# Patient Record
Sex: FEMALE | Race: BLACK OR AFRICAN AMERICAN | NOT HISPANIC OR LATINO | Employment: UNEMPLOYED | URBAN - NONMETROPOLITAN AREA
[De-identification: names, ages, dates, MRNs, and addresses within clinical notes are randomized per-mention and may not be internally consistent; named-entity substitution may affect disease eponyms.]

---

## 2017-01-23 RX ORDER — LANCETS
EACH MISCELLANEOUS
Qty: 100 EACH | Refills: 3 | Status: SHIPPED | OUTPATIENT
Start: 2017-01-23 | End: 2017-10-12 | Stop reason: SDUPTHER

## 2017-02-20 RX ORDER — ISOPROPYL ALCOHOL 0.75 G/1
SWAB TOPICAL
Qty: 200 EACH | Refills: 3 | Status: SHIPPED | OUTPATIENT
Start: 2017-02-20 | End: 2019-04-04 | Stop reason: SDUPTHER

## 2017-03-09 ENCOUNTER — OFFICE VISIT (OUTPATIENT)
Dept: OTOLARYNGOLOGY | Facility: CLINIC | Age: 82
End: 2017-03-09

## 2017-03-09 VITALS — WEIGHT: 194 LBS | BODY MASS INDEX: 29.4 KG/M2 | TEMPERATURE: 98.7 F | HEIGHT: 68 IN

## 2017-03-09 DIAGNOSIS — H61.23 BILATERAL IMPACTED CERUMEN: Primary | ICD-10-CM

## 2017-03-09 PROCEDURE — 69210 REMOVE IMPACTED EAR WAX UNI: CPT | Performed by: OTOLARYNGOLOGY

## 2017-03-09 NOTE — PROGRESS NOTES
Subjective   Kierra Scales is a 87 y.o. female.       History of Present Illness   Patient has a history of recurring binaural cerumen impactions.  Her daughter is noted that she hasn't been hearing well for a few months.      The following portions of the patient's history were reviewed and updated as appropriate: allergies, current medications, past family history, past medical history, past social history, past surgical history and problem list.     reports that she has never smoked. She does not have any smokeless tobacco history on file.   Patient is not a tobacco user and has not been counseled for use of tobacco products      Review of Systems        Objective   Physical Exam  Bilateral cerumen impactions are noted  Using the binocular microscope for visualization, cerumen impaction was removed from bilateral ear canal(s) using instrumentation. This was personally performed by Ugo Gale MD      Assessment/Plan   Kierra was seen today for follow-up.    Diagnoses and all orders for this visit:    Bilateral impacted cerumen      Cerumen removed as described above.  Will have her return in 9 months instead of waiting a year.

## 2017-03-15 ENCOUNTER — APPOINTMENT (OUTPATIENT)
Dept: LAB | Facility: HOSPITAL | Age: 82
End: 2017-03-15

## 2017-03-15 ENCOUNTER — OFFICE VISIT (OUTPATIENT)
Dept: ENDOCRINOLOGY | Facility: CLINIC | Age: 82
End: 2017-03-15

## 2017-03-15 VITALS
HEIGHT: 68 IN | BODY MASS INDEX: 29.37 KG/M2 | SYSTOLIC BLOOD PRESSURE: 118 MMHG | WEIGHT: 193.8 LBS | HEART RATE: 82 BPM | DIASTOLIC BLOOD PRESSURE: 68 MMHG

## 2017-03-15 DIAGNOSIS — E55.9 VITAMIN D DEFICIENCY: ICD-10-CM

## 2017-03-15 DIAGNOSIS — I10 ESSENTIAL HYPERTENSION: Primary | ICD-10-CM

## 2017-03-15 DIAGNOSIS — E05.90 HYPERTHYROIDISM: ICD-10-CM

## 2017-03-15 DIAGNOSIS — Z79.4 CONTROLLED TYPE 2 DIABETES MELLITUS WITHOUT COMPLICATION, WITH LONG-TERM CURRENT USE OF INSULIN (HCC): ICD-10-CM

## 2017-03-15 DIAGNOSIS — E11.9 CONTROLLED TYPE 2 DIABETES MELLITUS WITHOUT COMPLICATION, WITH LONG-TERM CURRENT USE OF INSULIN (HCC): ICD-10-CM

## 2017-03-15 DIAGNOSIS — E78.5 HYPERLIPIDEMIA, UNSPECIFIED HYPERLIPIDEMIA TYPE: ICD-10-CM

## 2017-03-15 LAB
ALBUMIN SERPL-MCNC: 4.6 G/DL (ref 3.4–4.8)
ALBUMIN/GLOB SERPL: 1.5 G/DL (ref 1.1–1.8)
ALP SERPL-CCNC: 75 U/L (ref 38–126)
ALT SERPL W P-5'-P-CCNC: 22 U/L (ref 9–52)
ANION GAP SERPL CALCULATED.3IONS-SCNC: 13 MMOL/L (ref 5–15)
AST SERPL-CCNC: 35 U/L (ref 14–36)
BASOPHILS # BLD AUTO: 0.01 10*3/MM3 (ref 0–0.2)
BASOPHILS NFR BLD AUTO: 0.2 % (ref 0–2)
BILIRUB SERPL-MCNC: 0.8 MG/DL (ref 0.2–1.3)
BUN BLD-MCNC: 13 MG/DL (ref 7–21)
BUN/CREAT SERPL: 16.9 (ref 7–25)
CALCIUM SPEC-SCNC: 9.6 MG/DL (ref 8.4–10.2)
CHLORIDE SERPL-SCNC: 101 MMOL/L (ref 95–110)
CO2 SERPL-SCNC: 29 MMOL/L (ref 22–31)
CREAT BLD-MCNC: 0.77 MG/DL (ref 0.5–1)
DEPRECATED RDW RBC AUTO: 43.7 FL (ref 36.4–46.3)
EOSINOPHIL # BLD AUTO: 0.08 10*3/MM3 (ref 0–0.7)
EOSINOPHIL NFR BLD AUTO: 1.7 % (ref 0–7)
ERYTHROCYTE [DISTWIDTH] IN BLOOD BY AUTOMATED COUNT: 12.8 % (ref 11.5–14.5)
GFR SERPL CREATININE-BSD FRML MDRD: 86 ML/MIN/1.73 (ref 39–90)
GLOBULIN UR ELPH-MCNC: 3 GM/DL (ref 2.3–3.5)
GLUCOSE BLD-MCNC: 138 MG/DL (ref 60–100)
HBA1C MFR BLD: 6.36 % (ref 4–5.6)
HCT VFR BLD AUTO: 39.7 % (ref 35–45)
HGB BLD-MCNC: 13.2 G/DL (ref 12–15.5)
IMM GRANULOCYTES # BLD: 0.01 10*3/MM3 (ref 0–0.02)
IMM GRANULOCYTES NFR BLD: 0.2 % (ref 0–0.5)
LYMPHOCYTES # BLD AUTO: 1.51 10*3/MM3 (ref 0.6–4.2)
LYMPHOCYTES NFR BLD AUTO: 32.3 % (ref 10–50)
MCH RBC QN AUTO: 31.7 PG (ref 26.5–34)
MCHC RBC AUTO-ENTMCNC: 33.2 G/DL (ref 31.4–36)
MCV RBC AUTO: 95.4 FL (ref 80–98)
MONOCYTES # BLD AUTO: 0.27 10*3/MM3 (ref 0–0.9)
MONOCYTES NFR BLD AUTO: 5.8 % (ref 0–12)
NEUTROPHILS # BLD AUTO: 2.79 10*3/MM3 (ref 2–8.6)
NEUTROPHILS NFR BLD AUTO: 59.8 % (ref 37–80)
PLATELET # BLD AUTO: 220 10*3/MM3 (ref 150–450)
PMV BLD AUTO: 11 FL (ref 8–12)
POTASSIUM BLD-SCNC: 4.2 MMOL/L (ref 3.5–5.1)
PROT SERPL-MCNC: 7.6 G/DL (ref 6.3–8.6)
RBC # BLD AUTO: 4.16 10*6/MM3 (ref 3.77–5.16)
SODIUM BLD-SCNC: 143 MMOL/L (ref 137–145)
T3 SERPL-MCNC: 135 NG/DL (ref 97–169)
T4 FREE SERPL-MCNC: 1.72 NG/DL (ref 0.78–2.19)
TSH SERPL DL<=0.05 MIU/L-ACNC: 0.43 MIU/ML (ref 0.46–4.68)
WBC NRBC COR # BLD: 4.67 10*3/MM3 (ref 3.2–9.8)

## 2017-03-15 PROCEDURE — 99214 OFFICE O/P EST MOD 30 MIN: CPT | Performed by: NURSE PRACTITIONER

## 2017-03-15 PROCEDURE — 36415 COLL VENOUS BLD VENIPUNCTURE: CPT | Performed by: NURSE PRACTITIONER

## 2017-03-15 PROCEDURE — 84443 ASSAY THYROID STIM HORMONE: CPT | Performed by: NURSE PRACTITIONER

## 2017-03-15 PROCEDURE — 84439 ASSAY OF FREE THYROXINE: CPT | Performed by: NURSE PRACTITIONER

## 2017-03-15 PROCEDURE — 80053 COMPREHEN METABOLIC PANEL: CPT | Performed by: NURSE PRACTITIONER

## 2017-03-15 PROCEDURE — 85025 COMPLETE CBC W/AUTO DIFF WBC: CPT | Performed by: NURSE PRACTITIONER

## 2017-03-15 PROCEDURE — 83036 HEMOGLOBIN GLYCOSYLATED A1C: CPT | Performed by: NURSE PRACTITIONER

## 2017-03-15 PROCEDURE — 84480 ASSAY TRIIODOTHYRONINE (T3): CPT | Performed by: NURSE PRACTITIONER

## 2017-03-15 RX ORDER — PRAVASTATIN SODIUM 40 MG
40 TABLET ORAL NIGHTLY
Qty: 30 TABLET | Refills: 11 | Status: SHIPPED | OUTPATIENT
Start: 2017-03-15 | End: 2018-03-16 | Stop reason: SDUPTHER

## 2017-03-15 RX ORDER — EZETIMIBE 10 MG/1
10 TABLET ORAL DAILY
Qty: 30 TABLET | Refills: 11 | Status: SHIPPED | OUTPATIENT
Start: 2017-03-15 | End: 2018-03-16 | Stop reason: SDUPTHER

## 2017-03-15 NOTE — PROGRESS NOTES
Subjective    Kierra Scales is a 87 y.o. female. she is here today for follow-up.    History of Present Illness     Duration/Timing:Diabetes mellitus type 2, age at onset of diabetes: 50 years, onset of symptoms sudden         Timing constant      Quality Controlled      Severity High            Severity (Complications/Hospitalizations)  Secondary Macrovascular Complications:CAD  Secondary Microvascular Complications:Diabetic Retinopathy      Context  Diabetes Regimen: Insulin, last HgbA1c 6.7% from Dec. 2016            Blood Glucose Readings     Was running high   Was snacking all day     Increased the insulin and now at goal         Diet      High carb      Exercise:does not exercise      Associated Signs/Symptoms  Hyperglycemic Symptoms: No polyuria,polydipsia, No polyphagia, No weight loss, No weight gain  Hypoglycemic Episodes:No documented hypoglycemia   Modifying Factors/Comorbidities:Hypertension, hyperlipidemia           The following portions of the patient's history were reviewed and updated as appropriate:   Past Medical History   Diagnosis Date   • Anxiety    • Arthritis    • Bradycardia    • Chest pain    • Dyslipidemia    • Dyspnea    • Fibrocystic disease of breast    • Hashimoto's thyroiditis    • Hypertensive disorder    • Hypoglycemic reaction to insulin in type 2 diabetes mellitus    • Hypothyroidism    • Hypothyroidism    • Pain    • Palpitations    • Paroxysmal tachycardia    • Postoperative hypothyroidism    • Shortness of breath    • Tricuspid valve disorders, specified as nonrheumatic    • Type 2 diabetes mellitus    • Vitamin D deficiency      Past Surgical History   Procedure Laterality Date   • Pacemaker implantation     • Tubal abdominal ligation     • Eye surgery     • External ear surgery     • Salivary gland surgery     • Lipoma excision       Family History   Problem Relation Age of Onset   • Diabetes Other    • Heart disease Other    • Hypertension Other    • Thyroid disease  "Other      OB History     No data available        Current Outpatient Prescriptions   Medication Sig Dispense Refill   • Alcohol Swabs (B-D SINGLE USE SWABS REGULAR) pads USE AS DIRECTED 200 each 3   • Canagliflozin (INVOKANA) 300 MG tablet Take 300 mg by mouth daily.     • donepezil (ARICEPT) 10 MG tablet Take 1 tablet by mouth Daily.  3   • enalapril (VASOTEC) 20 MG tablet Take 20 mg by mouth 2 (two) times a day.     • ezetimibe (ZETIA) 10 MG tablet Take 1 tablet by mouth Daily. 30 tablet 11   • insulin aspart (NOVOLOG FLEXPEN) 100 UNIT/ML solution pen-injector sc pen Inject 4 Units under the skin daily with dinner. May increase up to 8 units as directed.     • Insulin Glargine (LANTUS SOLOSTAR) 100 UNIT/ML injection pen Inject 20 Units under the skin every night.     • Insulin Pen Needle (COMFORT EZ PEN NEEDLES) 31G X 6 MM misc 2 (two) times a day. Inject two times per day.     • Insulin Syringe-Needle U-100 31G X 5/16\" 1 ML misc 2 (two) times a day. Use as directed TWICE DAILY FOR Insulin Injections     • Lancets (UNILET COMFORTOUCH LANCET) misc USE AS DIRECTED FOR FINGERSTICKS 100 each 3   • memantine (NAMENDA) 5 MG tablet Take 1 tablet by mouth 2 (Two) Times a Day.  3   • nabumetone (RELAFEN) 500 MG tablet Take 500 mg by mouth daily.     • pravastatin (PRAVACHOL) 40 MG tablet Take 1 tablet by mouth Every Night. 30 tablet 11   • SITagliptin (JANUVIA) 100 MG tablet Take 1 tablet by mouth Daily. 30 tablet 11   • sotalol (BETAPACE) 80 MG tablet Take 80 mg by mouth 2 (Two) Times a Day.     • TAZTIA  MG 24 hr capsule      • vitamin D (ERGOCALCIFEROL) 31795 UNITS capsule capsule Take 1 capsule by mouth Every 14 (Fourteen) Days. 30 capsule 11     No current facility-administered medications for this visit.      Allergies   Allergen Reactions   • Penicillins      Social History     Social History   • Marital status:      Spouse name: N/A   • Number of children: N/A   • Years of education: N/A     Social " "History Main Topics   • Smoking status: Never Smoker   • Smokeless tobacco: None   • Alcohol use None   • Drug use: None   • Sexual activity: Not Asked     Other Topics Concern   • None     Social History Narrative       Review of Systems  Review of Systems   Constitutional: Negative for activity change, appetite change, chills, diaphoresis and fatigue.   HENT: Negative for congestion, dental problem, drooling, ear discharge, ear pain, facial swelling, sneezing, sore throat, tinnitus, trouble swallowing and voice change.    Eyes: Negative for photophobia, pain, discharge, redness, itching and visual disturbance.   Respiratory: Negative for apnea, cough, choking, chest tightness and shortness of breath.    Cardiovascular: Negative for chest pain, palpitations and leg swelling.   Gastrointestinal: Negative for abdominal distention, abdominal pain, constipation, diarrhea, nausea and vomiting.   Endocrine: Negative for cold intolerance, heat intolerance, polydipsia, polyphagia and polyuria.   Genitourinary: Negative for difficulty urinating, dysuria, frequency, hematuria and urgency.   Musculoskeletal: Negative for arthralgias, back pain, gait problem, joint swelling, myalgias, neck pain and neck stiffness.   Skin: Negative for color change, pallor, rash and wound.   Allergic/Immunologic: Negative for environmental allergies, food allergies and immunocompromised state.   Neurological: Negative for dizziness, tremors, facial asymmetry, weakness, light-headedness, numbness and headaches.   Hematological: Negative for adenopathy. Does not bruise/bleed easily.   Psychiatric/Behavioral: Negative for agitation, behavioral problems, confusion, decreased concentration and sleep disturbance.        Objective      Visit Vitals   • /68   • Pulse 82   • Ht 68\" (172.7 cm)   • Wt 193 lb 12.8 oz (87.9 kg)   • BMI 29.47 kg/m2     Physical Exam   Constitutional: She is oriented to person, place, and time. She appears " well-developed and well-nourished. No distress.   HENT:   Head: Normocephalic and atraumatic.   Right Ear: External ear normal.   Left Ear: External ear normal.   Nose: Nose normal.   Eyes: Conjunctivae and EOM are normal. Pupils are equal, round, and reactive to light.   Neck: Normal range of motion. Neck supple. No tracheal deviation present. No thyromegaly present.   Cardiovascular: Normal rate, regular rhythm and normal heart sounds.    No murmur heard.  Pulmonary/Chest: Effort normal and breath sounds normal. No respiratory distress. She has no wheezes.   Abdominal: Soft. Bowel sounds are normal. There is no tenderness. There is no rebound and no guarding.   Musculoskeletal: Normal range of motion. She exhibits no edema, tenderness or deformity.   Neurological: She is alert and oriented to person, place, and time. No cranial nerve deficit.   Skin: Skin is warm and dry. No rash noted.   Psychiatric: She has a normal mood and affect. Her behavior is normal. Judgment and thought content normal.       Lab Review  GLUCOSE (mg/dl)   Date Value   12/09/2016 135 (H)   09/09/2016 62   06/09/2016 183 (H)     SODIUM (mmol/L)   Date Value   12/09/2016 144   09/09/2016 141   06/09/2016 144     POTASSIUM (mmol/L)   Date Value   12/09/2016 4.4   09/09/2016 4.4   06/09/2016 4.2     CHLORIDE (mmol/L)   Date Value   12/09/2016 102   09/09/2016 101   06/09/2016 100     CO2 (mmol/L)   Date Value   12/09/2016 30   09/09/2016 26   06/09/2016 30     BUN (mg/dl)   Date Value   12/09/2016 12   09/09/2016 16   06/09/2016 14     CREATININE (mg/dl)   Date Value   12/09/2016 0.8   09/09/2016 0.9   06/09/2016 0.8     HEMOGLOBIN A1C (%TotHgb)   Date Value   12/09/2016 6.7 (H)   09/09/2016 6.8 (H)   06/09/2016 6.7 (H)     TRIGLYCERIDES (mg/dl)   Date Value   08/05/2015 54   02/08/2014 67       Assessment/Plan      1. Essential hypertension    2. Hyperlipidemia, unspecified hyperlipidemia type    3. Vitamin D deficiency    4. Controlled type 2  "diabetes mellitus without complication, with long-term current use of insulin    5. Hyperthyroidism    .    Medications prescribed:  Outpatient Encounter Prescriptions as of 3/15/2017   Medication Sig Dispense Refill   • Alcohol Swabs (B-D SINGLE USE SWABS REGULAR) pads USE AS DIRECTED 200 each 3   • Canagliflozin (INVOKANA) 300 MG tablet Take 300 mg by mouth daily.     • donepezil (ARICEPT) 10 MG tablet Take 1 tablet by mouth Daily.  3   • enalapril (VASOTEC) 20 MG tablet Take 20 mg by mouth 2 (two) times a day.     • ezetimibe (ZETIA) 10 MG tablet Take 1 tablet by mouth Daily. 30 tablet 11   • insulin aspart (NOVOLOG FLEXPEN) 100 UNIT/ML solution pen-injector sc pen Inject 4 Units under the skin daily with dinner. May increase up to 8 units as directed.     • Insulin Glargine (LANTUS SOLOSTAR) 100 UNIT/ML injection pen Inject 20 Units under the skin every night.     • Insulin Pen Needle (COMFORT EZ PEN NEEDLES) 31G X 6 MM misc 2 (two) times a day. Inject two times per day.     • Insulin Syringe-Needle U-100 31G X 5/16\" 1 ML misc 2 (two) times a day. Use as directed TWICE DAILY FOR Insulin Injections     • Lancets (UNILET COMFORTOUCH LANCET) misc USE AS DIRECTED FOR FINGERSTICKS 100 each 3   • memantine (NAMENDA) 5 MG tablet Take 1 tablet by mouth 2 (Two) Times a Day.  3   • nabumetone (RELAFEN) 500 MG tablet Take 500 mg by mouth daily.     • pravastatin (PRAVACHOL) 40 MG tablet Take 1 tablet by mouth Every Night. 30 tablet 11   • SITagliptin (JANUVIA) 100 MG tablet Take 1 tablet by mouth Daily. 30 tablet 11   • sotalol (BETAPACE) 80 MG tablet Take 80 mg by mouth 2 (Two) Times a Day.     • TAZTIA  MG 24 hr capsule      • vitamin D (ERGOCALCIFEROL) 55785 UNITS capsule capsule Take 1 capsule by mouth Every 14 (Fourteen) Days. 30 capsule 11   • [DISCONTINUED] ezetimibe (ZETIA) 10 MG tablet Take 10 mg by mouth daily.     • [DISCONTINUED] pravastatin (PRAVACHOL) 40 MG tablet Take 40 mg by mouth every night.     • " [DISCONTINUED] sitaGLIPtin (JANUVIA) 100 MG tablet Take 100 mg by mouth daily.       No facility-administered encounter medications on file as of 3/15/2017.        Orders placed during this encounter include:  Orders Placed This Encounter   Procedures   • Comprehensive Metabolic Panel   • TSH   • Hemoglobin A1c   • T4, Free   • T3           Assessment and Plan      Glycemic Management         Januvia 100 mg daily      Invokana 100 mg daily     Lantus 20 units - increased to 22 units      Novolog 8 units with meals - increased to 10 units                  Lipid Management:      Pravastatin 40 mg qhs     Zetia 10 mg qhs     10-15      Lipids at goal         Blood Pressure Management:      On enalapril 20 mg daily      Not taking lasix      Sotalol TID      Preventive Care:patient is not smoking      Bone Health      August 2016      Vitamin d- 45.5      50,000 units every 2 weeks      Other Diabetes Related Aspects      Off medication      Nov. 2016      TSH - 0.53        Lab Results   Component Value Date    TSH 0.39 (L) 12/09/2016     Repeat thyroid function test             4. Follow-up: Return in about 3 months (around 6/15/2017) for Recheck.    .

## 2017-03-16 ENCOUNTER — TELEPHONE (OUTPATIENT)
Dept: ENDOCRINOLOGY | Facility: CLINIC | Age: 82
End: 2017-03-16

## 2017-03-16 DIAGNOSIS — R94.6 ABNORMAL THYROID FUNCTION TEST: Primary | ICD-10-CM

## 2017-03-16 DIAGNOSIS — E04.0 SIMPLE GOITER: ICD-10-CM

## 2017-03-16 NOTE — TELEPHONE ENCOUNTER
----- Message from DEBBY Cotto sent at 3/16/2017  1:09 PM CDT -----  Thyroid still abnormal need thyroid ultrasound and repeat levels day of ultrasound; A1c was 6.3 so great; chemistry and blood count normal

## 2017-03-24 ENCOUNTER — HOSPITAL ENCOUNTER (OUTPATIENT)
Dept: ULTRASOUND IMAGING | Facility: HOSPITAL | Age: 82
Discharge: HOME | End: 2017-03-24
Admitting: NURSE PRACTITIONER

## 2017-03-24 PROCEDURE — 76536 US EXAM OF HEAD AND NECK: CPT

## 2017-03-28 ENCOUNTER — TELEPHONE (OUTPATIENT)
Dept: ENDOCRINOLOGY | Facility: CLINIC | Age: 82
End: 2017-03-28

## 2017-03-28 DIAGNOSIS — E05.20 TOXIC MULTINODUL GOITER: Primary | ICD-10-CM

## 2017-03-28 NOTE — TELEPHONE ENCOUNTER
----- Message from DEBBY Cotto sent at 3/28/2017  7:53 AM CDT -----  Let her daughter know we need a thyroid uptake and scan-due to her tsh being hyperthyroid and she has thyroid nodules in both lobes of the thyroid ( I will order)

## 2017-04-10 ENCOUNTER — HOSPITAL ENCOUNTER (OUTPATIENT)
Dept: NUCLEAR MEDICINE | Facility: HOSPITAL | Age: 82
Discharge: HOME | End: 2017-04-10

## 2017-04-10 PROCEDURE — 0 SODIUM IODIDE 3.7 MBQ CAPSULE: Performed by: NURSE PRACTITIONER

## 2017-04-10 PROCEDURE — A9516 IODINE I-123 SOD IODIDE MIC: HCPCS | Performed by: NURSE PRACTITIONER

## 2017-04-10 RX ORDER — SODIUM IODIDE I 123 100 UCI/1
1 CAPSULE, GELATIN COATED ORAL
Status: COMPLETED | OUTPATIENT
Start: 2017-04-10 | End: 2017-04-10

## 2017-04-10 RX ADMIN — Medication 1 CAPSULE: at 08:44

## 2017-04-11 ENCOUNTER — HOSPITAL ENCOUNTER (OUTPATIENT)
Dept: NUCLEAR MEDICINE | Facility: HOSPITAL | Age: 82
Discharge: HOME | End: 2017-04-11

## 2017-04-11 PROCEDURE — 78014 THYROID IMAGING W/BLOOD FLOW: CPT

## 2017-04-12 ENCOUNTER — TELEPHONE (OUTPATIENT)
Dept: ENDOCRINOLOGY | Facility: CLINIC | Age: 82
End: 2017-04-12

## 2017-04-12 DIAGNOSIS — E04.1 SOLITARY THYROID NODULE: Primary | ICD-10-CM

## 2017-04-12 DIAGNOSIS — E04.1 THYROID NODULE: Primary | ICD-10-CM

## 2017-04-12 NOTE — TELEPHONE ENCOUNTER
----- Message from DEBBY Cotto sent at 4/12/2017 10:09 AM CDT -----  Let her know we need to start methimazole 2.5 mg daily - reason hyperthyroid, scan has high uptake; also needs to be scheduled for biopsy of thyroid nodule with  ; What blood thinners is she on? Stop ASA one week prior to biopsy, is she on plavix, coumadin?

## 2017-04-17 RX ORDER — INSULIN ASPART 100 [IU]/ML
INJECTION, SOLUTION INTRAVENOUS; SUBCUTANEOUS
Qty: 15 ML | Refills: 3 | Status: SHIPPED | OUTPATIENT
Start: 2017-04-17 | End: 2017-08-02 | Stop reason: SDUPTHER

## 2017-05-01 ENCOUNTER — TELEPHONE (OUTPATIENT)
Dept: ENDOCRINOLOGY | Facility: CLINIC | Age: 82
End: 2017-05-01

## 2017-05-01 RX ORDER — METHIMAZOLE 5 MG/1
TABLET ORAL
Qty: 30 TABLET | Refills: 5 | Status: SHIPPED | OUTPATIENT
Start: 2017-05-01 | End: 2018-04-13 | Stop reason: SDUPTHER

## 2017-05-03 ENCOUNTER — HOSPITAL ENCOUNTER (OUTPATIENT)
Dept: ULTRASOUND IMAGING | Facility: HOSPITAL | Age: 82
Discharge: HOME | End: 2017-05-03
Admitting: INTERNAL MEDICINE

## 2017-05-03 DIAGNOSIS — E04.1 SOLITARY THYROID NODULE: ICD-10-CM

## 2017-05-03 PROCEDURE — 88173 CYTOPATH EVAL FNA REPORT: CPT | Performed by: PATHOLOGY

## 2017-05-03 PROCEDURE — 88173 CYTOPATH EVAL FNA REPORT: CPT | Performed by: NURSE PRACTITIONER

## 2017-05-03 PROCEDURE — 76942 ECHO GUIDE FOR BIOPSY: CPT

## 2017-05-03 PROCEDURE — 10022 US GUIDED FINE NEEDLE ASPIRATION: CPT | Performed by: INTERNAL MEDICINE

## 2017-05-03 PROCEDURE — 76942 ECHO GUIDE FOR BIOPSY: CPT | Performed by: INTERNAL MEDICINE

## 2017-05-05 LAB
LAB AP CASE REPORT: NORMAL
LAB AP DIAGNOSIS COMMENT: NORMAL
LAB AP NON-GYN SPECIMEN ADEQUACY: NORMAL
Lab: NORMAL
PATH REPORT.FINAL DX SPEC: NORMAL

## 2017-05-08 ENCOUNTER — TELEPHONE (OUTPATIENT)
Dept: ENDOCRINOLOGY | Facility: CLINIC | Age: 82
End: 2017-05-08

## 2017-05-18 ENCOUNTER — TELEPHONE (OUTPATIENT)
Dept: ENDOCRINOLOGY | Facility: CLINIC | Age: 82
End: 2017-05-18

## 2017-05-24 ENCOUNTER — CLINICAL SUPPORT (OUTPATIENT)
Dept: CARDIOLOGY | Facility: CLINIC | Age: 82
End: 2017-05-24

## 2017-05-24 DIAGNOSIS — I49.5 SSS (SICK SINUS SYNDROME) (HCC): Primary | ICD-10-CM

## 2017-05-24 PROCEDURE — 93288 INTERROG EVL PM/LDLS PM IP: CPT | Performed by: INTERNAL MEDICINE

## 2017-05-26 ENCOUNTER — APPOINTMENT (OUTPATIENT)
Dept: CT IMAGING | Facility: HOSPITAL | Age: 82
End: 2017-05-26

## 2017-05-26 ENCOUNTER — HOSPITAL ENCOUNTER (INPATIENT)
Facility: HOSPITAL | Age: 82
LOS: 13 days | End: 2017-06-12
Attending: FAMILY MEDICINE | Admitting: FAMILY MEDICINE

## 2017-05-26 DIAGNOSIS — R31.9 HEMATURIA: ICD-10-CM

## 2017-05-26 DIAGNOSIS — K92.1 GASTROINTESTINAL HEMORRHAGE WITH MELENA: Primary | ICD-10-CM

## 2017-05-26 DIAGNOSIS — E86.0 DEHYDRATION: ICD-10-CM

## 2017-05-26 DIAGNOSIS — Z74.09 IMPAIRED FUNCTIONAL MOBILITY, BALANCE, GAIT, AND ENDURANCE: ICD-10-CM

## 2017-05-26 DIAGNOSIS — Z74.09 IMPAIRED MOBILITY AND ADLS: ICD-10-CM

## 2017-05-26 DIAGNOSIS — C18.7 MALIGNANT NEOPLASM OF SIGMOID COLON (HCC): ICD-10-CM

## 2017-05-26 DIAGNOSIS — Z78.9 IMPAIRED MOBILITY AND ADLS: ICD-10-CM

## 2017-05-26 LAB
ALBUMIN SERPL-MCNC: 3.6 G/DL (ref 3.4–4.8)
ALBUMIN/GLOB SERPL: 1.4 G/DL (ref 1.1–1.8)
ALP SERPL-CCNC: 54 U/L (ref 38–126)
ALT SERPL W P-5'-P-CCNC: 23 U/L (ref 9–52)
ANION GAP SERPL CALCULATED.3IONS-SCNC: 10 MMOL/L (ref 5–15)
AST SERPL-CCNC: 18 U/L (ref 14–36)
BACTERIA UR QL AUTO: NORMAL /HPF
BASOPHILS # BLD AUTO: 0.01 10*3/MM3 (ref 0–0.2)
BASOPHILS NFR BLD AUTO: 0.2 % (ref 0–2)
BILIRUB SERPL-MCNC: 0.6 MG/DL (ref 0.2–1.3)
BILIRUB UR QL STRIP: NEGATIVE
BUN BLD-MCNC: 26 MG/DL (ref 7–21)
BUN/CREAT SERPL: 28.6 (ref 7–25)
CALCIUM SPEC-SCNC: 9.2 MG/DL (ref 8.4–10.2)
CHLORIDE SERPL-SCNC: 105 MMOL/L (ref 95–110)
CLARITY UR: CLEAR
CO2 SERPL-SCNC: 20 MMOL/L (ref 22–31)
COLOR UR: YELLOW
CREAT BLD-MCNC: 0.91 MG/DL (ref 0.5–1)
DEPRECATED RDW RBC AUTO: 44.6 FL (ref 36.4–46.3)
EOSINOPHIL # BLD AUTO: 0.01 10*3/MM3 (ref 0–0.7)
EOSINOPHIL NFR BLD AUTO: 0.2 % (ref 0–7)
ERYTHROCYTE [DISTWIDTH] IN BLOOD BY AUTOMATED COUNT: 13 % (ref 11.5–14.5)
GFR SERPL CREATININE-BSD FRML MDRD: 71 ML/MIN/1.73 (ref 39–90)
GLOBULIN UR ELPH-MCNC: 2.6 GM/DL (ref 2.3–3.5)
GLUCOSE BLD-MCNC: 173 MG/DL (ref 60–100)
GLUCOSE BLDC GLUCOMTR-MCNC: 111 MG/DL (ref 70–130)
GLUCOSE UR STRIP-MCNC: ABNORMAL MG/DL
HCT VFR BLD AUTO: 31.3 % (ref 35–45)
HGB BLD-MCNC: 10.4 G/DL (ref 12–15.5)
HGB UR QL STRIP.AUTO: ABNORMAL
HOLD SPECIMEN: NORMAL
HOLD SPECIMEN: NORMAL
HYALINE CASTS UR QL AUTO: NORMAL /LPF
IMM GRANULOCYTES # BLD: 0.02 10*3/MM3 (ref 0–0.02)
IMM GRANULOCYTES NFR BLD: 0.4 % (ref 0–0.5)
KETONES UR QL STRIP: NEGATIVE
LEUKOCYTE ESTERASE UR QL STRIP.AUTO: NEGATIVE
LIPASE SERPL-CCNC: 140 U/L (ref 23–300)
LYMPHOCYTES # BLD AUTO: 1.13 10*3/MM3 (ref 0.6–4.2)
LYMPHOCYTES NFR BLD AUTO: 21.9 % (ref 10–50)
MCH RBC QN AUTO: 31.4 PG (ref 26.5–34)
MCHC RBC AUTO-ENTMCNC: 33.2 G/DL (ref 31.4–36)
MCV RBC AUTO: 94.6 FL (ref 80–98)
MONOCYTES # BLD AUTO: 0.26 10*3/MM3 (ref 0–0.9)
MONOCYTES NFR BLD AUTO: 5 % (ref 0–12)
NEUTROPHILS # BLD AUTO: 3.72 10*3/MM3 (ref 2–8.6)
NEUTROPHILS NFR BLD AUTO: 72.3 % (ref 37–80)
NITRITE UR QL STRIP: NEGATIVE
PH UR STRIP.AUTO: 5.5 [PH] (ref 5–9)
PLATELET # BLD AUTO: 155 10*3/MM3 (ref 150–450)
PMV BLD AUTO: 10.6 FL (ref 8–12)
POTASSIUM BLD-SCNC: 4.4 MMOL/L (ref 3.5–5.1)
PROT SERPL-MCNC: 6.2 G/DL (ref 6.3–8.6)
PROT UR QL STRIP: NEGATIVE
RBC # BLD AUTO: 3.31 10*6/MM3 (ref 3.77–5.16)
RBC # UR: NORMAL /HPF
REF LAB TEST METHOD: NORMAL
SODIUM BLD-SCNC: 135 MMOL/L (ref 137–145)
SP GR UR STRIP: 1.01 (ref 1–1.03)
SQUAMOUS #/AREA URNS HPF: NORMAL /HPF
UROBILINOGEN UR QL STRIP: ABNORMAL
WBC NRBC COR # BLD: 5.15 10*3/MM3 (ref 3.2–9.8)
WBC UR QL AUTO: NORMAL /HPF
WHOLE BLOOD HOLD SPECIMEN: NORMAL
WHOLE BLOOD HOLD SPECIMEN: NORMAL

## 2017-05-26 PROCEDURE — G0378 HOSPITAL OBSERVATION PER HR: HCPCS

## 2017-05-26 PROCEDURE — 85025 COMPLETE CBC W/AUTO DIFF WBC: CPT | Performed by: INTERNAL MEDICINE

## 2017-05-26 PROCEDURE — 82962 GLUCOSE BLOOD TEST: CPT

## 2017-05-26 PROCEDURE — 99284 EMERGENCY DEPT VISIT MOD MDM: CPT

## 2017-05-26 PROCEDURE — 85025 COMPLETE CBC W/AUTO DIFF WBC: CPT | Performed by: FAMILY MEDICINE

## 2017-05-26 PROCEDURE — 83690 ASSAY OF LIPASE: CPT | Performed by: NURSE PRACTITIONER

## 2017-05-26 PROCEDURE — 74176 CT ABD & PELVIS W/O CONTRAST: CPT

## 2017-05-26 PROCEDURE — 80053 COMPREHEN METABOLIC PANEL: CPT | Performed by: FAMILY MEDICINE

## 2017-05-26 PROCEDURE — 81001 URINALYSIS AUTO W/SCOPE: CPT | Performed by: NURSE PRACTITIONER

## 2017-05-26 RX ORDER — DILTIAZEM HYDROCHLORIDE 180 MG/1
180 CAPSULE, COATED, EXTENDED RELEASE ORAL DAILY
COMMUNITY
End: 2017-06-23 | Stop reason: HOSPADM

## 2017-05-26 RX ORDER — ONDANSETRON 2 MG/ML
4 INJECTION INTRAMUSCULAR; INTRAVENOUS EVERY 6 HOURS PRN
Status: DISCONTINUED | OUTPATIENT
Start: 2017-05-26 | End: 2017-06-08

## 2017-05-26 RX ORDER — DILTIAZEM HYDROCHLORIDE 180 MG/1
180 CAPSULE, COATED, EXTENDED RELEASE ORAL DAILY
Status: DISCONTINUED | OUTPATIENT
Start: 2017-05-26 | End: 2017-06-12 | Stop reason: HOSPADM

## 2017-05-26 RX ORDER — SOTALOL HYDROCHLORIDE 80 MG/1
80 TABLET ORAL EVERY 12 HOURS SCHEDULED
Status: DISCONTINUED | OUTPATIENT
Start: 2017-05-26 | End: 2017-06-12 | Stop reason: HOSPADM

## 2017-05-26 RX ORDER — SODIUM CHLORIDE 9 MG/ML
75 INJECTION, SOLUTION INTRAVENOUS CONTINUOUS
Status: DISCONTINUED | OUTPATIENT
Start: 2017-05-26 | End: 2017-05-26

## 2017-05-26 RX ORDER — ENALAPRIL MALEATE 5 MG/1
20 TABLET ORAL 2 TIMES DAILY
Status: DISCONTINUED | OUTPATIENT
Start: 2017-05-26 | End: 2017-06-12 | Stop reason: HOSPADM

## 2017-05-26 RX ORDER — PRAVASTATIN SODIUM 40 MG
40 TABLET ORAL NIGHTLY
Status: DISCONTINUED | OUTPATIENT
Start: 2017-05-26 | End: 2017-06-02

## 2017-05-26 RX ORDER — SODIUM CHLORIDE 9 MG/ML
75 INJECTION, SOLUTION INTRAVENOUS CONTINUOUS
Status: DISCONTINUED | OUTPATIENT
Start: 2017-05-26 | End: 2017-06-02

## 2017-05-26 RX ORDER — DONEPEZIL HYDROCHLORIDE 5 MG/1
10 TABLET, FILM COATED ORAL DAILY
Status: DISCONTINUED | OUTPATIENT
Start: 2017-05-26 | End: 2017-05-28

## 2017-05-26 RX ORDER — HYDROCODONE BITARTRATE AND ACETAMINOPHEN 5; 325 MG/1; MG/1
1 TABLET ORAL EVERY 6 HOURS PRN
Status: DISCONTINUED | OUTPATIENT
Start: 2017-05-26 | End: 2017-06-02

## 2017-05-26 RX ORDER — PANTOPRAZOLE SODIUM 40 MG/10ML
40 INJECTION, POWDER, LYOPHILIZED, FOR SOLUTION INTRAVENOUS EVERY 12 HOURS SCHEDULED
Status: DISCONTINUED | OUTPATIENT
Start: 2017-05-26 | End: 2017-06-12 | Stop reason: HOSPADM

## 2017-05-26 RX ORDER — PANTOPRAZOLE SODIUM 40 MG/10ML
40 INJECTION, POWDER, LYOPHILIZED, FOR SOLUTION INTRAVENOUS
Status: DISCONTINUED | OUTPATIENT
Start: 2017-05-27 | End: 2017-05-26

## 2017-05-26 RX ORDER — METHIMAZOLE 5 MG/1
2.5 TABLET ORAL DAILY
Status: DISCONTINUED | OUTPATIENT
Start: 2017-05-26 | End: 2017-06-12 | Stop reason: HOSPADM

## 2017-05-26 RX ORDER — ACETAMINOPHEN 325 MG/1
650 TABLET ORAL EVERY 6 HOURS PRN
Status: DISCONTINUED | OUTPATIENT
Start: 2017-05-26 | End: 2017-06-02

## 2017-05-26 RX ORDER — MEMANTINE HYDROCHLORIDE 5 MG/1
5 TABLET ORAL 2 TIMES DAILY
Status: DISCONTINUED | OUTPATIENT
Start: 2017-05-26 | End: 2017-06-12 | Stop reason: HOSPADM

## 2017-05-26 RX ADMIN — SOTALOL HYDROCHLORIDE 80 MG: 80 TABLET ORAL at 20:39

## 2017-05-26 RX ADMIN — ENALAPRIL MALEATE 20 MG: 10 TABLET ORAL at 20:40

## 2017-05-26 RX ADMIN — SODIUM CHLORIDE 500 ML: 9 INJECTION, SOLUTION INTRAVENOUS at 14:33

## 2017-05-26 RX ADMIN — MEMANTINE 5 MG: 5 TABLET ORAL at 20:39

## 2017-05-26 RX ADMIN — DONEPEZIL HYDROCHLORIDE 10 MG: 10 TABLET, FILM COATED ORAL at 20:39

## 2017-05-26 RX ADMIN — DILTIAZEM HYDROCHLORIDE 180 MG: 180 CAPSULE, EXTENDED RELEASE ORAL at 20:38

## 2017-05-26 RX ADMIN — METHIMAZOLE 2.5 MG: 5 TABLET ORAL at 20:40

## 2017-05-26 RX ADMIN — PANTOPRAZOLE SODIUM 40 MG: 40 INJECTION, POWDER, FOR SOLUTION INTRAVENOUS at 20:51

## 2017-05-26 RX ADMIN — PRAVASTATIN SODIUM 40 MG: 40 TABLET ORAL at 20:38

## 2017-05-26 RX ADMIN — SODIUM CHLORIDE 75 ML/HR: 900 INJECTION, SOLUTION INTRAVENOUS at 18:13

## 2017-05-27 LAB
ALBUMIN SERPL-MCNC: 3.8 G/DL (ref 3.4–4.8)
ALBUMIN/GLOB SERPL: 1.4 G/DL (ref 1.1–1.8)
ALP SERPL-CCNC: 50 U/L (ref 38–126)
ALT SERPL W P-5'-P-CCNC: 26 U/L (ref 9–52)
ANION GAP SERPL CALCULATED.3IONS-SCNC: 11 MMOL/L (ref 5–15)
AST SERPL-CCNC: 24 U/L (ref 14–36)
BASOPHILS # BLD AUTO: 0 10*3/MM3 (ref 0–0.2)
BASOPHILS # BLD AUTO: 0 10*3/MM3 (ref 0–0.2)
BASOPHILS # BLD AUTO: 0.01 10*3/MM3 (ref 0–0.2)
BASOPHILS NFR BLD AUTO: 0 % (ref 0–2)
BASOPHILS NFR BLD AUTO: 0 % (ref 0–2)
BASOPHILS NFR BLD AUTO: 0.2 % (ref 0–2)
BILIRUB SERPL-MCNC: 0.6 MG/DL (ref 0.2–1.3)
BUN BLD-MCNC: 14 MG/DL (ref 7–21)
BUN/CREAT SERPL: 16.9 (ref 7–25)
CALCIUM SPEC-SCNC: 9.1 MG/DL (ref 8.4–10.2)
CHLORIDE SERPL-SCNC: 105 MMOL/L (ref 95–110)
CO2 SERPL-SCNC: 27 MMOL/L (ref 22–31)
CREAT BLD-MCNC: 0.83 MG/DL (ref 0.5–1)
DEPRECATED RDW RBC AUTO: 44.3 FL (ref 36.4–46.3)
DEPRECATED RDW RBC AUTO: 44.9 FL (ref 36.4–46.3)
DEPRECATED RDW RBC AUTO: 45.4 FL (ref 36.4–46.3)
EOSINOPHIL # BLD AUTO: 0.06 10*3/MM3 (ref 0–0.7)
EOSINOPHIL NFR BLD AUTO: 1.2 % (ref 0–7)
EOSINOPHIL NFR BLD AUTO: 1.3 % (ref 0–7)
EOSINOPHIL NFR BLD AUTO: 1.4 % (ref 0–7)
ERYTHROCYTE [DISTWIDTH] IN BLOOD BY AUTOMATED COUNT: 12.9 % (ref 11.5–14.5)
ERYTHROCYTE [DISTWIDTH] IN BLOOD BY AUTOMATED COUNT: 13 % (ref 11.5–14.5)
ERYTHROCYTE [DISTWIDTH] IN BLOOD BY AUTOMATED COUNT: 13.1 % (ref 11.5–14.5)
GFR SERPL CREATININE-BSD FRML MDRD: 79 ML/MIN/1.73 (ref 39–90)
GLOBULIN UR ELPH-MCNC: 2.7 GM/DL (ref 2.3–3.5)
GLUCOSE BLD-MCNC: 126 MG/DL (ref 60–100)
GLUCOSE BLDC GLUCOMTR-MCNC: 120 MG/DL (ref 70–130)
GLUCOSE BLDC GLUCOMTR-MCNC: 140 MG/DL (ref 70–130)
GLUCOSE BLDC GLUCOMTR-MCNC: 150 MG/DL (ref 70–130)
GLUCOSE BLDC GLUCOMTR-MCNC: 209 MG/DL (ref 70–130)
HCT VFR BLD AUTO: 27.7 % (ref 35–45)
HCT VFR BLD AUTO: 28.4 % (ref 35–45)
HCT VFR BLD AUTO: 30.3 % (ref 35–45)
HGB BLD-MCNC: 10.2 G/DL (ref 12–15.5)
HGB BLD-MCNC: 9.3 G/DL (ref 12–15.5)
HGB BLD-MCNC: 9.5 G/DL (ref 12–15.5)
IMM GRANULOCYTES # BLD: 0.02 10*3/MM3 (ref 0–0.02)
IMM GRANULOCYTES NFR BLD: 0.4 % (ref 0–0.5)
IMM GRANULOCYTES NFR BLD: 0.4 % (ref 0–0.5)
IMM GRANULOCYTES NFR BLD: 0.5 % (ref 0–0.5)
LYMPHOCYTES # BLD AUTO: 1.48 10*3/MM3 (ref 0.6–4.2)
LYMPHOCYTES # BLD AUTO: 1.56 10*3/MM3 (ref 0.6–4.2)
LYMPHOCYTES # BLD AUTO: 1.83 10*3/MM3 (ref 0.6–4.2)
LYMPHOCYTES NFR BLD AUTO: 33.8 % (ref 10–50)
LYMPHOCYTES NFR BLD AUTO: 35.6 % (ref 10–50)
LYMPHOCYTES NFR BLD AUTO: 37.6 % (ref 10–50)
MCH RBC QN AUTO: 31.6 PG (ref 26.5–34)
MCH RBC QN AUTO: 31.6 PG (ref 26.5–34)
MCH RBC QN AUTO: 32.3 PG (ref 26.5–34)
MCHC RBC AUTO-ENTMCNC: 33.5 G/DL (ref 31.4–36)
MCHC RBC AUTO-ENTMCNC: 33.6 G/DL (ref 31.4–36)
MCHC RBC AUTO-ENTMCNC: 33.7 G/DL (ref 31.4–36)
MCV RBC AUTO: 94.2 FL (ref 80–98)
MCV RBC AUTO: 94.4 FL (ref 80–98)
MCV RBC AUTO: 95.9 FL (ref 80–98)
MONOCYTES # BLD AUTO: 0.31 10*3/MM3 (ref 0–0.9)
MONOCYTES # BLD AUTO: 0.34 10*3/MM3 (ref 0–0.9)
MONOCYTES # BLD AUTO: 0.35 10*3/MM3 (ref 0–0.9)
MONOCYTES NFR BLD AUTO: 6.4 % (ref 0–12)
MONOCYTES NFR BLD AUTO: 7.6 % (ref 0–12)
MONOCYTES NFR BLD AUTO: 8.2 % (ref 0–12)
NEUTROPHILS # BLD AUTO: 2.26 10*3/MM3 (ref 2–8.6)
NEUTROPHILS # BLD AUTO: 2.63 10*3/MM3 (ref 2–8.6)
NEUTROPHILS # BLD AUTO: 2.64 10*3/MM3 (ref 2–8.6)
NEUTROPHILS NFR BLD AUTO: 54.2 % (ref 37–80)
NEUTROPHILS NFR BLD AUTO: 54.3 % (ref 37–80)
NEUTROPHILS NFR BLD AUTO: 56.9 % (ref 37–80)
NRBC BLD MANUAL-RTO: 0 /100 WBC (ref 0–0)
PLATELET # BLD AUTO: 145 10*3/MM3 (ref 150–450)
PLATELET # BLD AUTO: 155 10*3/MM3 (ref 150–450)
PLATELET # BLD AUTO: 164 10*3/MM3 (ref 150–450)
PMV BLD AUTO: 10.5 FL (ref 8–12)
PMV BLD AUTO: 10.7 FL (ref 8–12)
PMV BLD AUTO: 11 FL (ref 8–12)
POTASSIUM BLD-SCNC: 4 MMOL/L (ref 3.5–5.1)
PROT SERPL-MCNC: 6.5 G/DL (ref 6.3–8.6)
RBC # BLD AUTO: 2.94 10*6/MM3 (ref 3.77–5.16)
RBC # BLD AUTO: 3.01 10*6/MM3 (ref 3.77–5.16)
RBC # BLD AUTO: 3.16 10*6/MM3 (ref 3.77–5.16)
SODIUM BLD-SCNC: 143 MMOL/L (ref 137–145)
WBC NRBC COR # BLD: 4.16 10*3/MM3 (ref 3.2–9.8)
WBC NRBC COR # BLD: 4.62 10*3/MM3 (ref 3.2–9.8)
WBC NRBC COR # BLD: 4.87 10*3/MM3 (ref 3.2–9.8)

## 2017-05-27 PROCEDURE — 80053 COMPREHEN METABOLIC PANEL: CPT | Performed by: INTERNAL MEDICINE

## 2017-05-27 PROCEDURE — 86901 BLOOD TYPING SEROLOGIC RH(D): CPT

## 2017-05-27 PROCEDURE — 86900 BLOOD TYPING SEROLOGIC ABO: CPT

## 2017-05-27 PROCEDURE — 85025 COMPLETE CBC W/AUTO DIFF WBC: CPT | Performed by: INTERNAL MEDICINE

## 2017-05-27 PROCEDURE — 82962 GLUCOSE BLOOD TEST: CPT

## 2017-05-27 PROCEDURE — 63710000001 INSULIN DETEMIR PER 5 UNITS: Performed by: NURSE PRACTITIONER

## 2017-05-27 PROCEDURE — G0378 HOSPITAL OBSERVATION PER HR: HCPCS

## 2017-05-27 RX ORDER — SODIUM CHLORIDE 0.9 % (FLUSH) 0.9 %
1-10 SYRINGE (ML) INJECTION AS NEEDED
Status: DISCONTINUED | OUTPATIENT
Start: 2017-05-27 | End: 2017-06-12 | Stop reason: HOSPADM

## 2017-05-27 RX ORDER — NICOTINE POLACRILEX 4 MG
15 LOZENGE BUCCAL
Status: DISCONTINUED | OUTPATIENT
Start: 2017-05-27 | End: 2017-06-12 | Stop reason: HOSPADM

## 2017-05-27 RX ORDER — DEXTROSE MONOHYDRATE 25 G/50ML
25 INJECTION, SOLUTION INTRAVENOUS
Status: DISCONTINUED | OUTPATIENT
Start: 2017-05-27 | End: 2017-06-12 | Stop reason: HOSPADM

## 2017-05-27 RX ORDER — SUCRALFATE ORAL 1 G/10ML
1 SUSPENSION ORAL EVERY 6 HOURS SCHEDULED
Status: DISCONTINUED | OUTPATIENT
Start: 2017-05-27 | End: 2017-06-02

## 2017-05-27 RX ADMIN — SOTALOL HYDROCHLORIDE 80 MG: 80 TABLET ORAL at 09:17

## 2017-05-27 RX ADMIN — SOTALOL HYDROCHLORIDE 80 MG: 80 TABLET ORAL at 20:12

## 2017-05-27 RX ADMIN — DONEPEZIL HYDROCHLORIDE 10 MG: 10 TABLET, FILM COATED ORAL at 09:16

## 2017-05-27 RX ADMIN — PANTOPRAZOLE SODIUM 40 MG: 40 INJECTION, POWDER, FOR SOLUTION INTRAVENOUS at 09:16

## 2017-05-27 RX ADMIN — SUCRALFATE 1 G: 1 SUSPENSION ORAL at 17:47

## 2017-05-27 RX ADMIN — PANTOPRAZOLE SODIUM 40 MG: 40 INJECTION, POWDER, FOR SOLUTION INTRAVENOUS at 20:12

## 2017-05-27 RX ADMIN — DILTIAZEM HYDROCHLORIDE 180 MG: 180 CAPSULE, EXTENDED RELEASE ORAL at 09:16

## 2017-05-27 RX ADMIN — ENALAPRIL MALEATE 20 MG: 10 TABLET ORAL at 17:46

## 2017-05-27 RX ADMIN — PRAVASTATIN SODIUM 40 MG: 40 TABLET ORAL at 20:12

## 2017-05-27 RX ADMIN — SUCRALFATE 1 G: 1 SUSPENSION ORAL at 14:47

## 2017-05-27 RX ADMIN — MEMANTINE 5 MG: 5 TABLET ORAL at 09:15

## 2017-05-27 RX ADMIN — SODIUM CHLORIDE 75 ML/HR: 900 INJECTION, SOLUTION INTRAVENOUS at 09:16

## 2017-05-27 RX ADMIN — INSULIN DETEMIR 20 UNITS: 100 INJECTION, SOLUTION SUBCUTANEOUS at 21:00

## 2017-05-27 RX ADMIN — METHIMAZOLE 2.5 MG: 5 TABLET ORAL at 09:15

## 2017-05-27 RX ADMIN — MEMANTINE 5 MG: 5 TABLET ORAL at 17:46

## 2017-05-27 RX ADMIN — SODIUM CHLORIDE 75 ML/HR: 900 INJECTION, SOLUTION INTRAVENOUS at 21:03

## 2017-05-27 RX ADMIN — ENALAPRIL MALEATE 20 MG: 10 TABLET ORAL at 09:16

## 2017-05-28 LAB
ABO GROUP BLD: NORMAL
ALBUMIN SERPL-MCNC: 3.4 G/DL (ref 3.4–4.8)
ALBUMIN/GLOB SERPL: 1.3 G/DL (ref 1.1–1.8)
ALP SERPL-CCNC: 42 U/L (ref 38–126)
ALT SERPL W P-5'-P-CCNC: 26 U/L (ref 9–52)
ANION GAP SERPL CALCULATED.3IONS-SCNC: 8 MMOL/L (ref 5–15)
APTT PPP: 29.2 SECONDS (ref 20–40.3)
AST SERPL-CCNC: 20 U/L (ref 14–36)
BASOPHILS # BLD AUTO: 0.01 10*3/MM3 (ref 0–0.2)
BASOPHILS NFR BLD AUTO: 0.2 % (ref 0–2)
BILIRUB SERPL-MCNC: 0.5 MG/DL (ref 0.2–1.3)
BLD GP AB SCN SERPL QL: NEGATIVE
BUN BLD-MCNC: 11 MG/DL (ref 7–21)
BUN/CREAT SERPL: 14.1 (ref 7–25)
CALCIUM SPEC-SCNC: 8.9 MG/DL (ref 8.4–10.2)
CHLORIDE SERPL-SCNC: 105 MMOL/L (ref 95–110)
CO2 SERPL-SCNC: 26 MMOL/L (ref 22–31)
CREAT BLD-MCNC: 0.78 MG/DL (ref 0.5–1)
DEPRECATED RDW RBC AUTO: 43 FL (ref 36.4–46.3)
DEPRECATED RDW RBC AUTO: 43.9 FL (ref 36.4–46.3)
DEPRECATED RDW RBC AUTO: 44.2 FL (ref 36.4–46.3)
EOSINOPHIL # BLD AUTO: 0.04 10*3/MM3 (ref 0–0.7)
EOSINOPHIL # BLD AUTO: 0.07 10*3/MM3 (ref 0–0.7)
EOSINOPHIL # BLD AUTO: 0.09 10*3/MM3 (ref 0–0.7)
EOSINOPHIL NFR BLD AUTO: 0.9 % (ref 0–7)
EOSINOPHIL NFR BLD AUTO: 1.7 % (ref 0–7)
EOSINOPHIL NFR BLD AUTO: 2 % (ref 0–7)
ERYTHROCYTE [DISTWIDTH] IN BLOOD BY AUTOMATED COUNT: 12.7 % (ref 11.5–14.5)
ERYTHROCYTE [DISTWIDTH] IN BLOOD BY AUTOMATED COUNT: 12.8 % (ref 11.5–14.5)
ERYTHROCYTE [DISTWIDTH] IN BLOOD BY AUTOMATED COUNT: 13.1 % (ref 11.5–14.5)
GFR SERPL CREATININE-BSD FRML MDRD: 84 ML/MIN/1.73 (ref 39–90)
GLOBULIN UR ELPH-MCNC: 2.7 GM/DL (ref 2.3–3.5)
GLUCOSE BLD-MCNC: 87 MG/DL (ref 60–100)
GLUCOSE BLDC GLUCOMTR-MCNC: 129 MG/DL (ref 70–130)
GLUCOSE BLDC GLUCOMTR-MCNC: 188 MG/DL (ref 70–130)
GLUCOSE BLDC GLUCOMTR-MCNC: 71 MG/DL (ref 70–130)
GLUCOSE BLDC GLUCOMTR-MCNC: 88 MG/DL (ref 70–130)
HCT VFR BLD AUTO: 27.3 % (ref 35–45)
HCT VFR BLD AUTO: 27.7 % (ref 35–45)
HCT VFR BLD AUTO: 28 % (ref 35–45)
HCT VFR BLD AUTO: 28.4 % (ref 35–45)
HEMOCCULT STL QL: POSITIVE
HGB BLD-MCNC: 9.2 G/DL (ref 12–15.5)
HGB BLD-MCNC: 9.3 G/DL (ref 12–15.5)
HGB BLD-MCNC: 9.5 G/DL (ref 12–15.5)
HGB BLD-MCNC: 9.6 G/DL (ref 12–15.5)
HOLD SPECIMEN: NORMAL
IMM GRANULOCYTES # BLD: 0.01 10*3/MM3 (ref 0–0.02)
IMM GRANULOCYTES # BLD: 0.01 10*3/MM3 (ref 0–0.02)
IMM GRANULOCYTES # BLD: 0.03 10*3/MM3 (ref 0–0.02)
IMM GRANULOCYTES NFR BLD: 0.2 % (ref 0–0.5)
IMM GRANULOCYTES NFR BLD: 0.2 % (ref 0–0.5)
IMM GRANULOCYTES NFR BLD: 0.7 % (ref 0–0.5)
INR PPP: 1.09 (ref 0.8–1.2)
LYMPHOCYTES # BLD AUTO: 1.19 10*3/MM3 (ref 0.6–4.2)
LYMPHOCYTES # BLD AUTO: 1.3 10*3/MM3 (ref 0.6–4.2)
LYMPHOCYTES # BLD AUTO: 1.56 10*3/MM3 (ref 0.6–4.2)
LYMPHOCYTES NFR BLD AUTO: 28.6 % (ref 10–50)
LYMPHOCYTES NFR BLD AUTO: 28.7 % (ref 10–50)
LYMPHOCYTES NFR BLD AUTO: 35.3 % (ref 10–50)
Lab: NORMAL
MCH RBC QN AUTO: 31.5 PG (ref 26.5–34)
MCH RBC QN AUTO: 31.5 PG (ref 26.5–34)
MCH RBC QN AUTO: 31.6 PG (ref 26.5–34)
MCHC RBC AUTO-ENTMCNC: 33.6 G/DL (ref 31.4–36)
MCHC RBC AUTO-ENTMCNC: 33.8 G/DL (ref 31.4–36)
MCHC RBC AUTO-ENTMCNC: 33.9 G/DL (ref 31.4–36)
MCV RBC AUTO: 92.7 FL (ref 80–98)
MCV RBC AUTO: 93.4 FL (ref 80–98)
MCV RBC AUTO: 93.9 FL (ref 80–98)
MONOCYTES # BLD AUTO: 0.25 10*3/MM3 (ref 0–0.9)
MONOCYTES # BLD AUTO: 0.29 10*3/MM3 (ref 0–0.9)
MONOCYTES # BLD AUTO: 0.3 10*3/MM3 (ref 0–0.9)
MONOCYTES NFR BLD AUTO: 6 % (ref 0–12)
MONOCYTES NFR BLD AUTO: 6.6 % (ref 0–12)
MONOCYTES NFR BLD AUTO: 6.6 % (ref 0–12)
NEUTROPHILS # BLD AUTO: 2.46 10*3/MM3 (ref 2–8.6)
NEUTROPHILS # BLD AUTO: 2.63 10*3/MM3 (ref 2–8.6)
NEUTROPHILS # BLD AUTO: 2.85 10*3/MM3 (ref 2–8.6)
NEUTROPHILS NFR BLD AUTO: 55.7 % (ref 37–80)
NEUTROPHILS NFR BLD AUTO: 62.9 % (ref 37–80)
NEUTROPHILS NFR BLD AUTO: 63.3 % (ref 37–80)
NRBC BLD MANUAL-RTO: 0.6 /100 WBC (ref 0–0)
PLATELET # BLD AUTO: 150 10*3/MM3 (ref 150–450)
PLATELET # BLD AUTO: 154 10*3/MM3 (ref 150–450)
PLATELET # BLD AUTO: 162 10*3/MM3 (ref 150–450)
PMV BLD AUTO: 10.3 FL (ref 8–12)
PMV BLD AUTO: 10.3 FL (ref 8–12)
PMV BLD AUTO: 9.5 FL (ref 8–12)
POTASSIUM BLD-SCNC: 3.6 MMOL/L (ref 3.5–5.1)
PROT SERPL-MCNC: 6.1 G/DL (ref 6.3–8.6)
PROTHROMBIN TIME: 14 SECONDS (ref 11.1–15.3)
RBC # BLD AUTO: 2.95 10*6/MM3 (ref 3.77–5.16)
RBC # BLD AUTO: 3.02 10*6/MM3 (ref 3.77–5.16)
RBC # BLD AUTO: 3.04 10*6/MM3 (ref 3.77–5.16)
RH BLD: POSITIVE
SODIUM BLD-SCNC: 139 MMOL/L (ref 137–145)
WBC NRBC COR # BLD: 4.16 10*3/MM3 (ref 3.2–9.8)
WBC NRBC COR # BLD: 4.42 10*3/MM3 (ref 3.2–9.8)
WBC NRBC COR # BLD: 4.53 10*3/MM3 (ref 3.2–9.8)

## 2017-05-28 PROCEDURE — 85610 PROTHROMBIN TIME: CPT | Performed by: SURGERY

## 2017-05-28 PROCEDURE — 85730 THROMBOPLASTIN TIME PARTIAL: CPT | Performed by: SURGERY

## 2017-05-28 PROCEDURE — 86900 BLOOD TYPING SEROLOGIC ABO: CPT | Performed by: HOSPITALIST

## 2017-05-28 PROCEDURE — 82962 GLUCOSE BLOOD TEST: CPT

## 2017-05-28 PROCEDURE — 80053 COMPREHEN METABOLIC PANEL: CPT | Performed by: INTERNAL MEDICINE

## 2017-05-28 PROCEDURE — 86901 BLOOD TYPING SEROLOGIC RH(D): CPT | Performed by: HOSPITALIST

## 2017-05-28 PROCEDURE — 85014 HEMATOCRIT: CPT | Performed by: INTERNAL MEDICINE

## 2017-05-28 PROCEDURE — G0378 HOSPITAL OBSERVATION PER HR: HCPCS

## 2017-05-28 PROCEDURE — 86850 RBC ANTIBODY SCREEN: CPT | Performed by: HOSPITALIST

## 2017-05-28 PROCEDURE — 86923 COMPATIBILITY TEST ELECTRIC: CPT

## 2017-05-28 PROCEDURE — 85018 HEMOGLOBIN: CPT | Performed by: INTERNAL MEDICINE

## 2017-05-28 PROCEDURE — 63710000001 INSULIN ASPART PER 5 UNITS: Performed by: NURSE PRACTITIONER

## 2017-05-28 PROCEDURE — 85025 COMPLETE CBC W/AUTO DIFF WBC: CPT | Performed by: HOSPITALIST

## 2017-05-28 PROCEDURE — 99225 PR SBSQ OBSERVATION CARE/DAY 25 MINUTES: CPT | Performed by: SURGERY

## 2017-05-28 PROCEDURE — 85025 COMPLETE CBC W/AUTO DIFF WBC: CPT | Performed by: INTERNAL MEDICINE

## 2017-05-28 PROCEDURE — 63710000001 INSULIN DETEMIR PER 5 UNITS: Performed by: NURSE PRACTITIONER

## 2017-05-28 PROCEDURE — 82270 OCCULT BLOOD FECES: CPT | Performed by: INTERNAL MEDICINE

## 2017-05-28 PROCEDURE — 82378 CARCINOEMBRYONIC ANTIGEN: CPT | Performed by: SURGERY

## 2017-05-28 RX ORDER — DONEPEZIL HYDROCHLORIDE 10 MG/1
10 TABLET, FILM COATED ORAL NIGHTLY
Status: DISCONTINUED | OUTPATIENT
Start: 2017-05-28 | End: 2017-06-12 | Stop reason: HOSPADM

## 2017-05-28 RX ADMIN — PANTOPRAZOLE SODIUM 40 MG: 40 INJECTION, POWDER, FOR SOLUTION INTRAVENOUS at 09:40

## 2017-05-28 RX ADMIN — PRAVASTATIN SODIUM 40 MG: 40 TABLET ORAL at 20:23

## 2017-05-28 RX ADMIN — METHIMAZOLE 2.5 MG: 5 TABLET ORAL at 09:39

## 2017-05-28 RX ADMIN — ENALAPRIL MALEATE 20 MG: 10 TABLET ORAL at 09:39

## 2017-05-28 RX ADMIN — PANTOPRAZOLE SODIUM 40 MG: 40 INJECTION, POWDER, FOR SOLUTION INTRAVENOUS at 20:24

## 2017-05-28 RX ADMIN — MEMANTINE 5 MG: 5 TABLET ORAL at 10:26

## 2017-05-28 RX ADMIN — SOTALOL HYDROCHLORIDE 80 MG: 80 TABLET ORAL at 20:23

## 2017-05-28 RX ADMIN — DONEPEZIL HYDROCHLORIDE 10 MG: 10 TABLET ORAL at 20:23

## 2017-05-28 RX ADMIN — Medication 10 ML: at 00:21

## 2017-05-28 RX ADMIN — INSULIN DETEMIR 20 UNITS: 100 INJECTION, SOLUTION SUBCUTANEOUS at 20:24

## 2017-05-28 RX ADMIN — SUCRALFATE 1 G: 1 SUSPENSION ORAL at 11:42

## 2017-05-28 RX ADMIN — DILTIAZEM HYDROCHLORIDE 180 MG: 180 CAPSULE, EXTENDED RELEASE ORAL at 09:38

## 2017-05-28 RX ADMIN — ENALAPRIL MALEATE 20 MG: 10 TABLET ORAL at 20:23

## 2017-05-28 RX ADMIN — SODIUM CHLORIDE 75 ML/HR: 900 INJECTION, SOLUTION INTRAVENOUS at 08:13

## 2017-05-28 RX ADMIN — MEMANTINE 5 MG: 5 TABLET ORAL at 18:01

## 2017-05-28 RX ADMIN — SUCRALFATE 1 G: 1 SUSPENSION ORAL at 18:01

## 2017-05-28 RX ADMIN — INSULIN ASPART 2 UNITS: 100 INJECTION, SOLUTION INTRAVENOUS; SUBCUTANEOUS at 20:34

## 2017-05-28 RX ADMIN — SOTALOL HYDROCHLORIDE 80 MG: 80 TABLET ORAL at 09:38

## 2017-05-29 ENCOUNTER — APPOINTMENT (OUTPATIENT)
Dept: NUCLEAR MEDICINE | Facility: HOSPITAL | Age: 82
End: 2017-05-29

## 2017-05-29 LAB
ALBUMIN SERPL-MCNC: 3.2 G/DL (ref 3.4–4.8)
ALBUMIN/GLOB SERPL: 1.1 G/DL (ref 1.1–1.8)
ALP SERPL-CCNC: 37 U/L (ref 38–126)
ALT SERPL W P-5'-P-CCNC: 22 U/L (ref 9–52)
ANION GAP SERPL CALCULATED.3IONS-SCNC: 6 MMOL/L (ref 5–15)
AST SERPL-CCNC: 21 U/L (ref 14–36)
BILIRUB SERPL-MCNC: 0.4 MG/DL (ref 0.2–1.3)
BUN BLD-MCNC: 8 MG/DL (ref 7–21)
BUN/CREAT SERPL: 11.4 (ref 7–25)
CALCIUM SPEC-SCNC: 8.6 MG/DL (ref 8.4–10.2)
CHLORIDE SERPL-SCNC: 106 MMOL/L (ref 95–110)
CO2 SERPL-SCNC: 26 MMOL/L (ref 22–31)
CREAT BLD-MCNC: 0.7 MG/DL (ref 0.5–1)
GFR SERPL CREATININE-BSD FRML MDRD: 96 ML/MIN/1.73 (ref 39–90)
GLOBULIN UR ELPH-MCNC: 2.8 GM/DL (ref 2.3–3.5)
GLUCOSE BLD-MCNC: 87 MG/DL (ref 60–100)
GLUCOSE BLDC GLUCOMTR-MCNC: 100 MG/DL (ref 70–130)
GLUCOSE BLDC GLUCOMTR-MCNC: 129 MG/DL (ref 70–130)
HCT VFR BLD AUTO: 24.9 % (ref 35–45)
HCT VFR BLD AUTO: 26.8 % (ref 35–45)
HGB BLD-MCNC: 8.4 G/DL (ref 12–15.5)
HGB BLD-MCNC: 9 G/DL (ref 12–15.5)
POTASSIUM BLD-SCNC: 3.7 MMOL/L (ref 3.5–5.1)
PROT SERPL-MCNC: 6 G/DL (ref 6.3–8.6)
SODIUM BLD-SCNC: 138 MMOL/L (ref 137–145)

## 2017-05-29 PROCEDURE — A9560 TC99M LABELED RBC: HCPCS | Performed by: SURGERY

## 2017-05-29 PROCEDURE — 82962 GLUCOSE BLOOD TEST: CPT

## 2017-05-29 PROCEDURE — G0378 HOSPITAL OBSERVATION PER HR: HCPCS

## 2017-05-29 PROCEDURE — 63710000001 INSULIN ASPART PER 5 UNITS: Performed by: NURSE PRACTITIONER

## 2017-05-29 PROCEDURE — 0 TECHNETIUM LABELED RED BLOOD CELLS: Performed by: SURGERY

## 2017-05-29 PROCEDURE — 85014 HEMATOCRIT: CPT | Performed by: INTERNAL MEDICINE

## 2017-05-29 PROCEDURE — 85018 HEMOGLOBIN: CPT | Performed by: INTERNAL MEDICINE

## 2017-05-29 PROCEDURE — 80053 COMPREHEN METABOLIC PANEL: CPT | Performed by: INTERNAL MEDICINE

## 2017-05-29 PROCEDURE — 78278 ACUTE GI BLOOD LOSS IMAGING: CPT

## 2017-05-29 PROCEDURE — 63710000001 INSULIN DETEMIR PER 5 UNITS: Performed by: NURSE PRACTITIONER

## 2017-05-29 PROCEDURE — 99224 PR SBSQ OBSERVATION CARE/DAY 15 MINUTES: CPT | Performed by: SURGERY

## 2017-05-29 RX ORDER — POLYETHYLENE GLYCOL 3350, SODIUM CHLORIDE, POTASSIUM CHLORIDE, SODIUM BICARBONATE, AND SODIUM SULFATE 240; 5.84; 2.98; 6.72; 22.72 G/4L; G/4L; G/4L; G/4L; G/4L
4000 POWDER, FOR SOLUTION ORAL ONCE
Status: COMPLETED | OUTPATIENT
Start: 2017-05-29 | End: 2017-05-29

## 2017-05-29 RX ADMIN — PANTOPRAZOLE SODIUM 40 MG: 40 INJECTION, POWDER, FOR SOLUTION INTRAVENOUS at 21:46

## 2017-05-29 RX ADMIN — SUCRALFATE 1 G: 1 SUSPENSION ORAL at 17:28

## 2017-05-29 RX ADMIN — SOTALOL HYDROCHLORIDE 80 MG: 80 TABLET ORAL at 09:57

## 2017-05-29 RX ADMIN — Medication 1 DOSE: at 10:32

## 2017-05-29 RX ADMIN — SUCRALFATE 1 G: 1 SUSPENSION ORAL at 05:52

## 2017-05-29 RX ADMIN — MEMANTINE 5 MG: 5 TABLET ORAL at 09:57

## 2017-05-29 RX ADMIN — SODIUM CHLORIDE 75 ML/HR: 900 INJECTION, SOLUTION INTRAVENOUS at 00:11

## 2017-05-29 RX ADMIN — ENALAPRIL MALEATE 20 MG: 10 TABLET ORAL at 09:57

## 2017-05-29 RX ADMIN — SODIUM CHLORIDE 75 ML/HR: 900 INJECTION, SOLUTION INTRAVENOUS at 09:58

## 2017-05-29 RX ADMIN — METHIMAZOLE 2.5 MG: 5 TABLET ORAL at 09:57

## 2017-05-29 RX ADMIN — SUCRALFATE 1 G: 1 SUSPENSION ORAL at 11:52

## 2017-05-29 RX ADMIN — INSULIN ASPART 3 UNITS: 100 INJECTION, SOLUTION INTRAVENOUS; SUBCUTANEOUS at 21:47

## 2017-05-29 RX ADMIN — MEMANTINE 5 MG: 5 TABLET ORAL at 17:28

## 2017-05-29 RX ADMIN — INSULIN DETEMIR 20 UNITS: 100 INJECTION, SOLUTION SUBCUTANEOUS at 21:00

## 2017-05-29 RX ADMIN — DONEPEZIL HYDROCHLORIDE 10 MG: 10 TABLET ORAL at 21:46

## 2017-05-29 RX ADMIN — PANTOPRAZOLE SODIUM 40 MG: 40 INJECTION, POWDER, FOR SOLUTION INTRAVENOUS at 09:58

## 2017-05-29 RX ADMIN — HYDROCODONE BITARTRATE AND ACETAMINOPHEN 1 TABLET: 5; 325 TABLET ORAL at 00:57

## 2017-05-29 RX ADMIN — SODIUM CHLORIDE 75 ML/HR: 900 INJECTION, SOLUTION INTRAVENOUS at 23:24

## 2017-05-29 RX ADMIN — PRAVASTATIN SODIUM 40 MG: 40 TABLET ORAL at 21:46

## 2017-05-29 RX ADMIN — POLYETHYLENE GLYCOL 3350, SODIUM CHLORIDE, POTASSIUM CHLORIDE, SODIUM BICARBONATE, AND SODIUM SULFATE 4000 ML: 240; 5.84; 2.98; 6.72; 22.72 POWDER, FOR SOLUTION ORAL at 16:04

## 2017-05-29 RX ADMIN — SUCRALFATE 1 G: 1 SUSPENSION ORAL at 00:11

## 2017-05-29 RX ADMIN — DILTIAZEM HYDROCHLORIDE 180 MG: 180 CAPSULE, EXTENDED RELEASE ORAL at 09:56

## 2017-05-29 RX ADMIN — SOTALOL HYDROCHLORIDE 80 MG: 80 TABLET ORAL at 21:46

## 2017-05-29 RX ADMIN — ENALAPRIL MALEATE 20 MG: 10 TABLET ORAL at 17:28

## 2017-05-30 ENCOUNTER — APPOINTMENT (OUTPATIENT)
Dept: GENERAL RADIOLOGY | Facility: HOSPITAL | Age: 82
End: 2017-05-30

## 2017-05-30 ENCOUNTER — ANESTHESIA EVENT (OUTPATIENT)
Dept: GASTROENTEROLOGY | Facility: HOSPITAL | Age: 82
End: 2017-05-30

## 2017-05-30 ENCOUNTER — ANESTHESIA (OUTPATIENT)
Dept: GASTROENTEROLOGY | Facility: HOSPITAL | Age: 82
End: 2017-05-30

## 2017-05-30 ENCOUNTER — APPOINTMENT (OUTPATIENT)
Dept: CT IMAGING | Facility: HOSPITAL | Age: 82
End: 2017-05-30

## 2017-05-30 LAB
ALBUMIN SERPL-MCNC: 3.5 G/DL (ref 3.4–4.8)
ALBUMIN/GLOB SERPL: 1.3 G/DL (ref 1.1–1.8)
ALP SERPL-CCNC: 47 U/L (ref 38–126)
ALT SERPL W P-5'-P-CCNC: 28 U/L (ref 9–52)
ANION GAP SERPL CALCULATED.3IONS-SCNC: 9 MMOL/L (ref 5–15)
AST SERPL-CCNC: 22 U/L (ref 14–36)
BASOPHILS # BLD AUTO: 0 10*3/MM3 (ref 0–0.2)
BASOPHILS NFR BLD AUTO: 0 % (ref 0–2)
BILIRUB SERPL-MCNC: 0.4 MG/DL (ref 0.2–1.3)
BUN BLD-MCNC: 4 MG/DL (ref 7–21)
BUN/CREAT SERPL: 5.6 (ref 7–25)
CALCIUM SPEC-SCNC: 8.7 MG/DL (ref 8.4–10.2)
CHLORIDE SERPL-SCNC: 106 MMOL/L (ref 95–110)
CO2 SERPL-SCNC: 28 MMOL/L (ref 22–31)
CREAT BLD-MCNC: 0.72 MG/DL (ref 0.5–1)
DEPRECATED RDW RBC AUTO: 44.3 FL (ref 36.4–46.3)
EOSINOPHIL # BLD AUTO: 0.06 10*3/MM3 (ref 0–0.7)
EOSINOPHIL NFR BLD AUTO: 1.4 % (ref 0–7)
ERYTHROCYTE [DISTWIDTH] IN BLOOD BY AUTOMATED COUNT: 13.2 % (ref 11.5–14.5)
GFR SERPL CREATININE-BSD FRML MDRD: 93 ML/MIN/1.73 (ref 39–90)
GLOBULIN UR ELPH-MCNC: 2.7 GM/DL (ref 2.3–3.5)
GLUCOSE BLD-MCNC: 63 MG/DL (ref 60–100)
GLUCOSE BLDC GLUCOMTR-MCNC: 111 MG/DL (ref 70–130)
GLUCOSE BLDC GLUCOMTR-MCNC: 116 MG/DL (ref 70–130)
GLUCOSE BLDC GLUCOMTR-MCNC: 116 MG/DL (ref 70–130)
GLUCOSE BLDC GLUCOMTR-MCNC: 187 MG/DL (ref 70–130)
GLUCOSE BLDC GLUCOMTR-MCNC: 187 MG/DL (ref 70–130)
GLUCOSE BLDC GLUCOMTR-MCNC: 63 MG/DL (ref 70–130)
GLUCOSE BLDC GLUCOMTR-MCNC: 75 MG/DL (ref 70–130)
GLUCOSE BLDC GLUCOMTR-MCNC: 83 MG/DL (ref 70–130)
HCT VFR BLD AUTO: 28.1 % (ref 35–45)
HGB BLD-MCNC: 9.3 G/DL (ref 12–15.5)
IMM GRANULOCYTES # BLD: 0.02 10*3/MM3 (ref 0–0.02)
IMM GRANULOCYTES NFR BLD: 0.5 % (ref 0–0.5)
LYMPHOCYTES # BLD AUTO: 1.17 10*3/MM3 (ref 0.6–4.2)
LYMPHOCYTES NFR BLD AUTO: 26.7 % (ref 10–50)
MCH RBC QN AUTO: 31.4 PG (ref 26.5–34)
MCHC RBC AUTO-ENTMCNC: 33.1 G/DL (ref 31.4–36)
MCV RBC AUTO: 94.9 FL (ref 80–98)
MONOCYTES # BLD AUTO: 0.39 10*3/MM3 (ref 0–0.9)
MONOCYTES NFR BLD AUTO: 8.9 % (ref 0–12)
NEUTROPHILS # BLD AUTO: 2.74 10*3/MM3 (ref 2–8.6)
NEUTROPHILS NFR BLD AUTO: 62.5 % (ref 37–80)
PLATELET # BLD AUTO: 163 10*3/MM3 (ref 150–450)
PMV BLD AUTO: 10.4 FL (ref 8–12)
POTASSIUM BLD-SCNC: 3.6 MMOL/L (ref 3.5–5.1)
PROT SERPL-MCNC: 6.2 G/DL (ref 6.3–8.6)
RBC # BLD AUTO: 2.96 10*6/MM3 (ref 3.77–5.16)
SODIUM BLD-SCNC: 143 MMOL/L (ref 137–145)
WBC NRBC COR # BLD: 4.38 10*3/MM3 (ref 3.2–9.8)

## 2017-05-30 PROCEDURE — 88305 TISSUE EXAM BY PATHOLOGIST: CPT | Performed by: SURGERY

## 2017-05-30 PROCEDURE — 82962 GLUCOSE BLOOD TEST: CPT

## 2017-05-30 PROCEDURE — 25010000002 PROPOFOL 10 MG/ML EMULSION: Performed by: NURSE ANESTHETIST, CERTIFIED REGISTERED

## 2017-05-30 PROCEDURE — 88305 TISSUE EXAM BY PATHOLOGIST: CPT | Performed by: PATHOLOGY

## 2017-05-30 PROCEDURE — 45381 COLONOSCOPY SUBMUCOUS NJX: CPT | Performed by: SURGERY

## 2017-05-30 PROCEDURE — 0 IOPAMIDOL 61 % SOLUTION: Performed by: INTERNAL MEDICINE

## 2017-05-30 PROCEDURE — 0DBN8ZX EXCISION OF SIGMOID COLON, VIA NATURAL OR ARTIFICIAL OPENING ENDOSCOPIC, DIAGNOSTIC: ICD-10-PCS | Performed by: SURGERY

## 2017-05-30 PROCEDURE — 85025 COMPLETE CBC W/AUTO DIFF WBC: CPT | Performed by: SURGERY

## 2017-05-30 PROCEDURE — 74177 CT ABD & PELVIS W/CONTRAST: CPT

## 2017-05-30 PROCEDURE — 71020 HC CHEST PA AND LATERAL: CPT

## 2017-05-30 PROCEDURE — 45380 COLONOSCOPY AND BIOPSY: CPT | Performed by: SURGERY

## 2017-05-30 PROCEDURE — 80053 COMPREHEN METABOLIC PANEL: CPT | Performed by: INTERNAL MEDICINE

## 2017-05-30 RX ORDER — PROMETHAZINE HYDROCHLORIDE 25 MG/ML
12.5 INJECTION, SOLUTION INTRAMUSCULAR; INTRAVENOUS ONCE AS NEEDED
Status: DISCONTINUED | OUTPATIENT
Start: 2017-05-30 | End: 2017-06-02 | Stop reason: HOSPADM

## 2017-05-30 RX ORDER — DEXAMETHASONE SODIUM PHOSPHATE 4 MG/ML
8 INJECTION, SOLUTION INTRA-ARTICULAR; INTRALESIONAL; INTRAMUSCULAR; INTRAVENOUS; SOFT TISSUE ONCE AS NEEDED
Status: DISCONTINUED | OUTPATIENT
Start: 2017-05-30 | End: 2017-06-02 | Stop reason: HOSPADM

## 2017-05-30 RX ORDER — PROMETHAZINE HYDROCHLORIDE 25 MG/1
25 SUPPOSITORY RECTAL ONCE AS NEEDED
Status: DISCONTINUED | OUTPATIENT
Start: 2017-05-30 | End: 2017-06-02 | Stop reason: HOSPADM

## 2017-05-30 RX ORDER — PROPOFOL 10 MG/ML
VIAL (ML) INTRAVENOUS AS NEEDED
Status: DISCONTINUED | OUTPATIENT
Start: 2017-05-30 | End: 2017-05-30 | Stop reason: SURG

## 2017-05-30 RX ORDER — PROMETHAZINE HYDROCHLORIDE 25 MG/1
25 TABLET ORAL ONCE AS NEEDED
Status: DISCONTINUED | OUTPATIENT
Start: 2017-05-30 | End: 2017-06-02 | Stop reason: HOSPADM

## 2017-05-30 RX ADMIN — PANTOPRAZOLE SODIUM 40 MG: 40 INJECTION, POWDER, FOR SOLUTION INTRAVENOUS at 21:50

## 2017-05-30 RX ADMIN — MEMANTINE 5 MG: 5 TABLET ORAL at 18:09

## 2017-05-30 RX ADMIN — SUCRALFATE 1 G: 1 SUSPENSION ORAL at 18:08

## 2017-05-30 RX ADMIN — PROPOFOL 50 MG: 10 INJECTION, EMULSION INTRAVENOUS at 13:01

## 2017-05-30 RX ADMIN — PROPOFOL 30 MG: 10 INJECTION, EMULSION INTRAVENOUS at 13:10

## 2017-05-30 RX ADMIN — SODIUM CHLORIDE 75 ML/HR: 900 INJECTION, SOLUTION INTRAVENOUS at 21:47

## 2017-05-30 RX ADMIN — SOTALOL HYDROCHLORIDE 80 MG: 80 TABLET ORAL at 21:49

## 2017-05-30 RX ADMIN — ENALAPRIL MALEATE 20 MG: 10 TABLET ORAL at 18:09

## 2017-05-30 RX ADMIN — PRAVASTATIN SODIUM 40 MG: 40 TABLET ORAL at 21:49

## 2017-05-30 RX ADMIN — DONEPEZIL HYDROCHLORIDE 10 MG: 10 TABLET ORAL at 21:49

## 2017-05-30 RX ADMIN — PROPOFOL 50 MG: 10 INJECTION, EMULSION INTRAVENOUS at 13:05

## 2017-05-30 RX ADMIN — PANTOPRAZOLE SODIUM 40 MG: 40 INJECTION, POWDER, FOR SOLUTION INTRAVENOUS at 08:35

## 2017-05-30 RX ADMIN — PROPOFOL 50 MG: 10 INJECTION, EMULSION INTRAVENOUS at 12:58

## 2017-05-30 RX ADMIN — DEXTROSE MONOHYDRATE 25 G: 25 INJECTION, SOLUTION INTRAVENOUS at 08:28

## 2017-05-30 RX ADMIN — IOPAMIDOL 100 ML: 612 INJECTION, SOLUTION INTRAVENOUS at 21:15

## 2017-05-30 RX ADMIN — PROPOFOL 50 MG: 10 INJECTION, EMULSION INTRAVENOUS at 12:56

## 2017-05-31 ENCOUNTER — APPOINTMENT (OUTPATIENT)
Dept: CARDIOLOGY | Facility: HOSPITAL | Age: 82
End: 2017-05-31
Attending: INTERNAL MEDICINE

## 2017-05-31 PROBLEM — Z01.810 PREOP CARDIOVASCULAR EXAM: Status: ACTIVE | Noted: 2017-05-31

## 2017-05-31 LAB
ALBUMIN SERPL-MCNC: 3.2 G/DL (ref 3.4–4.8)
ALBUMIN/GLOB SERPL: 1.2 G/DL (ref 1.1–1.8)
ALP SERPL-CCNC: 50 U/L (ref 38–126)
ALT SERPL W P-5'-P-CCNC: 33 U/L (ref 9–52)
ANION GAP SERPL CALCULATED.3IONS-SCNC: 9 MMOL/L (ref 5–15)
AST SERPL-CCNC: 21 U/L (ref 14–36)
BASOPHILS # BLD AUTO: 0.01 10*3/MM3 (ref 0–0.2)
BASOPHILS NFR BLD AUTO: 0.2 % (ref 0–2)
BH CV ECHO MEAS - ACS: 1 CM
BH CV ECHO MEAS - AO ISTHMUS: 2.5 CM
BH CV ECHO MEAS - AO MAX PG (FULL): 16.2 MMHG
BH CV ECHO MEAS - AO MAX PG: 24.8 MMHG
BH CV ECHO MEAS - AO MEAN PG (FULL): 9.2 MMHG
BH CV ECHO MEAS - AO MEAN PG: 13.4 MMHG
BH CV ECHO MEAS - AO ROOT AREA (BSA CORRECTED): 1.7
BH CV ECHO MEAS - AO ROOT AREA: 9.4 CM^2
BH CV ECHO MEAS - AO ROOT DIAM: 3.5 CM
BH CV ECHO MEAS - AO V2 MAX: 248.9 CM/SEC
BH CV ECHO MEAS - AO V2 MEAN: 176.3 CM/SEC
BH CV ECHO MEAS - AO V2 VTI: 57.7 CM
BH CV ECHO MEAS - ASC AORTA: 3.3 CM
BH CV ECHO MEAS - AVA(I,A): 2.5 CM^2
BH CV ECHO MEAS - AVA(I,D): 2.5 CM^2
BH CV ECHO MEAS - AVA(V,A): 2.7 CM^2
BH CV ECHO MEAS - AVA(V,D): 2.7 CM^2
BH CV ECHO MEAS - BSA(HAYCOCK): 2.1 M^2
BH CV ECHO MEAS - BSA: 2.1 M^2
BH CV ECHO MEAS - BZI_BMI: 25.5 KILOGRAMS/M^2
BH CV ECHO MEAS - BZI_METRIC_HEIGHT: 182.9 CM
BH CV ECHO MEAS - BZI_METRIC_WEIGHT: 85.3 KG
BH CV ECHO MEAS - EDV(CUBED): 124.7 ML
BH CV ECHO MEAS - EDV(TEICH): 118 ML
BH CV ECHO MEAS - EF(CUBED): 84.5 %
BH CV ECHO MEAS - EF(TEICH): 77.4 %
BH CV ECHO MEAS - ESV(CUBED): 19.3 ML
BH CV ECHO MEAS - ESV(TEICH): 26.6 ML
BH CV ECHO MEAS - FS: 46.3 %
BH CV ECHO MEAS - IVS/LVPW: 1
BH CV ECHO MEAS - IVSD: 0.8 CM
BH CV ECHO MEAS - LA DIMENSION: 3.7 CM
BH CV ECHO MEAS - LA/AO: 1.1
BH CV ECHO MEAS - LV MASS(C)D: 132.6 GRAMS
BH CV ECHO MEAS - LV MASS(C)DI: 63.9 GRAMS/M^2
BH CV ECHO MEAS - LV MAX PG: 8.5 MMHG
BH CV ECHO MEAS - LV MEAN PG: 4.3 MMHG
BH CV ECHO MEAS - LV V1 MAX: 146.1 CM/SEC
BH CV ECHO MEAS - LV V1 MEAN: 94.6 CM/SEC
BH CV ECHO MEAS - LV V1 VTI: 32.3 CM
BH CV ECHO MEAS - LVIDD: 5 CM
BH CV ECHO MEAS - LVIDS: 2.7 CM
BH CV ECHO MEAS - LVOT AREA (M): 4.5 CM^2
BH CV ECHO MEAS - LVOT AREA: 4.5 CM^2
BH CV ECHO MEAS - LVOT DIAM: 2.4 CM
BH CV ECHO MEAS - LVPWD: 0.78 CM
BH CV ECHO MEAS - MR MAX PG: 76.8 MMHG
BH CV ECHO MEAS - MR MAX VEL: 438.2 CM/SEC
BH CV ECHO MEAS - MV A MAX VEL: 105.6 CM/SEC
BH CV ECHO MEAS - MV DEC SLOPE: 721.3 CM/SEC^2
BH CV ECHO MEAS - MV E MAX VEL: 96.3 CM/SEC
BH CV ECHO MEAS - MV E/A: 0.91
BH CV ECHO MEAS - MV MAX PG: 10.3 MMHG
BH CV ECHO MEAS - MV MEAN PG: 4.5 MMHG
BH CV ECHO MEAS - MV P1/2T MAX VEL: 157.5 CM/SEC
BH CV ECHO MEAS - MV P1/2T: 63.9 MSEC
BH CV ECHO MEAS - MV V2 MAX: 160.2 CM/SEC
BH CV ECHO MEAS - MV V2 MEAN: 98.5 CM/SEC
BH CV ECHO MEAS - MV V2 VTI: 46.1 CM
BH CV ECHO MEAS - MVA P1/2T LCG: 1.4 CM^2
BH CV ECHO MEAS - MVA(P1/2T): 3.4 CM^2
BH CV ECHO MEAS - MVA(VTI): 3.2 CM^2
BH CV ECHO MEAS - PA MAX PG: 8.9 MMHG
BH CV ECHO MEAS - PA V2 MAX: 149.3 CM/SEC
BH CV ECHO MEAS - PI END-D VEL: 74.7 CM/SEC
BH CV ECHO MEAS - RVDD: 2.3 CM
BH CV ECHO MEAS - SI(AO): 260.7 ML/M^2
BH CV ECHO MEAS - SI(CUBED): 50.8 ML/M^2
BH CV ECHO MEAS - SI(LVOT): 70.8 ML/M^2
BH CV ECHO MEAS - SI(TEICH): 44 ML/M^2
BH CV ECHO MEAS - SV(AO): 540.9 ML
BH CV ECHO MEAS - SV(CUBED): 105.4 ML
BH CV ECHO MEAS - SV(LVOT): 146.9 ML
BH CV ECHO MEAS - SV(TEICH): 91.4 ML
BH CV ECHO MEAS - TR MAX VEL: 310.2 CM/SEC
BILIRUB SERPL-MCNC: 0.4 MG/DL (ref 0.2–1.3)
BUN BLD-MCNC: 4 MG/DL (ref 7–21)
BUN/CREAT SERPL: 5.5 (ref 7–25)
CALCIUM SPEC-SCNC: 8.5 MG/DL (ref 8.4–10.2)
CEA SERPL-MCNC: 1.9 NG/ML (ref 0–5)
CHLORIDE SERPL-SCNC: 106 MMOL/L (ref 95–110)
CO2 SERPL-SCNC: 25 MMOL/L (ref 22–31)
CREAT BLD-MCNC: 0.73 MG/DL (ref 0.5–1)
DEPRECATED RDW RBC AUTO: 46.4 FL (ref 36.4–46.3)
EOSINOPHIL # BLD AUTO: 0.05 10*3/MM3 (ref 0–0.7)
EOSINOPHIL NFR BLD AUTO: 1.1 % (ref 0–7)
ERYTHROCYTE [DISTWIDTH] IN BLOOD BY AUTOMATED COUNT: 13.6 % (ref 11.5–14.5)
GFR SERPL CREATININE-BSD FRML MDRD: 91 ML/MIN/1.73 (ref 39–90)
GLOBULIN UR ELPH-MCNC: 2.6 GM/DL (ref 2.3–3.5)
GLUCOSE BLD-MCNC: 156 MG/DL (ref 60–100)
GLUCOSE BLDC GLUCOMTR-MCNC: 180 MG/DL (ref 70–130)
GLUCOSE BLDC GLUCOMTR-MCNC: 180 MG/DL (ref 70–130)
GLUCOSE BLDC GLUCOMTR-MCNC: 185 MG/DL (ref 70–130)
GLUCOSE BLDC GLUCOMTR-MCNC: 203 MG/DL (ref 70–130)
HCT VFR BLD AUTO: 25.9 % (ref 35–45)
HGB BLD-MCNC: 8.4 G/DL (ref 12–15.5)
IMM GRANULOCYTES # BLD: 0.01 10*3/MM3 (ref 0–0.02)
IMM GRANULOCYTES NFR BLD: 0.2 % (ref 0–0.5)
LAB AP CASE REPORT: NORMAL
LAB AP CLINICAL INFORMATION: NORMAL
LAB AP DIAGNOSIS COMMENT: NORMAL
LV EF 2D ECHO EST: 60 %
LYMPHOCYTES # BLD AUTO: 1.02 10*3/MM3 (ref 0.6–4.2)
LYMPHOCYTES NFR BLD AUTO: 21.8 % (ref 10–50)
Lab: NORMAL
MCH RBC QN AUTO: 30.8 PG (ref 26.5–34)
MCHC RBC AUTO-ENTMCNC: 32.4 G/DL (ref 31.4–36)
MCV RBC AUTO: 94.9 FL (ref 80–98)
MONOCYTES # BLD AUTO: 0.44 10*3/MM3 (ref 0–0.9)
MONOCYTES NFR BLD AUTO: 9.4 % (ref 0–12)
NEUTROPHILS # BLD AUTO: 3.15 10*3/MM3 (ref 2–8.6)
NEUTROPHILS NFR BLD AUTO: 67.3 % (ref 37–80)
PATH REPORT.FINAL DX SPEC: NORMAL
PATH REPORT.GROSS SPEC: NORMAL
PLATELET # BLD AUTO: 162 10*3/MM3 (ref 150–450)
PMV BLD AUTO: 10.6 FL (ref 8–12)
POTASSIUM BLD-SCNC: 3.8 MMOL/L (ref 3.5–5.1)
PROT SERPL-MCNC: 5.8 G/DL (ref 6.3–8.6)
RBC # BLD AUTO: 2.73 10*6/MM3 (ref 3.77–5.16)
SODIUM BLD-SCNC: 140 MMOL/L (ref 137–145)
WBC NRBC COR # BLD: 4.68 10*3/MM3 (ref 3.2–9.8)

## 2017-05-31 PROCEDURE — 63710000001 INSULIN ASPART PER 5 UNITS: Performed by: NURSE PRACTITIONER

## 2017-05-31 PROCEDURE — 80053 COMPREHEN METABOLIC PANEL: CPT | Performed by: INTERNAL MEDICINE

## 2017-05-31 PROCEDURE — 85025 COMPLETE CBC W/AUTO DIFF WBC: CPT | Performed by: FAMILY MEDICINE

## 2017-05-31 PROCEDURE — 63710000001 INSULIN DETEMIR PER 5 UNITS: Performed by: NURSE PRACTITIONER

## 2017-05-31 PROCEDURE — 82962 GLUCOSE BLOOD TEST: CPT

## 2017-05-31 PROCEDURE — 99024 POSTOP FOLLOW-UP VISIT: CPT | Performed by: SURGERY

## 2017-05-31 PROCEDURE — 93306 TTE W/DOPPLER COMPLETE: CPT

## 2017-05-31 PROCEDURE — 99222 1ST HOSP IP/OBS MODERATE 55: CPT | Performed by: INTERNAL MEDICINE

## 2017-05-31 PROCEDURE — 93306 TTE W/DOPPLER COMPLETE: CPT | Performed by: INTERNAL MEDICINE

## 2017-05-31 RX ADMIN — SODIUM CHLORIDE 75 ML/HR: 900 INJECTION, SOLUTION INTRAVENOUS at 20:47

## 2017-05-31 RX ADMIN — PANTOPRAZOLE SODIUM 40 MG: 40 INJECTION, POWDER, FOR SOLUTION INTRAVENOUS at 21:31

## 2017-05-31 RX ADMIN — SUCRALFATE 1 G: 1 SUSPENSION ORAL at 17:59

## 2017-05-31 RX ADMIN — SUCRALFATE 1 G: 1 SUSPENSION ORAL at 00:21

## 2017-05-31 RX ADMIN — INSULIN ASPART 3 UNITS: 100 INJECTION, SOLUTION INTRAVENOUS; SUBCUTANEOUS at 21:29

## 2017-05-31 RX ADMIN — INSULIN DETEMIR 20 UNITS: 100 INJECTION, SOLUTION SUBCUTANEOUS at 21:28

## 2017-05-31 RX ADMIN — DILTIAZEM HYDROCHLORIDE 180 MG: 180 CAPSULE, EXTENDED RELEASE ORAL at 08:12

## 2017-05-31 RX ADMIN — INSULIN ASPART 3 UNITS: 100 INJECTION, SOLUTION INTRAVENOUS; SUBCUTANEOUS at 11:19

## 2017-05-31 RX ADMIN — ENALAPRIL MALEATE 20 MG: 10 TABLET ORAL at 17:55

## 2017-05-31 RX ADMIN — SOTALOL HYDROCHLORIDE 80 MG: 80 TABLET ORAL at 21:27

## 2017-05-31 RX ADMIN — SOTALOL HYDROCHLORIDE 80 MG: 80 TABLET ORAL at 08:13

## 2017-05-31 RX ADMIN — SUCRALFATE 1 G: 1 SUSPENSION ORAL at 05:41

## 2017-05-31 RX ADMIN — INSULIN ASPART 3 UNITS: 100 INJECTION, SOLUTION INTRAVENOUS; SUBCUTANEOUS at 17:52

## 2017-05-31 RX ADMIN — PANTOPRAZOLE SODIUM 40 MG: 40 INJECTION, POWDER, FOR SOLUTION INTRAVENOUS at 08:15

## 2017-05-31 RX ADMIN — SUCRALFATE 1 G: 1 SUSPENSION ORAL at 13:00

## 2017-05-31 RX ADMIN — DONEPEZIL HYDROCHLORIDE 10 MG: 10 TABLET ORAL at 21:27

## 2017-05-31 RX ADMIN — PRAVASTATIN SODIUM 40 MG: 40 TABLET ORAL at 21:27

## 2017-05-31 RX ADMIN — ENALAPRIL MALEATE 20 MG: 10 TABLET ORAL at 08:14

## 2017-05-31 RX ADMIN — MEMANTINE 5 MG: 5 TABLET ORAL at 17:55

## 2017-05-31 RX ADMIN — METHIMAZOLE 2.5 MG: 5 TABLET ORAL at 08:13

## 2017-05-31 RX ADMIN — MEMANTINE 5 MG: 5 TABLET ORAL at 08:16

## 2017-06-01 ENCOUNTER — ANESTHESIA EVENT (OUTPATIENT)
Dept: PERIOP | Facility: HOSPITAL | Age: 82
End: 2017-06-01

## 2017-06-01 LAB
ABO + RH BLD: NORMAL
ABO + RH BLD: NORMAL
ABO GROUP BLD: NORMAL
ALBUMIN SERPL-MCNC: 3.2 G/DL (ref 3.4–4.8)
ALBUMIN/GLOB SERPL: 1.2 G/DL (ref 1.1–1.8)
ALP SERPL-CCNC: 53 U/L (ref 38–126)
ALT SERPL W P-5'-P-CCNC: 27 U/L (ref 9–52)
ANION GAP SERPL CALCULATED.3IONS-SCNC: 10 MMOL/L (ref 5–15)
AST SERPL-CCNC: 21 U/L (ref 14–36)
BASOPHILS # BLD AUTO: 0 10*3/MM3 (ref 0–0.2)
BASOPHILS NFR BLD AUTO: 0 % (ref 0–2)
BH BB BLOOD EXPIRATION DATE: NORMAL
BH BB BLOOD EXPIRATION DATE: NORMAL
BH BB BLOOD TYPE BARCODE: 5100
BH BB BLOOD TYPE BARCODE: 5100
BH BB DISPENSE STATUS: NORMAL
BH BB DISPENSE STATUS: NORMAL
BH BB PRODUCT CODE: NORMAL
BH BB PRODUCT CODE: NORMAL
BH BB UNIT NUMBER: NORMAL
BH BB UNIT NUMBER: NORMAL
BILIRUB SERPL-MCNC: 0.4 MG/DL (ref 0.2–1.3)
BLD GP AB SCN SERPL QL: NEGATIVE
BUN BLD-MCNC: 4 MG/DL (ref 7–21)
BUN/CREAT SERPL: 5.6 (ref 7–25)
CALCIUM SPEC-SCNC: 8.6 MG/DL (ref 8.4–10.2)
CHLORIDE SERPL-SCNC: 104 MMOL/L (ref 95–110)
CO2 SERPL-SCNC: 28 MMOL/L (ref 22–31)
CREAT BLD-MCNC: 0.71 MG/DL (ref 0.5–1)
DEPRECATED RDW RBC AUTO: 47.3 FL (ref 36.4–46.3)
EOSINOPHIL # BLD AUTO: 0.08 10*3/MM3 (ref 0–0.7)
EOSINOPHIL NFR BLD AUTO: 1.8 % (ref 0–7)
ERYTHROCYTE [DISTWIDTH] IN BLOOD BY AUTOMATED COUNT: 13.9 % (ref 11.5–14.5)
GFR SERPL CREATININE-BSD FRML MDRD: 94 ML/MIN/1.73 (ref 39–90)
GLOBULIN UR ELPH-MCNC: 2.6 GM/DL (ref 2.3–3.5)
GLUCOSE BLD-MCNC: 94 MG/DL (ref 60–100)
GLUCOSE BLDC GLUCOMTR-MCNC: 133 MG/DL (ref 70–130)
GLUCOSE BLDC GLUCOMTR-MCNC: 146 MG/DL (ref 70–130)
GLUCOSE BLDC GLUCOMTR-MCNC: 188 MG/DL (ref 70–130)
HCT VFR BLD AUTO: 25.6 % (ref 35–45)
HGB BLD-MCNC: 8.5 G/DL (ref 12–15.5)
IMM GRANULOCYTES # BLD: 0.01 10*3/MM3 (ref 0–0.02)
IMM GRANULOCYTES NFR BLD: 0.2 % (ref 0–0.5)
LYMPHOCYTES # BLD AUTO: 1.17 10*3/MM3 (ref 0.6–4.2)
LYMPHOCYTES NFR BLD AUTO: 26.9 % (ref 10–50)
Lab: NORMAL
MCH RBC QN AUTO: 31.4 PG (ref 26.5–34)
MCHC RBC AUTO-ENTMCNC: 33.2 G/DL (ref 31.4–36)
MCV RBC AUTO: 94.5 FL (ref 80–98)
MONOCYTES # BLD AUTO: 0.48 10*3/MM3 (ref 0–0.9)
MONOCYTES NFR BLD AUTO: 11 % (ref 0–12)
NEUTROPHILS # BLD AUTO: 2.61 10*3/MM3 (ref 2–8.6)
NEUTROPHILS NFR BLD AUTO: 60.1 % (ref 37–80)
PLATELET # BLD AUTO: 166 10*3/MM3 (ref 150–450)
PMV BLD AUTO: 10.6 FL (ref 8–12)
POTASSIUM BLD-SCNC: 3.5 MMOL/L (ref 3.5–5.1)
PROT SERPL-MCNC: 5.8 G/DL (ref 6.3–8.6)
RBC # BLD AUTO: 2.71 10*6/MM3 (ref 3.77–5.16)
RH BLD: POSITIVE
SODIUM BLD-SCNC: 142 MMOL/L (ref 137–145)
UNIT  ABO: NORMAL
UNIT  ABO: NORMAL
UNIT  RH: NORMAL
UNIT  RH: NORMAL
WBC NRBC COR # BLD: 4.35 10*3/MM3 (ref 3.2–9.8)

## 2017-06-01 PROCEDURE — 86900 BLOOD TYPING SEROLOGIC ABO: CPT | Performed by: SURGERY

## 2017-06-01 PROCEDURE — 63710000001 INSULIN DETEMIR PER 5 UNITS: Performed by: NURSE PRACTITIONER

## 2017-06-01 PROCEDURE — 86901 BLOOD TYPING SEROLOGIC RH(D): CPT | Performed by: SURGERY

## 2017-06-01 PROCEDURE — 99231 SBSQ HOSP IP/OBS SF/LOW 25: CPT | Performed by: SURGERY

## 2017-06-01 PROCEDURE — 85025 COMPLETE CBC W/AUTO DIFF WBC: CPT | Performed by: FAMILY MEDICINE

## 2017-06-01 PROCEDURE — 86850 RBC ANTIBODY SCREEN: CPT | Performed by: SURGERY

## 2017-06-01 PROCEDURE — 93010 ELECTROCARDIOGRAM REPORT: CPT | Performed by: INTERNAL MEDICINE

## 2017-06-01 PROCEDURE — 80053 COMPREHEN METABOLIC PANEL: CPT | Performed by: INTERNAL MEDICINE

## 2017-06-01 PROCEDURE — 93005 ELECTROCARDIOGRAM TRACING: CPT | Performed by: FAMILY MEDICINE

## 2017-06-01 PROCEDURE — 82962 GLUCOSE BLOOD TEST: CPT

## 2017-06-01 PROCEDURE — 63710000001 INSULIN ASPART PER 5 UNITS: Performed by: NURSE PRACTITIONER

## 2017-06-01 RX ORDER — LEVOFLOXACIN 5 MG/ML
500 INJECTION, SOLUTION INTRAVENOUS ONCE
Status: COMPLETED | OUTPATIENT
Start: 2017-06-02 | End: 2017-06-02

## 2017-06-01 RX ORDER — NEOMYCIN SULFATE 500 MG/1
1000 TABLET ORAL
Status: DISCONTINUED | OUTPATIENT
Start: 2017-06-01 | End: 2017-06-02

## 2017-06-01 RX ORDER — METRONIDAZOLE 500 MG/1
1000 TABLET ORAL
Status: DISCONTINUED | OUTPATIENT
Start: 2017-06-01 | End: 2017-06-02

## 2017-06-01 RX ORDER — MAGNESIUM CARB/ALUMINUM HYDROX 105-160MG
296 TABLET,CHEWABLE ORAL ONCE
Status: COMPLETED | OUTPATIENT
Start: 2017-06-01 | End: 2017-06-01

## 2017-06-01 RX ADMIN — MAGESIUM CITRATE 296 ML: 1.75 LIQUID ORAL at 22:08

## 2017-06-01 RX ADMIN — HYDROCODONE BITARTRATE AND ACETAMINOPHEN 1 TABLET: 5; 325 TABLET ORAL at 09:25

## 2017-06-01 RX ADMIN — DILTIAZEM HYDROCHLORIDE 180 MG: 180 CAPSULE, EXTENDED RELEASE ORAL at 09:08

## 2017-06-01 RX ADMIN — SUCRALFATE 1 G: 1 SUSPENSION ORAL at 05:49

## 2017-06-01 RX ADMIN — DONEPEZIL HYDROCHLORIDE 10 MG: 10 TABLET ORAL at 22:10

## 2017-06-01 RX ADMIN — SODIUM CHLORIDE 75 ML/HR: 900 INJECTION, SOLUTION INTRAVENOUS at 23:49

## 2017-06-01 RX ADMIN — HYDROCODONE BITARTRATE AND ACETAMINOPHEN 1 TABLET: 5; 325 TABLET ORAL at 16:04

## 2017-06-01 RX ADMIN — ENALAPRIL MALEATE 20 MG: 10 TABLET ORAL at 17:50

## 2017-06-01 RX ADMIN — PRAVASTATIN SODIUM 40 MG: 40 TABLET ORAL at 22:10

## 2017-06-01 RX ADMIN — MEMANTINE 5 MG: 5 TABLET ORAL at 17:51

## 2017-06-01 RX ADMIN — INSULIN DETEMIR 20 UNITS: 100 INJECTION, SOLUTION SUBCUTANEOUS at 22:27

## 2017-06-01 RX ADMIN — PANTOPRAZOLE SODIUM 40 MG: 40 INJECTION, POWDER, FOR SOLUTION INTRAVENOUS at 22:13

## 2017-06-01 RX ADMIN — PANTOPRAZOLE SODIUM 40 MG: 40 INJECTION, POWDER, FOR SOLUTION INTRAVENOUS at 09:08

## 2017-06-01 RX ADMIN — ENALAPRIL MALEATE 20 MG: 10 TABLET ORAL at 09:07

## 2017-06-01 RX ADMIN — SUCRALFATE 1 G: 1 SUSPENSION ORAL at 17:51

## 2017-06-01 RX ADMIN — SOTALOL HYDROCHLORIDE 80 MG: 80 TABLET ORAL at 22:10

## 2017-06-01 RX ADMIN — INSULIN ASPART 3 UNITS: 100 INJECTION, SOLUTION INTRAVENOUS; SUBCUTANEOUS at 22:27

## 2017-06-01 RX ADMIN — SODIUM CHLORIDE 75 ML/HR: 900 INJECTION, SOLUTION INTRAVENOUS at 12:05

## 2017-06-01 RX ADMIN — NEOMYCIN SULFATE 1000 MG: 500 TABLET ORAL at 23:46

## 2017-06-01 RX ADMIN — SOTALOL HYDROCHLORIDE 80 MG: 80 TABLET ORAL at 09:08

## 2017-06-01 RX ADMIN — METRONIDAZOLE 1000 MG: 500 TABLET ORAL at 23:46

## 2017-06-01 RX ADMIN — SUCRALFATE 1 G: 1 SUSPENSION ORAL at 01:15

## 2017-06-01 RX ADMIN — NEOMYCIN SULFATE 1000 MG: 500 TABLET ORAL at 22:10

## 2017-06-01 RX ADMIN — METHIMAZOLE 2.5 MG: 5 TABLET ORAL at 09:08

## 2017-06-01 RX ADMIN — SUCRALFATE 1 G: 1 SUSPENSION ORAL at 11:33

## 2017-06-01 RX ADMIN — SUCRALFATE 1 G: 1 SUSPENSION ORAL at 23:46

## 2017-06-01 RX ADMIN — MEMANTINE 5 MG: 5 TABLET ORAL at 09:08

## 2017-06-01 RX ADMIN — METRONIDAZOLE 1000 MG: 500 TABLET ORAL at 22:10

## 2017-06-01 NOTE — PLAN OF CARE
Problem: Patient Care Overview (Adult)  Goal: Plan of Care Review  Outcome: Ongoing (interventions implemented as appropriate)    Problem: Fluid Volume Deficit (Adult)  Goal: Fluid/Electrolyte Balance  Outcome: Ongoing (interventions implemented as appropriate)  Goal: Comfort/Well Being  Outcome: Ongoing (interventions implemented as appropriate)    Problem: Gastrointestinal Bleeding (Adult)  Goal: Signs and Symptoms of Listed Potential Problems Will be Absent or Manageable (Gastrointestinal Bleeding)  Outcome: Ongoing (interventions implemented as appropriate)

## 2017-06-01 NOTE — PROGRESS NOTES
[facility] Medicine Services  INPATIENT PROGRESS NOTE    Length of Stay: 2  Date of Admission: 5/26/2017  Primary Care Physician: Melissa Ch MD    Subjective   Chief Complaint: Rectal bleeding  HPI:  No overnight events. Had colonoscopy that found a  mass in sigmoid colon that was partially obstructive, worrisome for malignancy. General surgery to plan on removing mass + part of colon for Friday, we will get pre-op clearance prior to this. Patient today complaining of no pain, no rectal bleeding reported per patient and daughter in room. Cardiology evaluated patient and cleared for surgery, ECHO and ECG done.     Review of Systems   Constitutional: Negative for chills and fever.   Respiratory: Negative for shortness of breath.    Cardiovascular: Negative for chest pain.   Gastrointestinal: Negative for abdominal pain, diarrhea, nausea and vomiting.   Genitourinary: Negative for dysuria.   Neurological: Negative for dizziness.        All pertinent negatives and positives are as above. All other systems have been reviewed and are negative unless otherwise stated.     Objective    Temp:  [97.3 °F (36.3 °C)-98.4 °F (36.9 °C)] 97.3 °F (36.3 °C)  Heart Rate:  [60-80] 62  Resp:  [16-18] 16  BP: (122-139)/(52-80) 139/63    Physical Exam   Constitutional: She is oriented to person, place, and time. She appears well-developed and well-nourished.   Cardiovascular: Normal rate, regular rhythm and normal heart sounds.  Exam reveals no gallop and no friction rub.    No murmur heard.  Pulmonary/Chest: Effort normal and breath sounds normal. No respiratory distress. She has no wheezes. She has no rales.   Abdominal: Soft. Bowel sounds are normal. There is no tenderness.   Musculoskeletal: Normal range of motion. She exhibits no edema.   Neurological: She is alert and oriented to person, place, and time.   Skin: Skin is warm and dry.   Psychiatric: She has a normal mood and  affect. Her behavior is normal.   Vitals reviewed.          Results Review:  I have reviewed the labs, radiology results, and diagnostic studies.    Laboratory Data:     Results from last 7 days  Lab Units 05/31/17  0540 05/30/17  0619 05/29/17  0344   SODIUM mmol/L 140 143 138   POTASSIUM mmol/L 3.8 3.6 3.7   CHLORIDE mmol/L 106 106 106   TOTAL CO2 mmol/L 25.0 28.0 26.0   BUN mg/dL 4* 4* 8   CREATININE mg/dL 0.73 0.72 0.70   GLUCOSE mg/dL 156* 63 87   CALCIUM mg/dL 8.5 8.7 8.6   BILIRUBIN mg/dL 0.4 0.4 0.4   ALK PHOS U/L 50 47 37*   ALT (SGPT) U/L 33 28 22   AST (SGOT) U/L 21 22 21   ANION GAP mmol/L 9.0 9.0 6.0     Estimated Creatinine Clearance: 56.1 mL/min (by C-G formula based on Cr of 0.73).            Results from last 7 days  Lab Units 05/31/17  0540 05/30/17  0619 05/29/17  0344 05/29/17  0022 05/28/17  1554 05/28/17  0938 05/28/17  0438 05/27/17  2348   WBC 10*3/mm3 4.68 4.38  --   --   --  4.53 4.16 4.42   HEMOGLOBIN g/dL 8.4* 9.3* 9.0* 8.4* 9.2* 9.5* 9.3* 9.6*   HEMATOCRIT % 25.9* 28.1* 26.8* 24.9* 27.3* 28.0* 27.7* 28.4*   PLATELETS 10*3/mm3 162 163  --   --   --  154 150 162       Results from last 7 days  Lab Units 05/28/17  1615   INR  1.09       Culture Data:   No results found for: BLOODCX  No results found for: URINECX  No results found for: RESPCX  No results found for: WOUNDCX  No results found for: STOOLCX  No components found for: BODYFLD    Radiology Data:   Imaging Results (last 24 hours)     Procedure Component Value Units Date/Time    CT Abdomen Pelvis With Contrast [[id number]] Collected:  05/30/17 2042     Updated:  05/31/17 0930    Narrative:         PROCEDURE: Ct abdomen and pelvis with contrast    REASON FOR EXAM: colon mass, K92.1 Melena R31.9 Hematuria,  unspecified E86.0 Dehydration    FINDINGS: Comparison study dated  May 26, 2017. Axial computer  tomography sequential imaging was performed from the diaphragms  through the symphysis pubis after administration of IV  contrast  .Sagittal and coronal reformates was performed.    This exam was performed according to our departmental  dose-optimization program, which includes automated exposure  control, adjustment of the mA and/or kV according to patient size  and/or use of iterative reconstruction technique.     Imaging through lung bases reveals no abnormality.    The liver is normal. Multiple gallstones are seen within the  gallbladder. Otherwise gallbladder is unremarkable. The biliary  system appears within normal limits. The pancreas is normal. The  spleen is normal. Stable right adrenal gland hypodense nodule  which has Hounsfield's below 10 which measures 1.6 cm in greatest  diameter. Otherwise the adrenal glands are unremarkable. Right  kidney mid zone stable subcentimeter hypodense lesion which does  not appear to enhance consistent with benign hyperdense cyst.  Right kidney lower pole stable small simple renal cortical cyst.  Otherwise right kidney and ureter are normal. Left kidney upper  pole, mid zone, and lower pole stable simple renal cortical  cysts. Otherwise left kidney and ureter are unremarkable. The  bladder is normal. Stable appearance of the uterus with small  calcified uterine fibroids seen. Mid descending colon focal  region of circumferential colon wall thickening. Otherwise hollow  viscera is unremarkable. No lymphadenopathy in the abdomen or the  pelvis. No acute osseous abnormality.      Impression:       1. Mid descending colon focal region of circumferential colon  wall thickening. This may represent peristaltic wave versus focal  region of colitis or neoplastic involvement. Consider colonoscopy  or barium enema to further evaluate..  2. Cholelithiasis..  3. Stable benign right adrenal gland adenoma..  4. Stable benign right kidney hyperdense cyst. Stable bilateral  kidney small simple renal cortical cyst..  5. Stable small calcified uterine fibroid..    Electronically signed by:  Elton Villanueva MD   5/31/2017 9:29 AM CDT  Workstation: [id number]          I have reviewed the patient current medications.     Assessment/Plan     Hospital Problem List     SSS (sick sinus syndrome)    Gastrointestinal hemorrhage with melena    Preop cardiovascular exam          Plan:    - Will keep patient on clears till Friday, ECHO pending. Surgery following.   - Continue current home medications  - SCDs/TEDs for DVT PPx               Discharge Planning: I expect patient to be discharged to home in 5-6 days.    Jimy Ryder MD   06/01/17   7:23 AM

## 2017-06-01 NOTE — PROGRESS NOTES
GENERAL SURGERY PROGRESS NOTE  Chief Complaint:  Surgery Follow up   LOS: 2 days       Subjective     Interval History:     No issues today, feels well.    Review of Systems:    No complaints, no blood per rectum    Objective     Vital Signs  Temp:  [97 °F (36.1 °C)-98.4 °F (36.9 °C)] 97 °F (36.1 °C)  Heart Rate:  [60-69] 62  Resp:  [16-18] 16  BP: (129-146)/(61-80) 146/65    Physical Exam:   Abdomen soft  Labs:  Lab Results (last 24 hours)     Procedure Component Value Units Date/Time    POC Glucose Fingerstick [[id number]]  (Abnormal) Collected:  05/31/17 1922    Specimen:  Blood Updated:  05/31/17 2040     Glucose 180 (H) mg/dL       Meter: [id number]: [id number] NYA TRAORE       CBC & Differential [[id number]] Collected:  06/01/17 0609    Specimen:  Blood Updated:  06/01/17 0724    Narrative:       The following orders were created for panel order CBC & Differential.  Procedure                               Abnormality         Status                     ---------                               -----------         ------                     CBC Auto Differential[[id number]]        Abnormal            Final result                 Please view results for these tests on the individual orders.    CBC Auto Differential [[id number]]  (Abnormal) Collected:  06/01/17 0609    Specimen:  Blood Updated:  06/01/17 0724     WBC 4.35 10*3/mm3      RBC 2.71 (L) 10*6/mm3      Hemoglobin 8.5 (L) g/dL      Hematocrit 25.6 (L) %      MCV 94.5 fL      MCH 31.4 pg      MCHC 33.2 g/dL      RDW 13.9 %      RDW-SD 47.3 (H) fl      MPV 10.6 fL      Platelets 166 10*3/mm3      Neutrophil % 60.1 %      Lymphocyte % 26.9 %      Monocyte % 11.0 %      Eosinophil % 1.8 %      Basophil % 0.0 %      Immature Grans % 0.2 %      Neutrophils, Absolute 2.61 10*3/mm3      Lymphocytes, Absolute 1.17 10*3/mm3      Monocytes, Absolute 0.48 10*3/mm3      Eosinophils, Absolute 0.08 10*3/mm3      Basophils, Absolute 0.00 10*3/mm3      Immature Grans,  Absolute 0.01 10*3/mm3     Comprehensive Metabolic Panel [[id number]]  (Abnormal) Collected:  06/01/17 0609    Specimen:  Blood Updated:  06/01/17 0733     Glucose 94 mg/dL      BUN 4 (L) mg/dL      Creatinine 0.71 mg/dL      Sodium 142 mmol/L      Potassium 3.5 mmol/L      Chloride 104 mmol/L      CO2 28.0 mmol/L      Calcium 8.6 mg/dL      Total Protein 5.8 (L) g/dL      Albumin 3.20 (L) g/dL      ALT (SGPT) 27 U/L      AST (SGOT) 21 U/L      Alkaline Phosphatase 53 U/L      Total Bilirubin 0.4 mg/dL      eGFR  African Amer 94 (H) mL/min/1.73      Globulin 2.6 gm/dL      A/G Ratio 1.2 g/dL      BUN/Creatinine Ratio 5.6 (L)     Anion Gap 10.0 mmol/L     Narrative:       The MDRD GFR formula is only valid for adults with stable renal function between ages 18 and 70.    POC Glucose Fingerstick [[id number]]  (Abnormal) Collected:  06/01/17 1043    Specimen:  Blood Updated:  06/01/17 1102     Glucose 146 (H) mg/dL       RN NotifiedMeter: [id number]: [id number] TRENT SILVER       POC Glucose Fingerstick [[id number]]  (Abnormal) Collected:  06/01/17 1609    Specimen:  Blood Updated:  06/01/17 1655     Glucose 133 (H) mg/dL       RN NotifiedMeter: [id number]: [id number] TRENT SILVER              Results Review:     Labs and imaging for today were reviewed.        Assessment/Plan     Kierra Scales is a 88 y.o. female with sigmoid colon cancer, lower GI bleed.      Will plan for OR tomorrow morning.  Will discuss risks and benefits with family tomorrow AM to obtain consent.  Bowel prep ordered for tonight.        This document has been electronically signed by Stefano Linn MD on June 1, 2017 5:22 PM        Stefano Linn MD  06/01/17  5:22 PM

## 2017-06-02 ENCOUNTER — ANESTHESIA (OUTPATIENT)
Dept: PERIOP | Facility: HOSPITAL | Age: 82
End: 2017-06-02

## 2017-06-02 LAB
ALBUMIN SERPL-MCNC: 2.9 G/DL (ref 3.4–4.8)
ALBUMIN/GLOB SERPL: 1.1 G/DL (ref 1.1–1.8)
ALP SERPL-CCNC: 48 U/L (ref 38–126)
ALT SERPL W P-5'-P-CCNC: 30 U/L (ref 9–52)
ANION GAP SERPL CALCULATED.3IONS-SCNC: 8 MMOL/L (ref 5–15)
ANION GAP SERPL CALCULATED.3IONS-SCNC: 9 MMOL/L (ref 5–15)
ARTERIAL PATENCY WRIST A: ABNORMAL
AST SERPL-CCNC: 20 U/L (ref 14–36)
ATMOSPHERIC PRESS: ABNORMAL MMHG
BASE EXCESS BLDA CALC-SCNC: 2.1 MMOL/L (ref -2.4–2.4)
BASOPHILS # BLD AUTO: 0 10*3/MM3 (ref 0–0.2)
BASOPHILS NFR BLD AUTO: 0 % (ref 0–2)
BDY SITE: ABNORMAL
BILIRUB SERPL-MCNC: 0.4 MG/DL (ref 0.2–1.3)
BUN BLD-MCNC: 5 MG/DL (ref 7–21)
BUN BLD-MCNC: 6 MG/DL (ref 7–21)
BUN/CREAT SERPL: 7.8 (ref 7–25)
BUN/CREAT SERPL: 9.2 (ref 7–25)
CA-I BLD-MCNC: 4.4 MG/DL (ref 4.5–4.9)
CALCIUM SPEC-SCNC: 7.2 MG/DL (ref 8.4–10.2)
CALCIUM SPEC-SCNC: 8.2 MG/DL (ref 8.4–10.2)
CHLORIDE SERPL-SCNC: 103 MMOL/L (ref 95–110)
CHLORIDE SERPL-SCNC: 104 MMOL/L (ref 95–110)
CO2 BLDA-SCNC: 26.5 MMOL/L (ref 23–27)
CO2 SERPL-SCNC: 25 MMOL/L (ref 22–31)
CO2 SERPL-SCNC: 27 MMOL/L (ref 22–31)
CREAT BLD-MCNC: 0.64 MG/DL (ref 0.5–1)
CREAT BLD-MCNC: 0.65 MG/DL (ref 0.5–1)
DEPRECATED RDW RBC AUTO: 45.8 FL (ref 36.4–46.3)
DEPRECATED RDW RBC AUTO: 46.2 FL (ref 36.4–46.3)
EOSINOPHIL # BLD AUTO: 0.07 10*3/MM3 (ref 0–0.7)
EOSINOPHIL NFR BLD AUTO: 1.4 % (ref 0–7)
ERYTHROCYTE [DISTWIDTH] IN BLOOD BY AUTOMATED COUNT: 13.4 % (ref 11.5–14.5)
ERYTHROCYTE [DISTWIDTH] IN BLOOD BY AUTOMATED COUNT: 13.7 % (ref 11.5–14.5)
GFR SERPL CREATININE-BSD FRML MDRD: 104 ML/MIN/1.73 (ref 39–90)
GFR SERPL CREATININE-BSD FRML MDRD: 106 ML/MIN/1.73 (ref 39–90)
GLOBULIN UR ELPH-MCNC: 2.6 GM/DL (ref 2.3–3.5)
GLUCOSE BLD-MCNC: 149 MG/DL (ref 60–100)
GLUCOSE BLD-MCNC: 84 MG/DL (ref 60–100)
GLUCOSE BLDA-MCNC: 93 MMOL/L
GLUCOSE BLDC GLUCOMTR-MCNC: 124 MG/DL (ref 70–130)
GLUCOSE BLDC GLUCOMTR-MCNC: 177 MG/DL (ref 70–130)
GLUCOSE BLDC GLUCOMTR-MCNC: 196 MG/DL (ref 70–130)
GLUCOSE BLDC GLUCOMTR-MCNC: 96 MG/DL (ref 70–130)
HCO3 BLDA-SCNC: 25.5 MMOL/L (ref 22–26)
HCT VFR BLD AUTO: 24.3 % (ref 35–45)
HCT VFR BLD AUTO: 27.2 % (ref 35–45)
HCT VFR BLD CALC: 24 % (ref 38–47)
HGB BLD-MCNC: 8.1 G/DL (ref 12–15.5)
HGB BLD-MCNC: 9.1 G/DL (ref 12–15.5)
HGB BLDA-MCNC: 8.3 G/DL (ref 12–16)
IMM GRANULOCYTES # BLD: 0.01 10*3/MM3 (ref 0–0.02)
IMM GRANULOCYTES NFR BLD: 0.2 % (ref 0–0.5)
LYMPHOCYTES # BLD AUTO: 1.2 10*3/MM3 (ref 0.6–4.2)
LYMPHOCYTES NFR BLD AUTO: 24.4 % (ref 10–50)
MCH RBC QN AUTO: 31 PG (ref 26.5–34)
MCH RBC QN AUTO: 31.8 PG (ref 26.5–34)
MCHC RBC AUTO-ENTMCNC: 33.3 G/DL (ref 31.4–36)
MCHC RBC AUTO-ENTMCNC: 33.5 G/DL (ref 31.4–36)
MCV RBC AUTO: 92.5 FL (ref 80–98)
MCV RBC AUTO: 95.3 FL (ref 80–98)
MODALITY: ABNORMAL
MONOCYTES # BLD AUTO: 0.54 10*3/MM3 (ref 0–0.9)
MONOCYTES NFR BLD AUTO: 11 % (ref 0–12)
NEUTROPHILS # BLD AUTO: 3.09 10*3/MM3 (ref 2–8.6)
NEUTROPHILS NFR BLD AUTO: 63 % (ref 37–80)
PCO2 BLDA: 34.5 MM HG (ref 35–45)
PH BLDA: 7.49 PH UNITS (ref 7.35–7.45)
PLATELET # BLD AUTO: 177 10*3/MM3 (ref 150–450)
PLATELET # BLD AUTO: 178 10*3/MM3 (ref 150–450)
PMV BLD AUTO: 10 FL (ref 8–12)
PMV BLD AUTO: 10.6 FL (ref 8–12)
PO2 BLDA: 102.6 MM HG (ref 80–105)
POTASSIUM BLD-SCNC: 3.5 MMOL/L (ref 3.5–5.1)
POTASSIUM BLD-SCNC: 3.6 MMOL/L (ref 3.5–5.1)
POTASSIUM BLDA-SCNC: 3.38 MMOL/L (ref 3.6–4.9)
PROT SERPL-MCNC: 5.5 G/DL (ref 6.3–8.6)
RBC # BLD AUTO: 2.55 10*6/MM3 (ref 3.77–5.16)
RBC # BLD AUTO: 2.94 10*6/MM3 (ref 3.77–5.16)
SAO2 % BLDCOA: 97.8 % (ref 94–100)
SODIUM BLD-SCNC: 136 MMOL/L (ref 137–145)
SODIUM BLD-SCNC: 140 MMOL/L (ref 137–145)
SODIUM BLDA-SCNC: 139 MMOL/L (ref 138–146)
WBC NRBC COR # BLD: 4.91 10*3/MM3 (ref 3.2–9.8)
WBC NRBC COR # BLD: 5.78 10*3/MM3 (ref 3.2–9.8)

## 2017-06-02 PROCEDURE — 25010000002 PROPOFOL 10 MG/ML EMULSION: Performed by: NURSE ANESTHETIST, CERTIFIED REGISTERED

## 2017-06-02 PROCEDURE — 0DTG0ZZ RESECTION OF LEFT LARGE INTESTINE, OPEN APPROACH: ICD-10-PCS | Performed by: SURGERY

## 2017-06-02 PROCEDURE — 0DJD4ZZ INSPECTION OF LOWER INTESTINAL TRACT, PERCUTANEOUS ENDOSCOPIC APPROACH: ICD-10-PCS | Performed by: SURGERY

## 2017-06-02 PROCEDURE — 25010000002 ALBUMIN HUMAN 5% PER 50 ML: Performed by: NURSE ANESTHETIST, CERTIFIED REGISTERED

## 2017-06-02 PROCEDURE — 85025 COMPLETE CBC W/AUTO DIFF WBC: CPT | Performed by: FAMILY MEDICINE

## 2017-06-02 PROCEDURE — 25010000002 PHENYLEPHRINE PER 1 ML: Performed by: NURSE ANESTHETIST, CERTIFIED REGISTERED

## 2017-06-02 PROCEDURE — 63710000001 INSULIN DETEMIR PER 5 UNITS: Performed by: NURSE PRACTITIONER

## 2017-06-02 PROCEDURE — 25010000003 MORPHINE PER 10 MG: Performed by: SURGERY

## 2017-06-02 PROCEDURE — 25010000002 LEVOFLOXACIN PER 250 MG: Performed by: SURGERY

## 2017-06-02 PROCEDURE — 82962 GLUCOSE BLOOD TEST: CPT

## 2017-06-02 PROCEDURE — 25010000002 HYDROMORPHONE PER 4 MG: Performed by: NURSE ANESTHETIST, CERTIFIED REGISTERED

## 2017-06-02 PROCEDURE — 25010000002 FENTANYL CITRATE (PF) 100 MCG/2ML SOLUTION: Performed by: NURSE ANESTHETIST, CERTIFIED REGISTERED

## 2017-06-02 PROCEDURE — 80053 COMPREHEN METABOLIC PANEL: CPT | Performed by: INTERNAL MEDICINE

## 2017-06-02 PROCEDURE — 82803 BLOOD GASES ANY COMBINATION: CPT | Performed by: SURGERY

## 2017-06-02 PROCEDURE — 25010000002 MIDAZOLAM PER 1 MG: Performed by: NURSE ANESTHETIST, CERTIFIED REGISTERED

## 2017-06-02 PROCEDURE — 63710000001 INSULIN ASPART PER 5 UNITS: Performed by: NURSE PRACTITIONER

## 2017-06-02 PROCEDURE — 85027 COMPLETE CBC AUTOMATED: CPT | Performed by: SURGERY

## 2017-06-02 PROCEDURE — P9041 ALBUMIN (HUMAN),5%, 50ML: HCPCS | Performed by: NURSE ANESTHETIST, CERTIFIED REGISTERED

## 2017-06-02 PROCEDURE — 44140 PARTIAL REMOVAL OF COLON: CPT | Performed by: SURGERY

## 2017-06-02 PROCEDURE — 25010000002 NEOSTIGMINE PER 0.5 MG: Performed by: NURSE ANESTHETIST, CERTIFIED REGISTERED

## 2017-06-02 PROCEDURE — 25010000002 ONDANSETRON PER 1 MG: Performed by: NURSE ANESTHETIST, CERTIFIED REGISTERED

## 2017-06-02 PROCEDURE — 88309 TISSUE EXAM BY PATHOLOGIST: CPT | Performed by: SURGERY

## 2017-06-02 PROCEDURE — 88309 TISSUE EXAM BY PATHOLOGIST: CPT | Performed by: PATHOLOGY

## 2017-06-02 RX ORDER — ROCURONIUM BROMIDE 10 MG/ML
INJECTION, SOLUTION INTRAVENOUS AS NEEDED
Status: DISCONTINUED | OUTPATIENT
Start: 2017-06-02 | End: 2017-06-02 | Stop reason: SURG

## 2017-06-02 RX ORDER — SODIUM CHLORIDE 9 MG/ML
100 INJECTION, SOLUTION INTRAVENOUS CONTINUOUS
Status: DISCONTINUED | OUTPATIENT
Start: 2017-06-02 | End: 2017-06-03

## 2017-06-02 RX ORDER — ALBUMIN, HUMAN INJ 5% 5 %
SOLUTION INTRAVENOUS CONTINUOUS PRN
Status: DISCONTINUED | OUTPATIENT
Start: 2017-06-02 | End: 2017-06-02 | Stop reason: SURG

## 2017-06-02 RX ORDER — GLYCOPYRROLATE 0.2 MG/ML
INJECTION INTRAMUSCULAR; INTRAVENOUS AS NEEDED
Status: DISCONTINUED | OUTPATIENT
Start: 2017-06-02 | End: 2017-06-02 | Stop reason: SURG

## 2017-06-02 RX ORDER — LEVOFLOXACIN 5 MG/ML
500 INJECTION, SOLUTION INTRAVENOUS EVERY 24 HOURS
Status: COMPLETED | OUTPATIENT
Start: 2017-06-03 | End: 2017-06-03

## 2017-06-02 RX ORDER — BUPIVACAINE HYDROCHLORIDE AND EPINEPHRINE 5; 5 MG/ML; UG/ML
INJECTION, SOLUTION EPIDURAL; INTRACAUDAL; PERINEURAL AS NEEDED
Status: DISCONTINUED | OUTPATIENT
Start: 2017-06-02 | End: 2017-06-12 | Stop reason: HOSPADM

## 2017-06-02 RX ORDER — FENTANYL CITRATE 50 UG/ML
INJECTION, SOLUTION INTRAMUSCULAR; INTRAVENOUS AS NEEDED
Status: DISCONTINUED | OUTPATIENT
Start: 2017-06-02 | End: 2017-06-02 | Stop reason: SURG

## 2017-06-02 RX ORDER — MORPHINE SULFATE 1 MG/ML
INJECTION INTRAVENOUS CONTINUOUS
Status: DISCONTINUED | OUTPATIENT
Start: 2017-06-02 | End: 2017-06-06

## 2017-06-02 RX ORDER — HYDROMORPHONE HCL 110MG/55ML
PATIENT CONTROLLED ANALGESIA SYRINGE INTRAVENOUS AS NEEDED
Status: DISCONTINUED | OUTPATIENT
Start: 2017-06-02 | End: 2017-06-02 | Stop reason: SURG

## 2017-06-02 RX ORDER — SODIUM CHLORIDE, SODIUM GLUCONATE, SODIUM ACETATE, POTASSIUM CHLORIDE, AND MAGNESIUM CHLORIDE 526; 502; 368; 37; 30 MG/100ML; MG/100ML; MG/100ML; MG/100ML; MG/100ML
INJECTION, SOLUTION INTRAVENOUS CONTINUOUS PRN
Status: DISCONTINUED | OUTPATIENT
Start: 2017-06-02 | End: 2017-06-02 | Stop reason: SURG

## 2017-06-02 RX ORDER — LIDOCAINE HYDROCHLORIDE 20 MG/ML
INJECTION, SOLUTION INFILTRATION; PERINEURAL AS NEEDED
Status: DISCONTINUED | OUTPATIENT
Start: 2017-06-02 | End: 2017-06-02 | Stop reason: SURG

## 2017-06-02 RX ORDER — NALOXONE HCL 0.4 MG/ML
0.1 VIAL (ML) INJECTION
Status: DISCONTINUED | OUTPATIENT
Start: 2017-06-02 | End: 2017-06-12 | Stop reason: HOSPADM

## 2017-06-02 RX ORDER — PROPOFOL 10 MG/ML
VIAL (ML) INTRAVENOUS AS NEEDED
Status: DISCONTINUED | OUTPATIENT
Start: 2017-06-02 | End: 2017-06-02 | Stop reason: SURG

## 2017-06-02 RX ORDER — ONDANSETRON 2 MG/ML
INJECTION INTRAMUSCULAR; INTRAVENOUS AS NEEDED
Status: DISCONTINUED | OUTPATIENT
Start: 2017-06-02 | End: 2017-06-02 | Stop reason: SURG

## 2017-06-02 RX ORDER — MIDAZOLAM HYDROCHLORIDE 1 MG/ML
INJECTION INTRAMUSCULAR; INTRAVENOUS AS NEEDED
Status: DISCONTINUED | OUTPATIENT
Start: 2017-06-02 | End: 2017-06-02 | Stop reason: SURG

## 2017-06-02 RX ADMIN — ENALAPRIL MALEATE 20 MG: 10 TABLET ORAL at 21:48

## 2017-06-02 RX ADMIN — LEVOFLOXACIN 500 MG: 5 INJECTION, SOLUTION INTRAVENOUS at 06:01

## 2017-06-02 RX ADMIN — PHENYLEPHRINE HYDROCHLORIDE 100 MCG: 10 INJECTION INTRAVENOUS at 13:02

## 2017-06-02 RX ADMIN — FENTANYL CITRATE 100 MCG: 50 INJECTION, SOLUTION INTRAMUSCULAR; INTRAVENOUS at 12:37

## 2017-06-02 RX ADMIN — SODIUM CHLORIDE, SODIUM GLUCONATE, SODIUM ACETATE, POTASSIUM CHLORIDE, AND MAGNESIUM CHLORIDE: 526; 502; 368; 37; 30 INJECTION, SOLUTION INTRAVENOUS at 14:40

## 2017-06-02 RX ADMIN — HYDROMORPHONE HYDROCHLORIDE 0.5 MG: 2 INJECTION, SOLUTION INTRAMUSCULAR; INTRAVENOUS; SUBCUTANEOUS at 12:47

## 2017-06-02 RX ADMIN — METRONIDAZOLE 500 MG: 500 INJECTION, SOLUTION INTRAVENOUS at 21:49

## 2017-06-02 RX ADMIN — ROCURONIUM BROMIDE 20 MG: 10 INJECTION INTRAVENOUS at 13:24

## 2017-06-02 RX ADMIN — EPHEDRINE SULFATE 5 MG: 50 INJECTION INTRAMUSCULAR; INTRAVENOUS; SUBCUTANEOUS at 14:57

## 2017-06-02 RX ADMIN — EPHEDRINE SULFATE 10 MG: 50 INJECTION INTRAMUSCULAR; INTRAVENOUS; SUBCUTANEOUS at 14:34

## 2017-06-02 RX ADMIN — PROPOFOL 100 MG: 10 INJECTION, EMULSION INTRAVENOUS at 11:41

## 2017-06-02 RX ADMIN — ROCURONIUM BROMIDE 30 MG: 10 INJECTION INTRAVENOUS at 11:42

## 2017-06-02 RX ADMIN — DILTIAZEM HYDROCHLORIDE 180 MG: 180 CAPSULE, EXTENDED RELEASE ORAL at 08:48

## 2017-06-02 RX ADMIN — FENTANYL CITRATE 50 MCG: 50 INJECTION, SOLUTION INTRAMUSCULAR; INTRAVENOUS at 11:41

## 2017-06-02 RX ADMIN — PHENYLEPHRINE HYDROCHLORIDE 100 MCG: 10 INJECTION INTRAVENOUS at 12:03

## 2017-06-02 RX ADMIN — ALBUMIN HUMAN: 0.05 INJECTION, SOLUTION INTRAVENOUS at 13:19

## 2017-06-02 RX ADMIN — METRONIDAZOLE 500 MG: 500 INJECTION, SOLUTION INTRAVENOUS at 12:23

## 2017-06-02 RX ADMIN — SOTALOL HYDROCHLORIDE 80 MG: 80 TABLET ORAL at 22:03

## 2017-06-02 RX ADMIN — SODIUM CHLORIDE, SODIUM GLUCONATE, SODIUM ACETATE, POTASSIUM CHLORIDE, AND MAGNESIUM CHLORIDE: 526; 502; 368; 37; 30 INJECTION, SOLUTION INTRAVENOUS at 14:10

## 2017-06-02 RX ADMIN — SODIUM CHLORIDE, SODIUM GLUCONATE, SODIUM ACETATE, POTASSIUM CHLORIDE, AND MAGNESIUM CHLORIDE: 526; 502; 368; 37; 30 INJECTION, SOLUTION INTRAVENOUS at 11:47

## 2017-06-02 RX ADMIN — MIDAZOLAM 1 MG: 1 INJECTION INTRAMUSCULAR; INTRAVENOUS at 11:32

## 2017-06-02 RX ADMIN — MEMANTINE 5 MG: 5 TABLET ORAL at 21:48

## 2017-06-02 RX ADMIN — INSULIN DETEMIR 20 UNITS: 100 INJECTION, SOLUTION SUBCUTANEOUS at 21:49

## 2017-06-02 RX ADMIN — MIDAZOLAM 1 MG: 1 INJECTION INTRAMUSCULAR; INTRAVENOUS at 11:36

## 2017-06-02 RX ADMIN — PHENYLEPHRINE HYDROCHLORIDE 100 MCG: 10 INJECTION INTRAVENOUS at 13:15

## 2017-06-02 RX ADMIN — Medication: at 16:58

## 2017-06-02 RX ADMIN — LIDOCAINE HYDROCHLORIDE 100 MG: 20 INJECTION, SOLUTION INFILTRATION; PERINEURAL at 11:41

## 2017-06-02 RX ADMIN — EPHEDRINE SULFATE 5 MG: 50 INJECTION INTRAMUSCULAR; INTRAVENOUS; SUBCUTANEOUS at 14:51

## 2017-06-02 RX ADMIN — PANTOPRAZOLE SODIUM 40 MG: 40 INJECTION, POWDER, FOR SOLUTION INTRAVENOUS at 08:48

## 2017-06-02 RX ADMIN — ONDANSETRON 4 MG: 2 INJECTION INTRAMUSCULAR; INTRAVENOUS at 15:35

## 2017-06-02 RX ADMIN — PHENYLEPHRINE HYDROCHLORIDE 100 MCG: 10 INJECTION INTRAVENOUS at 11:59

## 2017-06-02 RX ADMIN — EPHEDRINE SULFATE 15 MG: 50 INJECTION INTRAMUSCULAR; INTRAVENOUS; SUBCUTANEOUS at 14:29

## 2017-06-02 RX ADMIN — ENALAPRIL MALEATE 20 MG: 10 TABLET ORAL at 08:48

## 2017-06-02 RX ADMIN — ROCURONIUM BROMIDE 10 MG: 10 INJECTION INTRAVENOUS at 12:49

## 2017-06-02 RX ADMIN — NEOSTIGMINE METHYLSULFATE 2 MG: 1 INJECTION, SOLUTION INTRAMUSCULAR; INTRAVENOUS; SUBCUTANEOUS at 15:40

## 2017-06-02 RX ADMIN — GLYCOPYRROLATE 0.4 MG: 0.2 INJECTION, SOLUTION INTRAMUSCULAR; INTRAVENOUS at 15:40

## 2017-06-02 RX ADMIN — FENTANYL CITRATE 100 MCG: 50 INJECTION, SOLUTION INTRAMUSCULAR; INTRAVENOUS at 12:42

## 2017-06-02 RX ADMIN — INSULIN ASPART 3 UNITS: 100 INJECTION, SOLUTION INTRAVENOUS; SUBCUTANEOUS at 22:04

## 2017-06-02 RX ADMIN — HYDROMORPHONE HYDROCHLORIDE 0.5 MG: 2 INJECTION, SOLUTION INTRAMUSCULAR; INTRAVENOUS; SUBCUTANEOUS at 14:38

## 2017-06-02 RX ADMIN — METRONIDAZOLE 500 MG: 500 INJECTION, SOLUTION INTRAVENOUS at 07:30

## 2017-06-02 RX ADMIN — SODIUM CHLORIDE 125 ML/HR: 900 INJECTION, SOLUTION INTRAVENOUS at 21:52

## 2017-06-02 RX ADMIN — PHENYLEPHRINE HYDROCHLORIDE 100 MCG: 10 INJECTION INTRAVENOUS at 12:08

## 2017-06-02 RX ADMIN — PHENYLEPHRINE HYDROCHLORIDE 0.05 MCG/KG/MIN: 10 INJECTION INTRAVENOUS at 13:16

## 2017-06-02 RX ADMIN — HYDROMORPHONE HYDROCHLORIDE 0.4 MG: 2 INJECTION, SOLUTION INTRAMUSCULAR; INTRAVENOUS; SUBCUTANEOUS at 16:00

## 2017-06-02 RX ADMIN — ROCURONIUM BROMIDE 20 MG: 10 INJECTION INTRAVENOUS at 12:26

## 2017-06-02 RX ADMIN — SOTALOL HYDROCHLORIDE 80 MG: 80 TABLET ORAL at 08:48

## 2017-06-02 RX ADMIN — DONEPEZIL HYDROCHLORIDE 10 MG: 10 TABLET ORAL at 21:42

## 2017-06-02 RX ADMIN — PANTOPRAZOLE SODIUM 40 MG: 40 INJECTION, POWDER, FOR SOLUTION INTRAVENOUS at 22:03

## 2017-06-02 NOTE — PLAN OF CARE
Problem: Patient Care Overview (Adult)  Goal: Plan of Care Review  Outcome: Ongoing (interventions implemented as appropriate)    06/01/17 1956   Coping/Psychosocial Response Interventions   Plan Of Care Reviewed With patient;daughter   Patient Care Overview   Progress no change   Outcome Evaluation   Outcome Summary/Follow up Plan pt is scheduled for surgery tomorrow. npo after midnight.       Goal: Adult Individualization and Mutuality  Outcome: Ongoing (interventions implemented as appropriate)  Goal: Discharge Needs Assessment  Outcome: Ongoing (interventions implemented as appropriate)    Problem: Fluid Volume Deficit (Adult)  Goal: Fluid/Electrolyte Balance  Outcome: Ongoing (interventions implemented as appropriate)  Goal: Comfort/Well Being  Outcome: Ongoing (interventions implemented as appropriate)    Problem: Gastrointestinal Bleeding (Adult)  Goal: Signs and Symptoms of Listed Potential Problems Will be Absent or Manageable (Gastrointestinal Bleeding)  Outcome: Ongoing (interventions implemented as appropriate)

## 2017-06-02 NOTE — PLAN OF CARE
Problem: Patient Care Overview (Adult)  Goal: Plan of Care Review  Outcome: Ongoing (interventions implemented as appropriate)    06/02/17 1704   Coping/Psychosocial Response Interventions   Plan Of Care Reviewed With patient   Patient Care Overview   Progress progress toward functional goals as expected   Outcome Evaluation   Outcome Summary/Follow up Plan patient drowsy; vitals stable; pain minimal; ready for transfer to ICU 6         Problem: Perioperative Period (Adult)  Goal: Signs and Symptoms of Listed Potential Problems Will be Absent or Manageable (Perioperative Period)  Outcome: Ongoing (interventions implemented as appropriate)

## 2017-06-02 NOTE — PROGRESS NOTES
Risks and benefits of laparoscopic sigmoid colon resection, possible open colon resection were discussed with the patient and her daughters.  They wish to proceed.          This document has been electronically signed by Stefano Linn MD on June 2, 2017 9:08 AM

## 2017-06-02 NOTE — BRIEF OP NOTE
COLON RESECTION LAPAROSCOPIC POSSIBLE OPEN  Procedure Note    Kierra Scales  5/26/2017 - 6/2/2017    Pre-op Diagnosis:   Malignant neoplasm of sigmoid colon [C18.7]    Post-op Diagnosis:     Post-Op Diagnosis Codes:     * Malignant neoplasm of sigmoid colon [C18.7]    Procedure/CPT® Codes:      Procedure(s):  Left Hemicolectomy    Surgeon(s):  Stefano Linn MD    Anesthesia: General    Staff:   Circulator: Lindsay Kline RN; Saad Duval RN; Elyssa Harvey RN; Bhargav Ureña RN  Scrub Person: Pete Wade  Endo Technician: Rebecca Olsen CST  Assistant: Kinza Ramon    Estimated Blood Loss: 200 mL  Urine Voided: 635 mL    Specimens:                  ID Type Source Tests Collected by Time Destination   A : LEFT COLON STITCH MARKS DISTAL END Tissue Large Intestine, Sigmoid Colon TISSUE EXAM Stefano Linn MD 6/2/2017 1423          Drains:   Urethral Catheter 06/02/17 1200 100% silicone 16 10 10 (Active)   Securement secured to upper leg with adhesive device 6/2/2017  4:13 PM           Findings: tattooed lesion in left colon    Complications: none          This document has been electronically signed by Stefano Linn MD on June 2, 2017 4:32 PM      Stefano Linn MD     Date: 6/2/2017  Time: 4:31 PM

## 2017-06-02 NOTE — ANESTHESIA PROCEDURE NOTES
Airway  Urgency: elective    Airway not difficult    General Information and Staff    Patient location during procedure: OR  CRNA: ELIZABETH RUDD    Indications and Patient Condition  Indications for airway management: airway protection    Preoxygenated: yes  Mask difficulty assessment: 2 - vent by mask + OA or adjuvant +/- NMBA    Final Airway Details  Final airway type: endotracheal airway      Successful airway: ETT  Cuffed: yes   Successful intubation technique: direct laryngoscopy  Endotracheal tube insertion site: oral  Blade: Demarco  Blade size: #3  ETT size: 7.0 mm  Cormack-Lehane Classification: grade I - full view of glottis  Placement verified by: chest auscultation and capnometry   Cuff volume (mL): 8  Measured from: lips  ETT to lips (cm): 21  Number of attempts at approach: 1

## 2017-06-02 NOTE — ANESTHESIA POSTPROCEDURE EVALUATION
Patient: Kierra Scales    Procedure Summary     Date Anesthesia Start Anesthesia Stop Room / Location    06/02/17 1132 1618  [facility] OR 09 / [facility]       Procedure Diagnosis Surgeon Provider    COLON RESECTION (N/A Abdomen) Malignant neoplasm of sigmoid colon  (Malignant neoplasm of sigmoid colon [C18.7]) MD Andrae Bradshaw MD          Anesthesia Type: general  Last vitals  BP      Temp      Pulse     Resp      SpO2        Post Anesthesia Care and Evaluation    Patient location during evaluation: PACU  Patient participation: complete - patient participated  Level of consciousness: awake  Pain score: 2  Pain management: adequate  Airway patency: patent  Anesthetic complications: No anesthetic complications  PONV Status: none  Cardiovascular status: acceptable, blood pressure returned to baseline, hemodynamically stable and stable  Respiratory status: acceptable and spontaneous ventilation  Hydration status: acceptable

## 2017-06-02 NOTE — PROGRESS NOTES
[facility] Medicine Services  INPATIENT PROGRESS NOTE    Length of Stay: 3  Date of Admission: 5/26/2017  Primary Care Physician: Melissa Ch MD    Subjective   Chief Complaint: Rectal bleeding  HPI:  No overnight events. Has been on clears until midnight. No N/V, abdominal pain, or bleeding reported. Patient eager to get surgery done today.     Review of Systems   Constitutional: Negative for chills and fever.   Respiratory: Negative for shortness of breath.    Cardiovascular: Negative for chest pain.   Gastrointestinal: Negative for abdominal pain, diarrhea, nausea and vomiting.   Genitourinary: Negative for dysuria.   Neurological: Negative for dizziness.        All pertinent negatives and positives are as above. All other systems have been reviewed and are negative unless otherwise stated.     Objective    Temp:  [97.8 °F (36.6 °C)-99.7 °F (37.6 °C)] 97.8 °F (36.6 °C)  Heart Rate:  [60-72] 65  Resp:  [16-18] 16  BP: (122-149)/(58-67) 139/63    Physical Exam   Constitutional: She is oriented to person, place, and time. She appears well-developed and well-nourished.   Cardiovascular: Normal rate, regular rhythm and normal heart sounds.  Exam reveals no gallop and no friction rub.    No murmur heard.  Pulmonary/Chest: Effort normal and breath sounds normal. No respiratory distress. She has no wheezes. She has no rales.   Abdominal: Soft. Bowel sounds are normal. There is no tenderness.   Musculoskeletal: Normal range of motion. She exhibits no edema.   Neurological: She is alert and oriented to person, place, and time.   Skin: Skin is warm and dry.   Psychiatric: She has a normal mood and affect. Her behavior is normal.   Vitals reviewed.          Results Review:  I have reviewed the labs, radiology results, and diagnostic studies.    Laboratory Data:     Results from last 7 days  Lab Units 06/02/17  1324 06/02/17  0601 06/01/17  0609 05/31/17  0540   SODIUM  mmol/L  --  140 142 140   SODIUM, ARTERIAL mmol/L 139.0  --   --   --    POTASSIUM mmol/L  --  3.5 3.5 3.8   CHLORIDE mmol/L  --  104 104 106   TOTAL CO2 mmol/L  --  27.0 28.0 25.0   BUN mg/dL  --  5* 4* 4*   CREATININE mg/dL  --  0.64 0.71 0.73   GLUCOSE mg/dL  --  84 94 156*   GLUCOSE, ARTERIAL mmol/L 93  --   --   --    CALCIUM mg/dL  --  8.2* 8.6 8.5   BILIRUBIN mg/dL  --  0.4 0.4 0.4   ALK PHOS U/L  --  48 53 50   ALT (SGPT) U/L  --  30 27 33   AST (SGOT) U/L  --  20 21 21   ANION GAP mmol/L  --  9.0 10.0 9.0     Estimated Creatinine Clearance: 56.1 mL/min (by C-G formula based on Cr of 0.64).            Results from last 7 days  Lab Units 06/02/17  0601 06/01/17  0609 05/31/17  0540 05/30/17  0619 05/29/17  0344  05/28/17  0938   WBC 10*3/mm3 4.91 4.35 4.68 4.38  --   --  4.53   HEMOGLOBIN g/dL 8.1* 8.5* 8.4* 9.3* 9.0*  < > 9.5*   HEMATOCRIT % 24.3* 25.6* 25.9* 28.1* 26.8*  < > 28.0*   PLATELETS 10*3/mm3 177 166 162 163  --   --  154   < > = values in this interval not displayed.    Results from last 7 days  Lab Units 05/28/17  1615   INR  1.09       Culture Data:   No results found for: BLOODCX  No results found for: URINECX  No results found for: RESPCX  No results found for: WOUNDCX  No results found for: STOOLCX  No components found for: BODYFLD    Radiology Data:   Imaging Results (last 24 hours)     ** No results found for the last 24 hours. **          I have reviewed the patient current medications.     Assessment/Plan     Hospital Problem List     SSS (sick sinus syndrome)    Gastrointestinal hemorrhage with melena    Preop cardiovascular exam          Plan:    - Patient to OR today for colon resection   - Continue current home medications  - SCDs/TEDs for DVT PPx               Discharge Planning: I expect patient to be discharged to home in 5-6 days.    Jimy Ryder MD   06/02/17   4:23 PM

## 2017-06-02 NOTE — CONSULTS
"Adult Nutrition  Assessment    Patient Name:  Kierra Scales  YOB: 1929  MRN: [id number]  Admit Date:  5/26/2017    Assessment Date:  6/2/2017          Reason for Assessment       06/02/17 1303    Reason for Assessment    Reason For Assessment/Visit per organizational policy;length of stay    Diagnosis Diagnosis    Gastrointestinal Gastrointestinal bleed;Bowel resection                Nutrition/Diet History       06/02/17 1305    Nutrition/Diet History    Typical Food/Fluid Intake Pt gone to OR for colon resection and possible removal of mass.            Anthropometrics       06/02/17 1306    Anthropometrics    Height 182.9 cm (72\")    Weight 85.3 kg (188 lb)    RD Documented Weight on Admission 86.2 kg (190 lb)    Anthropometrics (Special Considerations)    Height Used for Calculations 1.829 m (6')    Weight Used for Calculations 85.3 kg (188 lb)    RD Calculated     RD Calculated %     Ideal Body Weight (IBW)    Ideal Body Weight (IBW), Female 73.69    % Ideal Body Weight 115.96    Body Mass Index (BMI)    BMI (kg/m2) 25.55    BMI Grade 25 - 29.9 - overweight            Labs/Tests/Procedures/Meds       06/02/17 1308    Labs/Tests/Procedures/Meds    Labs/Tests Review Reviewed    Medication Review Reviewed, pertinent              Estimated/Assessed Needs       06/02/17 1308    Calculation Measurements    Weight Used For Calculations 85.3 kg (188 lb)    Height Used for Calculations 1.829 m (6')    Estimated/Assessed Energy Needs    Energy Need Method [place] or    Age 88    RMR ([place] Jeor Equation) 1394.76    Activity Factors ([place] or)  --   1.4    Total estimated needs ([place] Jeor) 1952    Estimated/Assessed Protein Needs    Weight Used for Protein Calculation 85.3 kg (188 lb)    Protein (gm/kg) 1.2    1.2 Gm Protein (gm) 102.33    Estimated Protein Range     Estimated/Assessed Fluid Needs    Fluid Need Method RDA method    RDA Method (mL)  1950       "      Nutrition Prescription Ordered       06/02/17 1309    Nutrition Prescription PO    Current PO Diet NPO            Evaluation of Received Nutrient/Fluid Intake       06/02/17 1310    PO Evaluation    Number of Days PO Intake Evaluated 2 days    % PO Intake %   Clear liquids            Comments:      Pt screened for LOS.  Currently in OR for colon resection and may possibly have mass removed from colon.  RD will assess at later date and monitor diet advancement and initiation of PO intake.        Electronically signed by:  Xenia Randall RD  06/02/17 1:10 PM

## 2017-06-02 NOTE — ANESTHESIA PREPROCEDURE EVALUATION
Anesthesia Evaluation     Patient summary reviewed and Nursing notes reviewed   NPO Solid Status: > 8 hours       Airway   Mallampati: II  TM distance: >3 FB  Neck ROM: full  possible difficult intubation  Dental    (+) edentulous    Pulmonary - normal exam    breath sounds clear to auscultation  (+) shortness of breath,   Cardiovascular     ECG reviewed  Patient on routine beta blocker and Beta blocker given within 24 hours of surgery  Rhythm: regular  Rate: normal    (+) pacemaker interrogated >year ago, hypertension well controlled, valvular problems/murmurs TI, murmur (Grade II/VI systolic murmur.), hyperlipidemia    ROS comment: EKG:Paced    Neuro/Psych  (+) psychiatric history Anxiety, dementia,    GI/Hepatic/Renal/Endo    (+)  diabetes mellitus type 2 poorly controlled using insulin, hypothyroidism,     Musculoskeletal     Abdominal    Substance History - negative use     OB/GYN negative ob/gyn ROS         Other   (+) arthritis                                   Anesthesia Plan    ASA 3     general   (Discussed central line,arterial line(if necessary) and patient, family understand possible complications,risks and agrees.)  intravenous induction   Anesthetic plan and risks discussed with patient, child, healthcare power of  and legal guardian.

## 2017-06-03 LAB
ALBUMIN SERPL-MCNC: 2.9 G/DL (ref 3.4–4.8)
ALBUMIN/GLOB SERPL: 1.2 G/DL (ref 1.1–1.8)
ALP SERPL-CCNC: 46 U/L (ref 38–126)
ALT SERPL W P-5'-P-CCNC: 28 U/L (ref 9–52)
ANION GAP SERPL CALCULATED.3IONS-SCNC: 11 MMOL/L (ref 5–15)
AST SERPL-CCNC: 21 U/L (ref 14–36)
BASOPHILS # BLD AUTO: 0 10*3/MM3 (ref 0–0.2)
BASOPHILS NFR BLD AUTO: 0 % (ref 0–2)
BILIRUB SERPL-MCNC: 0.6 MG/DL (ref 0.2–1.3)
BUN BLD-MCNC: 6 MG/DL (ref 7–21)
BUN/CREAT SERPL: 9.7 (ref 7–25)
CALCIUM SPEC-SCNC: 7.9 MG/DL (ref 8.4–10.2)
CHLORIDE SERPL-SCNC: 106 MMOL/L (ref 95–110)
CO2 SERPL-SCNC: 24 MMOL/L (ref 22–31)
CREAT BLD-MCNC: 0.62 MG/DL (ref 0.5–1)
DEPRECATED RDW RBC AUTO: 45.3 FL (ref 36.4–46.3)
EOSINOPHIL # BLD AUTO: 0 10*3/MM3 (ref 0–0.7)
EOSINOPHIL NFR BLD AUTO: 0 % (ref 0–7)
ERYTHROCYTE [DISTWIDTH] IN BLOOD BY AUTOMATED COUNT: 13.4 % (ref 11.5–14.5)
GFR SERPL CREATININE-BSD FRML MDRD: 110 ML/MIN/1.73 (ref 39–90)
GLOBULIN UR ELPH-MCNC: 2.4 GM/DL (ref 2.3–3.5)
GLUCOSE BLD-MCNC: 150 MG/DL (ref 60–100)
GLUCOSE BLDC GLUCOMTR-MCNC: 131 MG/DL (ref 70–130)
GLUCOSE BLDC GLUCOMTR-MCNC: 139 MG/DL (ref 70–130)
GLUCOSE BLDC GLUCOMTR-MCNC: 162 MG/DL (ref 70–130)
HCT VFR BLD AUTO: 28.5 % (ref 35–45)
HGB BLD-MCNC: 9.6 G/DL (ref 12–15.5)
IMM GRANULOCYTES # BLD: 0.05 10*3/MM3 (ref 0–0.02)
IMM GRANULOCYTES NFR BLD: 0.4 % (ref 0–0.5)
LYMPHOCYTES # BLD AUTO: 0.56 10*3/MM3 (ref 0.6–4.2)
LYMPHOCYTES NFR BLD AUTO: 4.5 % (ref 10–50)
MCH RBC QN AUTO: 31.7 PG (ref 26.5–34)
MCHC RBC AUTO-ENTMCNC: 33.7 G/DL (ref 31.4–36)
MCV RBC AUTO: 94.1 FL (ref 80–98)
MONOCYTES # BLD AUTO: 0.59 10*3/MM3 (ref 0–0.9)
MONOCYTES NFR BLD AUTO: 4.7 % (ref 0–12)
NEUTROPHILS # BLD AUTO: 11.26 10*3/MM3 (ref 2–8.6)
NEUTROPHILS NFR BLD AUTO: 90.4 % (ref 37–80)
PLATELET # BLD AUTO: 174 10*3/MM3 (ref 150–450)
PMV BLD AUTO: 9.8 FL (ref 8–12)
POTASSIUM BLD-SCNC: 3.7 MMOL/L (ref 3.5–5.1)
PROT SERPL-MCNC: 5.3 G/DL (ref 6.3–8.6)
RBC # BLD AUTO: 3.03 10*6/MM3 (ref 3.77–5.16)
SODIUM BLD-SCNC: 141 MMOL/L (ref 137–145)
WBC NRBC COR # BLD: 12.46 10*3/MM3 (ref 3.2–9.8)

## 2017-06-03 PROCEDURE — 85025 COMPLETE CBC W/AUTO DIFF WBC: CPT | Performed by: FAMILY MEDICINE

## 2017-06-03 PROCEDURE — 25010000003 MORPHINE PER 10 MG: Performed by: SURGERY

## 2017-06-03 PROCEDURE — 94799 UNLISTED PULMONARY SVC/PX: CPT

## 2017-06-03 PROCEDURE — 25010000002 LEVOFLOXACIN PER 250 MG: Performed by: SURGERY

## 2017-06-03 PROCEDURE — 80053 COMPREHEN METABOLIC PANEL: CPT | Performed by: INTERNAL MEDICINE

## 2017-06-03 PROCEDURE — 63710000001 INSULIN ASPART PER 5 UNITS: Performed by: NURSE PRACTITIONER

## 2017-06-03 PROCEDURE — 25010000002 ENOXAPARIN PER 10 MG: Performed by: SURGERY

## 2017-06-03 PROCEDURE — 82962 GLUCOSE BLOOD TEST: CPT

## 2017-06-03 PROCEDURE — 25810000003 SODIUM CHLORIDE 0.9 % WITH KCL 20 MEQ 20-0.9 MEQ/L-% SOLUTION: Performed by: SURGERY

## 2017-06-03 PROCEDURE — 99024 POSTOP FOLLOW-UP VISIT: CPT | Performed by: SURGERY

## 2017-06-03 RX ORDER — SODIUM CHLORIDE AND POTASSIUM CHLORIDE 150; 900 MG/100ML; MG/100ML
100 INJECTION, SOLUTION INTRAVENOUS CONTINUOUS
Status: DISCONTINUED | OUTPATIENT
Start: 2017-06-03 | End: 2017-06-06

## 2017-06-03 RX ADMIN — POTASSIUM CHLORIDE AND SODIUM CHLORIDE 100 ML/HR: 900; 150 INJECTION, SOLUTION INTRAVENOUS at 11:57

## 2017-06-03 RX ADMIN — ENALAPRIL MALEATE 20 MG: 10 TABLET ORAL at 18:21

## 2017-06-03 RX ADMIN — LEVOFLOXACIN 500 MG: 5 INJECTION, SOLUTION INTRAVENOUS at 05:01

## 2017-06-03 RX ADMIN — DILTIAZEM HYDROCHLORIDE 180 MG: 180 CAPSULE, EXTENDED RELEASE ORAL at 10:34

## 2017-06-03 RX ADMIN — INSULIN ASPART 3 UNITS: 100 INJECTION, SOLUTION INTRAVENOUS; SUBCUTANEOUS at 21:07

## 2017-06-03 RX ADMIN — DONEPEZIL HYDROCHLORIDE 10 MG: 10 TABLET ORAL at 21:05

## 2017-06-03 RX ADMIN — MEMANTINE 5 MG: 5 TABLET ORAL at 11:56

## 2017-06-03 RX ADMIN — SODIUM CHLORIDE 125 ML/HR: 900 INJECTION, SOLUTION INTRAVENOUS at 05:53

## 2017-06-03 RX ADMIN — MEMANTINE 5 MG: 5 TABLET ORAL at 18:21

## 2017-06-03 RX ADMIN — PANTOPRAZOLE SODIUM 40 MG: 40 INJECTION, POWDER, FOR SOLUTION INTRAVENOUS at 10:35

## 2017-06-03 RX ADMIN — PANTOPRAZOLE SODIUM 40 MG: 40 INJECTION, POWDER, FOR SOLUTION INTRAVENOUS at 21:05

## 2017-06-03 RX ADMIN — SOTALOL HYDROCHLORIDE 80 MG: 80 TABLET ORAL at 21:05

## 2017-06-03 RX ADMIN — METRONIDAZOLE 500 MG: 500 INJECTION, SOLUTION INTRAVENOUS at 03:28

## 2017-06-03 RX ADMIN — ENOXAPARIN SODIUM 40 MG: 40 INJECTION SUBCUTANEOUS at 11:57

## 2017-06-03 RX ADMIN — ENALAPRIL MALEATE 20 MG: 10 TABLET ORAL at 10:34

## 2017-06-03 RX ADMIN — SOTALOL HYDROCHLORIDE 80 MG: 80 TABLET ORAL at 11:57

## 2017-06-03 RX ADMIN — METRONIDAZOLE 500 MG: 500 INJECTION, SOLUTION INTRAVENOUS at 12:06

## 2017-06-03 RX ADMIN — Medication: at 17:03

## 2017-06-03 RX ADMIN — METHIMAZOLE 2.5 MG: 5 TABLET ORAL at 10:34

## 2017-06-03 NOTE — PLAN OF CARE
Problem: Patient Care Overview (Adult)  Goal: Plan of Care Review    06/03/17 1880   Coping/Psychosocial Response Interventions   Plan Of Care Reviewed With patient   Patient Care Overview   Progress no change   Outcome Evaluation   Outcome Summary/Follow up Plan pt vss. Pt transfer from ICU

## 2017-06-03 NOTE — PLAN OF CARE
Problem: Patient Care Overview (Adult)  Goal: Plan of Care Review  Outcome: Ongoing (interventions implemented as appropriate)  Goal: Adult Individualization and Mutuality  Outcome: Ongoing (interventions implemented as appropriate)  Goal: Discharge Needs Assessment  Outcome: Ongoing (interventions implemented as appropriate)    Problem: Fluid Volume Deficit (Adult)  Goal: Fluid/Electrolyte Balance  Outcome: Ongoing (interventions implemented as appropriate)  Goal: Comfort/Well Being  Outcome: Ongoing (interventions implemented as appropriate)    Problem: Gastrointestinal Bleeding (Adult)  Goal: Signs and Symptoms of Listed Potential Problems Will be Absent or Manageable (Gastrointestinal Bleeding)  Outcome: Ongoing (interventions implemented as appropriate)    Problem: Perioperative Period (Adult)  Goal: Signs and Symptoms of Listed Potential Problems Will be Absent or Manageable (Perioperative Period)  Outcome: Ongoing (interventions implemented as appropriate)

## 2017-06-03 NOTE — PROGRESS NOTES
[facility] Medicine Services  INPATIENT PROGRESS NOTE     LOS: 4 days   Patient Care Team:  Melissa Ch MD as PCP - General  Lindsay Ocampo MA as Medical Assistant    Chief Complaint:    Patient is doing well postop.  She is nothing by mouth and yet to pass flatus.  She denies nausea, vomiting, fever or chills.  She has some abdominal discomfort.  She is slightly deconditioned.    Subjective     Interval History:     Patient Complaints: As above    History taken from: Patient    Review of Systems:    Review of Systems   Constitutional: Negative for chills, diaphoresis, fatigue and fever.   Respiratory: Negative for cough, chest tightness, shortness of breath and wheezing.    Cardiovascular: Negative for chest pain, palpitations and leg swelling.   Gastrointestinal: Negative for abdominal distention, abdominal pain, constipation, diarrhea, nausea and vomiting.   Genitourinary: Negative for difficulty urinating, dysuria, enuresis, flank pain, frequency and hematuria.   Neurological: Positive for weakness. Negative for dizziness and headaches.   Psychiatric/Behavioral: Negative for agitation and confusion.         Objective     Vital Signs  Temp:  [96.8 °F (36 °C)-99.3 °F (37.4 °C)] 96.8 °F (36 °C)  Heart Rate:  [60-86] 73  Resp:  [16-24] 24  BP: (102-149)/(52-67) 133/63  Arterial Line BP: (114-170)/(35-56) 168/41    Physical Exam:   Physical Exam   Constitutional: She is oriented to person, place, and time.   Cardiovascular: Normal rate, regular rhythm and normal heart sounds.  Exam reveals no gallop.    No murmur heard.  Pulmonary/Chest: Effort normal and breath sounds normal. She has no wheezes. She has no rales.   Abdominal: Soft. Bowel sounds are normal. She exhibits no distension. There is no tenderness.   Musculoskeletal: She exhibits no edema.   Neurological: She is alert and oriented to person, place, and time.   Skin: Skin is warm and dry.    Nursing note and vitals reviewed.         Results Review:       Results from last 7 days  Lab Units 06/03/17 0441 06/02/17  1642 06/02/17  1324 06/02/17  0601 06/01/17  0609 05/31/17  0540 05/30/17  0619 05/29/17  0344 05/28/17  0438   SODIUM mmol/L 141 136*  --  140 142 140 143 138 139   SODIUM, ARTERIAL mmol/L  --   --  139.0  --   --   --   --   --   --    POTASSIUM mmol/L 3.7 3.6  --  3.5 3.5 3.8 3.6 3.7 3.6   CHLORIDE mmol/L 106 103  --  104 104 106 106 106 105   TOTAL CO2 mmol/L 24.0 25.0  --  27.0 28.0 25.0 28.0 26.0 26.0   BUN mg/dL 6* 6*  --  5* 4* 4* 4* 8 11   CREATININE mg/dL 0.62 0.65  --  0.64 0.71 0.73 0.72 0.70 0.78   GLUCOSE mg/dL 150* 149*  --  84 94 156* 63 87 87   GLUCOSE, ARTERIAL mmol/L  --   --  93  --   --   --   --   --   --    CALCIUM mg/dL 7.9* 7.2*  --  8.2* 8.6 8.5 8.7 8.6 8.9   BILIRUBIN mg/dL 0.6  --   --  0.4 0.4 0.4 0.4 0.4 0.5   ALK PHOS U/L 46  --   --  48 53 50 47 37* 42   ALT (SGPT) U/L 28  --   --  30 27 33 28 22 26   AST (SGOT) U/L 21  --   --  20 21 21 22 21 20               Results from last 7 days  Lab Units 06/03/17  0441 06/02/17  1642 06/02/17  0601 06/01/17  0609 05/31/17  0540 05/30/17  0619 05/29/17  0344  05/28/17  0938   WBC 10*3/mm3 12.46* 5.78 4.91 4.35 4.68 4.38  --   --  4.53   HEMOGLOBIN g/dL 9.6* 9.1* 8.1* 8.5* 8.4* 9.3* 9.0*  < > 9.5*   HEMATOCRIT % 28.5* 27.2* 24.3* 25.6* 25.9* 28.1* 26.8*  < > 28.0*   PLATELETS 10*3/mm3 174 178 177 166 162 163  --   --  154   < > = values in this interval not displayed.    Lab Results   Component Value Date    CKTOTAL 45 11/26/2014       pH, Arterial   Date Value Ref Range Status   06/02/2017 7.486 (H) 7.350 - 7.450 pH units Final     CO2   Date Value Ref Range Status   06/03/2017 24.0 22.0 - 31.0 mmol/L Final         Results from last 7 days  Lab Units 05/28/17  1615   INR  1.09        Imaging Results (last 7 days)     Procedure Component Value Units Date/Time    NM GI Blood Loss [[id number]] Collected:  05/29/17 0903      Updated:  05/29/17 1220    Narrative:       .                         EXAMINATION:   Nuclear Medicine GI bleed scan                                    INDICATION:  Melena                 CLINICAL HISTORY:  COMPARISON EXAMINATIONS:   none                  DOSE:  36 mCi of autologous labeled red blood cells (Ultratag  in-vivo)  (I.V.)          TECHNIQUE:  dynamic acquisition, 60 second frame intervals for 60  minutes                        FINDINGS:                         The uptake and distribution of radiotracer activity demonstrates:                     A physiologic distribution of radiotracer for a patient of this  age.    There is no evidence of an active GI bleed during the duration of  the examination.                      .         Impression:       CONCLUSION:          1. Negative examination:  No evidence of active GI bleed during  the duration of the imaging study.           Please note:    Sensitivity for for detection of Tc-99m Ultratag RBCs:   approximately 0.1 to 0.5 cc/min is considered the minimum bleed  rate for scintigraphic detection.                Electronically signed by:  BRITTANY Sotelo MD  5/29/2017 12:19  PM CDT Workstation: [id number]    XR Chest PA & Lateral [[id number]] Collected:  05/30/17 2005     Updated:  05/30/17 2024    Narrative:         EXAM:          Radiograph(s), Chest   VIEWS:    PA / Lat ; 2       DATE/TIME:  5/30/2017 8:23 PM CDT               INDICATION:    Preop (procedure not specified)           COMPARISON:  CXR: none          FINDINGS:             - lines/tubes:    Left approach dual-lead pacemaker     - cardiac:         size within normal limits         - mediastinum: contour within normal limits         - lungs:         no focal air space process, pulmonary  interstitial edema, nodule(s)/mass             - pleura:         no evidence of  fluid                  - osseous:         unremarkable for age                  - misc.:         Impression:       CONCLUSION:         1. Negative examination.                                     Electronically signed by:  BRITTANY Sotelo MD  5/30/2017 8:24  PM CDT Workstation: [id number]    CT Abdomen Pelvis With Contrast [[id number]] Collected:  05/30/17 2042     Updated:  05/31/17 0930    Narrative:         PROCEDURE: Ct abdomen and pelvis with contrast    REASON FOR EXAM: colon mass, K92.1 Melena R31.9 Hematuria,  unspecified E86.0 Dehydration    FINDINGS: Comparison study dated  May 26, 2017. Axial computer  tomography sequential imaging was performed from the diaphragms  through the symphysis pubis after administration of IV contrast  .Sagittal and coronal reformates was performed.    This exam was performed according to our departmental  dose-optimization program, which includes automated exposure  control, adjustment of the mA and/or kV according to patient size  and/or use of iterative reconstruction technique.     Imaging through lung bases reveals no abnormality.    The liver is normal. Multiple gallstones are seen within the  gallbladder. Otherwise gallbladder is unremarkable. The biliary  system appears within normal limits. The pancreas is normal. The  spleen is normal. Stable right adrenal gland hypodense nodule  which has Hounsfield's below 10 which measures 1.6 cm in greatest  diameter. Otherwise the adrenal glands are unremarkable. Right  kidney mid zone stable subcentimeter hypodense lesion which does  not appear to enhance consistent with benign hyperdense cyst.  Right kidney lower pole stable small simple renal cortical cyst.  Otherwise right kidney and ureter are normal. Left kidney upper  pole, mid zone, and lower pole stable simple renal cortical  cysts. Otherwise left kidney and ureter are unremarkable. The  bladder is normal. Stable appearance of the uterus with small  calcified uterine fibroids seen. Mid descending colon focal  region of circumferential colon wall thickening. Otherwise hollow  viscera is  unremarkable. No lymphadenopathy in the abdomen or the  pelvis. No acute osseous abnormality.      Impression:       1. Mid descending colon focal region of circumferential colon  wall thickening. This may represent peristaltic wave versus focal  region of colitis or neoplastic involvement. Consider colonoscopy  or barium enema to further evaluate..  2. Cholelithiasis..  3. Stable benign right adrenal gland adenoma..  4. Stable benign right kidney hyperdense cyst. Stable bilateral  kidney small simple renal cortical cyst..  5. Stable small calcified uterine fibroid..    Electronically signed by:  Elton Villanueva MD  5/31/2017 9:29 AM CDT  Workstation: [id number]                                    Medication Review:   Current Facility-Administered Medications   Medication Dose Route Frequency Provider Last Rate Last Dose   • bupivacaine-EPINEPHrine PF (MARCAINE w/EPI) 0.5% -1:[id number] injection    PRN Stefano Linn MD   30 mL at 06/02/17 1130   • dextrose (D50W) solution 25 g  25 g Intravenous Q15 Min PRN Vicki G Levill, APRN   25 g at 05/30/17 0828   • dextrose (GLUTOSE) oral gel 15 g  15 g Oral Q15 Min PRN Vicki G Levill, APRN       • diltiaZEM CD (CARDIZEM CD) 24 hr capsule 180 mg  180 mg Oral Daily Joan Rojas MD   180 mg at 06/02/17 0848   • donepezil (ARICEPT) tablet 10 mg  10 mg Oral Nightly Stefano Linn MD   10 mg at 06/02/17 2142   • enalapril (VASOTEC) tablet 20 mg  20 mg Oral BID Joan Rojas MD   20 mg at 06/02/17 2148   • enoxaparin (LOVENOX) syringe 40 mg  40 mg Subcutaneous Q24H Jimmy Landry MD       • glucagon (human recombinant) (GLUCAGEN DIAGNOSTIC) injection 1 mg  1 mg Subcutaneous Q15 Min PRN Vicki G Levill, APRN       • insulin aspart (novoLOG) injection 0-14 Units  0-14 Units Subcutaneous 4x Daily AC & at Bedtime Vicki G Levill, APRN   3 Units at 06/02/17 2204   • memantine (NAMENDA) tablet 5 mg  5 mg Oral BID Joan Rojas MD   5 mg at 06/02/17 2148   • methIMAzole  (TAPAZOLE) half tablet 2.5 mg  2.5 mg Oral Daily Joan Rojas MD   2.5 mg at 06/01/17 0908   • metroNIDAZOLE (FLAGYL) IVPB 500 mg  500 mg Intravenous Q8H Stefano Linn MD   500 mg at 06/03/17 0328   • Morphine sulfate PCA 1 mg/mL 30 mL syringe   Intravenous Continuous Stefano Linn MD       • naloxone (NARCAN) injection 0.1 mg  0.1 mg Intravenous Q5 Min PRN Stefano Linn MD       • ondansetron (ZOFRAN) injection 4 mg  4 mg Intravenous Q6H PRN Joan Rojas MD       • pantoprazole (PROTONIX) injection 40 mg  40 mg Intravenous Q12H Joan Rojas MD   40 mg at 06/02/17 2203   • sodium chloride 0.9 % flush 1-10 mL  1-10 mL Intravenous PRN Vicki G Levill, APRN   10 mL at 05/28/17 0021   • sodium chloride 0.9 % with KCl 20 mEq/L infusion  100 mL/hr Intravenous Continuous Jimmy Landry MD       • sotalol (BETAPACE) tablet 80 mg  80 mg Oral Q12H Joan Rojas MD   80 mg at 06/02/17 2203         Assessment/Plan     Active Problems:    SSS (sick sinus syndrome)    Gastrointestinal hemorrhage with melena    Preop cardiovascular exam    PLAN:  Continue current Postoperative care.  Surgery is actively following and patient will be transferred out of the ICU today.  Hemoglobin remains stable at 9.6 and slight leukocytosis is reactive.  Echocardiogram showed normal left ventricular systolic function with ejection fraction of 60%.  The left atrial cavity is mildly dilated.    Nate Andres MD  06/03/17      EMR Dragon/Transcription disclaimer:   Much of this encounter note is an electronic transcription/translation of spoken language to printed text. The electronic translation of spoken language may permit erroneous, or at times, nonsensical words or phrases to be inadvertently transcribed; Although I have reviewed the note for such errors, some may still exist.

## 2017-06-03 NOTE — PLAN OF CARE
Problem: Patient Care Overview (Adult)  Goal: Plan of Care Review  Outcome: Ongoing (interventions implemented as appropriate)    06/03/17 0536   Coping/Psychosocial Response Interventions   Plan Of Care Reviewed With patient   Patient Care Overview   Progress progress toward functional goals as expected   Outcome Evaluation   Outcome Summary/Follow up Plan Patient did well through the night and pain was controlled appropriately per patient. Patient was confused at times and was trying to pull off tubing and such but was easily redirected that they had a purpose. Patient tolerated ice chips well.        Goal: Adult Individualization and Mutuality  Outcome: Ongoing (interventions implemented as appropriate)  Goal: Discharge Needs Assessment  Outcome: Ongoing (interventions implemented as appropriate)    Problem: Fluid Volume Deficit (Adult)  Goal: Fluid/Electrolyte Balance  Outcome: Ongoing (interventions implemented as appropriate)  Goal: Comfort/Well Being  Outcome: Ongoing (interventions implemented as appropriate)    Problem: Gastrointestinal Bleeding (Adult)  Goal: Signs and Symptoms of Listed Potential Problems Will be Absent or Manageable (Gastrointestinal Bleeding)  Outcome: Ongoing (interventions implemented as appropriate)    Problem: Perioperative Period (Adult)  Goal: Signs and Symptoms of Listed Potential Problems Will be Absent or Manageable (Perioperative Period)  Outcome: Ongoing (interventions implemented as appropriate)

## 2017-06-04 LAB
GLUCOSE BLDC GLUCOMTR-MCNC: 145 MG/DL (ref 70–130)
GLUCOSE BLDC GLUCOMTR-MCNC: 158 MG/DL (ref 70–130)
GLUCOSE BLDC GLUCOMTR-MCNC: 162 MG/DL (ref 70–130)
GLUCOSE BLDC GLUCOMTR-MCNC: 186 MG/DL (ref 70–130)

## 2017-06-04 PROCEDURE — 25010000003 MORPHINE PER 10 MG: Performed by: SURGERY

## 2017-06-04 PROCEDURE — 82962 GLUCOSE BLOOD TEST: CPT

## 2017-06-04 PROCEDURE — 25010000002 ENOXAPARIN PER 10 MG: Performed by: SURGERY

## 2017-06-04 PROCEDURE — 25810000003 SODIUM CHLORIDE 0.9 % WITH KCL 20 MEQ 20-0.9 MEQ/L-% SOLUTION: Performed by: SURGERY

## 2017-06-04 PROCEDURE — 63710000001 INSULIN ASPART PER 5 UNITS: Performed by: NURSE PRACTITIONER

## 2017-06-04 RX ADMIN — POTASSIUM CHLORIDE AND SODIUM CHLORIDE 100 ML/HR: 900; 150 INJECTION, SOLUTION INTRAVENOUS at 00:07

## 2017-06-04 RX ADMIN — ENOXAPARIN SODIUM 40 MG: 40 INJECTION SUBCUTANEOUS at 10:00

## 2017-06-04 RX ADMIN — MEMANTINE 5 MG: 5 TABLET ORAL at 17:17

## 2017-06-04 RX ADMIN — DONEPEZIL HYDROCHLORIDE 10 MG: 10 TABLET ORAL at 21:12

## 2017-06-04 RX ADMIN — SOTALOL HYDROCHLORIDE 80 MG: 80 TABLET ORAL at 21:12

## 2017-06-04 RX ADMIN — ENALAPRIL MALEATE 20 MG: 10 TABLET ORAL at 17:17

## 2017-06-04 RX ADMIN — PANTOPRAZOLE SODIUM 40 MG: 40 INJECTION, POWDER, FOR SOLUTION INTRAVENOUS at 21:12

## 2017-06-04 RX ADMIN — INSULIN ASPART 3 UNITS: 100 INJECTION, SOLUTION INTRAVENOUS; SUBCUTANEOUS at 21:24

## 2017-06-04 RX ADMIN — MEMANTINE 5 MG: 5 TABLET ORAL at 10:01

## 2017-06-04 RX ADMIN — POTASSIUM CHLORIDE AND SODIUM CHLORIDE 100 ML/HR: 900; 150 INJECTION, SOLUTION INTRAVENOUS at 11:24

## 2017-06-04 RX ADMIN — ENALAPRIL MALEATE 20 MG: 10 TABLET ORAL at 10:01

## 2017-06-04 RX ADMIN — POTASSIUM CHLORIDE AND SODIUM CHLORIDE 100 ML/HR: 900; 150 INJECTION, SOLUTION INTRAVENOUS at 21:17

## 2017-06-04 RX ADMIN — DILTIAZEM HYDROCHLORIDE 180 MG: 180 CAPSULE, EXTENDED RELEASE ORAL at 10:01

## 2017-06-04 RX ADMIN — INSULIN ASPART 3 UNITS: 100 INJECTION, SOLUTION INTRAVENOUS; SUBCUTANEOUS at 17:23

## 2017-06-04 RX ADMIN — Medication: at 17:29

## 2017-06-04 RX ADMIN — INSULIN ASPART 3 UNITS: 100 INJECTION, SOLUTION INTRAVENOUS; SUBCUTANEOUS at 09:09

## 2017-06-04 RX ADMIN — SOTALOL HYDROCHLORIDE 80 MG: 80 TABLET ORAL at 10:01

## 2017-06-04 RX ADMIN — PANTOPRAZOLE SODIUM 40 MG: 40 INJECTION, POWDER, FOR SOLUTION INTRAVENOUS at 10:02

## 2017-06-04 RX ADMIN — METHIMAZOLE 2.5 MG: 5 TABLET ORAL at 10:04

## 2017-06-04 NOTE — PLAN OF CARE
Problem: Gastrointestinal Bleeding (Adult)  Goal: Signs and Symptoms of Listed Potential Problems Will be Absent or Manageable (Gastrointestinal Bleeding)  Outcome: Ongoing (interventions implemented as appropriate)

## 2017-06-04 NOTE — MEDICAL STUDENT
Jimmy Landry MD [facility]  General Surgery    6/4/2017    Chief Complaint:     Subjective      LOS: 5 days      Patient is 88 y.o. female with  Malignant neoplasm of sigmoid colon POD #2 s/p Left Hemicolectomy  No complaints. Pain controlled. No flatus. Voiding urine without difficulty. No leg swelling or pain. No trouble breathing. No dizziness upon standing. OOB to bathroom.           Current Medications:     Current Facility-Administered Medications   Medication Dose Route Frequency Provider Last Rate Last Dose   • bupivacaine-EPINEPHrine PF (MARCAINE w/EPI) 0.5% -1:[id number] injection    PRN Stefano Linn MD   30 mL at 06/02/17 1130   • dextrose (D50W) solution 25 g  25 g Intravenous Q15 Min PRN Vicki G Levill, APRN   25 g at 05/30/17 0828   • dextrose (GLUTOSE) oral gel 15 g  15 g Oral Q15 Min PRN Vicki G Levill, APRN       • diltiaZEM CD (CARDIZEM CD) 24 hr capsule 180 mg  180 mg Oral Daily Joan Rojas MD   180 mg at 06/04/17 1001   • donepezil (ARICEPT) tablet 10 mg  10 mg Oral Nightly Stefano Linn MD   10 mg at 06/03/17 2105   • enalapril (VASOTEC) tablet 20 mg  20 mg Oral BID Joan Rojas MD   20 mg at 06/04/17 1001   • enoxaparin (LOVENOX) syringe 40 mg  40 mg Subcutaneous Q24H Jimmy Landry MD   40 mg at 06/04/17 1000   • glucagon (human recombinant) (GLUCAGEN DIAGNOSTIC) injection 1 mg  1 mg Subcutaneous Q15 Min PRN Vicki G Levill, APRN       • insulin aspart (novoLOG) injection 0-14 Units  0-14 Units Subcutaneous 4x Daily AC & at Bedtime Vicki G Levill, APRN   3 Units at 06/04/17 0909   • memantine (NAMENDA) tablet 5 mg  5 mg Oral BID Joan Rojas MD   5 mg at 06/04/17 1001   • methIMAzole (TAPAZOLE) half tablet 2.5 mg  2.5 mg Oral Daily Joan Rojas MD   2.5 mg at 06/04/17 1004   • Morphine sulfate PCA 1 mg/mL 30 mL syringe   Intravenous Continuous Stefano Linn MD       • naloxone (NARCAN) injection 0.1 mg  0.1 mg Intravenous Q5 Min PRN Stefano Linn MD       •  ondansetron (ZOFRAN) injection 4 mg  4 mg Intravenous Q6H PRN Joan Rojas MD       • pantoprazole (PROTONIX) injection 40 mg  40 mg Intravenous Q12H Joan Rojas MD   40 mg at 06/04/17 1002   • sodium chloride 0.9 % flush 1-10 mL  1-10 mL Intravenous PRN Vicki G Levill, APRN   10 mL at 05/28/17 0021   • sodium chloride 0.9 % with KCl 20 mEq/L infusion  100 mL/hr Intravenous Continuous Jimmy Landry  mL/hr at 06/04/17 1124 100 mL/hr at 06/04/17 1124   • sotalol (BETAPACE) tablet 80 mg  80 mg Oral Q12H Joan Rojas MD   80 mg at 06/04/17 1001       Objective     Physical Exam:   Temp:  [96.9 °F (36.1 °C)-100.2 °F (37.9 °C)] 99 °F (37.2 °C)  Heart Rate:  [67-87] 73  Resp:  [16-18] 18  BP: (114-135)/(56-96) 131/58     Intake/Output       06/03/17 0700 - 06/04/17 0659 06/04/17 0700 - 06/05/17 0659    Intake (ml) 1182 980    Output (ml) 750 250    Net (ml) 432 730          Physical Exam   Constitutional: She is oriented to person, place, and time.   Cardiovascular: Normal rate and regular rhythm.    4/6 holosystolic crescendo decrescendo murmur obliterating s2   Pulmonary/Chest: Effort normal. No respiratory distress.   Abdominal: Soft. She exhibits no distension. There is no tenderness.   Scant bowel sounds in all four quadrants . Intact incision   Musculoskeletal: She exhibits no edema.   Neurological: She is oriented to person, place, and time.   Skin: Skin is warm. No erythema.   Incisions healing well. No signs of infection, dehiscence, erythema, drainage    Vitals reviewed.        Labs:  Lab Results (last 24 hours)     Procedure Component Value Units Date/Time    POC Glucose Fingerstick [[id number]]  (Abnormal) Collected:  06/03/17 1709    Specimen:  Blood Updated:  06/03/17 1726     Glucose 139 (H) mg/dL       Meter: [id number]: [id number] GARCÍA KAYELA DAY       POC Glucose Fingerstick [[id number]]  (Abnormal) Collected:  06/03/17 2107    Specimen:  Blood Updated:  06/03/17 2132     Glucose 162  (H) mg/dL       Sliding Scale AdminMeter: [id number]: [id number] THERESA PALOMARES       POC Glucose Fingerstick [[id number]]  (Abnormal) Collected:  06/04/17 0633    Specimen:  Blood Updated:  06/04/17 0649     Glucose 186 (H) mg/dL       Sliding Scale AdminMeter: [id number]: [id number] DURALPOONAM VELASQUEZ       POC Glucose Fingerstick [[id number]]  (Abnormal) Collected:  06/04/17 1127    Specimen:  Blood Updated:  06/04/17 1146     Glucose 145 (H) mg/dL       Meter: [id number]: [id number] GARCÍA KAYELA DAY             Images:   Imaging Results (last 24 hours)     ** No results found for the last 24 hours. **          (I reviewed the patient's results and images.)                                                                                    ASSESSMENT/PLAN   88 y.o. female with  Malignant neoplasm of sigmoid colon POD #2 s/p Left Hemicolectomy    1.continue NPO until resumption of bowel function  2.continue DVT prophylaxis   3.OOB and ambulate    Ugo Heaton, Medical Student

## 2017-06-04 NOTE — PROGRESS NOTES
[facility] Medicine Services  INPATIENT PROGRESS NOTE    Length of Stay: 5  Date of Admission: 5/26/2017  Primary Care Physician: Melissa Ch MD    Subjective   Chief Complaint:   Chief Complaint   Patient presents with   • Rectal Bleeding       HPI:  HPI      Review of Systems   Constitutional: Negative for activity change, appetite change, chills, diaphoresis, fatigue, fever and unexpected weight change.   HENT: Negative.  Negative for congestion, dental problem, drooling, ear discharge, ear pain, facial swelling, hearing loss, mouth sores, nosebleeds, postnasal drip, rhinorrhea, sinus pressure, sneezing, tinnitus, trouble swallowing and voice change.    Eyes: Negative for photophobia and discharge.   Respiratory: Negative for apnea, cough, choking, shortness of breath, wheezing and stridor.    Cardiovascular: Negative for chest pain, palpitations and leg swelling.   Gastrointestinal: Negative for abdominal pain, anal bleeding, blood in stool, constipation, diarrhea, nausea, rectal pain and vomiting.   Endocrine: Negative for cold intolerance, heat intolerance, polydipsia, polyphagia and polyuria.   Genitourinary: Negative for dysuria, enuresis, frequency, hematuria and urgency.   Musculoskeletal: Negative for arthralgias, back pain, joint swelling, myalgias, neck pain and neck stiffness.   Skin: Negative for color change, pallor, rash and wound.   Allergic/Immunologic: Negative for food allergies and immunocompromised state.   Neurological: Negative for dizziness, tremors, seizures, syncope, facial asymmetry, speech difficulty, weakness, light-headedness, numbness and headaches.   Hematological: Negative for adenopathy.   Psychiatric/Behavioral: Negative for agitation, behavioral problems, confusion, hallucinations, sleep disturbance and suicidal ideas. The patient is not nervous/anxious.      All pertinent negatives and positives are as above. All other systems  have been reviewed and are negative unless otherwise stated.     Objective    Temp:  [96.9 °F (36.1 °C)-100.2 °F (37.9 °C)] 99 °F (37.2 °C)  Heart Rate:  [67-87] 73  Resp:  [16-18] 18  BP: (114-135)/(56-96) 131/58  Physical Exam   Constitutional: She is oriented to person, place, and time. She appears well-developed and well-nourished.  Non-toxic appearance. She does not have a sickly appearance. She does not appear ill. No distress. She is not intubated.   HENT:   Head: Normocephalic and atraumatic.   Right Ear: External ear normal.   Left Ear: External ear normal.   Nose: Nose normal.   Mouth/Throat: Oropharynx is clear and moist. No oropharyngeal exudate.   Eyes: Conjunctivae and EOM are normal. Pupils are equal, round, and reactive to light. Right eye exhibits no discharge and no exudate. Left eye exhibits no discharge and no exudate. Right conjunctiva is not injected. Right conjunctiva has no hemorrhage. Left conjunctiva is not injected. Left conjunctiva has no hemorrhage. No scleral icterus.   Neck: Normal range of motion. Neck supple. No hepatojugular reflux and no JVD present. No tracheal tenderness, no spinous process tenderness and no muscular tenderness present. Carotid bruit is not present. No rigidity. No tracheal deviation, no edema, no erythema and normal range of motion present. No thyroid mass and no thyromegaly present.   Cardiovascular: Normal rate, regular rhythm, normal heart sounds and intact distal pulses.   No extrasystoles are present. Exam reveals no gallop and no friction rub.    No murmur heard.  Pulmonary/Chest: Effort normal and breath sounds normal. No stridor. She is not intubated. No respiratory distress. She has no decreased breath sounds. She has no wheezes. She has no rhonchi. She has no rales. She exhibits no tenderness.   Abdominal: Soft. Normal appearance and bowel sounds are normal. She exhibits no shifting dullness, no distension, no pulsatile liver, no fluid wave, no  abdominal bruit, no ascites, no pulsatile midline mass and no mass. There is no hepatosplenomegaly, splenomegaly or hepatomegaly. There is no tenderness. There is no rigidity, no rebound, no guarding, no CVA tenderness, no tenderness at McBurney's point and negative Sheriff's sign. No hernia.   Genitourinary: Rectal exam shows guaiac negative stool.   Musculoskeletal: Normal range of motion. She exhibits no edema, tenderness or deformity.        Right shoulder: She exhibits no swelling.      Skin Integrity  -   She hasno right foot ulcer, non-callous right foot, no right foot warmth and no right foot blister. She has no left foot ulcer, non-callous left foot, no left foot warmth and no left foot blister..  Lymphadenopathy:     She has no cervical adenopathy.     She has no axillary adenopathy.   Neurological: She is alert and oriented to person, place, and time. She has normal strength and normal reflexes. She is not disoriented. She displays no atrophy, no tremor and normal reflexes. No cranial nerve deficit or sensory deficit. She exhibits normal muscle tone. She displays no seizure activity. Coordination and gait normal. She displays no Babinski's sign on the right side. She displays no Babinski's sign on the left side.   Skin: Skin is warm and dry. No abrasion, no bruising, no burn, no ecchymosis, no laceration, no lesion, no purpura and no rash noted. Rash is not macular, not papular, not maculopapular, not nodular, not pustular, not vesicular and not urticarial. She is not diaphoretic. No cyanosis or erythema. No pallor. Nails show no clubbing.   Psychiatric: She has a normal mood and affect. Her behavior is normal. Judgment and thought content normal. Her mood appears not anxious. Her affect is not angry, not blunt, not labile and not inappropriate. Her speech is not slurred. She is not agitated, not aggressive, not hyperactive, not slowed, not withdrawn and not combative. Thought content is not paranoid and  not delusional. Cognition and memory are not impaired. She does not express impulsivity or inappropriate judgment. She does not exhibit a depressed mood. She expresses no homicidal and no suicidal ideation. She expresses no suicidal plans and no homicidal plans. She is communicative. She exhibits normal recent memory and normal remote memory.         Results Review:  I have reviewed the labs, radiology results, and diagnostic studies.    Laboratory Data:   Lab Results (last 24 hours)     Procedure Component Value Units Date/Time    POC Glucose Fingerstick [[id number]]  (Abnormal) Collected:  06/03/17 1709    Specimen:  Blood Updated:  06/03/17 1726     Glucose 139 (H) mg/dL       Meter: [id number]: [id number] EVANS KAYELA DAY       POC Glucose Fingerstick [[id number]]  (Abnormal) Collected:  06/03/17 2107    Specimen:  Blood Updated:  06/03/17 2132     Glucose 162 (H) mg/dL       Sliding Scale AdminMeter: [id number]: [id number] [place] JELANI       POC Glucose Fingerstick [[id number]]  (Abnormal) Collected:  06/04/17 0633    Specimen:  Blood Updated:  06/04/17 0649     Glucose 186 (H) mg/dL       Sliding Scale AdminMeter: [id number]: [id number] DURAL RON       POC Glucose Fingerstick [[id number]]  (Abnormal) Collected:  06/04/17 1127    Specimen:  Blood Updated:  06/04/17 1146     Glucose 145 (H) mg/dL       Meter: [id number]: [id number] GARCÍA KAYELA DAY             Culture Data:   No results found for: BLOODCX  No results found for: URINECX  No results found for: RESPCX  No results found for: WOUNDCX  No results found for: STOOLCX  No components found for: BODYFLD    Radiology Data:   Imaging Results (last 24 hours)     ** No results found for the last 24 hours. **          I have reviewed the patient current medications.     Assessment/Plan     Active Problems:  SSS (sick sinus syndrome)  Gastrointestinal hemorrhage with melena  Preop cardiovascular  exam     PLAN:  Continue current Postoperative care. Surgery is actively following and patient will be transferred out of the ICU today.  Hemoglobin remains stable at 9.6 and slight leukocytosis is reactive.  Echocardiogram showed normal left ventricular systolic function with ejection fraction of 60%. The left atrial cavity is mildly dilated.     Will start PT / OT    Evie Schafer MD   06/04/17   3:04 PM

## 2017-06-04 NOTE — PLAN OF CARE
Problem: Patient Care Overview (Adult)  Goal: Plan of Care Review  Outcome: Ongoing (interventions implemented as appropriate)    06/04/17 1513   Coping/Psychosocial Response Interventions   Plan Of Care Reviewed With patient   Patient Care Overview   Progress improving   Outcome Evaluation   Outcome Summary/Follow up Plan pt vss.       Goal: Adult Individualization and Mutuality  Outcome: Ongoing (interventions implemented as appropriate)  Goal: Discharge Needs Assessment  Outcome: Ongoing (interventions implemented as appropriate)    Problem: Fluid Volume Deficit (Adult)  Goal: Fluid/Electrolyte Balance  Outcome: Ongoing (interventions implemented as appropriate)  Goal: Comfort/Well Being  Outcome: Ongoing (interventions implemented as appropriate)    Problem: Gastrointestinal Bleeding (Adult)  Goal: Signs and Symptoms of Listed Potential Problems Will be Absent or Manageable (Gastrointestinal Bleeding)  Outcome: Ongoing (interventions implemented as appropriate)    Problem: Perioperative Period (Adult)  Goal: Signs and Symptoms of Listed Potential Problems Will be Absent or Manageable (Perioperative Period)  Outcome: Ongoing (interventions implemented as appropriate)    Problem: Fall Risk (Adult)  Goal: Identify Related Risk Factors and Signs and Symptoms  Outcome: Outcome(s) achieved Date Met:  06/04/17  Goal: Absence of Falls  Outcome: Ongoing (interventions implemented as appropriate)

## 2017-06-05 PROBLEM — E11.9 DIABETES MELLITUS (HCC): Chronic | Status: ACTIVE | Noted: 2017-06-05

## 2017-06-05 PROBLEM — C18.9 COLON CANCER (HCC): Chronic | Status: ACTIVE | Noted: 2017-06-05

## 2017-06-05 PROBLEM — D50.0 BLOOD LOSS ANEMIA: Chronic | Status: ACTIVE | Noted: 2017-06-05

## 2017-06-05 LAB
ALBUMIN SERPL-MCNC: 2.5 G/DL (ref 3.4–4.8)
ALBUMIN/GLOB SERPL: 1 G/DL (ref 1.1–1.8)
ALP SERPL-CCNC: 59 U/L (ref 38–126)
ALT SERPL W P-5'-P-CCNC: 29 U/L (ref 9–52)
ANION GAP SERPL CALCULATED.3IONS-SCNC: 6 MMOL/L (ref 5–15)
AST SERPL-CCNC: 16 U/L (ref 14–36)
BASOPHILS # BLD AUTO: 0.01 10*3/MM3 (ref 0–0.2)
BASOPHILS NFR BLD AUTO: 0.1 % (ref 0–2)
BILIRUB SERPL-MCNC: 0.4 MG/DL (ref 0.2–1.3)
BUN BLD-MCNC: 6 MG/DL (ref 7–21)
BUN/CREAT SERPL: 10 (ref 7–25)
CALCIUM SPEC-SCNC: 8 MG/DL (ref 8.4–10.2)
CHLORIDE SERPL-SCNC: 107 MMOL/L (ref 95–110)
CO2 SERPL-SCNC: 25 MMOL/L (ref 22–31)
CREAT BLD-MCNC: 0.6 MG/DL (ref 0.5–1)
DEPRECATED RDW RBC AUTO: 47.1 FL (ref 36.4–46.3)
EOSINOPHIL # BLD AUTO: 0.05 10*3/MM3 (ref 0–0.7)
EOSINOPHIL NFR BLD AUTO: 0.6 % (ref 0–7)
ERYTHROCYTE [DISTWIDTH] IN BLOOD BY AUTOMATED COUNT: 13.5 % (ref 11.5–14.5)
GFR SERPL CREATININE-BSD FRML MDRD: 114 ML/MIN/1.73 (ref 39–90)
GLOBULIN UR ELPH-MCNC: 2.6 GM/DL (ref 2.3–3.5)
GLUCOSE BLD-MCNC: 139 MG/DL (ref 60–100)
GLUCOSE BLDC GLUCOMTR-MCNC: 150 MG/DL (ref 70–130)
GLUCOSE BLDC GLUCOMTR-MCNC: 200 MG/DL (ref 70–130)
GLUCOSE BLDC GLUCOMTR-MCNC: 259 MG/DL (ref 70–130)
GLUCOSE BLDC GLUCOMTR-MCNC: 260 MG/DL (ref 70–130)
HCT VFR BLD AUTO: 23.9 % (ref 35–45)
HGB BLD-MCNC: 7.7 G/DL (ref 12–15.5)
IMM GRANULOCYTES # BLD: 0.03 10*3/MM3 (ref 0–0.02)
IMM GRANULOCYTES NFR BLD: 0.4 % (ref 0–0.5)
LYMPHOCYTES # BLD AUTO: 0.66 10*3/MM3 (ref 0.6–4.2)
LYMPHOCYTES NFR BLD AUTO: 8.4 % (ref 10–50)
MCH RBC QN AUTO: 30.9 PG (ref 26.5–34)
MCHC RBC AUTO-ENTMCNC: 32.2 G/DL (ref 31.4–36)
MCV RBC AUTO: 96 FL (ref 80–98)
MONOCYTES # BLD AUTO: 0.56 10*3/MM3 (ref 0–0.9)
MONOCYTES NFR BLD AUTO: 7.1 % (ref 0–12)
NEUTROPHILS # BLD AUTO: 6.55 10*3/MM3 (ref 2–8.6)
NEUTROPHILS NFR BLD AUTO: 83.4 % (ref 37–80)
PLATELET # BLD AUTO: 183 10*3/MM3 (ref 150–450)
PMV BLD AUTO: 10.3 FL (ref 8–12)
POTASSIUM BLD-SCNC: 4.5 MMOL/L (ref 3.5–5.1)
PROT SERPL-MCNC: 5.1 G/DL (ref 6.3–8.6)
RBC # BLD AUTO: 2.49 10*6/MM3 (ref 3.77–5.16)
SODIUM BLD-SCNC: 138 MMOL/L (ref 137–145)
WBC NRBC COR # BLD: 7.86 10*3/MM3 (ref 3.2–9.8)

## 2017-06-05 PROCEDURE — 63710000001 INSULIN ASPART PER 5 UNITS: Performed by: NURSE PRACTITIONER

## 2017-06-05 PROCEDURE — 99024 POSTOP FOLLOW-UP VISIT: CPT | Performed by: SURGERY

## 2017-06-05 PROCEDURE — 25810000003 SODIUM CHLORIDE 0.9 % WITH KCL 20 MEQ 20-0.9 MEQ/L-% SOLUTION: Performed by: SURGERY

## 2017-06-05 PROCEDURE — 97162 PT EVAL MOD COMPLEX 30 MIN: CPT

## 2017-06-05 PROCEDURE — G8979 MOBILITY GOAL STATUS: HCPCS

## 2017-06-05 PROCEDURE — 85025 COMPLETE CBC W/AUTO DIFF WBC: CPT | Performed by: FAMILY MEDICINE

## 2017-06-05 PROCEDURE — G8978 MOBILITY CURRENT STATUS: HCPCS

## 2017-06-05 PROCEDURE — 25010000002 ENOXAPARIN PER 10 MG: Performed by: SURGERY

## 2017-06-05 PROCEDURE — 80053 COMPREHEN METABOLIC PANEL: CPT | Performed by: INTERNAL MEDICINE

## 2017-06-05 PROCEDURE — 82962 GLUCOSE BLOOD TEST: CPT

## 2017-06-05 RX ADMIN — MEMANTINE 5 MG: 5 TABLET ORAL at 17:00

## 2017-06-05 RX ADMIN — DILTIAZEM HYDROCHLORIDE 180 MG: 180 CAPSULE, EXTENDED RELEASE ORAL at 08:39

## 2017-06-05 RX ADMIN — POTASSIUM CHLORIDE AND SODIUM CHLORIDE 100 ML/HR: 900; 150 INJECTION, SOLUTION INTRAVENOUS at 06:47

## 2017-06-05 RX ADMIN — ENOXAPARIN SODIUM 40 MG: 40 INJECTION SUBCUTANEOUS at 08:39

## 2017-06-05 RX ADMIN — ENALAPRIL MALEATE 20 MG: 10 TABLET ORAL at 08:39

## 2017-06-05 RX ADMIN — POTASSIUM CHLORIDE AND SODIUM CHLORIDE 100 ML/HR: 900; 150 INJECTION, SOLUTION INTRAVENOUS at 16:57

## 2017-06-05 RX ADMIN — ENALAPRIL MALEATE 20 MG: 10 TABLET ORAL at 18:19

## 2017-06-05 RX ADMIN — PANTOPRAZOLE SODIUM 40 MG: 40 INJECTION, POWDER, FOR SOLUTION INTRAVENOUS at 21:23

## 2017-06-05 RX ADMIN — INSULIN ASPART 8 UNITS: 100 INJECTION, SOLUTION INTRAVENOUS; SUBCUTANEOUS at 16:56

## 2017-06-05 RX ADMIN — MEMANTINE 5 MG: 5 TABLET ORAL at 08:39

## 2017-06-05 RX ADMIN — DONEPEZIL HYDROCHLORIDE 10 MG: 10 TABLET ORAL at 21:23

## 2017-06-05 RX ADMIN — INSULIN ASPART 8 UNITS: 100 INJECTION, SOLUTION INTRAVENOUS; SUBCUTANEOUS at 21:30

## 2017-06-05 RX ADMIN — SOTALOL HYDROCHLORIDE 80 MG: 80 TABLET ORAL at 08:39

## 2017-06-05 RX ADMIN — INSULIN ASPART 5 UNITS: 100 INJECTION, SOLUTION INTRAVENOUS; SUBCUTANEOUS at 10:52

## 2017-06-05 RX ADMIN — METHIMAZOLE 2.5 MG: 5 TABLET ORAL at 08:39

## 2017-06-05 RX ADMIN — SOTALOL HYDROCHLORIDE 80 MG: 80 TABLET ORAL at 21:23

## 2017-06-05 RX ADMIN — PANTOPRAZOLE SODIUM 40 MG: 40 INJECTION, POWDER, FOR SOLUTION INTRAVENOUS at 08:41

## 2017-06-05 NOTE — THERAPY EVALUATION
Acute Care - Physical Therapy Initial Evaluation  [facility]     Patient Name: Kierra Scales  : 1929  MRN: [id number]  Today's Date: 2017   Onset of Illness/Injury or Date of Surgery Date: 17  Date of Referral to PT: 17         Admit Date: 2017     Visit Dx:    ICD-10-CM ICD-9-CM   1. Gastrointestinal hemorrhage with melena K92.1 578.1   2. Hematuria R31.9 599.70   3. Dehydration E86.0 276.51   4. Malignant neoplasm of sigmoid colon C18.7 153.3   5. 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)   • Hypothyroidism   • Dyspnea   • Hyperthyroidism   • Gastrointestinal hemorrhage with melena   • Diabetes mellitus   • Colon cancer   • Blood loss anemia     Past Medical History:   Diagnosis Date   • Anxiety    • Arthritis    • Bradycardia    • Dyslipidemia    • Dyspnea    • Fibrocystic disease of breast    • Hashimoto's thyroiditis    • Hypertensive disorder    • Hypothyroidism    • Pain    • Palpitations    • Paroxysmal tachycardia    • Shortness of breath    • Tricuspid valve disorders, specified as nonrheumatic    • Vitamin D deficiency      Past Surgical History:   Procedure Laterality Date   • COLON RESECTION N/A 2017    Procedure: COLON RESECTION;  Surgeon: Stefano Linn MD;  Location: [facility] OR;  Service:    • COLONOSCOPY N/A 2017    Procedure: Flexible Sigmoidoscopy;  Surgeon: Stefano Linn MD;  Location: [facility] ENDOSCOPY;  Service:    • EXTERNAL EAR SURGERY     • EYE SURGERY     • LIPOMA EXCISION     • PACEMAKER IMPLANTATION     • SALIVARY GLAND SURGERY     • TUBAL ABDOMINAL LIGATION            PT ASSESSMENT (last  72 hours)      PT Evaluation       06/05/17 1652 06/05/17 1455    Rehab Evaluation    Document Type  evaluation  -GB    Subjective Information  agree to therapy  -GB    Patient Effort, Rehab Treatment  good  -GB    Symptoms Noted During/After Treatment  --   hard of hearing/needed cuing & repeated cues for activity;   -GB    General Information    Patient Profile Review  yes  -GB    Onset of Illness/Injury or Date of Surgery Date  06/02/17  -GB    General Observations  up in chair; family around her  -GB    Precautions/Limitations  fall precautions;other (see comments)   gait belt placement  -GB    Prior Level of Function  independent:;all household mobility;cooking;ADL's;gait  -GB    Equipment Currently Used at Home none  -GB none  -GB    Plans/Goals Discussed With  patient and family  -GB    Risks Reviewed  patient and family:;LOB;other (comment)   no OOB/OO chair  w/out assist  -GB    Benefits Reviewed  patient and family:;improve function  -GB    Living Environment    Lives With  other (see comments)   family states someone there all the time  -GB    Living Arrangements  house  -GB    Home Accessibility  bed and bath on same level  -GB    Stair Railings at Home  none  -GB    Type of Financial/Environmental Concern  none  -GB    Living Environment Comment  has second story but she does not go up/down per grandson  -GB    Clinical Impression    Date of Referral to PT  06/05/17  -GB    PT Diagnosis  decreased mobility post op colon resection  -GB    Prognosis  good  -GB    Functional Level At Time Of Evaluation  required assist x 2-3 to stand  -GB    Patient/Family Goals Statement  home   -GB    Criteria for Skilled Therapeutic Interventions Met  yes  -GB    Pathology/Pathophysiology Noted (Describe Specifically for Each System)  musculoskeletal  -GB    Rehab Potential  good, to achieve stated therapy goals  -GB    Predicted Duration of Therapy Intervention (days/wks)  till d/c   -GB    Vital Signs    Pre Systolic  BP Rehab  140  -GB    Pre Treatment Diastolic BP  48  -GB    Post Systolic BP Rehab  144  -GB    Post Treatment Diastolic BP  44  -GB    Pretreatment Heart Rate (beats/min)  71  -GB    Intratreatment Heart Rate (beats/min)  75  -GB    Posttreatment Heart Rate (beats/min)  71  -GB    Pre SpO2 (%)  97  -GB    O2 Delivery Pre Treatment  supplemental O2  -GB    Post SpO2 (%)  92  -GB    O2 Delivery Post Treatment  supplemental O2  -GB    Pre Patient Position  Sitting  -GB    Intra Patient Position  Sitting  -GB    Post Patient Position  Sitting  -GB    Pain Assessment    Pain Assessment  0-10  -GB    Pain Score  --   no pain reported  -GB    Vision Assessment/Intervention    Visual Impairment  WFL  -GB    Cognitive Assessment/Intervention    Current Cognitive/Communication Assessment  functional  -GB    Orientation Status  oriented to;person;place;situation;required verbal cueing (specifiy in comments)   needed repeated physical commands for reaching back to chair  -GB    Follows Commands/Answers Questions  75% of the time   needed cuing to follow directions, carla motor commands  -GB    Personal Safety  mild impairment  -GB    ROM (Range of Motion)    General ROM  upper extremity range of motion deficits identified  -GB    General ROM Detail  monie shoulder flx PROM ~40-50deg but active ~30 deg; elbow ext R limited by ~30 deg from ext; L~10 deg from ext  -GB    MMT (Manual Muscle Testing)    General MMT Assessment  upper extremity strength deficits identified;lower extremity strength deficits identified  -GB    General MMT Assessment Detail  monie shoulder flx 3-/5 w/ elevation limited   -GB    Lower Extremity    Lower Ext Manual Muscle Testing Detail  monie LE shakey, short duration contraction but able to resist at least 4-/5, L>R; quads 4/5, hams 4-/5; DF/PF 4-/5  -GB    Transfer Assessment/Treatment    Transfers, Sit-Stand Winder  moderate assist (50% patient effort);2 person assist required;1 person + 1 person to  manage equipment  -GB    Transfers, Stand-Sit Reeves  moderate assist (50% patient effort);1 person + 1 person to manage equipment  -GB    Gait Assessment/Treatment    Gait, Reeves Level  contact guard assist;minimum assist (75% patient effort);2 person assist required;1 person + 1 person to manage equipment  -GB    Gait, Assistive Device  rolling walker  -GB    Gait, Distance (Feet)  6   6 ', 6' w/ CGA-min a x 2 plus one for equipment   -GB    Gait, Gait Pattern Analysis  swing-to gait  -GB    Gait, Gait Deviations  bilateral:;forward flexed posture;step length decreased  -GB    Gait, Comment  on last stance/gait, she did not have concept to reach behind her for chair with cuing;   -GB    Sensory Assessment/Intervention    Light Touch  LUE;RUE;LLE;RLE   intact  -GB    Positioning and Restraints    Pre-Treatment Position  sitting in chair/recliner  -GB    Post Treatment Position  chair  -GB    In Chair  call light within reach;encouraged to call for assist;reclined;with family/caregiver;LLE elevated;RLE elevated  -GB      06/03/17 0800       Muscle Tone Assessment    Muscle Tone Assessment Bilateral Upper Extremities;Bilateral Lower Extremities  -MA     Bilateral Upper Extremities Muscle Tone Assessment mildly decreased tone  -MA     Bilateral Lower Extremities Muscle Tone Assessment mildly decreased tone  -MA       User Key  (r) = Recorded By, (t) = Taken By, (c) = Cosigned By    Initials Name Provider Type    LILIAN Matthews, PT Physical Therapist    MUNIR Flores, RN Registered Nurse              PT Recommendation and Plan  Planned Therapy Interventions: gait training, bed mobility training, balance training, patient/family education, ROM (Range of Motion), strengthening, stair training, transfer training  PT Frequency: other (see comments) (5-14 x /wk)  Plan of Care Review  Plan Of Care Reviewed With: patient, caregiver  Outcome Summary/Follow up Plan: pt previously indep w/  mobility & self care but currently needs cuing & manual assist  on how/when to sit down, she is very weak and  not able to care for self at home in current condition          IP PT Goals       06/05/17 1652          Bed Mobility PT LTG    Bed Mobility PT LTG, Date Established 06/05/17  -GB      Bed Mobility PT LTG, Time to Achieve 5 - 7 days  -GB      Bed Mobility PT LTG, Activity Type all bed mobility  -GB      Bed Mobility PT LTG, [place] Level conditional independence  -GB      Bed Mobility PT LTG, Outcome goal not met  -GB      Transfer Training PT LTG    Transfer Training PT LTG, Date Established 06/05/17  -GB      Transfer Training PT LTG, Time to Achieve 5 - 7 days  -GB      Transfer Training PT LTG, Activity Type all transfers  -GB      Transfer Training PT LTG, [place] Level contact guard assist  -GB      Transfer Training PT LTG, Assist Device walker, rolling  -GB      Transfer Training PT LTG, Outcome goal not met  -GB      Gait Training PT STG    Gait Training Goal PT STG, Date Established 06/05/17  -GB      Gait Training Goal PT STG, Time to Achieve 2 wks  -GB      Gait Training Goal PT STG, [place] Level conditional independence  -GB      Gait Training Goal PT STG, Assist Device walker, rolling  -GB      Gait Training Goal PT STG, Distance to Achieve 150 ft w/ RW and CGA x 1  -GB      Gait Training Goal PT STG, Additional Goal 300 ft w/ RW and SBA x 1   -GB      Gait Training Goal PT STG, Outcome goal not met  -GB      Stair Training PT LTG    Stair Training Goal PT LTG, Date Established 06/05/17  -GB      Stair Training Goal PT LTG, Time to Achieve by discharge  -GB      Stair Training Goal PT LTG, [place] Level conditional independence  -GB      Stair Training Goal PT LTG, Assist Device walker, rolling  -GB      Stair Training Goal PT LTG, Distance to Achieve up/down 1 step  -GB      Stair Training Goal PT LTG, Outcome goal not met  -GB        User Key  (r) = Recorded By, (t) =  Taken By, (c) = Cosigned By    Initials Name Provider Type    LILIAN Matthews PT Physical Therapist                Outcome Measures       06/05/17 1700          How much help from another person do you currently need...    Turning from your back to your side while in flat bed without using bedrails? 2  -GB      Moving from lying on back to sitting on the side of a flat bed without bedrails? 2  -GB      Moving to and from a bed to a chair (including a wheelchair)? 2  -GB      Standing up from a chair using your arms (e.g., wheelchair, bedside chair)? 2  -GB      Climbing 3-5 steps with a railing? 1  -GB      To walk in hospital room? 1  -GB      AM-PAC 6 Clicks Score 10  -GB      Functional Assessment    Outcome Measure Options AM-PAC 6 Clicks Basic Mobility (PT)  -GB        User Key  (r) = Recorded By, (t) = Taken By, (c) = Cosigned By    Initials Name Provider Type    LILIAN Matthews PT Physical Therapist           Time Calculation:         PT Charges       06/05/17 1703          Time Calculation    Start Time 1455  -GB      Stop Time 1555  -GB      Time Calculation (min) 60 min  -GB      PT Received On 06/05/17  -GB      PT Goal Re-Cert Due Date 06/14/17  -GB        User Key  (r) = Recorded By, (t) = Taken By, (c) = Cosigned By    Initials Name Provider Type    LILIAN Matthews PT Physical Therapist          Therapy Charges for Today     Code Description Service Date Service Provider Modifiers Qty    63089170278 HC PT MOBILITY CURRENT 6/5/2017 Cristin Matthews, PT GP, CL 1    13171809315 HC PT MOBILITY PROJECTED 6/5/2017 Cristin Matthews PT GP, CI 1    03553687909 HC PT THER SUPP EA 15 MIN 6/5/2017 Cristin Matthews, PT GP 1    41078492458 HC PT EVAL MOD COMPLEXITY 3 6/5/2017 Cristin Matthews PT GP 1          PT G-Codes  PT Professional Judgement Used?: Yes  Outcome Measure Options: AM-PAC 6 Clicks Basic Mobility (PT)  Score: 10  Functional  Limitation: Mobility: Walking and moving around  Mobility: Walking and Moving Around Current Status (): At least 60 percent but less than 80 percent impaired, limited or restricted  Mobility: Walking and Moving Around Goal Status (): At least 1 percent but less than 20 percent impaired, limited or restricted      Cristin Matthews, PT  6/5/2017

## 2017-06-05 NOTE — CONSULTS
Adult Nutrition  Assessment    Patient Name:  Kierra Scales  YOB: 1929  MRN: [id number]  Admit Date:  5/26/2017    Assessment Date:  6/5/2017          Reason for Assessment       06/05/17 1446    Reason for Assessment    Reason For Assessment/Visit follow up protocol    Gastrointestinal Bowel resection                Nutrition/Diet History       06/05/17 1446    Nutrition/Diet History    Typical Food/Fluid Intake Pt started on clear liquids and tolerating so far.  Pt reports she was eating fine at home pta before GI symptoms began a few weeks ago.              Labs/Tests/Procedures/Meds       06/05/17 1447    Labs/Tests/Procedures/Meds    Labs/Tests Review Reviewed    Medication Review Reviewed, pertinent;Insulin            Physical Findings       06/05/17 1448    Physical Findings/Assessment    Additional Documentation Physical Appearance (Group)    Physical Appearance    Gastrointestinal abdominal tenderness    Skin surgical wound              Nutrition Prescription Ordered       06/05/17 1448    Nutrition Prescription PO    Current PO Diet Clear Liquid              Comments:      Pt is s/p colon resection.  Diet started on clear liquids today and pt tolerating so far.  Pt indicates she was eating fine prior to when GI symptoms began a few weeks ago.  Wt is normally around 200lb per pt.  Pt reports some soreness after abdominal surgery but no other GI symptoms.  RD will monitor diet advancement and progress.        Electronically signed by:  Xenia Randall RD  06/05/17 2:48 PM

## 2017-06-05 NOTE — PLAN OF CARE
Problem: Patient Care Overview (Adult)  Goal: Plan of Care Review  Outcome: Ongoing (interventions implemented as appropriate)    06/05/17 0313   Coping/Psychosocial Response Interventions   Plan Of Care Reviewed With patient   Patient Care Overview   Progress improving   Outcome Evaluation   Outcome Summary/Follow up Plan vss, pain with movement, voiding and using bedside, slept well durning night, hypoactive bowel sounds       Goal: Adult Individualization and Mutuality  Outcome: Ongoing (interventions implemented as appropriate)  Goal: Discharge Needs Assessment  Outcome: Ongoing (interventions implemented as appropriate)    Problem: Fluid Volume Deficit (Adult)  Goal: Fluid/Electrolyte Balance  Outcome: Ongoing (interventions implemented as appropriate)  Goal: Comfort/Well Being  Outcome: Ongoing (interventions implemented as appropriate)    Problem: Gastrointestinal Bleeding (Adult)  Goal: Signs and Symptoms of Listed Potential Problems Will be Absent or Manageable (Gastrointestinal Bleeding)  Outcome: Outcome(s) achieved Date Met:  06/05/17    Problem: Perioperative Period (Adult)  Goal: Signs and Symptoms of Listed Potential Problems Will be Absent or Manageable (Perioperative Period)  Outcome: Outcome(s) achieved Date Met:  06/05/17    Problem: Fall Risk (Adult)  Goal: Absence of Falls  Outcome: Ongoing (interventions implemented as appropriate)

## 2017-06-05 NOTE — PLAN OF CARE
Problem: Patient Care Overview (Adult)  Goal: Plan of Care Review  Outcome: Ongoing (interventions implemented as appropriate)    06/05/17 1652   Coping/Psychosocial Response Interventions   Plan Of Care Reviewed With patient;caregiver   Outcome Evaluation   Outcome Summary/Follow up Plan pt previously indep w/ mobility & self care but currently needs cuing & manual assist on how/when to sit down, she is very weak and not able to care for self at home in current condition       Goal: Adult Individualization and Mutuality  Outcome: Ongoing (interventions implemented as appropriate)  Goal: Discharge Needs Assessment  Outcome: Ongoing (interventions implemented as appropriate)    06/05/17 1652   Discharge Needs Assessment   Concerns To Be Addressed home safety concerns;cognitive/perceptual concerns   Concerns Comments pt may need 24/7 care for her safety in mobility and self care by d/c if she is not fully indep with good decision making for safety and mobility    Equipment Needed After Discharge walker, rolling;commode   Discharge Facility/Level Of Care Needs skilled transitional care;rehabilitation facility   Current Discharge Risk dependent with mobility/activities of daily living   Current Health   Outpatient/Agency/Support Group Needs inpatient rehabilitation facility (specify);skilled nursing facility (specify)   Self-Care   Equipment Currently Used at Home none         Problem: Inpatient Physical Therapy  Goal: Bed Mobility Goal LTG- PT  Outcome: Ongoing (interventions implemented as appropriate)    06/05/17 1652   Bed Mobility PT LTG   Bed Mobility PT LTG, Date Established 06/05/17   Bed Mobility PT LTG, Time to Achieve 5 - 7 days   Bed Mobility PT LTG, Activity Type all bed mobility   Bed Mobility PT LTG, [place] Level conditional independence   Bed Mobility PT LTG, Outcome goal not met       Goal: Transfer Training Goal 1 LTG- PT  Outcome: Ongoing (interventions implemented as appropriate)    06/05/17  1652   Transfer Training PT LTG   Transfer Training PT LTG, Date Established 06/05/17   Transfer Training PT LTG, Time to Achieve 5 - 7 days   Transfer Training PT LTG, Activity Type all transfers   Transfer Training PT LTG, [place] Level contact guard assist   Transfer Training PT LTG, Assist Device walker, rolling   Transfer Training PT LTG, Outcome goal not met       Goal: Gait Training Goal STG- PT  Outcome: Ongoing (interventions implemented as appropriate)    06/05/17 1652   Gait Training PT STG   Gait Training Goal PT STG, Date Established 06/05/17   Gait Training Goal PT STG, Time to Achieve 2 wks   Gait Training Goal PT STG, [place] Level conditional independence   Gait Training Goal PT STG, Assist Device walker, rolling   Gait Training Goal PT STG, Distance to Achieve 150 ft w/ RW and CGA x 1   Gait Training Goal PT STG, Additional Goal 300 ft w/ RW and SBA x 1    Gait Training Goal PT STG, Outcome goal not met       Goal: Stair Training Goal LTG- PT  Outcome: Ongoing (interventions implemented as appropriate)    06/05/17 1652   Stair Training PT LTG   Stair Training Goal PT LTG, Date Established 06/05/17   Stair Training Goal PT LTG, Time to Achieve by discharge   Stair Training Goal PT LTG, [place] Level conditional independence   Stair Training Goal PT LTG, Assist Device walker, rolling   Stair Training Goal PT LTG, Distance to Achieve up/down 1 step   Stair Training Goal PT LTG, Outcome goal not met

## 2017-06-05 NOTE — OP NOTE
COLON RESECTION LAPAROSCOPIC POSSIBLE OPEN  Procedure Note    Kierra Scales  5/26/2017 - 6/2/2017    Pre-op Diagnosis:   Malignant neoplasm of sigmoid colon [C18.7]    Post-op Diagnosis:     Post-Op Diagnosis Codes:     * Malignant neoplasm of sigmoid colon [C18.7]    Procedure/CPT® Codes:      Procedure(s):  Laparoscopic converted to Open Left Colon Resection    Surgeon(s):  Stefano Linn MD    Anesthesia: General    Staff:   Circulator: Lindsay Kline RN; Saad Duval RN; Elyssa Harvey RN; Bhargav Ureña RN  Scrub Person: Pete Wade  Endo Technician: Rebecca Olsen, JESUS  Assistant: Kinza Ramon    Estimated Blood Loss: 200 mL    Specimens:                  ID Type Source Tests Collected by Time Destination   A : LEFT COLON STITCH MARKS DISTAL END Tissue Large Intestine, Sigmoid Colon TISSUE EXAM Stefano Linn MD 6/2/2017 1423          Drains:       [REMOVED] Naso/Oral/Gastric Tube 06/02/17 1600 [place] sump right nostril (Removed)   Removed 06/03/17 1146   Placement Check Methods x-ray confirmation* 6/2/2017  5:25 PM   Tolerance no adverse signs/symptoms 6/3/2017  8:00 AM   Securement taped to nostril center 6/3/2017  4:00 AM   Suction Setting/Drainage Method low suction;intermittent setting 6/3/2017  8:00 AM   Drainage Color green, dark 6/3/2017  8:00 AM   General output (mL) 100 6/3/2017  5:41 AM       [REMOVED] Urethral Catheter 06/02/17 1200 100% silicone 16 10 10 (Removed)   Removed 06/03/17 1146   Daily Indications Monitoring of strict I &O 6/3/2017  8:00 AM   Securement secured to upper leg with adhesive device 6/3/2017  8:00 AM   Catheter care done Yes 6/3/2017  8:00 AM   Tolerance no signs/symptoms of discomfort 6/3/2017  8:00 AM   Urine Output (mL) 400 6/3/2017  8:00 AM       Findings: Left Colon lesion, tattooed    Complications: None    Indication: See H and P    Operative Note:    Patient was seen and consented preoperatively.  She was then taken in the operating  room and placed in supine position.  Gen. anesthetic was administered and the patient was orotracheally intubated without incident.  A Mcmanus catheter was placed sterilely by the nursing staff.  An arterial line was placed sterilely by the anesthesia staff.  A nasogastric tube was placed by anesthesia as well.  Following this a preoperative briefing was performed.  The abdomen was prepped and draped in normal sterile fashion.  A timeout was then performed.    Following time out the patient's prior lower midline scar was excised sharply.  Bovie electrocautery was used to carry the incision down to the level of the midline fascia.  The fascia was then opened.  The peritoneum was then grasped elevated upward and opened sharply.  The peritoneum and fascia was then opened for the full length of our hand port incision in the low midline.  A hand port was then inserted with a 5 mm trocar in place which was used to insufflate the abdomen without incident.  A laparoscope was inserted into additional 5 mm ports were placed in the left mid clavicular line in the upper abdomen.  Both were inserted after injection of local and under direct visualization.    With these ports in place my hand was inserted through the GelPort and used to obtain medial traction on the left colon.  The white line of [place] was visualized and the peritoneum was opened in this area using the Enseal device.  As the left colon was mobilized down towards the sigmoid colon it became apparent that the suspected sigmoid colon lesion was actually a lesion in the mid left colon.  This was confirmed by visualization of the tattoo as well as palpation of a firm mass in the middle portion of the left colon.  The left colon was then mobilized completely by division of its peritoneal attachments.  I began attempting to mobilize the splenic flexure, however this proved to be difficult owing to intra-abdominal fat as well as the anatomy of the patient's splenic  flexure.  The sigmoid colon was also mobilized out and away from the pelvic sidewall.  In doing so I was able to visualize and confirm the location of the left ureter.    At this point the GelPort was removed and I attempted to eviscerate the left colon.  Again, as the splenic flexure was not completely mobilized this proved to be difficult.  Therefore I converted to a small open incision by extending our hand port incision superiorly.  A wound protector was placed.  The splenic flexure of the colon was then retracted in the cephalad direction and the remaining attachments of the splenic flexure as well as the left colon adjacent to the lesion were divided using electrocautery.  The inferior mesenteric vein was encountered during this portion of the dissection, it was isolated with a right angle clamp and divided and secured with a suture ligature.  Once this was done the transverse colon, splenic flexure, and left colon were all well mobilized.  The middle colic artery was identified in the transverse colon.  A window was created in the mesentery just distal to this and proximal to the splenic flexure.  A KESHAV stapler was used to divide the colon this point.  The mesentery was then sequentially divided all the way down to the level of the left colic vasculature.  The vascular bundle was clamped near its base, divided and then secured with a suture ligature.  Division of the mesentery continued all the way down to the distal sigmoid colon.  A separate firing of the KESHAV stapler was then used to divide the sigmoid colon.  The specimen was then marked and passed off the field.    Attention was then turned to creation of an anastomosis.  Additional sigmoid colon was removed using a KESHAV stapler at this point as I felt there was questionable blood supply to the distal portion of the bowel.  This was passed off field as additional specimen.  The 2 segments of colon were then oriented in an end to end fashion.  The  mesenteric defect was then closed using interrupted 3-0 Vicryl suture.  The 2 staple lines were then removed.  An end to end single-layer handsewn anastomosis was then created using interrupted 3-0 Vicryl sutures.  At the conclusion of the anastomosis it was palpated and felt to be widely patent.    The abdomen was then irrigated.  All sponge, instrument, and needle counts were confirmed be correct.  The omentum was placed over our anastomosis.  The wound protector was removed.  The fascia was closed using a running #1 PDS suture.  Skin incisions were then closed using 4-0 Vicryl suture.  Skin affix was placed over all incisions.  The patient was then awakened and returned recovery in good condition.        This document has been electronically signed by Stefano Linn MD on June 5, 2017 8:40 AM        Stefano Linn MD     Date: 6/5/2017  Time: 8:29 AM

## 2017-06-05 NOTE — PLAN OF CARE
Problem: Patient Care Overview (Adult)  Goal: Plan of Care Review  Outcome: Ongoing (interventions implemented as appropriate)  Goal: Adult Individualization and Mutuality  Outcome: Ongoing (interventions implemented as appropriate)    06/05/17 1326   Individualization   Patient Specific Preferences pt likes toilet paper instead of wipes       Goal: Discharge Needs Assessment  Outcome: Ongoing (interventions implemented as appropriate)    Problem: Fluid Volume Deficit (Adult)  Goal: Fluid/Electrolyte Balance  Outcome: Ongoing (interventions implemented as appropriate)  Goal: Comfort/Well Being  Outcome: Ongoing (interventions implemented as appropriate)    Problem: Fall Risk (Adult)  Goal: Absence of Falls  Outcome: Ongoing (interventions implemented as appropriate)

## 2017-06-05 NOTE — PROGRESS NOTES
Progress Note  Guido Motley MD  Hospitalist     LOS: 6 days   Patient Care Team:  Melissa Ch MD as PCP    Chief Complaint: rectal bleeding    Subjective     Interval History:     Patient Complaints: rectal bleeding. She was diagnosed and operated upon (L hemicolectomy) due to colon cancer.    History taken from: patient    Medication Review:   Current Facility-Administered Medications   Medication Dose Route Frequency Provider Last Rate Last Dose   • bupivacaine-EPINEPHrine PF (MARCAINE w/EPI) 0.5% -[id number] injection    PRN Stefano Linn MD   30 mL at 06/02/17 1130   • dextrose (D50W) solution 25 g  25 g Intravenous Q15 Min PRN Vciki G Levill, APRN   25 g at 05/30/17 0828   • dextrose (GLUTOSE) oral gel 15 g  15 g Oral Q15 Min PRN Vicki G Levill, APRN       • diltiaZEM CD (CARDIZEM CD) 24 hr capsule 180 mg  180 mg Oral Daily Joan Rojas MD   180 mg at 06/05/17 0839   • donepezil (ARICEPT) tablet 10 mg  10 mg Oral Nightly Stefano Linn MD   10 mg at 06/04/17 2112   • enalapril (VASOTEC) tablet 20 mg  20 mg Oral BID Joan Rojas MD   20 mg at 06/05/17 0839   • enoxaparin (LOVENOX) syringe 40 mg  40 mg Subcutaneous Q24H Jimmy Landry MD   40 mg at 06/05/17 0839   • glucagon (human recombinant) (GLUCAGEN DIAGNOSTIC) injection 1 mg  1 mg Subcutaneous Q15 Min PRN Vicki G Levill, APRN       • insulin aspart (novoLOG) injection 0-14 Units  0-14 Units Subcutaneous 4x Daily AC & at Bedtime Vicki G Levill, APRN   5 Units at 06/05/17 1052   • memantine (NAMENDA) tablet 5 mg  5 mg Oral BID Joan Rojas MD   5 mg at 06/05/17 0839   • methIMAzole (TAPAZOLE) half tablet 2.5 mg  2.5 mg Oral Daily Joan Rojas MD   2.5 mg at 06/05/17 0839   • Morphine sulfate PCA 1 mg/mL 30 mL syringe   Intravenous Continuous Stefano Linn MD       • naloxone (NARCAN) injection 0.1 mg  0.1 mg Intravenous Q5 Min PRN Stefano Linn MD       • ondansetron (ZOFRAN) injection 4 mg  4 mg  Intravenous Q6H PRN Joan Rojas MD       • pantoprazole (PROTONIX) injection 40 mg  40 mg Intravenous Q12H Joan Rojas MD   40 mg at 06/05/17 0841   • sodium chloride 0.9 % flush 1-10 mL  1-10 mL Intravenous PRN Vicki Mchugh, APRN   10 mL at 05/28/17 0021   • sodium chloride 0.9 % with KCl 20 mEq/L infusion  100 mL/hr Intravenous Continuous Jimmy Landry  mL/hr at 06/05/17 0647 100 mL/hr at 06/05/17 0647   • sotalol (BETAPACE) tablet 80 mg  80 mg Oral Q12H Joan Rojas MD   80 mg at 06/05/17 0839       Review of Systems:   Review of Systems   Constitutional: Positive for fatigue. Negative for fever.   Respiratory: Negative for cough, shortness of breath and wheezing.    Cardiovascular: Negative for chest pain.   Gastrointestinal: Positive for abdominal distention and nausea. Negative for abdominal pain, anal bleeding, constipation, diarrhea and vomiting.   Genitourinary: Negative for dysuria and urgency.   Musculoskeletal: Positive for arthralgias.   Skin: Positive for pallor.   Neurological: Positive for weakness and light-headedness.   Psychiatric/Behavioral: Negative for agitation and behavioral problems.   All other systems reviewed and are negative.      Objective     Vital Signs  Temp:  [97 °F (36.1 °C)-99.8 °F (37.7 °C)] 99.8 °F (37.7 °C)  Heart Rate:  [68-82] 72  Resp:  [18] 18  BP: (125-142)/(58-64) 142/64    Physical Exam:  Physical Exam   Constitutional: She is oriented to person, place, and time. She appears well-developed and well-nourished.   HENT:   Head: Normocephalic and atraumatic.   Eyes: EOM are normal. Pupils are equal, round, and reactive to light. No scleral icterus.   Neck: Normal range of motion. Neck supple.   Cardiovascular: Normal rate and regular rhythm.    Pulmonary/Chest: Effort normal and breath sounds normal. No respiratory distress. She has no wheezes.   Abdominal: Soft. Bowel sounds are normal. She exhibits no distension. There is tenderness (minimal). There is no  rebound and no guarding.   Musculoskeletal: She exhibits no edema or tenderness.   Neurological: She is alert and oriented to person, place, and time. No cranial nerve deficit.   Skin: Skin is warm and dry. There is pallor.   Psychiatric: She has a normal mood and affect. Her behavior is normal.   Vitals reviewed.       Results Review:    Lab Results (last 24 hours)     Procedure Component Value Units Date/Time    POC Glucose Fingerstick [[id number]]  (Abnormal) Collected:  06/04/17 1722    Specimen:  Blood Updated:  06/04/17 1831     Glucose 162 (H) mg/dL       Meter: [id number]: [id number] RAQUEL BROOKE       POC Glucose Fingerstick [[id number]]  (Abnormal) Collected:  06/04/17 2122    Specimen:  Blood Updated:  06/04/17 2230     Glucose 158 (H) mg/dL       Meter: [id number]: [id number] DAYANA RO       POC Glucose Fingerstick [[id number]]  (Abnormal) Collected:  06/05/17 0647    Specimen:  Blood Updated:  06/05/17 0701     Glucose 150 (H) mg/dL       RN NotifiedMeter: [id number]: [id number] PAWEL STARKS       Comprehensive Metabolic Panel [[id number]]  (Abnormal) Collected:  06/05/17 0709    Specimen:  Blood Updated:  06/05/17 0758     Glucose 139 (H) mg/dL      BUN 6 (L) mg/dL      Creatinine 0.60 mg/dL      Sodium 138 mmol/L      Potassium 4.5 mmol/L      Chloride 107 mmol/L      CO2 25.0 mmol/L      Calcium 8.0 (L) mg/dL      Total Protein 5.1 (L) g/dL      Albumin 2.50 (L) g/dL      ALT (SGPT) 29 U/L      AST (SGOT) 16 U/L      Alkaline Phosphatase 59 U/L      Total Bilirubin 0.4 mg/dL      eGFR  African Amer 114 (H) mL/min/1.73      Globulin 2.6 gm/dL      A/G Ratio 1.0 (L) g/dL      BUN/Creatinine Ratio 10.0     Anion Gap 6.0 mmol/L     Narrative:       The MDRD GFR formula is only valid for adults with stable renal function between ages 18 and 70.    CBC & Differential [[id number]] Collected:  06/05/17 0757    Specimen:  Blood Updated:  06/05/17 0900    Narrative:       The  following orders were created for panel order CBC & Differential.  Procedure                               Abnormality         Status                     ---------                               -----------         ------                     CBC Auto Differential[[id number]]        Abnormal            Final result                 Please view results for these tests on the individual orders.    CBC Auto Differential [[id number]]  (Abnormal) Collected:  06/05/17 0757    Specimen:  Blood Updated:  06/05/17 0900     WBC 7.86 10*3/mm3      RBC 2.49 (L) 10*6/mm3      Hemoglobin 7.7 (L) g/dL      Hematocrit 23.9 (L) %      MCV 96.0 fL      MCH 30.9 pg      MCHC 32.2 g/dL      RDW 13.5 %      RDW-SD 47.1 (H) fl      MPV 10.3 fL      Platelets 183 10*3/mm3      Neutrophil % 83.4 (H) %      Lymphocyte % 8.4 (L) %      Monocyte % 7.1 %      Eosinophil % 0.6 %      Basophil % 0.1 %      Immature Grans % 0.4 %      Neutrophils, Absolute 6.55 10*3/mm3      Lymphocytes, Absolute 0.66 10*3/mm3      Monocytes, Absolute 0.56 10*3/mm3      Eosinophils, Absolute 0.05 10*3/mm3      Basophils, Absolute 0.01 10*3/mm3      Immature Grans, Absolute 0.03 (H) 10*3/mm3     Tissue Exam [[id number]] Collected:  06/02/17 1423    Specimen:  Tissue from Large Intestine, Sigmoid Colon Updated:  06/05/17 1018    POC Glucose Fingerstick [[id number]]  (Abnormal) Collected:  06/05/17 1050    Specimen:  Blood Updated:  06/05/17 1129     Glucose 200 (H) mg/dL       Sliding Scale AdminMeter: [id number]: [id number] ANDRES SHAN             Imaging Results (last 24 hours)     ** No results found for the last 24 hours. **          Assessment/Plan     Principal Problem:    Gastrointestinal hemorrhage with melena    Blood loss anemia    Active Problems:    Diabetes mellitus    Colon cancer - S/P colectomy    Follow up CBCs, advance diet if tolerated.    Guido Motley MD  06/05/17  12:06 PM

## 2017-06-05 NOTE — PROGRESS NOTES
GENERAL SURGERY PROGRESS NOTE  Chief Complaint:  Surgery Follow up   LOS: 6 days       Subjective     Interval History:     Feels well, no bowel function yet. No nausea. OOB and urinating well    Objective     Vital Signs  Temp:  [97 °F (36.1 °C)-99.8 °F (37.7 °C)] 99.8 °F (37.7 °C)  Heart Rate:  [68-82] 72  Resp:  [18] 18  BP: (118-142)/(56-64) 142/64    Physical Exam:   Abdomen soft, incision CDI  Labs:  Lab Results (last 24 hours)     Procedure Component Value Units Date/Time    POC Glucose Fingerstick [[id number]]  (Abnormal) Collected:  06/04/17 1127    Specimen:  Blood Updated:  06/04/17 1146     Glucose 145 (H) mg/dL       Meter: [id number]: [id number] xzoops       POC Glucose Fingerstick [[id number]]  (Abnormal) Collected:  06/04/17 1722    Specimen:  Blood Updated:  06/04/17 1831     Glucose 162 (H) mg/dL       Meter: [id number]: [id number] xzoops       POC Glucose Fingerstick [[id number]]  (Abnormal) Collected:  06/04/17 2122    Specimen:  Blood Updated:  06/04/17 2230     Glucose 158 (H) mg/dL       Meter: [id number]: [id number] Apprema       POC Glucose Fingerstick [[id number]]  (Abnormal) Collected:  06/05/17 0647    Specimen:  Blood Updated:  06/05/17 0701     Glucose 150 (H) mg/dL       RN NotifiedMeter: [id number]: [id number] PAWEL STARKS       Comprehensive Metabolic Panel [[id number]] Collected:  06/05/17 0709    Specimen:  Blood Updated:  06/05/17 0738    CBC & Differential [[id number]] Updated:  06/05/17 0748    Specimen:  Blood     Narrative:       The following orders were created for panel order CBC & Differential.  Procedure                               Abnormality         Status                     ---------                               -----------         ------                     CBC Auto Differential[[id number]]                                                         Please view results for these tests on the individual orders.     CBC Auto Differential [[id number]] Updated:  06/05/17 0748    Specimen:  Blood            Results Review:     Labs and imaging for today were reviewed.      Assessment/Plan     Kierra Scales is a 88 y.o. female who is POD#3 Left Colon Resection.      Await Pathology  Await bowel function, will start clears today.  Continue OOB, PT  ON GI and DVT prophylaxis.  Follow up AM labs.            This document has been electronically signed by Stefano Linn MD on June 5, 2017 7:49 AM        Stefano Linn MD  06/05/17  7:49 AM

## 2017-06-06 LAB
ALBUMIN SERPL-MCNC: 2.5 G/DL (ref 3.4–4.8)
ALBUMIN/GLOB SERPL: 1 G/DL (ref 1.1–1.8)
ALP SERPL-CCNC: 45 U/L (ref 38–126)
ALT SERPL W P-5'-P-CCNC: 28 U/L (ref 9–52)
ANION GAP SERPL CALCULATED.3IONS-SCNC: 5 MMOL/L (ref 5–15)
AST SERPL-CCNC: 16 U/L (ref 14–36)
BASOPHILS # BLD AUTO: 0 10*3/MM3 (ref 0–0.2)
BASOPHILS NFR BLD AUTO: 0 % (ref 0–2)
BILIRUB SERPL-MCNC: 0.4 MG/DL (ref 0.2–1.3)
BUN BLD-MCNC: 4 MG/DL (ref 7–21)
BUN/CREAT SERPL: 7.1 (ref 7–25)
CALCIUM SPEC-SCNC: 8.1 MG/DL (ref 8.4–10.2)
CHLORIDE SERPL-SCNC: 101 MMOL/L (ref 95–110)
CO2 SERPL-SCNC: 28 MMOL/L (ref 22–31)
CREAT BLD-MCNC: 0.56 MG/DL (ref 0.5–1)
DEPRECATED RDW RBC AUTO: 44.9 FL (ref 36.4–46.3)
EOSINOPHIL # BLD AUTO: 0.08 10*3/MM3 (ref 0–0.7)
EOSINOPHIL NFR BLD AUTO: 1.3 % (ref 0–7)
ERYTHROCYTE [DISTWIDTH] IN BLOOD BY AUTOMATED COUNT: 13.1 % (ref 11.5–14.5)
GFR SERPL CREATININE-BSD FRML MDRD: 124 ML/MIN/1.73 (ref 39–90)
GLOBULIN UR ELPH-MCNC: 2.5 GM/DL (ref 2.3–3.5)
GLUCOSE BLD-MCNC: 168 MG/DL (ref 60–100)
GLUCOSE BLDC GLUCOMTR-MCNC: 214 MG/DL (ref 70–130)
GLUCOSE BLDC GLUCOMTR-MCNC: 243 MG/DL (ref 70–130)
GLUCOSE BLDC GLUCOMTR-MCNC: 251 MG/DL (ref 70–130)
HCT VFR BLD AUTO: 22.3 % (ref 35–45)
HGB BLD-MCNC: 7.4 G/DL (ref 12–15.5)
IMM GRANULOCYTES # BLD: 0.02 10*3/MM3 (ref 0–0.02)
IMM GRANULOCYTES NFR BLD: 0.3 % (ref 0–0.5)
LYMPHOCYTES # BLD AUTO: 0.78 10*3/MM3 (ref 0.6–4.2)
LYMPHOCYTES NFR BLD AUTO: 12.9 % (ref 10–50)
MCH RBC QN AUTO: 31 PG (ref 26.5–34)
MCHC RBC AUTO-ENTMCNC: 33.2 G/DL (ref 31.4–36)
MCV RBC AUTO: 93.3 FL (ref 80–98)
MONOCYTES # BLD AUTO: 0.7 10*3/MM3 (ref 0–0.9)
MONOCYTES NFR BLD AUTO: 11.6 % (ref 0–12)
NEUTROPHILS # BLD AUTO: 4.46 10*3/MM3 (ref 2–8.6)
NEUTROPHILS NFR BLD AUTO: 73.9 % (ref 37–80)
PLATELET # BLD AUTO: 193 10*3/MM3 (ref 150–450)
PMV BLD AUTO: 9.8 FL (ref 8–12)
POTASSIUM BLD-SCNC: 3.3 MMOL/L (ref 3.5–5.1)
PROT SERPL-MCNC: 5 G/DL (ref 6.3–8.6)
RBC # BLD AUTO: 2.39 10*6/MM3 (ref 3.77–5.16)
SODIUM BLD-SCNC: 134 MMOL/L (ref 137–145)
WBC NRBC COR # BLD: 6.04 10*3/MM3 (ref 3.2–9.8)

## 2017-06-06 PROCEDURE — 80053 COMPREHEN METABOLIC PANEL: CPT | Performed by: INTERNAL MEDICINE

## 2017-06-06 PROCEDURE — 97110 THERAPEUTIC EXERCISES: CPT

## 2017-06-06 PROCEDURE — 97150 GROUP THERAPEUTIC PROCEDURES: CPT

## 2017-06-06 PROCEDURE — 97116 GAIT TRAINING THERAPY: CPT

## 2017-06-06 PROCEDURE — 82962 GLUCOSE BLOOD TEST: CPT

## 2017-06-06 PROCEDURE — 85025 COMPLETE CBC W/AUTO DIFF WBC: CPT | Performed by: FAMILY MEDICINE

## 2017-06-06 PROCEDURE — 25010000002 ENOXAPARIN PER 10 MG: Performed by: SURGERY

## 2017-06-06 PROCEDURE — 25810000003 SODIUM CHLORIDE 0.9 % WITH KCL 20 MEQ 20-0.9 MEQ/L-% SOLUTION: Performed by: SURGERY

## 2017-06-06 PROCEDURE — 94799 UNLISTED PULMONARY SVC/PX: CPT | Performed by: NURSE PRACTITIONER

## 2017-06-06 PROCEDURE — 99024 POSTOP FOLLOW-UP VISIT: CPT | Performed by: SURGERY

## 2017-06-06 PROCEDURE — 63710000001 INSULIN ASPART PER 5 UNITS: Performed by: NURSE PRACTITIONER

## 2017-06-06 RX ORDER — MORPHINE SULFATE 2 MG/ML
2 INJECTION, SOLUTION INTRAMUSCULAR; INTRAVENOUS EVERY 4 HOURS PRN
Status: DISCONTINUED | OUTPATIENT
Start: 2017-06-06 | End: 2017-06-12 | Stop reason: HOSPADM

## 2017-06-06 RX ORDER — SODIUM CHLORIDE 450 MG/100ML
50 INJECTION, SOLUTION INTRAVENOUS CONTINUOUS
Status: DISCONTINUED | OUTPATIENT
Start: 2017-06-06 | End: 2017-06-10

## 2017-06-06 RX ORDER — HYDROCODONE BITARTRATE AND ACETAMINOPHEN 5; 325 MG/1; MG/1
1 TABLET ORAL EVERY 6 HOURS PRN
Status: DISCONTINUED | OUTPATIENT
Start: 2017-06-06 | End: 2017-06-12 | Stop reason: HOSPADM

## 2017-06-06 RX ORDER — POTASSIUM CHLORIDE 750 MG/1
40 CAPSULE, EXTENDED RELEASE ORAL ONCE
Status: COMPLETED | OUTPATIENT
Start: 2017-06-06 | End: 2017-06-06

## 2017-06-06 RX ADMIN — MEMANTINE 5 MG: 5 TABLET ORAL at 17:10

## 2017-06-06 RX ADMIN — PANTOPRAZOLE SODIUM 40 MG: 40 INJECTION, POWDER, FOR SOLUTION INTRAVENOUS at 09:22

## 2017-06-06 RX ADMIN — SODIUM CHLORIDE 50 ML/HR: 4.5 INJECTION, SOLUTION INTRAVENOUS at 16:05

## 2017-06-06 RX ADMIN — POTASSIUM CHLORIDE 40 MEQ: 750 CAPSULE, EXTENDED RELEASE ORAL at 16:37

## 2017-06-06 RX ADMIN — SOTALOL HYDROCHLORIDE 80 MG: 80 TABLET ORAL at 09:26

## 2017-06-06 RX ADMIN — DONEPEZIL HYDROCHLORIDE 10 MG: 10 TABLET ORAL at 21:59

## 2017-06-06 RX ADMIN — INSULIN ASPART 3 UNITS: 100 INJECTION, SOLUTION INTRAVENOUS; SUBCUTANEOUS at 09:23

## 2017-06-06 RX ADMIN — INSULIN ASPART 5 UNITS: 100 INJECTION, SOLUTION INTRAVENOUS; SUBCUTANEOUS at 22:00

## 2017-06-06 RX ADMIN — INSULIN ASPART 5 UNITS: 100 INJECTION, SOLUTION INTRAVENOUS; SUBCUTANEOUS at 11:00

## 2017-06-06 RX ADMIN — INSULIN ASPART 8 UNITS: 100 INJECTION, SOLUTION INTRAVENOUS; SUBCUTANEOUS at 17:16

## 2017-06-06 RX ADMIN — ENALAPRIL MALEATE 20 MG: 10 TABLET ORAL at 09:22

## 2017-06-06 RX ADMIN — MEMANTINE 5 MG: 5 TABLET ORAL at 09:22

## 2017-06-06 RX ADMIN — SOTALOL HYDROCHLORIDE 80 MG: 80 TABLET ORAL at 21:59

## 2017-06-06 RX ADMIN — HYDROCODONE BITARTRATE AND ACETAMINOPHEN 1 TABLET: 5; 325 TABLET ORAL at 18:16

## 2017-06-06 RX ADMIN — POTASSIUM CHLORIDE AND SODIUM CHLORIDE 100 ML/HR: 900; 150 INJECTION, SOLUTION INTRAVENOUS at 02:10

## 2017-06-06 RX ADMIN — PANTOPRAZOLE SODIUM 40 MG: 40 INJECTION, POWDER, FOR SOLUTION INTRAVENOUS at 22:00

## 2017-06-06 RX ADMIN — DILTIAZEM HYDROCHLORIDE 180 MG: 180 CAPSULE, EXTENDED RELEASE ORAL at 09:22

## 2017-06-06 RX ADMIN — POTASSIUM CHLORIDE AND SODIUM CHLORIDE 100 ML/HR: 900; 150 INJECTION, SOLUTION INTRAVENOUS at 13:00

## 2017-06-06 RX ADMIN — ENOXAPARIN SODIUM 40 MG: 40 INJECTION SUBCUTANEOUS at 09:22

## 2017-06-06 RX ADMIN — METHIMAZOLE 2.5 MG: 5 TABLET ORAL at 09:22

## 2017-06-06 RX ADMIN — ENALAPRIL MALEATE 20 MG: 10 TABLET ORAL at 17:10

## 2017-06-06 NOTE — PLAN OF CARE
Problem: Patient Care Overview (Adult)  Goal: Plan of Care Review  Outcome: Ongoing (interventions implemented as appropriate)    06/06/17 1350   Coping/Psychosocial Response Interventions   Plan Of Care Reviewed With patient   Patient Care Overview   Progress improving   Outcome Evaluation   Outcome Summary/Follow up Plan working well with therapy, pt complaining of no pain       Goal: Adult Individualization and Mutuality  Outcome: Ongoing (interventions implemented as appropriate)  Goal: Discharge Needs Assessment  Outcome: Ongoing (interventions implemented as appropriate)    Problem: Fluid Volume Deficit (Adult)  Goal: Fluid/Electrolyte Balance  Outcome: Ongoing (interventions implemented as appropriate)    Problem: Fall Risk (Adult)  Goal: Absence of Falls  Outcome: Ongoing (interventions implemented as appropriate)

## 2017-06-06 NOTE — PROGRESS NOTES
Progress Note  Guido Motley MD  Hospitalist     LOS: 7 days   Patient Care Team:  Melissa Ch MD as PCP    Chief Complaint: rectal bleeding    Subjective     Interval History:     Patient Complaints: rectal bleeding. She was diagnosed and operated upon (L hemicolectomy) due to colon cancer.    History taken from: patient    Medication Review:   Current Facility-Administered Medications   Medication Dose Route Frequency Provider Last Rate Last Dose   • bupivacaine-EPINEPHrine PF (MARCAINE w/EPI) 0.5% -1:[id number] injection    PRN Stefano Linn MD   30 mL at 06/02/17 1130   • dextrose (D50W) solution 25 g  25 g Intravenous Q15 Min PRN Vicki G Levill, APRN   25 g at 05/30/17 0828   • dextrose (GLUTOSE) oral gel 15 g  15 g Oral Q15 Min PRN Vicki G Levill, APRN       • diltiaZEM CD (CARDIZEM CD) 24 hr capsule 180 mg  180 mg Oral Daily Joan Rojas MD   180 mg at 06/06/17 0922   • donepezil (ARICEPT) tablet 10 mg  10 mg Oral Nightly Stefano Linn MD   10 mg at 06/05/17 2123   • enalapril (VASOTEC) tablet 20 mg  20 mg Oral BID Joan Rojas MD   20 mg at 06/06/17 0922   • enoxaparin (LOVENOX) syringe 40 mg  40 mg Subcutaneous Q24H Jimmy Landry MD   40 mg at 06/06/17 0922   • glucagon (human recombinant) (GLUCAGEN DIAGNOSTIC) injection 1 mg  1 mg Subcutaneous Q15 Min PRN Vicki G Levill, APRN       • insulin aspart (novoLOG) injection 0-14 Units  0-14 Units Subcutaneous 4x Daily AC & at Bedtime Vicki G Levill, APRN   5 Units at 06/06/17 1100   • memantine (NAMENDA) tablet 5 mg  5 mg Oral BID Joan Rojas MD   5 mg at 06/06/17 0922   • methIMAzole (TAPAZOLE) half tablet 2.5 mg  2.5 mg Oral Daily Joan Rojas MD   2.5 mg at 06/06/17 0922   • Morphine sulfate PCA 1 mg/mL 30 mL syringe   Intravenous Continuous Stefano Linn MD       • naloxone (NARCAN) injection 0.1 mg  0.1 mg Intravenous Q5 Min PRN Stefano Linn MD       • ondansetron (ZOFRAN) injection 4 mg  4 mg  Intravenous Q6H PRN Joan Rojas MD       • pantoprazole (PROTONIX) injection 40 mg  40 mg Intravenous Q12H Joan Rojas MD   40 mg at 06/06/17 0922   • sodium chloride 0.9 % flush 1-10 mL  1-10 mL Intravenous PRN Vicki Mchugh, APRN   10 mL at 05/28/17 0021   • sodium chloride 0.9 % with KCl 20 mEq/L infusion  100 mL/hr Intravenous Continuous Jimmy Landry  mL/hr at 06/06/17 0210 100 mL/hr at 06/06/17 0210   • sotalol (BETAPACE) tablet 80 mg  80 mg Oral Q12H Joan Rojas MD   80 mg at 06/06/17 0926       Review of Systems:   Review of Systems   Constitutional: Positive for fatigue. Negative for fever.   Respiratory: Negative for cough, shortness of breath and wheezing.    Cardiovascular: Negative for chest pain.   Gastrointestinal: Positive for abdominal distention and nausea. Negative for abdominal pain, anal bleeding, constipation, diarrhea and vomiting.   Genitourinary: Negative for dysuria and urgency.   Musculoskeletal: Positive for arthralgias.   Skin: Positive for pallor.   Neurological: Positive for weakness and light-headedness.   Psychiatric/Behavioral: Negative for agitation and behavioral problems.   All other systems reviewed and are negative.      Objective     Vital Signs  Temp:  [97.4 °F (36.3 °C)-99.8 °F (37.7 °C)] 99.8 °F (37.7 °C)  Heart Rate:  [67-83] 76  Resp:  [18] 18  BP: (134-150)/(63-68) 150/68    Physical Exam:  Physical Exam   Constitutional: She is oriented to person, place, and time. She appears well-developed and well-nourished.   HENT:   Head: Normocephalic and atraumatic.   Eyes: EOM are normal. Pupils are equal, round, and reactive to light. No scleral icterus.   Neck: Normal range of motion. Neck supple.   Cardiovascular: Normal rate and regular rhythm.    Pulmonary/Chest: Effort normal and breath sounds normal. No respiratory distress. She has no wheezes.   Abdominal: Soft. Bowel sounds are normal. She exhibits no distension. There is tenderness (minimal). There is no  rebound and no guarding.   Musculoskeletal: She exhibits no edema or tenderness.   Neurological: She is alert and oriented to person, place, and time. No cranial nerve deficit.   Skin: Skin is warm and dry. There is pallor.   Psychiatric: She has a normal mood and affect. Her behavior is normal.   Vitals reviewed.       Results Review:    Lab Results (last 24 hours)     Procedure Component Value Units Date/Time    POC Glucose Fingerstick [[id number]]  (Abnormal) Collected:  06/05/17 1656    Specimen:  Blood Updated:  06/05/17 1755     Glucose 260 (H) mg/dL       Sliding Scale AdminMeter: [id number]: [id number] DMITRY TORREZ       POC Glucose Fingerstick [[id number]]  (Abnormal) Collected:  06/05/17 2123    Specimen:  Blood Updated:  06/05/17 2140     Glucose 259 (H) mg/dL       Sliding Scale AdminMeter: [id number]: [id number] JORDANA MCKENNA       CBC & Differential [[id number]] Collected:  06/06/17 0541    Specimen:  Blood Updated:  06/06/17 0627    Narrative:       The following orders were created for panel order CBC & Differential.  Procedure                               Abnormality         Status                     ---------                               -----------         ------                     CBC Auto Differential[[id number]]        Abnormal            Final result                 Please view results for these tests on the individual orders.    CBC Auto Differential [[id number]]  (Abnormal) Collected:  06/06/17 0541    Specimen:  Blood Updated:  06/06/17 0627     WBC 6.04 10*3/mm3      RBC 2.39 (L) 10*6/mm3      Hemoglobin 7.4 (L) g/dL      Hematocrit 22.3 (L) %      MCV 93.3 fL      MCH 31.0 pg      MCHC 33.2 g/dL      RDW 13.1 %      RDW-SD 44.9 fl      MPV 9.8 fL      Platelets 193 10*3/mm3      Neutrophil % 73.9 %      Lymphocyte % 12.9 %      Monocyte % 11.6 %      Eosinophil % 1.3 %      Basophil % 0.0 %      Immature Grans % 0.3 %      Neutrophils, Absolute 4.46 10*3/mm3       Lymphocytes, Absolute 0.78 10*3/mm3      Monocytes, Absolute 0.70 10*3/mm3      Eosinophils, Absolute 0.08 10*3/mm3      Basophils, Absolute 0.00 10*3/mm3      Immature Grans, Absolute 0.02 10*3/mm3     Comprehensive Metabolic Panel [[id number]]  (Abnormal) Collected:  06/06/17 0541    Specimen:  Blood Updated:  06/06/17 0640     Glucose 168 (H) mg/dL      BUN 4 (L) mg/dL      Creatinine 0.56 mg/dL      Sodium 134 (L) mmol/L      Potassium 3.3 (L) mmol/L      Chloride 101 mmol/L      CO2 28.0 mmol/L      Calcium 8.1 (L) mg/dL      Total Protein 5.0 (L) g/dL      Albumin 2.50 (L) g/dL      ALT (SGPT) 28 U/L      AST (SGOT) 16 U/L      Alkaline Phosphatase 45 U/L      Total Bilirubin 0.4 mg/dL      eGFR  African Amer 124 (H) mL/min/1.73      Globulin 2.5 gm/dL      A/G Ratio 1.0 (L) g/dL      BUN/Creatinine Ratio 7.1     Anion Gap 5.0 mmol/L     Narrative:       The MDRD GFR formula is only valid for adults with stable renal function between ages 18 and 70.          Imaging Results (last 24 hours)     ** No results found for the last 24 hours. **          Assessment/Plan     Principal Problem:    Gastrointestinal hemorrhage with melena    Blood loss anemia    Active Problems:    Diabetes mellitus    Colon cancer - S/P colectomy    Follow up CBCs, advance diet if tolerated.    Guido Motley MD  06/06/17  11:35 AM

## 2017-06-06 NOTE — PLAN OF CARE
Problem: Patient Care Overview (Adult)  Goal: Plan of Care Review  Outcome: Ongoing (interventions implemented as appropriate)    06/05/17 1652 06/06/17 0435   Coping/Psychosocial Response Interventions   Plan Of Care Reviewed With --  patient   Patient Care Overview   Progress --  improving   Outcome Evaluation   Outcome Summary/Follow up Plan pt previously indep w/ mobility & self care but currently needs cuing & manual assist on how/when to sit down, she is very weak and not able to care for self at home in current condition --        Goal: Adult Individualization and Mutuality  Outcome: Ongoing (interventions implemented as appropriate)  Goal: Discharge Needs Assessment  Outcome: Ongoing (interventions implemented as appropriate)    Problem: Fluid Volume Deficit (Adult)  Goal: Fluid/Electrolyte Balance  Outcome: Ongoing (interventions implemented as appropriate)  Goal: Comfort/Well Being  Outcome: Ongoing (interventions implemented as appropriate)    Problem: Fall Risk (Adult)  Goal: Absence of Falls  Outcome: Ongoing (interventions implemented as appropriate)

## 2017-06-06 NOTE — THERAPY TREATMENT NOTE
Acute Care - Physical Therapy Treatment Note  [facility]     Patient Name: Kierra Scales  : 1929  MRN: [id number]  Today's Date: 2017  Onset of Illness/Injury or Date of Surgery Date: 17  Date of Referral to PT: 17       Admit Date: 2017    Visit Dx:    ICD-10-CM ICD-9-CM   1. Gastrointestinal hemorrhage with melena K92.1 578.1   2. Hematuria R31.9 599.70   3. Dehydration E86.0 276.51   4. Malignant neoplasm of sigmoid colon C18.7 153.3   5. 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)   • Hypothyroidism   • Dyspnea   • Hyperthyroidism   • Gastrointestinal hemorrhage with melena   • Diabetes mellitus   • Colon cancer   • Blood loss anemia               Adult Rehabilitation Note       17 1325 17 1455       Rehab Assessment/Intervention    Discipline physical therapy assistant  -AM physical therapist  -     Document Type therapy note (daily note)  -AM      Subjective Information agree to therapy;complains of;pain  -AM      Patient Effort, Rehab Treatment good  -AM      Symptoms Noted During/After Treatment fatigue  -AM      Precautions/Limitations fall precautions;oxygen therapy device and L/min;pacemaker  -AM      Equipment Issued to Patient gait belt  -AM      Recorded by [AM] Luciano Sanchez PTA [GB] Cristin Matthews, PT     Vital Signs    Pre Systolic BP Rehab 158  -AM      Pre Treatment Diastolic BP 69  -AM      Post Systolic BP Rehab 149  -AM      Post Treatment Diastolic BP 66  -AM      Pretreatment Heart Rate (beats/min) 68  -AM      Posttreatment Heart Rate (beats/min) 71  -AM      Pre SpO2 (%) 98  -AM      O2  Delivery Pre Treatment supplemental O2  -AM      Post SpO2 (%) 99  -AM      O2 Delivery Post Treatment supplemental O2  -AM      Pre Patient Position Sitting  -AM      Intra Patient Position Standing  -AM      Post Patient Position Sitting  -AM      Recorded by [AM] Luciano Sanchez PTA      Pain Assessment    Pain Assessment No/denies pain  -AM      Recorded by [AM] Luciano Sanchez PTA      Vision Assessment/Intervention    Visual Impairment --  -AM      Recorded by [AM] Luciano Sanchez PTA      Cognitive Assessment/Intervention    Current Cognitive/Communication Assessment functional  -AM      Orientation Status oriented x 4  -AM      Follows Commands/Answers Questions 100% of the time  -AM      Personal Safety Interventions gait belt;nonskid shoes/slippers when out of bed;supervised activity  -AM      Recorded by [AM] Luciano Sanchez PTA      ROM (Range of Motion)    General ROM no range of motion deficits identified  -AM      Recorded by [AM] Luciano Sanchez PTA      Bed Mobility, Assessment/Treatment    Bed Mobility, Roll Left, [place] not tested  -AM      Bed Mobility, Roll Right, [place] not tested  -AM      Bed Mobility, Scoot/Bridge, [place] not tested  -AM      Bed Mob, Supine to Sit, [place] not tested  -AM      Bed Mob, Sit to Supine, [place] not tested  -AM      Bed Mob, Sidelying to Sit, [place] not tested  -AM      Bed Mob, Sit to Sidelying, [place] not tested  -AM      Recorded by [AM] Luciano Sanchez PTA      Transfer Assessment/Treatment    Transfers, Bed-Chair [place] contact guard assist  -AM      Transfers, Chair-Bed [place] contact guard assist  -AM      Transfers, Bed-Chair-Bed, Assist Device rolling walker  -AM      Transfers, Sit-Stand [place] minimum assist (75% patient effort)  -AM      Transfers, Stand-Sit [place] minimum assist (75% patient effort)  -AM      Transfers, Sit-Stand-Sit, Assist Device rolling walker  -AM      Toilet  Transfer, [place] not tested  -AM      Walk-In Shower Transfer, [place] not tested  -AM      Bathtub Transfer, [place] not tested  -AM      Transfer, Maintain Weight Bearing Status able to maintain weight bearing status  -AM      Transfer, Safety Issues step length decreased  -AM      Recorded by [AM] Luciano Sanchez PTA      Gait Assessment/Treatment    Gait, [place] Level contact guard assist  -AM      Gait, Assistive Device rolling walker  -AM      Gait, Distance (Feet) 80  -AM      Gait, Gait Pattern Analysis 3-point gait  -AM      Gait, Gait Deviations bilateral:;timothy decreased  -AM      Gait, Maintain Weight Bearing Status able to maintain weight bearing status  -AM      Gait, Safety Issues step length decreased;supplemental O2  -AM      Gait, Impairments strength decreased  -AM      Recorded by [AM] Luciano Sanchez PTA      Stairs Assessment/Treatment    Stairs, [place] Level not tested  -AM      Recorded by [AM] Luciano Sanchez PTA      Sensory Assessment/Intervention    Light Touch --  -AM      Recorded by [AM] Luciano Sanchez PTA      Positioning and Restraints    Pre-Treatment Position sitting in chair/recliner  -AM      Post Treatment Position chair  -AM      In Chair reclined;call light within reach;encouraged to call for assist;exit alarm on;legs elevated  -AM      Recorded by [AM] Luciano Sanchez PTA        User Key  (r) = Recorded By, (t) = Taken By, (c) = Cosigned By    Initials Name Effective Dates    LILIAN Matthews, PT 10/17/16 -     AM Luciano Sanchez, ROYAL 10/17/16 -                 IP PT Goals       06/06/17 1325 06/05/17 1652       Bed Mobility PT LTG    Bed Mobility PT LTG, Date Established  06/05/17  -GB     Bed Mobility PT LTG, Time to Achieve  5 - 7 days  -GB     Bed Mobility PT LTG, Activity Type  all bed mobility  -GB     Bed Mobility PT LTG, [place] Level  conditional independence  -GB     Bed Mobility PT LTG, Date Goal Reviewed 06/06/17   -AM      Bed Mobility PT LTG, Outcome goal not met  -AM goal not met  -GB     Transfer Training PT LTG    Transfer Training PT LTG, Date Established  06/05/17  -GB     Transfer Training PT LTG, Time to Achieve  5 - 7 days  -GB     Transfer Training PT LTG, Activity Type  all transfers  -GB     Transfer Training PT LTG, [place] Level  contact guard assist  -GB     Transfer Training PT LTG, Assist Device  walker, rolling  -GB     Transfer Training PT  LTG, Date Goal Reviewed 06/06/17  -AM      Transfer Training PT LTG, Outcome goal met  -AM goal not met  -GB     Gait Training PT STG    Gait Training Goal PT STG, Date Established  06/05/17  -GB     Gait Training Goal PT STG, Time to Achieve  2 wks  -GB     Gait Training Goal PT STG, [place] Level  conditional independence  -GB     Gait Training Goal PT STG, Assist Device  walker, rolling  -GB     Gait Training Goal PT STG, Distance to Achieve  150 ft w/ RW and CGA x 1  -GB     Gait Training Goal PT STG, Additional Goal  300 ft w/ RW and SBA x 1   -GB     Gait Training Goal PT STG, Date Goal Reviewed 06/06/17  -AM      Gait Training Goal PT STG, Outcome goal not met  -AM goal not met  -GB     Stair Training PT LTG    Stair Training Goal PT LTG, Date Established  06/05/17  -GB     Stair Training Goal PT LTG, Time to Achieve  by discharge  -GB     Stair Training Goal PT LTG, [place] Level  conditional independence  -GB     Stair Training Goal PT LTG, Assist Device  walker, rolling  -GB     Stair Training Goal PT LTG, Distance to Achieve  up/down 1 step  -GB     Stair Training Goal PT LTG, Date Goal Reviewed 06/06/17  -AM      Stair Training Goal PT LTG, Outcome goal not met  -AM goal not met  -GB       User Key  (r) = Recorded By, (t) = Taken By, (c) = Cosigned By    Initials Name Provider Type    LILIAN Matthews, PT Physical Therapist    CHARLES Sanchez, PTA Physical Therapy Assistant                  PT Recommendation and Plan  Anticipated  Discharge Disposition: inpatient rehabilitation facility  Planned Therapy Interventions: gait training, bed mobility training, balance training, patient/family education, ROM (Range of Motion), strengthening, stair training, transfer training  PT Frequency: other (see comments) (5-14 x /wk)  Plan of Care Review  Plan Of Care Reviewed With: patient  Progress: progress toward functional goals as expected  Outcome Summary/Follow up Plan: Pt met 1 PT goal this tx. Pt able to amb 80 ft w/RW CGA. Pt would benefit from ARU once discharged from this facility.           Outcome Measures       06/06/17 1325 06/05/17 1700       How much help from another person do you currently need...    Turning from your back to your side while in flat bed without using bedrails? 2  -AM 2  -GB     Moving from lying on back to sitting on the side of a flat bed without bedrails? 2  -AM 2  -GB     Moving to and from a bed to a chair (including a wheelchair)? 3  -AM 2  -GB     Standing up from a chair using your arms (e.g., wheelchair, bedside chair)? 3  -AM 2  -GB     Climbing 3-5 steps with a railing? 1  -AM 1  -GB     To walk in hospital room? 3  -AM 1  -GB     AM-PAC 6 Clicks Score 14  -AM 10  -GB     Functional Assessment    Outcome Measure Options AM-PAC 6 Clicks Basic Mobility (PT)  -AM AM-PAC 6 Clicks Basic Mobility (PT)  -GB       User Key  (r) = Recorded By, (t) = Taken By, (c) = Cosigned By    Initials Name Provider Type    LILIAN Matthews, PT Physical Therapist    CHARLES Sanchez PTA Physical Therapy Assistant           Time Calculation:         PT Charges       06/06/17 1325          Time Calculation    Start Time 1325  -AM      Stop Time 1350  -AM      Time Calculation (min) 25 min  -AM      PT Received On 06/06/17  -AM      PT - Next Appointment 06/07/17  -AM      Time Calculation- PT    Total Timed Code Minutes- PT 25 minute(s)  -AM        User Key  (r) = Recorded By, (t) = Taken By, (c) = Cosigned By    Initials  Name Provider Type    AM Luciano Sanchez PTA Physical Therapy Assistant          Therapy Charges for Today     Code Description Service Date Service Provider Modifiers Qty    13705981848 HC GAIT TRAINING EA 15 MIN 6/6/2017 Luciano Sanchez PTA GP 1    19586482166 HC PT THER PROC GROUP 6/6/2017 Luciano Sanchez PTA GP 1          PT G-Codes  PT Professional Judgement Used?: Yes  Outcome Measure Options: AM-PAC 6 Clicks Basic Mobility (PT)  Score: 10  Functional Limitation: Mobility: Walking and moving around  Mobility: Walking and Moving Around Current Status (): At least 60 percent but less than 80 percent impaired, limited or restricted  Mobility: Walking and Moving Around Goal Status (): At least 1 percent but less than 20 percent impaired, limited or restricted    Luciano Sanchez PTA  6/6/2017

## 2017-06-06 NOTE — PLAN OF CARE
Problem: Patient Care Overview (Adult)  Goal: Plan of Care Review  Outcome: Ongoing (interventions implemented as appropriate)    06/06/17 1325   Coping/Psychosocial Response Interventions   Plan Of Care Reviewed With patient   Patient Care Overview   Progress progress toward functional goals as expected   Outcome Evaluation   Outcome Summary/Follow up Plan Pt met 1 PT goal this tx. Pt able to amb 80 ft w/RW CGA. Pt would benefit from ARU once discharged from this facility.        Goal: Discharge Needs Assessment  Outcome: Ongoing (interventions implemented as appropriate)    06/06/17 1325   Discharge Needs Assessment   Concerns To Be Addressed discharge planning concerns   Readmission Within The Last 30 Days no previous admission in last 30 days   Equipment Needed After Discharge walker, rolling;commode   Discharge Facility/Level Of Care Needs skilled transitional care;rehabilitation facility   Current Discharge Risk lives alone;physical impairment   Current Health   Anticipated Changes Related to Illness inability to care for self   Self-Care   Equipment Currently Used at Home none   Living Environment   Transportation Available family or friend will provide         Problem: Inpatient Physical Therapy  Goal: Bed Mobility Goal LTG- PT  Outcome: Ongoing (interventions implemented as appropriate)    06/05/17 1652 06/06/17 1325   Bed Mobility PT LTG   Bed Mobility PT LTG, Date Established 06/05/17 --    Bed Mobility PT LTG, Time to Achieve 5 - 7 days --    Bed Mobility PT LTG, Activity Type all bed mobility --    Bed Mobility PT LTG, [place] Level conditional independence --    Bed Mobility PT LTG, Date Goal Reviewed --  06/06/17   Bed Mobility PT LTG, Outcome --  goal not met       Goal: Transfer Training Goal 1 LTG- PT  Outcome: Outcome(s) achieved Date Met:  06/06/17 06/05/17 1652 06/06/17 1325   Transfer Training PT LTG   Transfer Training PT LTG, Date Established 06/05/17 --    Transfer Training PT LTG,  Time to Achieve 5 - 7 days --    Transfer Training PT LTG, Activity Type all transfers --    Transfer Training PT LTG, [place] Level contact guard assist --    Transfer Training PT LTG, Assist Device walker, rolling --    Transfer Training PT LTG, Date Goal Reviewed --  06/06/17   Transfer Training PT LTG, Outcome --  goal met       Goal: Gait Training Goal STG- PT  Outcome: Ongoing (interventions implemented as appropriate)    06/05/17 1652 06/06/17 1325   Gait Training PT STG   Gait Training Goal PT STG, Date Established 06/05/17 --    Gait Training Goal PT STG, Time to Achieve 2 wks --    Gait Training Goal PT STG, [place] Level conditional independence --    Gait Training Goal PT STG, Assist Device walker, rolling --    Gait Training Goal PT STG, Distance to Achieve 150 ft w/ RW and CGA x 1 --    Gait Training Goal PT STG, Additional Goal 300 ft w/ RW and SBA x 1  --    Gait Training Goal PT STG, Date Goal Reviewed --  06/06/17   Gait Training Goal PT STG, Outcome --  goal not met       Goal: Stair Training Goal LTG- PT  Outcome: Ongoing (interventions implemented as appropriate)    06/05/17 1652 06/06/17 1325   Stair Training PT LTG   Stair Training Goal PT LTG, Date Established 06/05/17 --    Stair Training Goal PT LTG, Time to Achieve by discharge --    Stair Training Goal PT LTG, [place] Level conditional independence --    Stair Training Goal PT LTG, Assist Device walker, rolling --    Stair Training Goal PT LTG, Distance to Achieve up/down 1 step --    Stair Training Goal PT LTG, Date Goal Reviewed --  06/06/17   Stair Training Goal PT LTG, Outcome --  goal not met

## 2017-06-07 LAB
ALBUMIN SERPL-MCNC: 2.7 G/DL (ref 3.4–4.8)
ALBUMIN/GLOB SERPL: 1 G/DL (ref 1.1–1.8)
ALP SERPL-CCNC: 47 U/L (ref 38–126)
ALT SERPL W P-5'-P-CCNC: 31 U/L (ref 9–52)
ANION GAP SERPL CALCULATED.3IONS-SCNC: 7 MMOL/L (ref 5–15)
AST SERPL-CCNC: 18 U/L (ref 14–36)
BASOPHILS # BLD AUTO: 0.01 10*3/MM3 (ref 0–0.2)
BASOPHILS NFR BLD AUTO: 0.2 % (ref 0–2)
BILIRUB SERPL-MCNC: 0.4 MG/DL (ref 0.2–1.3)
BUN BLD-MCNC: 3 MG/DL (ref 7–21)
BUN/CREAT SERPL: 5.6 (ref 7–25)
CALCIUM SPEC-SCNC: 8.3 MG/DL (ref 8.4–10.2)
CHLORIDE SERPL-SCNC: 96 MMOL/L (ref 95–110)
CO2 SERPL-SCNC: 29 MMOL/L (ref 22–31)
CREAT BLD-MCNC: 0.54 MG/DL (ref 0.5–1)
DEPRECATED RDW RBC AUTO: 43.2 FL (ref 36.4–46.3)
EOSINOPHIL # BLD AUTO: 0.06 10*3/MM3 (ref 0–0.7)
EOSINOPHIL NFR BLD AUTO: 1.3 % (ref 0–7)
ERYTHROCYTE [DISTWIDTH] IN BLOOD BY AUTOMATED COUNT: 13 % (ref 11.5–14.5)
GFR SERPL CREATININE-BSD FRML MDRD: 129 ML/MIN/1.73 (ref 39–90)
GLOBULIN UR ELPH-MCNC: 2.7 GM/DL (ref 2.3–3.5)
GLUCOSE BLD-MCNC: 179 MG/DL (ref 60–100)
GLUCOSE BLDC GLUCOMTR-MCNC: 188 MG/DL (ref 70–130)
GLUCOSE BLDC GLUCOMTR-MCNC: 200 MG/DL (ref 70–130)
GLUCOSE BLDC GLUCOMTR-MCNC: 210 MG/DL (ref 70–130)
GLUCOSE BLDC GLUCOMTR-MCNC: 227 MG/DL (ref 70–130)
HCT VFR BLD AUTO: 23.6 % (ref 35–45)
HGB BLD-MCNC: 7.9 G/DL (ref 12–15.5)
IMM GRANULOCYTES # BLD: 0.01 10*3/MM3 (ref 0–0.02)
IMM GRANULOCYTES NFR BLD: 0.2 % (ref 0–0.5)
LYMPHOCYTES # BLD AUTO: 0.8 10*3/MM3 (ref 0.6–4.2)
LYMPHOCYTES NFR BLD AUTO: 17.8 % (ref 10–50)
MCH RBC QN AUTO: 30.7 PG (ref 26.5–34)
MCHC RBC AUTO-ENTMCNC: 33.5 G/DL (ref 31.4–36)
MCV RBC AUTO: 91.8 FL (ref 80–98)
MONOCYTES # BLD AUTO: 0.6 10*3/MM3 (ref 0–0.9)
MONOCYTES NFR BLD AUTO: 13.4 % (ref 0–12)
NEUTROPHILS # BLD AUTO: 3.01 10*3/MM3 (ref 2–8.6)
NEUTROPHILS NFR BLD AUTO: 67.1 % (ref 37–80)
PLATELET # BLD AUTO: 206 10*3/MM3 (ref 150–450)
PMV BLD AUTO: 10.2 FL (ref 8–12)
POTASSIUM BLD-SCNC: 3.4 MMOL/L (ref 3.5–5.1)
PROT SERPL-MCNC: 5.4 G/DL (ref 6.3–8.6)
RBC # BLD AUTO: 2.57 10*6/MM3 (ref 3.77–5.16)
SODIUM BLD-SCNC: 132 MMOL/L (ref 137–145)
WBC NRBC COR # BLD: 4.49 10*3/MM3 (ref 3.2–9.8)

## 2017-06-07 PROCEDURE — 97530 THERAPEUTIC ACTIVITIES: CPT

## 2017-06-07 PROCEDURE — 25010000002 ENOXAPARIN PER 10 MG: Performed by: SURGERY

## 2017-06-07 PROCEDURE — 82962 GLUCOSE BLOOD TEST: CPT

## 2017-06-07 PROCEDURE — 97116 GAIT TRAINING THERAPY: CPT

## 2017-06-07 PROCEDURE — 85025 COMPLETE CBC W/AUTO DIFF WBC: CPT | Performed by: FAMILY MEDICINE

## 2017-06-07 PROCEDURE — 25010000002 ONDANSETRON PER 1 MG: Performed by: INTERNAL MEDICINE

## 2017-06-07 PROCEDURE — 99024 POSTOP FOLLOW-UP VISIT: CPT | Performed by: SURGERY

## 2017-06-07 PROCEDURE — 80053 COMPREHEN METABOLIC PANEL: CPT | Performed by: INTERNAL MEDICINE

## 2017-06-07 PROCEDURE — 94760 N-INVAS EAR/PLS OXIMETRY 1: CPT

## 2017-06-07 PROCEDURE — 94799 UNLISTED PULMONARY SVC/PX: CPT

## 2017-06-07 PROCEDURE — 63710000001 INSULIN ASPART PER 5 UNITS: Performed by: NURSE PRACTITIONER

## 2017-06-07 RX ADMIN — ONDANSETRON 4 MG: 2 INJECTION INTRAMUSCULAR; INTRAVENOUS at 18:52

## 2017-06-07 RX ADMIN — HYDROCODONE BITARTRATE AND ACETAMINOPHEN 1 TABLET: 5; 325 TABLET ORAL at 18:52

## 2017-06-07 RX ADMIN — DONEPEZIL HYDROCHLORIDE 10 MG: 10 TABLET ORAL at 22:02

## 2017-06-07 RX ADMIN — ENALAPRIL MALEATE 20 MG: 10 TABLET ORAL at 08:24

## 2017-06-07 RX ADMIN — SODIUM CHLORIDE 50 ML/HR: 4.5 INJECTION, SOLUTION INTRAVENOUS at 14:22

## 2017-06-07 RX ADMIN — DILTIAZEM HYDROCHLORIDE 180 MG: 180 CAPSULE, EXTENDED RELEASE ORAL at 08:24

## 2017-06-07 RX ADMIN — SOTALOL HYDROCHLORIDE 80 MG: 80 TABLET ORAL at 08:25

## 2017-06-07 RX ADMIN — METHIMAZOLE 2.5 MG: 5 TABLET ORAL at 08:25

## 2017-06-07 RX ADMIN — SOTALOL HYDROCHLORIDE 80 MG: 80 TABLET ORAL at 22:02

## 2017-06-07 RX ADMIN — MEMANTINE 5 MG: 5 TABLET ORAL at 08:25

## 2017-06-07 RX ADMIN — PANTOPRAZOLE SODIUM 40 MG: 40 INJECTION, POWDER, FOR SOLUTION INTRAVENOUS at 22:02

## 2017-06-07 RX ADMIN — MEMANTINE 5 MG: 5 TABLET ORAL at 17:20

## 2017-06-07 RX ADMIN — ENALAPRIL MALEATE 20 MG: 10 TABLET ORAL at 17:20

## 2017-06-07 RX ADMIN — INSULIN ASPART 3 UNITS: 100 INJECTION, SOLUTION INTRAVENOUS; SUBCUTANEOUS at 17:24

## 2017-06-07 RX ADMIN — INSULIN ASPART 5 UNITS: 100 INJECTION, SOLUTION INTRAVENOUS; SUBCUTANEOUS at 22:19

## 2017-06-07 RX ADMIN — INSULIN ASPART 5 UNITS: 100 INJECTION, SOLUTION INTRAVENOUS; SUBCUTANEOUS at 11:36

## 2017-06-07 RX ADMIN — INSULIN ASPART 5 UNITS: 100 INJECTION, SOLUTION INTRAVENOUS; SUBCUTANEOUS at 08:25

## 2017-06-07 RX ADMIN — PANTOPRAZOLE SODIUM 40 MG: 40 INJECTION, POWDER, FOR SOLUTION INTRAVENOUS at 08:26

## 2017-06-07 RX ADMIN — ENOXAPARIN SODIUM 40 MG: 40 INJECTION SUBCUTANEOUS at 08:30

## 2017-06-07 NOTE — PLAN OF CARE
Problem: Patient Care Overview (Adult)  Goal: Plan of Care Review  Outcome: Ongoing (interventions implemented as appropriate)    06/07/17 1228   Coping/Psychosocial Response Interventions   Plan Of Care Reviewed With patient   Patient Care Overview   Progress improving   Outcome Evaluation   Outcome Summary/Follow up Plan overall did well with gt and mobility. cont gt and steps         Problem: Inpatient Physical Therapy  Goal: Bed Mobility Goal LTG- PT  Outcome: Ongoing (interventions implemented as appropriate)    06/05/17 1652 06/07/17 1228   Bed Mobility PT LTG   Bed Mobility PT LTG, Date Established 06/05/17 --    Bed Mobility PT LTG, Time to Achieve 5 - 7 days --    Bed Mobility PT LTG, Activity Type all bed mobility --    Bed Mobility PT LTG, Purdys Level conditional independence --    Bed Mobility PT LTG, Date Goal Reviewed --  06/07/17   Bed Mobility PT LTG, Outcome --  goal ongoing       Goal: Gait Training Goal STG- PT  Outcome: Ongoing (interventions implemented as appropriate)    06/05/17 1652 06/07/17 1228   Gait Training PT STG   Gait Training Goal PT STG, Date Established 06/05/17 --    Gait Training Goal PT STG, Time to Achieve 2 wks --    Gait Training Goal PT STG, Purdys Level conditional independence --    Gait Training Goal PT STG, Assist Device walker, rolling --    Gait Training Goal PT STG, Distance to Achieve 150 ft w/ RW and CGA x 1 --    Gait Training Goal PT STG, Additional Goal 300 ft w/ RW and SBA x 1  --    Gait Training Goal PT STG, Date Goal Reviewed --  06/07/17   Gait Training Goal PT STG, Outcome --  goal ongoing       Goal: Stair Training Goal LTG- PT  Outcome: Ongoing (interventions implemented as appropriate)    06/05/17 1652 06/07/17 1228   Stair Training PT LTG   Stair Training Goal PT LTG, Date Established 06/05/17 --    Stair Training Goal PT LTG, Time to Achieve by discharge --    Stair Training Goal PT LTG, Purdys Level conditional independence --     Stair Training Goal PT LTG, Assist Device walker, rolling --    Stair Training Goal PT LTG, Distance to Achieve up/down 1 step --    Stair Training Goal PT LTG, Date Goal Reviewed --  06/07/17   Stair Training Goal PT LTG, Outcome --  goal ongoing

## 2017-06-07 NOTE — PLAN OF CARE
Problem: Patient Care Overview (Adult)  Goal: Plan of Care Review  Outcome: Ongoing (interventions implemented as appropriate)  Goal: Adult Individualization and Mutuality  Outcome: Ongoing (interventions implemented as appropriate)  Goal: Discharge Needs Assessment  Outcome: Ongoing (interventions implemented as appropriate)    Problem: Fluid Volume Deficit (Adult)  Goal: Fluid/Electrolyte Balance  Outcome: Ongoing (interventions implemented as appropriate)  Goal: Comfort/Well Being  Outcome: Ongoing (interventions implemented as appropriate)    Problem: Fall Risk (Adult)  Goal: Absence of Falls  Outcome: Ongoing (interventions implemented as appropriate)

## 2017-06-07 NOTE — CONSULTS
Adult Nutrition  Assessment    Patient Name:  Kierra Scales  YOB: 1929  MRN: [id number]  Admit Date:  5/26/2017    Assessment Date:  6/7/2017          Reason for Assessment       06/07/17 1411    Reason for Assessment    Reason For Assessment/Visit follow up protocol    Gastrointestinal Bowel resection                Nutrition/Diet History       06/07/17 1411    Nutrition/Diet History    Typical Food/Fluid Intake Pt tolerating full liquids so far.  Willing to try glucerna.              Labs/Tests/Procedures/Meds       06/07/17 1411    Labs/Tests/Procedures/Meds    Labs/Tests Review Reviewed    Medication Review Reviewed, pertinent            Physical Findings       06/07/17 1412    Physical Appearance    Gastrointestinal abdominal tenderness    Skin surgical wound              Nutrition Prescription Ordered       06/07/17 1412    Nutrition Prescription PO    Current PO Diet Full Liquid            Evaluation of Received Nutrient/Fluid Intake       06/07/17 1412    PO Evaluation    % PO Intake 50-75   full liquids            Comments:      Diet advanced to full liquids.  Pt tolerating so far s/p colon resection.  Continue to await pathology results.  RD encouraged intake especially of yogurt and milk.  Pt willing to try Glucerna, will add on trays.  MILTON will monitor.        Electronically signed by:  Xenia Randall RD  06/07/17 2:13 PM

## 2017-06-07 NOTE — PROGRESS NOTES
GENERAL SURGERY PROGRESS NOTE  Chief Complaint:  Surgery Follow up   LOS: 8 days       Subjective     Interval History:     Feels well, had bowel movement this AM  Tolerated fulls    Objective     Vital Signs  Temp:  [98.3 °F (36.8 °C)-99.7 °F (37.6 °C)] 99.3 °F (37.4 °C)  Heart Rate:  [69-78] 78  Resp:  [16-18] 16  BP: (146-174)/(58-77) 154/73    Physical Exam:   Abdomen soft, incision CDI  Labs:  Lab Results (last 24 hours)     Procedure Component Value Units Date/Time    POC Glucose Fingerstick [[id number]]  (Abnormal) Collected:  06/06/17 1714    Specimen:  Blood Updated:  06/06/17 1802     Glucose 251 (H) mg/dL       Sliding Scale AdminMeter: [id number]: [id number] DMITRY TORREZ       POC Glucose Fingerstick [[id number]]  (Abnormal) Collected:  06/06/17 2106    Specimen:  Blood Updated:  06/06/17 2141     Glucose 214 (H) mg/dL       RN NotifiedMeter: [id number]: [id number] MAYAJUAN LANEVERNA       POC Glucose Fingerstick [[id number]]  (Abnormal) Collected:  06/07/17 0701    Specimen:  Blood Updated:  06/07/17 0717     Glucose 200 (H) mg/dL       RN NotifiedMeter: [id number]: [id number] DIONE BOWEN       CBC & Differential [[id number]] Collected:  06/07/17 0656    Specimen:  Blood Updated:  06/07/17 0745    Narrative:       The following orders were created for panel order CBC & Differential.  Procedure                               Abnormality         Status                     ---------                               -----------         ------                     CBC Auto Differential[[id number]]        Abnormal            Final result                 Please view results for these tests on the individual orders.    CBC Auto Differential [[id number]]  (Abnormal) Collected:  06/07/17 0656    Specimen:  Blood Updated:  06/07/17 0745     WBC 4.49 10*3/mm3      RBC 2.57 (L) 10*6/mm3      Hemoglobin 7.9 (L) g/dL      Hematocrit 23.6 (L) %      MCV 91.8 fL      MCH 30.7 pg      MCHC 33.5  g/dL      RDW 13.0 %      RDW-SD 43.2 fl      MPV 10.2 fL      Platelets 206 10*3/mm3      Neutrophil % 67.1 %      Lymphocyte % 17.8 %      Monocyte % 13.4 (H) %      Eosinophil % 1.3 %      Basophil % 0.2 %      Immature Grans % 0.2 %      Neutrophils, Absolute 3.01 10*3/mm3      Lymphocytes, Absolute 0.80 10*3/mm3      Monocytes, Absolute 0.60 10*3/mm3      Eosinophils, Absolute 0.06 10*3/mm3      Basophils, Absolute 0.01 10*3/mm3      Immature Grans, Absolute 0.01 10*3/mm3     Comprehensive Metabolic Panel [[id number]]  (Abnormal) Collected:  06/07/17 0656    Specimen:  Blood Updated:  06/07/17 0803     Glucose 179 (H) mg/dL      BUN 3 (L) mg/dL      Creatinine 0.54 mg/dL      Sodium 132 (L) mmol/L      Potassium 3.4 (L) mmol/L      Chloride 96 mmol/L      CO2 29.0 mmol/L      Calcium 8.3 (L) mg/dL      Total Protein 5.4 (L) g/dL      Albumin 2.70 (L) g/dL      ALT (SGPT) 31 U/L      AST (SGOT) 18 U/L      Alkaline Phosphatase 47 U/L      Total Bilirubin 0.4 mg/dL      eGFR  African Amer 129 (H) mL/min/1.73      Globulin 2.7 gm/dL      A/G Ratio 1.0 (L) g/dL      BUN/Creatinine Ratio 5.6 (L)     Anion Gap 7.0 mmol/L     Narrative:       The MDRD GFR formula is only valid for adults with stable renal function between ages 18 and 70.              Assessment/Plan     Kierra Scales is a 88 y.o. female who is s/p left colectomy      Advance diet. Anticipate home soon.  Follow up pathology.          This document has been electronically signed by Stefano Linn MD on June 7, 2017 4:34 PM        Stefano Linn MD  06/07/17  4:34 PM

## 2017-06-07 NOTE — PROGRESS NOTES
Progress Note  Guido Motley MD  Hospitalist     LOS: 8 days   Patient Care Team:  Melissa Ch MD as PCP    Chief Complaint: rectal bleeding    Subjective     Interval History:     Patient Complaints: rectal bleeding. She was diagnosed and operated upon (L hemicolectomy) due to colon cancer. She is able to ambulate with assistance.    History taken from: patient    Medication Review:   Current Facility-Administered Medications   Medication Dose Route Frequency Provider Last Rate Last Dose   • bupivacaine-EPINEPHrine PF (MARCAINE w/EPI) 0.5% -1:232450 injection    PRN Stefano Linn MD   30 mL at 06/02/17 1130   • dextrose (D50W) solution 25 g  25 g Intravenous Q15 Min PRN Vicki G Levill, APRN   25 g at 05/30/17 0828   • dextrose (GLUTOSE) oral gel 15 g  15 g Oral Q15 Min PRN Vicki G Levill, APRN       • diltiaZEM CD (CARDIZEM CD) 24 hr capsule 180 mg  180 mg Oral Daily Joan Rojas MD   180 mg at 06/07/17 0824   • donepezil (ARICEPT) tablet 10 mg  10 mg Oral Nightly Stefano Linn MD   10 mg at 06/06/17 2159   • enalapril (VASOTEC) tablet 20 mg  20 mg Oral BID Joan Rojas MD   20 mg at 06/07/17 0824   • enoxaparin (LOVENOX) syringe 40 mg  40 mg Subcutaneous Q24H Jimmy Landry MD   40 mg at 06/07/17 0830   • glucagon (human recombinant) (GLUCAGEN DIAGNOSTIC) injection 1 mg  1 mg Subcutaneous Q15 Min PRN Vicki G Levill, APRN       • HYDROcodone-acetaminophen (NORCO) 5-325 MG per tablet 1 tablet  1 tablet Oral Q6H PRN Stefano Linn MD   1 tablet at 06/06/17 1816   • insulin aspart (novoLOG) injection 0-14 Units  0-14 Units Subcutaneous 4x Daily AC & at Bedtime Vicki G Levill, APRN   5 Units at 06/07/17 0825   • memantine (NAMENDA) tablet 5 mg  5 mg Oral BID Joan Rojas MD   5 mg at 06/07/17 0825   • methIMAzole (TAPAZOLE) half tablet 2.5 mg  2.5 mg Oral Daily Samer MD Bob   2.5 mg at 06/07/17 0855   • Morphine sulfate (PF) injection 2 mg  2 mg Intravenous Q4H PRN Stefano  Jerson Linn MD       • naloxone (NARCAN) injection 0.1 mg  0.1 mg Intravenous Q5 Min PRN Stefano Linn MD       • ondansetron (ZOFRAN) injection 4 mg  4 mg Intravenous Q6H PRN Joan Rojas MD       • pantoprazole (PROTONIX) injection 40 mg  40 mg Intravenous Q12H Samer MD Bob   40 mg at 06/07/17 0826   • sodium chloride 0.45 % infusion  50 mL/hr Intravenous Continuous Stefano Linn MD 50 mL/hr at 06/06/17 1605 50 mL/hr at 06/06/17 1605   • sodium chloride 0.9 % flush 1-10 mL  1-10 mL Intravenous PRN Vicki G Levill, APRN   10 mL at 05/28/17 0021   • sotalol (BETAPACE) tablet 80 mg  80 mg Oral Q12H Samesheng Rojas MD   80 mg at 06/07/17 0825       Review of Systems:   Review of Systems   Constitutional: Positive for fatigue. Negative for fever.   Respiratory: Negative for cough, shortness of breath and wheezing.    Cardiovascular: Negative for chest pain.   Gastrointestinal: Positive for abdominal distention and nausea. Negative for abdominal pain, anal bleeding, constipation, diarrhea and vomiting.   Genitourinary: Negative for dysuria and urgency.   Musculoskeletal: Positive for arthralgias.   Skin: Positive for pallor.   Neurological: Positive for weakness and light-headedness.   Psychiatric/Behavioral: Negative for agitation and behavioral problems.   All other systems reviewed and are negative.      Objective     Vital Signs  Temp:  [98.3 °F (36.8 °C)-99.7 °F (37.6 °C)] 98.3 °F (36.8 °C)  Heart Rate:  [69-76] 75  Resp:  [18] 18  BP: (146-162)/(58-70) 162/68    Physical Exam:  Physical Exam   Constitutional: She is oriented to person, place, and time. She appears well-developed and well-nourished.   HENT:   Head: Normocephalic and atraumatic.   Eyes: EOM are normal. Pupils are equal, round, and reactive to light. No scleral icterus.   Neck: Normal range of motion. Neck supple.   Cardiovascular: Normal rate and regular rhythm.    Pulmonary/Chest: Effort normal and breath sounds  normal. No respiratory distress. She has no wheezes.   Abdominal: Soft. Bowel sounds are normal. She exhibits no distension. There is tenderness (minimal). There is no rebound and no guarding.   Musculoskeletal: She exhibits no edema or tenderness.   Neurological: She is alert and oriented to person, place, and time. No cranial nerve deficit.   Skin: Skin is warm and dry. There is pallor.   Psychiatric: She has a normal mood and affect. Her behavior is normal.   Vitals reviewed.       Results Review:    Lab Results (last 24 hours)     Procedure Component Value Units Date/Time    POC Glucose Fingerstick [[id number]]  (Abnormal) Collected:  06/06/17 1057    Specimen:  Blood Updated:  06/06/17 1140     Glucose 243 (H) mg/dL       Sliding Scale AdminMeter: [id number]: [id number] [employer] SHAN       POC Glucose Fingerstick [[id number]]  (Abnormal) Collected:  06/06/17 1714    Specimen:  Blood Updated:  06/06/17 1802     Glucose 251 (H) mg/dL       Sliding Scale AdminMeter: [id number]: [id number] [employer] SHAN       POC Glucose Fingerstick [[id number]]  (Abnormal) Collected:  06/06/17 2106    Specimen:  Blood Updated:  06/06/17 2141     Glucose 214 (H) mg/dL       RN NotifiedMeter: [id number]: [id number] COLEMANJonathanJUAN LANEVERNA       POC Glucose Fingerstick [[id number]]  (Abnormal) Collected:  06/07/17 0701    Specimen:  Blood Updated:  06/07/17 0717     Glucose 200 (H) mg/dL       RN NotifiedMeter: [id number]: [id number] DIONE BOWEN       CBC & Differential [[id number]] Collected:  06/07/17 0656    Specimen:  Blood Updated:  06/07/17 0745    Narrative:       The following orders were created for panel order CBC & Differential.  Procedure                               Abnormality         Status                     ---------                               -----------         ------                     CBC Auto Differential[[id number]]        Abnormal            Final result                  Please view results for these tests on the individual orders.    CBC Auto Differential [[id number]]  (Abnormal) Collected:  06/07/17 0656    Specimen:  Blood Updated:  06/07/17 0745     WBC 4.49 10*3/mm3      RBC 2.57 (L) 10*6/mm3      Hemoglobin 7.9 (L) g/dL      Hematocrit 23.6 (L) %      MCV 91.8 fL      MCH 30.7 pg      MCHC 33.5 g/dL      RDW 13.0 %      RDW-SD 43.2 fl      MPV 10.2 fL      Platelets 206 10*3/mm3      Neutrophil % 67.1 %      Lymphocyte % 17.8 %      Monocyte % 13.4 (H) %      Eosinophil % 1.3 %      Basophil % 0.2 %      Immature Grans % 0.2 %      Neutrophils, Absolute 3.01 10*3/mm3      Lymphocytes, Absolute 0.80 10*3/mm3      Monocytes, Absolute 0.60 10*3/mm3      Eosinophils, Absolute 0.06 10*3/mm3      Basophils, Absolute 0.01 10*3/mm3      Immature Grans, Absolute 0.01 10*3/mm3     Comprehensive Metabolic Panel [[id number]]  (Abnormal) Collected:  06/07/17 0656    Specimen:  Blood Updated:  06/07/17 0803     Glucose 179 (H) mg/dL      BUN 3 (L) mg/dL      Creatinine 0.54 mg/dL      Sodium 132 (L) mmol/L      Potassium 3.4 (L) mmol/L      Chloride 96 mmol/L      CO2 29.0 mmol/L      Calcium 8.3 (L) mg/dL      Total Protein 5.4 (L) g/dL      Albumin 2.70 (L) g/dL      ALT (SGPT) 31 U/L      AST (SGOT) 18 U/L      Alkaline Phosphatase 47 U/L      Total Bilirubin 0.4 mg/dL      eGFR  African Amer 129 (H) mL/min/1.73      Globulin 2.7 gm/dL      A/G Ratio 1.0 (L) g/dL      BUN/Creatinine Ratio 5.6 (L)     Anion Gap 7.0 mmol/L     Narrative:       The MDRD GFR formula is only valid for adults with stable renal function between ages 18 and 70.          Imaging Results (last 24 hours)     ** No results found for the last 24 hours. **          Assessment/Plan     Principal Problem:    Gastrointestinal hemorrhage with melena    Blood loss anemia    Active Problems:    Diabetes mellitus    Colon cancer - S/P colectomy    Follow up CBCs, advance diet if tolerated. PT/OT to evaluate.    Guido  MD Tiesha  06/07/17  11:22 AM

## 2017-06-07 NOTE — THERAPY TREATMENT NOTE
Inpatient Rehabilitation - Physical Therapy Treatment Note  [facility]     Patient Name: Kierra Scales  : 1929  MRN: [id number]  Today's Date: 2017  Onset of Illness/Injury or Date of Surgery Date: 17  Date of Referral to PT: 17       Admit Date: 2017    Visit Dx:    ICD-10-CM ICD-9-CM   1. Gastrointestinal hemorrhage with melena K92.1 578.1   2. Hematuria R31.9 599.70   3. Dehydration E86.0 276.51   4. Malignant neoplasm of sigmoid colon C18.7 153.3   5. 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)   • Hypothyroidism   • Dyspnea   • Hyperthyroidism   • Gastrointestinal hemorrhage with melena   • Diabetes mellitus   • Colon cancer   • Blood loss anemia               Adult Rehabilitation Note       17 1034 17 1325 17 1455    Rehab Assessment/Intervention    Discipline physical therapy assistant  -RW physical therapy assistant  -AM physical therapist  -GB    Document Type therapy note (daily note)  -RW therapy note (daily note)  -AM     Subjective Information agree to therapy  -RW agree to therapy;complains of;pain  -AM     Patient Effort, Rehab Treatment good  -RW good  -AM     Symptoms Noted During/After Treatment  fatigue  -AM     Precautions/Limitations pacemaker;oxygen therapy device and L/min  -RW fall precautions;oxygen therapy device and L/min;pacemaker  -AM     Equipment Issued to Patient  gait belt  -AM     Recorded by [RW] Danny Beebe PTA [AM] Luciano Sanchez PTA [GB] Cristin Matthews, IRMA    Vital Signs    Pre Systolic BP Rehab 161  -  -AM     Pre Treatment Diastolic BP 70  -RW 69  -AM     Post  Systolic BP Rehab 134  -  -AM     Post Treatment Diastolic BP 62  -RW 66  -AM     Pretreatment Heart Rate (beats/min) 76  -RW 68  -AM     Posttreatment Heart Rate (beats/min) 72  -RW 71  -AM     Pre SpO2 (%) 98  -RW 98  -AM     O2 Delivery Pre Treatment supplemental O2  -RW supplemental O2  -AM     Post SpO2 (%)  99  -AM     O2 Delivery Post Treatment  supplemental O2  -AM     Pre Patient Position  Sitting  -AM     Intra Patient Position  Standing  -AM     Post Patient Position  Sitting  -AM     Recorded by [RW] Danny Beebe PTA [AM] Luciano Sanchez PTA     Pain Assessment    Pain Assessment  No/denies pain  -AM     Recorded by  [AM] Luciano Sanchez PTA     Vision Assessment/Intervention    Visual Impairment  --  -AM     Recorded by  [AM] Luciano Sanchez PTA     Cognitive Assessment/Intervention    Current Cognitive/Communication Assessment functional  -RW functional  -AM     Orientation Status oriented to;person  -RW oriented x 4  -AM     Follows Commands/Answers Questions needs cueing  -% of the time  -AM     Personal Safety Interventions gait belt;nonskid shoes/slippers when out of bed  -RW gait belt;nonskid shoes/slippers when out of bed;supervised activity  -AM     Recorded by [RW] Danny Beebe PTA [AM] Luciano Sanchez PTA     ROM (Range of Motion)    General ROM  no range of motion deficits identified  -AM     Recorded by  [AM] Luciano Sanchez PTA     Bed Mobility, Assessment/Treatment    Bed Mobility, Roll Left, [place]  not tested  -AM     Bed Mobility, Roll Right, [place]  not tested  -AM     Bed Mobility, Scoot/Bridge, [place]  not tested  -AM     Bed Mob, Supine to Sit, [place] conditional independence;supervision required  -RW not tested  -AM     Bed Mob, Sit to Supine, [place]  not tested  -AM     Bed Mob, Sidelying to Sit, [place]  not tested  -AM     Bed Mob, Sit to Sidelying, [place]  not tested  -AM     Recorded by [RW] Danny Beebe PTA  [AM] Luciano Sanchez PTA     Transfer Assessment/Treatment    Transfers, Bed-Chair [place] contact guard assist  -RW contact guard assist  -AM     Transfers, Chair-Bed [place] contact guard assist  -RW contact guard assist  -AM     Transfers, Bed-Chair-Bed, Assist Device rolling walker  -RW rolling walker  -AM     Transfers, Sit-Stand [place] contact guard assist  -RW minimum assist (75% patient effort)  -AM     Transfers, Stand-Sit [place] contact guard assist  -RW minimum assist (75% patient effort)  -AM     Transfers, Sit-Stand-Sit, Assist Device rolling walker  -RW rolling walker  -AM     Toilet Transfer, [place] contact guard assist  -RW not tested  -AM     Walk-In Shower Transfer, [place]  not tested  -AM     Bathtub Transfer, [place]  not tested  -AM     Transfer, Maintain Weight Bearing Status  able to maintain weight bearing status  -AM     Transfer, Safety Issues  step length decreased  -AM     Recorded by [RW] Danny Beebe PTA [AM] Luciano Sanchez PTA     Gait Assessment/Treatment    Gait, [place] Level contact guard assist;stand by assist  -RW contact guard assist  -AM     Gait, Assistive Device rolling walker  -RW rolling walker  -AM     Gait, Distance (Feet) 300   2 standing breaks to look out windows  -RW 80  -AM     Gait, Gait Pattern Analysis swing-through gait  -RW 3-point gait  -AM     Gait, Gait Deviations  bilateral:;timothy decreased  -AM     Gait, Maintain Weight Bearing Status  able to maintain weight bearing status  -AM     Gait, Safety Issues  step length decreased;supplemental O2  -AM     Gait, Impairments  strength decreased  -AM     Recorded by [RW] Danny Beebe PTA [AM] Luciano Sanchez PTA     Stairs Assessment/Treatment    Stairs, [place] Level  not tested  -AM     Recorded by  [AM] Luciano Sanchez PTA     Functional Mobility    Functional Mobility- Ind. Level contact guard assist  -RW      Functional Mobility- Safety Issues  balance decreased during turns  -RW      Functional Mobility- Comment  in close enviornment obstacles hinder.  -RW      Recorded by [RW] Danny Beebe PTA      Sensory Assessment/Intervention    Light Touch  --  -AM     Recorded by  [AM] Luciano Sanchez PTA     Positioning and Restraints    Pre-Treatment Position in bed  -RW sitting in chair/recliner  -AM     Post Treatment Position chair  -RW chair  -AM     In Chair reclined;call light within reach;encouraged to call for assist  -RW reclined;call light within reach;encouraged to call for assist;exit alarm on;legs elevated  -AM     Recorded by [RW] Danny Beebe PTA [AM] Luciano Sanchez PTA       User Key  (r) = Recorded By, (t) = Taken By, (c) = Cosigned By    Initials Name Effective Dates    GB Cristin Matthews, PT 10/17/16 -     AM Luciano Sanchez, PTA 10/17/16 -     RW Danny Beebe, PTA 10/17/16 -                 IP PT Goals       06/07/17 1228 06/06/17 1325 06/05/17 1652    Bed Mobility PT LTG    Bed Mobility PT LTG, Date Established   06/05/17  -GB    Bed Mobility PT LTG, Time to Achieve   5 - 7 days  -GB    Bed Mobility PT LTG, Activity Type   all bed mobility  -GB    Bed Mobility PT LTG, [place] Level   conditional independence  -GB    Bed Mobility PT LTG, Date Goal Reviewed 06/07/17  -RW 06/06/17  -AM     Bed Mobility PT LTG, Outcome goal ongoing  -RW goal not met  -AM goal not met  -GB    Transfer Training PT LTG    Transfer Training PT LTG, Date Established   06/05/17  -GB    Transfer Training PT LTG, Time to Achieve   5 - 7 days  -GB    Transfer Training PT LTG, Activity Type   all transfers  -GB    Transfer Training PT LTG, [place] Level   contact guard assist  -GB    Transfer Training PT LTG, Assist Device   walker, rolling  -GB    Transfer Training PT  LTG, Date Goal Reviewed  06/06/17  -AM     Transfer Training PT LTG, Outcome  goal met  -AM goal not met  -GB    Gait Training PT STG    Gait Training Goal PT STG, Date  Established   06/05/17  -GB    Gait Training Goal PT STG, Time to Achieve   2 wks  -GB    Gait Training Goal PT STG, [place] Level   conditional independence  -GB    Gait Training Goal PT STG, Assist Device   walker, rolling  -GB    Gait Training Goal PT STG, Distance to Achieve   150 ft w/ RW and CGA x 1  -GB    Gait Training Goal PT STG, Additional Goal   300 ft w/ RW and SBA x 1   -GB    Gait Training Goal PT STG, Date Goal Reviewed 06/07/17  -RW 06/06/17  -AM     Gait Training Goal PT STG, Outcome goal ongoing  -RW goal not met  -AM goal not met  -GB    Stair Training PT LTG    Stair Training Goal PT LTG, Date Established   06/05/17  -GB    Stair Training Goal PT LTG, Time to Achieve   by discharge  -GB    Stair Training Goal PT LTG, [place] Level   conditional independence  -GB    Stair Training Goal PT LTG, Assist Device   walker, rolling  -GB    Stair Training Goal PT LTG, Distance to Achieve   up/down 1 step  -GB    Stair Training Goal PT LTG, Date Goal Reviewed 06/07/17  -RW 06/06/17  -AM     Stair Training Goal PT LTG, Outcome goal ongoing  -RW goal not met  -AM goal not met  -GB      User Key  (r) = Recorded By, (t) = Taken By, (c) = Cosigned By    Initials Name Provider Type    LILIAN Matthews, PT Physical Therapist    AM Luciano Sanchez, PTA Physical Therapy Assistant    RESHMA Beebe, PTA Physical Therapy Assistant          Physical Therapy Education     Title: PT OT SLP Therapies (Active)     Topic: Physical Therapy (Active)     Point: Mobility training (Active)    Learning Progress Summary    Learner Readiness Method Response Comment Documented by Status   Patient Acceptance E NR reinforced use of walker and to call for assist when needed.  06/07/17 1227 Active                      User Key     Initials Effective Dates Name Provider Type Discipline     10/17/16 -  Danny Beebe, ROYAL Physical Therapy Assistant PT                    PT Recommendation and Plan  Anticipated  Discharge Disposition: inpatient rehabilitation facility  Planned Therapy Interventions: gait training, bed mobility training, balance training, patient/family education, ROM (Range of Motion), strengthening, stair training, transfer training  PT Frequency: other (see comments) (5-14 x /wk)  Plan of Care Review  Plan Of Care Reviewed With: patient  Progress: improving  Outcome Summary/Follow up Plan: overall did well with gt and mobility. cont gt and steps          Outcome Measures       06/07/17 1034 06/06/17 1325 06/05/17 1700    How much help from another person do you currently need...    Turning from your back to your side while in flat bed without using bedrails? 3  -RW 2  -AM 2  -GB    Moving from lying on back to sitting on the side of a flat bed without bedrails? 3  -RW 2  -AM 2  -GB    Moving to and from a bed to a chair (including a wheelchair)? 3  -RW 3  -AM 2  -GB    Standing up from a chair using your arms (e.g., wheelchair, bedside chair)? 3  -RW 3  -AM 2  -GB    Climbing 3-5 steps with a railing? 3  -RW 1  -AM 1  -GB    To walk in hospital room? 3  -RW 3  -AM 1  -GB    AM-PAC 6 Clicks Score 18  -RW 14  -AM 10  -GB    Functional Assessment    Outcome Measure Options  AM-PAC 6 Clicks Basic Mobility (PT)  -AM AM-PAC 6 Clicks Basic Mobility (PT)  -GB      User Key  (r) = Recorded By, (t) = Taken By, (c) = Cosigned By    Initials Name Provider Type    LILIAN Matthews, PT Physical Therapist    CHARLES Sanchez, PTA Physical Therapy Assistant    RESHMA Beebe \[facility]\"" Physical Therapy Assistant           Time Calculation:         PT Charges       06/07/17 1230          Time Calculation    Start Time 1034  -RW      Stop Time 1107  -RW      Time Calculation (min) 33 min  -RW      Time Calculation- PT    Total Timed Code Minutes- PT 33 minute(s)  -RW        User Key  (r) = Recorded By, (t) = Taken By, (c) = Cosigned By    Initials Name Provider Type    RESHMA Beebe, ROYAL Physical Therapy  Assistant          Therapy Charges for Today     Code Description Service Date Service Provider Modifiers Qty    91020952332 HC GAIT TRAINING EA 15 MIN 6/7/2017 Danny Beebe PTA GP 1    28933963836 HC PT THERAPEUTIC ACT EA 15 MIN 6/7/2017 Danny Beebe PTA GP 1          PT G-Codes  PT Professional Judgement Used?: Yes  Outcome Measure Options: AM-PAC 6 Clicks Basic Mobility (PT)  Score: 10  Functional Limitation: Mobility: Walking and moving around  Mobility: Walking and Moving Around Current Status (): At least 60 percent but less than 80 percent impaired, limited or restricted  Mobility: Walking and Moving Around Goal Status (): At least 1 percent but less than 20 percent impaired, limited or restricted    Danny Beebe PTA  6/7/2017

## 2017-06-07 NOTE — PLAN OF CARE
Problem: Patient Care Overview (Adult)  Goal: Plan of Care Review  Outcome: Ongoing (interventions implemented as appropriate)    06/07/17 8468   Coping/Psychosocial Response Interventions   Plan Of Care Reviewed With patient   Patient Care Overview   Progress improving   Outcome Evaluation   Outcome Summary/Follow up Plan Patient VSS and patient had a BM today.        Goal: Adult Individualization and Mutuality  Outcome: Ongoing (interventions implemented as appropriate)  Goal: Discharge Needs Assessment  Outcome: Ongoing (interventions implemented as appropriate)    Problem: Fluid Volume Deficit (Adult)  Goal: Fluid/Electrolyte Balance  Outcome: Ongoing (interventions implemented as appropriate)  Goal: Comfort/Well Being  Outcome: Ongoing (interventions implemented as appropriate)    Problem: Fall Risk (Adult)  Goal: Absence of Falls  Outcome: Ongoing (interventions implemented as appropriate)

## 2017-06-07 NOTE — PLAN OF CARE
Problem: Patient Care Overview (Adult)  Goal: Plan of Care Review  Outcome: Ongoing (interventions implemented as appropriate)    06/07/17 1417   Coping/Psychosocial Response Interventions   Plan Of Care Reviewed With patient;family   Patient Care Overview   Progress improving   Outcome Evaluation   Outcome Summary/Follow up Plan Diet advanced to full liquids and pt tolerating thus far. Advised pt of menu options on full liquid diet and encouraged protein/calorie intake. Will add glucerna to optimize nutrition post op.

## 2017-06-08 LAB
ALBUMIN SERPL-MCNC: 2.7 G/DL (ref 3.4–4.8)
ALBUMIN/GLOB SERPL: 1.1 G/DL (ref 1.1–1.8)
ALP SERPL-CCNC: 49 U/L (ref 38–126)
ALT SERPL W P-5'-P-CCNC: 26 U/L (ref 9–52)
ANION GAP SERPL CALCULATED.3IONS-SCNC: 10 MMOL/L (ref 5–15)
AST SERPL-CCNC: 19 U/L (ref 14–36)
BASOPHILS # BLD AUTO: 0.01 10*3/MM3 (ref 0–0.2)
BASOPHILS NFR BLD AUTO: 0.2 % (ref 0–2)
BILIRUB SERPL-MCNC: 0.4 MG/DL (ref 0.2–1.3)
BUN BLD-MCNC: 5 MG/DL (ref 7–21)
BUN/CREAT SERPL: 8.2 (ref 7–25)
CALCIUM SPEC-SCNC: 8.3 MG/DL (ref 8.4–10.2)
CHLORIDE SERPL-SCNC: 96 MMOL/L (ref 95–110)
CO2 SERPL-SCNC: 26 MMOL/L (ref 22–31)
CREAT BLD-MCNC: 0.61 MG/DL (ref 0.5–1)
DEPRECATED RDW RBC AUTO: 43.3 FL (ref 36.4–46.3)
EOSINOPHIL # BLD AUTO: 0.07 10*3/MM3 (ref 0–0.7)
EOSINOPHIL NFR BLD AUTO: 1.4 % (ref 0–7)
ERYTHROCYTE [DISTWIDTH] IN BLOOD BY AUTOMATED COUNT: 13.5 % (ref 11.5–14.5)
GFR SERPL CREATININE-BSD FRML MDRD: 112 ML/MIN/1.73 (ref 39–90)
GLOBULIN UR ELPH-MCNC: 2.5 GM/DL (ref 2.3–3.5)
GLUCOSE BLD-MCNC: 159 MG/DL (ref 60–100)
GLUCOSE BLDC GLUCOMTR-MCNC: 165 MG/DL (ref 70–130)
GLUCOSE BLDC GLUCOMTR-MCNC: 181 MG/DL (ref 70–130)
GLUCOSE BLDC GLUCOMTR-MCNC: 184 MG/DL (ref 70–130)
GLUCOSE BLDC GLUCOMTR-MCNC: 187 MG/DL (ref 70–130)
HCT VFR BLD AUTO: 23.7 % (ref 35–45)
HGB BLD-MCNC: 8 G/DL (ref 12–15.5)
IMM GRANULOCYTES # BLD: 0.04 10*3/MM3 (ref 0–0.02)
IMM GRANULOCYTES NFR BLD: 0.8 % (ref 0–0.5)
LAB AP CASE REPORT: NORMAL
LAB AP DIAGNOSIS COMMENT: NORMAL
LAB AP SYNOPTIC CHECKLIST: NORMAL
LYMPHOCYTES # BLD AUTO: 0.94 10*3/MM3 (ref 0.6–4.2)
LYMPHOCYTES NFR BLD AUTO: 19.4 % (ref 10–50)
Lab: NORMAL
MCH RBC QN AUTO: 29.9 PG (ref 26.5–34)
MCHC RBC AUTO-ENTMCNC: 33.8 G/DL (ref 31.4–36)
MCV RBC AUTO: 88.4 FL (ref 80–98)
MONOCYTES # BLD AUTO: 0.63 10*3/MM3 (ref 0–0.9)
MONOCYTES NFR BLD AUTO: 13 % (ref 0–12)
NEUTROPHILS # BLD AUTO: 3.16 10*3/MM3 (ref 2–8.6)
NEUTROPHILS NFR BLD AUTO: 65.2 % (ref 37–80)
NRBC BLD MANUAL-RTO: 0 /100 WBC (ref 0–0)
PATH REPORT.FINAL DX SPEC: NORMAL
PATH REPORT.GROSS SPEC: NORMAL
PLATELET # BLD AUTO: 238 10*3/MM3 (ref 150–450)
PMV BLD AUTO: 9.8 FL (ref 8–12)
POTASSIUM BLD-SCNC: 3.2 MMOL/L (ref 3.5–5.1)
PROT SERPL-MCNC: 5.2 G/DL (ref 6.3–8.6)
RBC # BLD AUTO: 2.68 10*6/MM3 (ref 3.77–5.16)
SODIUM BLD-SCNC: 132 MMOL/L (ref 137–145)
WBC NRBC COR # BLD: 4.85 10*3/MM3 (ref 3.2–9.8)

## 2017-06-08 PROCEDURE — 25010000002 ONDANSETRON PER 1 MG: Performed by: INTERNAL MEDICINE

## 2017-06-08 PROCEDURE — 97110 THERAPEUTIC EXERCISES: CPT

## 2017-06-08 PROCEDURE — 25010000003 POTASSIUM CHLORIDE 10 MEQ/100ML SOLUTION: Performed by: INTERNAL MEDICINE

## 2017-06-08 PROCEDURE — 80053 COMPREHEN METABOLIC PANEL: CPT | Performed by: INTERNAL MEDICINE

## 2017-06-08 PROCEDURE — 97535 SELF CARE MNGMENT TRAINING: CPT

## 2017-06-08 PROCEDURE — 25010000002 ENOXAPARIN PER 10 MG: Performed by: SURGERY

## 2017-06-08 PROCEDURE — 85025 COMPLETE CBC W/AUTO DIFF WBC: CPT | Performed by: FAMILY MEDICINE

## 2017-06-08 PROCEDURE — 82962 GLUCOSE BLOOD TEST: CPT

## 2017-06-08 PROCEDURE — 99024 POSTOP FOLLOW-UP VISIT: CPT | Performed by: SURGERY

## 2017-06-08 PROCEDURE — 63710000001 INSULIN ASPART PER 5 UNITS: Performed by: NURSE PRACTITIONER

## 2017-06-08 PROCEDURE — 97116 GAIT TRAINING THERAPY: CPT

## 2017-06-08 RX ORDER — ONDANSETRON 2 MG/ML
4 INJECTION INTRAMUSCULAR; INTRAVENOUS EVERY 4 HOURS PRN
Status: DISCONTINUED | OUTPATIENT
Start: 2017-06-08 | End: 2017-06-12 | Stop reason: HOSPADM

## 2017-06-08 RX ORDER — POTASSIUM CHLORIDE 7.45 MG/ML
10 INJECTION INTRAVENOUS
Status: DISCONTINUED | OUTPATIENT
Start: 2017-06-08 | End: 2017-06-12 | Stop reason: HOSPADM

## 2017-06-08 RX ADMIN — INSULIN ASPART 3 UNITS: 100 INJECTION, SOLUTION INTRAVENOUS; SUBCUTANEOUS at 11:09

## 2017-06-08 RX ADMIN — MEMANTINE 5 MG: 5 TABLET ORAL at 08:59

## 2017-06-08 RX ADMIN — PANTOPRAZOLE SODIUM 40 MG: 40 INJECTION, POWDER, FOR SOLUTION INTRAVENOUS at 21:43

## 2017-06-08 RX ADMIN — POTASSIUM CHLORIDE 10 MEQ: 7.46 INJECTION, SOLUTION INTRAVENOUS at 16:51

## 2017-06-08 RX ADMIN — SODIUM CHLORIDE 50 ML/HR: 4.5 INJECTION, SOLUTION INTRAVENOUS at 09:10

## 2017-06-08 RX ADMIN — METHIMAZOLE 2.5 MG: 5 TABLET ORAL at 08:59

## 2017-06-08 RX ADMIN — INSULIN ASPART 3 UNITS: 100 INJECTION, SOLUTION INTRAVENOUS; SUBCUTANEOUS at 08:21

## 2017-06-08 RX ADMIN — ENOXAPARIN SODIUM 40 MG: 40 INJECTION SUBCUTANEOUS at 08:59

## 2017-06-08 RX ADMIN — MEMANTINE 5 MG: 5 TABLET ORAL at 17:54

## 2017-06-08 RX ADMIN — POTASSIUM CHLORIDE 10 MEQ: 7.46 INJECTION, SOLUTION INTRAVENOUS at 17:57

## 2017-06-08 RX ADMIN — SOTALOL HYDROCHLORIDE 80 MG: 80 TABLET ORAL at 21:43

## 2017-06-08 RX ADMIN — ENALAPRIL MALEATE 20 MG: 10 TABLET ORAL at 08:59

## 2017-06-08 RX ADMIN — DILTIAZEM HYDROCHLORIDE 180 MG: 180 CAPSULE, EXTENDED RELEASE ORAL at 08:59

## 2017-06-08 RX ADMIN — ENALAPRIL MALEATE 20 MG: 10 TABLET ORAL at 17:54

## 2017-06-08 RX ADMIN — INSULIN ASPART 3 UNITS: 100 INJECTION, SOLUTION INTRAVENOUS; SUBCUTANEOUS at 17:54

## 2017-06-08 RX ADMIN — POTASSIUM CHLORIDE 10 MEQ: 7.46 INJECTION, SOLUTION INTRAVENOUS at 18:54

## 2017-06-08 RX ADMIN — POTASSIUM CHLORIDE 10 MEQ: 7.46 INJECTION, SOLUTION INTRAVENOUS at 15:52

## 2017-06-08 RX ADMIN — SOTALOL HYDROCHLORIDE 80 MG: 80 TABLET ORAL at 08:59

## 2017-06-08 RX ADMIN — PANTOPRAZOLE SODIUM 40 MG: 40 INJECTION, POWDER, FOR SOLUTION INTRAVENOUS at 08:59

## 2017-06-08 RX ADMIN — ONDANSETRON 4 MG: 2 INJECTION INTRAMUSCULAR; INTRAVENOUS at 08:20

## 2017-06-08 RX ADMIN — INSULIN ASPART 3 UNITS: 100 INJECTION, SOLUTION INTRAVENOUS; SUBCUTANEOUS at 21:44

## 2017-06-08 RX ADMIN — ONDANSETRON 4 MG: 2 INJECTION INTRAMUSCULAR; INTRAVENOUS at 22:14

## 2017-06-08 RX ADMIN — DONEPEZIL HYDROCHLORIDE 10 MG: 10 TABLET ORAL at 21:43

## 2017-06-08 NOTE — PROGRESS NOTES
Progress Note  Guido Motley MD  Hospitalist     LOS: 9 days   Patient Care Team:  Melissa Ch MD as PCP    Chief Complaint: rectal bleeding    Subjective     Interval History:     Patient Complaints: rectal bleeding. She was diagnosed and operated upon (L hemicolectomy) due to colon cancer. She is able to ambulate with assistance. At times she is slightly confused. Today she felt nauseated.    History taken from: patient    Medication Review:   Current Facility-Administered Medications   Medication Dose Route Frequency Provider Last Rate Last Dose   • bupivacaine-EPINEPHrine PF (MARCAINE w/EPI) 0.5% -1:021371 injection    PRN Stefano Linn MD   30 mL at 06/02/17 1130   • dextrose (D50W) solution 25 g  25 g Intravenous Q15 Min PRN Vicki G Levill, APRN   25 g at 05/30/17 0828   • dextrose (GLUTOSE) oral gel 15 g  15 g Oral Q15 Min PRN Vicki G Levill, APRN       • diltiaZEM CD (CARDIZEM CD) 24 hr capsule 180 mg  180 mg Oral Daily Joan Rojas MD   180 mg at 06/08/17 0859   • donepezil (ARICEPT) tablet 10 mg  10 mg Oral Nightly Stefano Linn MD   10 mg at 06/07/17 2202   • enalapril (VASOTEC) tablet 20 mg  20 mg Oral BID Joan Rojas MD   20 mg at 06/08/17 0859   • enoxaparin (LOVENOX) syringe 40 mg  40 mg Subcutaneous Q24H Jimmy Landry MD   40 mg at 06/08/17 0859   • glucagon (human recombinant) (GLUCAGEN DIAGNOSTIC) injection 1 mg  1 mg Subcutaneous Q15 Min PRN Vicki G Levill, APRN       • HYDROcodone-acetaminophen (NORCO) 5-325 MG per tablet 1 tablet  1 tablet Oral Q6H PRN Stefano Linn MD   1 tablet at 06/07/17 1852   • insulin aspart (novoLOG) injection 0-14 Units  0-14 Units Subcutaneous 4x Daily AC & at Bedtime Vicki G Levill, APRN   3 Units at 06/08/17 1109   • memantine (NAMENDA) tablet 5 mg  5 mg Oral BID Joan Rojas MD   5 mg at 06/08/17 0859   • methIMAzole (TAPAZOLE) half tablet 2.5 mg  2.5 mg Oral Daily Samer MD Bob   2.5 mg at 06/08/17 0859   •  Morphine sulfate (PF) injection 2 mg  2 mg Intravenous Q4H PRN Stefano Linn MD       • naloxone (NARCAN) injection 0.1 mg  0.1 mg Intravenous Q5 Min PRN Stefano Linn MD       • ondansetron (ZOFRAN) injection 4 mg  4 mg Intravenous Q4H PRN Guido Motley MD       • pantoprazole (PROTONIX) injection 40 mg  40 mg Intravenous Q12H Joan Rojas MD   40 mg at 06/08/17 0859   • sodium chloride 0.45 % infusion  50 mL/hr Intravenous Continuous Stefano Linn MD 50 mL/hr at 06/08/17 0910 50 mL/hr at 06/08/17 0910   • sodium chloride 0.9 % flush 1-10 mL  1-10 mL Intravenous PRN Vicki G Levill, APRN   10 mL at 05/28/17 0021   • sotalol (BETAPACE) tablet 80 mg  80 mg Oral Q12H Joan Rojas MD   80 mg at 06/08/17 0859       Review of Systems:   Review of Systems   Constitutional: Positive for fatigue. Negative for fever.   Respiratory: Negative for cough, shortness of breath and wheezing.    Cardiovascular: Negative for chest pain.   Gastrointestinal: Positive for abdominal distention and nausea. Negative for abdominal pain, anal bleeding, constipation, diarrhea and vomiting.   Genitourinary: Negative for dysuria and urgency.   Musculoskeletal: Positive for arthralgias.   Skin: Positive for pallor.   Neurological: Positive for weakness and light-headedness.   Psychiatric/Behavioral: Negative for agitation and behavioral problems.   All other systems reviewed and are negative.      Objective     Vital Signs  Temp:  [96.8 °F (36 °C)-99.3 °F (37.4 °C)] 96.8 °F (36 °C)  Heart Rate:  [67-85] 80  Resp:  [16-18] 18  BP: (141-217)/() 168/78    Physical Exam:  Physical Exam   Constitutional: She is oriented to person, place, and time. She appears well-developed and well-nourished.   HENT:   Head: Normocephalic and atraumatic.   Eyes: EOM are normal. Pupils are equal, round, and reactive to light. No scleral icterus.   Neck: Normal range of motion. Neck supple.   Cardiovascular: Normal rate and  regular rhythm.    Pulmonary/Chest: Effort normal and breath sounds normal. No respiratory distress. She has no wheezes.   Abdominal: Soft. Bowel sounds are normal. She exhibits no distension. There is tenderness (minimal). There is no rebound and no guarding.   Musculoskeletal: She exhibits no edema or tenderness.   Neurological: She is alert and oriented to person, place, and time. No cranial nerve deficit.   Skin: Skin is warm and dry. There is pallor.   Psychiatric: She has a normal mood and affect. Her behavior is normal.   Vitals reviewed.       Results Review:    Lab Results (last 24 hours)     Procedure Component Value Units Date/Time    POC Glucose Fingerstick [[id number]]  (Abnormal) Collected:  06/07/17 1718    Specimen:  Blood Updated:  06/07/17 1808     Glucose 188 (H) mg/dL       Sliding Scale AdminMeter: [id number]: [id number] [facility]       POC Glucose Fingerstick [[id number]]  (Abnormal) Collected:  06/07/17 1111    Specimen:  Blood Updated:  06/07/17 2042     Glucose 227 (H) mg/dL       Sliding Scale AdminMeter: [id number]: [id number] [facility]       POC Glucose Fingerstick [[id number]]  (Abnormal) Collected:  06/07/17 2214    Specimen:  Blood Updated:  06/07/17 2226     Glucose 210 (H) mg/dL       RN NotifiedMeter: [id number]: [id number] MADISYN LOZA       CBC & Differential [[id number]] Collected:  06/08/17 0610    Specimen:  Blood Updated:  06/08/17 0705    Narrative:       The following orders were created for panel order CBC & Differential.  Procedure                               Abnormality         Status                     ---------                               -----------         ------                     CBC Auto Differential[[id number]]        Abnormal            Final result                 Please view results for these tests on the individual orders.    CBC Auto Differential [[id number]]  (Abnormal) Collected:  06/08/17 0610    Specimen:  Blood  Updated:  06/08/17 0705     WBC 4.85 10*3/mm3      RBC 2.68 (L) 10*6/mm3      Hemoglobin 8.0 (L) g/dL      Hematocrit 23.7 (L) %      MCV 88.4 fL      MCH 29.9 pg      MCHC 33.8 g/dL      RDW 13.5 %      RDW-SD 43.3 fl      MPV 9.8 fL      Platelets 238 10*3/mm3      Neutrophil % 65.2 %      Lymphocyte % 19.4 %      Monocyte % 13.0 (H) %      Eosinophil % 1.4 %      Basophil % 0.2 %      Immature Grans % 0.8 (H) %      Neutrophils, Absolute 3.16 10*3/mm3      Lymphocytes, Absolute 0.94 10*3/mm3      Monocytes, Absolute 0.63 10*3/mm3      Eosinophils, Absolute 0.07 10*3/mm3      Basophils, Absolute 0.01 10*3/mm3      Immature Grans, Absolute 0.04 (H) 10*3/mm3      nRBC 0.0 /100 WBC     POC Glucose Fingerstick [[id number]]  (Abnormal) Collected:  06/08/17 0643    Specimen:  Blood Updated:  06/08/17 0709     Glucose 184 (H) mg/dL       RN NotifiedMeter: [id number]: [id number] [facility]       Comprehensive Metabolic Panel [[id number]]  (Abnormal) Collected:  06/08/17 0610    Specimen:  Blood Updated:  06/08/17 0722     Glucose 159 (H) mg/dL      BUN 5 (L) mg/dL      Creatinine 0.61 mg/dL      Sodium 132 (L) mmol/L      Potassium 3.2 (L) mmol/L      Chloride 96 mmol/L      CO2 26.0 mmol/L      Calcium 8.3 (L) mg/dL      Total Protein 5.2 (L) g/dL      Albumin 2.70 (L) g/dL      ALT (SGPT) 26 U/L      AST (SGOT) 19 U/L      Alkaline Phosphatase 49 U/L      Total Bilirubin 0.4 mg/dL      eGFR  African Amer 112 (H) mL/min/1.73      Globulin 2.5 gm/dL      A/G Ratio 1.1 g/dL      BUN/Creatinine Ratio 8.2     Anion Gap 10.0 mmol/L     Narrative:       The MDRD GFR formula is only valid for adults with stable renal function between ages 18 and 70.    POC Glucose Fingerstick [[id number]]  (Abnormal) Collected:  06/08/17 1038    Specimen:  Blood Updated:  06/08/17 1057     Glucose 165 (H) mg/dL       Sliding Scale AdminMeter: [id number]: [id number] ANGELICA LIZAMA             Imaging Results (last 24 hours)     **  No results found for the last 24 hours. **          Assessment/Plan     Principal Problem:    Gastrointestinal hemorrhage with melena    Blood loss anemia    Active Problems:    Diabetes mellitus    Colon cancer - S/P colectomy    Follow up CBCs, advance diet if tolerated. PT/OT to evaluate. Lorraine Motley MD  06/08/17  12:10 PM

## 2017-06-08 NOTE — PLAN OF CARE
Problem: Patient Care Overview (Adult)  Goal: Plan of Care Review  Outcome: Ongoing (interventions implemented as appropriate)    06/08/17 1110   Coping/Psychosocial Response Interventions   Plan Of Care Reviewed With patient;daughter   Patient Care Overview   Progress progress toward functional goals as expected   Outcome Evaluation   Outcome Summary/Follow up Plan Pt did not meet any PT goals this tx. Pt able to amb 300 ft w/RW CGA. Pt would benefit from ARU once discharged from this facility.          Problem: Inpatient Physical Therapy  Goal: Bed Mobility Goal LTG- PT  Outcome: Ongoing (interventions implemented as appropriate)    06/05/17 1652 06/08/17 1110   Bed Mobility PT LTG   Bed Mobility PT LTG, Date Established 06/05/17 --    Bed Mobility PT LTG, Time to Achieve 5 - 7 days --    Bed Mobility PT LTG, Activity Type all bed mobility --    Bed Mobility PT LTG, [place] Level conditional independence --    Bed Mobility PT LTG, Date Goal Reviewed --  06/08/17   Bed Mobility PT LTG, Outcome --  goal not met       Goal: Gait Training Goal STG- PT  Outcome: Ongoing (interventions implemented as appropriate)    06/05/17 1652 06/08/17 1110   Gait Training PT STG   Gait Training Goal PT STG, Date Established 06/05/17 --    Gait Training Goal PT STG, Time to Achieve 2 wks --    Gait Training Goal PT STG, [place] Level conditional independence --    Gait Training Goal PT STG, Assist Device walker, rolling --    Gait Training Goal PT STG, Distance to Achieve 150 ft w/ RW and CGA x 1 --    Gait Training Goal PT STG, Additional Goal 300 ft w/ RW and SBA x 1  --    Gait Training Goal PT STG, Date Goal Reviewed --  06/08/17   Gait Training Goal PT STG, Outcome --  goal not met       Goal: Stair Training Goal LTG- PT  Outcome: Ongoing (interventions implemented as appropriate)    06/05/17 1652 06/08/17 1110   Stair Training PT LTG   Stair Training Goal PT LTG, Date Established 06/05/17 --    Stair Training Goal PT  LTG, Time to Achieve by discharge --    Stair Training Goal PT LTG, [place] Level conditional independence --    Stair Training Goal PT LTG, Assist Device walker, rolling --    Stair Training Goal PT LTG, Distance to Achieve up/down 1 step --    Stair Training Goal PT LTG, Date Goal Reviewed --  06/08/17   Stair Training Goal PT LTG, Outcome --  goal not met

## 2017-06-08 NOTE — PLAN OF CARE
Problem: Patient Care Overview (Adult)  Goal: Plan of Care Review  Outcome: Ongoing (interventions implemented as appropriate)    06/08/17 1532   Coping/Psychosocial Response Interventions   Plan Of Care Reviewed With patient   Patient Care Overview   Progress no change   Outcome Evaluation   Outcome Summary/Follow up Plan Pt Vomited this shift.       Goal: Adult Individualization and Mutuality  Outcome: Ongoing (interventions implemented as appropriate)  Goal: Discharge Needs Assessment  Outcome: Ongoing (interventions implemented as appropriate)    Problem: Fluid Volume Deficit (Adult)  Goal: Fluid/Electrolyte Balance  Outcome: Ongoing (interventions implemented as appropriate)  Goal: Comfort/Well Being  Outcome: Ongoing (interventions implemented as appropriate)    Problem: Fall Risk (Adult)  Goal: Absence of Falls  Outcome: Ongoing (interventions implemented as appropriate)

## 2017-06-08 NOTE — THERAPY TREATMENT NOTE
Acute Care - Physical Therapy Treatment Note  [facility]     Patient Name: Kierra Scales  : 1929  MRN: [id number]  Today's Date: 2017  Onset of Illness/Injury or Date of Surgery Date: 17  Date of Referral to PT: 17       Admit Date: 2017    Visit Dx:    ICD-10-CM ICD-9-CM   1. Gastrointestinal hemorrhage with melena K92.1 578.1   2. Hematuria R31.9 599.70   3. Dehydration E86.0 276.51   4. Malignant neoplasm of sigmoid colon C18.7 153.3   5. 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)   • Hypothyroidism   • Dyspnea   • Hyperthyroidism   • Gastrointestinal hemorrhage with melena   • Diabetes mellitus   • Colon cancer   • Blood loss anemia               Adult Rehabilitation Note       17 1110 17 1034 17 1325    Rehab Assessment/Intervention    Discipline physical therapy assistant  -AM physical therapy assistant  -RW physical therapy assistant  -AM    Document Type therapy note (daily note)  -AM therapy note (daily note)  -RW therapy note (daily note)  -AM    Subjective Information agree to therapy;complains of;nausea/vomiting  -AM agree to therapy  -RW agree to therapy;complains of;pain  -AM    Patient Effort, Rehab Treatment good  -AM good  -RW good  -AM    Symptoms Noted During/After Treatment fatigue  -AM  fatigue  -AM    Precautions/Limitations fall precautions;pacemaker  -AM pacemaker;oxygen therapy device and L/min  -RW fall precautions;oxygen therapy device and L/min;pacemaker  -AM    Equipment Issued to Patient gait belt  -AM  gait belt  -AM    Recorded by [AM] Luciano Sanchez PTA [RW] Danny Beebe PTA [AM] Luciano MEREDITH  ROYAL Sanchez    Vital Signs    Pre Systolic BP Rehab 150  -  -  -AM    Pre Treatment Diastolic BP 70  -AM 70  -RW 69  -AM    Post Systolic BP Rehab 161  -  -  -AM    Post Treatment Diastolic BP 72  -AM 62  -RW 66  -AM    Pretreatment Heart Rate (beats/min) 76  -AM 76  -RW 68  -AM    Posttreatment Heart Rate (beats/min) 79  -AM 72  -RW 71  -AM    Pre SpO2 (%) 98  -AM 98  -RW 98  -AM    O2 Delivery Pre Treatment room air  -AM supplemental O2  -RW supplemental O2  -AM    Post SpO2 (%) 98  -AM  99  -AM    O2 Delivery Post Treatment room air  -AM  supplemental O2  -AM    Pre Patient Position Supine  -AM  Sitting  -AM    Intra Patient Position Standing  -AM  Standing  -AM    Post Patient Position Sitting  -AM  Sitting  -AM    Recorded by [AM] Luciano Sanchez PTA [RW] Danny Beebe PTA [AM] Luciano Sanchez PTA    Pain Assessment    Pain Assessment No/denies pain  -AM  No/denies pain  -AM    Recorded by [AM] Luciano Sanchez PTA  [AM] Luciano Sanchez PTA    Vision Assessment/Intervention    Visual Impairment   --  -AM    Recorded by   [AM] Luciano Sanchez PTA    Cognitive Assessment/Intervention    Current Cognitive/Communication Assessment functional  -AM functional  -RW functional  -AM    Orientation Status oriented x 4  -AM oriented to;person  -RW oriented x 4  -AM    Follows Commands/Answers Questions 100% of the time  -AM needs cueing  -% of the time  -AM    Personal Safety Interventions gait belt;nonskid shoes/slippers when out of bed;supervised activity  -AM gait belt;nonskid shoes/slippers when out of bed  -RW gait belt;nonskid shoes/slippers when out of bed;supervised activity  -AM    Recorded by [AM] Luciano Sanchez PTA [RW] Danny Beebe PTA [AM] Luciano Sanchez PTA    ROM (Range of Motion)    General ROM no range of motion deficits identified  -AM  no range of motion deficits identified  -AM    Recorded by [AM] Luciano Sanchez PTA  [AM] Luciano Sanchez PTA    Bed  Mobility, Assessment/Treatment    Bed Mobility, Roll Left, [place] not tested  -AM  not tested  -AM    Bed Mobility, Roll Right, [place] not tested  -AM  not tested  -AM    Bed Mobility, Scoot/Bridge, [place] not tested  -AM  not tested  -AM    Bed Mob, Supine to Sit, [place] supervision required  -AM conditional independence;supervision required  -RW not tested  -AM    Bed Mob, Sit to Supine, [place] not tested  -AM  not tested  -AM    Bed Mob, Sidelying to Sit, [place] not tested  -AM  not tested  -AM    Bed Mob, Sit to Sidelying, [place] not tested  -AM  not tested  -AM    Bed Mobility, Safety Issues decreased use of arms for pushing/pulling;decreased use of legs for bridging/pushing  -AM      Bed Mobility, Impairments strength decreased  -AM      Recorded by [AM] Luciano Sanchez, PTA [RW] Danny Beebe, PTA [AM] Luciano Sanchez, PTA    Transfer Assessment/Treatment    Transfers, Bed-Chair [place] contact guard assist  -AM contact guard assist  -RW contact guard assist  -AM    Transfers, Chair-Bed [place] contact guard assist  -AM contact guard assist  -RW contact guard assist  -AM    Transfers, Bed-Chair-Bed, Assist Device rolling walker  -AM rolling walker  -RW rolling walker  -AM    Transfers, Sit-Stand [place] contact guard assist  -AM contact guard assist  -RW minimum assist (75% patient effort)  -AM    Transfers, Stand-Sit Levy contact guard assist  -AM contact guard assist  -RW minimum assist (75% patient effort)  -AM    Transfers, Sit-Stand-Sit, Assist Device rolling walker  -AM rolling walker  -RW rolling walker  -AM    Toilet Transfer, [place] contact guard assist  -AM contact guard assist  -RW not tested  -AM    Toilet Transfer, Assistive Device bedside commode without drop arms;rolling walker  -AM      Walk-In Shower Transfer, [place] not tested  -AM  not tested  -AM    Bathtub Transfer, [place] not tested  -AM  not tested  -AM     Transfer, Maintain Weight Bearing Status able to maintain weight bearing status  -AM  able to maintain weight bearing status  -AM    Transfer, Safety Issues step length decreased  -AM  step length decreased  -AM    Transfer, Impairments strength decreased  -AM      Recorded by [AM] Luciano Sanchez PTA [RW] Danny Beebe PTA [AM] Luciano Sanchez PTA    Gait Assessment/Treatment    Gait, [place] Level contact guard assist  -AM contact guard assist;stand by assist  -RW contact guard assist  -AM    Gait, Assistive Device rolling walker  -AM rolling walker  -RW rolling walker  -AM    Gait, Distance (Feet) 300  -   2 standing breaks to look out windows  -RW 80  -AM    Gait, Gait Pattern Analysis swing-through gait  -AM swing-through gait  -RW 3-point gait  -AM    Gait, Gait Deviations bilateral:;timothy decreased  -AM  bilateral:;timothy decreased  -AM    Gait, Maintain Weight Bearing Status able to maintain weight bearing status  -AM  able to maintain weight bearing status  -AM    Gait, Safety Issues step length decreased  -AM  step length decreased;supplemental O2  -AM    Gait, Impairments strength decreased  -AM  strength decreased  -AM    Recorded by [AM] Luciano Sanchez PTA [RW] Danny Beebe PTA [AM] Luciano Sanchez PTA    Stairs Assessment/Treatment    Stairs, [place] Level not tested  -AM  not tested  -AM    Recorded by [AM] Luciano Sanchez PTA  [AM] Luciano Sanchez PTA    Functional Mobility    Functional Mobility- Ind. Level  contact guard assist  -RW     Functional Mobility- Safety Issues  balance decreased during turns  -RW     Functional Mobility- Comment   in close enviornment obstacles hinder.  -RW     Recorded by  [RW] Danny Beebe PTA     Sensory Assessment/Intervention    Light Touch   --  -AM    Recorded by   [AM] Luciano Sanchez PTA    Positioning and Restraints    Pre-Treatment Position in bed  -AM in bed  -RW sitting in chair/recliner  -AM    Post Treatment Position  chair  -AM chair  -RW chair  -AM    In Chair reclined;call light within reach;encouraged to call for assist;exit alarm on;legs elevated  -AM reclined;call light within reach;encouraged to call for assist  -RW reclined;call light within reach;encouraged to call for assist;exit alarm on;legs elevated  -AM    Recorded by [AM] Luciano Sanchez, ROYAL [RW] Danny Beebe, PTA [AM] Luciano Sanchez PTA      User Key  (r) = Recorded By, (t) = Taken By, (c) = Cosigned By    Initials Name Effective Dates    AM Luciano Sanchez, PTA 10/17/16 -     RW Danny Beebe, ROYAL 10/17/16 -                 IP PT Goals       06/08/17 1110 06/07/17 1228 06/06/17 1325    Bed Mobility PT LTG    Bed Mobility PT LTG, Date Goal Reviewed 06/08/17  -AM 06/07/17  -RW 06/06/17  -AM    Bed Mobility PT LTG, Outcome goal not met  -AM goal ongoing  -RW goal not met  -AM    Transfer Training PT LTG    Transfer Training PT  LTG, Date Goal Reviewed   06/06/17  -AM    Transfer Training PT LTG, Outcome   goal met  -AM    Gait Training PT STG    Gait Training Goal PT STG, Date Goal Reviewed 06/08/17  -AM 06/07/17  -RW 06/06/17  -AM    Gait Training Goal PT STG, Outcome goal not met  -AM goal ongoing  -RW goal not met  -AM    Stair Training PT LTG    Stair Training Goal PT LTG, Date Goal Reviewed 06/08/17  -AM 06/07/17  -RW 06/06/17  -AM    Stair Training Goal PT LTG, Outcome goal not met  -AM goal ongoing  -RW goal not met  -AM      06/05/17 1652          Bed Mobility PT LTG    Bed Mobility PT LTG, Date Established 06/05/17  -GB      Bed Mobility PT LTG, Time to Achieve 5 - 7 days  -GB      Bed Mobility PT LTG, Activity Type all bed mobility  -GB      Bed Mobility PT LTG, [place] Level conditional independence  -GB      Bed Mobility PT LTG, Outcome goal not met  -GB      Transfer Training PT LTG    Transfer Training PT LTG, Date Established 06/05/17  -GB      Transfer Training PT LTG, Time to Achieve 5 - 7 days  -GB      Transfer Training PT LTG, Activity  Type all transfers  -GB      Transfer Training PT LTG, [place] Level contact guard assist  -GB      Transfer Training PT LTG, Assist Device walker, rolling  -GB      Transfer Training PT LTG, Outcome goal not met  -GB      Gait Training PT STG    Gait Training Goal PT STG, Date Established 06/05/17  -GB      Gait Training Goal PT STG, Time to Achieve 2 wks  -GB      Gait Training Goal PT STG, [place] Level conditional independence  -GB      Gait Training Goal PT STG, Assist Device walker, rolling  -GB      Gait Training Goal PT STG, Distance to Achieve 150 ft w/ RW and CGA x 1  -GB      Gait Training Goal PT STG, Additional Goal 300 ft w/ RW and SBA x 1   -GB      Gait Training Goal PT STG, Outcome goal not met  -GB      Stair Training PT LTG    Stair Training Goal PT LTG, Date Established 06/05/17  -GB      Stair Training Goal PT LTG, Time to Achieve by discharge  -GB      Stair Training Goal PT LTG, [place] Level conditional independence  -GB      Stair Training Goal PT LTG, Assist Device walker, rolling  -GB      Stair Training Goal PT LTG, Distance to Achieve up/down 1 step  -GB      Stair Training Goal PT LTG, Outcome goal not met  -GB        User Key  (r) = Recorded By, (t) = Taken By, (c) = Cosigned By    Initials Name Provider Type    LILIAN Matthews, PT Physical Therapist    CHARLES Sanchez, PTA Physical Therapy Assistant    RESHMA Beebe, ROYAL Physical Therapy Assistant          Physical Therapy Education     Title: PT OT SLP Therapies (Active)     Topic: Physical Therapy (Active)     Point: Mobility training (Active)    Learning Progress Summary    Learner Readiness Method Response Comment Documented by Status   Patient Acceptance E NR reinforced use of walker and to call for assist when needed.  06/07/17 1227 Active                      User Key     Initials Effective Dates Name Provider Type Discipline     10/17/16 -  Danny Beebe, ROYAL Physical Therapy Assistant PT                     PT Recommendation and Plan  Anticipated Discharge Disposition: inpatient rehabilitation facility  Planned Therapy Interventions: gait training, bed mobility training, balance training, patient/family education, ROM (Range of Motion), strengthening, stair training, transfer training  PT Frequency: other (see comments) (5-14 x /wk)  Plan of Care Review  Plan Of Care Reviewed With: patient, daughter  Progress: progress toward functional goals as expected  Outcome Summary/Follow up Plan: Pt did not meet any PT goals this tx. Pt able to amb 300 ft w/RW CGA. Pt would benefit from ARU once discharged from this facility.           Outcome Measures       06/08/17 1110 06/07/17 1034 06/06/17 1325    How much help from another person do you currently need...    Turning from your back to your side while in flat bed without using bedrails? 3  -AM 3  -RW 2  -AM    Moving from lying on back to sitting on the side of a flat bed without bedrails? 3  -AM 3  -RW 2  -AM    Moving to and from a bed to a chair (including a wheelchair)? 3  -AM 3  -RW 3  -AM    Standing up from a chair using your arms (e.g., wheelchair, bedside chair)? 3  -AM 3  -RW 3  -AM    Climbing 3-5 steps with a railing? 1  -AM 3  -RW 1  -AM    To walk in hospital room? 3  -AM 3  -RW 3  -AM    AM-PAC 6 Clicks Score 16  -AM 18  -RW 14  -AM    Functional Assessment    Outcome Measure Options AM-PAC 6 Clicks Basic Mobility (PT)  -AM  AM-PAC 6 Clicks Basic Mobility (PT)  -AM      06/05/17 1700          How much help from another person do you currently need...    Turning from your back to your side while in flat bed without using bedrails? 2  -GB      Moving from lying on back to sitting on the side of a flat bed without bedrails? 2  -GB      Moving to and from a bed to a chair (including a wheelchair)? 2  -GB      Standing up from a chair using your arms (e.g., wheelchair, bedside chair)? 2  -GB      Climbing 3-5 steps with a railing? 1  -GB      To  walk in hospital room? 1  -GB      AM-PAC 6 Clicks Score 10  -GB      Functional Assessment    Outcome Measure Options AM-PAC 6 Clicks Basic Mobility (PT)  -GB        User Key  (r) = Recorded By, (t) = Taken By, (c) = Cosigned By    Initials Name Provider Type    GB Cristin Matthews, PT Physical Therapist    AM Luciano Sanchez PTA Physical Therapy Assistant     Danny Beebe PTA Physical Therapy Assistant           Time Calculation:         PT Charges       06/08/17 1110          Time Calculation    Start Time 1110  -AM      Stop Time 1155  -AM      Time Calculation (min) 45 min  -AM      PT Received On 06/08/17  -AM      PT - Next Appointment 06/09/17  -AM      Time Calculation- PT    Total Timed Code Minutes- PT 45 minute(s)  -AM        User Key  (r) = Recorded By, (t) = Taken By, (c) = Cosigned By    Initials Name Provider Type    AM Luciano Sanchez PTA Physical Therapy Assistant          Therapy Charges for Today     Code Description Service Date Service Provider Modifiers Qty    17779654642 HC GAIT TRAINING EA 15 MIN 6/8/2017 Luciano Sanchez PTA GP 1    54562155969 HC PT THER PROC EA 15 MIN 6/8/2017 Luciano Sanchez PTA GP 1    71511837257 HC PT SELF CARE/MGMT/TRAIN EA 15 MIN 6/8/2017 Luciano Sanchez PTA GP 1          PT G-Codes  PT Professional Judgement Used?: Yes  Outcome Measure Options: AM-PAC 6 Clicks Basic Mobility (PT)  Score: 10  Functional Limitation: Mobility: Walking and moving around  Mobility: Walking and Moving Around Current Status (): At least 60 percent but less than 80 percent impaired, limited or restricted  Mobility: Walking and Moving Around Goal Status (): At least 1 percent but less than 20 percent impaired, limited or restricted    Luciano Sanchez PTA  6/8/2017

## 2017-06-08 NOTE — PROGRESS NOTES
GENERAL SURGERY PROGRESS NOTE  Chief Complaint:  Surgery Follow up   LOS: 9 days       Subjective     Interval History:     Feels well this AM. Tolerating regular food for breakfast.  Continues to pass gas and had another BM.  Working with PT.    Objective     Vital Signs  Temp:  [98.3 °F (36.8 °C)-99.3 °F (37.4 °C)] 99 °F (37.2 °C)  Heart Rate:  [67-85] 67  Resp:  [16-18] 18  BP: (141-174)/(60-87) 149/87    Physical Exam:   Abdomen soft, incision CDI  Labs:  Lab Results (last 24 hours)     Procedure Component Value Units Date/Time    POC Glucose Fingerstick [[id number]]  (Abnormal) Collected:  06/07/17 1718    Specimen:  Blood Updated:  06/07/17 1808     Glucose 188 (H) mg/dL       Sliding Scale AdminMeter: [id number]: [id number] DOMINGUEZ ALDA       POC Glucose Fingerstick [[id number]]  (Abnormal) Collected:  06/07/17 1111    Specimen:  Blood Updated:  06/07/17 2042     Glucose 227 (H) mg/dL       Sliding Scale AdminMeter: [id number]: [id number] DOMINGUEZ ALDA       POC Glucose Fingerstick [[id number]]  (Abnormal) Collected:  06/07/17 2214    Specimen:  Blood Updated:  06/07/17 2226     Glucose 210 (H) mg/dL       RN NotifiedMeter: [id number]: [id number] MADISYN LOZA       CBC & Differential [[id number]] Collected:  06/08/17 0610    Specimen:  Blood Updated:  06/08/17 0705    Narrative:       The following orders were created for panel order CBC & Differential.  Procedure                               Abnormality         Status                     ---------                               -----------         ------                     CBC Auto Differential[[id number]]        Abnormal            Final result                 Please view results for these tests on the individual orders.    CBC Auto Differential [[id number]]  (Abnormal) Collected:  06/08/17 0610    Specimen:  Blood Updated:  06/08/17 0705     WBC 4.85 10*3/mm3      RBC 2.68 (L) 10*6/mm3      Hemoglobin 8.0 (L) g/dL       Hematocrit 23.7 (L) %      MCV 88.4 fL      MCH 29.9 pg      MCHC 33.8 g/dL      RDW 13.5 %      RDW-SD 43.3 fl      MPV 9.8 fL      Platelets 238 10*3/mm3      Neutrophil % 65.2 %      Lymphocyte % 19.4 %      Monocyte % 13.0 (H) %      Eosinophil % 1.4 %      Basophil % 0.2 %      Immature Grans % 0.8 (H) %      Neutrophils, Absolute 3.16 10*3/mm3      Lymphocytes, Absolute 0.94 10*3/mm3      Monocytes, Absolute 0.63 10*3/mm3      Eosinophils, Absolute 0.07 10*3/mm3      Basophils, Absolute 0.01 10*3/mm3      Immature Grans, Absolute 0.04 (H) 10*3/mm3      nRBC 0.0 /100 WBC     POC Glucose Fingerstick [[id number]]  (Abnormal) Collected:  06/08/17 0643    Specimen:  Blood Updated:  06/08/17 0709     Glucose 184 (H) mg/dL       RN NotifiedMeter: [id number]: [id number] RANJANA BILL       Comprehensive Metabolic Panel [[id number]]  (Abnormal) Collected:  06/08/17 0610    Specimen:  Blood Updated:  06/08/17 0722     Glucose 159 (H) mg/dL      BUN 5 (L) mg/dL      Creatinine 0.61 mg/dL      Sodium 132 (L) mmol/L      Potassium 3.2 (L) mmol/L      Chloride 96 mmol/L      CO2 26.0 mmol/L      Calcium 8.3 (L) mg/dL      Total Protein 5.2 (L) g/dL      Albumin 2.70 (L) g/dL      ALT (SGPT) 26 U/L      AST (SGOT) 19 U/L      Alkaline Phosphatase 49 U/L      Total Bilirubin 0.4 mg/dL      eGFR  African Amer 112 (H) mL/min/1.73      Globulin 2.5 gm/dL      A/G Ratio 1.1 g/dL      BUN/Creatinine Ratio 8.2     Anion Gap 10.0 mmol/L     Narrative:       The MDRD GFR formula is only valid for adults with stable renal function between ages 18 and 70.          Assessment/Plan     Kierra Scales is a 88 y.o. female who is s/p left colon resection      Overall doing well.  Will continue PT/OT.      I spoke with her daughters this AM about disposition, their preference is for her to return home if able.  If not may need ARU or continuing PT to regain strength prior to going home.    From surgical standpoint can be discharged  when disposition confirmed.          This document has been electronically signed by Stefano Linn MD on June 8, 2017 8:08 AM        Stefano Linn MD  06/08/17  8:08 AM

## 2017-06-09 LAB
ALBUMIN SERPL-MCNC: 3.4 G/DL (ref 3.4–4.8)
ALBUMIN/GLOB SERPL: 1.1 G/DL (ref 1.1–1.8)
ALP SERPL-CCNC: 63 U/L (ref 38–126)
ALT SERPL W P-5'-P-CCNC: 29 U/L (ref 9–52)
ANION GAP SERPL CALCULATED.3IONS-SCNC: 14 MMOL/L (ref 5–15)
AST SERPL-CCNC: 30 U/L (ref 14–36)
BASOPHILS # BLD AUTO: 0.01 10*3/MM3 (ref 0–0.2)
BASOPHILS NFR BLD AUTO: 0.2 % (ref 0–2)
BILIRUB SERPL-MCNC: 0.5 MG/DL (ref 0.2–1.3)
BUN BLD-MCNC: 6 MG/DL (ref 7–21)
BUN/CREAT SERPL: 11.1 (ref 7–25)
CALCIUM SPEC-SCNC: 8.9 MG/DL (ref 8.4–10.2)
CHLORIDE SERPL-SCNC: 94 MMOL/L (ref 95–110)
CO2 SERPL-SCNC: 25 MMOL/L (ref 22–31)
CREAT BLD-MCNC: 0.54 MG/DL (ref 0.5–1)
DEPRECATED RDW RBC AUTO: 44.4 FL (ref 36.4–46.3)
EOSINOPHIL # BLD AUTO: 0.03 10*3/MM3 (ref 0–0.7)
EOSINOPHIL NFR BLD AUTO: 0.5 % (ref 0–7)
ERYTHROCYTE [DISTWIDTH] IN BLOOD BY AUTOMATED COUNT: 13.8 % (ref 11.5–14.5)
GFR SERPL CREATININE-BSD FRML MDRD: 129 ML/MIN/1.73 (ref 39–90)
GLOBULIN UR ELPH-MCNC: 3.1 GM/DL (ref 2.3–3.5)
GLUCOSE BLD-MCNC: 168 MG/DL (ref 60–100)
GLUCOSE BLDC GLUCOMTR-MCNC: 165 MG/DL (ref 70–130)
GLUCOSE BLDC GLUCOMTR-MCNC: 194 MG/DL (ref 70–130)
GLUCOSE BLDC GLUCOMTR-MCNC: 196 MG/DL (ref 70–130)
GLUCOSE BLDC GLUCOMTR-MCNC: 217 MG/DL (ref 70–130)
HCT VFR BLD AUTO: 28.9 % (ref 35–45)
HGB BLD-MCNC: 9.9 G/DL (ref 12–15.5)
IMM GRANULOCYTES # BLD: 0.05 10*3/MM3 (ref 0–0.02)
IMM GRANULOCYTES NFR BLD: 0.8 % (ref 0–0.5)
LYMPHOCYTES # BLD AUTO: 0.93 10*3/MM3 (ref 0.6–4.2)
LYMPHOCYTES NFR BLD AUTO: 14.9 % (ref 10–50)
MCH RBC QN AUTO: 30.6 PG (ref 26.5–34)
MCHC RBC AUTO-ENTMCNC: 34.3 G/DL (ref 31.4–36)
MCV RBC AUTO: 89.2 FL (ref 80–98)
MONOCYTES # BLD AUTO: 0.67 10*3/MM3 (ref 0–0.9)
MONOCYTES NFR BLD AUTO: 10.7 % (ref 0–12)
NEUTROPHILS # BLD AUTO: 4.57 10*3/MM3 (ref 2–8.6)
NEUTROPHILS NFR BLD AUTO: 72.9 % (ref 37–80)
NRBC BLD MANUAL-RTO: 0 /100 WBC (ref 0–0)
PLATELET # BLD AUTO: 323 10*3/MM3 (ref 150–450)
PMV BLD AUTO: 10 FL (ref 8–12)
POTASSIUM BLD-SCNC: 3.4 MMOL/L (ref 3.5–5.1)
POTASSIUM BLD-SCNC: 3.8 MMOL/L (ref 3.5–5.1)
POTASSIUM BLD-SCNC: 3.8 MMOL/L (ref 3.5–5.1)
PROT SERPL-MCNC: 6.5 G/DL (ref 6.3–8.6)
RBC # BLD AUTO: 3.24 10*6/MM3 (ref 3.77–5.16)
SODIUM BLD-SCNC: 133 MMOL/L (ref 137–145)
WBC NRBC COR # BLD: 6.26 10*3/MM3 (ref 3.2–9.8)

## 2017-06-09 PROCEDURE — 97166 OT EVAL MOD COMPLEX 45 MIN: CPT

## 2017-06-09 PROCEDURE — 82962 GLUCOSE BLOOD TEST: CPT

## 2017-06-09 PROCEDURE — G8988 SELF CARE GOAL STATUS: HCPCS

## 2017-06-09 PROCEDURE — 97535 SELF CARE MNGMENT TRAINING: CPT

## 2017-06-09 PROCEDURE — 94799 UNLISTED PULMONARY SVC/PX: CPT

## 2017-06-09 PROCEDURE — 99024 POSTOP FOLLOW-UP VISIT: CPT | Performed by: SURGERY

## 2017-06-09 PROCEDURE — G8987 SELF CARE CURRENT STATUS: HCPCS

## 2017-06-09 PROCEDURE — 80053 COMPREHEN METABOLIC PANEL: CPT | Performed by: INTERNAL MEDICINE

## 2017-06-09 PROCEDURE — 84132 ASSAY OF SERUM POTASSIUM: CPT | Performed by: HOSPITALIST

## 2017-06-09 PROCEDURE — 25010000003 POTASSIUM CHLORIDE 10 MEQ/100ML SOLUTION: Performed by: INTERNAL MEDICINE

## 2017-06-09 PROCEDURE — 85025 COMPLETE CBC W/AUTO DIFF WBC: CPT | Performed by: FAMILY MEDICINE

## 2017-06-09 PROCEDURE — 25010000002 ENOXAPARIN PER 10 MG: Performed by: SURGERY

## 2017-06-09 PROCEDURE — 63710000001 INSULIN ASPART PER 5 UNITS: Performed by: NURSE PRACTITIONER

## 2017-06-09 RX ADMIN — PANTOPRAZOLE SODIUM 40 MG: 40 INJECTION, POWDER, FOR SOLUTION INTRAVENOUS at 21:23

## 2017-06-09 RX ADMIN — POTASSIUM CHLORIDE 10 MEQ: 7.46 INJECTION, SOLUTION INTRAVENOUS at 06:51

## 2017-06-09 RX ADMIN — ENOXAPARIN SODIUM 40 MG: 40 INJECTION SUBCUTANEOUS at 09:04

## 2017-06-09 RX ADMIN — SODIUM CHLORIDE 50 ML/HR: 4.5 INJECTION, SOLUTION INTRAVENOUS at 14:42

## 2017-06-09 RX ADMIN — POTASSIUM CHLORIDE 10 MEQ: 7.46 INJECTION, SOLUTION INTRAVENOUS at 03:27

## 2017-06-09 RX ADMIN — POTASSIUM CHLORIDE 10 MEQ: 7.46 INJECTION, SOLUTION INTRAVENOUS at 05:27

## 2017-06-09 RX ADMIN — INSULIN ASPART 5 UNITS: 100 INJECTION, SOLUTION INTRAVENOUS; SUBCUTANEOUS at 08:57

## 2017-06-09 RX ADMIN — METHIMAZOLE 2.5 MG: 5 TABLET ORAL at 08:58

## 2017-06-09 RX ADMIN — MEMANTINE 5 MG: 5 TABLET ORAL at 17:25

## 2017-06-09 RX ADMIN — SOTALOL HYDROCHLORIDE 80 MG: 80 TABLET ORAL at 08:59

## 2017-06-09 RX ADMIN — POTASSIUM CHLORIDE 10 MEQ: 7.46 INJECTION, SOLUTION INTRAVENOUS at 04:27

## 2017-06-09 RX ADMIN — ENALAPRIL MALEATE 20 MG: 10 TABLET ORAL at 08:59

## 2017-06-09 RX ADMIN — PANTOPRAZOLE SODIUM 40 MG: 40 INJECTION, POWDER, FOR SOLUTION INTRAVENOUS at 09:02

## 2017-06-09 RX ADMIN — DONEPEZIL HYDROCHLORIDE 10 MG: 10 TABLET ORAL at 21:23

## 2017-06-09 RX ADMIN — ENALAPRIL MALEATE 20 MG: 10 TABLET ORAL at 17:24

## 2017-06-09 RX ADMIN — SOTALOL HYDROCHLORIDE 80 MG: 80 TABLET ORAL at 21:23

## 2017-06-09 RX ADMIN — MEMANTINE 5 MG: 5 TABLET ORAL at 08:59

## 2017-06-09 RX ADMIN — DILTIAZEM HYDROCHLORIDE 180 MG: 180 CAPSULE, EXTENDED RELEASE ORAL at 08:58

## 2017-06-09 RX ADMIN — INSULIN ASPART 3 UNITS: 100 INJECTION, SOLUTION INTRAVENOUS; SUBCUTANEOUS at 21:31

## 2017-06-09 RX ADMIN — INSULIN ASPART 3 UNITS: 100 INJECTION, SOLUTION INTRAVENOUS; SUBCUTANEOUS at 17:25

## 2017-06-09 RX ADMIN — INSULIN ASPART 3 UNITS: 100 INJECTION, SOLUTION INTRAVENOUS; SUBCUTANEOUS at 11:06

## 2017-06-09 NOTE — PROGRESS NOTES
Progress Note  Guido Motley MD  Hospitalist     LOS: 10 days   Patient Care Team:  Melissa Ch MD as PCP    Chief Complaint: rectal bleeding    Subjective     Interval History:     Patient Complaints: rectal bleeding. She was diagnosed and operated upon (L hemicolectomy) due to colon cancer. She is able to ambulate with assistance. At times she is slightly confused. Today she felt better.    History taken from: patient    Medication Review:   Current Facility-Administered Medications   Medication Dose Route Frequency Provider Last Rate Last Dose   • bupivacaine-EPINEPHrine PF (MARCAINE w/EPI) 0.5% -[id number] injection    PRN Stefano Linn MD   30 mL at 06/02/17 1130   • dextrose (D50W) solution 25 g  25 g Intravenous Q15 Min PRN Vicki G Levill, APRN   25 g at 05/30/17 0828   • dextrose (GLUTOSE) oral gel 15 g  15 g Oral Q15 Min PRN Vicki G Levill, APRN       • diltiaZEM CD (CARDIZEM CD) 24 hr capsule 180 mg  180 mg Oral Daily Joan Rojas MD   180 mg at 06/09/17 0858   • donepezil (ARICEPT) tablet 10 mg  10 mg Oral Nightly Stefano Linn MD   10 mg at 06/08/17 2143   • enalapril (VASOTEC) tablet 20 mg  20 mg Oral BID Joan Rojas MD   20 mg at 06/09/17 0859   • enoxaparin (LOVENOX) syringe 40 mg  40 mg Subcutaneous Q24H Jimmy Landry MD   40 mg at 06/09/17 0904   • glucagon (human recombinant) (GLUCAGEN DIAGNOSTIC) injection 1 mg  1 mg Subcutaneous Q15 Min PRN Vicki G Levill, APRN       • HYDROcodone-acetaminophen (NORCO) 5-325 MG per tablet 1 tablet  1 tablet Oral Q6H PRN Stefano Linn MD   1 tablet at 06/07/17 1852   • insulin aspart (novoLOG) injection 0-14 Units  0-14 Units Subcutaneous 4x Daily AC & at Bedtime Vicki G Levill, APRN   5 Units at 06/09/17 0857   • memantine (NAMENDA) tablet 5 mg  5 mg Oral BID Joan Rojas MD   5 mg at 06/09/17 0859   • methIMAzole (TAPAZOLE) half tablet 2.5 mg  2.5 mg Oral Daily Samer MD Bob   2.5 mg at 06/09/17 6001   • Morphine  sulfate (PF) injection 2 mg  2 mg Intravenous Q4H PRN Stefano Linn MD       • naloxone (NARCAN) injection 0.1 mg  0.1 mg Intravenous Q5 Min PRN Stefano Linn MD       • ondansetron (ZOFRAN) injection 4 mg  4 mg Intravenous Q4H PRN Guido Motley MD   4 mg at 06/08/17 2214   • pantoprazole (PROTONIX) injection 40 mg  40 mg Intravenous Q12H Joan Rojas MD   40 mg at 06/09/17 0902   • potassium chloride 10 mEq in 100 mL IVPB  10 mEq Intravenous Q1H PRN Guido Motley  mL/hr at 06/09/17 0651 10 mEq at 06/09/17 0651   • sodium chloride 0.45 % infusion  50 mL/hr Intravenous Continuous Stefano Linn MD   Stopped at 06/08/17 1557   • sodium chloride 0.9 % flush 1-10 mL  1-10 mL Intravenous PRN DEBBY Syed   10 mL at 05/28/17 0021   • sotalol (BETAPACE) tablet 80 mg  80 mg Oral Q12H Joan Rojas MD   80 mg at 06/09/17 0859       Review of Systems:   Review of Systems   Constitutional: Positive for fatigue. Negative for fever.   Respiratory: Negative for cough, shortness of breath and wheezing.    Cardiovascular: Negative for chest pain.   Gastrointestinal: Positive for abdominal distention and nausea. Negative for abdominal pain, anal bleeding, constipation, diarrhea and vomiting.   Genitourinary: Negative for dysuria and urgency.   Musculoskeletal: Positive for arthralgias.   Skin: Positive for pallor.   Neurological: Positive for weakness and light-headedness.   Psychiatric/Behavioral: Negative for agitation and behavioral problems.   All other systems reviewed and are negative.      Objective     Vital Signs  Temp:  [98.8 °F (37.1 °C)-99.7 °F (37.6 °C)] 99.7 °F (37.6 °C)  Heart Rate:  [68-78] 68  Resp:  [16-18] 18  BP: (161-172)/(75-78) 168/76    Physical Exam:  Physical Exam   Constitutional: She is oriented to person, place, and time. She appears well-developed and well-nourished.   HENT:   Head: Normocephalic and atraumatic.   Eyes: EOM are normal. Pupils are equal,  round, and reactive to light. No scleral icterus.   Neck: Normal range of motion. Neck supple.   Cardiovascular: Normal rate and regular rhythm.    Pulmonary/Chest: Effort normal and breath sounds normal. No respiratory distress. She has no wheezes.   Abdominal: Soft. Bowel sounds are normal. She exhibits no distension. There is tenderness (minimal). There is no rebound and no guarding.   Musculoskeletal: She exhibits no edema or tenderness.   Neurological: She is alert and oriented to person, place, and time. No cranial nerve deficit.   Skin: Skin is warm and dry. There is pallor.   Psychiatric: She has a normal mood and affect. Her behavior is normal.   Vitals reviewed.       Results Review:    Lab Results (last 24 hours)     Procedure Component Value Units Date/Time    POC Glucose Fingerstick [[id number]]  (Abnormal) Collected:  06/08/17 1038    Specimen:  Blood Updated:  06/08/17 1057     Glucose 165 (H) mg/dL       Sliding Scale AdminMeter: [id number]: [id number] [facility]       Tissue Exam [[id number]] Collected:  06/02/17 1423    Specimen:  Tissue from Large Intestine, Sigmoid Colon Updated:  06/08/17 1442     Case Report --     Surgical Pathology Report                         Case: [id number]                                  Authorizing Provider:  Stefano Linn MD Collected:           06/02/2017 02:23 PM          Ordering Location:     [facility]             Received:            06/05/2017 10:18 AM                                 [place] [facility]                                                              Pathologist:           Marcelino Donaldson MD                                                            Specimen:    Large Intestine, Sigmoid Colon, LEFT COLON STITCH JOSEPH DISTAL END                          Final Diagnosis --     LEFT COLON, RESECTION:  COLONIC ADENOCARCINOMA, MODERATELY DIFFERENTIATED, MARGINS FREE OF CARCINOMA.  DIVERTICULOSIS COLI.    MESENTERIC LYMPH NODES  (23):  NEGATIVE FOR METASTATIC CARCINOMA.         Synoptic Checklist --     COLON AND RECTUM: Resection, Including Transanal Disk Excision of Rectal Neoplasms  (Colon Res - All Specimens)      SPECIMEN     Specimen:    Transverse colon     Specimen:    Descending colon     Specimen:    Sigmoid colon     Procedure:    Left hemicolectomy     Macroscopic Intactness of Mesorectum:    Not applicable    TUMOR     Primary Tumor Site:    Left (descending) colon     Histologic Type:    Adenocarcinoma     Histologic Grade:    Low-grade (well differentiated to moderately differentiated)     Tumor Size:    Greatest dimension (cm): 5 cm     Tumor Deposits:    Not identified       Site(s) of Direct Extent of Tumor:    Left (descending) colon       Microscopic Tumor Extension:    Tumor invades through the muscularis propria into the subserosal adipose tissue or the nonperitonealized pericolic or perirectal soft tissues but does not extend to the serosal surface       Macroscopic Tumor Perforation:    Not Identified       Lymph-Vascular Invasion:    Not identified       Perineural Invasion:    Not identified    MARGINS     Uninvolved Margins:    All margins uninvolved by invasive carcinoma       Distance of Invasive Carcinoma from Closest Margin:    Specify (cm): 8 cm       Specify Margin:    Circumferential (Radial) or Mesenteric     Margins:    For Resection Specimens Only       Proximal Margin:             Proximal Margin:    Uninvolved by invasive carcinoma           Distance of Tumor from Margin:    Specify (cm): 16 cm       Distal Margin:             Distal Margin:    Uninvolved by invasive carcinoma           Distance of Tumor from Margin (required only for rectal tumors):    Specify (cm): 16 cm       Circumferential (Radial) Margin:    Not applicable       Mesenteric Margin:    Uninvolved by invasive carcinoma         Distance of Tumor from Margin:    Specify (cm): 8 cm    LYMPH NODES     Regional Lymph Nodes:            "Number of Lymph Nodes Examined:    Specify number: 23       Number of Lymph Nodes Involved:    Specify number: 0    STAGE (pTNM, AJCC 7th ed.)     Primary Tumor (pT):    pT3: Tumor invades through the muscularis propria into pericolorectal tissues     Regional Lymph Nodes (pN):    pN0: No regional lymph node metastasis    ADDITIONAL FINDINGS     Additional Pathologic Findings:    Diverticulosis       Comment --     The findings were discussed with Dr. Linn at 11:40 on June 8, 2017.       Gross Description --     The specimen is labeled \"left colon stitch marks distal end\".  A segment of colon measures 26 cm in length; it varies from 4-8 cm in circumference.  A white, braided stitch marks the distal margin.  A tumor lies 6 cm proximal to this margin.  The tumor measures 5.0 x 4.0 x 2.5 cm.  It lies 16 cm from the proximal margin of excision and 8 cm from the mesenteric margin of excision.  It extends around the full circumference of the colon.  It is ulcerated and invades through the full thickness of the bowel wall to involve serosal adipose tissue.  The proximal and distal margins are submitted in blocks 1A and 1B, respectively.  Sections of tumor are submitted in blocks 1C-1H.  Numerous diverticula are also noted.  These are especially prominent distal to the tumor but are also present proximal to the tumor.  Sections are submitted in block 1I.  Mesenteric lymph nodes are submitted in blocks 1J-1M.  Some lymph nodes are sectioned and marked with like colors of ink.  Also present in the container is an additional segment of colon measuring 10 cm in length and 4 cm in circumference.  This represents additional sigmoid colon (distal margin).  It shows numerous diverticula and some thickening of the mucosal wall, but no tumors or mucosal lesions.  Margins of excision are submitted in blocks 1N and 1O.  A section of diverticula is submitted in block 1P.  Mesenteric lymph nodes are submitted in block 1Q.  One lymph " node is bisected and marked with green ink.       Embedded Images --    POC Glucose Fingerstick [[id number]]  (Abnormal) Collected:  06/08/17 1642    Specimen:  Blood Updated:  06/08/17 1703     Glucose 187 (H) mg/dL       Sliding Scale AdminMeter: [id number]: [id number] ANGELICA LIZAMA       POC Glucose Fingerstick [[id number]]  (Abnormal) Collected:  06/08/17 2142    Specimen:  Blood Updated:  06/08/17 2241     Glucose 181 (H) mg/dL       RN NotifiedMeter: [id number]: [id number] MADISYN LOZA       Potassium [[id number]]  (Abnormal) Collected:  06/09/17 0021    Specimen:  Blood Updated:  06/09/17 0033     Potassium 3.4 (L) mmol/L     POC Glucose Fingerstick [[id number]]  (Abnormal) Collected:  06/09/17 0650    Specimen:  Blood Updated:  06/09/17 0721     Glucose 217 (H) mg/dL       RN NotifiedMeter: [id number]: [id number] [facility] [place]             Imaging Results (last 24 hours)     ** No results found for the last 24 hours. **          Assessment/Plan     Principal Problem:    Gastrointestinal hemorrhage with melena    Blood loss anemia    Active Problems:    Diabetes mellitus    Colon cancer - S/P colectomy    Follow up CBCs, advance diet if tolerated. PT/OT to evaluate. Lorraine Motley MD  06/09/17  10:19 AM

## 2017-06-09 NOTE — PROGRESS NOTES
ARU consult received. Patient has been working with PT, but does not have OT ordered. Will need MD to order OT and OT evaluation will have to be completed before patient can be evaluated for ARU. Have spoken to patient's RN concerning this.

## 2017-06-09 NOTE — PLAN OF CARE
Problem: Patient Care Overview (Adult)  Goal: Plan of Care Review  Outcome: Ongoing (interventions implemented as appropriate)    06/09/17 1725   Coping/Psychosocial Response Interventions   Plan Of Care Reviewed With patient;family   Outcome Evaluation   Outcome Summary/Follow up Plan OT evaluation completed. Pt aware she's in [place] but disoriented to type of place (hospital), month/year, & reason for being here. She also demonstrated significant problem-solving deficits, requiring visual & verbal cues & demonstration to follow commands for MMT & safe fxl mobility in/out of bathroom & toilet transfer. Pt initially stood without AD but was very unsteady. She required CGA for fxl mob/transfers with AD & min(A) for toileting. Pt has no medical equipment at home per family & was independent with all ADLs/mobility prior to admit. Recommend ARU/rehab placement for further therapy prior to discharge home.       Goal: Discharge Needs Assessment  Outcome: Ongoing (interventions implemented as appropriate)    06/09/17 1725   Discharge Needs Assessment   Equipment Needed After Discharge walker, rolling;bath bench;other (see comments)  (3-in-1 commode)   Discharge Facility/Level Of Care Needs rehabilitation facility   Current Discharge Risk lives alone;cognitively impaired;physical impairment         Problem: Inpatient Occupational Therapy  Goal: Transfer Training Goal 1 STG- OT  Outcome: Ongoing (interventions implemented as appropriate)    06/09/17 1725   Transfer Training OT STG   Transfer Training OT STG, Date Established 06/09/17   Transfer Training OT STG, Time to Achieve 5 - 7 days   Transfer Training OT STG, Activity Type toilet   Transfer Training OT STG, [place] Level supervision required       Goal: Dymanic Standing Balance Goal STG- OT  Outcome: Ongoing (interventions implemented as appropriate)    06/09/17 1725   Dynamic Standing Balance OT STG   Dynamic Standing Balance OT STG, Date Established  06/09/17   Dynamic Standing Balance OT STG, Time to Achieve 5 - 7 days   Dynamic Standing Balance OT STG, [place] Level supervision required   Dynamic Standing Balance OT STG, Assist Device assistive Device   Dynamic Standing Balance OT STG, Additional Goal 5 min       Goal: ADL Goal LTG- OT  Outcome: Ongoing (interventions implemented as appropriate)    06/09/17 1725   ADL OT LTG   ADL OT LTG, Date Established 06/09/17   ADL OT LTG, Time to Achieve 2 wks   ADL OT LTG, Activity Type ADL skills   ADL OT LTG, [place] Level standby assist       Goal: Functional Mobility Goal LTG- OT  Outcome: Ongoing (interventions implemented as appropriate)    06/09/17 1725   Functional Mobility OT LTG   Functional Mobility Goal OT LTG, Date Established 06/09/17   Functional Mobility Goal OT LTG, Time to Achieve 2 wks   Functional Mobility Goal OT LTG, [place] Level supervision   Functional Mobility Goal OT LTG, Distance to Achieve to the bathroom

## 2017-06-09 NOTE — SIGNIFICANT NOTE
"   06/09/17 1040   Rehab Treatment   Discipline physical therapy assistant   Treatment Not Performed patient/family declined treatment  (Pt stated, \"I have already walked this morning and I am sitting here working my book now.\")   Recommendation   PT - Next Appointment 06/10/17     "

## 2017-06-09 NOTE — THERAPY EVALUATION
Acute Care - Occupational Therapy Initial Evaluation  [facility]     Patient Name: Kierra Scales  : 1929  MRN: [id number]  Today's Date: 2017  Onset of Illness/Injury or Date of Surgery Date: 17  Date of Referral to OT: 17  Referring Physician: Dr. Hwang    Admit Date: 2017       ICD-10-CM ICD-9-CM   1. Gastrointestinal hemorrhage with melena K92.1 578.1   2. Hematuria R31.9 599.70   3. Dehydration E86.0 276.51   4. Malignant neoplasm of sigmoid colon C18.7 153.3   5. Impaired functional mobility, balance, gait, and endurance Z74.09 V49.89   6. 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)   • Hypothyroidism   • Dyspnea   • Hyperthyroidism   • Gastrointestinal hemorrhage with melena   • Diabetes mellitus   • Colon cancer   • Blood loss anemia     Past Medical History:   Diagnosis Date   • Anxiety    • Arthritis    • Bradycardia    • Dyslipidemia    • Dyspnea    • Fibrocystic disease of breast    • Hashimoto's thyroiditis    • Hypertensive disorder    • Hypothyroidism    • Pain    • Palpitations    • Paroxysmal tachycardia    • Shortness of breath    • Tricuspid valve disorders, specified as nonrheumatic    • Vitamin D deficiency      Past Surgical History:   Procedure Laterality Date   • COLON RESECTION N/A 2017    Procedure: COLON RESECTION;  Surgeon: Stefano Linn MD;  Location: [facility] OR;  Service:    • COLONOSCOPY N/A 2017    Procedure: Flexible Sigmoidoscopy;  Surgeon: Stefano Linn MD;  Location: [facility] ENDOSCOPY;  Service:    • EXTERNAL EAR SURGERY     • EYE SURGERY     • LIPOMA EXCISION     • PACEMAKER IMPLANTATION     • SALIVARY GLAND  SURGERY     • TUBAL ABDOMINAL LIGATION            OT ASSESSMENT FLOWSHEET (last 72 hours)      OT Evaluation       06/09/17 1545 06/09/17 0905 06/08/17 1110 06/07/17 1034       Rehab Evaluation    Document Type evaluation  -RW  therapy note (daily note)  -AM therapy note (daily note)  -RWA     Subjective Information agree to therapy  -RW  agree to therapy;complains of;nausea/vomiting  -AM agree to therapy  -RWA     Patient Effort, Rehab Treatment adequate  -RW  good  -AM good  -RWA     Symptoms Noted During/After Treatment none  -RW  fatigue  -AM      General Information    Patient Profile Review yes  -RW        Onset of Illness/Injury or Date of Surgery Date 06/02/17  -RW        Referring Physician Dr. Hwang  -RW        Precautions/Limitations fall precautions  -RW  fall precautions;pacemaker  -AM pacemaker;oxygen therapy device and L/min  -RWA     Prior Level of Function independent:;ADL's;cooking;cleaning;all household mobility  -RW        Equipment Currently Used at Home none  -RW        Plans/Goals Discussed With patient and family;agreed upon  -RW        Risks Reviewed patient and family:;LOB;increased discomfort;change in vital signs  -RW        Benefits Reviewed patient and family:;increase independence  -RW        Barriers to Rehab cognitive status  -RW        Living Environment    Lives With child(simeon), adult   daughter  -RW        Living Arrangements house  -RW        Home Accessibility stairs to enter home;bed and bath on same level;tub/shower is not walk in  -RW        Number of Stairs to Enter Home 1  -RW        Stair Railings at Home none  -RW        Type of Financial/Environmental Concern none  -RW        Transportation Available family or friend will provide  -RW        Living Environment Comment Pt's son reports there is a 2nd level in pt's home but she does not use it.  -RW        Clinical Impression    Date of Referral to OT 06/09/17  -RW        OT Diagnosis impaired mobility & ADLs  -RW         Impairments Found (describe specific impairments) arousal, attention, and cognition;gait, locomotion, and balance;muscle performance;other (see comments)   ADLs, functional mobility/transfers, safety, ax tolerance  -RW        Patient/Family Goals Statement home  -RW        Criteria for Skilled Therapeutic Interventions Met yes;treatment indicated  -RW        Rehab Potential good, to achieve stated therapy goals  -RW        Therapy Frequency other (see comments)   3-14 times/wk  -RW        Predicted Duration of Therapy Intervention (days/wks) until discharge  -RW        Anticipated Equipment Needs At Discharge tub bench;front wheeled walker  -RW        Anticipated Discharge Disposition inpatient rehabilitation facility  -RW        Vital Signs    Pre Systolic BP Rehab 158  -RW  150  -  -RWA     Pre Treatment Diastolic BP 64  -RW  70  -AM 70  -RWA     Post Systolic BP Rehab 150  -RW  161  -  -RWA     Post Treatment Diastolic BP 66  -RW  72  -AM 62  -RWA     Pretreatment Heart Rate (beats/min) 74  -RW  76  -AM 76  -RWA     Posttreatment Heart Rate (beats/min) 69  -RW  79  -AM 72  -RWA     Pre SpO2 (%) 95  -RW  98  -AM 98  -RWA     O2 Delivery Pre Treatment room air  -RW  room air  -AM supplemental O2  -RWA     Post SpO2 (%) 97  -RW  98  -AM      O2 Delivery Post Treatment room air  -RW  room air  -AM      Pre Patient Position Sitting  -RW  Supine  -AM      Intra Patient Position   Standing  -AM      Post Patient Position Sitting  -RW  Sitting  -AM      Pain Assessment    Pain Assessment Jason-Batista FACES  -RW  No/denies pain  -AM      Jason-Batista FACES Pain Rating 2  -RW        Vision Assessment/Intervention    Visual Impairment WFL with corrective lenses  -RW        Cognitive Assessment/Intervention    Current Cognitive/Communication Assessment impaired  -RW  functional  -AM functional  -RWA     Orientation Status oriented to;person;disoriented to;place;time;situation   aware she is in [place], unable to  "verbalize \"hospital\"  -RW  oriented x 4  -AM oriented to;person  -RWA     Follows Commands/Answers Questions able to follow single-step instructions;needs cueing;needs increased time;needs repetition  -RW  100% of the time  -AM needs cueing  -RWA     Personal Safety severe impairment;decreased awareness, need for assist;decreased awareness, need for safety;unaware of functional deficits;unaware of cognitive deficits  -RW        Personal Safety Interventions fall prevention program maintained;gait belt;nonskid shoes/slippers when out of bed;supervised activity  -RW  gait belt;nonskid shoes/slippers when out of bed;supervised activity  -AM gait belt;nonskid shoes/slippers when out of bed  -RWA     ROM (Range of Motion)    General ROM no range of motion deficits identified  -RW  no range of motion deficits identified  -AM      MMT (Manual Muscle Testing)    General MMT Assessment upper extremity strength deficits identified  -RW        General MMT Assessment Detail grossly 4/5 BUEs  -RW        Muscle Tone Assessment    Muscle Tone Assessment  --  -TO       Bilateral Upper Extremities Muscle Tone Assessment  --  -TO       Bilateral Lower Extremities Muscle Tone Assessment  --  -TO       Bed Mobility, Assessment/Treatment    Bed Mobility, Roll Left, [place]   not tested  -AM      Bed Mobility, Roll Right, [place]   not tested  -AM      Bed Mobility, Scoot/Bridge, [place]   not tested  -AM      Bed Mob, Supine to Sit, [place]   supervision required  -AM conditional independence;supervision required  -RWA     Bed Mob, Sit to Supine, [place]   not tested  -AM      Bed Mob, Sidelying to Sit, [place]   not tested  -AM      Bed Mob, Sit to Sidelying, [place]   not tested  -AM      Bed Mobility, Safety Issues   decreased use of arms for pushing/pulling;decreased use of legs for bridging/pushing  -AM      Bed Mobility, Impairments   strength decreased  -AM      Transfer Assessment/Treatment    " Transfers, Bed-Chair [place]   contact guard assist  -AM contact guard assist  -RWA     Transfers, Chair-Bed [place]   contact guard assist  -AM contact guard assist  -RWA     Transfers, Bed-Chair-Bed, Assist Device   rolling walker  -AM rolling walker  -RWA     Transfers, Sit-Stand [place] contact guard assist  -RW  contact guard assist  -AM contact guard assist  -RWA     Transfers, Stand-Sit [place] contact guard assist  -RW  contact guard assist  -AM contact guard assist  -RWA     Transfers, Sit-Stand-Sit, Assist Device   rolling walker  -AM rolling walker  -RWA     Toilet Transfer, [place] contact guard assist;verbal cues required  -RW  contact guard assist  -AM contact guard assist  -RWA     Toilet Transfer, Assistive Device bedside commode without drop arms;rolling walker  -RW  bedside commode without drop arms;rolling walker  -AM      Walk-In Shower Transfer, [place]   not tested  -AM      Bathtub Transfer, [place]   not tested  -AM      Transfer, Maintain Weight Bearing Status   able to maintain weight bearing status  -AM      Transfer, Safety Issues other (see comments)   too far away from AD  -RW  step length decreased  -AM      Transfer, Impairments strength decreased;impaired balance  -RW  strength decreased  -AM      Functional Mobility    Functional Mobility- Ind. Level contact guard assist  -RW   contact guard assist  -RWA     Functional Mobility- Device rolling walker  -RW        Functional Mobility-Distance (Feet) 12   x2  -RW        Functional Mobility- Safety Issues balance decreased during turns;other (see comments)   too far away from AD  -RW   balance decreased during turns  -RWA     Functional Mobility- Comment     in close enviornment obstacles hinder.  -RWA     Stairs Assessment/Treatment    Stairs, [place] Level   not tested  -AM      Toileting Assessment/Training    Toileting Assess/Train, Assistive Device grab bars;raised toilet seat  -RW         Toileting Assess/Train, Position sitting;standing  -RW        Toileting Assess/Train, Indepen Level minimum assist (75% patient effort);verbal cues required  -RW        Toileting Assess/Train, Impairments strength decreased;impaired balance  -RW        Sensory Assessment/Intervention    Light Touch  --  -TO       General Therapy Interventions    Planned Therapy Interventions activity intolerance;adaptive equipment training;ADL retraining;balance training;bed mobility training;energy conservation;home exercise program;strengthening;transfer training  -RW        Positioning and Restraints    Pre-Treatment Position sitting in chair/recliner  -RW  in bed  -AM in bed  -RWA     Post Treatment Position chair  -RW  chair  -AM chair  -RWA     In Chair reclined;call light within reach;encouraged to call for assist;exit alarm on;with family/caregiver  -RW  reclined;call light within reach;encouraged to call for assist;exit alarm on;legs elevated  -AM reclined;call light within reach;encouraged to call for assist  -RWA       User Key  (r) = Recorded By, (t) = Taken By, (c) = Cosigned By    Initials Name Effective Dates    RW Cami Mon, OTR/L 10/17/16 -     TO Ember Concepcion RN 10/17/16 -     AM Luciano Sanchez, PTA 10/17/16 -     RWA Danny Beebe, PTA 10/17/16 -            Occupational Therapy Education     Title: PT OT SLP Therapies (Active)     Topic: Occupational Therapy (Active)     Point: ADL training (Active)    Description: Instruct learner(s) on proper safety adaptation and remediation techniques during self care or transfers.   Instruct in proper use of assistive devices.    Learning Progress Summary    Learner Readiness Method Response Comment Documented by Status   Patient Acceptance E NR fall precautions, transfer safety, toileting RW 06/09/17 6982 Active               Point: Precautions (Active)    Description: Instruct learner(s) on prescribed precautions during self-care and functional transfers.     Learning Progress Summary    Learner Readiness Method Response Comment Documented by Status   Patient Acceptance E NR fall precautions, transfer safety, toileting  06/09/17 1645 Active                      User Key     Initials Effective Dates Name Provider Type Discipline     10/17/16 -  Cami Mon OTR/L Occupational Therapist OT                  OT Recommendation and Plan  Anticipated Equipment Needs At Discharge: tub bench, front wheeled walker  Anticipated Discharge Disposition: inpatient rehabilitation facility  Planned Therapy Interventions: activity intolerance, adaptive equipment training, ADL retraining, balance training, bed mobility training, energy conservation, home exercise program, strengthening, transfer training  Therapy Frequency: other (see comments) (3-14 times/wk)  Plan of Care Review  Plan Of Care Reviewed With: patient, family  Outcome Summary/Follow up Plan: OT evaluation completed. Pt aware she's in [place] but disoriented to type of place (hospital), month/year, & reason for being here. She also demonstrated significant problem-solving deficits, requiring visual & verbal cues & demonstration to follow commands for MMT & safe fxl mobility in/out of bathroom & toilet transfer. Pt initially stood without AD but was very unsteady. She required CGA for fxl mob/transfers & min(A) for toileting. Pt has no medical equipment at home per family & was independent with all ADLs/mobility prior to admit. Recommend ARU/rehab placement for further therapy prior to discharge home.          OT Goals       06/09/17 1725          Transfer Training OT STG    Transfer Training OT STG, Date Established 06/09/17  -RW      Transfer Training OT STG, Time to Achieve 5 - 7 days  -RW      Transfer Training OT STG, Activity Type toilet  -RW      Transfer Training OT STG, [place] Level supervision required  -RW      Dynamic Standing Balance OT STG    Dynamic Standing Balance OT STG, Date  Established 06/09/17  -RW      Dynamic Standing Balance OT STG, Time to Achieve 5 - 7 days  -RW      Dynamic Standing Balance OT STG, [place] Level supervision required  -RW      Dynamic Standing Balance OT STG, Assist Device assistive Device  -RW      Dynamic Standing Balance OT STG, Additional Goal 5 min  -RW      ADL OT LTG    ADL OT LTG, Date Established 06/09/17  -RW      ADL OT LTG, Time to Achieve 2 wks  -RW      ADL OT LTG, Activity Type ADL skills  -RW      ADL OT LTG, [place] Level standby assist  -RW      Functional Mobility OT LTG    Functional Mobility Goal OT LTG, Date Established 06/09/17  -RW      Functional Mobility Goal OT LTG, Time to Achieve 2 wks  -RW      Functional Mobility Goal OT LTG, [place] Level supervision  -RW      Functional Mobility Goal OT LTG, Distance to Achieve to the bathroom  -RW        User Key  (r) = Recorded By, (t) = Taken By, (c) = Cosigned By    Initials Name Provider Type    RW Cami Mon OTR/L Occupational Therapist                Outcome Measures       06/09/17 1545 06/08/17 1110 06/07/17 1034    How much help from another person do you currently need...    Turning from your back to your side while in flat bed without using bedrails?  3  -AM 3  -RW    Moving from lying on back to sitting on the side of a flat bed without bedrails?  3  -AM 3  -RW    Moving to and from a bed to a chair (including a wheelchair)?  3  -AM 3  -RW    Standing up from a chair using your arms (e.g., wheelchair, bedside chair)?  3  -AM 3  -RW    Climbing 3-5 steps with a railing?  1  -AM 3  -RW    To walk in hospital room?  3  -AM 3  -RW    AM-PAC 6 Clicks Score  16  -AM 18  -RW    How much help from another is currently needed...    Putting on and taking off regular lower body clothing? 2  -RWA      Bathing (including washing, rinsing, and drying) 2  -RWA      Toileting (which includes using toilet bed pan or urinal) 3  -RWA      Putting on and taking off regular upper  body clothing 3  -RWA      Taking care of personal grooming (such as brushing teeth) 3  -RWA      Eating meals 3  -RWA      Score 16  -RWA      Functional Assessment    Outcome Measure Options AM-PAC 6 Clicks Daily Activity (OT)  -RWA AM-PAC 6 Clicks Basic Mobility (PT)  -AM       User Key  (r) = Recorded By, (t) = Taken By, (c) = Cosigned By    Initials Name Provider Type    RW Cami Mon, OTR/L Occupational Therapist    AM Luciano Sanchez, PTA Physical Therapy Assistant    RESHMA Beebe, ROYAL Physical Therapy Assistant          Time Calculation:   OT Start Time: 1545  OT Stop Time: 1623  OT Time Calculation (min): 38 min    Therapy Charges for Today     Code Description Service Date Service Provider Modifiers Qty    [id number]  OT SELFCARE CURRENT 6/9/2017 Cami Esquiveleling, OTR/L GO, CK 1    [id number]  OT SELFCARE PROJECTED 6/9/2017 Cami Esquiveleling, OTR/L GO, CJ 1    [id number]  OT EVAL MOD COMPLEXITY 1 6/9/2017 Cami Esquiveleling, OTR/L GO 1    [id number]  OT SELF CARE/MGMT/TRAIN EA 15 MIN 6/9/2017 Cami E Wieling, OTR/L GO 2          OT G-codes  OT Professional Judgement Used?: Yes  OT Functional Scales Options: AM-PAC 6 Clicks Daily Activity (OT)  Score: 16  Functional Limitation: Self care  Self Care Current Status (): At least 40 percent but less than 60 percent impaired, limited or restricted  Self Care Goal Status (): At least 20 percent but less than 40 percent impaired, limited or restricted    Cami Mon, OTR/L  6/9/2017

## 2017-06-09 NOTE — PROGRESS NOTES
GENERAL SURGERY PROGRESS NOTE  Chief Complaint:  Surgery Follow up   LOS: 10 days       Subjective     Interval History:     Still with some nausea. Having diarrhea and passing gas. Has been walking.      Objective     Vital Signs  Temp:  [96.8 °F (36 °C)-99.3 °F (37.4 °C)] 98.8 °F (37.1 °C)  Heart Rate:  [68-80] 73  Resp:  [16-18] 18  BP: (161-217)/() 163/75    Physical Exam:   Abdomen soft, incisions healing well  Labs:  Lab Results (last 24 hours)     Procedure Component Value Units Date/Time    POC Glucose Fingerstick [[id number]]  (Abnormal) Collected:  06/08/17 1038    Specimen:  Blood Updated:  06/08/17 1057     Glucose 165 (H) mg/dL       Sliding Scale AdminMeter: [id number]: [id number] [place] ALDA       Tissue Exam [[id number]] Collected:  06/02/17 1423    Specimen:  Tissue from Large Intestine, Sigmoid Colon Updated:  06/08/17 1442     Case Report --     Surgical Pathology Report                         Case: [id number]                                  Authorizing Provider:  Stefano Linn MD Collected:           06/02/2017 02:23 PM          Ordering Location:     [facility]             Received:            06/05/2017 10:18 AM                                 [place]                                                              Pathologist:           Marcelino Donaldson MD                                                            Specimen:    Large Intestine, Sigmoid Colon, LEFT COLON STITCH JOSEPH DISTAL END                          Final Diagnosis --     LEFT COLON, RESECTION:  COLONIC ADENOCARCINOMA, MODERATELY DIFFERENTIATED, MARGINS FREE OF CARCINOMA.  DIVERTICULOSIS COLI.    MESENTERIC LYMPH NODES (23):  NEGATIVE FOR METASTATIC CARCINOMA.         Synoptic Checklist --     COLON AND RECTUM: Resection, Including Transanal Disk Excision of Rectal Neoplasms  (Colon Res - All Specimens)      SPECIMEN     Specimen:    Transverse colon     Specimen:    Descending colon      Specimen:    Sigmoid colon     Procedure:    Left hemicolectomy     Macroscopic Intactness of Mesorectum:    Not applicable    TUMOR     Primary Tumor Site:    Left (descending) colon     Histologic Type:    Adenocarcinoma     Histologic Grade:    Low-grade (well differentiated to moderately differentiated)     Tumor Size:    Greatest dimension (cm): 5 cm     Tumor Deposits:    Not identified       Site(s) of Direct Extent of Tumor:    Left (descending) colon       Microscopic Tumor Extension:    Tumor invades through the muscularis propria into the subserosal adipose tissue or the nonperitonealized pericolic or perirectal soft tissues but does not extend to the serosal surface       Macroscopic Tumor Perforation:    Not Identified       Lymph-Vascular Invasion:    Not identified       Perineural Invasion:    Not identified    MARGINS     Uninvolved Margins:    All margins uninvolved by invasive carcinoma       Distance of Invasive Carcinoma from Closest Margin:    Specify (cm): 8 cm       Specify Margin:    Circumferential (Radial) or Mesenteric     Margins:    For Resection Specimens Only       Proximal Margin:             Proximal Margin:    Uninvolved by invasive carcinoma           Distance of Tumor from Margin:    Specify (cm): 16 cm       Distal Margin:             Distal Margin:    Uninvolved by invasive carcinoma           Distance of Tumor from Margin (required only for rectal tumors):    Specify (cm): 16 cm       Circumferential (Radial) Margin:    Not applicable       Mesenteric Margin:    Uninvolved by invasive carcinoma         Distance of Tumor from Margin:    Specify (cm): 8 cm    LYMPH NODES     Regional Lymph Nodes:           Number of Lymph Nodes Examined:    Specify number: 23       Number of Lymph Nodes Involved:    Specify number: 0    STAGE (pTNM, AJCC 7th ed.)     Primary Tumor (pT):    pT3: Tumor invades through the muscularis propria into pericolorectal tissues     Regional Lymph Nodes  "(pN):    pN0: No regional lymph node metastasis    ADDITIONAL FINDINGS     Additional Pathologic Findings:    Diverticulosis       Comment --     The findings were discussed with Dr. Linn at 11:40 on June 8, 2017.       Gross Description --     The specimen is labeled \"left colon stitch marks distal end\".  A segment of colon measures 26 cm in length; it varies from 4-8 cm in circumference.  A white, braided stitch marks the distal margin.  A tumor lies 6 cm proximal to this margin.  The tumor measures 5.0 x 4.0 x 2.5 cm.  It lies 16 cm from the proximal margin of excision and 8 cm from the mesenteric margin of excision.  It extends around the full circumference of the colon.  It is ulcerated and invades through the full thickness of the bowel wall to involve serosal adipose tissue.  The proximal and distal margins are submitted in blocks 1A and 1B, respectively.  Sections of tumor are submitted in blocks 1C-1H.  Numerous diverticula are also noted.  These are especially prominent distal to the tumor but are also present proximal to the tumor.  Sections are submitted in block 1I.  Mesenteric lymph nodes are submitted in blocks 1J-1M.  Some lymph nodes are sectioned and marked with like colors of ink.  Also present in the container is an additional segment of colon measuring 10 cm in length and 4 cm in circumference.  This represents additional sigmoid colon (distal margin).  It shows numerous diverticula and some thickening of the mucosal wall, but no tumors or mucosal lesions.  Margins of excision are submitted in blocks 1N and 1O.  A section of diverticula is submitted in block 1P.  Mesenteric lymph nodes are submitted in block 1Q.  One lymph node is bisected and marked with green ink.       Embedded Images --    POC Glucose Fingerstick [[id number]]  (Abnormal) Collected:  06/08/17 1642    Specimen:  Blood Updated:  06/08/17 1703     Glucose 187 (H) mg/dL       Sliding Scale AdminMeter: [id number]: " [id number] [place] ALDA       POC Glucose Fingerstick [[id number]]  (Abnormal) Collected:  06/08/17 2142    Specimen:  Blood Updated:  06/08/17 2241     Glucose 181 (H) mg/dL       RN NotifiedMeter: [id number]: [id number] MADISYN LOZA       Potassium [[id number]]  (Abnormal) Collected:  06/09/17 0021    Specimen:  Blood Updated:  06/09/17 0033     Potassium 3.4 (L) mmol/L     POC Glucose Fingerstick [[id number]]  (Abnormal) Collected:  06/09/17 0650    Specimen:  Blood Updated:  06/09/17 0721     Glucose 217 (H) mg/dL       RN NotifiedMeter: [id number]: [id number] RANJANA KHAN              Results Review:    Pathology reviewed with patient and daughters      Assessment/Plan     Kierra Scales is a 88 y.o. female who is s/p left colon resection      Overall doing well.   Rehab consult.          This document has been electronically signed by Stefano Linn MD on June 9, 2017 7:53 AM        Stefano Linn MD  06/09/17  7:53 AM

## 2017-06-09 NOTE — CONSULTS
"Adult Nutrition  Assessment    Patient Name:  Kierra Scales  YOB: 1929  MRN: [id number]  Admit Date:  5/26/2017    Assessment Date:  6/9/2017          Reason for Assessment       06/09/17 1313    Reason for Assessment    Reason For Assessment/Visit follow up protocol                  Labs/Tests/Procedures/Meds       06/09/17 1314    Labs/Tests/Procedures/Meds    Labs/Tests Review Reviewed    Medication Review Reviewed, pertinent                  Evaluation of Received Nutrient/Fluid Intake       06/09/17 1314    PO Evaluation    % PO Intake 25-50-75%            Comments:  Pt w/overall good intake of soft diet, intakes 25-50-75%. Pt reports some nausea. Per RN, pt does \"ok\" w/meals. Receives glucerna. RD will continue to monitor.         Electronically signed by:  Sosa Paulino RD  06/09/17 1:19 PM  "

## 2017-06-09 NOTE — PLAN OF CARE
Problem: Patient Care Overview (Adult)  Goal: Plan of Care Review  Outcome: Ongoing (interventions implemented as appropriate)    06/09/17 1610   Coping/Psychosocial Response Interventions   Plan Of Care Reviewed With patient;family   Patient Care Overview   Progress improving   Outcome Evaluation   Outcome Summary/Follow up Plan pt ambulated halls today x1, pt states she doesn't have much of an appetite, up in chair today       Goal: Adult Individualization and Mutuality  Outcome: Ongoing (interventions implemented as appropriate)  Goal: Discharge Needs Assessment  Outcome: Ongoing (interventions implemented as appropriate)    Problem: Fluid Volume Deficit (Adult)  Goal: Fluid/Electrolyte Balance  Outcome: Ongoing (interventions implemented as appropriate)  Goal: Comfort/Well Being  Outcome: Ongoing (interventions implemented as appropriate)    Problem: Fall Risk (Adult)  Goal: Absence of Falls  Outcome: Ongoing (interventions implemented as appropriate)

## 2017-06-10 LAB
GLUCOSE BLDC GLUCOMTR-MCNC: 174 MG/DL (ref 70–130)
GLUCOSE BLDC GLUCOMTR-MCNC: 193 MG/DL (ref 70–130)
GLUCOSE BLDC GLUCOMTR-MCNC: 204 MG/DL (ref 70–130)
GLUCOSE BLDC GLUCOMTR-MCNC: 205 MG/DL (ref 70–130)

## 2017-06-10 PROCEDURE — 82962 GLUCOSE BLOOD TEST: CPT

## 2017-06-10 PROCEDURE — 25010000002 ENOXAPARIN PER 10 MG: Performed by: SURGERY

## 2017-06-10 PROCEDURE — 25010000002 ONDANSETRON PER 1 MG: Performed by: INTERNAL MEDICINE

## 2017-06-10 PROCEDURE — 99024 POSTOP FOLLOW-UP VISIT: CPT | Performed by: SURGERY

## 2017-06-10 PROCEDURE — 63710000001 INSULIN ASPART PER 5 UNITS: Performed by: NURSE PRACTITIONER

## 2017-06-10 RX ADMIN — ONDANSETRON 4 MG: 2 INJECTION INTRAMUSCULAR; INTRAVENOUS at 09:05

## 2017-06-10 RX ADMIN — DILTIAZEM HYDROCHLORIDE 180 MG: 180 CAPSULE, EXTENDED RELEASE ORAL at 08:11

## 2017-06-10 RX ADMIN — SOTALOL HYDROCHLORIDE 80 MG: 80 TABLET ORAL at 21:32

## 2017-06-10 RX ADMIN — INSULIN ASPART 3 UNITS: 100 INJECTION, SOLUTION INTRAVENOUS; SUBCUTANEOUS at 11:05

## 2017-06-10 RX ADMIN — PANTOPRAZOLE SODIUM 40 MG: 40 INJECTION, POWDER, FOR SOLUTION INTRAVENOUS at 08:11

## 2017-06-10 RX ADMIN — INSULIN ASPART 3 UNITS: 100 INJECTION, SOLUTION INTRAVENOUS; SUBCUTANEOUS at 21:43

## 2017-06-10 RX ADMIN — ENALAPRIL MALEATE 20 MG: 10 TABLET ORAL at 08:11

## 2017-06-10 RX ADMIN — ENALAPRIL MALEATE 20 MG: 10 TABLET ORAL at 17:44

## 2017-06-10 RX ADMIN — ENOXAPARIN SODIUM 40 MG: 40 INJECTION SUBCUTANEOUS at 08:33

## 2017-06-10 RX ADMIN — SODIUM CHLORIDE 50 ML/HR: 4.5 INJECTION, SOLUTION INTRAVENOUS at 08:33

## 2017-06-10 RX ADMIN — INSULIN ASPART 5 UNITS: 100 INJECTION, SOLUTION INTRAVENOUS; SUBCUTANEOUS at 08:12

## 2017-06-10 RX ADMIN — ONDANSETRON 4 MG: 2 INJECTION INTRAMUSCULAR; INTRAVENOUS at 21:06

## 2017-06-10 RX ADMIN — METHIMAZOLE 2.5 MG: 5 TABLET ORAL at 08:11

## 2017-06-10 RX ADMIN — MEMANTINE 5 MG: 5 TABLET ORAL at 08:11

## 2017-06-10 RX ADMIN — HYDROCODONE BITARTRATE AND ACETAMINOPHEN 1 TABLET: 5; 325 TABLET ORAL at 21:38

## 2017-06-10 RX ADMIN — DONEPEZIL HYDROCHLORIDE 10 MG: 10 TABLET ORAL at 21:32

## 2017-06-10 RX ADMIN — SOTALOL HYDROCHLORIDE 80 MG: 80 TABLET ORAL at 08:11

## 2017-06-10 RX ADMIN — MEMANTINE 5 MG: 5 TABLET ORAL at 17:44

## 2017-06-10 RX ADMIN — INSULIN ASPART 5 UNITS: 100 INJECTION, SOLUTION INTRAVENOUS; SUBCUTANEOUS at 17:44

## 2017-06-10 RX ADMIN — PANTOPRAZOLE SODIUM 40 MG: 40 INJECTION, POWDER, FOR SOLUTION INTRAVENOUS at 21:32

## 2017-06-10 RX ADMIN — ONDANSETRON 4 MG: 2 INJECTION INTRAMUSCULAR; INTRAVENOUS at 14:57

## 2017-06-10 NOTE — PROGRESS NOTES
LOS: 11 days   Patient Care Team:  Melissa Ch MD as PCP - General  Lindsay Ocampo MA as Medical Assistant    Chief Complaint:  Gastrointestinal hemorrhage with melena    Subjective     Interval History:   Feels OK    Objective     Vital Signs  Temp:  [97.6 °F (36.4 °C)-99.7 °F (37.6 °C)] 99.5 °F (37.5 °C)  Heart Rate:  [69-79] 79  Resp:  [18] 18  BP: (136-173)/(64-75) 155/72    Physical Exam:  Incision OK     Results Review:       Lab Results (last 24 hours)     Procedure Component Value Units Date/Time    POC Glucose Fingerstick [[id number]]  (Abnormal) Collected:  06/09/17 1104    Specimen:  Blood Updated:  06/09/17 1145     Glucose 165 (H) mg/dL       RN NotifiedMeter: [id number]: [id number] MIGUEL A GAIL       CBC & Differential [[id number]] Collected:  06/09/17 1132    Specimen:  Blood Updated:  06/09/17 1148    Narrative:       The following orders were created for panel order CBC & Differential.  Procedure                               Abnormality         Status                     ---------                               -----------         ------                     CBC Auto Differential[[id number]]        Abnormal            Final result                 Please view results for these tests on the individual orders.    CBC Auto Differential [[id number]]  (Abnormal) Collected:  06/09/17 1132    Specimen:  Blood Updated:  06/09/17 1148     WBC 6.26 10*3/mm3      RBC 3.24 (L) 10*6/mm3      Hemoglobin 9.9 (L) g/dL      Hematocrit 28.9 (L) %      MCV 89.2 fL      MCH 30.6 pg      MCHC 34.3 g/dL      RDW 13.8 %      RDW-SD 44.4 fl      MPV 10.0 fL      Platelets 323 10*3/mm3      Neutrophil % 72.9 %      Lymphocyte % 14.9 %      Monocyte % 10.7 %      Eosinophil % 0.5 %      Basophil % 0.2 %      Immature Grans % 0.8 (H) %      Neutrophils, Absolute 4.57 10*3/mm3      Lymphocytes, Absolute 0.93 10*3/mm3      Monocytes, Absolute 0.67 10*3/mm3      Eosinophils, Absolute 0.03 10*3/mm3       Basophils, Absolute 0.01 10*3/mm3      Immature Grans, Absolute 0.05 (H) 10*3/mm3      nRBC 0.0 /100 WBC     Potassium [[id number]]  (Normal) Collected:  06/09/17 1132    Specimen:  Blood Updated:  06/09/17 1151     Potassium 3.8 mmol/L     Comprehensive Metabolic Panel [[id number]]  (Abnormal) Collected:  06/09/17 1132    Specimen:  Blood Updated:  06/09/17 1201     Glucose 168 (H) mg/dL      BUN 6 (L) mg/dL      Creatinine 0.54 mg/dL      Sodium 133 (L) mmol/L      Potassium 3.8 mmol/L      Chloride 94 (L) mmol/L      CO2 25.0 mmol/L      Calcium 8.9 mg/dL      Total Protein 6.5 g/dL      Albumin 3.40 g/dL      ALT (SGPT) 29 U/L      AST (SGOT) 30 U/L      Alkaline Phosphatase 63 U/L      Total Bilirubin 0.5 mg/dL      eGFR  African Amer 129 (H) mL/min/1.73      Globulin 3.1 gm/dL      A/G Ratio 1.1 g/dL      BUN/Creatinine Ratio 11.1     Anion Gap 14.0 mmol/L     Narrative:       The MDRD GFR formula is only valid for adults with stable renal function between ages 18 and 70.    POC Glucose Fingerstick [[id number]]  (Abnormal) Collected:  06/09/17 1720    Specimen:  Blood Updated:  06/09/17 1732     Glucose 194 (H) mg/dL       RN NotifiedMeter: [id number]: [id number] GRADY GAIL       POC Glucose Fingerstick [[id number]]  (Abnormal) Collected:  06/09/17 2122    Specimen:  Blood Updated:  06/09/17 2149     Glucose 196 (H) mg/dL       Sliding Scale AdminMeter: [id number]: [id number] [facility]       POC Glucose Fingerstick [[id number]]  (Abnormal) Collected:  06/10/17 0649    Specimen:  Blood Updated:  06/10/17 0701     Glucose 205 (H) mg/dL       RN NotifiedMeter: [id number]: [id number] ALEKSANDRA WEBB             Medication Review:   Current Facility-Administered Medications   Medication Dose Route Frequency Provider Last Rate Last Dose   • bupivacaine-EPINEPHrine PF (MARCAINE w/EPI) 0.5% -1:448675 injection    PRN Stefano Linn MD   30 mL at 06/02/17 1130   • dextrose  (D50W) solution 25 g  25 g Intravenous Q15 Min PRN Vicki G Levill, APRN   25 g at 05/30/17 0828   • dextrose (GLUTOSE) oral gel 15 g  15 g Oral Q15 Min PRN Vicki G Levill, APRN       • diltiaZEM CD (CARDIZEM CD) 24 hr capsule 180 mg  180 mg Oral Daily Joan Rojas MD   180 mg at 06/10/17 0811   • donepezil (ARICEPT) tablet 10 mg  10 mg Oral Nightly Stefano Linn MD   10 mg at 06/09/17 2123   • enalapril (VASOTEC) tablet 20 mg  20 mg Oral BID Joan Rojas MD   20 mg at 06/10/17 0811   • enoxaparin (LOVENOX) syringe 40 mg  40 mg Subcutaneous Q24H Jimmy Landry MD   40 mg at 06/10/17 0833   • glucagon (human recombinant) (GLUCAGEN DIAGNOSTIC) injection 1 mg  1 mg Subcutaneous Q15 Min PRN Vicki G Levill, APRN       • HYDROcodone-acetaminophen (NORCO) 5-325 MG per tablet 1 tablet  1 tablet Oral Q6H PRN Stefano Linn MD   1 tablet at 06/07/17 1852   • insulin aspart (novoLOG) injection 0-14 Units  0-14 Units Subcutaneous 4x Daily AC & at Bedtime Vicki G Levill, APRN   5 Units at 06/10/17 0812   • memantine (NAMENDA) tablet 5 mg  5 mg Oral BID Joan Rojas MD   5 mg at 06/10/17 0811   • methIMAzole (TAPAZOLE) half tablet 2.5 mg  2.5 mg Oral Daily Joan Rojas MD   2.5 mg at 06/10/17 0811   • Morphine sulfate (PF) injection 2 mg  2 mg Intravenous Q4H PRN Stefano Linn MD       • naloxone (NARCAN) injection 0.1 mg  0.1 mg Intravenous Q5 Min PRN Stefano Linn MD       • ondansetron (ZOFRAN) injection 4 mg  4 mg Intravenous Q4H PRN Guido Motley MD   4 mg at 06/10/17 0905   • pantoprazole (PROTONIX) injection 40 mg  40 mg Intravenous Q12H Joan Rojas MD   40 mg at 06/10/17 0811   • potassium chloride 10 mEq in 100 mL IVPB  10 mEq Intravenous Q1H PRN Guido Motley  mL/hr at 06/09/17 0651 10 mEq at 06/09/17 0651   • sodium chloride 0.45 % infusion  50 mL/hr Intravenous Continuous Stefano Linn MD 50 mL/hr at 06/10/17 0833 50 mL/hr at 06/10/17 0833   • sodium  chloride 0.9 % flush 1-10 mL  1-10 mL Intravenous PRN DEBBY Syed   10 mL at 05/28/17 0021   • sotalol (BETAPACE) tablet 80 mg  80 mg Oral Q12H Angier MD Bob   80 mg at 06/10/17 0811       Assessment/Plan     Principal Problem:    Gastrointestinal hemorrhage with melena  Active Problems:    Diabetes mellitus    Colon cancer    Blood loss anemia    89 yo lady s/p left hemicolectomy  Continue supportive care  To rehab soon    Bautista Ackerman MD  06/10/17  10:22 AM

## 2017-06-10 NOTE — PLAN OF CARE
Problem: Patient Care Overview (Adult)  Goal: Plan of Care Review  Outcome: Ongoing (interventions implemented as appropriate)    06/10/17 9386   Coping/Psychosocial Response Interventions   Plan Of Care Reviewed With patient;daughter   Patient Care Overview   Progress no change       Goal: Adult Individualization and Mutuality  Outcome: Ongoing (interventions implemented as appropriate)  Goal: Discharge Needs Assessment  Outcome: Ongoing (interventions implemented as appropriate)    Problem: Fluid Volume Deficit (Adult)  Goal: Fluid/Electrolyte Balance  Outcome: Ongoing (interventions implemented as appropriate)  Goal: Comfort/Well Being  Outcome: Ongoing (interventions implemented as appropriate)    Problem: Fall Risk (Adult)  Goal: Absence of Falls  Outcome: Ongoing (interventions implemented as appropriate)

## 2017-06-10 NOTE — PROGRESS NOTES
Progress Note  Guido Motley MD  Hospitalist     LOS: 11 days   Patient Care Team:  Melissa Ch MD as PCP    Chief Complaint: rectal bleeding    Subjective     Interval History:     Patient: She was diagnosed and operated upon (L hemicolectomy) due to colon cancer. She is able to ambulate with assistance. At times she is slightly confused. She feels better - she is able to participate with therapy.    History taken from: patient    Medication Review:   Current Facility-Administered Medications   Medication Dose Route Frequency Provider Last Rate Last Dose   • bupivacaine-EPINEPHrine PF (MARCAINE w/EPI) 0.5% -1:734488 injection    PRN Stefano Linn MD   30 mL at 06/02/17 1130   • dextrose (D50W) solution 25 g  25 g Intravenous Q15 Min PRN Vicki G Levill, APRN   25 g at 05/30/17 0828   • dextrose (GLUTOSE) oral gel 15 g  15 g Oral Q15 Min PRN Vicki G Levill, APRN       • diltiaZEM CD (CARDIZEM CD) 24 hr capsule 180 mg  180 mg Oral Daily Joan Rojas MD   180 mg at 06/10/17 0811   • donepezil (ARICEPT) tablet 10 mg  10 mg Oral Nightly Stefano Linn MD   10 mg at 06/09/17 2123   • enalapril (VASOTEC) tablet 20 mg  20 mg Oral BID Joan Rojas MD   20 mg at 06/10/17 0811   • enoxaparin (LOVENOX) syringe 40 mg  40 mg Subcutaneous Q24H Jimmy Landry MD   40 mg at 06/10/17 0833   • glucagon (human recombinant) (GLUCAGEN DIAGNOSTIC) injection 1 mg  1 mg Subcutaneous Q15 Min PRN Vicki G Levill, APRN       • HYDROcodone-acetaminophen (NORCO) 5-325 MG per tablet 1 tablet  1 tablet Oral Q6H PRN Stefano Linn MD   1 tablet at 06/07/17 1852   • insulin aspart (novoLOG) injection 0-14 Units  0-14 Units Subcutaneous 4x Daily AC & at Bedtime Vicki G Levill, APRN   3 Units at 06/10/17 1105   • memantine (NAMENDA) tablet 5 mg  5 mg Oral BID Joan Rojas MD   5 mg at 06/10/17 0811   • methIMAzole (TAPAZOLE) half tablet 2.5 mg  2.5 mg Oral Daily Samer MD Bob   2.5 mg at 06/10/17 0811   •  Morphine sulfate (PF) injection 2 mg  2 mg Intravenous Q4H PRN Stefano Linn MD       • naloxone (NARCAN) injection 0.1 mg  0.1 mg Intravenous Q5 Min PRN Stefano Linn MD       • ondansetron (ZOFRAN) injection 4 mg  4 mg Intravenous Q4H PRN Guido Motley MD   4 mg at 06/10/17 0905   • pantoprazole (PROTONIX) injection 40 mg  40 mg Intravenous Q12H Joan Rojas MD   40 mg at 06/10/17 0811   • potassium chloride 10 mEq in 100 mL IVPB  10 mEq Intravenous Q1H PRN Guido Motley  mL/hr at 06/09/17 0651 10 mEq at 06/09/17 0651   • sodium chloride 0.45 % infusion  50 mL/hr Intravenous Continuous Stefano Linn MD 50 mL/hr at 06/10/17 0833 50 mL/hr at 06/10/17 0833   • sodium chloride 0.9 % flush 1-10 mL  1-10 mL Intravenous PRN DEBBY Syed   10 mL at 05/28/17 0021   • sotalol (BETAPACE) tablet 80 mg  80 mg Oral Q12H Joan Rojas MD   80 mg at 06/10/17 0811       Review of Systems:   Review of Systems   Constitutional: Positive for fatigue. Negative for fever.   Respiratory: Negative for cough, shortness of breath and wheezing.    Cardiovascular: Negative for chest pain.   Gastrointestinal: Positive for abdominal distention and nausea. Negative for abdominal pain, anal bleeding, constipation, diarrhea and vomiting.   Genitourinary: Negative for dysuria and urgency.   Musculoskeletal: Positive for arthralgias.   Skin: Positive for pallor.   Neurological: Positive for weakness and light-headedness.   Psychiatric/Behavioral: Negative for agitation and behavioral problems.   All other systems reviewed and are negative.      Objective     Vital Signs  Temp:  [97.6 °F (36.4 °C)-99.7 °F (37.6 °C)] 99.1 °F (37.3 °C)  Heart Rate:  [69-79] 74  Resp:  [18] 18  BP: (136-173)/(64-75) 152/67    Physical Exam:  Physical Exam   Constitutional: She is oriented to person, place, and time. She appears well-developed and well-nourished.   HENT:   Head: Normocephalic and atraumatic.   Eyes: EOM  are normal. Pupils are equal, round, and reactive to light. No scleral icterus.   Neck: Normal range of motion. Neck supple.   Cardiovascular: Normal rate and regular rhythm.    Pulmonary/Chest: Effort normal and breath sounds normal. No respiratory distress. She has no wheezes.   Abdominal: Soft. Bowel sounds are normal. She exhibits no distension. There is tenderness (minimal). There is no rebound and no guarding.   Musculoskeletal: She exhibits no edema or tenderness.   Neurological: She is alert and oriented to person, place, and time. No cranial nerve deficit.   Skin: Skin is warm and dry. There is pallor.   Psychiatric: She has a normal mood and affect. Her behavior is normal.   Vitals reviewed.       Results Review:    Lab Results (last 24 hours)     Procedure Component Value Units Date/Time    Comprehensive Metabolic Panel [[id number]]  (Abnormal) Collected:  06/09/17 1132    Specimen:  Blood Updated:  06/09/17 1201     Glucose 168 (H) mg/dL      BUN 6 (L) mg/dL      Creatinine 0.54 mg/dL      Sodium 133 (L) mmol/L      Potassium 3.8 mmol/L      Chloride 94 (L) mmol/L      CO2 25.0 mmol/L      Calcium 8.9 mg/dL      Total Protein 6.5 g/dL      Albumin 3.40 g/dL      ALT (SGPT) 29 U/L      AST (SGOT) 30 U/L      Alkaline Phosphatase 63 U/L      Total Bilirubin 0.5 mg/dL      eGFR  African Amer 129 (H) mL/min/1.73      Globulin 3.1 gm/dL      A/G Ratio 1.1 g/dL      BUN/Creatinine Ratio 11.1     Anion Gap 14.0 mmol/L     Narrative:       The MDRD GFR formula is only valid for adults with stable renal function between ages 18 and 70.    POC Glucose Fingerstick [[id number]]  (Abnormal) Collected:  06/09/17 1720    Specimen:  Blood Updated:  06/09/17 1732     Glucose 194 (H) mg/dL       RN NotifiedMeter: [id number]: [id number] MIGUEL A GAIL       POC Glucose Fingerstick [[id number]]  (Abnormal) Collected:  06/09/17 2122    Specimen:  Blood Updated:  06/09/17 2149     Glucose 196 (H) mg/dL       Sliding Scale  AdminMeter: [id number]: [id number] THERESA PALOMARES       POC Glucose Fingerstick [[id number]]  (Abnormal) Collected:  06/10/17 0649    Specimen:  Blood Updated:  06/10/17 0701     Glucose 205 (H) mg/dL       RN NotifiedMeter: [id number]: [id number] [facility]       POC Glucose Fingerstick [[id number]]  (Abnormal) Collected:  06/10/17 1104    Specimen:  Blood Updated:  06/10/17 1119     Glucose 174 (H) mg/dL       RN NotifiedMeter: [id number]: [id number] MAXIMUS SHAYY             Imaging Results (last 24 hours)     ** No results found for the last 24 hours. **          Assessment/Plan     Principal Problem:    Gastrointestinal hemorrhage with melena / resolved    Blood loss anemia    Active Problems:    Diabetes mellitus    Colon cancer - S/P colectomy    Follow up CBCs, advance diet. PT/OT to evaluate. ARU consult.    Guido Motley MD  06/10/17  11:59 AM

## 2017-06-11 LAB
BASOPHILS # BLD AUTO: 0.01 10*3/MM3 (ref 0–0.2)
BASOPHILS NFR BLD AUTO: 0.2 % (ref 0–2)
DEPRECATED RDW RBC AUTO: 44.8 FL (ref 36.4–46.3)
EOSINOPHIL # BLD AUTO: 0.05 10*3/MM3 (ref 0–0.7)
EOSINOPHIL NFR BLD AUTO: 0.9 % (ref 0–7)
ERYTHROCYTE [DISTWIDTH] IN BLOOD BY AUTOMATED COUNT: 13.6 % (ref 11.5–14.5)
GLUCOSE BLDC GLUCOMTR-MCNC: 187 MG/DL (ref 70–130)
GLUCOSE BLDC GLUCOMTR-MCNC: 210 MG/DL (ref 70–130)
GLUCOSE BLDC GLUCOMTR-MCNC: 219 MG/DL (ref 70–130)
GLUCOSE BLDC GLUCOMTR-MCNC: 239 MG/DL (ref 70–130)
HCT VFR BLD AUTO: 28.8 % (ref 35–45)
HGB BLD-MCNC: 9.6 G/DL (ref 12–15.5)
IMM GRANULOCYTES # BLD: 0.03 10*3/MM3 (ref 0–0.02)
IMM GRANULOCYTES NFR BLD: 0.5 % (ref 0–0.5)
LYMPHOCYTES # BLD AUTO: 0.97 10*3/MM3 (ref 0.6–4.2)
LYMPHOCYTES NFR BLD AUTO: 17.3 % (ref 10–50)
MCH RBC QN AUTO: 30.2 PG (ref 26.5–34)
MCHC RBC AUTO-ENTMCNC: 33.3 G/DL (ref 31.4–36)
MCV RBC AUTO: 90.6 FL (ref 80–98)
MONOCYTES # BLD AUTO: 0.67 10*3/MM3 (ref 0–0.9)
MONOCYTES NFR BLD AUTO: 12 % (ref 0–12)
NEUTROPHILS # BLD AUTO: 3.87 10*3/MM3 (ref 2–8.6)
NEUTROPHILS NFR BLD AUTO: 69.1 % (ref 37–80)
PLATELET # BLD AUTO: 300 10*3/MM3 (ref 150–450)
PMV BLD AUTO: 9.5 FL (ref 8–12)
RBC # BLD AUTO: 3.18 10*6/MM3 (ref 3.77–5.16)
WBC NRBC COR # BLD: 5.6 10*3/MM3 (ref 3.2–9.8)

## 2017-06-11 PROCEDURE — 97530 THERAPEUTIC ACTIVITIES: CPT

## 2017-06-11 PROCEDURE — 63710000001 INSULIN ASPART PER 5 UNITS: Performed by: NURSE PRACTITIONER

## 2017-06-11 PROCEDURE — 99024 POSTOP FOLLOW-UP VISIT: CPT | Performed by: SURGERY

## 2017-06-11 PROCEDURE — 25010000002 HYDRALAZINE PER 20 MG: Performed by: HOSPITALIST

## 2017-06-11 PROCEDURE — 97116 GAIT TRAINING THERAPY: CPT

## 2017-06-11 PROCEDURE — 85025 COMPLETE CBC W/AUTO DIFF WBC: CPT | Performed by: INTERNAL MEDICINE

## 2017-06-11 PROCEDURE — 25010000002 ENOXAPARIN PER 10 MG: Performed by: SURGERY

## 2017-06-11 PROCEDURE — 82962 GLUCOSE BLOOD TEST: CPT

## 2017-06-11 RX ORDER — HYDRALAZINE HYDROCHLORIDE 20 MG/ML
10 INJECTION INTRAMUSCULAR; INTRAVENOUS EVERY 6 HOURS PRN
Status: DISCONTINUED | OUTPATIENT
Start: 2017-06-11 | End: 2017-06-12 | Stop reason: HOSPADM

## 2017-06-11 RX ADMIN — DONEPEZIL HYDROCHLORIDE 10 MG: 10 TABLET ORAL at 20:34

## 2017-06-11 RX ADMIN — DILTIAZEM HYDROCHLORIDE 180 MG: 180 CAPSULE, EXTENDED RELEASE ORAL at 09:11

## 2017-06-11 RX ADMIN — INSULIN ASPART 5 UNITS: 100 INJECTION, SOLUTION INTRAVENOUS; SUBCUTANEOUS at 09:12

## 2017-06-11 RX ADMIN — SOTALOL HYDROCHLORIDE 80 MG: 80 TABLET ORAL at 09:12

## 2017-06-11 RX ADMIN — INSULIN ASPART 5 UNITS: 100 INJECTION, SOLUTION INTRAVENOUS; SUBCUTANEOUS at 20:34

## 2017-06-11 RX ADMIN — HYDROCODONE BITARTRATE AND ACETAMINOPHEN 1 TABLET: 5; 325 TABLET ORAL at 10:03

## 2017-06-11 RX ADMIN — METHIMAZOLE 2.5 MG: 5 TABLET ORAL at 09:11

## 2017-06-11 RX ADMIN — ENOXAPARIN SODIUM 40 MG: 40 INJECTION SUBCUTANEOUS at 09:12

## 2017-06-11 RX ADMIN — INSULIN ASPART 5 UNITS: 100 INJECTION, SOLUTION INTRAVENOUS; SUBCUTANEOUS at 17:16

## 2017-06-11 RX ADMIN — MEMANTINE 5 MG: 5 TABLET ORAL at 09:12

## 2017-06-11 RX ADMIN — HYDRALAZINE HYDROCHLORIDE 10 MG: 20 INJECTION INTRAMUSCULAR; INTRAVENOUS at 01:31

## 2017-06-11 RX ADMIN — PANTOPRAZOLE SODIUM 40 MG: 40 INJECTION, POWDER, FOR SOLUTION INTRAVENOUS at 09:12

## 2017-06-11 RX ADMIN — SOTALOL HYDROCHLORIDE 80 MG: 80 TABLET ORAL at 20:34

## 2017-06-11 RX ADMIN — PANTOPRAZOLE SODIUM 40 MG: 40 INJECTION, POWDER, FOR SOLUTION INTRAVENOUS at 20:34

## 2017-06-11 RX ADMIN — MEMANTINE 5 MG: 5 TABLET ORAL at 17:16

## 2017-06-11 RX ADMIN — ENALAPRIL MALEATE 20 MG: 10 TABLET ORAL at 09:12

## 2017-06-11 RX ADMIN — INSULIN ASPART 3 UNITS: 100 INJECTION, SOLUTION INTRAVENOUS; SUBCUTANEOUS at 11:43

## 2017-06-11 NOTE — PROGRESS NOTES
Progress Note  Guido Motley MD  Hospitalist     LOS: 12 days   Patient Care Team:  Melissa Ch MD as PCP    Chief Complaint: rectal bleeding    Subjective     Interval History:     Patient: She was diagnosed and operated upon (L hemicolectomy) due to colon cancer. She is able to ambulate with assistance. She feels better - she is able to participate with therapy.    History taken from: patient    Medication Review:   Current Facility-Administered Medications   Medication Dose Route Frequency Provider Last Rate Last Dose   • bupivacaine-EPINEPHrine PF (MARCAINE w/EPI) 0.5% -1:476807 injection    PRN Stefano Linn MD   30 mL at 06/02/17 1130   • dextrose (D50W) solution 25 g  25 g Intravenous Q15 Min PRN Vicki G Levill, APRN   25 g at 05/30/17 0828   • dextrose (GLUTOSE) oral gel 15 g  15 g Oral Q15 Min PRN Vicki G Levill, APRN       • diltiaZEM CD (CARDIZEM CD) 24 hr capsule 180 mg  180 mg Oral Daily Joan Rojas MD   180 mg at 06/11/17 0911   • donepezil (ARICEPT) tablet 10 mg  10 mg Oral Nightly Stefano Linn MD   10 mg at 06/10/17 2132   • enalapril (VASOTEC) tablet 20 mg  20 mg Oral BID Joan Rojas MD   20 mg at 06/11/17 0912   • enoxaparin (LOVENOX) syringe 40 mg  40 mg Subcutaneous Q24H Jimmy Landry MD   40 mg at 06/11/17 0912   • glucagon (human recombinant) (GLUCAGEN DIAGNOSTIC) injection 1 mg  1 mg Subcutaneous Q15 Min PRN Vicki G Levill, APRN       • hydrALAZINE (APRESOLINE) injection 10 mg  10 mg Intravenous Q6H PRN Raymond Rivera MD   10 mg at 06/11/17 0131   • HYDROcodone-acetaminophen (NORCO) 5-325 MG per tablet 1 tablet  1 tablet Oral Q6H PRN Stefano Linn MD   1 tablet at 06/11/17 1003   • insulin aspart (novoLOG) injection 0-14 Units  0-14 Units Subcutaneous 4x Daily AC & at Bedtime Vicki G Sydill, APRN   5 Units at 06/11/17 0912   • memantine (NAMENDA) tablet 5 mg  5 mg Oral BID Samer MD Bob   5 mg at 06/11/17 0912   • methIMAzole (TAPAZOLE) half  tablet 2.5 mg  2.5 mg Oral Daily Joan Rojas MD   2.5 mg at 06/11/17 0911   • Morphine sulfate (PF) injection 2 mg  2 mg Intravenous Q4H PRN Stefano Linn MD       • naloxone (NARCAN) injection 0.1 mg  0.1 mg Intravenous Q5 Min PRN Stefano Linn MD       • ondansetron (ZOFRAN) injection 4 mg  4 mg Intravenous Q4H PRN Guido Motley MD   4 mg at 06/10/17 2106   • pantoprazole (PROTONIX) injection 40 mg  40 mg Intravenous Q12H Joan Rojas MD   40 mg at 06/11/17 0912   • potassium chloride 10 mEq in 100 mL IVPB  10 mEq Intravenous Q1H PRN Guido Motley  mL/hr at 06/09/17 0651 10 mEq at 06/09/17 0651   • sodium chloride 0.9 % flush 1-10 mL  1-10 mL Intravenous PRN DEBBY Syed   10 mL at 05/28/17 0021   • sotalol (BETAPACE) tablet 80 mg  80 mg Oral Q12H Joan Rojas MD   80 mg at 06/11/17 0912       Review of Systems:   Review of Systems   Constitutional: Positive for fatigue. Negative for fever.   Respiratory: Negative for cough, shortness of breath and wheezing.    Cardiovascular: Negative for chest pain.   Gastrointestinal: Positive for abdominal distention and nausea. Negative for abdominal pain, anal bleeding, constipation, diarrhea and vomiting.   Genitourinary: Negative for dysuria and urgency.   Musculoskeletal: Positive for arthralgias.   Skin: Positive for pallor.   Neurological: Positive for weakness and light-headedness.   Psychiatric/Behavioral: Negative for agitation and behavioral problems.   All other systems reviewed and are negative.      Objective     Vital Signs  Temp:  [97.9 °F (36.6 °C)-99.3 °F (37.4 °C)] 98.7 °F (37.1 °C)  Heart Rate:  [69-90] 90  Resp:  [18] 18  BP: (115-180)/(55-84) 115/59    Physical Exam:  Physical Exam   Constitutional: She is oriented to person, place, and time. She appears well-developed and well-nourished.   HENT:   Head: Normocephalic and atraumatic.   Eyes: EOM are normal. Pupils are equal, round, and reactive to light. No  scleral icterus.   Neck: Normal range of motion. Neck supple.   Cardiovascular: Normal rate and regular rhythm.    Pulmonary/Chest: Effort normal and breath sounds normal. No respiratory distress. She has no wheezes.   Abdominal: Soft. Bowel sounds are normal. She exhibits no distension. There is tenderness (minimal). There is no rebound and no guarding.   Musculoskeletal: She exhibits no edema or tenderness.   Neurological: She is alert and oriented to person, place, and time. No cranial nerve deficit.   Skin: Skin is warm and dry. There is pallor.   Psychiatric: She has a normal mood and affect. Her behavior is normal.   Vitals reviewed.       Results Review:    Lab Results (last 24 hours)     Procedure Component Value Units Date/Time    POC Glucose Fingerstick [[id number]]  (Abnormal) Collected:  06/10/17 1104    Specimen:  Blood Updated:  06/10/17 1119     Glucose 174 (H) mg/dL       RN NotifiedMeter: [id number]: [id number] MAXIMUS SHAYY       POC Glucose Fingerstick [[id number]]  (Abnormal) Collected:  06/10/17 1742    Specimen:  Blood Updated:  06/10/17 1759     Glucose 204 (H) mg/dL       RN NotifiedMeter: [id number]: [id number] GOMEZ LAGOS       POC Glucose Fingerstick [[id number]]  (Abnormal) Collected:  06/10/17 2143    Specimen:  Blood Updated:  06/10/17 2222     Glucose 193 (H) mg/dL       Sliding Scale AdminMeter: [id number]: [id number] [facility]       CBC & Differential [[id number]] Collected:  06/11/17 0435    Specimen:  Blood Updated:  06/11/17 0459    Narrative:       The following orders were created for panel order CBC & Differential.  Procedure                               Abnormality         Status                     ---------                               -----------         ------                     CBC Auto Differential[[id number]]        Abnormal            Final result                 Please view results for these tests on the individual orders.    CBC Auto  Differential [[id number]]  (Abnormal) Collected:  06/11/17 0435    Specimen:  Blood Updated:  06/11/17 0459     WBC 5.60 10*3/mm3      RBC 3.18 (L) 10*6/mm3      Hemoglobin 9.6 (L) g/dL      Hematocrit 28.8 (L) %      MCV 90.6 fL      MCH 30.2 pg      MCHC 33.3 g/dL      RDW 13.6 %      RDW-SD 44.8 fl      MPV 9.5 fL      Platelets 300 10*3/mm3      Neutrophil % 69.1 %      Lymphocyte % 17.3 %      Monocyte % 12.0 %      Eosinophil % 0.9 %      Basophil % 0.2 %      Immature Grans % 0.5 %      Neutrophils, Absolute 3.87 10*3/mm3      Lymphocytes, Absolute 0.97 10*3/mm3      Monocytes, Absolute 0.67 10*3/mm3      Eosinophils, Absolute 0.05 10*3/mm3      Basophils, Absolute 0.01 10*3/mm3      Immature Grans, Absolute 0.03 (H) 10*3/mm3     POC Glucose Fingerstick [[id number]]  (Abnormal) Collected:  06/11/17 0636    Specimen:  Blood Updated:  06/11/17 0654     Glucose 210 (H) mg/dL       RN NotifiedMeter: [id number]: [id number] [facility]             Imaging Results (last 24 hours)     ** No results found for the last 24 hours. **          Assessment/Plan     Principal Problem:    Gastrointestinal hemorrhage with melena / resolved    Blood loss anemia    Active Problems:    Diabetes mellitus    Colon cancer - S/P colectomy    Follow up CBCs, advance diet. PT/OT to evaluate. ARU consult.    Guido Motley MD  06/11/17  10:59 AM

## 2017-06-11 NOTE — THERAPY TREATMENT NOTE
Acute Care - Physical Therapy Treatment Note  [facility]     Patient Name: Kierra Scales  : 1929  MRN: [id number]  Today's Date: 2017  Onset of Illness/Injury or Date of Surgery Date: 17  Date of Referral to PT: 17  Referring Physician: Dr. Hwang    Admit Date: 2017    Visit Dx:    ICD-10-CM ICD-9-CM   1. Gastrointestinal hemorrhage with melena K92.1 578.1   2. Hematuria R31.9 599.70   3. Dehydration E86.0 276.51   4. Malignant neoplasm of sigmoid colon C18.7 153.3   5. Impaired functional mobility, balance, gait, and endurance Z74.09 V49.89   6. 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)   • Hypothyroidism   • Dyspnea   • Hyperthyroidism   • Gastrointestinal hemorrhage with melena   • Diabetes mellitus   • Colon cancer   • Blood loss anemia               Adult Rehabilitation Note       17 1403 17 1000       Rehab Assessment/Intervention    Discipline physical therapy assistant  -TW occupational therapy assistant  -RC     Document Type therapy note (daily note)  -TW therapy note (daily note)  -RC     Subjective Information agree to therapy  -TW      Patient Effort, Rehab Treatment adequate  -TW      Symptoms Noted During/After Treatment none  -TW      Precautions/Limitations fall precautions  -TW      Recorded by [TW] Franco Byers PTA [RC] ROHAN Gomez/L     Vital Signs    Pre Systolic BP Rehab 99  -TW      Pre Treatment Diastolic BP 48  -TW      Post Systolic BP Rehab 110  -TW      Post Treatment Diastolic BP 58  -TW      Pretreatment Heart Rate (beats/min) 62  -TW      Posttreatment Heart Rate (beats/min) 68  -TW      Pre SpO2 (%) 95  -TW       O2 Delivery Pre Treatment room air  -TW      Post SpO2 (%) 97  -TW      Pre Patient Position Sitting  -TW      Intra Patient Position Standing  -TW      Post Patient Position Sitting  -TW      Recorded by [TW] Franco Byers PTA      Cognitive Assessment/Intervention    Current Cognitive/Communication Assessment impaired  -TW      Orientation Status oriented to;person  -TW      Follows Commands/Answers Questions able to follow single-step instructions;needs cueing;needs repetition  -TW      Personal Safety Interventions gait belt;nonskid shoes/slippers when out of bed  -TW      Recorded by [TW] Franco Byers PTA      Transfer Assessment/Treatment    Transfers, Bed-Chair [place] contact guard assist  -TW      Transfers, Chair-Bed [place] contact guard assist  -TW      Transfers, Bed-Chair-Bed, Assist Device rolling walker  -TW      Transfers, Sit-Stand [place] contact guard assist  -TW      Transfers, Stand-Sit [place] contact guard assist  -TW      Transfers, Sit-Stand-Sit, Assist Device rolling walker  -TW      Recorded by [TW] Franco Byers PTA      Gait Assessment/Treatment    Gait, [place] Level stand by assist;contact guard assist  -TW      Gait, Assistive Device rolling walker  -TW      Gait, Distance (Feet) 126   then 300  -TW      Gait, Gait Pattern Analysis swing-through gait  -TW      Recorded by [TW] Franco Byers PTA      Positioning and Restraints    Pre-Treatment Position sitting in chair/recliner  -TW      Post Treatment Position chair  -TW      In Chair reclined;call light within reach;encouraged to call for assist;with family/caregiver  -TW      Recorded by [TW] Franco Byers PTA        User Key  (r) = Recorded By, (t) = Taken By, (c) = Cosigned By    Initials Name Effective Dates    TW Franco Byers PTA 10/17/16 -     JOSÉ MIGUEL Rodas 10/17/16 -                 IP PT Goals       06/11/17 1403 06/08/17 1110 06/07/17 1228    Bed  Mobility PT LTG    Bed Mobility PT LTG, Date Goal Reviewed 06/11/17  -TW 06/08/17  -AM 06/07/17  -RW    Bed Mobility PT LTG, Outcome goal ongoing  -TW goal not met  -AM goal ongoing  -RW    Gait Training PT STG    Gait Training Goal PT STG, Date Goal Reviewed 06/11/17  -TW 06/08/17  -AM 06/07/17  -RW    Gait Training Goal PT STG, Outcome goal ongoing  -TW goal not met  -AM goal ongoing  -RW    Stair Training PT LTG    Stair Training Goal PT LTG, Date Goal Reviewed 06/11/17  -TW 06/08/17  -AM 06/07/17  -RW    Stair Training Goal PT LTG, Outcome goal ongoing  -TW goal not met  -AM goal ongoing  -RW      06/06/17 1325 06/05/17 1652       Bed Mobility PT LTG    Bed Mobility PT LTG, Date Established  06/05/17  -GB     Bed Mobility PT LTG, Time to Achieve  5 - 7 days  -GB     Bed Mobility PT LTG, Activity Type  all bed mobility  -GB     Bed Mobility PT LTG, [place] Level  conditional independence  -GB     Bed Mobility PT LTG, Date Goal Reviewed 06/06/17  -AM      Bed Mobility PT LTG, Outcome goal not met  -AM goal not met  -GB     Transfer Training PT LTG    Transfer Training PT LTG, Date Established  06/05/17  -GB     Transfer Training PT LTG, Time to Achieve  5 - 7 days  -GB     Transfer Training PT LTG, Activity Type  all transfers  -GB     Transfer Training PT LTG, [place] Level  contact guard assist  -GB     Transfer Training PT LTG, Assist Device  walker, rolling  -GB     Transfer Training PT  LTG, Date Goal Reviewed 06/06/17  -AM      Transfer Training PT LTG, Outcome goal met  -AM goal not met  -GB     Gait Training PT STG    Gait Training Goal PT STG, Date Established  06/05/17  -GB     Gait Training Goal PT STG, Time to Achieve  2 wks  -GB     Gait Training Goal PT STG, [place] Level  conditional independence  -GB     Gait Training Goal PT STG, Assist Device  walker, rolling  -GB     Gait Training Goal PT STG, Distance to Achieve  150 ft w/ RW and CGA x 1  -GB     Gait Training Goal PT STG,  Additional Goal  300 ft w/ RW and SBA x 1   -GB     Gait Training Goal PT STG, Date Goal Reviewed 06/06/17  -AM      Gait Training Goal PT STG, Outcome goal not met  -AM goal not met  -GB     Stair Training PT LTG    Stair Training Goal PT LTG, Date Established  06/05/17  -GB     Stair Training Goal PT LTG, Time to Achieve  by discharge  -GB     Stair Training Goal PT LTG, [place] Level  conditional independence  -GB     Stair Training Goal PT LTG, Assist Device  walker, rolling  -GB     Stair Training Goal PT LTG, Distance to Achieve  up/down 1 step  -GB     Stair Training Goal PT LTG, Date Goal Reviewed 06/06/17  -AM      Stair Training Goal PT LTG, Outcome goal not met  -AM goal not met  -GB       User Key  (r) = Recorded By, (t) = Taken By, (c) = Cosigned By    Initials Name Provider Type    GB Cristin Matthews, PT Physical Therapist    AM Luciano Sanchez, PTA Physical Therapy Assistant    TW Franco Byers, PTA Physical Therapy Assistant    RW Danny Beebe, PTA Physical Therapy Assistant          Physical Therapy Education     Title: PT OT SLP Therapies (Active)     Topic: Physical Therapy (Active)     Point: Mobility training (Active)    Learning Progress Summary    Learner Readiness Method Response Comment Documented by Status   Patient Acceptance E NR reinforced use of walker and to call for assist when needed.  06/07/17 1227 Active                      User Key     Initials Effective Dates Name Provider Type Discipline     10/17/16 -  Danny Beebe, PTA Physical Therapy Assistant PT                    PT Recommendation and Plan  Anticipated Discharge Disposition: inpatient rehabilitation facility  Planned Therapy Interventions: gait training, bed mobility training, balance training, patient/family education, ROM (Range of Motion), strengthening, stair training, transfer training  PT Frequency: other (see comments) (5-14 x /wk)  Plan of Care Review  Plan Of Care Reviewed With:  patient  Progress: improving  Outcome Summary/Follow up Plan: Pt is able to display improved tolerance to gait but cont to require increased cueing for safety and proper tech of activity.          Outcome Measures       06/11/17 1403 06/09/17 1545       How much help from another person do you currently need...    Turning from your back to your side while in flat bed without using bedrails? 3  -TW      Moving from lying on back to sitting on the side of a flat bed without bedrails? 3  -TW      Moving to and from a bed to a chair (including a wheelchair)? 3  -TW      Standing up from a chair using your arms (e.g., wheelchair, bedside chair)? 3  -TW      Climbing 3-5 steps with a railing? 2  -TW      To walk in hospital room? 3  -TW      AM-PAC 6 Clicks Score 17  -TW      How much help from another is currently needed...    Putting on and taking off regular lower body clothing?  2  -RW     Bathing (including washing, rinsing, and drying)  2  -RW     Toileting (which includes using toilet bed pan or urinal)  3  -RW     Putting on and taking off regular upper body clothing  3  -RW     Taking care of personal grooming (such as brushing teeth)  3  -RW     Eating meals  3  -RW     Score  16  -RW     Functional Assessment    Outcome Measure Options AM-PAC 6 Clicks Basic Mobility (PT)  -TW AM-PAC 6 Clicks Daily Activity (OT)  -RW       User Key  (r) = Recorded By, (t) = Taken By, (c) = Cosigned By    Initials Name Provider Type    RESHMA Mon OTR/L Occupational Therapist    TW Franco Byers PTA Physical Therapy Assistant           Time Calculation:         PT Charges       06/11/17 1553          Time Calculation    Start Time 1403  -TW      Stop Time 1429  -TW      Time Calculation (min) 26 min  -TW      PT Received On 06/11/17  -TW      Time Calculation- PT    Total Timed Code Minutes- PT 26 minute(s)  -TW        User Key  (r) = Recorded By, (t) = Taken By, (c) = Cosigned By    Initials Name Provider Type     TW Franco Byers PTA Physical Therapy Assistant          Therapy Charges for Today     Code Description Service Date Service Provider Modifiers Qty    38538492342 HC GAIT TRAINING EA 15 MIN 6/11/2017 Franco Byers PTA GP 1    75670005474 HC PT THERAPEUTIC ACT EA 15 MIN 6/11/2017 Franco Byers PTA GP 1          PT G-Codes  PT Professional Judgement Used?: Yes  Outcome Measure Options: AM-PAC 6 Clicks Basic Mobility (PT)  Score: 10  Functional Limitation: Mobility: Walking and moving around  Mobility: Walking and Moving Around Current Status (): At least 60 percent but less than 80 percent impaired, limited or restricted  Mobility: Walking and Moving Around Goal Status (): At least 1 percent but less than 20 percent impaired, limited or restricted    Franco Byers PTA  6/11/2017

## 2017-06-11 NOTE — PLAN OF CARE
Problem: Patient Care Overview (Adult)  Goal: Plan of Care Review  Outcome: Ongoing (interventions implemented as appropriate)    06/11/17 0402   Coping/Psychosocial Response Interventions   Plan Of Care Reviewed With patient   Patient Care Overview   Progress no change   Outcome Evaluation   Outcome Summary/Follow up Plan Pt rested well, BP elevated and PRN med administered, pain controlled with meds

## 2017-06-11 NOTE — SIGNIFICANT NOTE
06/11/17 1137   Rehab Treatment   Discipline occupational therapy assistant   Treatment Not Performed patient/family decline treatment, pt not feeling well  (daughter had walked pt and @ w/ bath)

## 2017-06-11 NOTE — PLAN OF CARE
Problem: Patient Care Overview (Adult)  Goal: Plan of Care Review  Outcome: Ongoing (interventions implemented as appropriate)    06/11/17 1403   Coping/Psychosocial Response Interventions   Plan Of Care Reviewed With patient   Patient Care Overview   Progress improving   Outcome Evaluation   Outcome Summary/Follow up Plan Pt is able to display improved tolerance to gait but cont to require increased cueing for safety and proper tech of activity.       Goal: Discharge Needs Assessment  Outcome: Ongoing (interventions implemented as appropriate)    05/30/17 1537 06/05/17 1652 06/06/17 1325   Discharge Needs Assessment   Concerns To Be Addressed --  --  --    Concerns Comments --  --  --    Readmission Within The Last 30 Days --  --  no previous admission in last 30 days   Equipment Needed After Discharge --  --  --    Discharge Facility/Level Of Care Needs --  --  --    Current Discharge Risk --  --  --    Discharge Disposition home healthcare service --  --    Discharge Planning Comments lives at home alone her dtr stays with her at times does not have any equipment at this time has meals on wheels  --  --    Current Health   Outpatient/Agency/Support Group Needs --  inpatient rehabilitation facility (specify);skilled nursing facility (specify) --    Anticipated Changes Related to Illness --  --  inability to care for self   Self-Care   Equipment Currently Used at Home --  --  --    Living Environment   Transportation Available --  --  --      06/06/17 1350 06/09/17 1545 06/09/17 1725   Discharge Needs Assessment   Concerns To Be Addressed --  --  --    Concerns Comments placement after discharge --  --    Readmission Within The Last 30 Days --  --  --    Equipment Needed After Discharge --  --  walker, rolling;bath bench;other (see comments)  (3-in-1 commode)   Discharge Facility/Level Of Care Needs --  --  rehabilitation facility   Current Discharge Risk --  --  lives alone;cognitively impaired;physical impairment    Discharge Disposition --  --  --    Discharge Planning Comments --  --  --    Current Health   Outpatient/Agency/Support Group Needs --  --  --    Anticipated Changes Related to Illness --  --  --    Self-Care   Equipment Currently Used at Home --  none --    Living Environment   Transportation Available --  family or friend will provide --      06/10/17 1557   Discharge Needs Assessment   Concerns To Be Addressed no discharge needs identified   Concerns Comments --    Readmission Within The Last 30 Days --    Equipment Needed After Discharge --    Discharge Facility/Level Of Care Needs --    Current Discharge Risk --    Discharge Disposition --    Discharge Planning Comments --    Current Health   Outpatient/Agency/Support Group Needs --    Anticipated Changes Related to Illness --    Self-Care   Equipment Currently Used at Home --    Living Environment   Transportation Available --          Problem: Inpatient Physical Therapy  Goal: Bed Mobility Goal LTG- PT  Outcome: Ongoing (interventions implemented as appropriate)    06/05/17 1652 06/11/17 1403   Bed Mobility PT LTG   Bed Mobility PT LTG, Date Established 06/05/17 --    Bed Mobility PT LTG, Time to Achieve 5 - 7 days --    Bed Mobility PT LTG, Activity Type all bed mobility --    Bed Mobility PT LTG, [place] Level conditional independence --    Bed Mobility PT LTG, Date Goal Reviewed --  06/11/17   Bed Mobility PT LTG, Outcome --  goal ongoing       Goal: Gait Training Goal STG- PT  Outcome: Ongoing (interventions implemented as appropriate)    06/05/17 1652 06/11/17 1403   Gait Training PT STG   Gait Training Goal PT STG, Date Established 06/05/17 --    Gait Training Goal PT STG, Time to Achieve 2 wks --    Gait Training Goal PT STG, [place] Level conditional independence --    Gait Training Goal PT STG, Assist Device walker, rolling --    Gait Training Goal PT STG, Distance to Achieve 150 ft w/ RW and CGA x 1 --    Gait Training Goal PT STG,  Additional Goal 300 ft w/ RW and SBA x 1  --    Gait Training Goal PT STG, Date Goal Reviewed --  06/11/17   Gait Training Goal PT STG, Outcome --  goal ongoing       Goal: Stair Training Goal LTG- PT  Outcome: Ongoing (interventions implemented as appropriate)    06/05/17 1652 06/11/17 1403   Stair Training PT LTG   Stair Training Goal PT LTG, Date Established 06/05/17 --    Stair Training Goal PT LTG, Time to Achieve by discharge --    Stair Training Goal PT LTG, [place] Level conditional independence --    Stair Training Goal PT LTG, Assist Device walker, rolling --    Stair Training Goal PT LTG, Distance to Achieve up/down 1 step --    Stair Training Goal PT LTG, Date Goal Reviewed --  06/11/17   Stair Training Goal PT LTG, Outcome --  goal ongoing

## 2017-06-11 NOTE — PROGRESS NOTES
LOS: 12 days   Patient Care Team:  Melissa Ch MD as PCP - General  Lindsay Ocampo MA as Medical Assistant    Chief Complaint:  Gastrointestinal hemorrhage with melena    Subjective     Interval History:   Feels better    Objective     Vital Signs  Temp:  [97.9 °F (36.6 °C)-99.3 °F (37.4 °C)] 98.7 °F (37.1 °C)  Heart Rate:  [69-90] 90  Resp:  [18] 18  BP: (115-180)/(55-84) 115/59    Physical Exam:  abd OK     Results Review:       Lab Results (last 24 hours)     Procedure Component Value Units Date/Time    POC Glucose Fingerstick [[id number]]  (Abnormal) Collected:  06/10/17 1104    Specimen:  Blood Updated:  06/10/17 1119     Glucose 174 (H) mg/dL       RN NotifiedMeter: [id number]: [id number] MAXIMUS SHAYY       POC Glucose Fingerstick [[id number]]  (Abnormal) Collected:  06/10/17 1742    Specimen:  Blood Updated:  06/10/17 1759     Glucose 204 (H) mg/dL       RN NotifiedMeter: [id number]: [id number] KUBACH VALESE       POC Glucose Fingerstick [[id number]]  (Abnormal) Collected:  06/10/17 2143    Specimen:  Blood Updated:  06/10/17 2222     Glucose 193 (H) mg/dL       Sliding Scale AdminMeter: [id number]: [id number] [facility]       CBC & Differential [[id number]] Collected:  06/11/17 0435    Specimen:  Blood Updated:  06/11/17 0459    Narrative:       The following orders were created for panel order CBC & Differential.  Procedure                               Abnormality         Status                     ---------                               -----------         ------                     CBC Auto Differential[[id number]]        Abnormal            Final result                 Please view results for these tests on the individual orders.    CBC Auto Differential [[id number]]  (Abnormal) Collected:  06/11/17 0435    Specimen:  Blood Updated:  06/11/17 0459     WBC 5.60 10*3/mm3      RBC 3.18 (L) 10*6/mm3      Hemoglobin 9.6 (L) g/dL      Hematocrit 28.8 (L) %       MCV 90.6 fL      MCH 30.2 pg      MCHC 33.3 g/dL      RDW 13.6 %      RDW-SD 44.8 fl      MPV 9.5 fL      Platelets 300 10*3/mm3      Neutrophil % 69.1 %      Lymphocyte % 17.3 %      Monocyte % 12.0 %      Eosinophil % 0.9 %      Basophil % 0.2 %      Immature Grans % 0.5 %      Neutrophils, Absolute 3.87 10*3/mm3      Lymphocytes, Absolute 0.97 10*3/mm3      Monocytes, Absolute 0.67 10*3/mm3      Eosinophils, Absolute 0.05 10*3/mm3      Basophils, Absolute 0.01 10*3/mm3      Immature Grans, Absolute 0.03 (H) 10*3/mm3     POC Glucose Fingerstick [[id number]]  (Abnormal) Collected:  06/11/17 0636    Specimen:  Blood Updated:  06/11/17 0654     Glucose 210 (H) mg/dL       RN NotifiedMeter: [id number]: [id number] ALEKSANDRA WEBB             Medication Review:   Current Facility-Administered Medications   Medication Dose Route Frequency Provider Last Rate Last Dose   • bupivacaine-EPINEPHrine PF (MARCAINE w/EPI) 0.5% -1:[id number] injection    PRN Stefano Linn MD   30 mL at 06/02/17 1130   • dextrose (D50W) solution 25 g  25 g Intravenous Q15 Min PRN Vicki G Levill, APRN   25 g at 05/30/17 0828   • dextrose (GLUTOSE) oral gel 15 g  15 g Oral Q15 Min PRN Vicki G Levill, APRN       • diltiaZEM CD (CARDIZEM CD) 24 hr capsule 180 mg  180 mg Oral Daily Joan Rojas MD   180 mg at 06/11/17 0911   • donepezil (ARICEPT) tablet 10 mg  10 mg Oral Nightly Stefano Linn MD   10 mg at 06/10/17 2132   • enalapril (VASOTEC) tablet 20 mg  20 mg Oral BID Joan Rojas MD   20 mg at 06/11/17 0912   • enoxaparin (LOVENOX) syringe 40 mg  40 mg Subcutaneous Q24H Jimmy Landry MD   40 mg at 06/11/17 0912   • glucagon (human recombinant) (GLUCAGEN DIAGNOSTIC) injection 1 mg  1 mg Subcutaneous Q15 Min PRN Vicki G Levill, APRN       • hydrALAZINE (APRESOLINE) injection 10 mg  10 mg Intravenous Q6H PRN Raymond Rivera MD   10 mg at 06/11/17 0131   • HYDROcodone-acetaminophen (NORCO) 5-325 MG per tablet 1 tablet  1  tablet Oral Q6H PRN Stefano Linn MD   1 tablet at 06/11/17 1003   • insulin aspart (novoLOG) injection 0-14 Units  0-14 Units Subcutaneous 4x Daily AC & at Bedtime Vicki G Levill, APRN   5 Units at 06/11/17 0912   • memantine (NAMENDA) tablet 5 mg  5 mg Oral BID Joan Rojas MD   5 mg at 06/11/17 0912   • methIMAzole (TAPAZOLE) half tablet 2.5 mg  2.5 mg Oral Daily Joan Rojas MD   2.5 mg at 06/11/17 0911   • Morphine sulfate (PF) injection 2 mg  2 mg Intravenous Q4H PRN Stefano Linn MD       • naloxone (NARCAN) injection 0.1 mg  0.1 mg Intravenous Q5 Min PRN Stefano Linn MD       • ondansetron (ZOFRAN) injection 4 mg  4 mg Intravenous Q4H PRN Guido Moltey MD   4 mg at 06/10/17 2106   • pantoprazole (PROTONIX) injection 40 mg  40 mg Intravenous Q12H Joan Rojas MD   40 mg at 06/11/17 0912   • potassium chloride 10 mEq in 100 mL IVPB  10 mEq Intravenous Q1H PRN Guido Motley  mL/hr at 06/09/17 0651 10 mEq at 06/09/17 0651   • sodium chloride 0.9 % flush 1-10 mL  1-10 mL Intravenous PRN Vicki Mchugh, APRN   10 mL at 05/28/17 0021   • sotalol (BETAPACE) tablet 80 mg  80 mg Oral Q12H Joan Rojas MD   80 mg at 06/11/17 0912       Assessment/Plan     Principal Problem:    Gastrointestinal hemorrhage with melena  Active Problems:    Diabetes mellitus    Colon cancer    Blood loss anemia    87 yo lady s/p left hemicolectomy  Continue supportive care  To rehab soon    Bautista Ackerman MD  06/11/17  10:26 AM

## 2017-06-12 ENCOUNTER — HOSPITAL ENCOUNTER (INPATIENT)
Facility: HOSPITAL | Age: 82
LOS: 11 days | Discharge: HOME | End: 2017-06-23
Attending: FAMILY MEDICINE | Admitting: FAMILY MEDICINE

## 2017-06-12 VITALS
SYSTOLIC BLOOD PRESSURE: 110 MMHG | RESPIRATION RATE: 18 BRPM | TEMPERATURE: 97 F | OXYGEN SATURATION: 99 % | HEART RATE: 70 BPM | BODY MASS INDEX: 26.29 KG/M2 | WEIGHT: 194.1 LBS | DIASTOLIC BLOOD PRESSURE: 53 MMHG | HEIGHT: 72 IN

## 2017-06-12 DIAGNOSIS — R26.9 ABNORMALITY OF GAIT AND MOBILITY: ICD-10-CM

## 2017-06-12 DIAGNOSIS — Z78.9 IMPAIRED MOBILITY AND ADLS: ICD-10-CM

## 2017-06-12 DIAGNOSIS — R13.11 ORAL PHASE DYSPHAGIA: Primary | ICD-10-CM

## 2017-06-12 DIAGNOSIS — R48.9 SYMBOLIC DYSFUNCTION: ICD-10-CM

## 2017-06-12 DIAGNOSIS — R53.81 PHYSICAL DECONDITIONING: ICD-10-CM

## 2017-06-12 DIAGNOSIS — C18.7 ADENOCARCINOMA OF SIGMOID COLON (HCC): ICD-10-CM

## 2017-06-12 DIAGNOSIS — Z74.09 IMPAIRED MOBILITY AND ADLS: ICD-10-CM

## 2017-06-12 DIAGNOSIS — M62.81 MUSCLE WEAKNESS (GENERALIZED): ICD-10-CM

## 2017-06-12 PROBLEM — F03.90 SENILE DEMENTIA, UNCOMPLICATED (HCC): Status: ACTIVE | Noted: 2017-06-12

## 2017-06-12 PROBLEM — Z51.89 ENCOUNTER FOR REHABILITATION: Status: ACTIVE | Noted: 2017-06-12

## 2017-06-12 PROBLEM — E11.65 TYPE 2 DIABETES MELLITUS WITH HYPERGLYCEMIA (HCC): Status: ACTIVE | Noted: 2017-06-05

## 2017-06-12 PROBLEM — D50.0 CHRONIC BLOOD LOSS ANEMIA: Status: ACTIVE | Noted: 2017-06-05

## 2017-06-12 LAB
GLUCOSE BLDC GLUCOMTR-MCNC: 156 MG/DL (ref 70–130)
GLUCOSE BLDC GLUCOMTR-MCNC: 195 MG/DL (ref 70–130)
GLUCOSE BLDC GLUCOMTR-MCNC: 232 MG/DL (ref 70–130)

## 2017-06-12 PROCEDURE — 82962 GLUCOSE BLOOD TEST: CPT

## 2017-06-12 PROCEDURE — 63710000001 INSULIN ASPART PER 5 UNITS: Performed by: NURSE PRACTITIONER

## 2017-06-12 PROCEDURE — 99024 POSTOP FOLLOW-UP VISIT: CPT | Performed by: SURGERY

## 2017-06-12 PROCEDURE — 63710000001 INSULIN DETEMIR PER 5 UNITS: Performed by: FAMILY MEDICINE

## 2017-06-12 PROCEDURE — 99222 1ST HOSP IP/OBS MODERATE 55: CPT | Performed by: FAMILY MEDICINE

## 2017-06-12 PROCEDURE — 25010000002 ENOXAPARIN PER 10 MG: Performed by: SURGERY

## 2017-06-12 PROCEDURE — 63710000001 INSULIN ASPART PER 5 UNITS: Performed by: FAMILY MEDICINE

## 2017-06-12 RX ORDER — NICOTINE POLACRILEX 4 MG
15 LOZENGE BUCCAL
Status: CANCELLED | OUTPATIENT
Start: 2017-06-12

## 2017-06-12 RX ORDER — METHIMAZOLE 5 MG/1
2.5 TABLET ORAL DAILY
Status: DISCONTINUED | OUTPATIENT
Start: 2017-06-13 | End: 2017-06-23 | Stop reason: HOSPADM

## 2017-06-12 RX ORDER — BISACODYL 10 MG
10 SUPPOSITORY, RECTAL RECTAL DAILY PRN
Status: CANCELLED | OUTPATIENT
Start: 2017-06-12

## 2017-06-12 RX ORDER — DONEPEZIL HYDROCHLORIDE 10 MG/1
10 TABLET, FILM COATED ORAL NIGHTLY
Status: DISCONTINUED | OUTPATIENT
Start: 2017-06-12 | End: 2017-06-23 | Stop reason: HOSPADM

## 2017-06-12 RX ORDER — CALCIUM CARBONATE 200(500)MG
2 TABLET,CHEWABLE ORAL 2 TIMES DAILY PRN
Status: CANCELLED | OUTPATIENT
Start: 2017-06-12

## 2017-06-12 RX ORDER — MEMANTINE HYDROCHLORIDE 5 MG/1
5 TABLET ORAL 2 TIMES DAILY
Status: CANCELLED | OUTPATIENT
Start: 2017-06-12

## 2017-06-12 RX ORDER — NICOTINE POLACRILEX 4 MG
15 LOZENGE BUCCAL
Status: DISCONTINUED | OUTPATIENT
Start: 2017-06-12 | End: 2017-06-23 | Stop reason: HOSPADM

## 2017-06-12 RX ORDER — HYDROCODONE BITARTRATE AND ACETAMINOPHEN 5; 325 MG/1; MG/1
1 TABLET ORAL EVERY 6 HOURS PRN
Status: DISCONTINUED | OUTPATIENT
Start: 2017-06-12 | End: 2017-06-12

## 2017-06-12 RX ORDER — ENALAPRIL MALEATE 10 MG/1
20 TABLET ORAL 2 TIMES DAILY
Status: DISCONTINUED | OUTPATIENT
Start: 2017-06-12 | End: 2017-06-14

## 2017-06-12 RX ORDER — METHIMAZOLE 5 MG/1
2.5 TABLET ORAL DAILY
Status: CANCELLED | OUTPATIENT
Start: 2017-06-13

## 2017-06-12 RX ORDER — SODIUM CHLORIDE 0.9 % (FLUSH) 0.9 %
1-10 SYRINGE (ML) INJECTION AS NEEDED
Status: DISCONTINUED | OUTPATIENT
Start: 2017-06-12 | End: 2017-06-13

## 2017-06-12 RX ORDER — SODIUM CHLORIDE 0.9 % (FLUSH) 0.9 %
1-10 SYRINGE (ML) INJECTION AS NEEDED
Status: CANCELLED | OUTPATIENT
Start: 2017-06-12

## 2017-06-12 RX ORDER — BISACODYL 10 MG
10 SUPPOSITORY, RECTAL RECTAL DAILY PRN
Status: DISCONTINUED | OUTPATIENT
Start: 2017-06-12 | End: 2017-06-23 | Stop reason: HOSPADM

## 2017-06-12 RX ORDER — SOTALOL HYDROCHLORIDE 80 MG/1
80 TABLET ORAL EVERY 12 HOURS SCHEDULED
Status: CANCELLED | OUTPATIENT
Start: 2017-06-12

## 2017-06-12 RX ORDER — DILTIAZEM HYDROCHLORIDE 180 MG/1
180 CAPSULE, COATED, EXTENDED RELEASE ORAL DAILY
Status: DISCONTINUED | OUTPATIENT
Start: 2017-06-13 | End: 2017-06-14

## 2017-06-12 RX ORDER — SOTALOL HYDROCHLORIDE 80 MG/1
80 TABLET ORAL EVERY 12 HOURS SCHEDULED
Status: DISCONTINUED | OUTPATIENT
Start: 2017-06-12 | End: 2017-06-14

## 2017-06-12 RX ORDER — MEMANTINE HYDROCHLORIDE 5 MG/1
5 TABLET ORAL 2 TIMES DAILY
Status: DISCONTINUED | OUTPATIENT
Start: 2017-06-12 | End: 2017-06-23 | Stop reason: HOSPADM

## 2017-06-12 RX ORDER — ENALAPRIL MALEATE 10 MG/1
20 TABLET ORAL 2 TIMES DAILY
Status: CANCELLED | OUTPATIENT
Start: 2017-06-12

## 2017-06-12 RX ORDER — DEXTROSE MONOHYDRATE 25 G/50ML
25 INJECTION, SOLUTION INTRAVENOUS
Status: DISCONTINUED | OUTPATIENT
Start: 2017-06-12 | End: 2017-06-13

## 2017-06-12 RX ORDER — HYDROCODONE BITARTRATE AND ACETAMINOPHEN 5; 325 MG/1; MG/1
1 TABLET ORAL EVERY 6 HOURS PRN
Status: DISPENSED | OUTPATIENT
Start: 2017-06-12 | End: 2017-06-18

## 2017-06-12 RX ORDER — DEXTROSE MONOHYDRATE 25 G/50ML
25 INJECTION, SOLUTION INTRAVENOUS
Status: CANCELLED | OUTPATIENT
Start: 2017-06-12

## 2017-06-12 RX ORDER — DONEPEZIL HYDROCHLORIDE 10 MG/1
10 TABLET, FILM COATED ORAL NIGHTLY
Status: CANCELLED | OUTPATIENT
Start: 2017-06-12

## 2017-06-12 RX ORDER — CALCIUM CARBONATE 200(500)MG
2 TABLET,CHEWABLE ORAL 2 TIMES DAILY PRN
Status: DISCONTINUED | OUTPATIENT
Start: 2017-06-12 | End: 2017-06-23 | Stop reason: HOSPADM

## 2017-06-12 RX ORDER — DILTIAZEM HYDROCHLORIDE 180 MG/1
180 CAPSULE, COATED, EXTENDED RELEASE ORAL DAILY
Status: CANCELLED | OUTPATIENT
Start: 2017-06-13

## 2017-06-12 RX ORDER — HYDROCODONE BITARTRATE AND ACETAMINOPHEN 5; 325 MG/1; MG/1
1 TABLET ORAL EVERY 6 HOURS PRN
Status: CANCELLED | OUTPATIENT
Start: 2017-06-12 | End: 2017-06-16

## 2017-06-12 RX ORDER — DOCUSATE SODIUM 100 MG/1
100 CAPSULE, LIQUID FILLED ORAL 2 TIMES DAILY
Status: DISCONTINUED | OUTPATIENT
Start: 2017-06-12 | End: 2017-06-20

## 2017-06-12 RX ADMIN — HYDROCODONE BITARTRATE AND ACETAMINOPHEN 1 TABLET: 5; 325 TABLET ORAL at 00:09

## 2017-06-12 RX ADMIN — DILTIAZEM HYDROCHLORIDE 180 MG: 180 CAPSULE, EXTENDED RELEASE ORAL at 08:36

## 2017-06-12 RX ADMIN — DONEPEZIL HYDROCHLORIDE 10 MG: 10 TABLET, FILM COATED ORAL at 20:02

## 2017-06-12 RX ADMIN — METHIMAZOLE 2.5 MG: 5 TABLET ORAL at 08:37

## 2017-06-12 RX ADMIN — ENALAPRIL MALEATE 20 MG: 10 TABLET ORAL at 17:21

## 2017-06-12 RX ADMIN — DOCUSATE SODIUM 100 MG: 100 CAPSULE, LIQUID FILLED ORAL at 17:21

## 2017-06-12 RX ADMIN — INSULIN ASPART 8 UNITS: 100 INJECTION, SOLUTION INTRAVENOUS; SUBCUTANEOUS at 20:05

## 2017-06-12 RX ADMIN — INSULIN ASPART 3 UNITS: 100 INJECTION, SOLUTION INTRAVENOUS; SUBCUTANEOUS at 17:22

## 2017-06-12 RX ADMIN — MEMANTINE 5 MG: 5 TABLET ORAL at 17:22

## 2017-06-12 RX ADMIN — INSULIN ASPART 5 UNITS: 100 INJECTION, SOLUTION INTRAVENOUS; SUBCUTANEOUS at 08:34

## 2017-06-12 RX ADMIN — PANTOPRAZOLE SODIUM 40 MG: 40 INJECTION, POWDER, FOR SOLUTION INTRAVENOUS at 08:38

## 2017-06-12 RX ADMIN — ENOXAPARIN SODIUM 40 MG: 40 INJECTION SUBCUTANEOUS at 08:37

## 2017-06-12 RX ADMIN — INSULIN DETEMIR 15 UNITS: 100 INJECTION, SOLUTION SUBCUTANEOUS at 20:04

## 2017-06-12 RX ADMIN — SOTALOL HYDROCHLORIDE 80 MG: 80 TABLET ORAL at 08:36

## 2017-06-12 RX ADMIN — MEMANTINE 5 MG: 5 TABLET ORAL at 08:37

## 2017-06-12 RX ADMIN — SOTALOL HYDROCHLORIDE 80 MG: 80 TABLET ORAL at 20:02

## 2017-06-12 RX ADMIN — ENALAPRIL MALEATE 20 MG: 10 TABLET ORAL at 08:36

## 2017-06-12 RX ADMIN — INSULIN ASPART 3 UNITS: 100 INJECTION, SOLUTION INTRAVENOUS; SUBCUTANEOUS at 11:26

## 2017-06-12 NOTE — PLAN OF CARE
Problem: Inpatient Occupational Therapy  Goal: Transfer Training Goal 1 STG- OT  Outcome: Unable to achieve outcome(s) by discharge Date Met:  06/12/17 06/09/17 1725 06/12/17 1414   Transfer Training OT STG   Transfer Training OT STG, Date Established 06/09/17 --    Transfer Training OT STG, Time to Achieve 5 - 7 days --    Transfer Training OT STG, Activity Type toilet --    Transfer Training OT STG, [place] Level supervision required --    Transfer Training OT STG, Date Goal Reviewed --  06/12/17   Transfer Training OT STG, Outcome --  goal not met   Transfer Training OT STG, Reason Goal Not Met --  discharged from facility       Goal: Dymanic Standing Balance Goal STG- OT  Outcome: Unable to achieve outcome(s) by discharge Date Met:  06/12/17 06/09/17 1725 06/12/17 1414   Dynamic Standing Balance OT STG   Dynamic Standing Balance OT STG, Date Established 06/09/17 --    Dynamic Standing Balance OT STG, Time to Achieve 5 - 7 days --    Dynamic Standing Balance OT STG, [place] Level supervision required --    Dynamic Standing Balance OT STG, Assist Device assistive Device --    Dynamic Standing Balance OT STG, Additional Goal 5 min --    Dynamic Standing Balance OT STG, Date Goal Reviewed --  06/12/17   Dynamic Standing Balance OT STG, Outcome --  goal not met   Dynamic Standing Balance OT STG, Reason Goal Not Met --  discharged from facility       Goal: ADL Goal LTG- OT  Outcome: Unable to achieve outcome(s) by discharge Date Met:  06/12/17 06/09/17 1725 06/12/17 1414   ADL OT LTG   ADL OT LTG, Date Established 06/09/17 --    ADL OT LTG, Time to Achieve 2 wks --    ADL OT LTG, Activity Type ADL skills --    ADL OT LTG, [place] Level standby assist --    ADL OT LTG, Date Goal Reviewed --  06/12/17   ADL OT LTG, Outcome --  goal not met   ADL OT LTG, Reason Goal Not Met --  discharged from facility       Goal: Functional Mobility Goal LTG- OT  Outcome: Unable to achieve outcome(s) by discharge  Date Met:  06/12/17 06/09/17 1725 06/12/17 1414   Functional Mobility OT LTG   Functional Mobility Goal OT LTG, Date Established 06/09/17 --    Functional Mobility Goal OT LTG, Time to Achieve 2 wks --    Functional Mobility Goal OT LTG, [place] Level supervision --    Functional Mobility Goal OT LTG, Distance to Achieve to the bathroom --    Functional Mobility Goal OT LTG, Date Goal Reviewed --  06/12/17   Functional Mobility Goal OT LTG, Outcome --  goal not met   Functional Mobility Goal OT LTG, Reason Goal Not Met --  discharged from facility

## 2017-06-12 NOTE — H&P
[address] [address]  T - [phone]     H/P NOTE         SUBJECTIVE:   Patient Care Team:  Melissa Ch MD as PCP - General  Lindsay Ocampo MA as Medical Assistant    Chief Complaint:     Chief Complaint   Patient presents with   • Rectal Bleeding       Subjective     Patient is 88 y.o. female presents with Admitted with rectal bleeding and anemia.  Was found to have a sigmoid colon cancer and underwent a left hemicolectomy by Dr. Linn on June 2, 2017.  Postoperatively she's done fairly well she's participated with therapy and we have been asked to admit her to the acute rehabilitation unit.      ROS/HISTORY/ CURRENT MEDICATIONS/OBJECTIVE/VS/PE:       History:     Past Medical History:   Diagnosis Date   • Anxiety    • Arthritis    • Bradycardia    • Dyslipidemia    • Dyspnea    • Fibrocystic disease of breast    • Hashimoto's thyroiditis    • Hypertensive disorder    • Hypothyroidism    • Pain    • Palpitations    • Paroxysmal tachycardia    • Shortness of breath    • Tricuspid valve disorders, specified as nonrheumatic    • Vitamin D deficiency      Past Surgical History:   Procedure Laterality Date   • COLON RESECTION N/A 6/2/2017    Procedure: COLON RESECTION;  Surgeon: Stefano Linn MD;  Location: [facility] OR;  Service:    • COLONOSCOPY N/A 5/30/2017    Procedure: Flexible Sigmoidoscopy;  Surgeon: Stefano Linn MD;  Location: [facility] ENDOSCOPY;  Service:    • EXTERNAL EAR SURGERY     • EYE SURGERY     • LIPOMA EXCISION     • PACEMAKER IMPLANTATION     • SALIVARY GLAND SURGERY     • TUBAL ABDOMINAL LIGATION       Family History   Problem Relation Age of Onset   • Diabetes Other    • Heart disease Other    • Hypertension Other    • Thyroid disease Other      Social History   Substance Use Topics   • Smoking status: Never Smoker   • Smokeless tobacco: Never Used   • Alcohol use No       Allergies:  Penicillins    Current  Medications:     Current Facility-Administered Medications:   •  bupivacaine-EPINEPHrine PF (MARCAINE w/EPI) 0.5% -1:[id number] injection, , , PRN, Stefano Linn MD, 30 mL at 06/02/17 1130  •  dextrose (D50W) solution 25 g, 25 g, Intravenous, Q15 Min PRN, Vicki G Levill, APRN, 25 g at 05/30/17 0828  •  dextrose (GLUTOSE) oral gel 15 g, 15 g, Oral, Q15 Min PRN, Vicki G Levill, APRN  •  diltiaZEM CD (CARDIZEM CD) 24 hr capsule 180 mg, 180 mg, Oral, Daily, Joan Rojas MD, 180 mg at 06/12/17 0836  •  donepezil (ARICEPT) tablet 10 mg, 10 mg, Oral, Nightly, Stefano Linn MD, 10 mg at 06/11/17 2034  •  enalapril (VASOTEC) tablet 20 mg, 20 mg, Oral, BID, Joan Rojas MD, 20 mg at 06/12/17 0836  •  enoxaparin (LOVENOX) syringe 40 mg, 40 mg, Subcutaneous, Q24H, Jimmy Landry MD, 40 mg at 06/12/17 0837  •  glucagon (human recombinant) (GLUCAGEN DIAGNOSTIC) injection 1 mg, 1 mg, Subcutaneous, Q15 Min PRN, Vicki G Levill, APRN  •  hydrALAZINE (APRESOLINE) injection 10 mg, 10 mg, Intravenous, Q6H PRN, Raymond Rivera MD, 10 mg at 06/11/17 0131  •  HYDROcodone-acetaminophen (NORCO) 5-325 MG per tablet 1 tablet, 1 tablet, Oral, Q6H PRN, Stefano Linn MD, 1 tablet at 06/12/17 0009  •  insulin aspart (novoLOG) injection 0-14 Units, 0-14 Units, Subcutaneous, 4x Daily AC & at Bedtime, Vicki G Levill, APRN, 3 Units at 06/12/17 1126  •  memantine (NAMENDA) tablet 5 mg, 5 mg, Oral, BID, Joan Rojas MD, 5 mg at 06/12/17 0837  •  methIMAzole (TAPAZOLE) half tablet 2.5 mg, 2.5 mg, Oral, Daily, Joan Rojas MD, 2.5 mg at 06/12/17 0837  •  Morphine sulfate (PF) injection 2 mg, 2 mg, Intravenous, Q4H PRN, Stefano Linn MD  •  naloxone (NARCAN) injection 0.1 mg, 0.1 mg, Intravenous, Q5 Min PRN, Stefano Linn MD  •  ondansetron (ZOFRAN) injection 4 mg, 4 mg, Intravenous, Q4H PRN, Guido Motley MD, 4 mg at 06/10/17 210  •  pantoprazole (PROTONIX) injection 40 mg, 40 mg, Intravenous, Q12H,  "Joan Rojas MD, 40 mg at 06/12/17 0838  •  potassium chloride 10 mEq in 100 mL IVPB, 10 mEq, Intravenous, Q1H PRN, Guido Motley MD, Last Rate: 100 mL/hr at 06/09/17 0651, 10 mEq at 06/09/17 0651  •  sodium chloride 0.9 % flush 1-10 mL, 1-10 mL, Intravenous, PRN, Vicki G Levill, APRN, 10 mL at 05/28/17 0021  •  sotalol (BETAPACE) tablet 80 mg, 80 mg, Oral, Q12H, Joan Rojas MD, 80 mg at 06/12/17 0836    Current Outpatient Prescriptions:   •  diltiaZEM CD (CARDIZEM CD) 180 MG 24 hr capsule, Take 180 mg by mouth Daily., Disp: , Rfl:   •  Alcohol Swabs (B-D SINGLE USE SWABS REGULAR) pads, USE AS DIRECTED, Disp: 200 each, Rfl: 3  •  donepezil (ARICEPT) 10 MG tablet, Take 1 tablet by mouth Daily., Disp: , Rfl: 3  •  enalapril (VASOTEC) 20 MG tablet, Take 20 mg by mouth 2 (two) times a day., Disp: , Rfl:   •  ezetimibe (ZETIA) 10 MG tablet, Take 1 tablet by mouth Daily., Disp: 30 tablet, Rfl: 11  •  Insulin Glargine (LANTUS SOLOSTAR) 100 UNIT/ML injection pen, Inject 20 Units under the skin every night., Disp: , Rfl:   •  Insulin Pen Needle (COMFORT EZ PEN NEEDLES) 31G X 6 MM misc, 2 (two) times a day. Inject two times per day., Disp: , Rfl:   •  Insulin Syringe-Needle U-100 31G X 5/16\" 1 ML misc, 2 (two) times a day. Use as directed TWICE DAILY FOR Insulin Injections, Disp: , Rfl:   •  Lancets (UNILET COMFORTOUCH LANCET) misc, USE AS DIRECTED FOR FINGERSTICKS, Disp: 100 each, Rfl: 3  •  memantine (NAMENDA) 5 MG tablet, Take 1 tablet by mouth 2 (Two) Times a Day., Disp: , Rfl: 3  •  methIMAzole (TAPAZOLE) 5 MG tablet, Take half tablet q day (Patient taking differently: Take 2.5 mg by mouth Daily. Take half tablet q day), Disp: 30 tablet, Rfl: 5  •  nabumetone (RELAFEN) 500 MG tablet, Take 500 mg by mouth daily., Disp: , Rfl:   •  NOVOLOG FLEXPEN 100 UNIT/ML solution pen-injector sc pen, INJECT 4 UNITS SUBCUTANEOUSLY WITH SUPPER. MAY INCREASE UP TO 8 UNITS AS DIRECTED, Disp: 15 mL, Rfl: 3  •  pravastatin (PRAVACHOL) " 40 MG tablet, Take 1 tablet by mouth Every Night., Disp: 30 tablet, Rfl: 11  •  SITagliptin (JANUVIA) 100 MG tablet, Take 1 tablet by mouth Daily., Disp: 30 tablet, Rfl: 11  •  sotalol (BETAPACE) 80 MG tablet, Take 80 mg by mouth 2 (Two) Times a Day., Disp: , Rfl:   •  vitamin D (ERGOCALCIFEROL) 84881 UNITS capsule capsule, Take 1 capsule by mouth Every 14 (Fourteen) Days., Disp: 30 capsule, Rfl: 11      REVIEW OF SYSTEMS:  Review of Systems   Constitutional: Positive for activity change and fatigue. Negative for unexpected weight change.   HENT: Negative for congestion, hearing loss, mouth sores, nosebleeds and postnasal drip.    Eyes: Negative.    Respiratory: Negative.    Cardiovascular: Negative.    Endocrine: Negative.    Genitourinary: Negative.    Musculoskeletal: Positive for arthralgias and gait problem.   Skin: Negative.         She has a surgical incision is healing   Allergic/Immunologic: Negative.    Neurological: Negative for dizziness, seizures, facial asymmetry, light-headedness, numbness and headaches.   Hematological: Negative.    Psychiatric/Behavioral: Positive for confusion. Negative for agitation, behavioral problems, decreased concentration, dysphoric mood, hallucinations, self-injury, sleep disturbance and suicidal ideas. The patient is not nervous/anxious and is not hyperactive.         Family that she's had some memory loss that seems to be a little worse in the hospital after surgery       Objective     Physical Exam:   Temp:  [97 °F (36.1 °C)-99.5 °F (37.5 °C)] 97 °F (36.1 °C)  Heart Rate:  [68-79] 70  Resp:  [18] 18  BP: ()/(44-70) 110/53    Physical Exam:    Physical Exam   Constitutional: She is oriented to person, place, and time. She appears well-developed and well-nourished. No distress.   HENT:   Head: Normocephalic and atraumatic.   Eyes: Conjunctivae and EOM are normal. Pupils are equal, round, and reactive to light. Right eye exhibits no discharge. Left eye exhibits no  discharge. No scleral icterus.   Neck: Normal range of motion. Neck supple. No tracheal deviation present. No thyromegaly present.   Cardiovascular: Normal rate, regular rhythm and intact distal pulses.  Exam reveals no gallop and no friction rub.    Murmur heard.  Chest wall shows pacemaker to be present without holosystolic murmur is present as well across the chest     Pulmonary/Chest: Effort normal and breath sounds normal. No respiratory distress. She has no wheezes. She has no rales. She exhibits no tenderness.   Abdominal: Soft. Bowel sounds are normal. She exhibits no distension and no mass. There is no tenderness. There is guarding.   Lower midline abdominal incision is present and healing well   Musculoskeletal: Normal range of motion. She exhibits no edema, tenderness or deformity.   Neurological: She is alert and oriented to person, place, and time. She has normal reflexes. No cranial nerve deficit. Coordination normal.   Skin: Skin is warm and dry. She is not diaphoretic. No erythema.   Psychiatric: She has a normal mood and affect. Her behavior is normal. Judgment normal.   She does have some memory issues and they are mild     Nursing note and vitals reviewed.           Results Review:      Lab Results (last 24 hours)     Procedure Component Value Units Date/Time    POC Glucose Fingerstick [[id number]]  (Abnormal) Collected:  06/11/17 1142    Specimen:  Blood Updated:  06/11/17 1350     Glucose 187 (H) mg/dL       Sliding Scale AdminMeter: [id number]: [id number] [employer]       POC Glucose Fingerstick [[id number]]  (Abnormal) Collected:  06/11/17 1635    Specimen:  Blood Updated:  06/11/17 1825     Glucose 219 (H) mg/dL       Sliding Scale AdminMeter: [id number]: [id number] [employer]       POC Glucose Fingerstick [[id number]]  (Abnormal) Collected:  06/11/17 2033    Specimen:  Blood Updated:  06/11/17 2130     Glucose 239 (H) mg/dL       Meter: [id number]: [id number]  DRAKE MANSI       POC Glucose Fingerstick [[id number]]  (Abnormal) Collected:  06/12/17 0641    Specimen:  Blood Updated:  06/12/17 0701     Glucose 232 (H) mg/dL       RN NotifiedMeter: [id number]: [id number] PAWEL STARKS       POC Glucose Fingerstick [[id number]]  (Abnormal) Collected:  06/12/17 1112    Specimen:  Blood Updated:  06/12/17 1127     Glucose 195 (H) mg/dL       Meter: [id number]: [id number] JEREL SOERNSON                             Imaging Results (last 24 hours)     ** No results found for the last 24 hours. **          I reviewed the patient's  clinical results.  I reviewed the patient's  imaging results and agree with the interpretation.   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  We will check iron levels and start replacing her iron.  Folic acid was checked in addition we'll check a B12 level also.  We expect that she'll participate 3 hours a day make measurable improvement and be able to return home at discharge  We'll control her diabetes and her blood pressure continue routine meds  Discussed with Dr. Linn prior to admission and discussed with her daughters in the room also  I discussed the patients findings and my recommendations with patient, family, nursing staff and consulting provider.      Bennie Lucia MD  06/12/17  1:04 PM

## 2017-06-12 NOTE — PLAN OF CARE
Problem: Infection, Risk/Actual (Adult)  Goal: Identify Related Risk Factors and Signs and Symptoms  Outcome: Outcome(s) achieved Date Met:  06/12/17 06/12/17 1501   Infection, Risk/Actual   Infection, Risk/Actual: Related Risk Factors chronic illness/condition;exposure to microbes;prolonged hospitalization;surgery/procedure;tissue perfusion altered   Signs and Symptoms (Infection, Risk/Actual) (none)       Goal: Infection Prevention/Resolution  Outcome: Ongoing (interventions implemented as appropriate)    06/12/17 1501   Infection, Risk/Actual (Adult)   Infection Prevention/Resolution achieves outcome

## 2017-06-12 NOTE — THERAPY DISCHARGE NOTE
Acute Care - Physical Therapy Discharge Summary  [facility]       Patient Name: Kierra Scales  : 1929  MRN: [id number]    Today's Date: 2017  Onset of Illness/Injury or Date of Surgery Date: 17    Date of Referral to PT: 17  Referring Physician: Dr. Hwang      Admit Date: 2017      PT Recommendation and Plan    Visit Dx:    ICD-10-CM ICD-9-CM   1. Gastrointestinal hemorrhage with melena K92.1 578.1   2. Hematuria R31.9 599.70   3. Dehydration E86.0 276.51   4. Malignant neoplasm of sigmoid colon C18.7 153.3   5. Impaired functional mobility, balance, gait, and endurance Z74.09 V49.89   6. Impaired mobility and ADLs Z74.09 799.89             Outcome Measures       17 1403 17 1545       How much help from another person do you currently need...    Turning from your back to your side while in flat bed without using bedrails? 3  -TW      Moving from lying on back to sitting on the side of a flat bed without bedrails? 3  -TW      Moving to and from a bed to a chair (including a wheelchair)? 3  -TW      Standing up from a chair using your arms (e.g., wheelchair, bedside chair)? 3  -TW      Climbing 3-5 steps with a railing? 2  -TW      To walk in hospital room? 3  -TW      AM-PAC 6 Clicks Score 17  -TW      How much help from another is currently needed...    Putting on and taking off regular lower body clothing?  2  -RW     Bathing (including washing, rinsing, and drying)  2  -RW     Toileting (which includes using toilet bed pan or urinal)  3  -RW     Putting on and taking off regular upper body clothing  3  -RW     Taking care of personal grooming (such as brushing teeth)  3  -RW     Eating meals  3  -RW     Score  16  -RW     Functional Assessment    Outcome Measure Options AM-PAC 6 Clicks Basic Mobility (PT)  -TW AM-PAC 6 Clicks Daily Activity (OT)  -RW       User Key  (r) = Recorded By, (t) = Taken By, (c) = Cosigned By    Initials Name Provider Type    RESHMA Almanza  BASSEM Mon, OTR/L Occupational Therapist    TW Franco Byers, PTA Physical Therapy Assistant                      IP PT Goals       06/11/17 1403 06/08/17 1110 06/07/17 1228    Bed Mobility PT LTG    Bed Mobility PT LTG, Date Goal Reviewed 06/11/17  -TW 06/08/17  -AM 06/07/17  -RW    Bed Mobility PT LTG, Outcome goal ongoing  -TW goal not met  -AM goal ongoing  -RW    Gait Training PT STG    Gait Training Goal PT STG, Date Goal Reviewed 06/11/17  -TW 06/08/17  -AM 06/07/17  -RW    Gait Training Goal PT STG, Outcome goal ongoing  -TW goal not met  -AM goal ongoing  -RW    Stair Training PT LTG    Stair Training Goal PT LTG, Date Goal Reviewed 06/11/17  -TW 06/08/17  -AM 06/07/17  -RW    Stair Training Goal PT LTG, Outcome goal ongoing  -TW goal not met  -AM goal ongoing  -RW      06/06/17 1325 06/05/17 1652       Bed Mobility PT LTG    Bed Mobility PT LTG, Date Established  06/05/17  -GB     Bed Mobility PT LTG, Time to Achieve  5 - 7 days  -GB     Bed Mobility PT LTG, Activity Type  all bed mobility  -GB     Bed Mobility PT LTG, [place] Level  conditional independence  -GB     Bed Mobility PT LTG, Date Goal Reviewed 06/06/17  -AM      Bed Mobility PT LTG, Outcome goal not met  -AM goal not met  -GB     Transfer Training PT LTG    Transfer Training PT LTG, Date Established  06/05/17  -GB     Transfer Training PT LTG, Time to Achieve  5 - 7 days  -GB     Transfer Training PT LTG, Activity Type  all transfers  -GB     Transfer Training PT LTG, [place] Level  contact guard assist  -GB     Transfer Training PT LTG, Assist Device  walker, rolling  -GB     Transfer Training PT  LTG, Date Goal Reviewed 06/06/17  -AM      Transfer Training PT LTG, Outcome goal met  -AM goal not met  -GB     Gait Training PT STG    Gait Training Goal PT STG, Date Established  06/05/17  -GB     Gait Training Goal PT STG, Time to Achieve  2 wks  -GB     Gait Training Goal PT STG, [place] Level  conditional independence  -GB      Gait Training Goal PT STG, Assist Device  walker, rolling  -GB     Gait Training Goal PT STG, Distance to Achieve  150 ft w/ RW and CGA x 1  -GB     Gait Training Goal PT STG, Additional Goal  300 ft w/ RW and SBA x 1   -GB     Gait Training Goal PT STG, Date Goal Reviewed 06/06/17  -AM      Gait Training Goal PT STG, Outcome goal not met  -AM goal not met  -GB     Stair Training PT LTG    Stair Training Goal PT LTG, Date Established  06/05/17  -GB     Stair Training Goal PT LTG, Time to Achieve  by discharge  -GB     Stair Training Goal PT LTG, [place] Level  conditional independence  -GB     Stair Training Goal PT LTG, Assist Device  walker, rolling  -GB     Stair Training Goal PT LTG, Distance to Achieve  up/down 1 step  -GB     Stair Training Goal PT LTG, Date Goal Reviewed 06/06/17  -AM      Stair Training Goal PT LTG, Outcome goal not met  -AM goal not met  -GB       User Key  (r) = Recorded By, (t) = Taken By, (c) = Cosigned By    Initials Name Provider Type    LILIAN Matthews, PT Physical Therapist    AM Luciano Sanchez, PTA Physical Therapy Assistant    TW Franco Byers, PTA Physical Therapy Assistant    RW Danny Beebe, PTA Physical Therapy Assistant              PT Discharge Summary  Anticipated Discharge Disposition: inpatient rehabilitation facility  Reason for Discharge: Discharge from facility, Per MD order  Outcomes Achieved: Patient able to partially acheive established goals  Discharge Destination: Inpatient rehabilitation facility      Luciano Sanchez PTA   6/12/2017

## 2017-06-12 NOTE — DISCHARGE SUMMARY
[facility] Medicine Services  DISCHARGE SUMMARY       Date of Admission: 5/26/2017  Date of Discharge:  6/12/2017  Primary Care Physician: Melissa Ch MD    Presenting Problem/History of Present Illness:  Dehydration [E86.0]  Hematuria [R31.9]  Gastrointestinal hemorrhage with melena [K92.1]  Gastrointestinal hemorrhage with melena [K92.1]  Physical deconditioning [R53.81]     Final Discharge Diagnoses:  Hospital Problem List     * (Principal)Colon cancer (Chronic)    Overview Addendum 6/12/2017 12:59 PM by Bennie Lucia MD     Sigmoid colon, left hemicolectomy  6/2/2017         Essential hypertension    Tricuspid valve disorders, 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 female who presented to [facility] with Dark stool.   Patient was found to be suffering from a gastrointestinal hemorrhage.  Patient underwent colonoscopy with Dr. Stefano Linn with general surgery.  Colonoscopy revealed a large mass in the sigmoid colon.  Patient then underwent colon resection.  Patient's hemoglobin and hematocrit stabilized and she began to tolerate by mouth intake, though appetite is somewhat poor at this time.  Patient was approved for acute rehabilitation and has been discharged there to the care of Dr. Bennie Lucia.    Condition on Discharge:  Stable, improved    Physical Exam on Discharge:  /53 (BP Location:  "Right arm, Patient Position: Sitting)  Pulse 70  Temp 97 °F (36.1 °C) (Axillary)   Resp 18  Ht 72\" (182.9 cm)  Wt 194 lb 1.6 oz (88 kg)  LMP Comment: postmeno  SpO2 99%  BMI 26.32 kg/m2  Physical Exam   Constitutional: She is oriented to person, place, and time. She appears well-developed and well-nourished.  Non-toxic appearance. She does not have a sickly appearance. She does not appear ill. No distress.   HENT:   Head: Normocephalic and atraumatic.   Eyes: Conjunctivae and EOM are normal. Pupils are equal, round, and reactive to light. Right eye exhibits no discharge and no exudate. Left eye exhibits no discharge and no exudate. No scleral icterus.   Neck: Normal range of motion. Neck supple.   Cardiovascular: Normal rate, regular rhythm, normal heart sounds and intact distal pulses.  Exam reveals no gallop and no friction rub.    No murmur heard.  Pulmonary/Chest: Effort normal and breath sounds normal. No accessory muscle usage. No apnea, no tachypnea and no bradypnea. No respiratory distress. She has no wheezes. She has no rales. She exhibits no tenderness.   Abdominal: Soft. Bowel sounds are normal. She exhibits no distension. There is no tenderness. There is no rebound and no guarding.   Musculoskeletal: She exhibits no edema or deformity.   Neurological: She is alert and oriented to person, place, and time.   Skin: Skin is warm and dry. No abrasion, no bruising, no ecchymosis, no laceration, no lesion and no petechiae noted. She is not diaphoretic.   Psychiatric: She has a normal mood and affect. Her behavior is normal. Her mood appears not anxious. Her affect is not inappropriate.     Discharge Disposition:  Rehab Facility or Unit ([facility])    Discharge Medications:   Kierra Scales   Home Medication Instructions [facility]:[id number]    Printed on:06/12/17 1432   Medication Information                      Alcohol Swabs (B-D SINGLE USE SWABS REGULAR) pads  USE AS DIRECTED           " "  diltiaZEM CD (CARDIZEM CD) 180 MG 24 hr capsule  Take 180 mg by mouth Daily.             donepezil (ARICEPT) 10 MG tablet  Take 1 tablet by mouth Daily.             enalapril (VASOTEC) 20 MG tablet  Take 20 mg by mouth 2 (two) times a day.             ezetimibe (ZETIA) 10 MG tablet  Take 1 tablet by mouth Daily.             Insulin Glargine (LANTUS SOLOSTAR) 100 UNIT/ML injection pen  Inject 20 Units under the skin every night.             Insulin Pen Needle (COMFORT EZ PEN NEEDLES) 31G X 6 MM misc  2 (two) times a day. Inject two times per day.             Insulin Syringe-Needle U-100 31G X 5/16\" 1 ML misc  2 (two) times a day. Use as directed TWICE DAILY FOR Insulin Injections             Lancets (UNILET COMFORTOUCH LANCET) misc  USE AS DIRECTED FOR FINGERSTICKS             memantine (NAMENDA) 5 MG tablet  Take 1 tablet by mouth 2 (Two) Times a Day.             methIMAzole (TAPAZOLE) 5 MG tablet  Take half tablet q day             nabumetone (RELAFEN) 500 MG tablet  Take 500 mg by mouth daily.             NOVOLOG FLEXPEN 100 UNIT/ML solution pen-injector sc pen  INJECT 4 UNITS SUBCUTANEOUSLY WITH SUPPER. MAY INCREASE UP TO 8 UNITS AS DIRECTED             pravastatin (PRAVACHOL) 40 MG tablet  Take 1 tablet by mouth Every Night.             SITagliptin (JANUVIA) 100 MG tablet  Take 1 tablet by mouth Daily.             sotalol (BETAPACE) 80 MG tablet  Take 80 mg by mouth 2 (Two) Times a Day.             vitamin D (ERGOCALCIFEROL) 16543 UNITS capsule capsule  Take 1 capsule by mouth Every 14 (Fourteen) Days.                 Discharge Diet:  As tolerated, ADA diet    Activity at Discharge:  Per PT/OT recommendations    Discharge Care Plan/Instructions: Per ARU s/p discharge, ARU services appreciated    Follow-up Appointments:   Future Appointments  Date Time Provider Department Center   6/16/2017 1:00 PM DEBBY Cotto [facility] None   6/27/2017 1:15 PM Karol Valenzuela MD [facility] None   11/8/2017 " 12:45 PM [facility] None   12/13/2017 1:40 PM MD MUKUL Polanco [facility] None

## 2017-06-12 NOTE — PLAN OF CARE
Problem: Patient Care Overview (Adult)  Goal: Plan of Care Review  Outcome: Ongoing (interventions implemented as appropriate)    06/12/17 0301   Coping/Psychosocial Response Interventions   Plan Of Care Reviewed With patient   Patient Care Overview   Progress improving   Outcome Evaluation   Outcome Summary/Follow up Plan pt had one pain pill this shift, sleeping well       Goal: Adult Individualization and Mutuality  Outcome: Ongoing (interventions implemented as appropriate)  Goal: Discharge Needs Assessment  Outcome: Ongoing (interventions implemented as appropriate)    Problem: Fluid Volume Deficit (Adult)  Goal: Fluid/Electrolyte Balance  Outcome: Ongoing (interventions implemented as appropriate)  Goal: Comfort/Well Being  Outcome: Ongoing (interventions implemented as appropriate)    Problem: Fall Risk (Adult)  Goal: Absence of Falls  Outcome: Ongoing (interventions implemented as appropriate)

## 2017-06-12 NOTE — PLAN OF CARE
Problem: Patient Care Overview (Adult)  Goal: Plan of Care Review  Outcome: Ongoing (interventions implemented as appropriate)    06/12/17 1502   Coping/Psychosocial Response Interventions   Plan Of Care Reviewed With patient   Patient Care Overview   Progress no change   Outcome Evaluation   Outcome Summary/Follow up Plan pt arrived to floor via wheelchair accompanied by cna and pt transport. Pt has no c/o unrelieved pain or altered comfort at this time. pt refused elmination assistance. incision site has small serosangineous drainage but no signs of infection, swelling, redness noted. Iv is 22 g right forearm no s/s of swelling, redness or infiltration noted at this time. vitals are stable.        Goal: Adult Individualization and Mutuality  Outcome: Ongoing (interventions implemented as appropriate)  Goal: Discharge Needs Assessment  Outcome: Ongoing (interventions implemented as appropriate)    Problem: Functional Deficit (Adult,Obstetrics,Pediatric)  Goal: Signs and Symptoms of Listed Potential Problems Will be Absent or Manageable (Functional Deficit)  Outcome: Ongoing (interventions implemented as appropriate)    06/12/17 1502   Functional Deficit   Problems Assessed (Functional Deficit) all   Problems Present (Functional Deficit) none         06/12/17 1502   Functional Deficit   Problems Assessed (Functional Deficit) all   Problems Present (Functional Deficit) balance impairment;functional activity tolerance impairment;mobility impairment;muscle strength impairment;pain

## 2017-06-12 NOTE — THERAPY DISCHARGE NOTE
Acute Care - Occupational Therapy Discharge Summary  [facility]     Patient Name: Kierra Scales  : 1929  MRN: [id number]    Today's Date: 2017  Onset of Illness/Injury or Date of Surgery Date: 17    Date of Referral to OT: 17  Referring Physician: Dr. Hwang      Admit Date: 2017        OT Recommendation and Plan    Visit Dx:    ICD-10-CM ICD-9-CM   1. Gastrointestinal hemorrhage with melena K92.1 578.1   2. Hematuria R31.9 599.70   3. Dehydration E86.0 276.51   4. Malignant neoplasm of sigmoid colon C18.7 153.3   5. Impaired functional mobility, balance, gait, and endurance Z74.09 V49.89   6. Impaired mobility and ADLs Z74.09 799.89                     OT Goals       17 1414 17 1725       Transfer Training OT STG    Transfer Training OT STG, Date Established  17  -RW     Transfer Training OT STG, Time to Achieve  5 - 7 days  -RW     Transfer Training OT STG, Activity Type  toilet  -RW     Transfer Training OT STG, Warren Level  supervision required  -RW     Transfer Training OT STG, Date Goal Reviewed 17  -RM      Transfer Training OT STG, Outcome goal not met  -RM      Transfer Training OT STG, Reason Goal Not Met discharged from facility  -RM      Dynamic Standing Balance OT STG    Dynamic Standing Balance OT STG, Date Established  17  -RW     Dynamic Standing Balance OT STG, Time to Achieve  5 - 7 days  -RW     Dynamic Standing Balance OT STG, Warren Level  supervision required  -RW     Dynamic Standing Balance OT STG, Assist Device  assistive Device  -RW     Dynamic Standing Balance OT STG, Additional Goal  5 min  -RW     Dynamic Standing Balance OT STG, Date Goal Reviewed 17  -RM      Dynamic Standing Balance OT STG, Outcome goal not met  -RM      Dynamic Standing Balance OT STG, Reason Goal Not Met discharged from facility  -RM      ADL OT LTG    ADL OT LTG, Date Established  17  -RW     ADL OT LTG, Time to Achieve  2  wks  -RW     ADL OT LTG, Activity Type  ADL skills  -RW     ADL OT LTG, Craig Level  standby assist  -RW     ADL OT LTG, Date Goal Reviewed 06/12/17  -RM      ADL OT LTG, Outcome goal not met  -RM      ADL OT LTG, Reason Goal Not Met discharged from facility  -RM      Functional Mobility OT LTG    Functional Mobility Goal OT LTG, Date Established  06/09/17  -RW     Functional Mobility Goal OT LTG, Time to Achieve  2 wks  -RW     Functional Mobility Goal OT LTG, Craig Level  supervision  -RW     Functional Mobility Goal OT LTG, Distance to Achieve  to the bathroom  -RW     Functional Mobility Goal OT LTG, Date Goal Reviewed 06/12/17  -RM      Functional Mobility Goal OT LTG, Outcome goal not met  -RM      Functional Mobility Goal OT LTG, Reason Goal Not Met discharged from facility  -        User Key  (r) = Recorded By, (t) = Taken By, (c) = Cosigned By    Initials Name Provider Type    RW Cami Mon, OTR/L Occupational Therapist    RM Catie Sheriff OT Occupational Therapist                Outcome Measures       06/11/17 1403 06/09/17 1545       How much help from another person do you currently need...    Turning from your back to your side while in flat bed without using bedrails? 3  -TW      Moving from lying on back to sitting on the side of a flat bed without bedrails? 3  -TW      Moving to and from a bed to a chair (including a wheelchair)? 3  -TW      Standing up from a chair using your arms (e.g., wheelchair, bedside chair)? 3  -TW      Climbing 3-5 steps with a railing? 2  -TW      To walk in hospital room? 3  -TW      AM-PAC 6 Clicks Score 17  -TW      How much help from another is currently needed...    Putting on and taking off regular lower body clothing?  2  -RW     Bathing (including washing, rinsing, and drying)  2  -RW     Toileting (which includes using toilet bed pan or urinal)  3  -RW     Putting on and taking off regular upper body clothing  3  -RW     Taking care of  personal grooming (such as brushing teeth)  3  -RW     Eating meals  3  -RW     Score  16  -RW     Functional Assessment    Outcome Measure Options AM-PAC 6 Clicks Basic Mobility (PT)  -TW AM-PAC 6 Clicks Daily Activity (OT)  -RW       User Key  (r) = Recorded By, (t) = Taken By, (c) = Cosigned By    Initials Name Provider Type    RW Cami Mon, OTR/L Occupational Therapist    TW Fracno Byers PTA Physical Therapy Assistant              OT Discharge Summary  Anticipated Discharge Disposition: inpatient rehabilitation facility  Reason for Discharge: Discharge from facility  Outcomes Achieved: Refer to plan of care for updates on goals achieved  Discharge Destination: Inpatient rehabilitation facility      Catie Sheriff OT  6/12/2017

## 2017-06-12 NOTE — H&P (VIEW-ONLY)
[address] [address]  T - [id number]     H/P NOTE         SUBJECTIVE:   Patient Care Team:  Melissa Ch MD as PCP - General  Lindsay Ocampo MA as Medical Assistant    Chief Complaint:     Chief Complaint   Patient presents with   • Rectal Bleeding       Subjective     Patient is 88 y.o. female presents with Admitted with rectal bleeding and anemia.  Was found to have a sigmoid colon cancer and underwent a left hemicolectomy by Dr. Linn on June 2, 2017.  Postoperatively she's done fairly well she's participated with therapy and we have been asked to admit her to the acute rehabilitation unit.      ROS/HISTORY/ CURRENT MEDICATIONS/OBJECTIVE/VS/PE:       History:     Past Medical History:   Diagnosis Date   • Anxiety    • Arthritis    • Bradycardia    • Dyslipidemia    • Dyspnea    • Fibrocystic disease of breast    • Hashimoto's thyroiditis    • Hypertensive disorder    • Hypothyroidism    • Pain    • Palpitations    • Paroxysmal tachycardia    • Shortness of breath    • Tricuspid valve disorders, specified as nonrheumatic    • Vitamin D deficiency      Past Surgical History:   Procedure Laterality Date   • COLON RESECTION N/A 6/2/2017    Procedure: COLON RESECTION;  Surgeon: Stefano Linn MD;  Location: [facility] OR;  Service:    • COLONOSCOPY N/A 5/30/2017    Procedure: Flexible Sigmoidoscopy;  Surgeon: Stefano Linn MD;  Location: [facility] ENDOSCOPY;  Service:    • EXTERNAL EAR SURGERY     • EYE SURGERY     • LIPOMA EXCISION     • PACEMAKER IMPLANTATION     • SALIVARY GLAND SURGERY     • TUBAL ABDOMINAL LIGATION       Family History   Problem Relation Age of Onset   • Diabetes Other    • Heart disease Other    • Hypertension Other    • Thyroid disease Other      Social History   Substance Use Topics   • Smoking status: Never Smoker   • Smokeless tobacco: Never Used   • Alcohol use No       Allergies:  Penicillins    Current  Medications:     Current Facility-Administered Medications:   •  bupivacaine-EPINEPHrine PF (MARCAINE w/EPI) 0.5% -1:932259 injection, , , PRN, Stefano Linn MD, 30 mL at 06/02/17 1130  •  dextrose (D50W) solution 25 g, 25 g, Intravenous, Q15 Min PRN, Vicki G Levill, APRN, 25 g at 05/30/17 0828  •  dextrose (GLUTOSE) oral gel 15 g, 15 g, Oral, Q15 Min PRN, Vicki G Levill, APRN  •  diltiaZEM CD (CARDIZEM CD) 24 hr capsule 180 mg, 180 mg, Oral, Daily, Joan Rojas MD, 180 mg at 06/12/17 0836  •  donepezil (ARICEPT) tablet 10 mg, 10 mg, Oral, Nightly, Stefano Linn MD, 10 mg at 06/11/17 2034  •  enalapril (VASOTEC) tablet 20 mg, 20 mg, Oral, BID, Joan Rojas MD, 20 mg at 06/12/17 0836  •  enoxaparin (LOVENOX) syringe 40 mg, 40 mg, Subcutaneous, Q24H, Jimmy Landry MD, 40 mg at 06/12/17 0837  •  glucagon (human recombinant) (GLUCAGEN DIAGNOSTIC) injection 1 mg, 1 mg, Subcutaneous, Q15 Min PRN, Vicki G Levill, APRN  •  hydrALAZINE (APRESOLINE) injection 10 mg, 10 mg, Intravenous, Q6H PRN, Raymond Rivera MD, 10 mg at 06/11/17 0131  •  HYDROcodone-acetaminophen (NORCO) 5-325 MG per tablet 1 tablet, 1 tablet, Oral, Q6H PRN, Stefano Linn MD, 1 tablet at 06/12/17 0009  •  insulin aspart (novoLOG) injection 0-14 Units, 0-14 Units, Subcutaneous, 4x Daily AC & at Bedtime, Vicki G Levill, APRN, 3 Units at 06/12/17 1126  •  memantine (NAMENDA) tablet 5 mg, 5 mg, Oral, BID, Joan Rojas MD, 5 mg at 06/12/17 0837  •  methIMAzole (TAPAZOLE) half tablet 2.5 mg, 2.5 mg, Oral, Daily, Joan Rojas MD, 2.5 mg at 06/12/17 0837  •  Morphine sulfate (PF) injection 2 mg, 2 mg, Intravenous, Q4H PRN, Stefano Linn MD  •  naloxone (NARCAN) injection 0.1 mg, 0.1 mg, Intravenous, Q5 Min PRN, Stefano Linn MD  •  ondansetron (ZOFRAN) injection 4 mg, 4 mg, Intravenous, Q4H PRN, Guido Motley MD, 4 mg at 06/10/17 2103  •  pantoprazole (PROTONIX) injection 40 mg, 40 mg, Intravenous, Q12H,  "Joan Rojas MD, 40 mg at 06/12/17 0838  •  potassium chloride 10 mEq in 100 mL IVPB, 10 mEq, Intravenous, Q1H PRN, Guido Motley MD, Last Rate: 100 mL/hr at 06/09/17 0651, 10 mEq at 06/09/17 0651  •  sodium chloride 0.9 % flush 1-10 mL, 1-10 mL, Intravenous, PRN, Vicki G Levill, APRN, 10 mL at 05/28/17 0021  •  sotalol (BETAPACE) tablet 80 mg, 80 mg, Oral, Q12H, Joan Rojas MD, 80 mg at 06/12/17 0836    Current Outpatient Prescriptions:   •  diltiaZEM CD (CARDIZEM CD) 180 MG 24 hr capsule, Take 180 mg by mouth Daily., Disp: , Rfl:   •  Alcohol Swabs (B-D SINGLE USE SWABS REGULAR) pads, USE AS DIRECTED, Disp: 200 each, Rfl: 3  •  donepezil (ARICEPT) 10 MG tablet, Take 1 tablet by mouth Daily., Disp: , Rfl: 3  •  enalapril (VASOTEC) 20 MG tablet, Take 20 mg by mouth 2 (two) times a day., Disp: , Rfl:   •  ezetimibe (ZETIA) 10 MG tablet, Take 1 tablet by mouth Daily., Disp: 30 tablet, Rfl: 11  •  Insulin Glargine (LANTUS SOLOSTAR) 100 UNIT/ML injection pen, Inject 20 Units under the skin every night., Disp: , Rfl:   •  Insulin Pen Needle (COMFORT EZ PEN NEEDLES) 31G X 6 MM misc, 2 (two) times a day. Inject two times per day., Disp: , Rfl:   •  Insulin Syringe-Needle U-100 31G X 5/16\" 1 ML misc, 2 (two) times a day. Use as directed TWICE DAILY FOR Insulin Injections, Disp: , Rfl:   •  Lancets (UNILET COMFORTOUCH LANCET) misc, USE AS DIRECTED FOR FINGERSTICKS, Disp: 100 each, Rfl: 3  •  memantine (NAMENDA) 5 MG tablet, Take 1 tablet by mouth 2 (Two) Times a Day., Disp: , Rfl: 3  •  methIMAzole (TAPAZOLE) 5 MG tablet, Take half tablet q day (Patient taking differently: Take 2.5 mg by mouth Daily. Take half tablet q day), Disp: 30 tablet, Rfl: 5  •  nabumetone (RELAFEN) 500 MG tablet, Take 500 mg by mouth daily., Disp: , Rfl:   •  NOVOLOG FLEXPEN 100 UNIT/ML solution pen-injector sc pen, INJECT 4 UNITS SUBCUTANEOUSLY WITH SUPPER. MAY INCREASE UP TO 8 UNITS AS DIRECTED, Disp: 15 mL, Rfl: 3  •  pravastatin (PRAVACHOL) " 40 MG tablet, Take 1 tablet by mouth Every Night., Disp: 30 tablet, Rfl: 11  •  SITagliptin (JANUVIA) 100 MG tablet, Take 1 tablet by mouth Daily., Disp: 30 tablet, Rfl: 11  •  sotalol (BETAPACE) 80 MG tablet, Take 80 mg by mouth 2 (Two) Times a Day., Disp: , Rfl:   •  vitamin D (ERGOCALCIFEROL) 46509 UNITS capsule capsule, Take 1 capsule by mouth Every 14 (Fourteen) Days., Disp: 30 capsule, Rfl: 11      REVIEW OF SYSTEMS:  Review of Systems   Constitutional: Positive for activity change and fatigue. Negative for unexpected weight change.   HENT: Negative for congestion, hearing loss, mouth sores, nosebleeds and postnasal drip.    Eyes: Negative.    Respiratory: Negative.    Cardiovascular: Negative.    Endocrine: Negative.    Genitourinary: Negative.    Musculoskeletal: Positive for arthralgias and gait problem.   Skin: Negative.         She has a surgical incision is healing   Allergic/Immunologic: Negative.    Neurological: Negative for dizziness, seizures, facial asymmetry, light-headedness, numbness and headaches.   Hematological: Negative.    Psychiatric/Behavioral: Positive for confusion. Negative for agitation, behavioral problems, decreased concentration, dysphoric mood, hallucinations, self-injury, sleep disturbance and suicidal ideas. The patient is not nervous/anxious and is not hyperactive.         Family that she's had some memory loss that seems to be a little worse in the hospital after surgery       Objective     Physical Exam:   Temp:  [97 °F (36.1 °C)-99.5 °F (37.5 °C)] 97 °F (36.1 °C)  Heart Rate:  [68-79] 70  Resp:  [18] 18  BP: ()/(44-70) 110/53    Physical Exam:    Physical Exam   Constitutional: She is oriented to person, place, and time. She appears well-developed and well-nourished. No distress.   HENT:   Head: Normocephalic and atraumatic.   Eyes: Conjunctivae and EOM are normal. Pupils are equal, round, and reactive to light. Right eye exhibits no discharge. Left eye exhibits no  discharge. No scleral icterus.   Neck: Normal range of motion. Neck supple. No tracheal deviation present. No thyromegaly present.   Cardiovascular: Normal rate, regular rhythm and intact distal pulses.  Exam reveals no gallop and no friction rub.    Murmur heard.  Chest wall shows pacemaker to be present without holosystolic murmur is present as well across the chest     Pulmonary/Chest: Effort normal and breath sounds normal. No respiratory distress. She has no wheezes. She has no rales. She exhibits no tenderness.   Abdominal: Soft. Bowel sounds are normal. She exhibits no distension and no mass. There is no tenderness. There is guarding.   Lower midline abdominal incision is present and healing well   Musculoskeletal: Normal range of motion. She exhibits no edema, tenderness or deformity.   Neurological: She is alert and oriented to person, place, and time. She has normal reflexes. No cranial nerve deficit. Coordination normal.   Skin: Skin is warm and dry. She is not diaphoretic. No erythema.   Psychiatric: She has a normal mood and affect. Her behavior is normal. Judgment normal.   She does have some memory issues and they are mild     Nursing note and vitals reviewed.           Results Review:      Lab Results (last 24 hours)     Procedure Component Value Units Date/Time    POC Glucose Fingerstick [[id number]]  (Abnormal) Collected:  06/11/17 1142    Specimen:  Blood Updated:  06/11/17 1350     Glucose 187 (H) mg/dL       Sliding Scale AdminMeter: [id number]: [id number] Bomgar       POC Glucose Fingerstick [[id number]]  (Abnormal) Collected:  06/11/17 1635    Specimen:  Blood Updated:  06/11/17 1825     Glucose 219 (H) mg/dL       Sliding Scale AdminMeter: [id number]: [id number] [place]       POC Glucose Fingerstick [[id number]]  (Abnormal) Collected:  06/11/17 2033    Specimen:  Blood Updated:  06/11/17 2130     Glucose 239 (H) mg/dL       Meter: [id number]: [id number]  DRAKE MANSI       POC Glucose Fingerstick [[id number]]  (Abnormal) Collected:  06/12/17 0641    Specimen:  Blood Updated:  06/12/17 0701     Glucose 232 (H) mg/dL       RN NotifiedMeter: [id number]: [id number] PAWEL STARKS       POC Glucose Fingerstick [[id number]]  (Abnormal) Collected:  06/12/17 1112    Specimen:  Blood Updated:  06/12/17 1127     Glucose 195 (H) mg/dL       Meter: [id number]: [id number] JEREL SORENSON                             Imaging Results (last 24 hours)     ** No results found for the last 24 hours. **          I reviewed the patient's  clinical results.  I reviewed the patient's  imaging results and agree with the interpretation.   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  We will check iron levels and start replacing her iron.  Folic acid was checked in addition we'll check a B12 level also.  We expect that she'll participate 3 hours a day make measurable improvement and be able to return home at discharge  We'll control her diabetes and her blood pressure continue routine meds  Discussed with Dr. Linn prior to admission and discussed with her daughters in the room also  I discussed the patients findings and my recommendations with patient, family, nursing staff and consulting provider.      Bennie Lucia MD  06/12/17  1:04 PM

## 2017-06-12 NOTE — PLAN OF CARE
Problem: Fall Risk (Adult)  Goal: Identify Related Risk Factors and Signs and Symptoms  Outcome: Outcome(s) achieved Date Met:  06/12/17 06/12/17 1500   Fall Risk   Fall Risk: Related Risk Factors age-related changes;confusion/agitation;culprit medication(s);gait/mobility problems;environment unfamiliar;depression/anxiety   Fall Risk: Signs and Symptoms presence of risk factors

## 2017-06-12 NOTE — NURSING NOTE
Patient visited for ARU consult. Patient daughter present at time of visit. Patient was independent at home prior and lives alone. Patients daughter states she stays all night with patient most nights. Patient has good family support at home. Patient did not use any equipment at home prior. Patient is alert and oriented to person. Patient states her name and birthdate, her daughters name, and that she lives in [place]. Patient has noted dementia. Daughter states patient is more oriented when in her home environment. Patient has been working with PT/OT and is requiring the continuation of these 2 skills for strength and safety prior to returning back home. Patient will also be evaluated by ST while on ARU. Patient medicare days have been checked by CM. Patient is agreeable to come to ARU. Patient has been discussed with MD over rehab floor. CM notified that we will accept patient on ARU today.

## 2017-06-12 NOTE — PROGRESS NOTES
GENERAL SURGERY PROGRESS NOTE  Chief Complaint:  Surgery Follow up   LOS: 13 days       Subjective     Interval History:     Feels well, still minimal PO intake.      Objective     Vital Signs  Temp:  [98 °F (36.7 °C)-99.5 °F (37.5 °C)] 99.5 °F (37.5 °C)  Heart Rate:  [68-79] 72  Resp:  [18] 18  BP: ()/(44-70) 154/67    Physical Exam:   Abdomen soft  Labs:  Lab Results (last 24 hours)     Procedure Component Value Units Date/Time    POC Glucose Fingerstick [[id number]]  (Abnormal) Collected:  06/11/17 1142    Specimen:  Blood Updated:  06/11/17 1350     Glucose 187 (H) mg/dL       Sliding Scale AdminMeter: [id number]: [id number] KUBACH VALESE       POC Glucose Fingerstick [[id number]]  (Abnormal) Collected:  06/11/17 1635    Specimen:  Blood Updated:  06/11/17 1825     Glucose 219 (H) mg/dL       Sliding Scale AdminMeter: [id number]: [id number] KUBACH VALESE       POC Glucose Fingerstick [[id number]]  (Abnormal) Collected:  06/11/17 2033    Specimen:  Blood Updated:  06/11/17 2130     Glucose 239 (H) mg/dL       Meter: [id number]: [id number] DRAKE NAZARIO       POC Glucose Fingerstick [[id number]]  (Abnormal) Collected:  06/12/17 0641    Specimen:  Blood Updated:  06/12/17 0701     Glucose 232 (H) mg/dL       RN NotifiedMeter: [id number]: [id number] PAWEL STARKS             Assessment/Plan     Kierra Scales is a 88 y.o. female who is s/p left colon resection      Overall doing well.  Encourage PO diet.  Continue PT/OT  To ARU when able.          This document has been electronically signed by Stefano Linn MD on June 12, 2017 9:42 AM        Stefano Linn MD  06/12/17  9:42 AM

## 2017-06-13 PROBLEM — C18.7 ADENOCARCINOMA OF SIGMOID COLON (HCC): Status: ACTIVE | Noted: 2017-06-13

## 2017-06-13 PROBLEM — E87.6 HYPOKALEMIA: Status: ACTIVE | Noted: 2017-06-13

## 2017-06-13 LAB
ALBUMIN SERPL-MCNC: 3.1 G/DL (ref 3.4–4.8)
ALBUMIN/GLOB SERPL: 1.2 G/DL (ref 1.1–1.8)
ALP SERPL-CCNC: 50 U/L (ref 38–126)
ALT SERPL W P-5'-P-CCNC: 33 U/L (ref 9–52)
ANION GAP SERPL CALCULATED.3IONS-SCNC: 12 MMOL/L (ref 5–15)
AST SERPL-CCNC: 16 U/L (ref 14–36)
BASOPHILS # BLD AUTO: 0 10*3/MM3 (ref 0–0.2)
BASOPHILS NFR BLD AUTO: 0 % (ref 0–2)
BILIRUB SERPL-MCNC: 0.3 MG/DL (ref 0.2–1.3)
BUN BLD-MCNC: 18 MG/DL (ref 7–21)
BUN/CREAT SERPL: 18.6 (ref 7–25)
CALCIUM SPEC-SCNC: 8.7 MG/DL (ref 8.4–10.2)
CHLORIDE SERPL-SCNC: 97 MMOL/L (ref 95–110)
CO2 SERPL-SCNC: 28 MMOL/L (ref 22–31)
CREAT BLD-MCNC: 0.97 MG/DL (ref 0.5–1)
DEPRECATED RDW RBC AUTO: 46.2 FL (ref 36.4–46.3)
EOSINOPHIL # BLD AUTO: 0.09 10*3/MM3 (ref 0–0.7)
EOSINOPHIL NFR BLD AUTO: 1.6 % (ref 0–7)
ERYTHROCYTE [DISTWIDTH] IN BLOOD BY AUTOMATED COUNT: 13.9 % (ref 11.5–14.5)
FOLATE SERPL-MCNC: 18.9 NG/ML (ref 2.76–21)
GFR SERPL CREATININE-BSD FRML MDRD: 66 ML/MIN/1.73 (ref 39–90)
GLOBULIN UR ELPH-MCNC: 2.6 GM/DL (ref 2.3–3.5)
GLUCOSE BLD-MCNC: 134 MG/DL (ref 60–100)
GLUCOSE BLDC GLUCOMTR-MCNC: 149 MG/DL (ref 70–130)
GLUCOSE BLDC GLUCOMTR-MCNC: 208 MG/DL (ref 70–130)
GLUCOSE BLDC GLUCOMTR-MCNC: 237 MG/DL (ref 70–130)
GLUCOSE BLDC GLUCOMTR-MCNC: 249 MG/DL (ref 70–130)
GLUCOSE BLDC GLUCOMTR-MCNC: 254 MG/DL (ref 70–130)
HBA1C MFR BLD: 6.3 % (ref 4–5.6)
HCT VFR BLD AUTO: 25.3 % (ref 35–45)
HGB BLD-MCNC: 8.5 G/DL (ref 12–15.5)
IMM GRANULOCYTES # BLD: 0.03 10*3/MM3 (ref 0–0.02)
IMM GRANULOCYTES NFR BLD: 0.5 % (ref 0–0.5)
IRON 24H UR-MRATE: 15 MCG/DL (ref 37–170)
IRON SATN MFR SERPL: 7 % (ref 15–50)
LYMPHOCYTES # BLD AUTO: 0.99 10*3/MM3 (ref 0.6–4.2)
LYMPHOCYTES NFR BLD AUTO: 18.1 % (ref 10–50)
MAGNESIUM SERPL-MCNC: 1.9 MG/DL (ref 1.6–2.3)
MCH RBC QN AUTO: 30.7 PG (ref 26.5–34)
MCHC RBC AUTO-ENTMCNC: 33.6 G/DL (ref 31.4–36)
MCV RBC AUTO: 91.3 FL (ref 80–98)
MONOCYTES # BLD AUTO: 0.55 10*3/MM3 (ref 0–0.9)
MONOCYTES NFR BLD AUTO: 10.1 % (ref 0–12)
NEUTROPHILS # BLD AUTO: 3.8 10*3/MM3 (ref 2–8.6)
NEUTROPHILS NFR BLD AUTO: 69.7 % (ref 37–80)
PLATELET # BLD AUTO: 299 10*3/MM3 (ref 150–450)
PMV BLD AUTO: 10 FL (ref 8–12)
POTASSIUM BLD-SCNC: 3.1 MMOL/L (ref 3.5–5.1)
PROT SERPL-MCNC: 5.7 G/DL (ref 6.3–8.6)
RBC # BLD AUTO: 2.77 10*6/MM3 (ref 3.77–5.16)
SODIUM BLD-SCNC: 137 MMOL/L (ref 137–145)
TIBC SERPL-MCNC: 230 MCG/DL (ref 265–497)
TSH SERPL DL<=0.05 MIU/L-ACNC: 0.44 MIU/ML (ref 0.46–4.68)
VIT B12 BLD-MCNC: 513 PG/ML (ref 239–931)
WBC NRBC COR # BLD: 5.46 10*3/MM3 (ref 3.2–9.8)

## 2017-06-13 PROCEDURE — 92610 EVALUATE SWALLOWING FUNCTION: CPT | Performed by: SPEECH-LANGUAGE PATHOLOGIST

## 2017-06-13 PROCEDURE — 97116 GAIT TRAINING THERAPY: CPT | Performed by: PHYSICAL THERAPIST

## 2017-06-13 PROCEDURE — 83735 ASSAY OF MAGNESIUM: CPT | Performed by: FAMILY MEDICINE

## 2017-06-13 PROCEDURE — G8987 SELF CARE CURRENT STATUS: HCPCS

## 2017-06-13 PROCEDURE — G8979 MOBILITY GOAL STATUS: HCPCS | Performed by: PHYSICAL THERAPIST

## 2017-06-13 PROCEDURE — G8978 MOBILITY CURRENT STATUS: HCPCS | Performed by: PHYSICAL THERAPIST

## 2017-06-13 PROCEDURE — 63710000001 INSULIN DETEMIR PER 5 UNITS: Performed by: FAMILY MEDICINE

## 2017-06-13 PROCEDURE — 97530 THERAPEUTIC ACTIVITIES: CPT | Performed by: PHYSICAL THERAPIST

## 2017-06-13 PROCEDURE — 84443 ASSAY THYROID STIM HORMONE: CPT | Performed by: FAMILY MEDICINE

## 2017-06-13 PROCEDURE — 85025 COMPLETE CBC W/AUTO DIFF WBC: CPT | Performed by: FAMILY MEDICINE

## 2017-06-13 PROCEDURE — 97166 OT EVAL MOD COMPLEX 45 MIN: CPT

## 2017-06-13 PROCEDURE — 97162 PT EVAL MOD COMPLEX 30 MIN: CPT | Performed by: PHYSICAL THERAPIST

## 2017-06-13 PROCEDURE — 83540 ASSAY OF IRON: CPT | Performed by: FAMILY MEDICINE

## 2017-06-13 PROCEDURE — 82746 ASSAY OF FOLIC ACID SERUM: CPT | Performed by: FAMILY MEDICINE

## 2017-06-13 PROCEDURE — G8988 SELF CARE GOAL STATUS: HCPCS

## 2017-06-13 PROCEDURE — 92523 SPEECH SOUND LANG COMPREHEN: CPT | Performed by: SPEECH-LANGUAGE PATHOLOGIST

## 2017-06-13 PROCEDURE — 82962 GLUCOSE BLOOD TEST: CPT

## 2017-06-13 PROCEDURE — 82607 VITAMIN B-12: CPT | Performed by: FAMILY MEDICINE

## 2017-06-13 PROCEDURE — 25010000002 ENOXAPARIN PER 10 MG: Performed by: FAMILY MEDICINE

## 2017-06-13 PROCEDURE — 99232 SBSQ HOSP IP/OBS MODERATE 35: CPT | Performed by: FAMILY MEDICINE

## 2017-06-13 PROCEDURE — 83036 HEMOGLOBIN GLYCOSYLATED A1C: CPT | Performed by: FAMILY MEDICINE

## 2017-06-13 PROCEDURE — 63710000001 INSULIN ASPART PER 5 UNITS: Performed by: FAMILY MEDICINE

## 2017-06-13 PROCEDURE — 80053 COMPREHEN METABOLIC PANEL: CPT | Performed by: FAMILY MEDICINE

## 2017-06-13 PROCEDURE — 97535 SELF CARE MNGMENT TRAINING: CPT

## 2017-06-13 PROCEDURE — 97542 WHEELCHAIR MNGMENT TRAINING: CPT | Performed by: PHYSICAL THERAPIST

## 2017-06-13 PROCEDURE — 97530 THERAPEUTIC ACTIVITIES: CPT

## 2017-06-13 PROCEDURE — 83550 IRON BINDING TEST: CPT | Performed by: FAMILY MEDICINE

## 2017-06-13 RX ORDER — POTASSIUM CHLORIDE 750 MG/1
10 CAPSULE, EXTENDED RELEASE ORAL 2 TIMES DAILY WITH MEALS
Status: DISCONTINUED | OUTPATIENT
Start: 2017-06-13 | End: 2017-06-23 | Stop reason: HOSPADM

## 2017-06-13 RX ORDER — FERROUS SULFATE TAB EC 324 MG (65 MG FE EQUIVALENT) 324 (65 FE) MG
324 TABLET DELAYED RESPONSE ORAL 2 TIMES DAILY WITH MEALS
Status: DISCONTINUED | OUTPATIENT
Start: 2017-06-13 | End: 2017-06-23 | Stop reason: HOSPADM

## 2017-06-13 RX ADMIN — DONEPEZIL HYDROCHLORIDE 10 MG: 10 TABLET, FILM COATED ORAL at 20:04

## 2017-06-13 RX ADMIN — POTASSIUM CHLORIDE 10 MEQ: 750 CAPSULE, EXTENDED RELEASE ORAL at 10:43

## 2017-06-13 RX ADMIN — DOCUSATE SODIUM 100 MG: 100 CAPSULE, LIQUID FILLED ORAL at 09:10

## 2017-06-13 RX ADMIN — MAGNESIUM OXIDE TAB 400 MG (241.3 MG ELEMENTAL MG) 400 MG: 400 (241.3 MG) TAB at 10:43

## 2017-06-13 RX ADMIN — DILTIAZEM HYDROCHLORIDE 180 MG: 180 CAPSULE, EXTENDED RELEASE ORAL at 09:10

## 2017-06-13 RX ADMIN — FERROUS SULFATE TAB EC 324 MG (65 MG FE EQUIVALENT) 324 MG: 324 (65 FE) TABLET DELAYED RESPONSE at 17:08

## 2017-06-13 RX ADMIN — HYDROCODONE BITARTRATE AND ACETAMINOPHEN 1 TABLET: 5; 325 TABLET ORAL at 20:09

## 2017-06-13 RX ADMIN — Medication 1 TABLET: at 10:43

## 2017-06-13 RX ADMIN — HYDROCODONE BITARTRATE AND ACETAMINOPHEN 1 TABLET: 5; 325 TABLET ORAL at 03:41

## 2017-06-13 RX ADMIN — SOTALOL HYDROCHLORIDE 80 MG: 80 TABLET ORAL at 20:04

## 2017-06-13 RX ADMIN — HYDROCODONE BITARTRATE AND ACETAMINOPHEN 1 TABLET: 5; 325 TABLET ORAL at 09:40

## 2017-06-13 RX ADMIN — LINAGLIPTIN 5 MG: 5 TABLET, FILM COATED ORAL at 09:10

## 2017-06-13 RX ADMIN — INSULIN ASPART 5 UNITS: 100 INJECTION, SOLUTION INTRAVENOUS; SUBCUTANEOUS at 10:44

## 2017-06-13 RX ADMIN — INSULIN DETEMIR 15 UNITS: 100 INJECTION, SOLUTION SUBCUTANEOUS at 20:05

## 2017-06-13 RX ADMIN — MEMANTINE 5 MG: 5 TABLET ORAL at 17:08

## 2017-06-13 RX ADMIN — ENOXAPARIN SODIUM 40 MG: 40 INJECTION SUBCUTANEOUS at 09:08

## 2017-06-13 RX ADMIN — ENALAPRIL MALEATE 20 MG: 10 TABLET ORAL at 09:10

## 2017-06-13 RX ADMIN — Medication 2.5 MG: at 09:10

## 2017-06-13 RX ADMIN — SOTALOL HYDROCHLORIDE 80 MG: 80 TABLET ORAL at 09:10

## 2017-06-13 RX ADMIN — MEMANTINE 5 MG: 5 TABLET ORAL at 09:10

## 2017-06-13 RX ADMIN — DOCUSATE SODIUM 100 MG: 100 CAPSULE, LIQUID FILLED ORAL at 17:08

## 2017-06-13 RX ADMIN — INSULIN ASPART 5 UNITS: 100 INJECTION, SOLUTION INTRAVENOUS; SUBCUTANEOUS at 20:05

## 2017-06-13 RX ADMIN — POTASSIUM CHLORIDE 10 MEQ: 750 CAPSULE, EXTENDED RELEASE ORAL at 17:08

## 2017-06-13 RX ADMIN — FERROUS SULFATE TAB EC 324 MG (65 MG FE EQUIVALENT) 324 MG: 324 (65 FE) TABLET DELAYED RESPONSE at 10:42

## 2017-06-13 RX ADMIN — INSULIN ASPART 5 UNITS: 100 INJECTION, SOLUTION INTRAVENOUS; SUBCUTANEOUS at 17:09

## 2017-06-13 NOTE — PLAN OF CARE
Problem: Patient Care Overview (Adult)  Goal: Plan of Care Review  Outcome: Ongoing (interventions implemented as appropriate)    06/13/17 0621   Coping/Psychosocial Response Interventions   Plan Of Care Reviewed With patient   Patient Care Overview   Progress no change   Outcome Evaluation   Outcome Summary/Follow up Plan pt rested well this shift, eval with therapy today         Problem: Functional Deficit (Adult,Obstetrics,Pediatric)  Goal: Signs and Symptoms of Listed Potential Problems Will be Absent or Manageable (Functional Deficit)  Outcome: Ongoing (interventions implemented as appropriate)    06/13/17 0621   Functional Deficit   Problems Assessed (Functional Deficit) all   Problems Present (Functional Deficit) balance impairment;coordination impairment;functional activity tolerance impairment;mobility impairment;muscle strength impairment;pain         Problem: Fall Risk (Adult)  Goal: Absence of Falls  Outcome: Ongoing (interventions implemented as appropriate)    06/13/17 0621   Fall Risk (Adult)   Absence of Falls achieves outcome         Problem: Infection, Risk/Actual (Adult)  Goal: Infection Prevention/Resolution  Outcome: Ongoing (interventions implemented as appropriate)    06/13/17 0621   Infection, Risk/Actual (Adult)   Infection Prevention/Resolution making progress toward outcome

## 2017-06-13 NOTE — THERAPY EVALUATION
ARU - Speech Language Pathology Initial Evaluation  [facility] [place]     Patient Name: Kierra Scales  : 1929  MRN: [id number]  Today's Date: 2017  Onset of Illness/Injury or Date of Surgery Date: 17  Date of Referral to SLP: 17  Referring Physician: Khari      Admit Date: 2017     Speech-language and swallow evaluations completed this date. Pt is currently on mechanical soft diet w/thin liquids. Pt safely tolerated current diet. SLP trialed regular solids. Pt did not have lower dentures in place as they were miss placed at the time (later found by dtr at end of evaluation). SLP established dysphagia goal to trial regular solids w/dentures in place to assess if diet can be advanced. Pt presented w/severe word finding and comprehension difficulties. Pt presented w/occasional perseveration. Pt unable to reason/problem solve through the use of call light. SLP instructed dtr to continue to re-educate as often as possible on use of call light to prevent a fall. Dtr in agreement w/POC to address swallowing, cognitive and linguistic deficits.    Goals to be addressed:  1.  Pt will safely tolerate recommended diet w/no overt s/s of aspiration:  2.  Pt will complete one-step directions without objects w/90% acc:  3.  Pt will complete two-step commands w/90% acc:  4.  Pt will complete simple yes/no questions w/90% acc:  5.  Pt will complete simple responsive naming questions w/90% acc:  6.  Pt will complete object/picture naming w/80% acc:  7.  Pt will answer WH question with one word responses w/90% acc:  8.  Pt will complete simple divergent naming task w/an average of 10 wpm:  9.  Pt will complete immediate recall of related words w/80% acc:  10:  Pt will recall details of the day w/80% acc:  11. Pt will complete orientation to JERRY, YR, and Place w/80% acc:    Michelle Whitten MS CCC-SLP 2017 3:22 PM         Visit Dx:    ICD-10-CM ICD-9-CM   1. Oral phase dysphagia R13.11 787.21    2. Impaired mobility and ADLs Z74.09 799.89   3. Muscle weakness (generalized) M62.81 728.87   4. Abnormality of gait and mobility R26.9 781.2   5. 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)   • Hypothyroidism   • Dyspnea   • Hyperthyroidism   • Gastrointestinal hemorrhage with melena   • Type 2 diabetes mellitus with hyperglycemia   • Colon cancer   • Chronic blood loss anemia   • Physical deconditioning   • Senile dementia, uncomplicated   • Encounter for rehabilitation   • Adenocarcinoma of sigmoid colon   • Hypokalemia     Past Medical History:   Diagnosis Date   • Anxiety    • Arthritis    • Bradycardia    • Dyslipidemia    • Dyspnea    • Fibrocystic disease of breast    • Hashimoto's thyroiditis    • Hypertensive disorder    • Hypothyroidism    • Pain    • Palpitations    • Paroxysmal tachycardia    • Shortness of breath    • Tricuspid valve disorders, specified as nonrheumatic    • Vitamin D deficiency      Past Surgical History:   Procedure Laterality Date   • COLON RESECTION N/A 6/2/2017    Procedure: COLON RESECTION;  Surgeon: Stefano Linn MD;  Location: [facility] OR;  Service:    • COLONOSCOPY N/A 5/30/2017    Procedure: Flexible Sigmoidoscopy;  Surgeon: Stefano Linn MD;  Location: [facility] ENDOSCOPY;  Service:    • EXTERNAL EAR SURGERY     • EYE SURGERY     • LIPOMA EXCISION     • PACEMAKER IMPLANTATION     • SALIVARY GLAND SURGERY     • TUBAL ABDOMINAL LIGATION            SLP EVALUATION (last 72 hours)      SLP Evaluation       06/13/17 1205                Living Environment    Lives With alone;other (see comments)   Dtr dtr stays during day and alone at night  -CK        Living  Arrangements house  -CK        Clinical Impression    Date of Referral to SLP 06/12/17  -CK        SLP Diagnosis symbolic dysfunction  -CK        Prognosis fair  -CK        Functional Level At Time Of Evaluation impaired  -CK        Criteria for Skilled Therapeutic Interventions Met skilled criteria for cognitive linguistic intervention met  -CK        Rehab Potential fair, will monitor progress closely  -CK        Therapy Frequency 3-5 times/wk  -CK        Expected Duration of Therapy Session (min) 30-45 minutes;15-30 minutes;45-60 minutes  -CK        Communication Treatment Objective and Progress    Receptive Language Treatment Objectives Improve ability to follow directions;Improve ability to comprehend questions  -CK        Expressive Treatment Objectives Improve word retrieval skills  -CK        Cognitive Linguistic Treatment Objectives Improve orientation;Improve memory skills  -CK        Improve ability to follow directions    Improve ability to follow directions: one-step directions without objects;two-step commands;90%  -CK        Status: Improve ability to follow directions New  -CK        Ability to Follow Directions Progress continue to address  -CK        Improve ability to comprehend questions    Improve ability to comprehend questions: simple yes/no questions;simple general questions;90%  -CK        Status: Improve ability to comprehend questions New  -CK        Ability to Comprehend Questions Progress continue to address  -CK        Improve word retrieval skills    Improve word retrieval skills by: naming an object/pic;answer WH question with one word;completing a divergent task;completing a convergent task;90%  -CK        Status: Improve word retrieval skills New  -CK        Word Retrieval Skills Progress 90%  -CK        Improve orientation    Improve orientation through: demonstrating orientation to day;demonstrating orientation to year;demonstrating orientation to place;80%  -CK        Status:  Improve orientation through New  -CK        Orientation Progress continue to address  -CK        Improve memory skills    Improve memory skills through: recalling related word lists immediately;recall details of the day;80%  -CK        Status: Improve memory skills New  -CK        Memory Skills Progress continue to address  -CK          User Key  (r) = Recorded By, (t) = Taken By, (c) = Cosigned By    Initials Name Effective Dates    CK Michelle Whitten, MS CCC-SLP 10/17/16 -            EDUCATION  The patient has been educated in the following areas:   Cognitive Impairment Communication Impairment Dysphagia (Swallowing Impairment) Modified Diet Instruction.    SLP Recommendation and Plan  SLP Diagnosis: symbolic dysfunction  Prognosis: fair  Rehab Potential: fair, will monitor progress closely  Criteria for Skilled Therapeutic Interventions Met: skilled criteria for cognitive linguistic intervention met  Anticipated Discharge Disposition: home with assist     Therapy Frequency: 3-5 times/wk     Expected Duration of Therapy Session (min): 30-45 minutes, 15-30 minutes, 45-60 minutes    Plan of Care Review  Plan Of Care Reviewed With: patient, daughter  Outcome Summary/Follow up Plan: Speech-language and swallow evaluations completed this date.  Pt is currently on mechanical soft diet w/thin liquids.  Pt safely tolerated current diet.  SLP trialed regular solids.  Pt did not have lower dentures in place as they were miss placed at the time (later found by dtr at end of evaluation).  SLP established dysphagia goal to trial regular solids w/dentures in place to assess if diet can be advanced.  Pt presented w/severe word finding and comprehension difficulties.  Pt presented w/occasional perseveration.  Pt unable to reason/problem solve through the use of call light.  SLP instructed dtr to continue to re-educate as often as possible on use of call light to prevent a fall.  Dtr in agreement w/POC to address swallowing,  cognitive and linguistic deficits.          IP SLP Goals       06/13/17 1506          Safely Consume Diet    Safely Consume Diet- SLP, Date Established 06/13/17  -CK      Safely Consume Diet- SLP, Time to Achieve by discharge  -CK      Safely Consume Diet- SLP, Additional Goal Pt will safely tolerate recommended diet w/no overt s/s of aspiration.  -CK      Safely Consume Diet- SLP, Date Goal Reviewed 06/13/17  -CK      Receptive Language- Optimal Participation in Care    Receptive Language Optimal Participation in Care- SLP, Date Established 06/13/17  -CK      Receptive Language Optimal Participation in Care- SLP, Time to Achieve by discharge  -CK      Receptive Language Optimal Participation in Care- SLP, Activity Level Patient will improve ability to follow directions;Patient will improve ability to comprehend questions  -CK      Receptive Language Optimal Participation in Care- SLP, Date Goal Reviewed 06/13/17  -CK      Expressive- Optimal Participation in Care    Expressive Optimal Participation in Care- SLP, Date Established 06/13/17  -CK      Expressive Optimal Participation in Care- SLP, Time to Achieve by discharge  -CK      Expressive Optimal Participation in Care- SLP, Activity Level Patient will improve word retrieval skills  -CK      Expressive Optimal Participation in Care- SLP, Date Goal Reviewed 06/13/17  -CK      Cognitive Linguistic- Optimal Participation in Care    Cognitive Linguistic Optimal Participation in Care- SLP, Date Established 06/13/17  -CK      Cognitive Linguistic Optimal Participation in Care- SLP, Time to Achieve by discharge  -CK      Cognitive Linguistic Optimal Participation in Care- SLP, Activity Level Patient will improve orientation for increased safety in environment;Patient will improve memory skills for increased safety in environment  -CK      Cognitive Linguistic Optimal Participation in Care- SLP, Date Goal Reviewed 06/13/17  -CK        User Key  (r) = Recorded By, (t) =  Taken By, (c) = Cosigned By    Initials Name Provider Type    CK Michelle Whitten, MS CCC-SLP Speech and Language Pathologist              Time Calculation:         Time Calculation- SLP       17 1515          Time Calculation- SLP    SLP Start Time 1205  -CK      SLP Stop Time 1314  -CK      SLP Time Calculation (min) 69 min  -CK      Total Timed Code Minutes- SLP 69 minute(s)  -CK      SLP Received On 17  -CK      SLP Goal Re-Cert Due Date 17  -CK        User Key  (r) = Recorded By, (t) = Taken By, (c) = Cosigned By    Initials Name Provider Type    GLENYS Whitten, MS CCC-SLP Speech and Language Pathologist          Therapy Charges for Today     Code Description Service Date Service Provider Modifiers Qty    91211225296 HC ST EVAL ORAL PHARYNG SWALLOW 2 2017 Michelle Whitten MS CCC-SLP GN 1    23353798210 HC ST EVAL SPEECH AND PROD W LANG  3 2017 Michelle Whitten MS CCC-SLP GN 1                     Michelle Whitten MS CCC-SLP  2017 and ARU - Speech Language Pathology   Swallow Initial Evaluation [facility]     Patient Name: Kierra Scales  : 1929  MRN: [id number]  Today's Date: 2017  Onset of Illness/Injury or Date of Surgery Date: 17  Date of Referral to SLP: 17  Referring Physician: Khari      Admit Date: 2017    Visit Dx:     ICD-10-CM ICD-9-CM   1. Oral phase dysphagia R13.11 787.21   2. Impaired mobility and ADLs Z74.09 799.89   3. Muscle weakness (generalized) M62.81 728.87   4. Abnormality of gait and mobility R26.9 781.2   5. 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)   • Hypothyroidism   • Dyspnea   • Hyperthyroidism   •  Gastrointestinal hemorrhage with melena   • Type 2 diabetes mellitus with hyperglycemia   • Colon cancer   • Chronic blood loss anemia   • Physical deconditioning   • Senile dementia, uncomplicated   • Encounter for rehabilitation   • Adenocarcinoma of sigmoid colon   • Hypokalemia     Past Medical History:   Diagnosis Date   • Anxiety    • Arthritis    • Bradycardia    • Dyslipidemia    • Dyspnea    • Fibrocystic disease of breast    • Hashimoto's thyroiditis    • Hypertensive disorder    • Hypothyroidism    • Pain    • Palpitations    • Paroxysmal tachycardia    • Shortness of breath    • Tricuspid valve disorders, specified as nonrheumatic    • Vitamin D deficiency      Past Surgical History:   Procedure Laterality Date   • COLON RESECTION N/A 6/2/2017    Procedure: COLON RESECTION;  Surgeon: Stefano Linn MD;  Location: [facility] OR;  Service:    • COLONOSCOPY N/A 5/30/2017    Procedure: Flexible Sigmoidoscopy;  Surgeon: Stefano Linn MD;  Location: [facility] ENDOSCOPY;  Service:    • EXTERNAL EAR SURGERY     • EYE SURGERY     • LIPOMA EXCISION     • PACEMAKER IMPLANTATION     • SALIVARY GLAND SURGERY     • TUBAL ABDOMINAL LIGATION            SWALLOW EVALUATION (last 72 hours)      Swallow Evaluation       06/13/17 1205                Rehab Evaluation    Document Type evaluation  -CK        Subjective Information agree to therapy  -CK        Patient Effort, Rehab Treatment adequate  -CK        General Information    Patient Profile Review yes  -CK        Date of Surgery 06/02/17  -CK        Referring Physician Khari  -CK        Pertinent History Of Current Problem No prior dysphagia hx  -CK        Current Diet Limitations mechanical soft;thin liquids  -CK        Precautions/Limitations, Vision WFL with corrective lenses  -CK        Precautions/Limitations, Hearing WFL  -CK        Prior Level of Function- Swallowing no diet consistency restrictions  -CK        Clinical Impression    Patient's  Goals For Discharge patient did not state  -CK        SLP Swallowing Diagnosis mild dysphagia;oral dysfunction  -CK        Rehab Potential/Prognosis, Swallowing good, to achieve stated therapy goals  -CK        Criteria for Skilled Therapeutic Interventions Met skilled criteria for dysphagia intervention met  -CK        Therapy Frequency 3-5 times/wk  -CK        Expected Duration Therapy Session (min) 15-30 minutes  -CK        SLP Diet Recommendation soft textures;ground;thin liquids  -CK        SLP Rec. for Method of Medication Administration meds whole with thin liquid  -CK        Monitor For Signs Of Aspiration gurgly voice;cough;throat clearing  -CK        Anticipated Discharge Disposition home with assist  -CK        Pain Assessment    Pain Assessment No/denies pain  -CK        Vision Assessment/Intervention    Visual Impairment WFL with corrective lenses  -CK        Cognitive Assessment/Intervention    Current Cognitive/Communication Assessment impaired  -CK        Orientation Status oriented to;person;disoriented to;place;time;situation  -CK        Follows Commands/Answers Questions able to follow single-step instructions;needs cueing;needs repetition  -CK        Oral Motor Structure and Function    Oral Motor Anatomy and Physiology patient demonstrates anatomy and physiology that is WNL  -CK        Dentition Assessment edentulous, does not have dentures  -CK        Secretion Management WNL/WFL  -CK        Volitional Swallow no difficulties initiating volitional swallow  -CK        Volitional Cough no difficulties initiating volitional cough  -CK        Oral Musculature General Assessment WFL (within functional limits)  -CK        General Feeding/Swallowing Observations    Current Feeding Method oral feeding methods  -CK        Observations of Self Feeding Skills appropriate self feeding skills observed  -CK        Clinical Swallow Exam    Mode of Presentation self fed  -CK        Oral Phase Results impaired  oral phase, signs of dysfunction present  -CK        Pharyngeal Phase Results no signs/symptoms of pharyngeal impairment  -CK        Swallow Recommendations    Eating Assistance needs occasional supervision during self eating activity  -CK        Recommended Diet soft textures;ground;thin liquids  -CK        Dysphagia Treatment Objectives and Progress    Dysphagia Treatment Objectives Other 1  -CK        Dysphagia Other 1    Dysphagia Other 1 Objective Pt will safely tolerate recommended diet w/no overt s/s of aspiration.  -CK        Status: Dysphagia Other 1 New  -CK        Dysphagia Other 1 Progress continue to address  -CK          User Key  (r) = Recorded By, (t) = Taken By, (c) = Cosigned By    Initials Name Effective Dates     Michelle Whitten, MS CCC-SLP 10/17/16 -         EDUCATION  The patient has been educated in the following areas:   Dysphagia (Swallowing Impairment) Modified Diet Instruction.    SLP Recommendation and Plan  SLP Swallowing Diagnosis: mild dysphagia, oral dysfunction  SLP Diet Recommendation: soft textures, ground, thin liquids     SLP Rec. for Method of Medication Administration: meds whole with thin liquid  Monitor For Signs Of Aspiration: gurgly voice, cough, throat clearing     Criteria for Skilled Therapeutic Interventions Met: skilled criteria for dysphagia intervention met  Anticipated Discharge Disposition: home with assist  Rehab Potential/Prognosis, Swallowing: good, to achieve stated therapy goals  Therapy Frequency: 3-5 times/wk     Expected Duration of Therapy Session (min): 30-45 minutes, 15-30 minutes, 45-60 minutes       Plan of Care Review  Plan Of Care Reviewed With: patient, daughter  Outcome Summary/Follow up Plan: Speech-language and swallow evaluations completed this date.  Pt is currently on mechanical soft diet w/thin liquids.  Pt safely tolerated current diet.  SLP trialed regular solids.  Pt did not have lower dentures in place as they were miss placed at the  time (later found by dtr at end of evaluation).  SLP established dysphagia goal to trial regular solids w/dentures in place to assess if diet can be advanced.  Pt presented w/severe word finding and comprehension difficulties.  Pt presented w/occasional perseveration.  Pt unable to reason/problem solve through the use of call light.  SLP instructed dtr to continue to re-educate as often as possible on use of call light to prevent a fall.  Dtr in agreement w/POC to address swallowing, cognitive and linguistic deficits.          IP SLP Goals       06/13/17 1506          Safely Consume Diet    Safely Consume Diet- SLP, Date Established 06/13/17  -CK      Safely Consume Diet- SLP, Time to Achieve by discharge  -CK      Safely Consume Diet- SLP, Additional Goal Pt will safely tolerate recommended diet w/no overt s/s of aspiration.  -CK      Safely Consume Diet- SLP, Date Goal Reviewed 06/13/17  -CK      Receptive Language- Optimal Participation in Care    Receptive Language Optimal Participation in Care- SLP, Date Established 06/13/17  -CK      Receptive Language Optimal Participation in Care- SLP, Time to Achieve by discharge  -CK      Receptive Language Optimal Participation in Care- SLP, Activity Level Patient will improve ability to follow directions;Patient will improve ability to comprehend questions  -CK      Receptive Language Optimal Participation in Care- SLP, Date Goal Reviewed 06/13/17  -CK      Expressive- Optimal Participation in Care    Expressive Optimal Participation in Care- SLP, Date Established 06/13/17  -CK      Expressive Optimal Participation in Care- SLP, Time to Achieve by discharge  -CK      Expressive Optimal Participation in Care- SLP, Activity Level Patient will improve word retrieval skills  -CK      Expressive Optimal Participation in Care- SLP, Date Goal Reviewed 06/13/17  -CK      Cognitive Linguistic- Optimal Participation in Care    Cognitive Linguistic Optimal Participation in Care-  SLP, Date Established 06/13/17  -CK      Cognitive Linguistic Optimal Participation in Care- SLP, Time to Achieve by discharge  -CK      Cognitive Linguistic Optimal Participation in Care- SLP, Activity Level Patient will improve orientation for increased safety in environment;Patient will improve memory skills for increased safety in environment  -CK      Cognitive Linguistic Optimal Participation in Care- SLP, Date Goal Reviewed 06/13/17  -CK        User Key  (r) = Recorded By, (t) = Taken By, (c) = Cosigned By    Initials Name Provider Type    GLENYS Whitten MS CCC-SLP Speech and Language Pathologist               Time Calculation:         Time Calculation- SLP       06/13/17 1515          Time Calculation- SLP    SLP Start Time 1205  -CK      SLP Stop Time 1314  -CK      SLP Time Calculation (min) 69 min  -CK      Total Timed Code Minutes- SLP 69 minute(s)  -CK      SLP Received On 06/13/17  -CK      SLP Goal Re-Cert Due Date 06/27/17  -CK        User Key  (r) = Recorded By, (t) = Taken By, (c) = Cosigned By    Initials Name Provider Type    GLENYS Whitten MS CCC-SLP Speech and Language Pathologist          Therapy Charges for Today     Code Description Service Date Service Provider Modifiers Qty    92279680346 HC ST EVAL ORAL PHARYNG SWALLOW 2 6/13/2017 MS OSIRIS Cho GN 1    [id number] HC ST EVAL SPEECH AND PROD W LANG  3 6/13/2017 MS LILIAN ChoSLP GN 1               MS OSIRIS Cho  6/13/2017

## 2017-06-13 NOTE — PROGRESS NOTES
LOS: 1 day   Patient Care Team:  Melissa Ch MD as PCP - General  Lindsay Ocampo MA as Medical Assistant    Chief Complaint: Deconditioning secondary to adenocarcinoma of the colon with left hemicolectomy      Interval History:   Subjective   she feels good today she says she slept well she's eating well has good appetite        Review of Systems she is not having shortness of breath no nausea no vomiting no diarrhea says she is eating well  no chest pain      History taken from: patient chart RN     has a past medical history of Anxiety; Arthritis; Bradycardia; Dyslipidemia; Dyspnea; Fibrocystic disease of breast; Hashimoto's thyroiditis; Hypertensive disorder; Hypothyroidism; Pain; Palpitations; Paroxysmal tachycardia; Shortness of breath; Tricuspid valve disorders, specified as nonrheumatic; and Vitamin D deficiency.  Social History     Social History   • Marital status:      Spouse name: N/A   • Number of children: N/A   • Years of education: N/A     Occupational History   • Not on file.     Social History Main Topics   • Smoking status: Never Smoker   • Smokeless tobacco: Never Used   • Alcohol use No   • Drug use: Not on file   • Sexual activity: Defer     Other Topics Concern   • Not on file     Social History Narrative    Lives alone but daughters expect to stay with her at discharge     Past Surgical History:   Procedure Laterality Date   • COLON RESECTION N/A 6/2/2017    Procedure: COLON RESECTION;  Surgeon: Stefano Linn MD;  Location: [facility] OR;  Service:    • COLONOSCOPY N/A 5/30/2017    Procedure: Flexible Sigmoidoscopy;  Surgeon: Stefano Linn MD;  Location: [facility] ENDOSCOPY;  Service:    • EXTERNAL EAR SURGERY     • EYE SURGERY     • LIPOMA EXCISION     • PACEMAKER IMPLANTATION     • SALIVARY GLAND SURGERY     • TUBAL ABDOMINAL LIGATION       Family History   Problem Relation Age of Onset   • Diabetes Other    • Heart disease Other    • Hypertension  Other    • Thyroid disease Other      Allergies   Allergen Reactions   • Penicillins        Objective     Vital Signs  Temp:  [95.4 °F (35.2 °C)-96.2 °F (35.7 °C)] 95.4 °F (35.2 °C)  Heart Rate:  [65-73] 65  Resp:  [18] 18  BP: (102-110)/(54-59) 110/59  Last 3 weights    06/12/17  1322 06/13/17  0558   Weight: 171 lb 6.4 oz (77.7 kg) (please use standing scale fo next weigh in.) 166 lb 4 oz (75.4 kg)       Physical Exam:     General Appearance:    Alert, cooperative, in no acute distress   Head:    Normocephalic, without obvious abnormality, atraumatic   Eyes:            Lids and lashes normal, conjunctivae and sclerae normal, no   icterus, no pallor, corneas clear, PERRLA   Throat:   No oral lesions, no thrush, oral mucosa moist   Neck:   No adenopathy, supple, trachea midline, no thyromegaly, no   carotid bruit, no JVD       Lungs:     Clear to auscultation,respirations regular, even and                  Unlabored     Heart:    Regular rhythm and normal rate, normal S1 and S2, Holosystolic murmur is unchanged, no gallop, no rub, no click  no ectopy   Chest Wall:    No abnormalities observed   Abdomen:     Normal bowel sounds, no masses, no organomegaly, soft        non-tender, non-distended, no guarding, no rebound                Tenderness Incision healing well    Extremities:   Moves all extremities well, no edema, no cyanosis, no             Redness        Skin:   No bleeding, bruising or rash   Lymph nodes:   No palpable adenopathy   Neurologic:   Cranial nerves 2 - 12 grossly intact, sensation intact, DTR       present and equal bilaterally                                                  Results Review:   Lab Results (last 24 hours)     Procedure Component Value Units Date/Time    POC Glucose Fingerstick [[id number]]  (Abnormal) Collected:  06/12/17 1619    Specimen:  Blood Updated:  06/12/17 1740     Glucose 156 (H) mg/dL       RN NotifiedMeter: [id number]: [id number] SAMMI OBRIEN       POC Glucose  Fingerstick [[id number]]  (Abnormal) Collected:  06/12/17 2004    Specimen:  Blood Updated:  06/13/17 0255     Glucose 254 (H) mg/dL       RN NotifiedMeter: [id number]: [id number] MARISA CRABTREE       POC Glucose Fingerstick [[id number]]  (Abnormal) Collected:  06/13/17 0602    Specimen:  Blood Updated:  06/13/17 0614     Glucose 149 (H) mg/dL       RN NotifiedMeter: [id number]: [id number] MARISA CRABTREE       CBC Auto Differential [[id number]]  (Abnormal) Collected:  06/13/17 0533    Specimen:  Blood Updated:  06/13/17 0659     WBC 5.46 10*3/mm3      RBC 2.77 (L) 10*6/mm3      Hemoglobin 8.5 (L) g/dL      Hematocrit 25.3 (L) %      MCV 91.3 fL      MCH 30.7 pg      MCHC 33.6 g/dL      RDW 13.9 %      RDW-SD 46.2 fl      MPV 10.0 fL      Platelets 299 10*3/mm3      Neutrophil % 69.7 %      Lymphocyte % 18.1 %      Monocyte % 10.1 %      Eosinophil % 1.6 %      Basophil % 0.0 %      Immature Grans % 0.5 %      Neutrophils, Absolute 3.80 10*3/mm3      Lymphocytes, Absolute 0.99 10*3/mm3      Monocytes, Absolute 0.55 10*3/mm3      Eosinophils, Absolute 0.09 10*3/mm3      Basophils, Absolute 0.00 10*3/mm3      Immature Grans, Absolute 0.03 (H) 10*3/mm3     Iron Profile [[id number]]  (Abnormal) Collected:  06/13/17 0533    Specimen:  Blood Updated:  06/13/17 0711     Iron 15 (L) mcg/dL      TIBC 230 (L) mcg/dL      Iron Saturation 7 (L) %     Magnesium [[id number]]  (Normal) Collected:  06/13/17 0533    Specimen:  Blood Updated:  06/13/17 0713     Magnesium 1.9 mg/dL     Comprehensive Metabolic Panel [[id number]]  (Abnormal) Collected:  06/13/17 0533    Specimen:  Blood Updated:  06/13/17 0714     Glucose 134 (H) mg/dL      BUN 18 mg/dL      Creatinine 0.97 mg/dL      Sodium 137 mmol/L      Potassium 3.1 (L) mmol/L      Chloride 97 mmol/L      CO2 28.0 mmol/L      Calcium 8.7 mg/dL      Total Protein 5.7 (L) g/dL      Albumin 3.10 (L) g/dL      ALT (SGPT) 33 U/L      AST (SGOT) 16 U/L      Alkaline Phosphatase 50 U/L       Total Bilirubin 0.3 mg/dL      eGFR   Amer 66 mL/min/1.73      Globulin 2.6 gm/dL      A/G Ratio 1.2 g/dL      BUN/Creatinine Ratio 18.6     Anion Gap 12.0 mmol/L     Narrative:       The MDRD GFR formula is only valid for adults with stable renal function between ages 18 and 70.    TSH [[id number]]  (Abnormal) Collected:  06/13/17 0533    Specimen:  Blood Updated:  06/13/17 0731     TSH 0.440 (L) mIU/mL     Hemoglobin A1c [[id number]]  (Abnormal) Collected:  06/13/17 0533    Specimen:  Blood Updated:  06/13/17 0738     Hemoglobin A1C 6.30 (H) %     Vitamin B12 [[id number]]  (Normal) Collected:  06/13/17 0533    Specimen:  Blood Updated:  06/13/17 0806     Vitamin B-12 513 pg/mL     Folate [[id number]]  (Normal) Collected:  06/13/17 0533    Specimen:  Blood Updated:  06/13/17 0806     Folate 18.90 ng/mL     POC Glucose Fingerstick [[id number]]  (Abnormal) Collected:  06/13/17 1041    Specimen:  Blood Updated:  06/13/17 1101     Glucose 208 (H) mg/dL       Sliding Scale AdminMeter: [id number]: [id number] BRIANNA CRABTREE           Imaging Results (last 72 hours)     ** No results found for the last 72 hours. **            Current Facility-Administered Medications:   •  bisacodyl (DULCOLAX) suppository 10 mg, 10 mg, Rectal, Daily PRN, Bennie Lucia MD  •  calcium carbonate (TUMS) chewable tablet 500 mg (200 mg elemental), 2 tablet, Oral, BID PRN, Bennie Lucia MD  •  dextrose (GLUTOSE) oral gel 15 g, 15 g, Oral, Q15 Min PRN, Bennie Lucia MD  •  diltiaZEM CD (CARDIZEM CD) 24 hr capsule 180 mg, 180 mg, Oral, Daily, Bennie Lucia MD, 180 mg at 06/13/17 0910  •  docusate sodium (COLACE) capsule 100 mg, 100 mg, Oral, BID, Bennie Lucia MD, 100 mg at 06/13/17 0910  •  donepezil (ARICEPT) tablet 10 mg, 10 mg, Oral, Nightly, Bennie Lucia MD, 10 mg at 06/12/17 2002  •  enalapril (VASOTEC) tablet 20 mg, 20 mg, Oral, BID, Bennie Lucia MD, 20 mg at 06/13/17 0910  •  ferrous  sulfate EC tablet 324 mg, 324 mg, Oral, BID With Meals, Bennie Lucia MD, 324 mg at 06/13/17 1042  •  folic acid-vit B6-vit B12 (FOLBEE) tablet 1 tablet, 1 tablet, Oral, Daily, Bennie Lucia MD, 1 tablet at 06/13/17 1043  •  glucagon (human recombinant) (GLUCAGEN DIAGNOSTIC) injection 1 mg, 1 mg, Subcutaneous, Q15 Min PRN, Bennie Lucia MD  •  HYDROcodone-acetaminophen (NORCO) 5-325 MG per tablet 1 tablet, 1 tablet, Oral, Q6H PRN, Bennie Lucia MD, 1 tablet at 06/13/17 0940  •  insulin aspart (novoLOG) injection 0-14 Units, 0-14 Units, Subcutaneous, 4x Daily AC & at Bedtime, Bennie Lucia MD, 5 Units at 06/13/17 1044  •  insulin detemir (LEVEMIR) injection 15 Units, 15 Units, Subcutaneous, Nightly, Bennie Lucia MD, 15 Units at 06/12/17 2004  •  linagliptin (TRADJENTA) tablet 5 mg, 5 mg, Oral, Daily, Bennie Lucia MD, 5 mg at 06/13/17 0910  •  magnesium hydroxide (MILK OF MAGNESIA) suspension 2400 mg/10mL 10 mL, 10 mL, Oral, BID PRN, Bennie Lucia MD  •  magnesium oxide (MAGOX) tablet 400 mg, 400 mg, Oral, Daily, Bennie Lucia MD, 400 mg at 06/13/17 1043  •  memantine (NAMENDA) tablet 5 mg, 5 mg, Oral, BID, Bennie Lucia MD, 5 mg at 06/13/17 0910  •  methIMAzole (TAPAZOLE) half tablet 2.5 mg, 2.5 mg, Oral, Daily, Bennie Lucia MD, 2.5 mg at 06/13/17 0910  •  potassium chloride (MICRO-K) CR capsule 10 mEq, 10 mEq, Oral, BID With Meals, Bennie Lucia MD, 10 mEq at 06/13/17 1043  •  sotalol (BETAPACE) tablet 80 mg, 80 mg, Oral, Q12H, Bennie Lucia MD, 80 mg at 06/13/17 0910    Principal Problem:    Physical deconditioning  Active Problems:    Gastrointestinal hemorrhage with melena    Adenocarcinoma of sigmoid colon    Essential hypertension    Type 2 diabetes mellitus with hyperglycemia    Chronic blood loss anemia    Senile dementia, uncomplicated    Tricuspid valve disorders, 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)    Encounter for  rehabilitation  Hypokalemia           PLAN  intensive therapy with 3 PT OT and speech therapy.  Will replace iron and vitamins and replace her potassium  She is iron deficient and replace that as well  Her dementia has not been a problem so far she is participating quite well    24 hour reevaluation shows that she is a good acute rehabilitation candidate    Bennie Lucia MD  06/13/17  12:07 PM

## 2017-06-13 NOTE — THERAPY EVALUATION
Inpatient Rehabilitation - Occupational Therapy Initial Evaluation  [facility]     Patient Name: Kierra Scales  : 1929  MRN: [id number]  Today's Date: 2017  Onset of Illness/Injury or Date of Surgery Date: 17  Date of Referral to OT: 17  Referring Physician: Dr. Lucia    Admit Date: 2017       ICD-10-CM ICD-9-CM   1. Oral phase dysphagia R13.11 787.21   2. 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)   • Hypothyroidism   • Dyspnea   • Hyperthyroidism   • Gastrointestinal hemorrhage with melena   • Type 2 diabetes mellitus with hyperglycemia   • Colon cancer   • Chronic blood loss anemia   • Physical deconditioning   • Senile dementia, uncomplicated   • Encounter for rehabilitation   • Adenocarcinoma of sigmoid colon   • Hypokalemia     Past Medical History:   Diagnosis Date   • Anxiety    • Arthritis    • Bradycardia    • Dyslipidemia    • Dyspnea    • Fibrocystic disease of breast    • Hashimoto's thyroiditis    • Hypertensive disorder    • Hypothyroidism    • Pain    • Palpitations    • Paroxysmal tachycardia    • Shortness of breath    • Tricuspid valve disorders, specified as nonrheumatic    • Vitamin D deficiency      Past Surgical History:   Procedure Laterality Date   • COLON RESECTION N/A 2017    Procedure: COLON RESECTION;  Surgeon: Stefano Linn MD;  Location: [facility] OR;  Service:    • COLONOSCOPY N/A 2017    Procedure: Flexible Sigmoidoscopy;  Surgeon: Stefano Linn MD;  Location: [facility] ENDOSCOPY;  Service:    • EXTERNAL EAR SURGERY     • EYE SURGERY     • LIPOMA EXCISION     • PACEMAKER IMPLANTATION     • SALIVARY GLAND SURGERY      • TUBAL ABDOMINAL LIGATION            OT ASSESSMENT FLOWSHEET (last 72 hours)      OT Evaluation       06/13/17 1205 06/13/17 1036 06/13/17 0759 06/13/17 0748 06/12/17 2148    Rehab Evaluation    Document Type evaluation  -CK evaluation  - evaluation  -      Subjective Information agree to therapy  -CK agree to therapy;complains of;pain  - agree to therapy;complains of;pain  -      Patient Effort, Rehab Treatment adequate  -CK  good   inconsistent  -      Symptoms Noted During/After Treatment   increased pain;fatigue  -      General Information    Patient Profile Review  yes  -LM yes  -      Onset of Illness/Injury or Date of Surgery Date  06/02/17  -LM 06/02/17  -      Referring Physician  Dr. Lucia  - Dr. Lucia  -      General Observations   Pt sitting HOB up on room air  -      Precautions/Limitations  fall precautions;other (see comments)   Gait Belt Placement 2* to incision  - fall precautions;other (see comments)   vital signs; gait belt placement 2* incision  -      Prior Level of Function  independent:;all household mobility;gait;ADL's   Family completed IADL's  - independent:;all household mobility;ADL's   light housekeeping, family did IADL  -      [place] Currently Used [place]   [facility] [place]      Plans/Goals Discussed With   patient  -      Risks Reviewed   patient:  -      Benefits Reviewed   patient:  -      Barriers to Rehab   cognitive status;medically complex;hearing deficit  -      Living Environment    Lives With alone;other (see comments)   Dtr dtr stays during day and alone at night  -CK alone   Dtr stays during the day; Pt alone at night  - alone   dtr stayed during day, alone at night  -      Living Arrangements house  - house  - house  -      Home Accessibility   bed and bath on same level   pt doesn't go up the stairs  -      Living Environment Comment   getting shower chair, has walk in shower  -      Clinical  Impression    Date of Referral to OT   06/12/17  -      OT Diagnosis   impaired mobility and ADL  -      Prognosis   fair  -      Functional Level At Time Of Evaluation   Pt fatigued and in pain and required extended time and effort with all tasks and encouragement to engage. Pt decreased indepdnence with ADL, functional t/f and mobility  -      Impairments Found (describe specific impairments)   aerobic capacity/endurance;arousal, attention, and cognition;gait, locomotion, and balance;motor function;muscle performance   ADL, functional t/f and mobility, safety, ax tolerance  -      Patient/Family Goals Statement   pt did not state  -      Criteria for Skilled Therapeutic Interventions Met   yes;treatment indicated  -      Rehab Potential   good, to achieve stated therapy goals  -      Therapy Frequency   other (see comments)   3-14x a week  -      Predicted Duration of Therapy Intervention (days/wks)   until d/c or all goals met  -      Anticipated Discharge Disposition   home with 24/7 care;home with home health;skilled nursing facility   depends on progress  -      Functional Level Prior    Ambulation   0-->independent  -      Transferring   0-->independent  -      Toileting   0-->independent  -      Bathing   0-->independent  -      Dressing   0-->independent  -      Eating   0-->independent  -      Communication   0-->understands/communicates without difficulty  -      Swallowing   0-->swallows foods/liquids without difficulty  -      Vital Signs    Pre Systolic BP Rehab  107  -LM 96  -BH      Pre Treatment Diastolic BP  52  -LM 51  -BH      Intra Systolic BP Rehab   93   98/55  -BH      Intra Treatment Diastolic BP   50  -BH      Post Systolic BP Rehab   107  -BH      Post Treatment Diastolic BP   55  -BH      Pretreatment Heart Rate (beats/min)  76  -LM 66  -BH      Intratreatment Heart Rate (beats/min)   71   went up to 84  -BH      Pretreatment Resp Rate (breaths/min)    67  -BH      Pre SpO2 (%)  98  -LM 99  -BH      O2 Delivery Pre Treatment  room air  - room air  -      Intra SpO2 (%)   100  -BH      O2 Delivery Intra Treatment   room air  -BH      Post SpO2 (%)   94  -BH      O2 Delivery Post Treatment   room air  -BH      Pre Patient Position  Supine  -LM Supine  -      Intra Patient Position   Sitting  -BH      Post Patient Position   Supine  -BH      Pain Assessment    Pain Assessment No/denies pain  -CK  0-10  -      Pain Score   --   pt stated a little in back unable to rate  -      Post Pain Score   --   hurt neck, back, bottom unable to rate  -      Pain Intervention(s)   Repositioned;Ambulation/increased activity   RN notified not time for pain meds yet. Let pt know when due  -      Vision Assessment/Intervention    Visual Impairment WFL with corrective lenses  -CK WFL with corrective lenses  -LM WFL with corrective lenses  -      Cognitive Assessment/Intervention    Current Cognitive/Communication Assessment impaired  -CK impaired  -LM impaired  -      Orientation Status oriented to;person;disoriented to;place;time;situation  -CK oriented to;person;place;disoriented to;time;situation  - oriented to;person   to BD to city not to hospital, why here, or date  -      Follows Commands/Answers Questions able to follow single-step instructions;needs cueing;needs repetition  -  able to follow single-step instructions;needs cueing;needs increased time;needs repetition;50% of the time  [facility]      Personal Safety   severe impairment;decreased awareness, need for assist;decreased awareness, need for safety;decreased insight to deficits;unaware of cognitive deficits;unaware of consequences of deficits  -      Personal Safety Interventions   fall prevention program maintained;gait belt;muscle strengthening facilitated;nonskid shoes/slippers when out of bed;supervised activity  -      ROM (Range of Motion)    General ROM   no range of motion deficits  identified  -      General ROM Detail   BUE WFL, poor digit to thumb opposition to the tips difficulty with concept of task, digit to nose fair accuracy  -      MMT (Manual Muscle Testing)    General MMT Assessment   upper extremity strength deficits identified;lower extremity strength deficits identified   please see PT for LB  -      General MMT Assessment Detail   BUE  grossly 4-/5 BUE grossly 4 to 4-/5  -      Muscle Tone Assessment    Muscle Tone Assessment    Bilateral Upper Extremities;Bilateral Lower Extremities  -AT Bilateral Upper Extremities;Bilateral Lower Extremities  -AC    Bilateral Upper Extremities Muscle Tone Assessment    mildly decreased tone  -AT mildly decreased tone  -AC    Bilateral Lower Extremities Muscle Tone Assessment    mildly decreased tone  -AT mildly decreased tone  -    Mobility Assessment/Training    Extremity Weight-Bearing Status   --   WBAT   -      Bed Mobility, Assessment/Treatment    Bed Mobility, Assistive Device   bed rails;head of bed elevated   to go supine HOB down  -      Bed Mobility, Scoot/Bridge, [place]   supervision required;verbal cues required  -      Bed Mob, Supine to Sit, [place]   supervision required;verbal cues required  -      Bed Mob, Sit to Supine, [place]   supervision required;verbal cues required;minimum assist (75% patient effort)  -      Bed Mobility, Impairments   strength decreased;coordination impaired;motor control impaired  -      Bed Mobility, Comment   Pt inconsistent with need for assist and completing. Pt required extended time and effort and max cuing at times with only one step at a time simplified.   -      Transfer Assessment/Treatment    Transfers, Sit-Stand [place]   minimum assist (75% patient effort)  -      Transfers, Stand-Sit [place]   minimum assist (75% patient effort)  -      Transfers, Sit-Stand-Sit, Assist Device   rolling walker   no device 2x for dressing/bath  -       Toilet Transfer, [place]   minimum assist (75% patient effort)  -      Toilet Transfer, Assistive Device   --   BSC over toilet RW  -      Transfer, Comment   Pt required verbal and tactile cues for placement of hands for sit to stand t/f with pt easily confused. Pt unsafe and requried redirection unsteady with encouragement that she can complete.   -      Functional Mobility    Functional Mobility- Ind. Level   minimum assist (75% patient effort);contact guard assist;verbal cues required  -      Functional Mobility- Device   rolling walker  -      Functional Mobility-Distance (Feet)   --   approx 20 feet in room to toilet and back  -      Functional Mobility- Comment   pt required encouragement, inconsistent, required redirection and assist with walker and balance  -      Upper Body Bathing Assessment/Training    UB Bathing Assess/Train Assistive Device   --   sponge bath wipes  -      UB Bathing Assess/Train, Position   edge of bed;sitting  -      UB Bathing Assess/Train, [place] Level   minimum assist (75% patient effort);set up required;verbal cues required  -      UB Bathing Assess/Train, Comment   close supervision set up multiple vc/redirection to follow sequence total assist for back  -      Lower Body Bathing Assessment/Training    LB Bathing Assess/Train Assistive Device   --   sponge bath wipes  -      LB Bathing Assess/Train, Position   edge of bed;sitting;standing  -      LB Bathing Assess/Train, [place] Level   minimum assist (75% patient effort);verbal cues required;moderate assist (50% patient effort)  -      LB Bathing Assess/Train, Comment   pt requried assist with feet and to stand min Assist to clean with vc and directions step by step  -      Upper Body Dressing Assessment/Training    UB Dressing Assess/Train, Clothing Type   doffing:;pull over;donning:;button up  -      UB Dressing Assess/Train, Comment   Pt min assist to doff shirt mod vc and  redirection. don with SBA and vc. extended time to button min assist to alighn up correctly  -      Lower Body Dressing Assessment/Training    LB Dressing Assess/Train, Clothing Type   doffing:;donning:;socks;pants   underwear  -      LB Dressing Assess/Train, Comment   Pt required CGA and redirection with extended time to doff socks able to don right sock with min-mod assist and left sock max assist. Pt requried min A to CGA to doff underwear and pants and average of mod assist to thread pants and pull them up.   -      Toileting Assessment/Training    Toileting Assess/Train, Assistive Device   grab bars;raised toilet seat   BSC over toilet  -      Toileting Assess/Train, Position   sitting;standing  -      Toileting Assess/Train, [place] Level   verbal cues required;minimum assist (75% patient effort)  -      Toileting Assess/Train, Comment   Assist with balance to stand and manage clothes and cleaning and redirection to clean.   -      Grooming Assessment/Training    Grooming Assess/Train, Comment   pt sat EOB to apply deodorant with min assist through verbal redirection and demo to only apply deodorant in appriopriate places. Pt too fatigued to go to sink to wash hands this date.   -      Motor Skills/Interventions    Additional Documentation   Balance Skills Training (Group);Fine Motor Coordination Training (Group)  -      Balance Skills Training    Sitting-Level of Assistance   Close supervision;Contact guard;Distant supervision   depends on level of act, pt unsteady slightly impulsive  -      Standing-Level of Assistance   Minimum assistance;Contact guard  -      Standing-Balance Activities   --   for ADL  -      Standing Balance # of Minutes   --   approx total 8-10 minutes  -      Fine Motor Coordination Training    Opposition   Right:;Left:;impaired   difficult to assess decreased direction following  -      Sensory Assessment/Intervention    Light Touch   LUE;RUE  -  LUE;RUE;LLE;RLE   intact  -AT LUE;RUE;LLE;RLE   intact  -AC    LUE Light Touch   other (see comments)   appears WFL, difficut to assess decreased direction followin  -      RUE Light Touch   other (see comments)   appears WFL difficut to assess decreased direction following  -      General Therapy Interventions    Planned Therapy Interventions   activity intolerance;adaptive equipment training;ADL retraining;balance training;bed mobility training;energy conservation;fine motor coordination training;home exercise program;motor coordination training;strengthening;transfer training  -      Positioning and Restraints    Pre-Treatment Position   in bed  -      Post Treatment Position   bed  -      In Bed   notified nsg;supine;call light within reach;encouraged to call for assist;exit alarm on  -        06/12/17 1415 06/12/17 1400 06/12/17 1322 06/11/17 1930 06/11/17 1403    Rehab Evaluation    Document Type     therapy note (daily note)  -TW    Subjective Information     agree to therapy  -TW    Patient Effort, Rehab Treatment     adequate  -TW    Symptoms Noted During/After Treatment     none  -TW    General Information    Precautions/Limitations     fall precautions  -TW    Equipment Currently Used at Home  none  -KM       Living Environment    Lives With  child(simeon), adult  -KM       Living Arrangements  house  -KM       Home Accessibility  stairs to enter home;bed and bath on same level;tub/shower is not walk in  -KM       Number of Stairs to Enter Home  1  -KM       Stair Railings at Home  none  -KM       Type of Financial/Environmental Concern  none  -KM       Transportation Available  family or friend will provide  -KM       Clinical Impression    Anticipated Discharge Disposition inpatient rehabilitation facility  -RM        Functional Level Prior    Ambulation  0-->independent  -KM       Transferring  0-->independent  -KM       Toileting  0-->independent  -KM       Bathing  0-->independent  -KM        Dressing  0-->independent  -KM       Eating  0-->independent  -KM       Communication  0-->understands/communicates without difficulty  -KM       Swallowing  0-->swallows foods/liquids without difficulty  -KM       Prior Functional Level Comment  n/a  -KM       Vital Signs    Pre Systolic BP Rehab     99  -TW    Pre Treatment Diastolic BP     48  -TW    Post Systolic BP Rehab     110  -TW    Post Treatment Diastolic BP     58  -TW    Pretreatment Heart Rate (beats/min)     62  -TW    Posttreatment Heart Rate (beats/min)     68  -TW    Pre SpO2 (%)     95  -TW    O2 Delivery Pre Treatment     room air  -TW    Post SpO2 (%)     97  -TW    Pre Patient Position     Sitting  -TW    Intra Patient Position     Standing  -TW    Post Patient Position     Sitting  -TW    Cognitive Assessment/Intervention    Current Cognitive/Communication Assessment     impaired  -TW    Orientation Status     oriented to;person  -TW    Follows Commands/Answers Questions     able to follow single-step instructions;needs cueing;needs repetition  -TW    Personal Safety Interventions     gait belt;nonskid shoes/slippers when out of bed  -TW    Muscle Tone Assessment    Muscle Tone Assessment    Bilateral Upper Extremities;Bilateral Lower Extremities  -JG     Bilateral Upper Extremities Muscle Tone Assessment   mildly decreased tone  -KM mildly decreased tone  -JG     Bilateral Lower Extremities Muscle Tone Assessment   mildly decreased tone  -KM mildly decreased tone  -JG     Transfer Assessment/Treatment    Transfers, Bed-Chair [place]     contact guard assist  -TW    Transfers, Chair-Bed [place]     contact guard assist  -TW    Transfers, Bed-Chair-Bed, Assist Device     rolling walker  -TW    Transfers, Sit-Stand [place]     contact guard assist  -TW    Transfers, Stand-Sit [place]     contact guard assist  -TW    Transfers, Sit-Stand-Sit, Assist Device     rolling walker  -TW    Sensory Assessment/Intervention    Light  Touch   LUE;RUE;LLE;RLE   intact  -KM      Positioning and Restraints    Pre-Treatment Position     sitting in chair/recliner  -TW    Post Treatment Position     chair  -TW    In Chair     reclined;call light within reach;encouraged to call for assist;with family/caregiver  -TW      06/11/17 1000 06/11/17 0912 06/10/17 2100          Rehab Evaluation    Document Type therapy note (daily note)  -RC        Muscle Tone Assessment    Muscle Tone Assessment  Bilateral Upper Extremities;Bilateral Lower Extremities  -VK Bilateral Upper Extremities;Bilateral Lower Extremities  -BD      Bilateral Upper Extremities Muscle Tone Assessment  mildly decreased tone  -VK mildly decreased tone  -BD      Bilateral Lower Extremities Muscle Tone Assessment  mildly decreased tone  -VK mildly decreased tone  -BD        User Key  (r) = Recorded By, (t) = Taken By, (c) = Cosigned By    Initials Name Effective Dates    LM Megan Kruse, PT 06/15/16 -     BH Rebeka Carrillo, OTR/L 10/17/16 -     CK Michelle Whitten, MS CCC-SLP 10/17/16 -     VK Liliam Tilley RN 10/17/16 -     BD Rebeka Willett, LUIS 10/17/16 -     MAYRA Saul RN 10/17/16 -     AC Carlita Cisse, LUIS 10/17/16 -     KM Sadia Neal, RN 10/17/16 -     AT Carlita Calderon, LUIS 10/17/16 -     TW Franco Byers, PTA 10/17/16 -     RC Ally Kwong, MADRIGAL/L 10/17/16 -     RM Catie Sheriff, OT 03/22/17 -            Occupational Therapy Education     Title: PT OT SLP Therapies (Active)     Topic: Occupational Therapy (Active)     Point: ADL training (Done)    Description: Instruct learner(s) on proper safety adaptation and remediation techniques during self care or transfers.   Instruct in proper use of assistive devices.    Learning Progress Summary    Learner Readiness Method Response Comment Documented by Status   Patient Acceptance E VU,NR Educated pt about OT and POC. Educated pt not to get up on her own and how to use call button. Educated pt on safety with t/f and ADL.   06/13/17 1342 Done               Point: Precautions (Done)    Description: Instruct learner(s) on prescribed precautions during self-care and functional transfers.    Learning Progress Summary    Learner Readiness Method Response Comment Documented by Status   Patient Acceptance E VU,NR Educated pt about OT and POC. Educated pt not to get up on her own and how to use call button. Educated pt on safety with t/f and ADL.  06/13/17 1342 Done               Point: Body mechanics (Done)    Description: Instruct learner(s) on proper positioning and spine alignment during self-care, functional mobility activities and/or exercises.    Learning Progress Summary    Learner Readiness Method Response Comment Documented by Status   Patient Acceptance E VU,NR Educated pt about OT and POC. Educated pt not to get up on her own and how to use call button. Educated pt on safety with t/f and ADL.  06/13/17 1342 Done                      User Key     Initials Effective Dates Name Provider Type Discipline     10/17/16 -  Rebeka Carrillo, OTR/L Occupational Therapist OT                  OT Recommendation and Plan  Anticipated Discharge Disposition: home with /7 care, home with home health, skilled nursing facility (depends on progress)  Planned Therapy Interventions: activity intolerance, adaptive equipment training, ADL retraining, balance training, bed mobility training, energy conservation, fine motor coordination training, home exercise program, motor coordination training, strengthening, transfer training  Therapy Frequency: other (see comments) (3-14x a week)  Plan of Care Review  Plan Of Care Reviewed With: patient  Outcome Summary/Follow up Plan: OT daniel compelted this date. Pt required frequent redirection and assist to follow 1 step simplified instructions. Pt displayed decreased safety and problem solving with ADL. Pt requried assist with LB dressing/bath and min assist with UB. Pt min assist with t/f and SBA with bed  mobility with extended time and effort. Pt could benefit from skilled OT to increase safety, strength, endurance, and independence with ADL to reach maximum level of independence. Recommend further therapy then 24/7 d/c with HH or SNF if pt not progressing or if family unable to assist pt at d/c.           OT Goals       06/13/17 0759 06/12/17 1414 06/09/17 1725    Transfer Training OT STG    Transfer Training OT STG, Date Established 06/13/17  -  06/09/17  -RW    Transfer Training OT STG, Time to Achieve 2 wks  -BH  5 - 7 days  -RW    Transfer Training OT STG, Activity Type sit to stand/stand to sit;bed to chair /chair to bed;toilet  -  toilet  -RW    Transfer Training OT STG, [place] Level supervision required;set up required   AE/AD as needed  -  supervision required  -RW    Transfer Training OT STG, Date Goal Reviewed  06/12/17  -     Transfer Training OT STG, Outcome  goal not met  -     Transfer Training OT STG, Reason Goal Not Met  discharged from facility  -     Dynamic Standing Balance OT STG    Dynamic Standing Balance OT STG, Date Established 06/13/17  -  06/09/17  -RW    Dynamic Standing Balance OT STG, Time to Achieve 2 wks  -BH  5 - 7 days  -RW    Dynamic Standing Balance OT STG, [place] Level supervision required   5 minute no LOB or increased fatigue  -  supervision required  -RW    Dynamic Standing Balance OT STG, Assist Device   assistive Device  -RW    Dynamic Standing Balance OT STG, Additional Goal   5 min  -RW    Dynamic Standing Balance OT STG, Date Goal Reviewed  06/12/17  -     Dynamic Standing Balance OT STG, Outcome  goal not met  -RM     Dynamic Standing Balance OT STG, Reason Goal Not Met  discharged from facility  -     Caregiver Training OT LTG    Caregiver Training OT LTG, Date Established 06/13/17  -      Caregiver Training OT LTG, Time to Achieve by discharge  -      Caregiver Training OT LTG, [place] Level able to assist adequately;able to cue  patient adequately   t/f, safety at home, [facility]  -      ADL OT LTG    ADL OT LTG, Date Established 06/13/17  -  06/09/17  -    ADL OT LTG, Time to Achieve by discharge  -  2 wks  -    ADL OT LTG, Activity Type ADL skills  -  ADL skills  -    ADL OT LTG, [place] Level standby assist;modified independent;setup;assistive device  -  standby assist  -    ADL OT LTG, Date Goal Reviewed  06/12/17  -     ADL OT LTG, Outcome  goal not met  -     ADL OT LTG, Reason Goal Not Met  discharged from facility  -     Functional Mobility OT LTG    Functional Mobility Goal OT LTG, Date Established   06/09/17  -    Functional Mobility Goal OT LTG, Time to Achieve   2 wks  -    Functional Mobility Goal OT LTG, [place] Level   supervision  -    Functional Mobility Goal OT LTG, Distance to Achieve   to the bathroom  -    Functional Mobility Goal OT LTG, Date Goal Reviewed  06/12/17  -     Functional Mobility Goal OT LTG, Outcome  goal not met  -     Functional Mobility Goal OT LTG, Reason Goal Not Met  discharged from facility  -     Activity Tolerance OT STG    Activity Tolerance Goal OT STG, Date Established 06/13/17  -      Activity Tolerance Goal OT STG, Time to Achieve 2 wks  -      Activity Tolerance Goal OT STG, Activity Level 20 min activity;O2 sat >/equal to 90%;with 1 rest break  -        User Key  (r) = Recorded By, (t) = Taken By, (c) = Cosigned By    Initials Name Provider Type     Rebeka Carrillo, OTR/L Occupational Therapist    RW Cami Mon, OTR/L Occupational Therapist    RM Catie Sheriff, OT Occupational Therapist                Outcome Measures       06/13/17 0759 06/11/17 1403       How much difficulty does the patient currently have...    Turning from your back to your side while in flat bed without using bedrails?  3  -TW     Standing up from a chair using your arms (e.g., wheelchair, bedside chair)?  3  -TW     Moving from lying on back to sitting on the  side of a flat bed without bedrails?  3  -TW     How much help from another person do you currently need...    Moving to and from a bed to a chair (including a wheelchair)?  3  -TW     Climbing 3-5 steps with a railing?  2  -TW     To walk in hospital room?  3  -TW     AM-PAC 6 Clicks Score  17  -TW     How much help from another is currently needed...    Putting on and taking off regular lower body clothing? 2  -BH      Bathing (including washing, rinsing, and drying) 2  -BH      Toileting (which includes using toilet bed pan or urinal) 3  -BH      Putting on and taking off regular upper body clothing 3  -BH      Taking care of personal grooming (such as brushing teeth) 3  -BH      Eating meals 3  -BH      Score 16  -      Functional Assessment    Outcome Measure Options AM-PAC 6 Clicks Daily Activity (OT)  - AM-PAC 6 Clicks Basic Mobility (PT)  -       User Key  (r) = Recorded By, (t) = Taken By, (c) = Cosigned By    Initials Name Provider Type     Rebeka Carrillo OTR/L Occupational Therapist     Franco Byers PTA Physical Therapy Assistant          Time Calculation:   OT Start Time: 0759  OT Stop Time: 0932  OT Time Calculation (min): 93 min    Therapy Charges for Today     Code Description Service Date Service Provider Modifiers Qty    58161893521  OT SELFCARE CURRENT 6/13/2017 EDIS Tolbert/L GO, CK 1    [id number]  OT SELFCARE PROJECTED 6/13/2017 EDIS Tolbert/L GO, CJ 1    [id number]  OT SELF CARE/MGMT/TRAIN EA 15 MIN 6/13/2017 EDIS Tolbert/L GO 4    [id number]  OT THERAPEUTIC ACT EA 15 MIN 6/13/2017 EDIS Tolbert/L GO 1    [id number]  OT EVAL MOD COMPLEXITY 1 6/13/2017 EDIS Tolbert/L GO 1          OT G-codes  OT Professional Judgement Used?: Yes  OT Functional Scales Options: AM-PAC 6 Clicks Daily Activity (OT)  Score: 16  Functional Limitation: Self care  Self Care Current Status (): At least 40 percent but less than 60 percent impaired,  limited or restricted  Self Care Goal Status (): At least 20 percent but less than 40 percent impaired, limited or restricted    Rebeka Carrillo OTR/POONAM  6/13/2017

## 2017-06-13 NOTE — PLAN OF CARE
Problem: Patient Care Overview (Adult)  Goal: Plan of Care Review  Outcome: Ongoing (interventions implemented as appropriate)    06/13/17 2669   Coping/Psychosocial Response Interventions   Plan Of Care Reviewed With patient   Patient Care Overview   Progress no change   Outcome Evaluation   Outcome Summary/Follow up Plan pt was fatigued today, but often declined wanting to lay down. systolic BP is in high 80's - holding evening BP med - pt is not symptomatic.       Goal: Adult Individualization and Mutuality  Outcome: Ongoing (interventions implemented as appropriate)  Goal: Discharge Needs Assessment  Outcome: Ongoing (interventions implemented as appropriate)    Problem: Functional Deficit (Adult,Obstetrics,Pediatric)  Goal: Signs and Symptoms of Listed Potential Problems Will be Absent or Manageable (Functional Deficit)  Outcome: Ongoing (interventions implemented as appropriate)    Problem: Fall Risk (Adult)  Goal: Absence of Falls  Outcome: Ongoing (interventions implemented as appropriate)    Problem: Infection, Risk/Actual (Adult)  Goal: Infection Prevention/Resolution  Outcome: Ongoing (interventions implemented as appropriate)

## 2017-06-13 NOTE — PROGRESS NOTES
[facility]     Patient Name: Kierra Scales  MRN: [id number]  Today's Date: 6/13/2017    Admit Date: 6/12/2017 06/13/17 1408    Living Environment    Lives With alone    Living Environment    Provides Primary Care For no one, unable/limited ability to care for self    Quality Of Family Relationships helpful;involved                  Demographic Summary       06/13/17 1914    Referral Information    Admission Type inpatient    Arrived From admitted as an inpatient    Referral Source --   acute rehab psychosocial assessment    Record Reviewed medical record    Contact Information    Permission Granted to Share Information With family/designee    Primary Care Physician Information    Name Dr. Ch            Functional Status       06/13/17 1350    Functional Status Current    Ambulation 3-->assistive equipment and person    Transferring 2-->assistive person    Toileting 2-->assistive person    Bathing 2-->assistive person    Dressing 2-->assistive person    Eating 0-->independent    Communication 2-->difficulty understanding (not related to language barrier)    Change in Functional Status Since Onset of Current Illness/Injury yes    IADL    Medications assistive person    Meal Preparation assistive person    Housekeeping assistive person    Laundry assistive person    Shopping assistive person    Oral Care independent    IADL Comments Pt unable to answer questions r/t PLOF.  Per pt's daughter, different family members were in/out of home and checked on pt daiily.  Pt had one daughter that would stay often during the day.  Pt would stay alone at night.  Per Elsi, no safety concerns prior and pt functioned well on her own.  Encouraged her to do some planning with family to see if they can provide 24/7 care upon d/c if needed.  Pt has MOW.    Cognitive/Perceptual/Developmental    Current Mental Status/Cognitive Functioning recall memory is not intact;remote memory is not intact;lacks insight  into situation   pt's daughter reported pt has had dementia but still recognized them and functioned well on her own.  At present, pt unable to state how many children she has or names of children.  Unable to answer questions posed (responses incongruent to ?'s posed).      Cognitive/Perceptual/Developmental Comments Pt taps pen on [place] a word book and occassionally circles letters (of note only 2 of the circled areas of the page represent words the rest are random groupings of letters).  Mood appears somewhat flat and even.  Little mood reactivity noted.    Employment/Financial    Financial Concerns none            Psychosocial       06/13/17 1405    Values/Beliefs    Spiritual Care Comment pt unable to answer question r/t spirituality    Emotional/Psychological    Affect flat   little to no mood reactivity noted    Mood other (see comments)   little to no mood reactivity noted    Verbal Skills --   responses are logical but not congruent with questions posed    Current Interpersonal Conduct/Behavior cooperative    Mental Health Conditions/Symptoms other (see comments)   dementia    Thought Process Alterations slowed thoughts    Mental Health Treatment none    Coping/Stress    Major Change/Loss/Stressor denies    Sources Of Support adult child(simeon)    Reaction To Health Status unable to assess    Understanding Of Condition And Treatment unable to comprehend    Suicide Risk    Suicidal Ideation no    Homicide Risk    Homicidal Ideation no            Abuse/Neglect       06/13/17 1407    Abuse Screen    Abuse Screen Comment no observable or reported indicators of abuse or neglect             Nancy Hughes, SHEBA

## 2017-06-13 NOTE — PLAN OF CARE
Problem: Patient Care Overview (Adult)  Goal: Plan of Care Review  Outcome: Ongoing (interventions implemented as appropriate)    06/13/17 1036   Coping/Psychosocial Response Interventions   Plan Of Care Reviewed With patient   Outcome Evaluation   Outcome Summary/Follow up Plan PT evaluation completed on this date. Pt transferred supine-->sit with SBA. Pt stood and ambulated 120 feet x 2 with CGA. Pt demonstrates decreased safety awareness and required encouragement for participation. Pt will benefit from skilled PT to improve mobility and safety prior to d/c. Depending on progress while on ARU, pt may need 24/7 care or SNF upon d/c.       Goal: Discharge Needs Assessment  Outcome: Ongoing (interventions implemented as appropriate)    06/13/17 1036   Discharge Needs Assessment   Equipment Needed After Discharge walker, rolling   Discharge Facility/Level Of Care Needs nursing facility, skilled;home with home health  (Or 24/7 care from family -depending on progress while on ARU)   Self-Care   Equipment Currently Used at Home commode         Problem: Inpatient Physical Therapy  Goal: Bed Mobility Goal LTG- PT  Outcome: Ongoing (interventions implemented as appropriate)    06/13/17 1036   Bed Mobility PT LTG   Bed Mobility PT LTG, Date Established 06/13/17   Bed Mobility PT LTG, Time to Achieve by discharge   Bed Mobility PT LTG, Activity Type supine to sit/sit to supine   Bed Mobility PT LTG, [place] Level supervision required   Bed Mobility PT LTG, Additional Goal HOB flat; No BR's   Bed Mobility PT LTG, Outcome goal ongoing       Goal: Transfer Training Goal 1 LTG- PT  Outcome: Ongoing (interventions implemented as appropriate)    06/13/17 1036   Transfer Training PT LTG   Transfer Training PT LTG, Date Established 06/13/17   Transfer Training PT LTG, Time to Achieve by discharge   Transfer Training PT LTG, Activity Type bed to chair /chair to bed   Transfer Training PT LTG, [place] Level supervision  required   Transfer Training PT LTG, Assist Device (AAD)   Transfer Training PT LTG, Outcome goal ongoing       Goal: Gait Training Goal LTG- PT  Outcome: Ongoing (interventions implemented as appropriate)    06/13/17 1036   Gait Training PT LTG   Gait Training Goal PT LTG, Date Established 06/13/17   Gait Training Goal PT LTG, Time to Achieve by discharge   Gait Training Goal PT LTG, [place] Level supervision required   Gait Training Goal PT LTG, Assist Device (AAD)   Gait Training Goal PT LTG, Distance to Achieve 150 feet   Gait Training Goal PT LTG, Outcome goal ongoing       Goal: Stair Training Goal LTG- PT  Outcome: Ongoing (interventions implemented as appropriate)    06/13/17 1036   Stair Training PT LTG   Stair Training Goal PT LTG, Date Established 06/13/17   Stair Training Goal PT LTG, Time to Achieve by discharge   Stair Training Goal PT LTG, Number of Steps 4 steps   Stair Training Goal PT LTG, [place] Level contact guard assist   Stair Training Goal PT LTG, Assist Device 2 handrails   Stair Training Goal PT LTG, Outcome goal ongoing

## 2017-06-13 NOTE — THERAPY EVALUATION
Inpatient Rehabilitation - Physical Therapy Initial Evaluation  [facility]     Patient Name: Kierra Scales  : 1929  MRN: [id number]  Today's Date: 2017   Onset of Illness/Injury or Date of Surgery Date: 17  Date of Referral to PT: 17  Referring Physician: Dr. Lucia      Admit Date: 2017     Visit Dx:    ICD-10-CM ICD-9-CM   1. Oral phase dysphagia R13.11 787.21   2. Impaired mobility and ADLs Z74.09 799.89   3. Muscle weakness (generalized) M62.81 728.87   4. 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)   • Hypothyroidism   • Dyspnea   • Hyperthyroidism   • Gastrointestinal hemorrhage with melena   • Type 2 diabetes mellitus with hyperglycemia   • Colon cancer   • Chronic blood loss anemia   • Physical deconditioning   • Senile dementia, uncomplicated   • Encounter for rehabilitation   • Adenocarcinoma of sigmoid colon   • Hypokalemia     Past Medical History:   Diagnosis Date   • Anxiety    • Arthritis    • Bradycardia    • Dyslipidemia    • Dyspnea    • Fibrocystic disease of breast    • Hashimoto's thyroiditis    • Hypertensive disorder    • Hypothyroidism    • Pain    • Palpitations    • Paroxysmal tachycardia    • Shortness of breath    • Tricuspid valve disorders, specified as nonrheumatic    • Vitamin D deficiency      Past Surgical History:   Procedure Laterality Date   • COLON RESECTION N/A 2017    Procedure: COLON RESECTION;  Surgeon: Stefano Linn MD;  Location: [facility] OR;  Service:    • COLONOSCOPY N/A 2017    Procedure: Flexible Sigmoidoscopy;  Surgeon: Stefano Linn MD;  Location: [facility] ENDOSCOPY;  Service:    • EXTERNAL EAR  SURGERY     • EYE SURGERY     • LIPOMA EXCISION     • PACEMAKER IMPLANTATION     • SALIVARY GLAND SURGERY     • TUBAL ABDOMINAL LIGATION            PT ASSESSMENT (last 72 hours)      PT Evaluation       06/13/17 1408 06/13/17 1205    Rehab Evaluation    Document Type  evaluation  -    Subjective Information  agree to therapy  -    Patient Effort, Rehab Treatment  adequate  -CK    Living Environment    Lives With alone  -CC alone;other (see comments)   Dtr dtr stays during day and alone at night  -CK    Living Arrangements  house  -    Pain Assessment    Pain Assessment  No/denies pain  -CK    Vision Assessment/Intervention    Visual Impairment  WFL with corrective lenses  -CK    Cognitive Assessment/Intervention    Current Cognitive/Communication Assessment  impaired  -CK    Orientation Status  oriented to;person;disoriented to;place;time;situation  -CK    Follows Commands/Answers Questions  able to follow single-step instructions;needs cueing;needs repetition  -CK      06/13/17 1036 06/13/17 0759    Rehab Evaluation    Document Type evaluation  - evaluation  -    Subjective Information agree to therapy;complains of;pain  - agree to therapy;complains of;pain  -    Patient Effort, Rehab Treatment adequate  - good   inconsistent  -    Symptoms Noted During/After Treatment increased pain;fatigue  - increased pain;fatigue  -    General Information    Patient Profile Review yes  -LM yes  -    Onset of Illness/Injury or Date of Surgery Date 06/02/17  -LM 06/02/17  -    Referring Physician Dr. Lucia  - Dr. Lucia  -    General Observations Pt lying in bed with HOB elevated.  Pt pleasant and agreeable to PT eval.  - Pt sitting HOB up on room air  -    Pertinent History Of Current Problem Admitted with dark stools on 5/26.  Pt found to have a large mass in the sigmoid colon. s/p open L colon resection - 6/2/17.  Pt transferred to ARU on 6/12 for further therapy.  -LM      Precautions/Limitations fall precautions;other (see comments)   Gait Belt Placement 2* to incision  - fall precautions;other (see comments)   vital signs; gait belt placement 2* incision  -    Prior Level of Function independent:;all household mobility;gait;ADL's   Family completed IADL's  - independent:;all household mobility;ADL's   light housekeeping, family did IADL  -    Equipment Currently Used at Home commode  - --   [facility]  -    Plans/Goals Discussed With patient and family;agreed upon  - patient  -    Risks Reviewed patient and family:;LOB;increased discomfort  - patient:  -    Benefits Reviewed patient and family:;improve function;increase independence;increase strength;increase balance;decrease pain  - patient:  -    Barriers to Rehab cognitive status  - cognitive status;medically complex;hearing deficit  -    Living Environment    Lives With alone   Dtr stays during the day; Pt alone at night  - alone   dtr stayed during day, alone at night  -    Living Arrangements house  - house  -    Home Accessibility  bed and bath on same level   pt doesn't go up the stairs  -    Living Environment Comment  getting shower chair, has walk in shower  -    Clinical Impression    Date of Referral to PT 06/12/17  -     PT Diagnosis Abnormality of gait and mobility; Muscle Weakness  -     Criteria for Skilled Therapeutic Interventions Met yes;treatment indicated  -     Pathology/Pathophysiology Noted (Describe Specifically for Each System) musculoskeletal  -     Impairments Found (describe specific impairments) aerobic capacity/endurance;arousal, attention, and cognition;gait, locomotion, and balance;muscle performance;ROM  -     Functional Limitations in Following Categories (Describe Specific Limitations) self-care;home management  -     Rehab Potential fair, will monitor progress closely  -LM     Predicted Duration of Therapy Intervention (days/wks) Until discharge or  all goals met  -LM     Vital Signs    Pre Systolic BP Rehab 107  -LM 96  -BH    Pre Treatment Diastolic BP 52  -LM 51  -BH    Intra Systolic BP Rehab  93   98/55  -BH    Intra Treatment Diastolic BP  50  -BH    Post Systolic BP Rehab 107  -  -BH    Post Treatment Diastolic BP 51  -LM 55  -BH    Pretreatment Heart Rate (beats/min) 76  -LM 66  -BH    Intratreatment Heart Rate (beats/min)  71   went up to 84  -BH    Posttreatment Heart Rate (beats/min) 65  -LM     Pretreatment Resp Rate (breaths/min)  67  -BH    Pre SpO2 (%) 98  -LM 99  -BH    O2 Delivery Pre Treatment room air  -LM room air  -BH    Intra SpO2 (%)  100  -BH    O2 Delivery Intra Treatment  room air  -BH    Post SpO2 (%) 98  -LM 94  -BH    O2 Delivery Post Treatment room air  -LM room air  -BH    Pre Patient Position Supine  -LM Supine  -BH    Intra Patient Position  Sitting  -BH    Post Patient Position Sitting  -LM Supine  -BH    Pain Assessment    Pain Assessment 0-10  -LM 0-10  -BH    Pain Score unable to assess   Unable to assign number 2* to cognition  -LM --   pt stated a little in back unable to rate  -BH    Post Pain Score unable to assess   Unable to assign number 2* to cognition  -LM --   hurt neck, back, bottom unable to rate  -BH    Pain Type Chronic pain  -LM     Pain Location Back  -LM     Pain Orientation Lower  -LM     Pain Intervention(s) Repositioned;Ambulation/increased activity  -LM Repositioned;Ambulation/increased activity   RN notified not time for pain meds yet. Let pt know when due  -    Vision Assessment/Intervention    Visual Impairment WFL with corrective lenses  -LM WFL with corrective lenses  -    Cognitive Assessment/Intervention    Current Cognitive/Communication Assessment impaired  -LM impaired  -    Orientation Status oriented to;person;place;disoriented to;time;situation  - oriented to;person   to BD to city not to hospital, why here, or date  -    Follows Commands/Answers Questions able to follow  single-step instructions;50% of the time;needs cueing;needs repetition  - able to follow single-step instructions;needs cueing;needs increased time;needs repetition;50% of the time  [facility]    Personal Safety decreased awareness, need for safety;decreased awareness, need for assist  - severe impairment;decreased awareness, need for assist;decreased awareness, need for safety;decreased insight to deficits;unaware of cognitive deficits;unaware of consequences of deficits  -    Personal Safety Interventions fall prevention program maintained;gait belt;muscle strengthening facilitated;nonskid shoes/slippers when out of bed  - fall prevention program maintained;gait belt;muscle strengthening facilitated;nonskid shoes/slippers when out of bed;supervised activity  -    ROM (Range of Motion)    General ROM  no range of motion deficits identified  -    General ROM Detail LLE - Decreased active hip flex - remainder of extremity AROM WFL; RLE AROM WFL  -LM BUE WFL, poor digit to thumb opposition to the tips difficulty with concept of task, digit to nose fair accuracy  -    MMT (Manual Muscle Testing)    General MMT Assessment  upper extremity strength deficits identified;lower extremity strength deficits identified   please see PT for LB  -    General MMT Assessment Detail LLE - Hip flex - 2+/5; Remainder of extremity 3+/5; LLE - grossly 3+/5 throughout (pt with difficulty command to resist for proper MMT)  - BUE  grossly 4-/5 BUE grossly 4 to 4-/5  -    Mobility Assessment/Training    Extremity Weight-Bearing Status  --   WBAT   -    Bed Mobility, Assessment/Treatment    Bed Mobility, Assistive Device bed rails;head of bed elevated  - bed rails;head of bed elevated   to go supine HOB down  -    Bed Mobility, Scoot/Bridge, [place] supervision required;verbal cues required  - supervision required;verbal cues required  -    Bed Mob, Supine to Sit, [place] supervision required;verbal cues  required  -LM supervision required;verbal cues required  -    Bed Mob, Sit to Supine, [place] not tested  -LM supervision required;verbal cues required;minimum assist (75% patient effort)  -    Bed Mobility, Impairments  strength decreased;coordination impaired;motor control impaired  -    Bed Mobility, Comment  Pt inconsistent with need for assist and completing. Pt required extended time and effort and max cuing at times with only one step at a time simplified.   -    Transfer Assessment/Treatment    Transfers, Sit-Stand [place] contact guard assist;verbal cues required  - minimum assist (75% patient effort)  -    Transfers, Stand-Sit [place] contact guard assist;verbal cues required  - minimum assist (75% patient effort)  -    Transfers, Sit-Stand-Sit, Assist Device rolling walker  - rolling walker   no device 2x for dressing/bath  -    Toilet Transfer, [place]  minimum assist (75% patient effort)  -    Toilet Transfer, Assistive Device  --   BSC over toilet RW  -    Transfer, Comment Requires constant cueing for safety with transfers (i.e. - reaching back and pushing up from arm rests when standing).  - Pt required verbal and tactile cues for placement of hands for sit to stand t/f with pt easily confused. Pt unsafe and requried redirection unsteady with encouragement that she can complete.   -    Gait Assessment/Treatment    Gait, [place] Level contact guard assist  -     Gait, Assistive Device rolling walker  -LM     Gait, Distance (Feet) 120   120 feet x 2  -LM     Gait, Impairments strength decreased;impaired balance;pain  -LM     Gait, Comment At first, pt adament about not ambulatin 2nd time, but with maximum encouragement from PT and granddaughter, pt willing.  -LM     Stairs Assessment/Treatment    Stairs, [place] Level not tested  -LM     Wheelchair Training/Management    Wheelchair, Distance Propelled 60 feet x 2 with Min/ModA  -LM      Wheelchair Training Comment First cycle of 60 feet pt just used UE's to self propel and had severe difficulty with turns requiring ModA.  Second cycle of 60 feet, pt educated on using LE's to assist with turning - pt then required [place].  Pt educated on how/when to use brakes.  -     Motor Skills/Interventions    Additional Documentation  Balance Skills Training (Group);Fine Motor Coordination Training (Group)  -    Balance Skills Training    Sitting-Level of Assistance Close supervision  - Close supervision;Contact guard;Distant supervision   depends on level of act, pt unsteady slightly impulsive  -    Sitting # of Minutes 8  -LM     Standing-Level of Assistance  Minimum assistance;Contact guard  -    Standing-Balance Activities  --   for ADL  -    Standing Balance # of Minutes  --   approx total 8-10 minutes  -    Fine Motor Coordination Training    Opposition  Right:;Left:;impaired   difficult to assess decreased direction following  -    Sensory Assessment/Intervention    Light Touch LLE;RLE  -LM LUE;RUE  -BH    LUE Light Touch  other (see comments)   appears WFL, difficut to assess decreased direction followin  -    RUE Light Touch  other (see comments)   appears WFL difficut to assess decreased direction following  -BH    LLE Light Touch other (see comments)   Difficult to assess 2* cognition  -LM     RLE Light Touch other (see comments)   Difficult to assess 2* cognition  -LM     Positioning and Restraints    Pre-Treatment Position in bed  - in bed  -    Post Treatment Position wheelchair  - bed  -    In Bed  notified nsg;supine;call light within reach;encouraged to call for assist;exit alarm on  -    In Wheelchair sitting;call light within reach;encouraged to call for assist;notified nsg;with family/caregiver  -       06/13/17 0748 06/12/17 0207    Muscle Tone Assessment    Muscle Tone Assessment Bilateral Upper Extremities;Bilateral Lower Extremities  -AT Bilateral Upper  Extremities;Bilateral Lower Extremities  -AC    Bilateral Upper Extremities Muscle Tone Assessment mildly decreased tone  -AT mildly decreased tone  -AC    Bilateral Lower Extremities Muscle Tone Assessment mildly decreased tone  -AT mildly decreased tone  -AC    Sensory Assessment/Intervention    Light Touch LUE;RUE;LLE;RLE   intact  -AT LUE;RUE;LLE;RLE   intact  -AC      06/12/17 1400 06/12/17 1322    General Information    Equipment Currently Used at Home none  -KM     Living Environment    Lives With child(simeon), adult  -KM     Living Arrangements house  -KM     Home Accessibility stairs to enter home;bed and bath on same level;tub/shower is not walk in  -KM     Number of Stairs to Enter Home 1  -KM     Stair Railings at Home none  -KM     Type of Financial/Environmental Concern none  -KM     Transportation Available family or friend will provide  -KM     Muscle Tone Assessment    Bilateral Upper Extremities Muscle Tone Assessment  mildly decreased tone  -KM    Bilateral Lower Extremities Muscle Tone Assessment  mildly decreased tone  -KM    Sensory Assessment/Intervention    Light Touch  LUE;RUE;LLE;RLE   intact  -KM      06/11/17 1930 06/11/17 1403    Rehab Evaluation    Document Type  therapy note (daily note)  -TW    Subjective Information  agree to therapy  -TW    Patient Effort, Rehab Treatment  adequate  -TW    Symptoms Noted During/After Treatment  none  -TW    General Information    Precautions/Limitations  fall precautions  -TW    Vital Signs    Pre Systolic BP Rehab  99  -TW    Pre Treatment Diastolic BP  48  -TW    Post Systolic BP Rehab  110  -TW    Post Treatment Diastolic BP  58  -TW    Pretreatment Heart Rate (beats/min)  62  -TW    Posttreatment Heart Rate (beats/min)  68  -TW    Pre SpO2 (%)  95  -TW    O2 Delivery Pre Treatment  room air  -TW    Post SpO2 (%)  97  -TW    Pre Patient Position  Sitting  -TW    Intra Patient Position  Standing  -TW    Post Patient Position  Sitting  -TW     Cognitive Assessment/Intervention    Current Cognitive/Communication Assessment  impaired  -TW    Orientation Status  oriented to;person  -TW    Follows Commands/Answers Questions  able to follow single-step instructions;needs cueing;needs repetition  -TW    Personal Safety Interventions  gait belt;nonskid shoes/slippers when out of bed  -TW    Muscle Tone Assessment    Muscle Tone Assessment Bilateral Upper Extremities;Bilateral Lower Extremities  -JG     Bilateral Upper Extremities Muscle Tone Assessment mildly decreased tone  -JG     Bilateral Lower Extremities Muscle Tone Assessment mildly decreased tone  -JG     Transfer Assessment/Treatment    Transfers, Bed-Chair [place]  contact guard assist  -TW    Transfers, Chair-Bed [place]  contact guard assist  -TW    Transfers, Bed-Chair-Bed, Assist Device  rolling walker  -TW    Transfers, Sit-Stand [place]  contact guard assist  -TW    Transfers, Stand-Sit [place]  contact guard assist  -TW    Transfers, Sit-Stand-Sit, Assist Device  rolling walker  -TW    Gait Assessment/Treatment    Gait, [place] Level  stand by assist;contact guard assist  -TW    Gait, Assistive Device  rolling walker  -TW    Gait, Distance (Feet)  126   then 300  -TW    Gait, Gait Pattern Analysis  swing-through gait  -TW    Positioning and Restraints    Pre-Treatment Position  sitting in chair/recliner  -TW    Post Treatment Position  chair  -TW    In Chair  reclined;call light within reach;encouraged to call for assist;with family/caregiver  -TW      06/11/17 1000 06/11/17 0912    Rehab Evaluation    Document Type therapy note (daily note)  -RC     Muscle Tone Assessment    Muscle Tone Assessment  Bilateral Upper Extremities;Bilateral Lower Extremities  -VK    Bilateral Upper Extremities Muscle Tone Assessment  mildly decreased tone  -VK    Bilateral Lower Extremities Muscle Tone Assessment  mildly decreased tone  -VK      06/10/17 2100       Muscle Tone Assessment     Muscle Tone Assessment Bilateral Upper Extremities;Bilateral Lower Extremities  -BD     Bilateral Upper Extremities Muscle Tone Assessment mildly decreased tone  -BD     Bilateral Lower Extremities Muscle Tone Assessment mildly decreased tone  -BD       User Key  (r) = Recorded By, (t) = Taken By, (c) = Cosigned By    Initials Name Provider Type    LM Megan Kruse, IRMA Physical Therapist     Rebeka Carrillo, OTR/L Occupational Therapist    CK Michelle Whitten, MS CCC-SLP Speech and Language Pathologist    VK Liliam Tilley, RN Registered Nurse    ARCHIE Willett, RN Registered Nurse    MAYRA Saul, RN Registered Nurse    CC Nancy Hughes, [facility]     AC Carlita Cisse, RN Registered Nurse    KM Sadia Neal, RN Registered Nurse    AT Carlita Calderon, RN Registered Nurse    TW Franco Byers, ROYAL Physical Therapy Assistant    RC Ally Kwong, MADRIGAL/L Occupational Therapy Assistant          Physical Therapy Education     Title: PT OT SLP Therapies (Active)     Topic: Physical Therapy (Active)     Point: Mobility training (Active)    Learning Progress Summary    Learner Readiness Method Response Comment Documented by Status   Patient Acceptance E NR Reviewed safety with transfers - however, pt unable to retain.  Pt's family will require education.  06/13/17 1436 Active               Point: Precautions (Active)    Learning Progress Summary    Learner Readiness Method Response Comment Documented by Status   Patient Acceptance E NR Reviewed safety with transfers - however, pt unable to retain.  Pt's family will require education.  06/13/17 1436 Active                      User Key     Initials Effective Dates Name Provider Type Discipline     06/15/16 -  Megan Kruse PT Physical Therapist PT                PT Recommendation and Plan  Anticipated Equipment Needs At Discharge: front wheeled walker  Anticipated Discharge Disposition: home with 24/7 care, home with home health  Planned Therapy  Interventions: balance training, bed mobility training, gait training, home exercise program, manual therapy techniques, motor coordination training, neuromuscular re-education, patient/family education, postural re-education, ROM (Range of Motion), stair training, strengthening, stretching, transfer training, wheelchair management/propulsion training  PT Frequency: other (see comments) (5-14 times/wk)  Plan of Care Review  Plan Of Care Reviewed With: patient  Outcome Summary/Follow up Plan: PT evaluation completed on this date.  Pt transferred supine-->sit with SBA.  Pt stood and ambulated 120 feet x 2 with CGA.  Pt demonstrates decreased safety awareness and required encouragement for participation.  Pt will benefit from skilled PT to improve mobility and safety prior to d/c.  Depending on progress while on ARU, pt may need 24/7 care or SNF upon d/c.          IP PT Goals       06/13/17 1036 06/11/17 1403 06/08/17 1110    Bed Mobility PT LTG    Bed Mobility PT LTG, Date Established 06/13/17  -LM      Bed Mobility PT LTG, Time to Achieve by discharge  -LM      Bed Mobility PT LTG, Activity Type supine to sit/sit to supine  -LM      Bed Mobility PT LTG, [place] Level supervision required  -LM      Bed Mobility PT LTG, Additional Goal HOB flat; No BR's  -LM      Bed Mobility PT LTG, Date Goal Reviewed  06/11/17  -TW 06/08/17  -AM    Bed Mobility PT LTG, Outcome goal ongoing  -LM goal ongoing  -TW goal not met  -AM    Transfer Training PT LTG    Transfer Training PT LTG, Date Established 06/13/17  -LM      Transfer Training PT LTG, Time to Achieve by discharge  -LM      Transfer Training PT LTG, Activity Type bed to chair /chair to bed  -LM      Transfer Training PT LTG, [place] Level supervision required  -LM      Transfer Training PT LTG, Assist Device --   AAD  -LM      Transfer Training PT LTG, Outcome goal ongoing  -LM      Gait Training PT STG    Gait Training Goal PT STG, Date Goal Reviewed  06/11/17   -TW 06/08/17  -AM    Gait Training Goal PT STG, Outcome  goal ongoing  -TW goal not met  -AM    Gait Training PT LTG    Gait Training Goal PT LTG, Date Established 06/13/17  -LM      Gait Training Goal PT LTG, Time to Achieve by discharge  -LM      Gait Training Goal PT LTG, [place] Level supervision required  -LM      Gait Training Goal PT LTG, Assist Device --   AAD  -LM      Gait Training Goal PT LTG, Distance to Achieve 150 feet  -LM      Gait Training Goal PT LTG, Outcome goal ongoing  -LM      Stair Training PT LTG    Stair Training Goal PT LTG, Date Established 06/13/17  -LM      Stair Training Goal PT LTG, Time to Achieve by discharge  -LM      Stair Training Goal PT LTG, Number of Steps 4 steps  -LM      Stair Training Goal PT LTG, [place] Level contact guard assist  -LM      Stair Training Goal PT LTG, Assist Device 2 handrails  -LM      Stair Training Goal PT LTG, Date Goal Reviewed  06/11/17  -TW 06/08/17  -AM    Stair Training Goal PT LTG, Outcome goal ongoing  -LM goal ongoing  -TW goal not met  -AM      06/07/17 1228 06/06/17 1325 06/05/17 1652    Bed Mobility PT LTG    Bed Mobility PT LTG, Date Established   06/05/17  -GB    Bed Mobility PT LTG, Time to Achieve   5 - 7 days  -GB    Bed Mobility PT LTG, Activity Type   all bed mobility  -GB    Bed Mobility PT LTG, [place] Level   conditional independence  -GB    Bed Mobility PT LTG, Date Goal Reviewed 06/07/17  -RW 06/06/17  -AM     Bed Mobility PT LTG, Outcome goal ongoing  -RW goal not met  -AM goal not met  -GB    Transfer Training PT LTG    Transfer Training PT LTG, Date Established   06/05/17  -GB    Transfer Training PT LTG, Time to Achieve   5 - 7 days  -GB    Transfer Training PT LTG, Activity Type   all transfers  -GB    Transfer Training PT LTG, [place] Level   contact guard assist  -GB    Transfer Training PT LTG, Assist Device   walker, rolling  -GB    Transfer Training PT  LTG, Date Goal Reviewed  06/06/17  -AM      Transfer Training PT LTG, Outcome  goal met  -AM goal not met  -GB    Gait Training PT STG    Gait Training Goal PT STG, Date Established   06/05/17  -GB    Gait Training Goal PT STG, Time to Achieve   2 wks  -GB    Gait Training Goal PT STG, [place] Level   conditional independence  -GB    Gait Training Goal PT STG, Assist Device   walker, rolling  -GB    Gait Training Goal PT STG, Distance to Achieve   150 ft w/ RW and CGA x 1  -GB    Gait Training Goal PT STG, Additional Goal   300 ft w/ RW and SBA x 1   -GB    Gait Training Goal PT STG, Date Goal Reviewed 06/07/17  -RW 06/06/17  -AM     Gait Training Goal PT STG, Outcome goal ongoing  -RW goal not met  -AM goal not met  -GB    Stair Training PT LTG    Stair Training Goal PT LTG, Date Established   06/05/17  -GB    Stair Training Goal PT LTG, Time to Achieve   by discharge  -GB    Stair Training Goal PT LTG, [place] Level   conditional independence  -GB    Stair Training Goal PT LTG, Assist Device   walker, rolling  -GB    Stair Training Goal PT LTG, Distance to Achieve   up/down 1 step  -GB    Stair Training Goal PT LTG, Date Goal Reviewed 06/07/17  -RW 06/06/17  -AM     Stair Training Goal PT LTG, Outcome goal ongoing  -RW goal not met  -AM goal not met  -GB      User Key  (r) = Recorded By, (t) = Taken By, (c) = Cosigned By    Initials Name Provider Type    MADDY Kruse, PT Physical Therapist    LILIAN Matthews, PT Physical Therapist    AM Luciano Sanchez, PTA Physical Therapy Assistant    CORRIE Byers, PTA Physical Therapy Assistant    RW Danny Beebe, PTA Physical Therapy Assistant                Outcome Measures       06/13/17 1036 06/13/17 0759 06/11/17 1403    How much help from another person do you currently need...    Turning from your back to your side while in flat bed without using bedrails? 3  -LM  3  -TW    Moving from lying on back to sitting on the side of a flat bed without bedrails? 3  -LM  3  -TW    Moving  to and from a bed to a chair (including a wheelchair)? 3  -LM  3  -TW    Standing up from a chair using your arms (e.g., wheelchair, bedside chair)? 3  -LM  3  -TW    Climbing 3-5 steps with a railing? 3  -LM  2  -TW    To walk in hospital room? 3  -LM  3  -TW    AM-PAC 6 Clicks Score 18  -LM  17  -TW    How much help from another is currently needed...    Putting on and taking off regular lower body clothing?  2  -BH     Bathing (including washing, rinsing, and drying)  2  -BH     Toileting (which includes using toilet bed pan or urinal)  3  -BH     Putting on and taking off regular upper body clothing  3  -BH     Taking care of personal grooming (such as brushing teeth)  3  -BH     Eating meals  3  -BH     Score  16  -BH     Functional Assessment    Outcome Measure Options AM-PAC 6 Clicks Basic Mobility (PT)  -LM AM-PAC 6 Clicks Daily Activity (OT)  -BH AM-PAC 6 Clicks Basic Mobility (PT)  -TW      User Key  (r) = Recorded By, (t) = Taken By, (c) = Cosigned By    Initials Name Provider Type    MADDY Kruse PT Physical Therapist     Rebeka Carrillo, OTR/L Occupational Therapist    TW Franco Byers, PTA Physical Therapy Assistant           Time Calculation:         PT Charges       06/13/17 1036          Time Calculation    Start Time 1036  -LM      Stop Time 1130  -LM      Time Calculation (min) 54 min  -LM      PT Received On 06/13/17  -LM      PT Goal Re-Cert Due Date 06/26/17  -LM      Time Calculation- PT    Total Timed Code Minutes- PT 38 minute(s)  -LM        User Key  (r) = Recorded By, (t) = Taken By, (c) = Cosigned By    Initials Name Provider Type    LM Megan Kruse PT Physical Therapist          Therapy Charges for Today     Code Description Service Date Service Provider Modifiers Qty    68450688515 HC PT MOBILITY CURRENT 6/13/2017 Megan Kruse, PT GP, CK 1    50695227106 HC PT MOBILITY PROJECTED 6/13/2017 Megan Kruse PT GP, CJ 1    68720146996 HC PT EVAL MOD COMPLEXITY 1 6/13/2017 Megan  Aixa, PT GP 1    76019144729 HC GAIT TRAINING EA 15 MIN 6/13/2017 Megan Kruse, PT GP 1    93449741951 HC PT WHEELCHAIR MGMT EA 15 MIN 6/13/2017 Megan Kruse, PT GP 1    06159654072 HC PT THERAPEUTIC ACT EA 15 MIN 6/13/2017 Megan Kruse, PT GP 1          PT G-Codes  PT Professional Judgement Used?: Yes  Outcome Measure Options: AM-PAC 6 Clicks Basic Mobility (PT)  Score: 18  Functional Limitation: Mobility: Walking and moving around  Mobility: Walking and Moving Around Current Status (): At least 40 percent but less than 60 percent impaired, limited or restricted  Mobility: Walking and Moving Around Goal Status (): At least 20 percent but less than 40 percent impaired, limited or restricted      Megan Kruse PT  6/13/2017

## 2017-06-13 NOTE — PLAN OF CARE
Problem: Patient Care Overview (Adult)  Goal: Plan of Care Review  Outcome: Ongoing (interventions implemented as appropriate)    06/13/17 1506   Coping/Psychosocial Response Interventions   Plan Of Care Reviewed With patient;daughter   Outcome Evaluation   Outcome Summary/Follow up Plan Speech-language and swallow evaluations completed this date. Pt is currently on mechanical soft diet w/thin liquids. Pt safely tolerated current diet. SLP trialed regular solids. Pt did not have lower dentures in place as they were miss placed at the time (later found by dtr at end of evaluation). SLP established dysphagia goal to trial regular solids w/dentures in place to assess if diet can be advanced. Pt presented w/severe word finding and comprehension difficulties. Pt presented w/occasional perseveration. Pt unable to reason/problem solve through the use of call light. SLP instructed dtr to continue to re-educate as often as possible on use of call light to prevent a fall. Dtr in agreement w/POC to address swallowing, cognitive and linguistic deficits.         Problem: Inpatient SLP  Goal: Dysphagia- Patient will safely consume diet as per recommendation with no signs/symptoms of aspiration  Outcome: Ongoing (interventions implemented as appropriate)    06/13/17 1506   Safely Consume Diet   Safely Consume Diet- SLP, Date Established 06/13/17   Safely Consume Diet- SLP, Time to Achieve by discharge   Safely Consume Diet- SLP, Additional Goal Pt will safely tolerate recommended diet w/no overt s/s of aspiration.   Safely Consume Diet- SLP, Date Goal Reviewed 06/13/17       Goal: Expressive-Patient will improve expressive language skills to allow optimal participation in care  Outcome: Ongoing (interventions implemented as appropriate)    06/13/17 1506   Expressive- Optimal Participation in Care   Expressive Optimal Participation in Care- SLP, Date Established 06/13/17   Expressive Optimal Participation in Care- SLP, Time to  Achieve by discharge   Expressive Optimal Participation in Care- SLP, Activity Level Patient will improve word retrieval skills   Expressive Optimal Participation in Care- SLP, Date Goal Reviewed 06/13/17       Goal: Cognitive-linguistic-Patient will improve Cognitive-linguistic skills to allow optimal participation in care  Outcome: Ongoing (interventions implemented as appropriate)    06/13/17 1506   Cognitive Linguistic- Optimal Participation in Care   Cognitive Linguistic Optimal Participation in Care- SLP, Date Established 06/13/17   Cognitive Linguistic Optimal Participation in Care- SLP, Time to Achieve by discharge   Cognitive Linguistic Optimal Participation in Care- SLP, Activity Level Patient will improve orientation for increased safety in environment;Patient will improve memory skills for increased safety in environment   Cognitive Linguistic Optimal Participation in Care- SLP, Date Goal Reviewed 06/13/17       Goal: Receptive Language-Patient will improve receptive language skills to allow optimal participation in care  Outcome: Ongoing (interventions implemented as appropriate)    06/13/17 1506   Receptive Language- Optimal Participation in Care   Receptive Language Optimal Participation in Care- SLP, Date Established 06/13/17   Receptive Language Optimal Participation in Care- SLP, Time to Achieve by discharge   Receptive Language Optimal Participation in Care- SLP, Activity Level Patient will improve ability to follow directions;Patient will improve ability to comprehend questions   Receptive Language Optimal Participation in Care- SLP, Date Goal Reviewed 06/13/17

## 2017-06-13 NOTE — PLAN OF CARE
Problem: Patient Care Overview (Adult)  Goal: Plan of Care Review  Outcome: Ongoing (interventions implemented as appropriate)    06/13/17 1344   Coping/Psychosocial Response Interventions   Plan Of Care Reviewed With patient   Outcome Evaluation   Outcome Summary/Follow up Plan OT daniel compelted this date. Pt required frequent redirection and assist to follow 1 step simplified instructions. Pt displayed decreased safety and problem solving with ADL. Pt requried assist with LB dressing/bath and min assist with UB. Pt min assist with t/f and SBA with bed mobility with extended time and effort. Pt could benefit from skilled OT to increase safety, strength, endurance, and independence with ADL to reach maximum level of independence. Recommend further therapy then 24/7 d/c with HH or SNF if pt not progressing or if family unable to assist pt at d/c.          Problem: Inpatient Occupational Therapy  Goal: Transfer Training Goal 1 STG- OT  Outcome: Ongoing (interventions implemented as appropriate)    06/13/17 0759   Transfer Training OT STG   Transfer Training OT STG, Date Established 06/13/17   Transfer Training OT STG, Time to Achieve 2 wks   Transfer Training OT STG, Activity Type sit to stand/stand to sit;bed to chair /chair to bed;toilet   Transfer Training OT STG, [place] Level supervision required;set up required  (AE/AD as needed)       Goal: Dymanic Standing Balance Goal STG- OT  Outcome: Ongoing (interventions implemented as appropriate)    06/13/17 0759   Dynamic Standing Balance OT STG   Dynamic Standing Balance OT STG, Date Established 06/13/17   Dynamic Standing Balance OT STG, Time to Achieve 2 wks   Dynamic Standing Balance OT STG, [place] Level supervision required  (5 minute no LOB or increased fatigue)       Goal: Caregiver Training Goal LTG- OT  Outcome: Ongoing (interventions implemented as appropriate)    06/13/17 0759   Caregiver Training OT LTG   Caregiver Training OT LTG, Date  Established 06/13/17   Caregiver Training OT LTG, Time to Achieve by discharge   Caregiver Training OT LTG, [place] Level able to assist adequately;able to cue patient adequately  (t/f, safety at home, HEP)       Goal: ADL Goal LTG- OT  Outcome: Ongoing (interventions implemented as appropriate)    06/13/17 0759   ADL OT LTG   ADL OT LTG, Date Established 06/13/17   ADL OT LTG, Time to Achieve by discharge   ADL OT LTG, Activity Type ADL skills   ADL OT LTG, [place] Level standby assist;modified independent;setup;assistive device       Goal: Activity Tolerance Goal STG- OT  Outcome: Ongoing (interventions implemented as appropriate)    06/13/17 0759   Activity Tolerance OT STG   Activity Tolerance Goal OT STG, Date Established 06/13/17   Activity Tolerance Goal OT STG, Time to Achieve 2 wks   Activity Tolerance Goal OT STG, Activity Level 20 min activity;O2 sat >/equal to 90%;with 1 rest break

## 2017-06-13 NOTE — SIGNIFICANT NOTE
Pt states that she is not going to do anything else today. Therapist checked on pt and she agreed to participate but changed her mind once I came back to room.

## 2017-06-14 ENCOUNTER — APPOINTMENT (OUTPATIENT)
Dept: ULTRASOUND IMAGING | Facility: HOSPITAL | Age: 82
End: 2017-06-14

## 2017-06-14 LAB
ALBUMIN SERPL-MCNC: 3.2 G/DL (ref 3.4–4.8)
ANION GAP SERPL CALCULATED.3IONS-SCNC: 10 MMOL/L (ref 5–15)
BASOPHILS # BLD AUTO: 0 10*3/MM3 (ref 0–0.2)
BASOPHILS NFR BLD AUTO: 0 % (ref 0–2)
BUN BLD-MCNC: 27 MG/DL (ref 7–21)
BUN/CREAT SERPL: 19.3 (ref 7–25)
CALCIUM SPEC-SCNC: 8.9 MG/DL (ref 8.4–10.2)
CHLORIDE SERPL-SCNC: 97 MMOL/L (ref 95–110)
CO2 SERPL-SCNC: 28 MMOL/L (ref 22–31)
CREAT BLD-MCNC: 1.4 MG/DL (ref 0.5–1)
CREAT UR-MCNC: 387.7 MG/DL
DEPRECATED RDW RBC AUTO: 47.1 FL (ref 36.4–46.3)
EOSINOPHIL # BLD AUTO: 0.15 10*3/MM3 (ref 0–0.7)
EOSINOPHIL NFR BLD AUTO: 2.3 % (ref 0–7)
ERYTHROCYTE [DISTWIDTH] IN BLOOD BY AUTOMATED COUNT: 14.1 % (ref 11.5–14.5)
GFR SERPL CREATININE-BSD FRML MDRD: 43 ML/MIN/1.73 (ref 39–90)
GLUCOSE BLD-MCNC: 88 MG/DL (ref 60–100)
GLUCOSE BLDC GLUCOMTR-MCNC: 165 MG/DL (ref 70–130)
GLUCOSE BLDC GLUCOMTR-MCNC: 265 MG/DL (ref 70–130)
GLUCOSE BLDC GLUCOMTR-MCNC: 280 MG/DL (ref 70–130)
GLUCOSE BLDC GLUCOMTR-MCNC: 95 MG/DL (ref 70–130)
HCT VFR BLD AUTO: 27.3 % (ref 35–45)
HGB BLD-MCNC: 8.8 G/DL (ref 12–15.5)
IMM GRANULOCYTES # BLD: 0.02 10*3/MM3 (ref 0–0.02)
IMM GRANULOCYTES NFR BLD: 0.3 % (ref 0–0.5)
LYMPHOCYTES # BLD AUTO: 0.97 10*3/MM3 (ref 0.6–4.2)
LYMPHOCYTES NFR BLD AUTO: 15 % (ref 10–50)
MAGNESIUM SERPL-MCNC: 2 MG/DL (ref 1.6–2.3)
MCH RBC QN AUTO: 29.7 PG (ref 26.5–34)
MCHC RBC AUTO-ENTMCNC: 32.2 G/DL (ref 31.4–36)
MCV RBC AUTO: 92.2 FL (ref 80–98)
MONOCYTES # BLD AUTO: 0.7 10*3/MM3 (ref 0–0.9)
MONOCYTES NFR BLD AUTO: 10.8 % (ref 0–12)
NEUTROPHILS # BLD AUTO: 4.63 10*3/MM3 (ref 2–8.6)
NEUTROPHILS NFR BLD AUTO: 71.6 % (ref 37–80)
PHOSPHATE SERPL-MCNC: 4.1 MG/DL (ref 2.4–4.4)
PLATELET # BLD AUTO: 319 10*3/MM3 (ref 150–450)
PMV BLD AUTO: 10.3 FL (ref 8–12)
POTASSIUM BLD-SCNC: 3.5 MMOL/L (ref 3.5–5.1)
RBC # BLD AUTO: 2.96 10*6/MM3 (ref 3.77–5.16)
SODIUM BLD-SCNC: 135 MMOL/L (ref 137–145)
SODIUM UR-SCNC: 28 MMOL/L (ref 30–90)
WBC NRBC COR # BLD: 6.47 10*3/MM3 (ref 3.2–9.8)

## 2017-06-14 PROCEDURE — 83735 ASSAY OF MAGNESIUM: CPT | Performed by: FAMILY MEDICINE

## 2017-06-14 PROCEDURE — 97110 THERAPEUTIC EXERCISES: CPT

## 2017-06-14 PROCEDURE — 97116 GAIT TRAINING THERAPY: CPT

## 2017-06-14 PROCEDURE — 76770 US EXAM ABDO BACK WALL COMP: CPT

## 2017-06-14 PROCEDURE — 84300 ASSAY OF URINE SODIUM: CPT | Performed by: INTERNAL MEDICINE

## 2017-06-14 PROCEDURE — 99024 POSTOP FOLLOW-UP VISIT: CPT | Performed by: SURGERY

## 2017-06-14 PROCEDURE — 99232 SBSQ HOSP IP/OBS MODERATE 35: CPT | Performed by: FAMILY MEDICINE

## 2017-06-14 PROCEDURE — 92507 TX SP LANG VOICE COMM INDIV: CPT | Performed by: SPEECH-LANGUAGE PATHOLOGIST

## 2017-06-14 PROCEDURE — 80069 RENAL FUNCTION PANEL: CPT | Performed by: FAMILY MEDICINE

## 2017-06-14 PROCEDURE — 97535 SELF CARE MNGMENT TRAINING: CPT

## 2017-06-14 PROCEDURE — 63710000001 INSULIN DETEMIR PER 5 UNITS: Performed by: FAMILY MEDICINE

## 2017-06-14 PROCEDURE — 82962 GLUCOSE BLOOD TEST: CPT

## 2017-06-14 PROCEDURE — 63710000001 INSULIN ASPART PER 5 UNITS: Performed by: FAMILY MEDICINE

## 2017-06-14 PROCEDURE — 85025 COMPLETE CBC W/AUTO DIFF WBC: CPT | Performed by: FAMILY MEDICINE

## 2017-06-14 PROCEDURE — 92526 ORAL FUNCTION THERAPY: CPT | Performed by: SPEECH-LANGUAGE PATHOLOGIST

## 2017-06-14 PROCEDURE — 82570 ASSAY OF URINE CREATININE: CPT | Performed by: INTERNAL MEDICINE

## 2017-06-14 PROCEDURE — 97530 THERAPEUTIC ACTIVITIES: CPT

## 2017-06-14 RX ORDER — SOTALOL HYDROCHLORIDE 80 MG/1
40 TABLET ORAL EVERY 12 HOURS SCHEDULED
Status: DISCONTINUED | OUTPATIENT
Start: 2017-06-14 | End: 2017-06-23 | Stop reason: HOSPADM

## 2017-06-14 RX ORDER — SODIUM CHLORIDE 9 MG/ML
75 INJECTION, SOLUTION INTRAVENOUS CONTINUOUS
Status: DISCONTINUED | OUTPATIENT
Start: 2017-06-14 | End: 2017-06-15

## 2017-06-14 RX ORDER — DILTIAZEM HYDROCHLORIDE 120 MG/1
120 CAPSULE, COATED, EXTENDED RELEASE ORAL DAILY
Status: DISCONTINUED | OUTPATIENT
Start: 2017-06-15 | End: 2017-06-23 | Stop reason: HOSPADM

## 2017-06-14 RX ADMIN — SOTALOL HYDROCHLORIDE 80 MG: 80 TABLET ORAL at 08:03

## 2017-06-14 RX ADMIN — DOCUSATE SODIUM 100 MG: 100 CAPSULE, LIQUID FILLED ORAL at 18:00

## 2017-06-14 RX ADMIN — LINAGLIPTIN 5 MG: 5 TABLET, FILM COATED ORAL at 08:03

## 2017-06-14 RX ADMIN — INSULIN ASPART 8 UNITS: 100 INJECTION, SOLUTION INTRAVENOUS; SUBCUTANEOUS at 20:10

## 2017-06-14 RX ADMIN — INSULIN DETEMIR 15 UNITS: 100 INJECTION, SOLUTION SUBCUTANEOUS at 20:09

## 2017-06-14 RX ADMIN — MEMANTINE 5 MG: 5 TABLET ORAL at 08:03

## 2017-06-14 RX ADMIN — MAGNESIUM OXIDE TAB 400 MG (241.3 MG ELEMENTAL MG) 400 MG: 400 (241.3 MG) TAB at 08:04

## 2017-06-14 RX ADMIN — HYDROCODONE BITARTRATE AND ACETAMINOPHEN 1 TABLET: 5; 325 TABLET ORAL at 23:55

## 2017-06-14 RX ADMIN — DOCUSATE SODIUM 100 MG: 100 CAPSULE, LIQUID FILLED ORAL at 08:04

## 2017-06-14 RX ADMIN — INSULIN ASPART 8 UNITS: 100 INJECTION, SOLUTION INTRAVENOUS; SUBCUTANEOUS at 17:30

## 2017-06-14 RX ADMIN — FERROUS SULFATE TAB EC 324 MG (65 MG FE EQUIVALENT) 324 MG: 324 (65 FE) TABLET DELAYED RESPONSE at 08:04

## 2017-06-14 RX ADMIN — Medication 2.5 MG: at 08:13

## 2017-06-14 RX ADMIN — ENALAPRIL MALEATE 20 MG: 10 TABLET ORAL at 08:04

## 2017-06-14 RX ADMIN — HYDROCODONE BITARTRATE AND ACETAMINOPHEN 1 TABLET: 5; 325 TABLET ORAL at 18:03

## 2017-06-14 RX ADMIN — POTASSIUM CHLORIDE 10 MEQ: 750 CAPSULE, EXTENDED RELEASE ORAL at 18:00

## 2017-06-14 RX ADMIN — Medication 1 TABLET: at 08:07

## 2017-06-14 RX ADMIN — Medication 40 MG: at 20:09

## 2017-06-14 RX ADMIN — DILTIAZEM HYDROCHLORIDE 180 MG: 180 CAPSULE, EXTENDED RELEASE ORAL at 08:03

## 2017-06-14 RX ADMIN — INSULIN ASPART 3 UNITS: 100 INJECTION, SOLUTION INTRAVENOUS; SUBCUTANEOUS at 12:11

## 2017-06-14 RX ADMIN — DONEPEZIL HYDROCHLORIDE 10 MG: 10 TABLET, FILM COATED ORAL at 20:10

## 2017-06-14 RX ADMIN — POTASSIUM CHLORIDE 10 MEQ: 750 CAPSULE, EXTENDED RELEASE ORAL at 08:03

## 2017-06-14 RX ADMIN — SODIUM CHLORIDE 75 ML/HR: 900 INJECTION, SOLUTION INTRAVENOUS at 16:20

## 2017-06-14 RX ADMIN — FERROUS SULFATE TAB EC 324 MG (65 MG FE EQUIVALENT) 324 MG: 324 (65 FE) TABLET DELAYED RESPONSE at 18:00

## 2017-06-14 RX ADMIN — MEMANTINE 5 MG: 5 TABLET ORAL at 18:00

## 2017-06-14 NOTE — THERAPY TREATMENT NOTE
AR[facility] - Speech Language Pathology Treatment Note  [facility]     Patient Name: Kierra Scales  : 1929  MRN: [id number]  Today's Date: 2017  Referring Physician: Khari      Admit Date: 2017    Pt seen for am and noon meal and s/l therapy after am meal. Pt safely tolerated regular solids during am meal and diet was advanced to regular solids. Pt safely tolearted regular solids at noon meal; however, required cues to cut and eat meat. Pt performed well w/1-step directions this date and IR of 3 related words. Pt was able to recall 40% of breakfast meal after 20min after eating. Pt performed well w/basic yes/no questions; however, struggles w/comparative yes/no questions and short term/hx questions. Pt increased performance w/picture identification this date. Discussed diet advancement w/pt, dtr, and nsg.    Goals to be addressed:  1. Pt will safely tolerate recommended diet w/no overt s/s of aspiration:  Pt safely tolerated regular solids during am meal w/no overt s/s of aspiration.  Diet advanced to regular solids/thin liquids.  Pt safely tolerated regular solids/thin liquids at noon meal w/no overt s/s of aspiration.  Complete 1 additional f/u and d/c if no difficulty noted.  2. Pt will complete one-step directions without objects w/90% acc:  Pt was 100% acc w/simple 1-step directions (body parts).  3. Pt will complete two-step commands w/90% acc:  Pt was 20% acc w/2-step directions using body parts.  4. Pt will complete simple yes/no questions w/90% acc:  Pt was 90% acc w/basic yes/no questions and 50% acc w/comparative yes/no questions.  5. Pt will complete simple responsive naming questions w/90% acc:  Pt was 50% acc w/wh-questions.  Pt appears to be stronger w/verb answers rather than noun answers.  6. Pt will complete object/picture naming w/80% acc:  Pt was 60% acc w/simple picture naming.  7. Pt will complete convergent naming task w/80% acc:  Deferred this date.  8. Pt will complete  simple divergent naming task w/an average of 10 wpm:  Pt had an average of 4.3 wpm (Animals [reviewed from eval]: 8 wpm; Clothes: 4 wpm; Holidays: 1 wpm).  9. Pt will complete immediate recall of related words w/80% acc:  Pt was 60% acc w/recall of 3 related words.  10: Pt will recall details of the day w/80% acc:  Pt able to recall 40% of breakfast after 20 min delay.  11. Pt will complete orientation to [place], [place], and Place w/80% acc:  Pt was oriented to city and state.  She was oriented to building w/F:2.  She was not oriented to [place] or year.    Michelle Whitten, MS CCC-SLP 6/14/2017 2:53 PM      Visit Dx:      ICD-10-CM ICD-9-CM   1. Oral phase dysphagia R13.11 787.21   2. Impaired mobility and ADLs Z74.09 799.89   3. Muscle weakness (generalized) M62.81 728.87   4. Abnormality of gait and mobility R26.9 781.2   5. 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)   • Hypothyroidism   • Dyspnea   • Hyperthyroidism   • Gastrointestinal hemorrhage with melena   • Type 2 diabetes mellitus with hyperglycemia   • Colon cancer   • Chronic blood loss anemia   • Physical deconditioning   • Senile dementia, uncomplicated   • Encounter for rehabilitation   • Adenocarcinoma of sigmoid colon   • Hypokalemia              Adult Rehabilitation Note       06/14/17 1205 06/14/17 1106 06/14/17 0905    Rehab Assessment/Intervention    Discipline speech language pathologist  -CK physical therapy assistant  -RW occupational therapy assistant  -RC    Document Type therapy note (daily note)  -CK therapy note (daily note)  -RW therapy note (daily note)  -RC    Subjective Information agree to therapy  -CK agree to therapy  -RW agree to therapy   -RC    Patient Effort, Rehab Treatment adequate  -CK adequate  -RW good  -RC    Patient Response to Treatment  needed alot of encouragement and daughter assisted  -RW     Recorded by [CK] Michelle Whitten, MS CCC-SLP [RW] Danny Beebe PTA [RC] ADRIANNA GomezA/POONAM    Pain Assessment    Pain Assessment No/denies pain  -CK      Pain Score 0  -CK      Post Pain Score 0  -CK unable to assess  -RW unable to assess  -RC    Pain Type  Chronic pain  -RW Chronic pain  -RC    Pain Location  Back  -RW Back  -RC    Pain Orientation  Left;Lower  -RW Lower  -RC    Pain Intervention(s)   Rest  -RC    Recorded by [CK] Michelle Whitten MS CCC-SLP [RW] Danny Beebe PTA [RC] ADRIANNA GomezA/L    Vision Assessment/Intervention    Visual Impairment  WFL with corrective lenses  -RW     Recorded by  [RW] Danny Beebe PTA     Cognitive Assessment/Intervention    Current Cognitive/Communication Assessment  impaired  -RW impaired  -RC    Orientation Status  oriented to;person;place;disoriented to;time;situation  -RW person  -RC    Recorded by  [RW] Danny Beebe PTA [RC] ADRIANNA GomezA/L    Dysphagia Treatment Objectives and Progress    Dysphagia Treatment Objectives Other 1  -CK      Recorded by [CK] Michelle Whitten MS CCC-SLP      Dysphagia Other 1    Dysphagia Other 1 Objective Pt will safely tolerate recommended diet w/no overt s/s of aspiration.  -CK      Status: Dysphagia Other 1 Progressing as expected  -CK      Dysphagia Other 1 Progress 90%;continue to address  -CK      Comments: Dysphagia Other 1 Pt safely tolerated regular solids/thin liquids.  Pt did require initiation to cut and eat meat.  -CK      Recorded by [CK] Michelle Whitten, MS CCC-SLP      Bed Mobility, Assessment/Treatment    Bed Mobility, Assistive Device  bed rails;head of bed elevated  -RW     Bed Mob, Supine to Sit, [place]  supervision required  -RW     Bed Mob, Sit to Supine, [place]   supervision required  -RC     Recorded by  [RW] Danny Beebe PTA [RC] ADRIANNA GomezA/L    Transfer Assessment/Treatment    Transfers, Chair-Bed [place]   contact guard assist  -RC    Transfers, Bed-Chair-Bed, Assist Device   rolling walker  -RC    Transfers, Sit-Stand [place]  contact guard assist;verbal cues required  -RW     Transfers, Stand-Sit [place]  contact guard assist;verbal cues required  -RW     Transfers, Sit-Stand-Sit, Assist Device  rolling walker  -RW     Toilet Transfer, [place]  contact guard assist;minimum assist (75% patient effort)   daughter assist  -RW contact guard assist;verbal cues required  -RC    Toilet Transfer, Assistive Device  rolling walker  -RW rolling walker  -RC    Recorded by  [RW] Danny Beebe PTA [RC] ADRIANNA GomezA/L    Gait Assessment/Treatment    Gait, [place] Level  contact guard assist;verbal cues required  -RW     Gait, Assistive Device  rolling walker  -RW     Gait, Distance (Feet)  150  -RW     Gait, Impairments  impaired balance  -RW     Recorded by  [RW] Danny Beebe PTA     Functional Mobility    Functional Mobility- Ind. Level   contact guard assist  -RC    Functional Mobility- Device   rolling walker  -RC    Functional Mobility-Distance (Feet)   15  -RC    Recorded by   [RC] ADRIANNA GomezA/L    Upper Body Bathing Assessment/Training    UB Bathing Assess/Train, Position   sitting;sink side  -RC    UB Bathing Assess/Train, [place] Level   supervision required;verbal cues required  -RC    Recorded by   [RC] ADRIANNA GomezA/L    Lower Body Bathing Assessment/Training    LB Bathing Assess/Train, Position   sitting;standing  -RC    LB Bathing Assess/Train, [place] Level   contact guard assist  -RC    Recorded by   [RC] ADRIANNA GomezA/L    Upper Body Dressing Assessment/Training    UB Dressing Assess/Train, Clothing Type   doffing:;donning:;pull over  -RC    UB Dressing Assess/Train, Position   sitting  -RC    UB Dressing  Assess/Train, [place]   minimum assist (75% patient effort)  -RC    Recorded by   [RC] ADRIANNA GomezA/L    Lower Body Dressing Assessment/Training    LB Dressing Assess/Train, Clothing Type  donning:;shoes  -RW doffing:;donning:;pants;shorts;shoes  -RC    LB Dressing Assess/Train, Position  sitting  -RW sitting;standing  -RC    LB Dressing Assess/Train, [place]  minimum assist (75% patient effort);set up required  -RW minimum assist (75% patient effort)  -RC    Recorded by  [RW] Danny Beebe PTA [RC] ROHAN Gomez/L    Toileting Assessment/Training    Toileting Assess/Train, Position   sitting;standing  -RC    Toileting Assess/Train, Indepen Level   minimum assist (75% patient effort);verbal cues required  -RC    Recorded by   [RC] ROHAN Gomez/L    Grooming Assessment/Training    Grooming Assess/Train, Position   sitting  -RC    Grooming Assess/Train, Indepen Level   supervision required;verbal cues required  -RC    Recorded by   [RC] ROHAN Gomez/L    Therapy Exercises    Bilateral Lower Extremities  AROM:;20 reps;supine;ankle pumps/circles;heel slides;hip abduction/adduction;SAQ   2 sets in part  -RW     Bilateral Upper Extremity   --   ue bike 10 min  -RC    Recorded by  [RW] Danny Beebe PTA [RC] ROHAN Gomez/L    Positioning and Restraints    Pre-Treatment Position  in bed  -RW sitting in chair/recliner  -RC    Post Treatment Position  wheelchair  -RW bed  -RC    In Bed  call light within reach  -RW exit alarm on;encouraged to call for assist;call light within reach  -RC    Recorded by  [RW] Danny Beebe PTA [RC] ROHNA Gomez/POONAM      06/14/17 0714          Rehab Assessment/Intervention    Discipline speech language pathologist  -CK      Document Type therapy note (daily note)  -CK      Subjective Information agree to therapy  -CK      Patient Effort, Rehab Treatment good  -CK      Recorded by [CK] Michelle Whitten MS CCC-SLP      Pain  Assessment    Pain Assessment 0-10  -CK      Pain Score 2  -CK      Post Pain Score 2  -CK      Pain Type Chronic pain  -CK      Pain Location Back  -CK      Pain Orientation Lower  -CK      Pain Intervention(s) Repositioned  -CK      Recorded by [CK] Michelle Whitten MS CCC-SLP      Communication Treatment Objective and Progress    Receptive Language Treatment Objectives Improve ability to follow directions;Improve ability to comprehend questions  -CK      Expressive Treatment Objectives Improve word retrieval skills  -CK      Cognitive Linguistic Treatment Objectives Improve orientation;Improve memory skills  -CK      Recorded by [CK] Michelle Whitten MS CCC-SLP      Improve ability to follow directions    Improve ability to follow directions: one-step directions without objects;two-step commands;90%  -CK      Status: Improve ability to follow directions Progressing as expected  -CK      Ability to Follow Directions Progress 100%;20%  -CK      Recorded by [CK] Michelle Whitten MS CCC-SLP      Improve ability to comprehend questions    Improve ability to comprehend questions: simple yes/no questions;simple general questions;90%  -CK      Status: Improve ability to comprehend questions Progressing as expected  -CK      Ability to Comprehend Questions Progress 90%;50%  -CK      Comments: Improve ability to comprehend questions 90% w/basic yes/no; 50% w/comparative yes/no questions  -CK      Recorded by [CK] Michelle Whitten MS CCC-SLP      Improve word retrieval skills    Improve word retrieval skills by: naming an object/pic;answer WH question with one word;completing a divergent task;completing a convergent task;90%  -CK      Status: Improve word retrieval skills Progressing as expected  -CK      Word Retrieval Skills Progress 50%;with inconsistent cues  -CK      Recorded by [CK] Michelle Whitten MS CCC-SLP      Improve orientation    Improve orientation through: demonstrating orientation to  day;demonstrating orientation to year;demonstrating orientation to place;80%  -CK      Status: Improve orientation through Progressing as expected  -CK      Orientation Progress 40%  -CK      Recorded by [CK] Michelle Whitten MS CCC-SLP      Improve memory skills    Improve memory skills through: recalling related word lists immediately;recall details of the day;80%  -CK      Status: Improve memory skills Progressing as expected  -CK      Memory Skills Progress 60%  -CK      Recorded by [CK] Michelle Whitten MS CCC-SLP      Dysphagia Treatment Objectives and Progress    Dysphagia Treatment Objectives Other 1  -CK      Recorded by [CK] Michelle Whitten MS CCC-SLP      Dysphagia Other 1    Dysphagia Other 1 Objective Pt will safely tolerate recommended diet w/no overt s/s of aspiration.  -CK      Status: Dysphagia Other 1 Progressing as expected  -CK      Dysphagia Other 1 Progress 90%;continue to address  -CK      Comments: Dysphagia Other 1 Pt safely tolerated mech soft solids/thin liquids via straw.  Pt also safely tolerated regular solid trials w/no overt s/s of aspiration  -CK      Recorded by [CK] Michelle Whitten MS CCC-SLP        User Key  (r) = Recorded By, (t) = Taken By, (c) = Cosigned By    Initials Name Effective Dates    CK Michelle Whitten MS CCC-SLP 10/17/16 -     RESHMA Beebe, PTA 10/17/16 -     ROHAN Rodas/POONAM 10/17/16 -                SLP Goals       06/14/17 1434 06/13/17 1506       Safely Consume Diet    Safely Consume Diet- SLP, Date Established  06/13/17  -CK     Safely Consume Diet- SLP, Time to Achieve  by discharge  -CK     Safely Consume Diet- SLP, Additional Goal  Pt will safely tolerate recommended diet w/no overt s/s of aspiration.  -CK     Safely Consume Diet- SLP, Date Goal Reviewed 06/14/17  -CK 06/13/17  -CK     Safely Consume Diet- SLP, Outcome goal partially met  -CK      Receptive Language- Optimal Participation in Care    Receptive Language Optimal  Participation in Care- SLP, Date Established  06/13/17  -CK     Receptive Language Optimal Participation in Care- SLP, Time to Achieve  by discharge  -CK     Receptive Language Optimal Participation in Care- SLP, Activity Level  Patient will improve ability to follow directions;Patient will improve ability to comprehend questions  -CK     Receptive Language Optimal Participation in Care- SLP, Date Goal Reviewed 06/14/17  -CK 06/13/17  -CK     Receptive Language Optimal Participation in Care- SLP, Outcome goal ongoing  -CK      Expressive- Optimal Participation in Care    Expressive Optimal Participation in Care- SLP, Date Established  06/13/17  -CK     Expressive Optimal Participation in Care- SLP, Time to Achieve  by discharge  -CK     Expressive Optimal Participation in Care- SLP, Activity Level  Patient will improve word retrieval skills  -CK     Expressive Optimal Participation in Care- SLP, Date Goal Reviewed 06/14/17  -CK 06/13/17  -CK     Expressive Optimal Participation in Care- SLP, Outcome goal ongoing  -CK      Cognitive Linguistic- Optimal Participation in Care    Cognitive Linguistic Optimal Participation in Care- SLP, Date Established  06/13/17  -CK     Cognitive Linguistic Optimal Participation in Care- SLP, Time to Achieve  by discharge  -CK     Cognitive Linguistic Optimal Participation in Care- SLP, Activity Level  Patient will improve orientation for increased safety in environment;Patient will improve memory skills for increased safety in environment  -CK     Cognitive Linguistic Optimal Participation in Care- SLP, Date Goal Reviewed 06/14/17  -CK 06/13/17  -CK     Cognitive Linguistic Optimal Participation in Care- SLP, Outcome goal ongoing  -CK        User Key  (r) = Recorded By, (t) = Taken By, (c) = Cosigned By    Initials Name Provider Type    GLENYS Whitten MS CCC-SLP Speech and Language Pathologist          EDUCATION  The patient has been educated in the following areas:   Cognitive  Impairment.    SLP Recommendation and Plan  SLP Diagnosis: symbolic dysfunction  Prognosis: fair  Rehab Potential: fair, will monitor progress closely  Criteria for Skilled Therapeutic Interventions Met: skilled criteria for cognitive linguistic intervention met  Anticipated Discharge Disposition: home with assist     Therapy Frequency: 3-5 times/wk     Expected Duration of Therapy Session (min): 30-45 minutes, 15-30 minutes, 45-60 minutes    Plan of Care Review  Plan Of Care Reviewed With: patient, daughter  Outcome Summary/Follow up Plan: Pt seen for am  and noon meal and s/l therapy after am meal.  Pt safely tolerated regular solids during am meal and diet was advanced to regular solids.  Pt safely tolearted regular solids at noon meal; however, required cues to cut and eat meat.  Pt performed well w/1-step directions this date and IR of 3 related words.  Pt was able to recall 40% of breakfast meal after 20min after eating.  Pt performed well w/basic yes/no questions; however, struggles w/comparative yes/no questions and short term/hx questions.  Pt increased performance w/picture identification this date.  Discussed diet advancement w/pt, dtr, and nsg.          Time Calculation:         Time Calculation- SLP       06/14/17 1441 06/14/17 1439       Time Calculation- SLP    SLP Start Time 1205  -CK 0714  -CK     SLP Stop Time 1230  -CK 0830  -CK     SLP Time Calculation (min) 25 min  -CK 76 min  -CK     Total Timed Code Minutes- SLP 25 minute(s)  -CK 76 minute(s)  -CK     SLP Received On 06/14/17  -CK 06/14/17  -CK     SLP Goal Re-Cert Due Date 06/27/17  -CK 06/27/17  -CK       User Key  (r) = Recorded By, (t) = Taken By, (c) = Cosigned By    Initials Name Provider Type    CK Michelle Whitten MS CCC-SLP Speech and Language Pathologist          Therapy Charges for Today     Code Description Service Date Service Provider Modifiers Qty    20658418574 HC ST EVAL ORAL PHARYNG SWALLOW 2 6/13/2017 Michelle Whitten MS  CCC-SLP GN 1    50226324596 HC ST EVAL SPEECH AND PROD W LANG  3 2017 Michelle Whitten, MS CCC-SLP GN 1    82424228801 HC ST TREATMENT SPEECH 3 2017 iMchelle Whitten, MS CCC-SLP GN 1    91366915947 HC ST TREATMENT SWALLOW 4 2017 Michelle Whitten, MS CCC-SLP GN 1               Michelle LEVIN Carney, MS CCC-SLP  2017 and [place] - Speech Language Pathology   Swallow Treatment Note [facility]     Patient Name: Kierra Scales  : 1929  MRN: [id number]  Today's Date: 2017  Onset of Illness/Injury or Date of Surgery Date: 17  Date of Referral to SLP: 17  Referring Physician: Khari      Admit Date: 2017    Visit Dx:      ICD-10-CM ICD-9-CM   1. Oral phase dysphagia R13.11 787.21   2. Impaired mobility and ADLs Z74.09 799.89   3. Muscle weakness (generalized) M62.81 728.87   4. Abnormality of gait and mobility R26.9 781.2   5. 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)   • Hypothyroidism   • Dyspnea   • Hyperthyroidism   • Gastrointestinal hemorrhage with melena   • Type 2 diabetes mellitus with hyperglycemia   • Colon cancer   • Chronic blood loss anemia   • Physical deconditioning   • Senile dementia, uncomplicated   • Encounter for rehabilitation   • Adenocarcinoma of sigmoid colon   • Hypokalemia             Adult Rehabilitation Note       17 1205 17 1106 17 0905    Rehab Assessment/Intervention    Discipline speech language pathologist  -CK physical therapy assistant  -RW occupational therapy assistant  -RC    Document Type therapy note (daily note)  -CK therapy note (daily note)  -RW therapy note (daily note)  -RC    Subjective Information agree to  therapy  -CK agree to therapy  -RW agree to therapy  -RC    Patient Effort, Rehab Treatment adequate  -CK adequate  -RW good  -RC    Patient Response to Treatment  needed alot of encouragement and daughter assisted  -RW     Recorded by [CK] Michelle Whitten, MS CCC-SLP [RW] Danny Beebe PTA [RC] Ally Kwong, MADRIGAL/L    Pain Assessment    Pain Assessment No/denies pain  -CK      Pain Score 0  -CK      Post Pain Score 0  -CK unable to assess  -RW unable to assess  -RC    Pain Type  Chronic pain  -RW Chronic pain  -RC    Pain Location  Back  -RW Back  -RC    Pain Orientation  Left;Lower  -RW Lower  -RC    Pain Intervention(s)   Rest  -RC    Recorded by [CK] Michelle Whitten MS CCC-SLP [RW] Danny Beebe PTA [RC] Ally Kwong MADRIGAL/L    Vision Assessment/Intervention    Visual Impairment  WFL with corrective lenses  -RW     Recorded by  [RW] Danny Beebe PTA     Cognitive Assessment/Intervention    Current Cognitive/Communication Assessment  impaired  -RW impaired  -RC    Orientation Status  oriented to;person;place;disoriented to;time;situation  -RW person  -RC    Recorded by  [RW] Danny Beebe PTA [RC] Ally Kwong MADRIGAL/L    Dysphagia Treatment Objectives and Progress    Dysphagia Treatment Objectives Other 1  -CK      Recorded by [CK] Michelle Whitten MS CCC-SLP      Dysphagia Other 1    Dysphagia Other 1 Objective Pt will safely tolerate recommended diet w/no overt s/s of aspiration.  -CK      Status: Dysphagia Other 1 Progressing as expected  -CK      Dysphagia Other 1 Progress 90%;continue to address  -CK      Comments: Dysphagia Other 1 Pt safely tolerated regular solids/thin liquids.  Pt did require initiation to cut and eat meat.  -CK      Recorded by [CK] Michelle Whitten, MS CCC-SLP      Bed Mobility, Assessment/Treatment    Bed Mobility, Assistive Device  bed rails;head of bed elevated  -RW     Bed Mob, Supine to Sit, [place]  supervision required  -RW     Bed Mob, Sit to  Supine, [place]   supervision required  -RC    Recorded by  [RW] Danny Beebe PTA [RC] Ally Kwong MADRIGAL/L    Transfer Assessment/Treatment    Transfers, Chair-Bed [place]   contact guard assist  -RC    Transfers, Bed-Chair-Bed, Assist Device   rolling walker  -RC    Transfers, Sit-Stand [place]  contact guard assist;verbal cues required  -RW     Transfers, Stand-Sit [place]  contact guard assist;verbal cues required  -RW     Transfers, Sit-Stand-Sit, Assist Device  rolling walker  -RW     Toilet Transfer, [place]  contact guard assist;minimum assist (75% patient effort)   daughter assist  -RW contact guard assist;verbal cues required  -RC    Toilet Transfer, Assistive Device  rolling walker  -RW rolling walker  -RC    Recorded by  [RW] Danny Beebe PTA [RC] ADRIANNA GomezA/L    Gait Assessment/Treatment    Gait, [place] Level  contact guard assist;verbal cues required  -RW     Gait, Assistive Device  rolling walker  -RW     Gait, Distance (Feet)  150  -RW     Gait, Impairments  impaired balance  -RW     Recorded by  [RW] Danny Beebe PTA     Functional Mobility    Functional Mobility- Ind. Level   contact guard assist  -RC    Functional Mobility- Device   rolling walker  -RC    Functional Mobility-Distance (Feet)   15  -RC    Recorded by   [RC] Ally Kwong MADRIGAL/L    Upper Body Bathing Assessment/Training    UB Bathing Assess/Train, Position   sitting;sink side  -RC    UB Bathing Assess/Train, [place] Level   supervision required;verbal cues required  -RC    Recorded by   [RC] Ally Kwong MADRIGAL/L    Lower Body Bathing Assessment/Training    LB Bathing Assess/Train, Position   sitting;standing  -RC    LB Bathing Assess/Train, [place] Level   contact guard assist  -RC    Recorded by   [RC] Ally Kwong MADRIGAL/L    Upper Body Dressing Assessment/Training    UB Dressing Assess/Train, Clothing Type   doffing:;donning:;pull over  -RC    UB Dressing  Assess/Train, Position   sitting  -RC    UB Dressing Assess/Train, [place]   minimum assist (75% patient effort)  -RC    Recorded by   [RC] ROHAN Gomez/L    Lower Body Dressing Assessment/Training    LB Dressing Assess/Train, Clothing Type  donning:;shoes  -RW doffing:;donning:;pants;shorts;shoes  -RC    LB Dressing Assess/Train, Position  sitting  -RW sitting;standing  -RC    LB Dressing Assess/Train, [place]  minimum assist (75% patient effort);set up required  -RW minimum assist (75% patient effort)  -RC    Recorded by  [RW] Danny Beebe PTA [RC] ROHAN Gomez/L    Toileting Assessment/Training    Toileting Assess/Train, Position   sitting;standing  -RC    Toileting Assess/Train, Indepen Level   minimum assist (75% patient effort);verbal cues required  -RC    Recorded by   [RC] ROHAN Gomez/L    Grooming Assessment/Training    Grooming Assess/Train, Position   sitting  -RC    Grooming Assess/Train, [place]epen Level   supervision required;verbal cues required  -RC    Recorded by   [RC] ROHAN Gomez/L    Therapy Exercises    Bilateral Lower Extremities  AROM:;20 reps;supine;ankle pumps/circles;heel slides;hip abduction/adduction;SAQ   2 sets in part  -RW     Bilateral Upper Extremity   --   ue bike 10 min  -RC    Recorded by  [RW] Danny Beebe PTA [RC] ROHAN Gomez/L    Positioning and Restraints    Pre-Treatment Position  in bed  -RW sitting in chair/recliner  -RC    Post Treatment Position  wheelchair  -RW bed  -RC    In Bed  call light within reach  -RW exit alarm on;encouraged to call for assist;call light within reach  -RC    Recorded by  [RW] Danny Beebe PTA [RC] ROHAN Gomez/POONAM      06/14/17 0714          Rehab Assessment/Intervention    Discipline speech language pathologist  -CK      Document Type therapy note (daily note)  -CK      Subjective Information agree to therapy  -CK      Patient Effort, Rehab Treatment good  -CK      Recorded  by [CK] Michelle Whitten MS CCC-SLP      Pain Assessment    Pain Assessment 0-10  -CK      Pain Score 2  -CK      Post Pain Score 2  -CK      Pain Type Chronic pain  -CK      Pain Location Back  -CK      Pain Orientation Lower  -CK      Pain Intervention(s) Repositioned  -CK      Recorded by [CK] Michelle Whitten MS CCC-SLP      Communication Treatment Objective and Progress    Receptive Language Treatment Objectives Improve ability to follow directions;Improve ability to comprehend questions  -CK      Expressive Treatment Objectives Improve word retrieval skills  -CK      Cognitive Linguistic Treatment Objectives Improve orientation;Improve memory skills  -CK      Recorded by [CK] Michelle Whitten MS CCC-SLP      Improve ability to follow directions    Improve ability to follow directions: one-step directions without objects;two-step commands;90%  -CK      Status: Improve ability to follow directions Progressing as expected  -CK      Ability to Follow Directions Progress 100%;20%  -CK      Recorded by [CK] Michelle Whitten MS CCC-SLP      Improve ability to comprehend questions    Improve ability to comprehend questions: simple yes/no questions;simple general questions;90%  -CK      Status: Improve ability to comprehend questions Progressing as expected  -CK      Ability to Comprehend Questions Progress 90%;50%  -CK      Comments: Improve ability to comprehend questions 90% w/basic yes/no; 50% w/comparative yes/no questions  -CK      Recorded by [CK] Michelle Whitten MS CCC-SLP      Improve word retrieval skills    Improve word retrieval skills by: naming an object/pic;answer WH question with one word;completing a divergent task;completing a convergent task;90%  -CK      Status: Improve word retrieval skills Progressing as expected  -CK      Word Retrieval Skills Progress 50%;with inconsistent cues  -CK      Recorded by [CK] Michelle Whitten MS CCC-SLP      Improve orientation    Improve orientation  through: demonstrating orientation to day;demonstrating orientation to year;demonstrating orientation to place;80%  -CK      Status: Improve orientation through Progressing as expected  -CK      Orientation Progress 40%  -CK      Recorded by [CK] Michelle Whitten MS CCC-SLP      Improve memory skills    Improve memory skills through: recalling related word lists immediately;recall details of the day;80%  -CK      Status: Improve memory skills Progressing as expected  -CK      Memory Skills Progress 60%  -CK      Recorded by [CK] Michelle Whitten MS CCC-SLP      Dysphagia Treatment Objectives and Progress    Dysphagia Treatment Objectives Other 1  -CK      Recorded by [CK] Michelle Whitten MS CCC-SLP      Dysphagia Other 1    Dysphagia Other 1 Objective Pt will safely tolerate recommended diet w/no overt s/s of aspiration.  -CK      Status: Dysphagia Other 1 Progressing as expected  -CK      Dysphagia Other 1 Progress 90%;continue to address  -CK      Comments: Dysphagia Other 1 Pt safely tolerated mech soft solids/thin liquids via straw.  Pt also safely tolerated regular solid trials w/no overt s/s of aspiration  -CK      Recorded by [CK] Michelle Whitten MS CCC-SLP        User Key  (r) = Recorded By, (t) = Taken By, (c) = Cosigned By    Initials Name Effective Dates    CK Michelle Whitten MS CCC-SLP 10/17/16 -     RW Danny Beebe, PTA 10/17/16 -     TAHIR Kwong, MADRIGAL/POONAM 10/17/16 -                   IP SLP Goals       06/14/17 1434 06/13/17 1506       Safely Consume Diet    Safely Consume Diet- SLP, Date Established  06/13/17  -CK     Safely Consume Diet- SLP, Time to Achieve  by discharge  -CK     Safely Consume Diet- SLP, Additional Goal  Pt will safely tolerate recommended diet w/no overt s/s of aspiration.  -CK     Safely Consume Diet- SLP, Date Goal Reviewed 06/14/17  -CK 06/13/17  -CK     Safely Consume Diet- SLP, Outcome goal partially met  -CK      Receptive Language- Optimal Participation  in Care    Receptive Language Optimal Participation in Care- SLP, Date Established  06/13/17  -CK     Receptive Language Optimal Participation in Care- SLP, Time to Achieve  by discharge  -CK     Receptive Language Optimal Participation in Care- SLP, Activity Level  Patient will improve ability to follow directions;Patient will improve ability to comprehend questions  -CK     Receptive Language Optimal Participation in Care- SLP, Date Goal Reviewed 06/14/17  -CK 06/13/17  -CK     Receptive Language Optimal Participation in Care- SLP, Outcome goal ongoing  -CK      Expressive- Optimal Participation in Care    Expressive Optimal Participation in Care- SLP, Date Established  06/13/17  -CK     Expressive Optimal Participation in Care- SLP, Time to Achieve  by discharge  -CK     Expressive Optimal Participation in Care- SLP, Activity Level  Patient will improve word retrieval skills  -CK     Expressive Optimal Participation in Care- SLP, Date Goal Reviewed 06/14/17  -CK 06/13/17  -CK     Expressive Optimal Participation in Care- SLP, Outcome goal ongoing  -CK      Cognitive Linguistic- Optimal Participation in Care    Cognitive Linguistic Optimal Participation in Care- SLP, Date Established  06/13/17  -CK     Cognitive Linguistic Optimal Participation in Care- SLP, Time to Achieve  by discharge  -CK     Cognitive Linguistic Optimal Participation in Care- SLP, Activity Level  Patient will improve orientation for increased safety in environment;Patient will improve memory skills for increased safety in environment  -CK     Cognitive Linguistic Optimal Participation in Care- SLP, Date Goal Reviewed 06/14/17  -CK 06/13/17  -CK     Cognitive Linguistic Optimal Participation in Care- SLP, Outcome goal ongoing  -CK        User Key  (r) = Recorded By, (t) = Taken By, (c) = Cosigned By    Initials Name Provider Type    GLENYS Whitten MS CCC-SLP Speech and Language Pathologist          EDUCATION  The patient has been  educated in the following areas:   Dysphagia (Swallowing Impairment).    SLP Recommendation and Plan                                           Plan of Care Review  Plan Of Care Reviewed With: patient, daughter  Outcome Summary/Follow up Plan: Pt seen for am  and noon meal and s/l therapy after am meal.  Pt safely tolerated regular solids during am meal and diet was advanced to regular solids.  Pt safely tolearted regular solids at noon meal; however, required cues to cut and eat meat.  Pt performed well w/1-step directions this date and IR of 3 related words.  Pt was able to recall 40% of breakfast meal after 20min after eating.  Pt performed well w/basic yes/no questions; however, struggles w/comparative yes/no questions and short term/hx questions.  Pt increased performance w/picture identification this date.  Discussed diet advancement w/pt, dtr, and nsg.           Time Calculation:         Time Calculation- SLP       06/14/17 1441 06/14/17 1439       Time Calculation- SLP    SLP Start Time 1205  -CK 0714  -CK     SLP Stop Time 1230  -CK 0830  -CK     SLP Time Calculation (min) 25 min  -CK 76 min  -CK     Total Timed Code Minutes- SLP 25 minute(s)  -CK 76 minute(s)  -CK     SLP Received On 06/14/17  -CK 06/14/17  -CK     SLP Goal Re-Cert Due Date 06/27/17  -CK 06/27/17  -CK       User Key  (r) = Recorded By, (t) = Taken By, (c) = Cosigned By    Initials Name Provider Type    CK Michelle Whitten MS CCC-SLP Speech and Language Pathologist          Therapy Charges for Today     Code Description Service Date Service Provider Modifiers Qty    52108493768 HC ST EVAL ORAL PHARYNG SWALLOW 2 6/13/2017 Michelle Whitten MS CCC-SLP GN 1    74278199503 HC ST EVAL SPEECH AND PROD W LANG  3 6/13/2017 MS LILIAN ChoSLP GN 1    [id number] HC ST TREATMENT SPEECH 3 6/14/2017 Michelle Whitten MS CCC-SLP GN 1    [id number] HC ST TREATMENT SWALLOW 4 6/14/2017 Michelle Whitten MS CCC-SLP GN 1                  Michelle Whitten, MS CCC-SLP  6/14/2017

## 2017-06-14 NOTE — PLAN OF CARE
Problem: Patient Care Overview (Adult)  Goal: Plan of Care Review  Outcome: Ongoing (interventions implemented as appropriate)    06/14/17 1608   Coping/Psychosocial Response Interventions   Plan Of Care Reviewed With patient   Patient Care Overview   Progress progress toward functional goals is gradual   Outcome Evaluation   Outcome Summary/Follow up Plan pt still needs alot of encouragement to participate and daughters help with this. gt and steps         Problem: Inpatient Physical Therapy  Goal: Bed Mobility Goal LTG- PT  Outcome: Ongoing (interventions implemented as appropriate)    06/13/17 1036 06/14/17 1608   Bed Mobility PT LTG   Bed Mobility PT LTG, Date Established 06/13/17 --    Bed Mobility PT LTG, Time to Achieve by discharge --    Bed Mobility PT LTG, Activity Type supine to sit/sit to supine --    Bed Mobility PT LTG, [place] Level supervision required --    Bed Mobility PT LTG, Additional Goal HOB flat; No BR's --    Bed Mobility PT LTG, Date Goal Reviewed --  06/14/17   Bed Mobility PT LTG, Outcome --  goal ongoing       Goal: Transfer Training Goal 1 LTG- PT  Outcome: Ongoing (interventions implemented as appropriate)    06/13/17 1036 06/14/17 1608   Transfer Training PT LTG   Transfer Training PT LTG, Date Established 06/13/17 --    Transfer Training PT LTG, Time to Achieve by discharge --    Transfer Training PT LTG, Activity Type bed to chair /chair to bed --    Transfer Training PT LTG, [place] Level supervision required --    Transfer Training PT LTG, Assist Device (AAD) --    Transfer Training PT LTG, Date Goal Reviewed --  06/14/17   Transfer Training PT LTG, Outcome --  goal ongoing       Goal: Gait Training Goal LTG- PT  Outcome: Ongoing (interventions implemented as appropriate)    06/13/17 1036 06/14/17 1608   Gait Training PT LTG   Gait Training Goal PT LTG, Date Established 06/13/17 --    Gait Training Goal PT LTG, Time to Achieve by discharge --    Gait Training Goal PT  LTG, [place] Level supervision required --    Gait Training Goal PT LTG, Assist Device (AAD) --    Gait Training Goal PT LTG, Distance to Achieve 150 feet --    Gait Training Goal PT LTG, Date Goal Reviewed --  06/14/17   Gait Training Goal PT LTG, Outcome --  goal ongoing       Goal: Stair Training Goal LTG- PT  Outcome: Ongoing (interventions implemented as appropriate)    06/13/17 1036 06/14/17 1608   Stair Training PT LTG   Stair Training Goal PT LTG, Date Established 06/13/17 --    Stair Training Goal PT LTG, Time to Achieve by discharge --    Stair Training Goal PT LTG, Number of Steps 4 steps --    Stair Training Goal PT LTG, [place] Level contact guard assist --    Stair Training Goal PT LTG, Assist Device 2 handrails --    Stair Training Goal PT LTG, Date Goal Reviewed --  06/14/17   Stair Training Goal PT LTG, Outcome --  goal ongoing

## 2017-06-14 NOTE — PLAN OF CARE
Problem: Patient Care Overview (Adult)  Goal: Plan of Care Review  Outcome: Ongoing (interventions implemented as appropriate)    06/14/17 1434   Coping/Psychosocial Response Interventions   Plan Of Care Reviewed With patient;daughter   Outcome Evaluation   Outcome Summary/Follow up Plan Pt seen for am and noon meal and s/l therapy after am meal. Pt safely tolerated regular solids during am meal and diet was advanced to regular solids. Pt safely tolearted regular solids at noon meal; however, required cues to cut and eat meat. Pt performed well w/1-step directions this date and IR of 3 related words. Pt was able to recall 40% of breakfast meal after 20min after eating. Pt performed well w/basic yes/no questions; however, struggles w/comparative yes/no questions and short term/hx questions. Pt increased performance w/picture identification this date. Discussed diet advancement w/pt, dtr, and nsg.         Problem: Inpatient SLP  Goal: Dysphagia- Patient will safely consume diet as per recommendation with no signs/symptoms of aspiration  Outcome: Ongoing (interventions implemented as appropriate)    06/13/17 1506 06/14/17 1434   Safely Consume Diet   Safely Consume Diet- SLP, Date Established 06/13/17 --    Safely Consume Diet- SLP, Time to Achieve by discharge --    Safely Consume Diet- SLP, Additional Goal Pt will safely tolerate recommended diet w/no overt s/s of aspiration. --    Safely Consume Diet- SLP, Date Goal Reviewed --  06/14/17   Safely Consume Diet- SLP, Outcome --  goal partially met       Goal: Expressive-Patient will improve expressive language skills to allow optimal participation in care  Outcome: Ongoing (interventions implemented as appropriate)    06/13/17 1506 06/14/17 1434   Expressive- Optimal Participation in Care   Expressive Optimal Participation in Care- SLP, Date Established 06/13/17 --    Expressive Optimal Participation in Care- SLP, Time to Achieve by discharge --    Expressive Optimal  Participation in Care- SLP, Activity Level Patient will improve word retrieval skills --    Expressive Optimal Participation in Care- SLP, Date Goal Reviewed --  06/14/17   Expressive Optimal Participation in Care- SLP, Outcome --  goal ongoing       Goal: Cognitive-linguistic-Patient will improve Cognitive-linguistic skills to allow optimal participation in care  Outcome: Ongoing (interventions implemented as appropriate)    06/13/17 1506 06/14/17 1434   Cognitive Linguistic- Optimal Participation in Care   Cognitive Linguistic Optimal Participation in Care- SLP, Date Established 06/13/17 --    Cognitive Linguistic Optimal Participation in Care- SLP, Time to Achieve by discharge --    Cognitive Linguistic Optimal Participation in Care- SLP, Activity Level Patient will improve orientation for increased safety in environment;Patient will improve memory skills for increased safety in environment --    Cognitive Linguistic Optimal Participation in Care- SLP, Date Goal Reviewed --  06/14/17   Cognitive Linguistic Optimal Participation in Care- SLP, Outcome --  goal ongoing       Goal: Receptive Language-Patient will improve receptive language skills to allow optimal participation in care  Outcome: Ongoing (interventions implemented as appropriate)    06/13/17 1506 06/14/17 1434   Receptive Language- Optimal Participation in Care   Receptive Language Optimal Participation in Care- SLP, Date Established 06/13/17 --    Receptive Language Optimal Participation in Care- SLP, Time to Achieve by discharge --    Receptive Language Optimal Participation in Care- SLP, Activity Level Patient will improve ability to follow directions;Patient will improve ability to comprehend questions --    Receptive Language Optimal Participation in Care- SLP, Date Goal Reviewed --  06/14/17   Receptive Language Optimal Participation in Care- SLP, Outcome --  goal ongoing

## 2017-06-14 NOTE — THERAPY TREATMENT NOTE
Inpatient Rehabilitation - Physical Therapy Treatment Note  [facility]     Patient Name: Kierra Scales  : 1929  MRN: [id number]  Today's Date: 2017  Onset of Illness/Injury or Date of Surgery Date: 17  Date of Referral to PT: 17  Referring Physician: Dr. Lucia    Admit Date: 2017    Visit Dx:    ICD-10-CM ICD-9-CM   1. Oral phase dysphagia R13.11 787.21   2. Impaired mobility and ADLs Z74.09 799.89   3. Muscle weakness (generalized) M62.81 728.87   4. Abnormality of gait and mobility R26.9 781.2   5. 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)   • Hypothyroidism   • Dyspnea   • Hyperthyroidism   • Gastrointestinal hemorrhage with melena   • Type 2 diabetes mellitus with hyperglycemia   • Colon cancer   • Chronic blood loss anemia   • Physical deconditioning   • Senile dementia, uncomplicated   • Encounter for rehabilitation   • Adenocarcinoma of sigmoid colon   • Hypokalemia               Adult Rehabilitation Note       17 1454 17 1205 17 1106    Rehab Assessment/Intervention    Discipline physical therapy assistant  -RW speech language pathologist  -CK physical therapy assistant  -RW    Document Type therapy note (daily note)  -RW therapy note (daily note)  -CK therapy note (daily note)  -RW    Subjective Information agree to therapy  -RW agree to therapy  -CK agree to therapy  -RW    Patient Effort, Rehab Treatment fair  -RW adequate  -CK adequate  -RW    Patient Response to Treatment c/o back hurting and tired  -RW  needed alot of encouragement and daughter assisted  -RW    Recorded by [RW] Danny Beebe PTA [CK] Michelle Whitten, MS CCC-SLP [RW] Danny PARRA  ROYAL Beebe    Pain Assessment    Pain Assessment  No/denies pain  -CK     Jason-Batista FACES Pain Rating --   back hurts  -RW      Pain Score  0  -CK     Post Pain Score  0  -CK unable to assess  -RW    Pain Type   Chronic pain  -RW    Pain Location Back  -RW  Back  -RW    Pain Orientation Left;Lower  -RW  Left;Lower  -RW    Pain Intervention(s) Rest  -RW      Recorded by [RW] Danny Beebe PTA [CK] Michelle Whitten MS CCC-SLP [RW] Danny Beebe PTA    Vision Assessment/Intervention    Visual Impairment WFL with corrective lenses  -RW  WFL with corrective lenses  -RW    Recorded by [RW] Danny Beebe PTA  [RW] Danny Beebe PTA    Cognitive Assessment/Intervention    Current Cognitive/Communication Assessment impaired  -RW  impaired  -RW    Orientation Status oriented to;person  -RW  oriented to;person;place;disoriented to;time;situation  -RW    Follows Commands/Answers Questions 50% of the time;needs cueing  -RW      Recorded by [RW] Danny Beebe PTA  [RW] Danny Beebe PTA    Dysphagia Treatment Objectives and Progress    Dysphagia Treatment Objectives  Other 1  -CK     Recorded by  [CK] Michelle Whitten MS CCC-SLP     Dysphagia Other 1    Dysphagia Other 1 Objective  Pt will safely tolerate recommended diet w/no overt s/s of aspiration.  -CK     Status: Dysphagia Other 1  Progressing as expected  -CK     Dysphagia Other 1 Progress  90%;continue to address  -CK     Comments: Dysphagia Other 1  Pt safely tolerated regular solids/thin liquids.  Pt did require initiation to cut and eat meat.  -CK     Recorded by  [CK] Michelle Whitten MS CCC-SLP     Bed Mobility, Assessment/Treatment    Bed Mobility, Assistive Device   bed rails;head of bed elevated  -RW    Bed Mob, Supine to Sit, [place]   supervision required  -RW    Recorded by   [RW] Danny Beebe PTA    Transfer Assessment/Treatment    Transfers, Sit-Stand [place] contact guard assist;verbal cues required;minimum assist (75% patient  effort)  -RW  contact guard assist;verbal cues required  -RW    Transfers, Stand-Sit [place] contact guard assist;verbal cues required  -RW  contact guard assist;verbal cues required  -RW    Transfers, Sit-Stand-Sit, Assist Device rolling walker  -RW  rolling walker  -RW    Toilet Transfer, [place]   contact guard assist;minimum assist (75% patient effort)   daughter assist  -RW    Toilet Transfer, Assistive Device   rolling walker  -RW    Recorded by [RW] Danny Beebe PTA  [RW] Danny Beebe PTA    Gait Assessment/Treatment    Gait, [place] Level contact guard assist;verbal cues required  -RW  contact guard assist;verbal cues required  -RW    Gait, Assistive Device rolling walker  -RW  rolling walker  -RW    Gait, Distance (Feet) 200  -RW  150  -RW    Gait, Impairments impaired balance;motor control impaired  -RW  impaired balance  -RW    Gait, Comment needed alot encouragement  -RW      Recorded by [RW] Danny Beebe PTA  [RW] Danny Beebe PTA    Stairs Assessment/Treatment    Number of Stairs curb  -RW      Stairs, [place] Level minimum assist (75% patient effort)  -RW      Stairs, Assistive Device walker  -RW      Recorded by [RW] Danny Beebe PTA      Lower Body Dressing Assessment/Training    LB Dressing Assess/Train, Clothing Type donning:;shoes  -RW  donning:;shoes  -RW    LB Dressing Assess/Train, Position   sitting  -RW    LB Dressing Assess/Train, [place]   minimum assist (75% patient effort);set up required  -RW    Recorded by [RW] Danny Beebe PTA  [RW] Danny Beebe PTA    Balance Skills Training    Standing-Balance Activities --   static stance at windows searching for daughter outside.  -RW      Recorded by [RW] Danny Beebe PTA      Therapy Exercises    Bilateral Lower Extremities AROM:;ankle pumps/circles;10 reps;sitting;hip flexion   2 sets in part  -RW  AROM:;20 reps;supine;ankle pumps/circles;heel slides;hip abduction/adduction;SAQ   2 sets in part   -RW    Recorded by [RW] Danny Beebe PTA  [RW] Danny Beebe PTA    Positioning and Restraints    Pre-Treatment Position sitting in chair/recliner  -RW  in bed  -RW    Post Treatment Position wheelchair  -RW  wheelchair  -RW    In Bed patient within staff view  -RW  call light within reach  -RW    Recorded by [RW] Danny Beebe PTA  [RW] Danny Beebe PTA      06/14/17 0905 06/14/17 0714       Rehab Assessment/Intervention    Discipline occupational therapy assistant  -RC speech language pathologist  -CK     Document Type therapy note (daily note)  -RC therapy note (daily note)  -CK     Subjective Information agree to therapy  -RC agree to therapy  -CK     Patient Effort, Rehab Treatment good  -RC good  -CK     Recorded by [RC] ROHAN Gomez/POONAM [CK] Michelle Whitten MS CCC-SLP     Pain Assessment    Pain Assessment  0-10  -CK     Pain Score  2  -CK     Post Pain Score unable to assess  -RC 2  -CK     Pain Type Chronic pain  -RC Chronic pain  -CK     Pain Location Back  -RC Back  -CK     Pain Orientation Lower  -RC Lower  -CK     Pain Intervention(s) Rest  -RC Repositioned  -CK     Recorded by [RC] ROHAN Gomez/POONAM [CK] Michelle Whitten MS CCC-SLP     Cognitive Assessment/Intervention    Current Cognitive/Communication Assessment impaired  -RC      Orientation Status person  -RC      Recorded by [RC] ROHAN Gomez/POONAM      Communication Treatment Objective and Progress    Receptive Language Treatment Objectives  Improve ability to follow directions;Improve ability to comprehend questions  -CK     Expressive Treatment Objectives  Improve word retrieval skills  -CK     Cognitive Linguistic Treatment Objectives  Improve orientation;Improve memory skills  -CK     Recorded by  [CK] Michelle Whitten MS CCC-SLP     Improve ability to follow directions    Improve ability to follow directions:  one-step directions without objects;two-step commands;90%  -CK     Status: Improve ability to  follow directions  Progressing as expected  -CK     Ability to Follow Directions Progress  100%;20%  -CK     Recorded by  [CK] Michelle Whitten MS CCC-SLP     Improve ability to comprehend questions    Improve ability to comprehend questions:  simple yes/no questions;simple general questions;90%  -CK     Status: Improve ability to comprehend questions  Progressing as expected  -CK     Ability to Comprehend Questions Progress  90%;50%  -CK     Comments: Improve ability to comprehend questions  90% w/basic yes/no; 50% w/comparative yes/no questions  -CK     Recorded by  [CK] Michelle Whitten MS CCC-SLP     Improve word retrieval skills    Improve word retrieval skills by:  naming an object/pic;answer WH question with one word;completing a divergent task;completing a convergent task;90%  -CK     Status: Improve word retrieval skills  Progressing as expected  -CK     Word Retrieval Skills Progress  50%;with inconsistent cues  -CK     Recorded by  [CK] Michelle Whitten MS CCC-SLP     Improve orientation    Improve orientation through:  demonstrating orientation to day;demonstrating orientation to year;demonstrating orientation to place;80%  -CK     Status: Improve orientation through  Progressing as expected  -CK     Orientation Progress  40%  -CK     Recorded by  [CK] Michelle Whitten MS CCC-SLP     Improve memory skills    Improve memory skills through:  recalling related word lists immediately;recall details of the day;80%  -CK     Status: Improve memory skills  Progressing as expected  -CK     Memory Skills Progress  60%  -CK     Recorded by  [CK] MS LILIAN ChoSLP     Dysphagia Treatment Objectives and Progress    Dysphagia Treatment Objectives  Other 1  -CK     Recorded by  [CK] MS OSIRIS Cho     Dysphagia Other 1    Dysphagia Other 1 Objective  Pt will safely tolerate recommended diet w/no overt s/s of aspiration.  -CK     Status: Dysphagia Other 1  Progressing as expected  -CK      Dysphagia Other 1 Progress  90%;continue to address  -CK     Comments: Dysphagia Other 1  Pt safely tolerated [facility] soft solids/thin liquids via straw.  Pt also safely tolerated regular solid trials w/no overt s/s of aspiration  -CK     Recorded by  [CK] Michelle Whitten MS CCC-SLP     Bed Mobility, Assessment/Treatment    Bed Mob, Sit to Supine, [place] supervision required  -RC      Recorded by [RC] ROHAN Gomez/L      Transfer Assessment/Treatment    Transfers, Chair-Bed [place] contact guard assist  -RC      Transfers, Bed-Chair-Bed, Assist Device rolling walker  -RC      Toilet Transfer, [place] contact guard assist;verbal cues required  -RC      Toilet Transfer, Assistive Device rolling walker  -RC      Recorded by [RC] ROHAN Gomez/POONAM      Functional Mobility    Functional Mobility- Ind. Level contact guard assist  -RC      Functional Mobility- Device rolling walker  -RC      Functional Mobility-Distance (Feet) 15  -RC      Recorded by [RC] ROHAN Gomez/L      Upper Body Bathing Assessment/Training    UB Bathing Assess/Train, Position sitting;sink side  -RC      UB Bathing Assess/Train, [place] Level supervision required;verbal cues required  -RC      Recorded by [RC] ROHAN Gomez/L      Lower Body Bathing Assessment/Training    LB Bathing Assess/Train, Position sitting;standing  -RC      LB Bathing Assess/Train, [place] Level contact guard assist  -RC      Recorded by [RC] ROHAN Gomez/L      Upper Body Dressing Assessment/Training    UB Dressing Assess/Train, Clothing Type doffing:;donning:;pull over  -RC      UB Dressing Assess/Train, Position sitting  -RC      UB Dressing Assess/Train, [place] minimum assist (75% patient effort)  -RC      Recorded by [RC] ADRIANNA GomezA/L      Lower Body Dressing Assessment/Training    LB Dressing Assess/Train, Clothing Type doffing:;donning:;pants;shorts;shoes  -RC      LB Dressing  Assess/Train, Position sitting;standing  -RC      LB Dressing Assess/Train, [place] minimum assist (75% patient effort)  -RC      Recorded by [RC] ROHAN Gomez/L      Toileting Assessment/Training    Toileting Assess/Train, Position sitting;standing  -RC      Toileting Assess/Train, [place] Level minimum assist (75% patient effort);verbal cues required  -RC      Recorded by [RC] ADRIANNA GomezA/L      Grooming Assessment/Training    Grooming Assess/Train, Position sitting  -RC      Grooming Assess/Train, [place]ep[place] Level supervision required;verbal cues required  -RC      Recorded by [RC] ROHAN Gomez/L      Therapy Exercises    Bilateral Upper Extremity --   ue bike 10 min  -RC      Recorded by [RC] ROHAN Gomez/L      Positioning and Restraints    Pre-Treatment Position sitting in chair/recliner  -RC      Post Treatment Position bed  -RC      In Bed exit alarm on;encouraged to call for assist;call light within reach  -RC      Recorded by [RC] ROHAN Gomez/L        User Key  (r) = Recorded By, (t) = Taken By, (c) = Cosigned By    Initials Name Effective Dates    CK Michelle Whitten, MS CCC-SLP 10/17/16 -     RW Danny Beebe, PTA 10/17/16 -     RC ROHAN Gomez/POONAM 10/17/16 -                 IP PT Goals       06/14/17 1608 06/13/17 1036 06/11/17 1403    Bed Mobility PT LTG    Bed Mobility PT LTG, Date Established  06/13/17  -LM     Bed Mobility PT LTG, Time to Achieve  by discharge  -LM     Bed Mobility PT LTG, Activity Type  supine to sit/sit to supine  -LM     Bed Mobility PT LTG, [place] Level  supervision required  -LM     Bed Mobility PT LTG, Additional Goal  HOB flat; No BR's  -LM     Bed Mobility PT LTG, Date Goal Reviewed 06/14/17  -RW  06/11/17  -TW    Bed Mobility PT LTG, Outcome goal ongoing  -RW goal ongoing  -LM goal ongoing  -TW    Transfer Training PT LTG    Transfer Training PT LTG, Date Established  06/13/17  -LM     Transfer Training PT LTG,  Time to Achieve  by discharge  -LM     Transfer Training PT LTG, Activity Type  bed to chair /chair to bed  -LM     Transfer Training PT LTG, [place] Level  supervision required  -LM     Transfer Training PT LTG, Assist Device  --   AAD  -LM     Transfer Training PT  LTG, Date Goal Reviewed 06/14/17  -RW      Transfer Training PT LTG, Outcome goal ongoing  -RW goal ongoing  -LM     Gait Training PT STG    Gait Training Goal PT STG, Date Goal Reviewed   06/11/17  -TW    Gait Training Goal PT STG, Outcome   goal ongoing  -TW    Gait Training PT LTG    Gait Training Goal PT LTG, Date Established  06/13/17  -LM     Gait Training Goal PT LTG, Time to Achieve  by discharge  -LM     Gait Training Goal PT LTG, [place] Level  supervision required  -LM     Gait Training Goal PT LTG, Assist Device  --   AAD  -LM     Gait Training Goal PT LTG, Distance to Achieve  150 feet  -LM     Gait Training Goal PT LTG, Date Goal Reviewed 06/14/17  -RW      Gait Training Goal PT LTG, Outcome goal ongoing  -RW goal ongoing  -LM     Stair Training PT LTG    Stair Training Goal PT LTG, Date Established  06/13/17  -LM     Stair Training Goal PT LTG, Time to Achieve  by discharge  -LM     Stair Training Goal PT LTG, Number of Steps  4 steps  -LM     Stair Training Goal PT LTG, [place] Level  contact guard assist  -LM     Stair Training Goal PT LTG, Assist Device  2 handrails  -LM     Stair Training Goal PT LTG, Date Goal Reviewed 06/14/17  -RW  06/11/17  -TW    Stair Training Goal PT LTG, Outcome goal ongoing  -RW goal ongoing  -LM goal ongoing  -TW      06/08/17 1110 06/07/17 1228 06/06/17 1325    Bed Mobility PT LTG    Bed Mobility PT LTG, Date Goal Reviewed 06/08/17  -AM 06/07/17  -RW 06/06/17  -AM    Bed Mobility PT LTG, Outcome goal not met  -AM goal ongoing  -RW goal not met  -AM    Transfer Training PT LTG    Transfer Training PT  LTG, Date Goal Reviewed   06/06/17  -AM    Transfer Training PT LTG, Outcome   goal met  -AM     Gait Training PT STG    Gait Training Goal PT STG, Date Goal Reviewed 06/08/17  -AM 06/07/17  -RW 06/06/17  -AM    Gait Training Goal PT STG, Outcome goal not met  -AM goal ongoing  -RW goal not met  -AM    Stair Training PT LTG    Stair Training Goal PT LTG, Date Goal Reviewed 06/08/17  -AM 06/07/17  -RW 06/06/17  -AM    Stair Training Goal PT LTG, Outcome goal not met  -AM goal ongoing  -RW goal not met  -AM      06/05/17 1652          Bed Mobility PT LTG    Bed Mobility PT LTG, Date Established 06/05/17  -GB      Bed Mobility PT LTG, Time to Achieve 5 - 7 days  -GB      Bed Mobility PT LTG, Activity Type all bed mobility  -GB      Bed Mobility PT LTG, [place] Level conditional independence  -GB      Bed Mobility PT LTG, Outcome goal not met  -GB      Transfer Training PT LTG    Transfer Training PT LTG, Date Established 06/05/17  -GB      Transfer Training PT LTG, Time to Achieve 5 - 7 days  -GB      Transfer Training PT LTG, Activity Type all transfers  -GB      Transfer Training PT LTG, [place] Level contact guard assist  -GB      Transfer Training PT LTG, Assist Device walker, rolling  -GB      Transfer Training PT LTG, Outcome goal not met  -GB      Gait Training PT STG    Gait Training Goal PT STG, Date Established 06/05/17  -GB      Gait Training Goal PT STG, Time to Achieve 2 wks  -GB      Gait Training Goal PT STG, [place] Level conditional independence  -GB      Gait Training Goal PT STG, Assist Device walker, rolling  -GB      Gait Training Goal PT STG, Distance to Achieve 150 ft w/ RW and CGA x 1  -GB      Gait Training Goal PT STG, Additional Goal 300 ft w/ RW and SBA x 1   -GB      Gait Training Goal PT STG, Outcome goal not met  -GB      Stair Training PT LTG    Stair Training Goal PT LTG, Date Established 06/05/17  -GB      Stair Training Goal PT LTG, Time to Achieve by discharge  -GB      Stair Training Goal PT LTG, [place] Level conditional independence  -GB      Stair  Training Goal PT LTG, Assist Device walker, rolling  -GB      Stair Training Goal PT LTG, Distance to Achieve up/down 1 step  -GB      Stair Training Goal PT LTG, Outcome goal not met  -GB        User Key  (r) = Recorded By, (t) = Taken By, (c) = Cosigned By    Initials Name Provider Type    LM Megan Kruse, PT Physical Therapist    GB Cristin Matthews, PT Physical Therapist    AM Luciano Sanchez, PTA Physical Therapy Assistant     Franco Byers, PTA Physical Therapy Assistant    RW Danny Beebe, PTA Physical Therapy Assistant          Physical Therapy Education     Title: PT OT SLP Therapies (Active)     Topic: Physical Therapy (Active)     Point: Mobility training (Done)    Learning Progress Summary    Learner Readiness Method Response Comment Documented by Status   Patient Acceptance E VU,NR reviewed benifits of oob and mobility with assistance.  06/14/17 1252 Done    Acceptance E NR Reviewed safety with transfers - however, pt unable to retain.  Pt's family will require education.  06/13/17 1436 Active               Point: Precautions (Done)    Learning Progress Summary    Learner Readiness Method Response Comment Documented by Status   Patient Acceptance E VU,NR reviewed benifits of oob and mobility with assistance.  06/14/17 1252 Done    Acceptance E NR Reviewed safety with transfers - however, pt unable to retain.  Pt's family will require education.  06/13/17 1436 Active                      User Key     Initials Effective Dates Name Provider Type Discipline     06/15/16 -  Megan Kruse, PT Physical Therapist PT     10/17/16 -  Danny Beebe, PTA Physical Therapy Assistant PT                    PT Recommendation and Plan  Anticipated Equipment Needs At Discharge: front wheeled walker  Anticipated Discharge Disposition: home with 24/7 care, home with home health  Planned Therapy Interventions: balance training, bed mobility training, gait training, home exercise program, manual  therapy techniques, motor coordination training, neuromuscular re-education, patient/family education, postural re-education, ROM (Range of Motion), stair training, strengthening, stretching, transfer training, wheelchair management/propulsion training  PT Frequency: other (see comments) (5-14 times/wk)  Plan of Care Review  Plan Of Care Reviewed With: patient  Progress: progress toward functional goals is gradual  Outcome Summary/Follow up Plan: pt still needs alot of encouragement to participate and daughters help with this. gt and steps          Outcome Measures       06/14/17 1200 06/14/17 0905 06/13/17 1036    How much help from another person do you currently need...    Turning from your back to your side while in flat bed without using bedrails? 3  -RW  3  -LM    Moving from lying on back to sitting on the side of a flat bed without bedrails? 3  -RW  3  -LM    Moving to and from a bed to a chair (including a wheelchair)? 3  -RW  3  -LM    Standing up from a chair using your arms (e.g., wheelchair, bedside chair)? 3  -RW  3  -LM    Climbing 3-5 steps with a railing? 3  -RW  3  -LM    To walk in hospital room? 3  -RW  3  -LM    AM-PAC 6 Clicks Score 18  -RW  18  -LM    How much help from another is currently needed...    Putting on and taking off regular lower body clothing?  3  -RC     Bathing (including washing, rinsing, and drying)  3  -RC     Toileting (which includes using toilet bed pan or urinal)  3  -RC     Putting on and taking off regular upper body clothing  3  -RC     Taking care of personal grooming (such as brushing teeth)  3  -RC     Eating meals  3  -RC     Score  18  -RC     Functional Assessment    Outcome Measure Options   AM-PAC 6 Clicks Basic Mobility (PT)  -LM      06/13/17 7479          How much help from another is currently needed...    Putting on and taking off regular lower body clothing? 2  -BH      Bathing (including washing, rinsing, and drying) 2  -BH      Toileting (which  includes using toilet bed pan or urinal) 3  -BH      Putting on and taking off regular upper body clothing 3  -BH      Taking care of personal grooming (such as brushing teeth) 3  -BH      Eating meals 3  -      Score 16  -      Functional Assessment    Outcome Measure Options AM-PAC 6 Clicks Daily Activity (OT)  -        User Key  (r) = Recorded By, (t) = Taken By, (c) = Cosigned By    Initials Name Provider Type    MADDY Kruse, PT Physical Therapist     Rebeka Carrillo, OTR/L Occupational Therapist    RESHMA Beebe PTA Physical Therapy Assistant    TAHIR Kwong, MADRIGAL/L Occupational Therapy Assistant           Time Calculation:         PT Charges       06/14/17 1612 06/14/17 1253       Time Calculation    Start Time 1454  -RW 1106  -RW     Stop Time 1534  -RW 1156  -RW     Time Calculation (min) 40 min  -RW 50 min  -RW     Time Calculation- PT    Total Timed Code Minutes- PT 40 minute(s)  -RW 50 minute(s)  -RW       User Key  (r) = Recorded By, (t) = Taken By, (c) = Cosigned By    Initials Name Provider Type    RESHMA Beebe PTA Physical Therapy Assistant          Therapy Charges for Today     Code Description Service Date Service Provider Modifiers Qty    65725006711 HC GAIT TRAINING EA 15 MIN 6/14/2017 Danny Beebe PTA GP 1    95023419176 HC PT THER PROC EA 15 MIN 6/14/2017 Danny Beebe PTA GP 1    83232052280 HC PT THERAPEUTIC ACT EA 15 MIN 6/14/2017 Danny Beebe PTA GP 1    21893674384 HC GAIT TRAINING EA 15 MIN 6/14/2017 Danny Beebe PTA GP 1    80136402221 HC PT THERAPEUTIC ACT EA 15 MIN 6/14/2017 Danny Beebe PTA GP 1    64201387779 HC PT THER PROC EA 15 MIN 6/14/2017 Danny Beebe PTA GP 1          PT G-Codes  PT Professional Judgement Used?: Yes  Outcome Measure Options: AM-PAC 6 Clicks Basic Mobility (PT)  Score: 18  Functional Limitation: Mobility: Walking and moving around  Mobility: Walking and Moving Around Current Status (): At least 40 percent but  less than 60 percent impaired, limited or restricted  Mobility: Walking and Moving Around Goal Status (): At least 20 percent but less than 40 percent impaired, limited or restricted    Danny Beebe, PTA  6/14/2017

## 2017-06-14 NOTE — CONSULTS
Adult Nutrition  Assessment    Patient Name:  Kierra Scales  YOB: 1929  MRN: [id number]  Admit Date:  6/12/2017    Assessment Date:  6/14/2017              Nutrition/Diet History       06/14/17 1548    Nutrition/Diet History    Patient Reported Diet/Restrictions/Preferences diabetic                Labs/Tests/Procedures/Meds       06/14/17 1549    Labs/Tests/Procedures/Meds    Labs/Tests Review Reviewed;K+;Glucose;Hgb A1C;Alb;BUN;Hgb Hct    Medication Review Reviewed, pertinent    Significant Vitals reviewed                Nutrition Prescription Ordered       06/14/17 1550    Nutrition Prescription PO    Current PO Diet Regular    Supplement Glucerna Shake    Supplement Frequency 2 times a day              Comments:  Nursing says pt has dementia and this is why she answers as she does. Pt didn't answer RDN's questions, but indicated she drinks the glucerna and likes it. Diet is regular . Will speak with md re this. Glucose is variable. Albumin , bun and h/h are below normal limits.pt denies weight loss but has had a decreased appetite. Potassium is below normal limits as well. Will continue glucerna with meals bid and continue to monitor and encourage fair to excellent intake to improve.        Electronically signed by:  Megan Pompa RD  06/14/17 3:53 PM

## 2017-06-14 NOTE — PLAN OF CARE
Problem: Patient Care Overview (Adult)  Goal: Plan of Care Review  Outcome: Ongoing (interventions implemented as appropriate)    06/14/17 0215   Coping/Psychosocial Response Interventions   Plan Of Care Reviewed With patient   Patient Care Overview   Progress no change   Outcome Evaluation   Outcome Summary/Follow up Plan pt rested well during night systolic bp in 90's will continue to monitor          Problem: Functional Deficit (Adult,Obstetrics,Pediatric)  Goal: Signs and Symptoms of Listed Potential Problems Will be Absent or Manageable (Functional Deficit)  Outcome: Ongoing (interventions implemented as appropriate)    Problem: Fall Risk (Adult)  Goal: Absence of Falls  Outcome: Ongoing (interventions implemented as appropriate)    Problem: Infection, Risk/Actual (Adult)  Goal: Infection Prevention/Resolution  Outcome: Ongoing (interventions implemented as appropriate)

## 2017-06-14 NOTE — CONSULTS
[facility]  [address] [place] [address]. [id number]  T - [phone]  F  [phone]     Consultation         PATIENT  DEMOGRAPHICS   PATIENT NAME: Kierra Scales                      PHYSICIAN: Kamron Strong MD  : 1929  MRN: [id number]    Subjective   SUBJECTIVE   Referring Provider: Dr Lucia  Reason for Consultation: KOLE  History of present illness:      Ms. Scales is a 88-year-old female who has history of dementia diabetes mellitus type 2 came in with the GI bleed.  Patient underwent colonoscopy and found to have a large mass in the sigmoid colon.  She therefore underwent colonic resection.  pathology turned out to be colonic adenocarcinoma moderately differentiated with free margins. Patient is now transferred to acute rehabilitation for physical therapy.    We have been asked to evaluate for worsening creatinine.  Her baseline creatinine is less than 1 and today it is 1.45.  Her Vasotec is already been discontinued.  She was started on Tradjenta yesterday and that has also been stopped.  She is not on any potential nephrotoxins.  Her blood pressures though borderline low and yesterday it was 88/52.  History is fairly Limited due to her dementia.  She denies any problem with urination.  She denies any shortness of air.    Past Medical History:   Diagnosis Date   • Anxiety    • Arthritis    • Bradycardia    • Dyslipidemia    • Dyspnea    • Fibrocystic disease of breast    • Hashimoto's thyroiditis    • Hypertensive disorder    • Hypothyroidism    • Pain    • Palpitations    • Paroxysmal tachycardia    • Shortness of breath    • Tricuspid valve disorders, specified as nonrheumatic    • Vitamin D deficiency      Past Surgical History:   Procedure Laterality Date   • COLON RESECTION N/A 2017    Procedure: COLON RESECTION;  Surgeon: Stefano Linn MD;  Location: [facility];  Service:    • COLONOSCOPY N/A 2017    Procedure: Flexible Sigmoidoscopy;  Surgeon:  "Stefano Linn MD;  Location: [facility] ENDOSCOPY;  Service:    • EXTERNAL EAR SURGERY     • EYE SURGERY     • LIPOMA EXCISION     • PACEMAKER IMPLANTATION     • SALIVARY GLAND SURGERY     • TUBAL ABDOMINAL LIGATION       Family History   Problem Relation Age of Onset   • Diabetes Other    • Heart disease Other    • Hypertension Other    • Thyroid disease Other      Social History   Substance Use Topics   • Smoking status: Never Smoker   • Smokeless tobacco: Never Used   • Alcohol use No     Allergies:  Penicillins     REVIEW OF SYSTEMS    Review of Systems   Unable to perform ROS: Dementia       Objective   OBJECTIVE   Vital Signs  Temp:  [95.5 °F (35.3 °C)-97.3 °F (36.3 °C)] 97.3 °F (36.3 °C)  Heart Rate:  [62-63] 63  Resp:  [18-20] 20  BP: ()/(52-58) 106/58    Flowsheet Rows         First Filed Value    Admission Height  72\" (182.9 cm) Documented at 06/12/2017 1322    Admission Weight  171 lb 6.4 oz (77.7 kg) [please use standing scale fo next weigh in.] Documented at 06/12/2017 1322           I/O last 3 completed shifts:  In: 680 [P.O.:680]  Out: -     PHYSICAL EXAM    Physical Exam   Constitutional: She appears well-developed.   HENT:   Head: Normocephalic.   Eyes: Pupils are equal, round, and reactive to light.   Cardiovascular: Normal rate, regular rhythm and normal heart sounds.    Pulmonary/Chest: Effort normal and breath sounds normal.   Abdominal: Soft. Bowel sounds are normal.   Musculoskeletal: She exhibits no edema.   Neurological: She is alert.       RESULTS   Results Review:      Results from last 7 days  Lab Units 06/14/17  0501 06/13/17  0533 06/09/17  1132  06/08/17  0610   SODIUM mmol/L 135* 137 133*  --  132*   POTASSIUM mmol/L 3.5 3.1* 3.8  3.8  < > 3.2*   CHLORIDE mmol/L 97 97 94*  --  96   TOTAL CO2 mmol/L 28.0 28.0 25.0  --  26.0   BUN mg/dL 27* 18 6*  --  5*   CREATININE mg/dL 1.40* 0.97 0.54  --  0.61   CALCIUM mg/dL 8.9 8.7 8.9  --  8.3*   BILIRUBIN mg/dL  --  0.3 0.5  --  " 0.4   ALK PHOS U/L  --  50 63  --  49   ALT (SGPT) U/L  --  33 29  --  26   AST (SGOT) U/L  --  16 30  --  19   GLUCOSE mg/dL 88 134* 168*  --  159*   < > = values in this interval not displayed.    Estimated Creatinine Clearance: 32.1 mL/min (by C-G formula based on Cr of 1.4).      Results from last 7 days  Lab Units 06/14/17  0501 06/13/17  0533   MAGNESIUM mg/dL 2.0 1.9   PHOSPHORUS mg/dL 4.1  --                Results from last 7 days  Lab Units 06/14/17  0501 06/13/17  0533 06/11/17  0435 06/09/17  1132 06/08/17  0610   WBC 10*3/mm3 6.47 5.46 5.60 6.26 4.85   HEMOGLOBIN g/dL 8.8* 8.5* 9.6* 9.9* 8.0*   PLATELETS 10*3/mm3 319 299 300 323 238              MEDICATIONS      diltiaZEM  mg Oral Daily   docusate sodium 100 mg Oral BID   donepezil 10 mg Oral Nightly   ferrous sulfate 324 mg Oral BID With Meals   folic acid-vit B6-vit B12 1 tablet Oral Daily   insulin aspart 0-14 Units Subcutaneous 4x Daily AC & at Bedtime   insulin detemir 15 Units Subcutaneous Nightly   magnesium oxide 400 mg Oral Daily   memantine 5 mg Oral BID   methIMAzole 2.5 mg Oral Daily   potassium chloride 10 mEq Oral BID With Meals   sotalol 80 mg Oral Q12H        Prescriptions Prior to Admission   Medication Sig Dispense Refill Last Dose   • Alcohol Swabs (B-D SINGLE USE SWABS REGULAR) pads USE AS DIRECTED 200 each 3    • diltiaZEM CD (CARDIZEM CD) 180 MG 24 hr capsule Take 180 mg by mouth Daily.   5/26/2017 at 0900   • donepezil (ARICEPT) 10 MG tablet Take 1 tablet by mouth Daily.  3 5/26/2017 at 0900   • NOVOLOG FLEXPEN 100 UNIT/ML solution pen-injector sc pen INJECT 4 UNITS SUBCUTANEOUSLY WITH SUPPER. MAY INCREASE UP TO 8 UNITS AS DIRECTED 15 mL 3 5/25/2017 at 1900   • pravastatin (PRAVACHOL) 40 MG tablet Take 1 tablet by mouth Every Night. 30 tablet 11 5/25/2017 at 2100   • SITagliptin (JANUVIA) 100 MG tablet Take 1 tablet by mouth Daily. 30 tablet 11 5/26/2017 at 0900   • sotalol (BETAPACE) 80 MG tablet Take 80 mg by mouth 2 (Two)  "Times a Day.   5/26/2017 at 0900   • vitamin D (ERGOCALCIFEROL) 05102 UNITS capsule capsule Take 1 capsule by mouth Every 14 (Fourteen) Days. 30 capsule 11    • enalapril (VASOTEC) 20 MG tablet Take 20 mg by mouth 2 (two) times a day.   5/26/2017 at 0900   • ezetimibe (ZETIA) 10 MG tablet Take 1 tablet by mouth Daily. 30 tablet 11 5/26/2017 at 0900   • Insulin Glargine (LANTUS SOLOSTAR) 100 UNIT/ML injection pen Inject 20 Units under the skin every night.   5/25/2017 at 2100   • Insulin Pen Needle (COMFORT EZ PEN NEEDLES) 31G X 6 MM misc 2 (two) times a day. Inject two times per day.   Taking   • Insulin Syringe-Needle U-100 31G X 5/16\" 1 ML misc 2 (two) times a day. Use as directed TWICE DAILY FOR Insulin Injections   Taking   • Lancets (UNILET COMFORTOUCH LANCET) misc USE AS DIRECTED FOR FINGERSTICKS 100 each 3    • memantine (NAMENDA) 5 MG tablet Take 1 tablet by mouth 2 (Two) Times a Day.  3 5/26/2017 at 0900   • methIMAzole (TAPAZOLE) 5 MG tablet Take half tablet q day (Patient taking differently: Take 2.5 mg by mouth Daily. Take half tablet q day) 30 tablet 5 5/26/2017 at 0900   • nabumetone (RELAFEN) 500 MG tablet Take 500 mg by mouth daily.   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  Patient baseline creatinine is less than 1.  This could be related to hypotension leading to hypoperfusion.  Patient Vasotec has already been stopped.  I will give gentle IV fluid to raised her blood pressure.  I'll also lower the Cardizem and Betapace, due to borderline low blood pressure.  We'll " check urine sodium and urine creatinine.  If cr is not improving I will get ultrasound of the kidneys.    2.diabetes mellitus type 2 currently on insulin.  tradjenta on hold    3.history of hypothyroidism    4.hypokalemia currently stable on potassium supplement    5. Colon cancer s/p resection    Thank you Dr. Lucia for the referral will continue follow the patient in the hospital stay.         I discussed the patients findings and my recommendations with patient and nursing staff         This document has been electronically signed by Kamron Strong MD on June 14, 2017 12:57 PM

## 2017-06-14 NOTE — PROGRESS NOTES
LOS: 2 days   Patient Care Team:  Melissa Ch MD as PCP - General  Lindsay Ocampo MA as Medical Assistant    Chief Complaint: Deconditioning secondary to left hemicolectomy for adenocarcinoma of the sigmoid colon        Interval History:   Subjective   slept fairly well feels good today  Working with therapy      Review of Systems no shortness breath no chest pain no nausea or vomiting      History taken from: patient chart RN     has a past medical history of Anxiety; Arthritis; Bradycardia; Dyslipidemia; Dyspnea; Fibrocystic disease of breast; Hashimoto's thyroiditis; Hypertensive disorder; Hypothyroidism; Pain; Palpitations; Paroxysmal tachycardia; Shortness of breath; Tricuspid valve disorders, specified as nonrheumatic; and Vitamin D deficiency.  Social History     Social History   • Marital status:      Spouse name: N/A   • Number of children: N/A   • Years of education: N/A     Occupational History   • Not on file.     Social History Main Topics   • Smoking status: Never Smoker   • Smokeless tobacco: Never Used   • Alcohol use No   • Drug use: Not on file   • Sexual activity: Defer     Other Topics Concern   • Not on file     Social History Narrative    Lives alone but daughters expect to stay with her at discharge     Past Surgical History:   Procedure Laterality Date   • COLON RESECTION N/A 6/2/2017    Procedure: COLON RESECTION;  Surgeon: Stefano Linn MD;  Location: [facility] OR;  Service:    • COLONOSCOPY N/A 5/30/2017    Procedure: Flexible Sigmoidoscopy;  Surgeon: Stefano Linn MD;  Location: [facility] ENDOSCOPY;  Service:    • EXTERNAL EAR SURGERY     • EYE SURGERY     • LIPOMA EXCISION     • PACEMAKER IMPLANTATION     • SALIVARY GLAND SURGERY     • TUBAL ABDOMINAL LIGATION       Family History   Problem Relation Age of Onset   • Diabetes Other    • Heart disease Other    • Hypertension Other    • Thyroid disease Other      Allergies   Allergen Reactions   •  Penicillins        Objective     Vital Signs  Temp:  [95.5 °F (35.3 °C)-97.3 °F (36.3 °C)] 97.3 °F (36.3 °C)  Heart Rate:  [62-63] 63  Resp:  [18-20] 20  BP: ()/(52-58) 106/58  Last 3 weights    06/12/17  1322 06/13/17  0558 06/14/17  0300   Weight: 171 lb 6.4 oz (77.7 kg) (please use standing scale fo next weigh in.) 166 lb 4 oz (75.4 kg) 166 lb (75.3 kg)       Physical Exam:     General Appearance:    Alert, cooperative, in no acute distress   Head:    Normocephalic, without obvious abnormality, atraumatic   Eyes:            Lids and lashes normal, conjunctivae and sclerae normal, no   icterus, no pallor, corneas clear, PERRLA   Throat:   No oral lesions, no thrush, oral mucosa moist   Neck:   No adenopathy, supple, trachea midline, no thyromegaly, no   carotid bruit, no JVD       Lungs:     Clear to auscultation,respirations regular, even and                  Unlabored     Heart:    Regular rhythm and normal rate, normal S1 and S2, no            murmur, no gallop, no rub, no click    Chest Wall:    No abnormalities observed   Abdomen:     Normal bowel sounds, no masses, no organomegaly, soft        non-tender, non-distended, no guarding, no rebound                Tenderness Incision healing    Extremities:   Moves all extremities well, no edema, no cyanosis, no             Redness        Skin:   No bleeding, bruising or rash   Lymph nodes:   No palpable adenopathy   Neurologic:   Cranial nerves 2 - 12 grossly intact, sensation intact, DTR       present and equal bilaterally                                                  Results Review:   Lab Results (last 24 hours)     Procedure Component Value Units Date/Time    POC Glucose Fingerstick [[id number]]  (Abnormal) Collected:  06/13/17 1041    Specimen:  Blood Updated:  06/13/17 1101     Glucose 208 (H) mg/dL       Sliding Scale AdminMeter: [id number]: [id number] BRIANNA CRABTREE       POC Glucose Fingerstick [[id number]]  (Abnormal) Collected:  06/13/17  1610    Specimen:  Blood Updated:  06/13/17 1629     Glucose 249 (H) mg/dL       Sliding Scale AdminMeter: [id number]: [id number] BRIANNA CRABTREE       POC Glucose Fingerstick [[id number]]  (Abnormal) Collected:  06/13/17 2002    Specimen:  Blood Updated:  06/13/17 2032     Glucose 237 (H) mg/dL       Meter: [id number]: [id number] ESTRELLITA FLORENTIN       POC Glucose Fingerstick [[id number]]  (Normal) Collected:  06/14/17 0502    Specimen:  Blood Updated:  06/14/17 0514     Glucose 95 mg/dL       Meter: [id number]: [id number] ESTRELLITA LERMA       CBC & Differential [[id number]] Collected:  06/14/17 0501    Specimen:  Blood Updated:  06/14/17 0651    Narrative:       The following orders were created for panel order CBC & Differential.  Procedure                               Abnormality         Status                     ---------                               -----------         ------                     CBC Auto Differential[[id number]]        Abnormal            Final result                 Please view results for these tests on the individual orders.    CBC Auto Differential [[id number]]  (Abnormal) Collected:  06/14/17 0501    Specimen:  Blood Updated:  06/14/17 0651     WBC 6.47 10*3/mm3      RBC 2.96 (L) 10*6/mm3      Hemoglobin 8.8 (L) g/dL      Hematocrit 27.3 (L) %      MCV 92.2 fL      MCH 29.7 pg      MCHC 32.2 g/dL      RDW 14.1 %      RDW-SD 47.1 (H) fl      MPV 10.3 fL      Platelets 319 10*3/mm3      Neutrophil % 71.6 %      Lymphocyte % 15.0 %      Monocyte % 10.8 %      Eosinophil % 2.3 %      Basophil % 0.0 %      Immature Grans % 0.3 %      Neutrophils, Absolute 4.63 10*3/mm3      Lymphocytes, Absolute 0.97 10*3/mm3      Monocytes, Absolute 0.70 10*3/mm3      Eosinophils, Absolute 0.15 10*3/mm3      Basophils, Absolute 0.00 10*3/mm3      Immature Grans, Absolute 0.02 10*3/mm3     Renal Function Panel [[id number]]  (Abnormal) Collected:  06/14/17 0501    Specimen:  Blood Updated:   06/14/17 0715     Glucose 88 mg/dL      BUN 27 (H) mg/dL      Creatinine 1.40 (H) mg/dL      Sodium 135 (L) mmol/L      Potassium 3.5 mmol/L      Chloride 97 mmol/L      CO2 28.0 mmol/L      Calcium 8.9 mg/dL      Albumin 3.20 (L) g/dL      Phosphorus 4.1 mg/dL      Anion Gap 10.0 mmol/L      BUN/Creatinine Ratio 19.3     eGFR  African Amer 43 mL/min/1.73     Narrative:       The MDRD GFR formula is only valid for adults with stable renal function between ages 18 and 70.    Magnesium [[id number]]  (Normal) Collected:  06/14/17 0501    Specimen:  Blood Updated:  06/14/17 0715     Magnesium 2.0 mg/dL         Imaging Results (last 72 hours)     ** No results found for the last 72 hours. **            Current Facility-Administered Medications:   •  bisacodyl (DULCOLAX) suppository 10 mg, 10 mg, Rectal, Daily PRN, Bennie Lucia MD  •  calcium carbonate (TUMS) chewable tablet 500 mg (200 mg elemental), 2 tablet, Oral, BID PRN, Bennie Lucia MD  •  dextrose (GLUTOSE) oral gel 15 g, 15 g, Oral, Q15 Min PRN, Bennie Lucia MD  •  diltiaZEM CD (CARDIZEM CD) 24 hr capsule 180 mg, 180 mg, Oral, Daily, Bennie Lucia MD, 180 mg at 06/14/17 0803  •  docusate sodium (COLACE) capsule 100 mg, 100 mg, Oral, BID, Bennie Lucia MD, 100 mg at 06/14/17 0804  •  donepezil (ARICEPT) tablet 10 mg, 10 mg, Oral, Nightly, Bennie Lucia MD, 10 mg at 06/13/17 2004  •  ferrous sulfate EC tablet 324 mg, 324 mg, Oral, BID With Meals, Bennie Lucia MD, 324 mg at 06/14/17 0804  •  folic acid-vit B6-vit B12 (FOLBEE) tablet 1 tablet, 1 tablet, Oral, Daily, Bennie Lucia MD, 1 tablet at 06/14/17 0807  •  glucagon (human recombinant) (GLUCAGEN DIAGNOSTIC) injection 1 mg, 1 mg, Subcutaneous, Q15 Min PRN, Bennie Lucia MD  •  HYDROcodone-acetaminophen (NORCO) 5-325 MG per tablet 1 tablet, 1 tablet, Oral, Q6H PRN, Bennie Lucia MD, 1 tablet at 06/13/17 2009  •  insulin aspart (novoLOG) injection 0-14  Units, 0-14 Units, Subcutaneous, 4x Daily AC & at Bedtime, Bennei Lucia MD, 5 Units at 06/13/17 2005  •  insulin detemir (LEVEMIR) injection 15 Units, 15 Units, Subcutaneous, Nightly, Bennie Lucia MD, 15 Units at 06/13/17 2005  •  magnesium oxide (MAGOX) tablet 400 mg, 400 mg, Oral, Daily, Bennie Lucia MD, 400 mg at 06/14/17 0804  •  memantine (NAMENDA) tablet 5 mg, 5 mg, Oral, BID, Bennie Lucia MD, 5 mg at 06/14/17 0803  •  methIMAzole (TAPAZOLE) half tablet 2.5 mg, 2.5 mg, Oral, Daily, Bennie Lucia MD, 2.5 mg at 06/14/17 0813  •  potassium chloride (MICRO-K) CR capsule 10 mEq, 10 mEq, Oral, BID With Meals, Bennie Lucia MD, 10 mEq at 06/14/17 0803  •  sotalol (BETAPACE) tablet 80 mg, 80 mg, Oral, Q12H, Bennie Lucia MD, 80 mg at 06/14/17 0803      Assessment/Plan   Principal Problem:    Physical deconditioning  Active Problems:    Gastrointestinal hemorrhage with melena    Adenocarcinoma of sigmoid colon    Essential hypertension    Type 2 diabetes mellitus with hyperglycemia    Chronic blood loss anemia    Senile dementia, uncomplicated    Hypokalemia    Tricuspid valve disorders, 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 I did stop this morning and that is the only medication change and the last 24 hours.  After seeing her lab work I did stop the trajenta and Vasotec  Have encouraged by mouth fluids and I have asked Dr. Strong of nephrology to see her  She is working well with therapy  Hemoglobin and hematocrit stable    Bennie Lucia MD  06/14/17  10:43 AM

## 2017-06-14 NOTE — PLAN OF CARE
Problem: Patient Care Overview (Adult)  Goal: Plan of Care Review  Outcome: Ongoing (interventions implemented as appropriate)    06/14/17 0905   Coping/Psychosocial Response Interventions   Plan Of Care Reviewed With patient   Patient Care Overview   Progress progress toward functional goals as expected   Outcome Evaluation   Outcome Summary/Follow up Plan pt needs prompting to innitate and to complete adl tasks cont poc       Goal: Discharge Needs Assessment  Outcome: Ongoing (interventions implemented as appropriate)    06/12/17 1400 06/12/17 1502 06/13/17 1036   Discharge Needs Assessment   Concerns To Be Addressed --  no discharge needs identified --    Equipment Needed After Discharge --  --  walker, rolling   Discharge Facility/Level Of Care Needs --  --  nursing facility, skilled;home with home health  (Or 24/7 care from family -depending on progress while on ARU)   Current Health   Outpatient/Agency/Support Group Needs skilled nursing facility (specify) --  --    Anticipated Changes Related to Illness inability to care for self --  --    Living Environment   Transportation Available family or friend will provide --  --    Self-Care   Equipment Currently Used at Home --  --  commode         Problem: Inpatient Occupational Therapy  Goal: Transfer Training Goal 1 STG- OT  Outcome: Ongoing (interventions implemented as appropriate)    06/14/17 0905   Transfer Training OT STG   Transfer Training OT STG, Date Goal Reviewed 06/14/17   Transfer Training OT STG, Outcome goal met       Goal: Dymanic Standing Balance Goal STG- OT  Outcome: Ongoing (interventions implemented as appropriate)    06/14/17 0905   Dynamic Standing Balance OT STG   Dynamic Standing Balance OT STG, Date Goal Reviewed 06/14/17   Dynamic Standing Balance OT STG, Outcome goal ongoing       Goal: Caregiver Training Goal LTG- OT  Outcome: Ongoing (interventions implemented as appropriate)    06/14/17 0905   Caregiver Training OT LTG   Caregiver  Training OT LTG, Date Goal Reviewed 06/14/17   Caregiver Training OT LTG, Outcome goal ongoing       Goal: ADL Goal LTG- OT  Outcome: Ongoing (interventions implemented as appropriate)    06/14/17 0905   ADL OT LTG   ADL OT LTG, Date Goal Reviewed 06/14/17   ADL OT LTG, Outcome goal ongoing       Goal: Activity Tolerance Goal STG- OT  Outcome: Ongoing (interventions implemented as appropriate)    06/14/17 0905   Activity Tolerance OT STG   Activity Tolerance Goal OT STG, Date Goal Reviewed 06/14/17   Activity Tolerance Goal OT STG, Outcome goal ongoing

## 2017-06-14 NOTE — PROGRESS NOTES
GENERAL SURGERY PROGRESS NOTE  Chief Complaint:  Surgery Follow up   LOS: 2 days       Subjective     Interval History:     Feels well, working with PT. Tolerating diet.    Objective     Vital Signs  Temp:  [95.5 °F (35.3 °C)-97.6 °F (36.4 °C)] 97.6 °F (36.4 °C)  Heart Rate:  [62-70] 70  Resp:  [18-20] 20  BP: ()/(52-58) 101/58    Physical Exam:   Abdomen soft  Labs:  Lab Results (last 24 hours)     Procedure Component Value Units Date/Time    POC Glucose Fingerstick [[id number]]  (Abnormal) Collected:  06/13/17 1610    Specimen:  Blood Updated:  06/13/17 1629     Glucose 249 (H) mg/dL       Sliding Scale AdminMeter: [id number]: [id number] BRIANNA CRABTREE       POC Glucose Fingerstick [[id number]]  (Abnormal) Collected:  06/13/17 2002    Specimen:  Blood Updated:  06/13/17 2032     Glucose 237 (H) mg/dL       Meter: [id number]: [id number] ESTRELLITA LERMA       POC Glucose Fingerstick [[id number]]  (Normal) Collected:  06/14/17 0502    Specimen:  Blood Updated:  06/14/17 0514     Glucose 95 mg/dL       Meter: [id number]: [id number] ESTRELLITA LERMA       CBC & Differential [[id number]] Collected:  06/14/17 0501    Specimen:  Blood Updated:  06/14/17 0651    Narrative:       The following orders were created for panel order CBC & Differential.  Procedure                               Abnormality         Status                     ---------                               -----------         ------                     CBC Auto Differential[[id number]]        Abnormal            Final result                 Please view results for these tests on the individual orders.    CBC Auto Differential [[id number]]  (Abnormal) Collected:  06/14/17 0501    Specimen:  Blood Updated:  06/14/17 0651     WBC 6.47 10*3/mm3      RBC 2.96 (L) 10*6/mm3      Hemoglobin 8.8 (L) g/dL      Hematocrit 27.3 (L) %      MCV 92.2 fL      MCH 29.7 pg      MCHC 32.2 g/dL      RDW 14.1 %      RDW-SD 47.1 (H) fl      MPV 10.3 fL       Platelets 319 10*3/mm3      Neutrophil % 71.6 %      Lymphocyte % 15.0 %      Monocyte % 10.8 %      Eosinophil % 2.3 %      Basophil % 0.0 %      Immature Grans % 0.3 %      Neutrophils, Absolute 4.63 10*3/mm3      Lymphocytes, Absolute 0.97 10*3/mm3      Monocytes, Absolute 0.70 10*3/mm3      Eosinophils, Absolute 0.15 10*3/mm3      Basophils, Absolute 0.00 10*3/mm3      Immature Grans, Absolute 0.02 10*3/mm3     Renal Function Panel [[id number]]  (Abnormal) Collected:  06/14/17 0501    Specimen:  Blood Updated:  06/14/17 0715     Glucose 88 mg/dL      BUN 27 (H) mg/dL      Creatinine 1.40 (H) mg/dL      Sodium 135 (L) mmol/L      Potassium 3.5 mmol/L      Chloride 97 mmol/L      CO2 28.0 mmol/L      Calcium 8.9 mg/dL      Albumin 3.20 (L) g/dL      Phosphorus 4.1 mg/dL      Anion Gap 10.0 mmol/L      BUN/Creatinine Ratio 19.3     eGFR  African Amer 43 mL/min/1.73     Narrative:       The MDRD GFR formula is only valid for adults with stable renal function between ages 18 and 70.    Magnesium [[id number]]  (Normal) Collected:  06/14/17 0501    Specimen:  Blood Updated:  06/14/17 0715     Magnesium 2.0 mg/dL     POC Glucose Fingerstick [[id number]]  (Abnormal) Collected:  06/14/17 1049    Specimen:  Blood Updated:  06/14/17 1101     Glucose 165 (H) mg/dL       RN NotifiedMeter: [id number]: [id number] CLAY FAHAD       Sodium, Urine, Random [[id number]]  (Abnormal) Collected:  06/14/17 1340    Specimen:  Urine from Urine, Clean Catch Updated:  06/14/17 1356     Sodium, Urine 28 (L) mmol/L     Creatinine, Urine, Random [[id number]] Collected:  06/14/17 1340    Specimen:  Urine from Urine, Clean Catch Updated:  06/14/17 1407     Creatinine, Urine 387.7 mg/dL              Assessment/Plan     Kierra Scales is a 88 y.o. female who is s/p left colon resection      Continue rehab.  May need IVF due to increasing Cr.  Care per Dr. Lucia.          This document has been electronically signed by Stefano Arthur  MD Kaveh on June 14, 2017 3:46 PM        Stefano Linn MD  06/14/17  3:46 PM

## 2017-06-15 LAB
ALBUMIN SERPL-MCNC: 3.3 G/DL (ref 3.4–4.8)
ANION GAP SERPL CALCULATED.3IONS-SCNC: 10 MMOL/L (ref 5–15)
BASOPHILS # BLD AUTO: 0 10*3/MM3 (ref 0–0.2)
BASOPHILS NFR BLD AUTO: 0 % (ref 0–2)
BUN BLD-MCNC: 27 MG/DL (ref 7–21)
BUN/CREAT SERPL: 31.4 (ref 7–25)
CALCIUM SPEC-SCNC: 8.7 MG/DL (ref 8.4–10.2)
CHLORIDE SERPL-SCNC: 99 MMOL/L (ref 95–110)
CO2 SERPL-SCNC: 29 MMOL/L (ref 22–31)
CREAT BLD-MCNC: 0.86 MG/DL (ref 0.5–1)
DEPRECATED RDW RBC AUTO: 47.5 FL (ref 36.4–46.3)
EOSINOPHIL # BLD AUTO: 0.01 10*3/MM3 (ref 0–0.7)
EOSINOPHIL NFR BLD AUTO: 0.1 % (ref 0–7)
ERYTHROCYTE [DISTWIDTH] IN BLOOD BY AUTOMATED COUNT: 14.1 % (ref 11.5–14.5)
GFR SERPL CREATININE-BSD FRML MDRD: 75 ML/MIN/1.73 (ref 39–90)
GLUCOSE BLD-MCNC: 220 MG/DL (ref 60–100)
GLUCOSE BLDC GLUCOMTR-MCNC: 119 MG/DL (ref 70–130)
GLUCOSE BLDC GLUCOMTR-MCNC: 268 MG/DL (ref 70–130)
GLUCOSE BLDC GLUCOMTR-MCNC: 273 MG/DL (ref 70–130)
GLUCOSE BLDC GLUCOMTR-MCNC: 293 MG/DL (ref 70–130)
HCT VFR BLD AUTO: 29.5 % (ref 35–45)
HGB BLD-MCNC: 9.5 G/DL (ref 12–15.5)
IMM GRANULOCYTES # BLD: 0.03 10*3/MM3 (ref 0–0.02)
IMM GRANULOCYTES NFR BLD: 0.4 % (ref 0–0.5)
LYMPHOCYTES # BLD AUTO: 0.63 10*3/MM3 (ref 0.6–4.2)
LYMPHOCYTES NFR BLD AUTO: 7.7 % (ref 10–50)
MCH RBC QN AUTO: 29.7 PG (ref 26.5–34)
MCHC RBC AUTO-ENTMCNC: 32.2 G/DL (ref 31.4–36)
MCV RBC AUTO: 92.2 FL (ref 80–98)
MONOCYTES # BLD AUTO: 0.43 10*3/MM3 (ref 0–0.9)
MONOCYTES NFR BLD AUTO: 5.2 % (ref 0–12)
NEUTROPHILS # BLD AUTO: 7.13 10*3/MM3 (ref 2–8.6)
NEUTROPHILS NFR BLD AUTO: 86.6 % (ref 37–80)
NRBC BLD MANUAL-RTO: 0 /100 WBC (ref 0–0)
PHOSPHATE SERPL-MCNC: 3.3 MG/DL (ref 2.4–4.4)
PLATELET # BLD AUTO: 327 10*3/MM3 (ref 150–450)
PMV BLD AUTO: 10.1 FL (ref 8–12)
POTASSIUM BLD-SCNC: 3.8 MMOL/L (ref 3.5–5.1)
RBC # BLD AUTO: 3.2 10*6/MM3 (ref 3.77–5.16)
SODIUM BLD-SCNC: 138 MMOL/L (ref 137–145)
WBC NRBC COR # BLD: 8.23 10*3/MM3 (ref 3.2–9.8)

## 2017-06-15 PROCEDURE — 63710000001 INSULIN DETEMIR PER 5 UNITS: Performed by: FAMILY MEDICINE

## 2017-06-15 PROCEDURE — 97116 GAIT TRAINING THERAPY: CPT

## 2017-06-15 PROCEDURE — 97110 THERAPEUTIC EXERCISES: CPT

## 2017-06-15 PROCEDURE — 97530 THERAPEUTIC ACTIVITIES: CPT

## 2017-06-15 PROCEDURE — 99232 SBSQ HOSP IP/OBS MODERATE 35: CPT | Performed by: FAMILY MEDICINE

## 2017-06-15 PROCEDURE — 92526 ORAL FUNCTION THERAPY: CPT | Performed by: SPEECH-LANGUAGE PATHOLOGIST

## 2017-06-15 PROCEDURE — 82962 GLUCOSE BLOOD TEST: CPT

## 2017-06-15 PROCEDURE — 97535 SELF CARE MNGMENT TRAINING: CPT

## 2017-06-15 PROCEDURE — 85025 COMPLETE CBC W/AUTO DIFF WBC: CPT | Performed by: FAMILY MEDICINE

## 2017-06-15 PROCEDURE — 80069 RENAL FUNCTION PANEL: CPT | Performed by: FAMILY MEDICINE

## 2017-06-15 PROCEDURE — 92507 TX SP LANG VOICE COMM INDIV: CPT | Performed by: SPEECH-LANGUAGE PATHOLOGIST

## 2017-06-15 PROCEDURE — 63710000001 INSULIN ASPART PER 5 UNITS: Performed by: FAMILY MEDICINE

## 2017-06-15 RX ADMIN — FERROUS SULFATE TAB EC 324 MG (65 MG FE EQUIVALENT) 324 MG: 324 (65 FE) TABLET DELAYED RESPONSE at 08:03

## 2017-06-15 RX ADMIN — FERROUS SULFATE TAB EC 324 MG (65 MG FE EQUIVALENT) 324 MG: 324 (65 FE) TABLET DELAYED RESPONSE at 17:05

## 2017-06-15 RX ADMIN — INSULIN ASPART 8 UNITS: 100 INJECTION, SOLUTION INTRAVENOUS; SUBCUTANEOUS at 17:06

## 2017-06-15 RX ADMIN — MEMANTINE 5 MG: 5 TABLET ORAL at 17:05

## 2017-06-15 RX ADMIN — HYDROCODONE BITARTRATE AND ACETAMINOPHEN 1 TABLET: 5; 325 TABLET ORAL at 17:09

## 2017-06-15 RX ADMIN — DONEPEZIL HYDROCHLORIDE 10 MG: 10 TABLET, FILM COATED ORAL at 21:14

## 2017-06-15 RX ADMIN — MEMANTINE 5 MG: 5 TABLET ORAL at 08:03

## 2017-06-15 RX ADMIN — Medication 1 TABLET: at 08:03

## 2017-06-15 RX ADMIN — DOCUSATE SODIUM 100 MG: 100 CAPSULE, LIQUID FILLED ORAL at 08:03

## 2017-06-15 RX ADMIN — MAGNESIUM OXIDE TAB 400 MG (241.3 MG ELEMENTAL MG) 400 MG: 400 (241.3 MG) TAB at 08:03

## 2017-06-15 RX ADMIN — POTASSIUM CHLORIDE 10 MEQ: 750 CAPSULE, EXTENDED RELEASE ORAL at 08:03

## 2017-06-15 RX ADMIN — Medication 2.5 MG: at 08:03

## 2017-06-15 RX ADMIN — INSULIN ASPART 8 UNITS: 100 INJECTION, SOLUTION INTRAVENOUS; SUBCUTANEOUS at 12:00

## 2017-06-15 RX ADMIN — SODIUM CHLORIDE 75 ML/HR: 900 INJECTION, SOLUTION INTRAVENOUS at 04:44

## 2017-06-15 RX ADMIN — Medication 40 MG: at 21:15

## 2017-06-15 RX ADMIN — INSULIN ASPART 8 UNITS: 100 INJECTION, SOLUTION INTRAVENOUS; SUBCUTANEOUS at 21:16

## 2017-06-15 RX ADMIN — DILTIAZEM HYDROCHLORIDE 120 MG: 120 CAPSULE, COATED, EXTENDED RELEASE ORAL at 08:03

## 2017-06-15 RX ADMIN — DOCUSATE SODIUM 100 MG: 100 CAPSULE, LIQUID FILLED ORAL at 17:05

## 2017-06-15 RX ADMIN — HYDROCODONE BITARTRATE AND ACETAMINOPHEN 1 TABLET: 5; 325 TABLET ORAL at 08:03

## 2017-06-15 RX ADMIN — INSULIN DETEMIR 15 UNITS: 100 INJECTION, SOLUTION SUBCUTANEOUS at 21:17

## 2017-06-15 RX ADMIN — POTASSIUM CHLORIDE 10 MEQ: 750 CAPSULE, EXTENDED RELEASE ORAL at 17:05

## 2017-06-15 RX ADMIN — Medication 40 MG: at 08:03

## 2017-06-15 NOTE — THERAPY TREATMENT NOTE
Inpatient Rehabilitation - Occupational Therapy Treatment Note  [facility]     Patient Name: Kierra Scales  : 1929  MRN: [id number]  Today's Date: 6/15/2017  Onset of Illness/Injury or Date of Surgery Date: 17  Date of Referral to OT: 17  Referring Physician: Dr. Lucia      Admit Date: 2017    Visit Dx:     ICD-10-CM ICD-9-CM   1. Oral phase dysphagia R13.11 787.21   2. Impaired mobility and ADLs Z74.09 799.89   3. Muscle weakness (generalized) M62.81 728.87   4. Abnormality of gait and mobility R26.9 781.2   5. 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)   • Hypothyroidism   • Dyspnea   • Hyperthyroidism   • Gastrointestinal hemorrhage with melena   • Type 2 diabetes mellitus with hyperglycemia   • Colon cancer   • Chronic blood loss anemia   • Physical deconditioning   • Senile dementia, uncomplicated   • Encounter for rehabilitation   • Adenocarcinoma of sigmoid colon   • Hypokalemia             Adult Rehabilitation Note       06/15/17 1341 06/15/17 1301 06/15/17 0950    Rehab Assessment/Intervention    Discipline occupational therapy assistant  -RC physical therapy assistant  -RW occupational therapy assistant  -RC    Document Type therapy note (daily note)  -RC therapy note (daily note)  -RW therapy note (daily note)  -RC    Subjective Information agree to therapy  -RC agree to therapy  -RW agree to therapy  -RC    Patient Effort, Rehab Treatment good  -RC good  -RW fair  -RC    Recorded by [RC] ROHAN Gomez/L [RW] Danny Beebe PTA [RC] ROHAN Gomez/L    Vital Signs    Pre Systolic BP Rehab   102  -RC    Pre Treatment Diastolic BP   50  -RC    Post Systolic  BP Rehab   117  -RC    Post Treatment Diastolic BP   55  -RC    Recorded by   [RC] Ally Kwong MADRIGAL/L    Pain Assessment    Pain Assessment No/denies pain  -RC No/denies pain  -RW     Pain Type   Chronic pain  -RC    Pain Location   Back  -RC    Pain Intervention(s)   Rest  -RC    Recorded by [RC] Ally Kwong MADRIGAL/L [RW] Danny Beebe PTA [RC] Ally Kwong, MADRIGAL/L    Vision Assessment/Intervention    Visual Impairment  WFL with corrective lenses  -RW     Recorded by  [RW] Danny Beebe PTA     Cognitive Assessment/Intervention    Current Cognitive/Communication Assessment impaired  -RC functional  -RW     Orientation Status person  -RC oriented to;person  -RW     Follows Commands/Answers Questions  75% of the time  -RW     Personal Safety  mild impairment  -RW     Personal Safety Interventions  gait belt;nonskid shoes/slippers when out of bed  -RW     Recorded by [RC] Ally Kwong MADRIGAL/L [RW] Danny Beebe PTA     Transfer Assessment/Treatment    Transfers, Chair-Bed [place]   contact guard assist;verbal cues required  -RC    Transfers, Bed-Chair-Bed, Assist Device   rolling walker  -RC    Transfers, Sit-Stand [place]  contact guard assist;verbal cues required  -RW     Transfers, Stand-Sit [place]  contact guard assist;verbal cues required  -RW     Transfers, Sit-Stand-Sit, Assist Device  rolling walker  -RW     Toilet Transfer, [place] contact guard assist  -RC      Transfer, Comment  sit to stand x 5  -RW     Recorded by [RC] Ally Kwong MADRIGAL/L [RW] Danny Beebe PTA [RC] Ally Kwong, MADRIGAL/L    Gait Assessment/Treatment    Gait, [place] Level  contact guard assist;verbal cues required  -RW     Gait, Assistive Device  rolling walker  -RW     Gait, Distance (Feet)  200   150' x 3  -RW     Gait, Impairments  impaired balance  -RW     Recorded by  [RW] Danny Beebe PTA     Wheelchair Training/Management    Wheelchair, Distance Propelled  120  -RW      Wheelchair Training Comment  min assist   -RW     Recorded by  [RW] Danny Beebe, PTA     Upper Body Bathing Assessment/Training    UB Bathing Assess/Train, Position sitting;sink side  -RC      UB Bathing Assess/Train, [place] Level supervision required;verbal cues required  -RC      UB Bathing Assess/Train, Comment pt requires vc's to complete task, pt pulled IV out   -RC      Recorded by [RC] ADRIANNA GomezA/L      Lower Body Bathing Assessment/Training    LB Bathing Assess/Train, Position sink side;sitting;standing  -RC      LB Bathing Assess/Train, [place] Level verbal cues required;contact guard assist  -RC      Recorded by [RC] ADRIANNA GomezA/L      Upper Body Dressing Assessment/Training    UB Dressing Assess/Train, Clothing Type donning:;doffing:;pull over  -RC      UB Dressing Assess/Train, Position sitting  -RC      UB Dressing Assess/Train, [place] supervision required  -RC      Recorded by [RC] ADRIANNA GomezA/L      Lower Body Dressing Assessment/Training    LB Dressing Assess/Train, Clothing Type doffing:;donning:;pants;shoes;shorts  -RC  doffing:;shoes  -RC    LB Dressing Assess/Train, Position sitting;standing  -RC  sitting  -RC    LB Dressing Assess/Train, [place] verbal cues required;contact guard assist  -RC  verbal cues required  -RC    Recorded by [RC] ADRIANNA GomezA/L  [RC] Ally Kwong MADRIGAL/L    Toileting Assessment/Training    Toileting Assess/Train, Assistive Device grab bars  -RC      Toileting Assess/Train, Position sitting;standing  -RC      Toileting Assess/Train, [place] contact guard assist  -RC      Recorded by [RC] ADRIANNA GomezA/L      Grooming Assessment/Training    Grooming Assess/Train, Position sitting  -RC      Grooming Assess/Train, [place] supervision required  -RC      Recorded by [RC] Ally Kwong MADRIGAL/L      Motor Skills/Interventions    Additional Documentation   --   , cone stack and pass, clothes  pin tree  -RC    Recorded by   [RC] ADRIANNA GomezA/L    Therapy Exercises    Bilateral Lower Extremities  AROM:;ankle pumps/circles;sitting;15 reps;hip abduction/adduction;knee flexion;hamstring stretch;hip flexion;mini squats;standing;heel raises   2 sets  -RW     BLE Resistance  theraband  -RW     Bilateral Upper Extremity   --   ue bike 15 min multi vc's to stay on task  -RC    Recorded by  [RW] Danny Beebe PTA [RC] ROHAN Gomez/L    Positioning and Restraints    Pre-Treatment Position sitting in chair/recliner  -RC sitting in chair/recliner  -RW sitting in chair/recliner  -RC    Post Treatment Position wheelchair  -RC wheelchair  -RW bed  -RC    In Bed notified nsg;encouraged to call for assist   in nursing area  -RC  encouraged to call for assist;call light within reach;notified nsg  -RC    In Wheelchair  with OT  -RW     Recorded by [RC] ROHAN Gomez/L [RW] Danny Beebe PTA [RC] ADRIANNA GomezA/POONAM      06/15/17 0945 06/15/17 0905 06/15/17 0800    Rehab Assessment/Intervention    Discipline  speech language pathologist  -EC physical therapy assistant  -RW    Document Type  therapy note (daily note)  -EC therapy note (daily note)  -RW    Subjective Information  agree to therapy;no complaints  -EC agree to therapy  -RW    Patient Effort, Rehab Treatment  fair  -EC fair  -RW    Patient Response to Treatment   got dizzy after steps and needed to be lowered into wc. bp 103/59 hr 70  -RW    Recorded by  [EC] Tamiko Couch CCC-SLP [RW] Danny Beebe PTA    Vital Signs    Pre Systolic BP Rehab --  -RC      Pre Treatment Diastolic BP --  -RC      Intra Systolic BP Rehab   103  -RW    Intra Treatment Diastolic BP   59  -RW    Post Systolic BP Rehab --  -RC      Post Treatment Diastolic BP --  -RC      Intratreatment Heart Rate (beats/min)   70  -RW    Intra SpO2 (%)   97  -RW    O2 Delivery Intra Treatment   room air  -RW    Post SpO2 (%)   92  -RW    O2 Delivery Post  Treatment   room air  -RW    Recorded by [RC] Ally Kwong, MADRIGAL/L  [RW] Danny Beebe, PTA    Pain Assessment    Pain Assessment  No/denies pain  -EC --   c/o back pain with transiton moves and gt  -RW    Pain Location   Back  -RW    Pain Orientation   Left;Lower  -RW    Pain Intervention(s)   Rest  -RW    Recorded by  [EC] Tamiko Couch CCC-SLP [RW] Danny Beebe PTA    Vision Assessment/Intervention    Visual Impairment   WFL with corrective lenses  -RW    Recorded by   [RW] Danny Beebe PTA    Cognitive Assessment/Intervention    Current Cognitive/Communication Assessment   impaired  -RW    Orientation Status   oriented to;person  -RW    Follows Commands/Answers Questions   50% of the time;needs cueing  -RW    Personal Safety   moderate impairment;mild impairment  -RW    Personal Safety Interventions   gait belt;nonskid shoes/slippers when out of bed  -RW    Recorded by   [RW] Danny Beebe PTA    Communication Treatment Objective and Progress    Receptive Language Treatment Objectives  Improve ability to follow directions;Improve ability to comprehend questions  -EC     Expressive Treatment Objectives  Improve word retrieval skills  -EC     Cognitive Linguistic Treatment Objectives  Improve orientation;Improve memory skills  -EC     Recorded by  [EC] Tamiko Couch CCC-SLP     Improve ability to follow directions    Improve ability to follow directions:  two-step commands;80%;without cues  -EC     Status: Improve ability to follow directions  Progress slower than expected  -EC     Ability to Follow Directions Progress  20%  -EC     Recorded by  [EC] Tamiko Couch CCC-SLP     Improve ability to comprehend questions    Improve ability to comprehend questions:  simple yes/no questions;simple general questions;90%  -EC     Status: Improve ability to comprehend questions  Progressing as expected  -EC     Ability to Comprehend Questions Progress  30%;50%  -EC     Recorded by  [EC]  OSIRIS Lin     Improve word retrieval skills    Improve word retrieval skills by:  naming an object/pic;answer WH question with one word;completing a divergent task;completing a convergent task;90%  -EC     Status: Improve word retrieval skills  Progressing as expected  -EC     Word Retrieval Skills Progress  50%  -EC     Recorded by  [EC] OSIRIS Lin     Improve orientation    Improve orientation through:  demonstrating orientation to day;demonstrating orientation to year;demonstrating orientation to place;80%  -EC     Status: Improve orientation through  Progress slower than expected  -EC     Orientation Progress  30%  -EC     Recorded by  [EC] OSIRIS Lin     Improve memory skills    Improve memory skills through:  recall details of the day;50%  -EC     Status: Improve memory skills  Progress slower than expected  -EC     Memory Skills Progress  50%  -EC     Recorded by  [EC] OSIRIS Lin     Dysphagia Treatment Objectives and Progress    Dysphagia Treatment Objectives  Other 1  -EC     Recorded by  [EC] OSIRIS Lin     Dysphagia Other 1    Dysphagia Other 1 Objective  Pt will safely tolerate recommended diet w/no overt s/s of aspiration.  -EC     Status: Dysphagia Other 1  Achieved  -EC     Dysphagia Other 1 Progress  90%;with consistent cues  -EC     Recorded by  [EC] OSIRIS Lin     Transfer Assessment/Treatment    Transfers, Sit-Stand [place]   contact guard assist;verbal cues required;minimum assist (75% patient effort)  -RW    Transfers, Stand-Sit [place]   contact guard assist;verbal cues required  -RW    Transfers, Sit-Stand-Sit, Assist Device   rolling walker  -RW    Toilet Transfer, [place]   minimum assist (75% patient effort)  -RW    Toilet Transfer, Assistive Device   rolling walker  -RW    Recorded by   [RW] Danny Beebe, PTA    Gait Assessment/Treatment    Gait, [place] Level    contact guard assist;verbal cues required  -RW    Gait, Assistive Device   rolling walker  -RW    Gait, Distance (Feet)   150   70'  -RW    Gait, Impairments   impaired balance;motor control impaired  -RW    Recorded by   [RW] Danny Beebe PTA    Stairs Assessment/Treatment    Number of Stairs   4  -RW    Stairs, Handrail Location   both sides  -RW    Stairs, [place] Level   contact guard assist  -RW    Stairs, Assistive Device   --  -RW    Stairs, Comment   pt got dizzy and had to be lowered into her wc  -RW    Recorded by   [RW] Danny Beebe PTA    Wheelchair Training/Management    Wheelchair, Distance Propelled   100  -RW    Wheelchair Training Comment   min assist  -RW    Recorded by   [RW] Danny Beebe PTA    Lower Body Dressing Assessment/Training    LB Dressing Assess/Train, Clothing Type   --  -RW    Recorded by   [RW] Danny Beebe PTA    Balance Skills Training    Standing-Balance Activities   --  -RW    Recorded by   [RW] Danny Beebe PTA    Therapy Exercises    Bilateral Lower Extremities   AROM:;ankle pumps/circles;sitting;15 reps;hip abduction/adduction;knee flexion;LAQ   2 sets  -RW    BLE Resistance   theraband  -RW    Recorded by   [RW] Danny Beebe PTA    Positioning and Restraints    Pre-Treatment Position   sitting in chair/recliner  -RW    Post Treatment Position   wheelchair  -RW    In Wheelchair   with SLP  -RW    Recorded by   [RW] Danny Beebe PTA      06/14/17 1454 06/14/17 1205 06/14/17 1106    Rehab Assessment/Intervention    Discipline physical therapy assistant  -RW speech language pathologist  -CK physical therapy assistant  -RW    Document Type therapy note (daily note)  -RW therapy note (daily note)  -CK therapy note (daily note)  -RW    Subjective Information agree to therapy  -RW agree to therapy  -CK agree to therapy  -RW    Patient Effort, Rehab Treatment fair  -RW adequate  -CK adequate  -RW    Patient Response to Treatment c/o back hurting and tired   -RW  needed alot of encouragement and daughter assisted  -RW    Recorded by [RW] Danny Beebe PTA [CK] Michelle Whitten, MS CCC-SLP [RW] Danny Beebe PTA    Pain Assessment    Pain Assessment  No/denies pain  -CK     [place] FACES Pain Rating --   back hurts  -RW      Pain Score  0  -CK     Post Pain Score  0  -CK unable to assess  -RW    Pain Type   Chronic pain  -RW    Pain Location Back  -RW  Back  -RW    Pain Orientation Left;Lower  -RW  Left;Lower  -RW    Pain Intervention(s) Rest  -RW      Recorded by [RW] Danny Beebe PTA [CK] Michelle Whitten MS CCC-SLP [RW] Danny Beebe PTA    Vision Assessment/Intervention    Visual Impairment WFL with corrective lenses  -RW  WFL with corrective lenses  -RW    Recorded by [RW] Danny Beebe PTA  [RW] Danny Beebe PTA    Cognitive Assessment/Intervention    Current Cognitive/Communication Assessment impaired  -RW  impaired  -RW    Orientation Status oriented to;person  -RW  oriented to;person;place;disoriented to;time;situation  -RW    Follows Commands/Answers Questions 50% of the time;needs cueing  -RW      Recorded by [RW] Danny Beebe PTA  [RW] Danny Beebe PTA    Dysphagia Treatment Objectives and Progress    Dysphagia Treatment Objectives  Other 1  -CK     Recorded by  [CK] Michelle Whitten MS CCC-SLP     Dysphagia Other 1    Dysphagia Other 1 Objective  Pt will safely tolerate recommended diet w/no overt s/s of aspiration.  -CK     Status: Dysphagia Other 1  Progressing as expected  -CK     Dysphagia Other 1 Progress  90%;continue to address  -CK     Comments: Dysphagia Other 1  Pt safely tolerated regular solids/thin liquids.  Pt did require initiation to cut and eat meat.  -CK     Recorded by  [CK] Michelle Whitten, MS CCC-SLP     Bed Mobility, Assessment/Treatment    Bed Mobility, Assistive Device   bed rails;head of bed elevated  -RW    Bed Mob, Supine to Sit, [place]   supervision required  -RW    Recorded by   [RW] Danny PARRA  ROYAL Beebe    Transfer Assessment/Treatment    Transfers, Sit-Stand [place] contact guard assist;verbal cues required;minimum assist (75% patient effort)  -RW  contact guard assist;verbal cues required  -RW    Transfers, Stand-Sit [place] contact guard assist;verbal cues required  -RW  contact guard assist;verbal cues required  -RW    Transfers, Sit-Stand-Sit, Assist Device rolling walker  -RW  rolling walker  -RW    Toilet Transfer, [place]   contact guard assist;minimum assist (75% patient effort)   daughter assist  -RW    Toilet Transfer, Assistive Device   rolling walker  -RW    Recorded by [RW] Danny Beebe PTA  [RW] Danny Beebe PTA    Gait Assessment/Treatment    Gait, [place] Level contact guard assist;verbal cues required  -RW  contact guard assist;verbal cues required  -RW    Gait, Assistive Device rolling walker  -RW  rolling walker  -RW    Gait, Distance (Feet) 200  -RW  150  -RW    Gait, Impairments impaired balance;motor control impaired  -RW  impaired balance  -RW    Gait, Comment needed alot encouragement  -RW      Recorded by [RW] Danny Beebe PTA  [RW] Danny Beebe PTA    Stairs Assessment/Treatment    Number of Stairs curb  -RW      Stairs, [place] Level minimum assist (75% patient effort)  -RW      Stairs, Assistive Device walker  -RW      Recorded by [RW] Danny Beebe PTA      Lower Body Dressing Assessment/Training    LB Dressing Assess/Train, Clothing Type donning:;shoes  -RW  donning:;shoes  -RW    LB Dressing Assess/Train, Position   sitting  -RW    LB Dressing Assess/Train, [place]   minimum assist (75% patient effort);set up required  -RW    Recorded by [RW] Danny Beebe PTA  [RW] Danny Beebe PTA    Balance Skills Training    Standing-Balance Activities --   static stance at windows searching for daughter outside.  -RW      Recorded by [RW] Danny Beebe PTA      Therapy Exercises    Bilateral Lower Extremities AROM:;ankle  pumps/circles;10 reps;sitting;hip flexion   2 sets in part  -RW  AROM:;20 reps;supine;ankle pumps/circles;heel slides;hip abduction/adduction;SAQ   2 sets in part  -RW    Recorded by [RW] Danny Beebe PTA  [RW] Danny Beebe PTA    Positioning and Restraints    Pre-Treatment Position sitting in chair/recliner  -RW  in bed  -RW    Post Treatment Position wheelchair  -RW  wheelchair  -RW    In Bed patient within staff view  -RW  call light within reach  -RW    Recorded by [RW] Danny Beebe PTA  [RW] Danny Beebe PTA      06/14/17 0905 06/14/17 0714       Rehab Assessment/Intervention    Discipline occupational therapy assistant  -RC speech language pathologist  -CK     Document Type therapy note (daily note)  -RC therapy note (daily note)  -CK     Subjective Information agree to therapy  -RC agree to therapy  -CK     Patient Effort, Rehab Treatment good  -RC good  -CK     Recorded by [RC] ROHAN Gomez/POONAM [CK] Michelle Whitten MS CCC-SLP     Pain Assessment    Pain Assessment  0-10  -CK     Pain Score  2  -CK     Post Pain Score unable to assess  -RC 2  -CK     Pain Type Chronic pain  -RC Chronic pain  -CK     Pain Location Back  -RC Back  -CK     Pain Orientation Lower  -RC Lower  -CK     Pain Intervention(s) Rest  -RC Repositioned  -CK     Recorded by [RC] ROHAN Gomez/POONAM [CK] Michelle Whitten MS CCC-SLP     Cognitive Assessment/Intervention    Current Cognitive/Communication Assessment impaired  -RC      Orientation Status person  -RC      Recorded by [RC] ROHAN Gomez/POONAM      Communication Treatment Objective and Progress    Receptive Language Treatment Objectives  Improve ability to follow directions;Improve ability to comprehend questions  -CK     Expressive Treatment Objectives  Improve word retrieval skills  -CK     Cognitive Linguistic Treatment Objectives  Improve orientation;Improve memory skills  -CK     Recorded by  [CK] Michelle Whitten MS CCC-SLP     Improve  ability to follow directions    Improve ability to follow directions:  one-step directions without objects;two-step commands;90%  -CK     Status: Improve ability to follow directions  Progressing as expected  -CK     Ability to Follow Directions Progress  100%;20%  -CK     Recorded by  [CK] Michelle Whitten MS CCC-ELISA     Improve ability to comprehend questions    Improve ability to comprehend questions:  simple yes/no questions;simple general questions;90%  -CK     Status: Improve ability to comprehend questions  Progressing as expected  -CK     Ability to Comprehend Questions Progress  90%;50%  -CK     Comments: Improve ability to comprehend questions  90% w/basic yes/no; 50% w/comparative yes/no questions  -CK     Recorded by  [CK] MS OSIRIS Cho     Improve word retrieval skills    Improve word retrieval skills by:  naming an object/pic;answer WH question with one word;completing a divergent task;completing a convergent task;90%  -CK     Status: Improve word retrieval skills  Progressing as expected  -CK     Word Retrieval Skills Progress  50%;with inconsistent cues  -CK     Recorded by  [CK] Michelle Whitten MS CCC-SLP     Improve orientation    Improve orientation through:  demonstrating orientation to day;demonstrating orientation to year;demonstrating orientation to place;80%  -CK     Status: Improve orientation through  Progressing as expected  -CK     Orientation Progress  40%  -CK     Recorded by  [CK] Michelle Whitten MS CCC-ELISA     Improve memory skills    Improve memory skills through:  recalling related word lists immediately;recall details of the day;80%  -CK     Status: Improve memory skills  Progressing as expected  -CK     Memory Skills Progress  60%  -CK     Recorded by  [CK] MS OSIRIS Cho     Dysphagia Treatment Objectives and Progress    Dysphagia Treatment Objectives  Other 1  -CK     Recorded by  [CK] MS OSIRIS Cho     Dysphagia Other 1     Dysphagia Other 1 Objective  Pt will safely tolerate recommended diet w/no overt s/s of aspiration.  -CK     Status: Dysphagia Other 1  Progressing as expected  -CK     Dysphagia Other 1 Progress  90%;continue to address  -CK     Comments: Dysphagia Other 1  Pt safely tolerated [facility] soft solids/thin liquids via straw.  Pt also safely tolerated regular solid trials w/no overt s/s of aspiration  -CK     Recorded by  [CK] Michelle Whitten MS CCC-SLP     Bed Mobility, Assessment/Treatment    Bed Mob, Sit to Supine, [place] supervision required  -RC      Recorded by [RC] JOSÉ MIGUEL Gomez      Transfer Assessment/Treatment    Transfers, Chair-Bed [place] contact guard assist  -RC      Transfers, Bed-Chair-Bed, Assist Device rolling walker  -RC      Toilet Transfer, [place] contact guard assist;verbal cues required  -RC      Toilet Transfer, Assistive Device rolling walker  -RC      Recorded by [RC] JOSÉ MIGUEL Gomez      Functional Mobility    Functional Mobility- [place] Level contact guard assist  -RC      Functional Mobility- Device rolling walker  -RC      Functional Mobility-Distance (Feet) 15  -RC      Recorded by [RC] JOSÉ MIGUEL Gomez      Upper Body Bathing Assessment/Training    UB Bathing Assess/Train, Position sitting;sink side  -RC      UB Bathing Assess/Train, [place] Level supervision required;verbal cues required  -RC      Recorded by [RC] JOSÉ MIGUEL Gomez      Lower Body Bathing Assessment/Training    LB Bathing Assess/Train, Position sitting;standing  -RC      LB Bathing Assess/Train, [place] Level contact guard assist  -RC      Recorded by [RC] JOSÉ MIGUEL Gomez      Upper Body Dressing Assessment/Training    UB Dressing Assess/Train, Clothing Type doffing:;donning:;pull over  -RC      UB Dressing Assess/Train, Position sitting  -RC      UB Dressing Assess/Train, [place] minimum assist (75% patient effort)  -RC      Recorded by [RC] Ally ENRIQUEZ  Varghese MADRIGAL/L      Lower Body Dressing Assessment/Training    LB Dressing Assess/Train, Clothing Type doffing:;donning:;pants;shorts;shoes  -RC      LB Dressing Assess/Train, Position sitting;standing  -RC      LB Dressing Assess/Train, [place] minimum assist (75% patient effort)  -RC      Recorded by [RC] ADRIANNA GomezA/L      Toileting Assessment/Training    Toileting Assess/Train, Position sitting;standing  -RC      Toileting Assess/Train, [place] minimum assist (75% patient effort);verbal cues required  -RC      Recorded by [RC] ADRIANNA GomezA/L      Grooming Assessment/Training    Grooming Assess/Train, Position sitting  -RC      Grooming Assess/Train, [place] Level supervision required;verbal cues required  -RC      Recorded by [RC] ROHAN Gomez/L      Therapy Exercises    Bilateral Upper Extremity --   ue bike 10 min  -RC      Recorded by [RC] ROHAN Gomez/L      Positioning and Restraints    Pre-Treatment Position sitting in chair/recliner  -RC      Post Treatment Position bed  -RC      In Bed exit alarm on;encouraged to call for assist;call light within reach  -RC      Recorded by [RC] ROHAN Gomez/L        User Key  (r) = Recorded By, (t) = Taken By, (c) = Cosigned By    Initials Name Effective Dates    EC Tamiko Couch, CCC-SLP 12/30/16 -     CK Michelle Whitten, MS CCC-SLP 10/17/16 -     RW Danny Beebe, PTA 10/17/16 -     RC ROHAN Gomez/POONAM 10/17/16 -                 OT Goals       06/15/17 1341 06/14/17 1431 06/14/17 0905    Transfer Training OT STG    Transfer Training OT STG, Date Goal Reviewed 06/15/17  -RC  06/14/17  -RC    Transfer Training OT STG, Outcome goal ongoing  -RC  goal met  -RC    Dynamic Standing Balance OT STG    Dynamic Standing Balance OT STG, Date Goal Reviewed 06/15/17  -RC  06/14/17  -RC    Dynamic Standing Balance OT STG, Outcome goal ongoing  -RC  goal ongoing  -RC    Caregiver Training OT LTG    Caregiver  Training OT LTG, Date Goal Reviewed 06/15/17  -RC  06/14/17  -RC    Caregiver Training OT LTG, Outcome goal ongoing  -RC  goal ongoing  -RC    ADL OT LTG    ADL OT LTG, Date Goal Reviewed 06/15/17  -RC (P)  06/14/17  -RC 06/14/17  -RC    ADL OT LTG, Outcome goal ongoing  -RC (P)  goal ongoing  -RC goal ongoing  -RC    Activity Tolerance OT STG    Activity Tolerance Goal OT STG, Date Goal Reviewed 06/15/17  -RC  06/14/17  -RC    Activity Tolerance Goal OT STG, Outcome goal ongoing  -RC  goal ongoing  -RC      06/13/17 0759 06/12/17 1414 06/09/17 1725    Transfer Training OT STG    Transfer Training OT STG, Date Established 06/13/17  -  06/09/17  -RW    Transfer Training OT STG, Time to Achieve 2 wks  -BH  5 - 7 days  -RW    Transfer Training OT STG, Activity Type sit to stand/stand to sit;bed to chair /chair to bed;toilet  -  toilet  -RW    Transfer Training OT STG, [place] Level supervision required;set up required   AE/AD as needed  -  supervision required  -RW    Transfer Training OT STG, Date Goal Reviewed  06/12/17  -RM     Transfer Training OT STG, Outcome  goal not met  -RM     Transfer Training OT STG, Reason Goal Not Met  discharged from facility  -RM     Dynamic Standing Balance OT STG    Dynamic Standing Balance OT STG, Date Established 06/13/17  -  06/09/17  -RW    Dynamic Standing Balance OT STG, Time to Achieve 2 wks  -BH  5 - 7 days  -RW    Dynamic Standing Balance OT STG, [place] Level supervision required   5 minute no LOB or increased fatigue  -  supervision required  -RW    Dynamic Standing Balance OT STG, Assist Device   assistive Device  -RW    Dynamic Standing Balance OT STG, Additional Goal   5 min  -RW    Dynamic Standing Balance OT STG, Date Goal Reviewed  06/12/17  -RM     Dynamic Standing Balance OT STG, Outcome  goal not met  -RM     Dynamic Standing Balance OT STG, Reason Goal Not Met  discharged from facility  -RM     Caregiver Training OT LTG    Caregiver Training OT  LTG, Date Established 06/13/17  [facility]      Caregiver Training OT LTG, Time to Achieve by discharge  -      Caregiver Training OT LTG, [place] Level able to assist adequately;able to cue patient adequately   t/f, safety at home, [facility]  -      ADL OT LTG    ADL OT LTG, Date Established 06/13/17  -  06/09/17  -    ADL OT LTG, Time to Achieve by discharge  -  2 wks  -    ADL OT LTG, Activity Type ADL skills  -  ADL skills  -    ADL OT LTG, [place] Level standby assist;modified independent;setup;assistive device  -  standby assist  -    ADL OT LTG, Date Goal Reviewed  06/12/17  -     ADL OT LTG, Outcome  goal not met  -     ADL OT LTG, Reason Goal Not Met  discharged from facility  -     Functional Mobility OT LTG    Functional Mobility Goal OT LTG, Date Established   06/09/17  -    Functional Mobility Goal OT LTG, Time to Achieve   2 wks  -    Functional Mobility Goal OT LTG, [place] Level   supervision  -    Functional Mobility Goal OT LTG, Distance to Achieve   to the bathroom  -    Functional Mobility Goal OT LTG, Date Goal Reviewed  06/12/17  -     Functional Mobility Goal OT LTG, Outcome  goal not met  -     Functional Mobility Goal OT LTG, Reason Goal Not Met  discharged from facility  -     Activity Tolerance OT STG    Activity Tolerance Goal OT STG, Date Established 06/13/17  [facility]      Activity Tolerance Goal OT STG, Time to Achieve 2 wks  -      Activity Tolerance Goal OT STG, Activity Level 20 min activity;O2 sat >/equal to 90%;with 1 rest break  -        User Key  (r) = Recorded By, (t) = Taken By, (c) = Cosigned By    Initials Name Provider Type     Rebeka Carrillo, OTR/L Occupational Therapist    RW Cami Mon, OTR/L Occupational Therapist    RC Ally Kwong MADRIGAL/L Occupational Therapy Assistant    RM Catie Sheriff, OT Occupational Therapist          Occupational Therapy Education     Title: PT OT SLP Therapies (Active)     Topic:  Occupational Therapy (Active)     Point: ADL training (Active)    Description: Instruct learner(s) on proper safety adaptation and remediation techniques during self care or transfers.   Instruct in proper use of assistive devices.    Learning Progress Summary    Learner Readiness Method Response Comment Documented by Status   Patient Acceptance E NR   06/15/17 1425 Active    Acceptance E NR   06/14/17 1430 Active    Acceptance E VU,NR Educated pt about OT and POC. Educated pt not to get up on her own and how to use call button. Educated pt on safety with t/f and ADL.  06/13/17 1342 Done               Point: Precautions (Done)    Description: Instruct learner(s) on prescribed precautions during self-care and functional transfers.    Learning Progress Summary    Learner Readiness Method Response Comment Documented by Status   Patient Acceptance E VU,NR Educated pt about OT and POC. Educated pt not to get up on her own and how to use call button. Educated pt on safety with t/f and ADL.  06/13/17 1342 Done               Point: Body mechanics (Done)    Description: Instruct learner(s) on proper positioning and spine alignment during self-care, functional mobility activities and/or exercises.    Learning Progress Summary    Learner Readiness Method Response Comment Documented by Status   Patient Acceptance E VU,NR Educated pt about OT and POC. Educated pt not to get up on her own and how to use call button. Educated pt on safety with t/f and ADL.  06/13/17 1342 Done                      User Key     Initials Effective Dates Name Provider Type Discipline     10/17/16 -  EDIS Tolbert/L Occupational Therapist OT     10/17/16 -  ROHAN Gomez/L Occupational Therapy Assistant OT                  OT Recommendation and Plan  Anticipated Discharge Disposition: home with 24/7 care, home with home health, skilled nursing facility (depends on progress)  Planned Therapy Interventions: activity  intolerance, adaptive equipment training, ADL retraining, balance training, bed mobility training, energy conservation, fine motor coordination training, home exercise program, motor coordination training, strengthening, transfer training  Therapy Frequency: other (see comments) (3-14x a week)  Plan of Care Review  Plan Of Care Reviewed With: patient  Progress: progress toward functional goals is gradual  Outcome Summary/Follow up Plan: required vc's to stay on task innitate task, pt pulled her IV out while washing arms. if d/c recommend 24/7 supervision        Outcome Measures       06/15/17 1341 06/15/17 0800 06/14/17 1200    How much help from another person do you currently need...    Turning from your back to your side while in flat bed without using bedrails?  3  -RW 3  -RW    Moving from lying on back to sitting on the side of a flat bed without bedrails?  3  -RW 3  -RW    Moving to and from a bed to a chair (including a wheelchair)?  3  -RW 3  -RW    Standing up from a chair using your arms (e.g., wheelchair, bedside chair)?  3  -RW 3  -RW    Climbing 3-5 steps with a railing?  3  -RW 3  -RW    To walk in hospital room?  3  -RW 3  -RW    AM-PAC 6 Clicks Score  18  -RW 18  -RW    How much help from another is currently needed...    Putting on and taking off regular lower body clothing? 3  -RC      Bathing (including washing, rinsing, and drying) 3  -RC      Toileting (which includes using toilet bed pan or urinal) 3  -RC      Putting on and taking off regular upper body clothing 3  -RC      Taking care of personal grooming (such as brushing teeth) 3  -RC      Eating meals 3  -RC      Score 18  -RC        06/14/17 0905 06/13/17 1036 06/13/17 0759    How much help from another person do you currently need...    Turning from your back to your side while in flat bed without using bedrails?  3  -LM     Moving from lying on back to sitting on the side of a flat bed without bedrails?  3  -LM     Moving to and from a  bed to a chair (including a wheelchair)?  3  -LM     Standing up from a chair using your arms (e.g., wheelchair, bedside chair)?  3  -LM     Climbing 3-5 steps with a railing?  3  -LM     To walk in hospital room?  3  -LM     AM-PAC 6 Clicks Score  18  -LM     How much help from another is currently needed...    Putting on and taking off regular lower body clothing? 3  -RC  2  -BH    Bathing (including washing, rinsing, and drying) 3  -RC  2  -BH    Toileting (which includes using toilet bed pan or urinal) 3  -RC  3  -BH    Putting on and taking off regular upper body clothing 3  -RC  3  -BH    Taking care of personal grooming (such as brushing teeth) 3  -RC  3  -BH    Eating meals 3  -RC  3  -BH    Score 18  -RC  16  -BH    Functional Assessment    Outcome Measure Options  AM-PAC 6 Clicks Basic Mobility (PT)  - AM-PAC 6 Clicks Daily Activity (OT)  -      User Key  (r) = Recorded By, (t) = Taken By, (c) = Cosigned By    Initials Name Provider Type     Megan Kruse, PT Physical Therapist     Rebeka Carrillo, OTR/L Occupational Therapist    RW Danny Beebe, PTA Physical Therapy Assistant    ROHAN Rodas/L Occupational Therapy Assistant           Time Calculation:         Time Calculation- OT       06/15/17 1428 06/15/17 1339       Time Calculation- OT    OT Start Time 1341  -RC 0950  -RC     OT Stop Time 1419  -RC 1110  -RC     OT Time Calculation (min) 38 min  -RC 80 min  -RC     Total Timed Code Minutes- OT 38 minute(s)  -RC 80 minute(s)  -RC       User Key  (r) = Recorded By, (t) = Taken By, (c) = Cosigned By    Initials Name Provider Type    TAHIR Kwong MADRIGAL/L Occupational Therapy Assistant           Therapy Charges for Today     Code Description Service Date Service Provider Modifiers Qty    34312362245 HC OT THER PROC EA 15 MIN 6/14/2017 Ally Kwong MADRIGAL/L GO 1    63063510871 HC OT THERAPEUTIC ACT EA 15 MIN 6/14/2017 Ally Kwong MADRIGAL/L GO 1    42323098711 HC OT SELF  CARE/MGMT/TRAIN EA 15 MIN 6/14/2017 Ally Kwong MADRIGAL/L GO 3    58112022120 HC OT SELF CARE/MGMT/TRAIN EA 15 MIN 6/15/2017 Ally Kwong MADRIGAL/L GO 3          OT G-codes  OT Professional Judgement Used?: Yes  OT Functional Scales Options: AM-PAC 6 Clicks Daily Activity (OT)  Score: 16  Functional Limitation: Self care  Self Care Current Status (): At least 40 percent but less than 60 percent impaired, limited or restricted  Self Care Goal Status (): At least 20 percent but less than 40 percent impaired, limited or restricted    Ally Kwong, MADRIGAL/L  6/15/2017

## 2017-06-15 NOTE — PLAN OF CARE
Problem: Patient Care Overview (Adult)  Goal: Plan of Care Review  Outcome: Ongoing (interventions implemented as appropriate)    06/15/17 1353   Coping/Psychosocial Response Interventions   Plan Of Care Reviewed With patient   Patient Care Overview   Progress progress toward functional goals is gradual   Outcome Evaluation   Outcome Summary/Follow up Plan got dizzy this am after steps but did well this pm and able to increase activity tolerance. cont with gt and therex         Problem: Inpatient Physical Therapy  Goal: Bed Mobility Goal LTG- PT  Outcome: Ongoing (interventions implemented as appropriate)    06/13/17 1036 06/15/17 1353   Bed Mobility PT LTG   Bed Mobility PT LTG, Date Established 06/13/17 --    Bed Mobility PT LTG, Time to Achieve by discharge --    Bed Mobility PT LTG, Activity Type supine to sit/sit to supine --    Bed Mobility PT LTG, [place] Level supervision required --    Bed Mobility PT LTG, Additional Goal HOB flat; No BR's --    Bed Mobility PT LTG, Date Goal Reviewed --  06/15/17   Bed Mobility PT LTG, Outcome --  goal ongoing       Goal: Transfer Training Goal 1 LTG- PT  Outcome: Ongoing (interventions implemented as appropriate)    06/13/17 1036 06/15/17 1353   Transfer Training PT LTG   Transfer Training PT LTG, Date Established 06/13/17 --    Transfer Training PT LTG, Time to Achieve by discharge --    Transfer Training PT LTG, Activity Type bed to chair /chair to bed --    Transfer Training PT LTG, [place] Level supervision required --    Transfer Training PT LTG, Assist Device (AAD) --    Transfer Training PT LTG, Date Goal Reviewed --  06/15/17   Transfer Training PT LTG, Outcome --  goal ongoing       Goal: Gait Training Goal LTG- PT  Outcome: Ongoing (interventions implemented as appropriate)    06/13/17 1036 06/15/17 1353   Gait Training PT LTG   Gait Training Goal PT LTG, Date Established 06/13/17 --    Gait Training Goal PT LTG, Time to Achieve by discharge --    Gait  Training Goal PT LTG, [place] Level supervision required --    Gait Training Goal PT LTG, Assist Device (AAD) --    Gait Training Goal PT LTG, Distance to Achieve 150 feet --    Gait Training Goal PT LTG, Date Goal Reviewed --  06/15/17   Gait Training Goal PT LTG, Outcome --  goal ongoing       Goal: Stair Training Goal LTG- PT  Outcome: Outcome(s) achieved Date Met:  06/15/17    06/13/17 1036 06/15/17 1353   Stair Training PT LTG   Stair Training Goal PT LTG, Date Established 06/13/17 --    Stair Training Goal PT LTG, Time to Achieve by discharge --    Stair Training Goal PT LTG, Number of Steps 4 steps --    Stair Training Goal PT LTG, [place] Level contact guard assist --    Stair Training Goal PT LTG, Assist Device 2 handrails --    Stair Training Goal PT LTG, Date Goal Reviewed --  06/15/17   Stair Training Goal PT LTG, Outcome --  goal met

## 2017-06-15 NOTE — PLAN OF CARE
Problem: Patient Care Overview (Adult)  Goal: Plan of Care Review  Outcome: Ongoing (interventions implemented as appropriate)    06/15/17 1341   Coping/Psychosocial Response Interventions   Plan Of Care Reviewed With patient   Patient Care Overview   Progress progress toward functional goals is gradual   Outcome Evaluation   Outcome Summary/Follow up Plan required vc's to stay on task innitate task, pt pulled her IV out while washing arms. if d/c recommend 24/7 supervision       Goal: Discharge Needs Assessment  Outcome: Ongoing (interventions implemented as appropriate)    06/12/17 1400 06/12/17 1502 06/13/17 1036   Discharge Needs Assessment   Concerns To Be Addressed --  no discharge needs identified --    Equipment Needed After Discharge --  --  walker, rolling   Discharge Facility/Level Of Care Needs --  --  nursing facility, skilled;home with home health  (Or 24/7 care from family -depending on progress while on ARU)   Current Health   Outpatient/Agency/Support Group Needs skilled nursing facility (specify) --  --    Anticipated Changes Related to Illness inability to care for self --  --    Living Environment   Transportation Available family or friend will provide --  --    Self-Care   Equipment Currently Used at Home --  --  commode         Problem: Inpatient Occupational Therapy  Goal: Transfer Training Goal 1 STG- OT  Outcome: Ongoing (interventions implemented as appropriate)    06/15/17 1341   Transfer Training OT STG   Transfer Training OT STG, Date Goal Reviewed 06/15/17   Transfer Training OT STG, Outcome goal ongoing       Goal: Dymanic Standing Balance Goal STG- OT  Outcome: Ongoing (interventions implemented as appropriate)    06/15/17 1341   Dynamic Standing Balance OT STG   Dynamic Standing Balance OT STG, Date Goal Reviewed 06/15/17   Dynamic Standing Balance OT STG, Outcome goal ongoing       Goal: Caregiver Training Goal LTG- OT  Outcome: Ongoing (interventions implemented as appropriate)     06/15/17 1341   Caregiver Training OT LTG   Caregiver Training OT LTG, Date Goal Reviewed 06/15/17   Caregiver Training OT LTG, Outcome goal ongoing       Goal: ADL Goal LTG- OT  Outcome: Ongoing (interventions implemented as appropriate)    06/15/17 1341   ADL OT LTG   ADL OT LTG, Date Goal Reviewed 06/15/17   ADL OT LTG, Outcome goal ongoing       Goal: Activity Tolerance Goal STG- OT  Outcome: Ongoing (interventions implemented as appropriate)    06/15/17 1341   Activity Tolerance OT STG   Activity Tolerance Goal OT STG, Date Goal Reviewed 06/15/17   Activity Tolerance Goal OT STG, Outcome goal ongoing

## 2017-06-15 NOTE — PROGRESS NOTES
"[facility]  [address] [place] [address]. [address]  T - [phone]  F - [phone]     Progress Note          PATIENT  DEMOGRAPHICS   PATIENT NAME: Kierra Scales                      PHYSICIAN: Kamron Strong MD  : [id number]  MRN: [id number]   LOS: 3 days    Patient Care Team:  Melissa Ch MD as PCP - [facility]  Subjective   SUBJECTIVE   Doing well no soa         Objective   OBJECTIVE   Vital Signs  Temp:  [96.3 °F (35.7 °C)-97.6 °F (36.4 °C)] 96.3 °F (35.7 °C)  Heart Rate:  [62-70] 62  Resp:  [20] 20  BP: (100-101)/(58-60) 100/60    Flowsheet Rows         First Filed Value    Admission Height  72\" (182.9 cm) Documented at 2017 1322    Admission Weight  171 lb 6.4 oz (77.7 kg) [please use standing scale fo next weigh in.] Documented at 2017 1322           I/O last 3 completed shifts:  In: 480 [P.O.:480]  Out: -     PHYSICAL EXAM    Physical Exam   Constitutional: She is oriented to person, place, and time. She appears well-developed.   HENT:   Head: Normocephalic.   Eyes: Pupils are equal, round, and reactive to light.   Cardiovascular: Normal rate, regular rhythm and normal heart sounds.    Pulmonary/Chest: Effort normal and breath sounds normal.   Abdominal: Soft. Bowel sounds are normal.   Neurological: She is alert and oriented to person, place, and time.       RESULTS   Results Review:      Results from last 7 days  Lab Units 06/15/17  1002 17  0501 17  0533 17  1132   SODIUM mmol/L 138 135* 137 133*   POTASSIUM mmol/L 3.8 3.5 3.1* 3.8  3.8   CHLORIDE mmol/L 99 97 97 94*   TOTAL CO2 mmol/L 29.0 28.0 28.0 25.0   BUN mg/dL 27* 27* 18 6*   CREATININE mg/dL 0.86 1.40* 0.97 0.54   CALCIUM mg/dL 8.7 8.9 8.7 8.9   BILIRUBIN mg/dL  --   --  0.3 0.5   ALK PHOS U/L  --   --  50 63   ALT (SGPT) U/L  --   --  33 29   AST (SGOT) U/L  --   --  16 30   GLUCOSE mg/dL 220* 88 134* 168*       Estimated " Creatinine Clearance: 52.2 mL/min (by C-G formula based on Cr of 0.86).      Results from last 7 days  Lab Units 06/15/17  1002 06/14/17  0501 06/13/17  0533   MAGNESIUM mg/dL  --  2.0 1.9   PHOSPHORUS mg/dL 3.3 4.1  --                Results from last 7 days  Lab Units 06/15/17  1002 06/14/17  0501 06/13/17  0533 06/11/17  0435 06/09/17  1132   WBC 10*3/mm3 8.23 6.47 5.46 5.60 6.26   HEMOGLOBIN g/dL 9.5* 8.8* 8.5* 9.6* 9.9*   PLATELETS 10*3/mm3 327 319 299 300 323               Imaging Results (last 24 hours)     Procedure Component Value Units Date/Time    US Renal Complete [[id number]] Collected:  06/14/17 1350     Updated:  06/14/17 1523    Narrative:         EXAMINATION: Ultrasound, retroperitoneal / renal     CLINICAL INDICATION / HISTORY: юлия rule out hydro, R13.11  Dysphagia, oral phase Z74.09 Other reduced mobility M62.81 Muscle  weakness (generalized) R26.9 Unspecified abnormalities of gait  and mobility R48.9 Unspecified symbolic dysfunctions  COMPARISON:  CT abdomen and pelvis dated 5/30/2017  TECHNIQUE:  Grayscale and color Doppler ultrasound.    FINDINGS:    Right kidney: The right kidney is normal in size and  echogenicity. It measures 11.3 cm in length. There are two cysts  in the right kidney, one measures 1.4 cm in the midpole which  correlates to the hyperdense cyst seen on CT. The other measures  3.2 cm located more inferiorly. Their is mild right  hydronephrosis. There is no evidence of suspicious mass or  calculus.     Left kidney: The left kidney is normal in size and echogenicity.  It measures 11.8 cm in length. There is a 3.8 cm cyst in the  upper pole of the left kidney. There is no evidence of  hydronephrosis, suspicious mass or calculus.     Urinary bladder: Unremarkable         Impression:       CONCLUSION: Bilateral renal cysts including that which was noted  on the CT in the right mid kidney. Mild right hydronephrosis.    Electronically signed by:  Ugo Washington MD  6/14/2017 3:22 PM  CDT  Workstation: [id number]           MEDICATIONS      diltiaZEM  mg Oral Daily   docusate sodium 100 mg Oral BID   donepezil 10 mg Oral Nightly   ferrous sulfate 324 mg Oral BID With Meals   folic acid-vit B6-vit B12 1 tablet Oral Daily   insulin aspart 0-14 Units Subcutaneous 4x Daily AC & at Bedtime   insulin detemir 15 Units Subcutaneous Nightly   magnesium oxide 400 mg Oral Daily   memantine 5 mg Oral BID   methIMAzole 2.5 mg Oral Daily   potassium chloride 10 mEq Oral BID With Meals   sotalol 40 mg Oral Q12H       sodium chloride 75 mL/hr Last Rate: 75 mL/hr (06/15/17 5429)       Assessment/Plan   ASSESSMENT / PLAN    Principal Problem:    Physical deconditioning  Active Problems:    Essential hypertension    Tricuspid valve disorders, 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 Patient baseline creatinine is less than 1. This could be related to hypotension leading to hypoperfusion. Patient Vasotec is on hold. Cr is better with ivf. Will stop ivf after current bag.   Cardizem and Betapace are now lowered . U/s showed cyst and mild right hydro - observe for now    2.diabetes mellitus type 2 currently on insulin. tradjenta on hold     3.history of hypothyroidism     4.hypokalemia currently stable on potassium supplement     5. Colon cancer s/p resection                This document has been electronically signed by Kamron Strong MD on Briana 15, 2017 1:28 PM

## 2017-06-15 NOTE — THERAPY TREATMENT NOTE
"Inpatient Rehabilitation - Speech Language Pathology Treatment Note  [facility]     Patient Name: Kierra Scales  : 1929  MRN: [id number]  Today's Date: 6/15/2017  Referring Physician: Khari      Admit Date: 2017  Goals to be addressed:  1. Pt will safely tolerate recommended diet w/no overt s/s of aspiration:GOAL MET pt tolerated cereal in milk with no s/s of aspirastion.  Pt does have some difficulty attending to task, and required verbal cues to chew as well as take one bite at a time.  Pt will require supervision at meals to ensure these strats are followed.    2. Pt will complete one-step directions without objects w/90% acc: Pt was 100% acc w/simple 1-step directions (body parts). GOAL MET  3. Pt will complete two-step commands w/90% acc: Pt was 15% acc w/2-step directions using body parts.  4. Pt will complete simple yes/no questions w/90% acc: Pt was 30% acc w/basic yes/no questions  5. Pt will answer simple \"WH\" questions w/90% acc: Pt was 50% acc w/wh-questions.   6. Pt will complete object/picture naming w/80% acc: Pt was 90% acc w/simple picture naming.  7. Pt will complete convergent naming task w/80% acc: she could not attend to task.  Obtained 20% accuracy.    8. Pt will complete simple divergent naming task w/an average of 5 wpm for simple concrete categories: Pt had an average of 3 wpm (fruit 4, animals 3, clothes 4, veggies 2). Moderate cues provided.   9. Pt will complete immediate recall of related words w/80% acc: Pt was 30% acc w/recall of 3 related words.  Discontinue goal  10: Pt will recall details of the day w/80% acc: deferred  11. Pt will complete orientation to [place], [place], and Place w/80% acc with use of visual aide: 0% did not know place, month, year, or state.  Did not recall why in hospital. (Goal is changed this date to incorporate use of visual cues)  Visit Dx:      ICD-10-CM ICD-9-CM   1. Oral phase dysphagia R13.11 787.21   2. Impaired mobility and ADLs Z74.09 " 799.89   3. Muscle weakness (generalized) M62.81 728.87   4. Abnormality of gait and mobility R26.9 781.2   5. 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)   • Hypothyroidism   • Dyspnea   • Hyperthyroidism   • Gastrointestinal hemorrhage with melena   • Type 2 diabetes mellitus with hyperglycemia   • Colon cancer   • Chronic blood loss anemia   • Physical deconditioning   • Senile dementia, uncomplicated   • Encounter for rehabilitation   • Adenocarcinoma of sigmoid colon   • Hypokalemia              Adult Rehabilitation Note       06/15/17 0905 06/15/17 0800 06/14/17 1454    Rehab Assessment/Intervention    Discipline speech language pathologist  -EC physical therapy assistant  -RW physical therapy assistant  -RW    Document Type therapy note (daily note)  -EC therapy note (daily note)  -RW therapy note (daily note)  -RW    Subjective Information agree to therapy;no complaints  -EC agree to therapy  -RW agree to therapy  -RW    Patient Effort, Rehab Treatment fair  -EC fair  -RW fair  -RW    Patient Response to Treatment  got dizzy after steps and needed to be lowered into wc. bp 103/59 hr 70  -RW c/o back hurting and tired  -RW    Recorded by [EC] Tamiko Couch, GURMEET-SLP [RW] Danny Beebe PTA [RW] Danny Beebe PTA    Vital Signs    Intra Systolic BP Rehab  103  -RW     Intra Treatment Diastolic BP  59  -RW     Intratreatment Heart Rate (beats/min)  70  -RW     Intra SpO2 (%)  97  -RW     O2 Delivery Intra Treatment  room air  -RW     Post SpO2 (%)  92  -RW     O2 Delivery Post Treatment  room air  -RW     Recorded by  [RW] Danny Beebe PTA     Pain Assessment    Pain Assessment No/denies pain  -EC --    c/o back pain with transiton moves and gt  -RW     [place] FACES Pain Rating   --   back hurts  -RW    Pain Location  Back  -RW Back  -RW    Pain Orientation  Left;Lower  -RW Left;Lower  -RW    Pain Intervention(s)  Rest  -RW Rest  -RW    Recorded by [EC] Tamiko Couch CCC-SLP [RW] Danny Beebe PTA [RW] Danny Beebe PTA    Vision Assessment/Intervention    Visual Impairment  WFL with corrective lenses  -RW WFL with corrective lenses  -RW    Recorded by  [RW] Danny Beebe PTA [RW] Danny Beebe PTA    Cognitive Assessment/Intervention    Current Cognitive/Communication Assessment  impaired  -RW impaired  -RW    Orientation Status  oriented to;person  -RW oriented to;person  -RW    Follows Commands/Answers Questions  50% of the time;needs cueing  -RW 50% of the time;needs cueing  -RW    Personal Safety  moderate impairment;mild impairment  -RW     Personal Safety Interventions  gait belt;nonskid shoes/slippers when out of bed  -RW     Recorded by  [RW] Danny Beebe PTA [RW] Danny Beebe PTA    Communication Treatment Objective and Progress    Receptive Language Treatment Objectives Improve ability to follow directions;Improve ability to comprehend questions  -EC      Expressive Treatment Objectives Improve word retrieval skills  -EC      Cognitive Linguistic Treatment Objectives Improve orientation;Improve memory skills  -EC      Recorded by [EC] Tamiko Couch CCC-SLP      Improve ability to follow directions    Improve ability to follow directions: two-step commands;80%;without cues  -EC      Status: Improve ability to follow directions Progress slower than expected  -EC      Ability to Follow Directions Progress 20%  -EC      Recorded by [EC] Tamiko Couch CCC-SLP      Improve ability to comprehend questions    Improve ability to comprehend questions: simple yes/no questions;simple general questions;90%  -EC      Status: Improve ability to comprehend questions Progressing as  expected  -EC      Ability to Comprehend Questions Progress 30%;50%  -EC      Recorded by [EC] OSIRIS Lin      Improve word retrieval skills    Improve word retrieval skills by: naming an object/pic;answer WH question with one word;completing a divergent task;completing a convergent task;90%  -EC      Status: Improve word retrieval skills Progressing as expected  -EC      Word Retrieval Skills Progress 50%  -EC      Recorded by [EC] OSIRIS Lin      Improve orientation    Improve orientation through: demonstrating orientation to day;demonstrating orientation to year;demonstrating orientation to place;80%  -EC      Status: Improve orientation through Progress slower than expected  -EC      Orientation Progress 30%  -EC      Recorded by [EC] OSIRIS Lin      Improve memory skills    Improve memory skills through: recall details of the day;50%  -EC      Status: Improve memory skills Progress slower than expected  -EC      Memory Skills Progress 50%  -EC      Recorded by [EC] OSIRIS Lin      Dysphagia Treatment Objectives and Progress    Dysphagia Treatment Objectives Other 1  -EC      Recorded by [EC] OSIRIS Lin      Dysphagia Other 1    Dysphagia Other 1 Objective Pt will safely tolerate recommended diet w/no overt s/s of aspiration.  -EC      Status: Dysphagia Other 1 Achieved  -EC      Dysphagia Other 1 Progress 90%;with consistent cues  -EC      Recorded by [EC] OSIRIS Lin      Transfer Assessment/Treatment    Transfers, Sit-Stand [place]  contact guard assist;verbal cues required;minimum assist (75% patient effort)  -RW contact guard assist;verbal cues required;minimum assist (75% patient effort)  -RW    Transfers, Stand-Sit [place]  contact guard assist;verbal cues required  -RW contact guard assist;verbal cues required  -RW    Transfers, Sit-Stand-Sit, Assist Device  rolling walker  -RW rolling walker   -RW    Toilet Transfer, [place]  minimum assist (75% patient effort)  -RW     Toilet Transfer, Assistive Device  rolling walker  -RW     Recorded by  [RW] Danny Beebe PTA [RW] Danny Beebe PTA    Gait Assessment/Treatment    Gait, [place] Level  contact guard assist;verbal cues required  -RW contact guard assist;verbal cues required  -RW    Gait, Assistive Device  rolling walker  -RW rolling walker  -RW    Gait, Distance (Feet)  150   70'  -  -RW    Gait, Impairments  impaired balance;motor control impaired  -RW impaired balance;motor control impaired  -RW    Gait, Comment   needed alot encouragement  -RW    Recorded by  [RW] Danny Beebe PTA [RW] Danny Beebe PTA    Stairs Assessment/Treatment    Number of Stairs  4  -RW curb  -RW    Stairs, Handrail Location  both sides  -RW     Stairs, [place] Level  contact guard assist  -RW minimum assist (75% patient effort)  -RW    Stairs, Assistive Device  --  -RW walker  -RW    Stairs, Comment  pt got dizzy and had to be lowered into her wc  -RW     Recorded by  [RW] Danny Beebe PTA [RW] Danny Beebe PTA    Wheelchair Training/Management    Wheelchair, Distance Propelled  100  -RW     Wheelchair Training Comment  min assist  -RW     Recorded by  [RW] Danny Beebe PTA     Lower Body Dressing Assessment/Training    LB Dressing Assess/Train, Clothing Type  --  -RW donning:;shoes  -RW    Recorded by  [RW] Danny Beebe PTA [RW] Danny Beebe PTA    Balance Skills Training    Standing-Balance Activities  --  -RW --   static stance at windows searching for daughter outside.  -RW    Recorded by  [RW] Danny Beebe PTA [RW] Danny Beebe PTA    Therapy Exercises    Bilateral Lower Extremities  AROM:;ankle pumps/circles;sitting;15 reps;hip abduction/adduction;knee flexion;LAQ   2 sets  -RW AROM:;ankle pumps/circles;10 reps;sitting;hip flexion   2 sets in part  -RW    BLE Resistance  theraband  -RW     Recorded by  [RW] Danny PARRA  ROYAL Beebe [RW] Danny Beebe PTA    Positioning and Restraints    Pre-Treatment Position  sitting in chair/recliner  -RW sitting in chair/recliner  -RW    Post Treatment Position  wheelchair  -RW wheelchair  -RW    In Bed   patient within staff view  -RW    In Wheelchair  with SLP  -RW     Recorded by  [RW] Danny Beebe PTA [RW] Danny Beebe PTA      06/14/17 1205 06/14/17 1106 06/14/17 0905    Rehab Assessment/Intervention    Discipline speech language pathologist  -CK physical therapy assistant  -RW occupational therapy assistant  -RC    Document Type therapy note (daily note)  -CK therapy note (daily note)  -RW therapy note (daily note)  -RC    Subjective Information agree to therapy  -CK agree to therapy  -RW agree to therapy  -RC    Patient Effort, Rehab Treatment adequate  -CK adequate  -RW good  -RC    Patient Response to Treatment  needed alot of encouragement and daughter assisted  -RW     Recorded by [CK] Michelle Whitten MS CCC-SLP [RW] Danny Beebe PTA [RC] ADRIANNA GomezA/POONAM    Pain Assessment    Pain Assessment No/denies pain  -CK      Pain Score 0  -CK      Post Pain Score 0  -CK unable to assess  -RW unable to assess  -RC    Pain Type  Chronic pain  -RW Chronic pain  -RC    Pain Location  Back  -RW Back  -RC    Pain Orientation  Left;Lower  -RW Lower  -RC    Pain Intervention(s)   Rest  -RC    Recorded by [CK] Michelle Whitten MS CCC-SLP [RW] Danny Beebe PTA [RC] ADRIANNA GomezA/L    Vision Assessment/Intervention    Visual Impairment  WFL with corrective lenses  -RW     Recorded by  [RW] Danny Beebe PTA     Cognitive Assessment/Intervention    Current Cognitive/Communication Assessment  impaired  -RW impaired  -RC    Orientation Status  oriented to;person;place;disoriented to;time;situation  -RW person  -RC    Recorded by  [RW] Danny Beebe PTA [RC] ADRIANNA GomezA/L    Dysphagia Treatment Objectives and Progress    Dysphagia Treatment Objectives Other 1   -CK      Recorded by [CK] Michelle Whitten MS CCC-SLP      Dysphagia Other 1    Dysphagia Other 1 Objective Pt will safely tolerate recommended diet w/no overt s/s of aspiration.  -CK      Status: Dysphagia Other 1 Progressing as expected  -CK      Dysphagia Other 1 Progress 90%;continue to address  -CK      Comments: Dysphagia Other 1 Pt safely tolerated regular solids/thin liquids.  Pt did require initiation to cut and eat meat.  -CK      Recorded by [CK] Michelle Whitten MS CCC-SLP      Bed Mobility, Assessment/Treatment    Bed Mobility, Assistive Device  bed rails;head of bed elevated  -RW     Bed Mob, Supine to Sit, [place]  supervision required  -RW     Bed Mob, Sit to Supine, [place]   supervision required  -RC    Recorded by  [RW] Danny Beebe PTA [RC] Ally Kwong, MADRIGAL/L    Transfer Assessment/Treatment    Transfers, Chair-Bed [place]   contact guard assist  -RC    Transfers, Bed-Chair-Bed, Assist Device   rolling walker  -RC    Transfers, Sit-Stand [place]  contact guard assist;verbal cues required  -RW     Transfers, Stand-Sit [place]  contact guard assist;verbal cues required  -RW     Transfers, Sit-Stand-Sit, Assist Device  rolling walker  -RW     Toilet Transfer, [place]  contact guard assist;minimum assist (75% patient effort)   daughter assist  -RW contact guard assist;verbal cues required  -RC    Toilet Transfer, Assistive Device  rolling walker  -RW rolling walker  -RC    Recorded by  [RW] Danny Beebe PTA [RC] Ally Kwong, MADRIGAL/L    Gait Assessment/Treatment    Gait, [place] Level  contact guard assist;verbal cues required  -RW     Gait, Assistive Device  rolling walker  -RW     Gait, Distance (Feet)  150  -RW     Gait, Impairments  impaired balance  -RW     Recorded by  [RW] Danny Beebe PTA     Functional Mobility    Functional Mobility- Ind. Level   contact guard assist  -RC    Functional Mobility- Device   rolling walker  -RC     Functional Mobility-Distance (Feet)   15  -RC    Recorded by   [RC] ADRIANNA GomezA/L    Upper Body Bathing Assessment/Training    UB Bathing Assess/Train, Position   sitting;sink side  -RC    UB Bathing Assess/Train, Door Level   supervision required;verbal cues required  -RC    Recorded by   [RC] ADRIANNA GomezA/L    Lower Body Bathing Assessment/Training    LB Bathing Assess/Train, Position   sitting;standing  -RC    LB Bathing Assess/Train, Door Level   contact guard assist  -RC    Recorded by   [RC] ROHAN Gomez/L    Upper Body Dressing Assessment/Training    UB Dressing Assess/Train, Clothing Type   doffing:;donning:;pull over  -RC    UB Dressing Assess/Train, Position   sitting  -RC    UB Dressing Assess/Train, Door   minimum assist (75% patient effort)  -RC    Recorded by   [RC] ADRIANNA GomezA/L    Lower Body Dressing Assessment/Training    LB Dressing Assess/Train, Clothing Type  donning:;shoes  -RW doffing:;donning:;pants;shorts;shoes  -RC    LB Dressing Assess/Train, Position  sitting  -RW sitting;standing  -RC    LB Dressing Assess/Train, Door  minimum assist (75% patient effort);set up required  -RW minimum assist (75% patient effort)  -RC    Recorded by  [RW] Danny Beebe PTA [RC] ADRIANNA GomezA/L    Toileting Assessment/Training    Toileting Assess/Train, Position   sitting;standing  -RC    Toileting Assess/Train, [place]epen Level   minimum assist (75% patient effort);verbal cues required  -RC    Recorded by   [RC] ADRIANNA GomezA/L    Grooming Assessment/Training    Grooming Assess/Train, Position   sitting  -RC    Grooming Assess/Train, Indepen Level   supervision required;verbal cues required  -RC    Recorded by   [RC] ROHAN Gomez/L    Therapy Exercises    Bilateral Lower Extremities  AROM:;20 reps;supine;ankle pumps/circles;heel slides;hip abduction/adduction;SAQ   2 sets in part  -RW     Bilateral Upper Extremity    --   ue bike 10 min  -RC    Recorded by  [RW] Danny Beebe PTA [RC] ADRIANNA GomezA/L    Positioning and Restraints    Pre-Treatment Position  in bed  -RW sitting in chair/recliner  -RC    Post Treatment Position  wheelchair  -RW bed  -RC    In Bed  call light within reach  -RW exit alarm on;encouraged to call for assist;call light within reach  -RC    Recorded by  [RW] Danny Beebe PTA [RC] ADRIANNA GomezA/POONAM      06/14/17 0714          Rehab Assessment/Intervention    Discipline speech language pathologist  -CK      Document Type therapy note (daily note)  -CK      Subjective Information agree to therapy  -CK      Patient Effort, Rehab Treatment good  -CK      Recorded by [CK] Michelle Whitten MS CCC-SLP      Pain Assessment    Pain Assessment 0-10  -CK      Pain Score 2  -CK      Post Pain Score 2  -CK      Pain Type Chronic pain  -CK      Pain Location Back  -CK      Pain Orientation Lower  -CK      Pain Intervention(s) Repositioned  -CK      Recorded by [CK] Michelle Whitten MS CCC-SLP      Communication Treatment Objective and Progress    Receptive Language Treatment Objectives Improve ability to follow directions;Improve ability to comprehend questions  -CK      Expressive Treatment Objectives Improve word retrieval skills  -CK      Cognitive Linguistic Treatment Objectives Improve orientation;Improve memory skills  -CK      Recorded by [CK] Michelle Whitten MS CCC-SLP      Improve ability to follow directions    Improve ability to follow directions: one-step directions without objects;two-step commands;90%  -CK      Status: Improve ability to follow directions Progressing as expected  -CK      Ability to Follow Directions Progress 100%;20%  -CK      Recorded by [CK] Michelle Whitten MS CCC-SLP      Improve ability to comprehend questions    Improve ability to comprehend questions: simple yes/no questions;simple general questions;90%  -CK      Status: Improve ability to comprehend  questions Progressing as expected  -CK      Ability to Comprehend Questions Progress 90%;50%  -CK      Comments: Improve ability to comprehend questions 90% w/basic yes/no; 50% w/comparative yes/no questions  -CK      Recorded by [CK] Michelle Whitten MS CCC-SLP      Improve word retrieval skills    Improve word retrieval skills by: naming an object/pic;answer WH question with one word;completing a divergent task;completing a convergent task;90%  -CK      Status: Improve word retrieval skills Progressing as expected  -CK      Word Retrieval Skills Progress 50%;with inconsistent cues  -CK      Recorded by [CK] Michelle Whitten MS CCC-SLP      Improve orientation    Improve orientation through: demonstrating orientation to day;demonstrating orientation to year;demonstrating orientation to place;80%  -CK      Status: Improve orientation through Progressing as expected  -CK      Orientation Progress 40%  -CK      Recorded by [CK] Michelle Whitten MS CCC-ELISA      Improve memory skills    Improve memory skills through: recalling related word lists immediately;recall details of the day;80%  -CK      Status: Improve memory skills Progressing as expected  -CK      Memory Skills Progress 60%  -CK      Recorded by [CK] Michelle Whitten MS CCC-SLP      Dysphagia Treatment Objectives and Progress    Dysphagia Treatment Objectives Other 1  -CK      Recorded by [CK] MS OSIRIS Cho      Dysphagia Other 1    Dysphagia Other 1 Objective Pt will safely tolerate recommended diet w/no overt s/s of aspiration.  -CK      Status: Dysphagia Other 1 Progressing as expected  -CK      Dysphagia Other 1 Progress 90%;continue to address  -CK      Comments: Dysphagia Other 1 Pt safely tolerated [facility] soft solids/thin liquids via straw.  Pt also safely tolerated regular solid trials w/no overt s/s of aspiration  -CK      Recorded by [CK] Michelle Whitten MS CCC-SLP        User Key  (r) = Recorded By, (t) = Taken By, (c) =  Cosigned By    Initials Name Effective Dates    EC Tamiko Couch, CCC-SLP 12/30/16 -     CK Michelle Whitten, MS CCC-SLP 10/17/16 -     RW Danny Beebe, PTA 10/17/16 -     RC Ally Kwong, MADRIGAL/L 10/17/16 -               IP SLP Goals       06/15/17 1103 06/14/17 1434 06/13/17 1506    Safely Consume Diet    Safely Consume Diet- SLP, Date Established   06/13/17  -CK    Safely Consume Diet- SLP, Time to Achieve   by discharge  -CK    Safely Consume Diet- SLP, Additional Goal   Pt will safely tolerate recommended diet w/no overt s/s of aspiration.  -CK    Safely Consume Diet- SLP, Date Goal Reviewed 06/15/17  -EC 06/14/17  -CK 06/13/17  -CK    Safely Consume Diet- SLP, Outcome goal met  -EC goal partially met  -CK     Receptive Language- Optimal Participation in Care    Receptive Language Optimal Participation in Care- SLP, Date Established   06/13/17  -CK    Receptive Language Optimal Participation in Care- SLP, Time to Achieve   by discharge  -CK    Receptive Language Optimal Participation in Care- SLP, Activity Level   Patient will improve ability to follow directions;Patient will improve ability to comprehend questions  -CK    Receptive Language Optimal Participation in Care- SLP, Date Goal Reviewed 06/15/17  -EC 06/14/17  -CK 06/13/17  -CK    Receptive Language Optimal Participation in Care- SLP, Outcome goal ongoing  -EC goal ongoing  -CK     Expressive- Optimal Participation in Care    Expressive Optimal Participation in Care- SLP, Date Established   06/13/17  -CK    Expressive Optimal Participation in Care- SLP, Time to Achieve   by discharge  -CK    Expressive Optimal Participation in Care- SLP, Activity Level   Patient will improve word retrieval skills  -CK    Expressive Optimal Participation in Care- SLP, Date Goal Reviewed 06/15/17  -EC 06/14/17  -CK 06/13/17  -CK    Expressive Optimal Participation in Care- SLP, Outcome goal ongoing  -EC goal ongoing  -CK     Cognitive Linguistic- Optimal  Participation in Care    Cognitive Linguistic Optimal Participation in Care- SLP, Date Established   06/13/17  -CK    Cognitive Linguistic Optimal Participation in Care- SLP, Time to Achieve   by discharge  -CK    Cognitive Linguistic Optimal Participation in Care- SLP, Activity Level   Patient will improve orientation for increased safety in environment;Patient will improve memory skills for increased safety in environment  -CK    Cognitive Linguistic Optimal Participation in Care- SLP, Date Goal Reviewed 06/15/17  -EC 06/14/17  -CK 06/13/17  -CK    Cognitive Linguistic Optimal Participation in Care- SLP, Outcome goal ongoing  -EC goal ongoing  -CK       User Key  (r) = Recorded By, (t) = Taken By, (c) = Cosigned By    Initials Name Provider Type    LUCERO Couch CCC-SLP Speech and Language Pathologist    GLENYS Whitten, MS CCC-SLP Speech and Language Pathologist          EDUCATION  The patient has been educated in the following areas:   Cognitive Impairment Dysphagia (Swallowing Impairment).    SLP Recommendation and Plan                               Plan of Care Review  Plan Of Care Reviewed With: patient  Progress: progress toward functional goals as expected  Outcome Summary/Follow up Plan: pt has met goals for diet toleration.  she has some difficulty attending to task and would best be in her room with decreased distractions when eating a meal.  encouragement to eat verbally.  Pt continues to work on cognitive goals addressing orientation, memory, and word finding.            Time Calculation:         Time Calculation- SLP       06/15/17 1106          Time Calculation- SLP    SLP Start Time 0905  -EC      SLP Stop Time 0950  -EC      SLP Time Calculation (min) 45 min  -EC      Total Timed Code Minutes- SLP 45 minute(s)  -EC      SLP Received On 06/15/17  -EC        User Key  (r) = Recorded By, (t) = Taken By, (c) = Cosigned By    Initials Name Provider Type    EC Tamiko Couch CCC-SLP  Speech and Language Pathologist          Therapy Charges for Today     Code Description Service Date Service Provider Modifiers Qty    [id number]  ST TREATMENT SWALLOW 1 6/15/2017 LILIAN LinSLP GN 1    [id number]  ST TREATMENT SPEECH 2 6/15/2017 OSIRIS Lin GN 1               OSIRIS Soliman  6/15/2017

## 2017-06-15 NOTE — PLAN OF CARE
Problem: Patient Care Overview (Adult)  Goal: Plan of Care Review  Outcome: Ongoing (interventions implemented as appropriate)    06/15/17 7418   Coping/Psychosocial Response Interventions   Plan Of Care Reviewed With patient   Patient Care Overview   Progress no change   Outcome Evaluation   Outcome Summary/Follow up Plan pt has done well with therapy,she has had some confusion today and should be monitored for safe choices.pt became very dizzy in tthe gym today but all vitals were wnl,sugar was fine but pt rebounded and felt fine after that.       Goal: Adult Individualization and Mutuality  Outcome: Ongoing (interventions implemented as appropriate)    Problem: Functional Deficit (Adult,Obstetrics,Pediatric)  Goal: Signs and Symptoms of Listed Potential Problems Will be Absent or Manageable (Functional Deficit)  Outcome: Ongoing (interventions implemented as appropriate)    Problem: Fall Risk (Adult)  Goal: Absence of Falls  Outcome: Ongoing (interventions implemented as appropriate)    Problem: Infection, Risk/Actual (Adult)  Goal: Infection Prevention/Resolution  Outcome: Ongoing (interventions implemented as appropriate)

## 2017-06-15 NOTE — NURSING NOTE
pts daughters have stated her lips are swollen,pt has no c/o tingling,burning or soreness.facility denture cream vs pts own fixadent.i cant say if i see any swelling but MD is aware and we will probably just monitor it

## 2017-06-15 NOTE — PLAN OF CARE
Problem: Patient Care Overview (Adult)  Goal: Plan of Care Review  Outcome: Ongoing (interventions implemented as appropriate)    06/15/17 0229   Coping/Psychosocial Response Interventions   Plan Of Care Reviewed With patient   Patient Care Overview   Progress no change   Outcome Evaluation   Outcome Summary/Follow up Plan pt rested well during night vss c/o of lower back pain         Problem: Functional Deficit (Adult,Obstetrics,Pediatric)  Goal: Signs and Symptoms of Listed Potential Problems Will be Absent or Manageable (Functional Deficit)  Outcome: Ongoing (interventions implemented as appropriate)    Problem: Fall Risk (Adult)  Goal: Absence of Falls  Outcome: Ongoing (interventions implemented as appropriate)    Problem: Infection, Risk/Actual (Adult)  Goal: Infection Prevention/Resolution  Outcome: Ongoing (interventions implemented as appropriate)

## 2017-06-15 NOTE — PLAN OF CARE
Problem: Patient Care Overview (Adult)  Goal: Plan of Care Review  Outcome: Ongoing (interventions implemented as appropriate)    06/15/17 1103   Coping/Psychosocial Response Interventions   Plan Of Care Reviewed With patient   Patient Care Overview   Progress progress toward functional goals as expected   Outcome Evaluation   Outcome Summary/Follow up Plan pt has met goals for diet toleration. she has some difficulty attending to task and would best be in her room with decreased distractions when eating a meal. encouragement to eat verbally. Pt continues to work on cognitive goals addressing orientation, memory, and word finding.          Problem: Inpatient SLP  Goal: Dysphagia- Patient will safely consume diet as per recommendation with no signs/symptoms of aspiration  Outcome: Outcome(s) achieved Date Met:  06/15/17    06/13/17 1506 06/15/17 1103   Safely Consume Diet   Safely Consume Diet- SLP, Date Established 06/13/17 --    Safely Consume Diet- SLP, Time to Achieve by discharge --    Safely Consume Diet- SLP, Additional Goal Pt will safely tolerate recommended diet w/no overt s/s of aspiration. --    Safely Consume Diet- SLP, Date Goal Reviewed --  06/15/17   Safely Consume Diet- SLP, Outcome --  goal met       Goal: Expressive-Patient will improve expressive language skills to allow optimal participation in care  Outcome: Ongoing (interventions implemented as appropriate)    06/13/17 1506 06/15/17 1103   Expressive- Optimal Participation in Care   Expressive Optimal Participation in Care- SLP, Date Established 06/13/17 --    Expressive Optimal Participation in Care- SLP, Time to Achieve by discharge --    Expressive Optimal Participation in Care- SLP, Activity Level Patient will improve word retrieval skills --    Expressive Optimal Participation in Care- SLP, Date Goal Reviewed --  06/15/17   Expressive Optimal Participation in Care- SLP, Outcome --  goal ongoing       Goal: Cognitive-linguistic-Patient will  improve Cognitive-linguistic skills to allow optimal participation in care  Outcome: Ongoing (interventions implemented as appropriate)    06/13/17 1506 06/15/17 1103   Cognitive Linguistic- Optimal Participation in Care   Cognitive Linguistic Optimal Participation in Care- SLP, Date Established 06/13/17 --    Cognitive Linguistic Optimal Participation in Care- SLP, Time to Achieve by discharge --    Cognitive Linguistic Optimal Participation in Care- SLP, Activity Level Patient will improve orientation for increased safety in environment;Patient will improve memory skills for increased safety in environment --    Cognitive Linguistic Optimal Participation in Care- SLP, Date Goal Reviewed --  06/15/17   Cognitive Linguistic Optimal Participation in Care- SLP, Outcome --  goal ongoing       Goal: Receptive Language-Patient will improve receptive language skills to allow optimal participation in care  Outcome: Ongoing (interventions implemented as appropriate)    06/13/17 1506 06/15/17 1103   Receptive Language- Optimal Participation in Care   Receptive Language Optimal Participation in Care- SLP, Date Established 06/13/17 --    Receptive Language Optimal Participation in Care- SLP, Time to Achieve by discharge --    Receptive Language Optimal Participation in Care- SLP, Activity Level Patient will improve ability to follow directions;Patient will improve ability to comprehend questions --    Receptive Language Optimal Participation in Care- SLP, Date Goal Reviewed --  06/15/17   Receptive Language Optimal Participation in Care- SLP, Outcome --  goal ongoing

## 2017-06-15 NOTE — PROGRESS NOTES
LOS: 3 days   Patient Care Team:  Melissa Ch MD as PCP - General  Lindsay Ocampo MA as Medical Assistant    Chief Complaint: Deconditioning secondary to colon obstruction        Interval History:   Subjective   complains of back pain today      Review of Systems no shortness of breath no fever no chills no nausea no vomiting  She is out of bed and she is working with therapy      History taken from: patient chart family RN And therapy staff     has a past medical history of Anxiety; Arthritis; Bradycardia; Dyslipidemia; Dyspnea; Fibrocystic disease of breast; Hashimoto's thyroiditis; Hypertensive disorder; Hypothyroidism; Pain; Palpitations; Paroxysmal tachycardia; Shortness of breath; Tricuspid valve disorders, specified as nonrheumatic; and Vitamin D deficiency.  Social History     Social History   • Marital status:      Spouse name: N/A   • Number of children: N/A   • Years of education: N/A     Occupational History   • Not on file.     Social History Main Topics   • Smoking status: Never Smoker   • Smokeless tobacco: Never Used   • Alcohol use No   • Drug use: Not on file   • Sexual activity: Defer     Other Topics Concern   • Not on file     Social History Narrative    Lives alone but daughters expect to stay with her at discharge     Past Surgical History:   Procedure Laterality Date   • COLON RESECTION N/A 6/2/2017    Procedure: COLON RESECTION;  Surgeon: Stefano Linn MD;  Location: [facility] OR;  Service:    • COLONOSCOPY N/A 5/30/2017    Procedure: Flexible Sigmoidoscopy;  Surgeon: Stefano Linn MD;  Location: [facility] ENDOSCOPY;  Service:    • EXTERNAL EAR SURGERY     • EYE SURGERY     • LIPOMA EXCISION     • PACEMAKER IMPLANTATION     • SALIVARY GLAND SURGERY     • TUBAL ABDOMINAL LIGATION       Family History   Problem Relation Age of Onset   • Diabetes Other    • Heart disease Other    • Hypertension Other    • Thyroid disease Other      Allergies   Allergen  Reactions   • Penicillins        Objective     Vital Signs  Temp:  [96.3 °F (35.7 °C)-97.6 °F (36.4 °C)] 96.3 °F (35.7 °C)  Heart Rate:  [62-70] 62  Resp:  [20] 20  BP: (100-101)/(58-60) 100/60  Last 3 weights    06/13/17  0558 06/14/17  0300 06/15/17  0449   Weight: 166 lb 4 oz (75.4 kg) 166 lb (75.3 kg) 170 lb (77.1 kg)       Physical Exam:     General Appearance:    Alert, cooperative, in no acute distress   Head:    Normocephalic, without obvious abnormality, atraumatic   Eyes:            Lids and lashes normal, conjunctivae and sclerae normal, no   icterus, no pallor, corneas clear, PERRLA   Throat:   No oral lesions, no thrush, oral mucosa moist   Neck:   No adenopathy, supple, trachea midline, no thyromegaly, no   carotid bruit, no JVD       Lungs:     Clear to auscultation,respirations regular, even and                  Unlabored     Heart:    Regular rhythm and normal rate, normal S1 and S2, no            murmur, no gallop, no rub, no click    Chest Wall:    No abnormalities observed   Abdomen:     Normal bowel sounds, no masses, no organomegaly, soft        non-tender, non-distended, no guarding, no rebound                Tenderness Incision healing well    Extremities:   Moves all extremities well, no edema, no cyanosis, no             Redness        Skin:   No bleeding, bruising or rash   Lymph nodes:   No palpable adenopathy   Neurologic:   Cranial nerves 2 - 12 grossly intact, sensation intact, DTR       present and equal bilaterally She is alert and talkative but she has significant cognitive defects                                                  Results Review:   Lab Results (last 24 hours)     Procedure Component Value Units Date/Time    Creatinine, Urine, Random [[id number]] Collected:  06/14/17 1340    Specimen:  Urine from Urine, Clean Catch Updated:  06/14/17 1407     Creatinine, Urine 387.7 mg/dL     POC Glucose Fingerstick [[id number]]  (Abnormal) Collected:  06/14/17 1607    Specimen:  Blood  Updated:  06/14/17 1622     Glucose 280 (H) mg/dL       RN NotifiedMeter: [id number]: [id number] CLAY VÁSQUEZ       POC Glucose Fingerstick [[id number]]  (Abnormal) Collected:  06/1934    Specimen:  Blood Updated:  06/14/17 1950     Glucose 265 (H) mg/dL       RN NotifiedSliding Scale AdminMeter: [id number]: [id number] ESTRELLITA       POC Glucose Fingerstick [[id number]]  (Normal) Collected:  06/15/17 0516    Specimen:  Blood Updated:  06/15/17 0528     Glucose 119 mg/dL       RN NotifiedMeter: [id number]: [id number] ESTRELLITA LERMA       CBC & Differential [[id number]] Collected:  06/15/17 1002    Specimen:  Blood Updated:  06/15/17 1022    Narrative:       The following orders were created for panel order CBC & Differential.  Procedure                               Abnormality         Status                     ---------                               -----------         ------                     CBC Auto Differential[[id number]]        Abnormal            Final result                 Please view results for these tests on the individual orders.    CBC Auto Differential [[id number]]  (Abnormal) Collected:  06/15/17 1002    Specimen:  Blood Updated:  06/15/17 1022     WBC 8.23 10*3/mm3      RBC 3.20 (L) 10*6/mm3      Hemoglobin 9.5 (L) g/dL      Hematocrit 29.5 (L) %      MCV 92.2 fL      MCH 29.7 pg      MCHC 32.2 g/dL      RDW 14.1 %      RDW-SD 47.5 (H) fl      MPV 10.1 fL      Platelets 327 10*3/mm3      Neutrophil % 86.6 (H) %      Lymphocyte % 7.7 (L) %      Monocyte % 5.2 %      Eosinophil % 0.1 %      Basophil % 0.0 %      Immature Grans % 0.4 %      Neutrophils, Absolute 7.13 10*3/mm3      Lymphocytes, Absolute 0.63 10*3/mm3      Monocytes, Absolute 0.43 10*3/mm3      Eosinophils, Absolute 0.01 10*3/mm3      Basophils, Absolute 0.00 10*3/mm3      Immature Grans, Absolute 0.03 (H) 10*3/mm3      nRBC 0.0 /100 WBC     Renal Function Panel [[id number]]  (Abnormal) Collected:   06/15/17 1002    Specimen:  Blood Updated:  06/15/17 1031     Glucose 220 (H) mg/dL      BUN 27 (H) mg/dL      Creatinine 0.86 mg/dL      Sodium 138 mmol/L      Potassium 3.8 mmol/L      Chloride 99 mmol/L      CO2 29.0 mmol/L      Calcium 8.7 mg/dL      Albumin 3.30 (L) g/dL      Phosphorus 3.3 mg/dL      Anion Gap 10.0 mmol/L      BUN/Creatinine Ratio 31.4 (H)     eGFR  African Amer 75 mL/min/1.73     Narrative:       The MDRD GFR formula is only valid for adults with stable renal function between ages 18 and 70.        Imaging Results (last 72 hours)     Procedure Component Value Units Date/Time    US Renal Complete [[id number]] Collected:  06/14/17 1350     Updated:  06/14/17 1523    Narrative:         EXAMINATION: Ultrasound, retroperitoneal / renal     CLINICAL INDICATION / HISTORY: юлия rule out hydro, R13.11  Dysphagia, oral phase Z74.09 Other reduced mobility M62.81 Muscle  weakness (generalized) R26.9 Unspecified abnormalities of gait  and mobility R48.9 Unspecified symbolic dysfunctions  COMPARISON:  CT abdomen and pelvis dated 5/30/2017  TECHNIQUE:  Grayscale and color Doppler ultrasound.    FINDINGS:    Right kidney: The right kidney is normal in size and  echogenicity. It measures 11.3 cm in length. There are two cysts  in the right kidney, one measures 1.4 cm in the midpole which  correlates to the hyperdense cyst seen on CT. The other measures  3.2 cm located more inferiorly. Their is mild right  hydronephrosis. There is no evidence of suspicious mass or  calculus.     Left kidney: The left kidney is normal in size and echogenicity.  It measures 11.8 cm in length. There is a 3.8 cm cyst in the  upper pole of the left kidney. There is no evidence of  hydronephrosis, suspicious mass or calculus.     Urinary bladder: Unremarkable         Impression:       CONCLUSION: Bilateral renal cysts including that which was noted  on the CT in the right mid kidney. Mild right hydronephrosis.    Electronically signed  by:  Ugo Washington MD  6/14/2017 3:22 PM CDT  Workstation: [id number]            Current Facility-Administered Medications:   •  bisacodyl (DULCOLAX) suppository 10 mg, 10 mg, Rectal, Daily PRN, Bennie Lucia MD  •  calcium carbonate (TUMS) chewable tablet 500 mg (200 mg elemental), 2 tablet, Oral, BID PRN, Bennie Lucia MD  •  dextrose (GLUTOSE) oral gel 15 g, 15 g, Oral, Q15 Min PRN, Bennie Lucia MD  •  diltiaZEM CD (CARDIZEM CD) 24 hr capsule 120 mg, 120 mg, Oral, Daily, Kamron Strong MD, 120 mg at 06/15/17 0803  •  docusate sodium (COLACE) capsule 100 mg, 100 mg, Oral, BID, Bennie Lucia MD, 100 mg at 06/15/17 0803  •  donepezil (ARICEPT) tablet 10 mg, 10 mg, Oral, Nightly, Bennie Lucia MD, 10 mg at 06/14/17 2010  •  ferrous sulfate EC tablet 324 mg, 324 mg, Oral, BID With Meals, Bennie Lucia MD, 324 mg at 06/15/17 0803  •  folic acid-vit B6-vit B12 (FOLBEE) tablet 1 tablet, 1 tablet, Oral, Daily, Bennie Lucia MD, 1 tablet at 06/15/17 0803  •  glucagon (human recombinant) (GLUCAGEN DIAGNOSTIC) injection 1 mg, 1 mg, Subcutaneous, Q15 Min PRN, Bennie Lucia MD  •  HYDROcodone-acetaminophen (NORCO) 5-325 MG per tablet 1 tablet, 1 tablet, Oral, Q6H PRN, Bennie Lucia MD, 1 tablet at 06/15/17 0803  •  insulin aspart (novoLOG) injection 0-14 Units, 0-14 Units, Subcutaneous, 4x Daily AC & at Bedtime, Bennie Lucia MD, 8 Units at 06/15/17 1200  •  insulin detemir (LEVEMIR) injection 15 Units, 15 Units, Subcutaneous, Nightly, Bennie uLcia MD, 15 Units at 06/14/17 2009  •  magnesium oxide (MAGOX) tablet 400 mg, 400 mg, Oral, Daily, Bennie Lucia MD, 400 mg at 06/15/17 0803  •  memantine (NAMENDA) tablet 5 mg, 5 mg, Oral, BID, Bennie Lucia MD, 5 mg at 06/15/17 0803  •  methIMAzole (TAPAZOLE) half tablet 2.5 mg, 2.5 mg, Oral, Daily, Bennie Lucia MD, 2.5 mg at 06/15/17 0803  •  potassium chloride (MICRO-K) CR capsule 10 mEq, 10 mEq,  Oral, BID With Meals, Bennie Lucia MD, 10 mEq at 06/15/17 0803  •  sotalol (BETAPACE) half tablet 40 mg, 40 mg, Oral, Q12H, Kamron Strong MD, 40 mg at 06/15/17 0803      Assessment/Plan   Principal Problem:    Physical deconditioning  Active Problems:    Gastrointestinal hemorrhage with melena    Adenocarcinoma of sigmoid colon    Essential hypertension    Type 2 diabetes mellitus with hyperglycemia    Chronic blood loss anemia    Senile dementia, uncomplicated    Hypokalemia    Tricuspid valve disorders, 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  She has significant dementia and we have all encourage family to stay with her 24 hours a day  She is participating with therapy  We'll continue to monitor her blood pressure and her renal function    Bennie Lucia MD  06/15/17  2:06 PM     She did have an episode of dizziness and lightheadedness and low blood pressure.  At other times her blood pressures been quite high and we will need to continue to monitor blood pressure to try to keep it under control without hypotension

## 2017-06-15 NOTE — THERAPY TREATMENT NOTE
Inpatient Rehabilitation - Physical Therapy Treatment Note  [facility]     Patient Name: Kierra Scales  : 1929  MRN: [id number]  Today's Date: 6/15/2017  Onset of Illness/Injury or Date of Surgery Date: 17  Date of Referral to PT: 17  Referring Physician: Dr. Lucia    Admit Date: 2017    Visit Dx:    ICD-10-CM ICD-9-CM   1. Oral phase dysphagia R13.11 787.21   2. Impaired mobility and ADLs Z74.09 799.89   3. Muscle weakness (generalized) M62.81 728.87   4. Abnormality of gait and mobility R26.9 781.2   5. 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)   • Hypothyroidism   • Dyspnea   • Hyperthyroidism   • Gastrointestinal hemorrhage with melena   • Type 2 diabetes mellitus with hyperglycemia   • Colon cancer   • Chronic blood loss anemia   • Physical deconditioning   • Senile dementia, uncomplicated   • Encounter for rehabilitation   • Adenocarcinoma of sigmoid colon   • Hypokalemia               Adult Rehabilitation Note       06/15/17 1301 06/15/17 0950 06/15/17 0945    Rehab Assessment/Intervention    Discipline physical therapy assistant  -RW occupational therapy assistant  -RC     Document Type therapy note (daily note)  -RW therapy note (daily note)  -RC     Subjective Information agree to therapy  -RW agree to therapy  -RC     Patient Effort, Rehab Treatment good  -RW fair  -RC     Recorded by [RW] Danny Beebe PTA [RC] ROHAN Gomez/L     Vital Signs    Pre Systolic BP Rehab  102  -RC --  -RC    Pre Treatment Diastolic BP  50  -RC --  -RC    Post Systolic BP Rehab  117  -RC --  -RC    Post Treatment Diastolic BP  55  -RC --  -RC    Recorded by  [RC] Ally Kwong  MADRIGAL/L [RC] Ally Kwong, MADRIGAL/L    Pain Assessment    Pain Assessment No/denies pain  -RW      Pain Type  Chronic pain  -RC     Pain Location  Back  -RC     Pain Intervention(s)  Rest  -RC     Recorded by [RW] Danny Beebe PTA [RC] Ally Kwong, MADRIGAL/L     Vision Assessment/Intervention    Visual Impairment WFL with corrective lenses  -RW      Recorded by [RW] Danny Beebe PTA      Cognitive Assessment/Intervention    Current Cognitive/Communication Assessment functional  -RW      Orientation Status oriented to;person  -RW      Follows Commands/Answers Questions 75% of the time  -RW      Personal Safety mild impairment  -RW      Personal Safety Interventions gait belt;nonskid shoes/slippers when out of bed  -RW      Recorded by [RW] Danny Beebe PTA      Transfer Assessment/Treatment    Transfers, Chair-Bed [place]  contact guard assist;verbal cues required  -RC     Transfers, Bed-Chair-Bed, Assist Device  rolling walker  -RC     Transfers, Sit-Stand [place] contact guard assist;verbal cues required  -RW      Transfers, Stand-Sit [place] contact guard assist;verbal cues required  -RW      Transfers, Sit-Stand-Sit, Assist Device rolling walker  -RW      Transfer, Comment sit to stand x 5  -RW      Recorded by [RW] Danny Beebe PTA [RC] Ally Kwong, MADRIGAL/L     Gait Assessment/Treatment    Gait, [place] Level contact guard assist;verbal cues required  -RW      Gait, Assistive Device rolling walker  -RW      Gait, Distance (Feet) 200   150' x 3  -RW      Gait, Impairments impaired balance  -RW      Recorded by [RW] Danny Beebe PTA      Wheelchair Training/Management    Wheelchair, Distance Propelled 120  -RW      Wheelchair Training Comment min assist   -RW      Recorded by [RW] Danny Beebe PTA      Lower Body Dressing Assessment/Training    LB Dressing Assess/Train, Clothing Type  doffing:;shoes  -RC     LB Dressing Assess/Train, Position  sitting  -RC     LB Dressing  Assess/Train, [place]  verbal cues required  -RC     Recorded by  [RC] ADRIANNA GomezA/L     Motor Skills/Interventions    Additional Documentation  --   , cone stack and pass, clothes pin tree  -RC     Recorded by  [RC] ROHAN Gomez/L     Therapy Exercises    Bilateral Lower Extremities AROM:;ankle pumps/circles;sitting;15 reps;hip abduction/adduction;knee flexion;hamstring stretch;hip flexion;mini squats;standing;heel raises   2 sets  -RW      BLE Resistance theraband  -RW      Bilateral Upper Extremity  --   ue bike 15 min multi vc's to stay on task  -RC     Recorded by [RW] Danny Beebe PTA [RC] ROHAN Gomez/L     Positioning and Restraints    Pre-Treatment Position sitting in chair/recliner  -RW sitting in chair/recliner  -RC     Post Treatment Position wheelchair  -RW bed  -RC     In Bed  encouraged to call for assist;call light within reach;notified nsg  -RC     In Wheelchair with OT  -RW      Recorded by [RW] Danny Beebe PTA [RC] ROHAN Gomez/POONAM       06/15/17 0905 06/15/17 0800 06/14/17 1454    Rehab Assessment/Intervention    Discipline speech language pathologist  -EC physical therapy assistant  -RW physical therapy assistant  -RW    Document Type therapy note (daily note)  -EC therapy note (daily note)  -RW therapy note (daily note)  -RW    Subjective Information agree to therapy;no complaints  -EC agree to therapy  -RW agree to therapy  -RW    Patient Effort, Rehab Treatment fair  -EC fair  -RW fair  -RW    Patient Response to Treatment  got dizzy after steps and needed to be lowered into wc. bp 103/59 hr 70  -RW c/o back hurting and tired  -RW    Recorded by [EC] Tamiko Couch, GURMEET-SLP [RW] Danny Beebe, PTA [RW] Danny Beebe, PTA    Vital Signs    Intra Systolic BP Rehab  103  -RW     Intra Treatment Diastolic BP  59  -RW     Intratreatment Heart Rate (beats/min)  70  -RW     Intra SpO2 (%)  97  -RW     O2 Delivery Intra Treatment  room air  -RW      Post SpO2 (%)  92  -RW     O2 Delivery Post Treatment  room air  -RW     Recorded by  [RW] Danny Beebe PTA     Pain Assessment    Pain Assessment No/denies pain  -EC --   c/o back pain with transiton moves and gt  -RW     [place] FACES Pain Rating   --   back hurts  -RW    Pain Location  Back  -RW Back  -RW    Pain Orientation  Left;Lower  -RW Left;Lower  -RW    Pain Intervention(s)  Rest  -RW Rest  -RW    Recorded by [EC] Tamiko Couch CCC-SLP [RW] Danny Beebe PTA [RW] Danny Beebe PTA    Vision Assessment/Intervention    Visual Impairment  WFL with corrective lenses  -RW WFL with corrective lenses  -RW    Recorded by  [RW] Danny Beebe PTA [RW] Danny Beebe PTA    Cognitive Assessment/Intervention    Current Cognitive/Communication Assessment  impaired  -RW impaired  -RW    Orientation Status  oriented to;person  -RW oriented to;person  -RW    Follows Commands/Answers Questions  50% of the time;needs cueing  -RW 50% of the time;needs cueing  -RW    Personal Safety  moderate impairment;mild impairment  -RW     Personal Safety Interventions  gait belt;nonskid shoes/slippers when out of bed  -RW     Recorded by  [RW] Danny Beebe PTA [RW] Danny Beebe PTA    Communication Treatment Objective and Progress    Receptive Language Treatment Objectives Improve ability to follow directions;Improve ability to comprehend questions  -EC      Expressive Treatment Objectives Improve word retrieval skills  -EC      Cognitive Linguistic Treatment Objectives Improve orientation;Improve memory skills  -EC      Recorded by [EC] Tamiko Couch CCC-SLP      Improve ability to follow directions    Improve ability to follow directions: two-step commands;80%;without cues  -EC      Status: Improve ability to follow directions Progress slower than expected  -EC      Ability to Follow Directions Progress 20%  -EC      Recorded by [EC] Tamiko Couch CCC-SLP      Improve ability to comprehend  questions    Improve ability to comprehend questions: simple yes/no questions;simple general questions;90%  -EC      Status: Improve ability to comprehend questions Progressing as expected  -EC      Ability to Comprehend Questions Progress 30%;50%  -EC      Recorded by [EC] OSIRIS Lin      Improve word retrieval skills    Improve word retrieval skills by: naming an object/pic;answer WH question with one word;completing a divergent task;completing a convergent task;90%  -EC      Status: Improve word retrieval skills Progressing as expected  -EC      Word Retrieval Skills Progress 50%  -EC      Recorded by [EC] OSIRIS Lin      Improve orientation    Improve orientation through: demonstrating orientation to day;demonstrating orientation to year;demonstrating orientation to place;80%  -EC      Status: Improve orientation through Progress slower than expected  -EC      Orientation Progress 30%  -EC      Recorded by [EC] OSIRIS Lin      Improve memory skills    Improve memory skills through: recall details of the day;50%  -EC      Status: Improve memory skills Progress slower than expected  -EC      Memory Skills Progress 50%  -EC      Recorded by [EC] OSIRIS Lin      Dysphagia Treatment Objectives and Progress    Dysphagia Treatment Objectives Other 1  -EC      Recorded by [EC] OSIRIS Lin      Dysphagia Other 1    Dysphagia Other 1 Objective Pt will safely tolerate recommended diet w/no overt s/s of aspiration.  -EC      Status: Dysphagia Other 1 Achieved  -EC      Dysphagia Other 1 Progress 90%;with consistent cues  -EC      Recorded by [EC] OSIRIS Lin      Transfer Assessment/Treatment    Transfers, Sit-Stand [place]  contact guard assist;verbal cues required;minimum assist (75% patient effort)  -RW contact guard assist;verbal cues required;minimum assist (75% patient effort)  -RW    Transfers, Stand-Sit  [place]  contact guard assist;verbal cues required  -RW contact guard assist;verbal cues required  -RW    Transfers, Sit-Stand-Sit, Assist Device  rolling walker  -RW rolling walker  -RW    Toilet Transfer, [place]  minimum assist (75% patient effort)  -RW     Toilet Transfer, Assistive Device  rolling walker  -RW     Recorded by  [RW] Danny Beebe PTA [RW] Danny Beebe PTA    Gait Assessment/Treatment    Gait, [place] Level  contact guard assist;verbal cues required  -RW contact guard assist;verbal cues required  -RW    Gait, Assistive Device  rolling walker  -RW rolling walker  -RW    Gait, Distance (Feet)  150   70'  -  -RW    Gait, Impairments  impaired balance;motor control impaired  -RW impaired balance;motor control impaired  -RW    Gait, Comment   needed alot encouragement  -RW    Recorded by  [RW] Danny Beebe PTA [RW] Danny Beebe PTA    Stairs Assessment/Treatment    Number of Stairs  4  -RW curb  -RW    Stairs, Handrail Location  both sides  -RW     Stairs, [place] Level  contact guard assist  -RW minimum assist (75% patient effort)  -RW    Stairs, Assistive Device  --  -RW walker  -RW    Stairs, Comment  pt got dizzy and had to be lowered into her wc  -RW     Recorded by  [RW] Danny Beebe PTA [RW] Danny Beebe PTA    Wheelchair Training/Management    Wheelchair, Distance Propelled  100  -RW     Wheelchair Training Comment  min assist  -RW     Recorded by  [RW] Danny Beebe PTA     Lower Body Dressing Assessment/Training    LB Dressing Assess/Train, Clothing Type  --  -RW donning:;shoes  -RW    Recorded by  [RW] Danny Beebe PTA [RW] Danny Beebe PTA    Balance Skills Training    Standing-Balance Activities  --  -RW --   static stance at windows searching for daughter outside.  -RW    Recorded by  [RW] Danny Beebe PTA [RW] Danny Beebe PTA    Therapy Exercises    Bilateral Lower Extremities  AROM:;ankle pumps/circles;sitting;15 reps;hip  abduction/adduction;knee flexion;LAQ   2 sets  -RW AROM:;ankle pumps/circles;10 reps;sitting;hip flexion   2 sets in part  -RW    BLE Resistance  theraband  -RW     Recorded by  [RW] Danny Beebe PTA [RW] Danny Beebe PTA    Positioning and Restraints    Pre-Treatment Position  sitting in chair/recliner  -RW sitting in chair/recliner  -RW    Post Treatment Position  wheelchair  -RW wheelchair  -RW    In Bed   patient within staff view  -RW    In Wheelchair  with SLP  -RW     Recorded by  [RW] Danny Beebe PTA [RW] Danny Beebe PTA      06/14/17 1205 06/14/17 1106 06/14/17 0905    Rehab Assessment/Intervention    Discipline speech language pathologist  -CK physical therapy assistant  -RW occupational therapy assistant  -RC    Document Type therapy note (daily note)  -CK therapy note (daily note)  -RW therapy note (daily note)  -RC    Subjective Information agree to therapy  -CK agree to therapy  -RW agree to therapy  -RC    Patient Effort, Rehab Treatment adequate  -CK adequate  -RW good  -RC    Patient Response to Treatment  needed alot of encouragement and daughter assisted  -RW     Recorded by [CK] Michelle Whitten, MS CCC-SLP [RW] Danny Beebe, PTA [RC] Ally Kwong, MADRIGAL/L    Pain Assessment    Pain Assessment No/denies pain  -CK      Pain Score 0  -CK      Post Pain Score 0  -CK unable to assess  -RW unable to assess  -RC    Pain Type  Chronic pain  -RW Chronic pain  -RC    Pain Location  Back  -RW Back  -RC    Pain Orientation  Left;Lower  -RW Lower  -RC    Pain Intervention(s)   Rest  -RC    Recorded by [CK] Michelle Whitten, MS CCC-SLP [RW] Danny Beebe PTA [RC] Ally Kwong, MADRIGAL/L    Vision Assessment/Intervention    Visual Impairment  WFL with corrective lenses  -RW     Recorded by  [RW] Danny Beebe PTA     Cognitive Assessment/Intervention    Current Cognitive/Communication Assessment  impaired  -RW impaired  -RC    Orientation Status  oriented to;person;place;disoriented  to;time;situation  -RW person  -RC    Recorded by  [RW] Danny Beebe PTA [RC] ADRIANNA GomezA/L    Dysphagia Treatment Objectives and Progress    Dysphagia Treatment Objectives Other 1  -CK      Recorded by [CK] Michelle Whitten MS CCC-SLP      Dysphagia Other 1    Dysphagia Other 1 Objective Pt will safely tolerate recommended diet w/no overt s/s of aspiration.  -CK      Status: Dysphagia Other 1 Progressing as expected  -CK      Dysphagia Other 1 Progress 90%;continue to address  -CK      Comments: Dysphagia Other 1 Pt safely tolerated regular solids/thin liquids.  Pt did require initiation to cut and eat meat.  -CK      Recorded by [CK] Michelle Whitten MS CCC-SLP      Bed Mobility, Assessment/Treatment    Bed Mobility, Assistive Device  bed rails;head of bed elevated  -RW     Bed Mob, Supine to Sit, [place]  supervision required  -RW     Bed Mob, Sit to Supine, [place]   supervision required  -RC    Recorded by  [RW] Danny Beebe PTA [RC] ADRIANNA GomezA/L    Transfer Assessment/Treatment    Transfers, Chair-Bed [place]   contact guard assist  -RC    Transfers, Bed-Chair-Bed, Assist Device   rolling walker  -RC    Transfers, Sit-Stand [place]  contact guard assist;verbal cues required  -RW     Transfers, Stand-Sit [place]  contact guard assist;verbal cues required  -RW     Transfers, Sit-Stand-Sit, Assist Device  rolling walker  -RW     Toilet Transfer, [place]  contact guard assist;minimum assist (75% patient effort)   daughter assist  -RW contact guard assist;verbal cues required  -RC    Toilet Transfer, Assistive Device  rolling walker  -RW rolling walker  -RC    Recorded by  [RW] Danny Beebe PTA [RC] ADRIANNA GomezA/L    Gait Assessment/Treatment    Gait, [place] Level  contact guard assist;verbal cues required  -RW     Gait, Assistive Device  rolling walker  -RW     Gait, Distance (Feet)  150  -RW     Gait, Impairments  impaired balance  -RW      Recorded by  [RW] Danny Beebe PTA     Functional Mobility    Functional Mobility- Ind. Level   contact guard assist  -RC    Functional Mobility- Device   rolling walker  -RC    Functional Mobility-Distance (Feet)   15  -RC    Recorded by   [RC] ADRIANNA GomezA/L    Upper Body Bathing Assessment/Training    UB Bathing Assess/Train, Position   sitting;sink side  -RC    UB Bathing Assess/Train, [place] Level   supervision required;verbal cues required  -RC    Recorded by   [RC] ADRIANNA GomezA/L    Lower Body Bathing Assessment/Training    LB Bathing Assess/Train, Position   sitting;standing  -RC    LB Bathing Assess/Train, [place] Level   contact guard assist  -RC    Recorded by   [RC] ADRIANNA GomezA/L    Upper Body Dressing Assessment/Training    UB Dressing Assess/Train, Clothing Type   doffing:;donning:;pull over  -RC    UB Dressing Assess/Train, Position   sitting  -RC    UB Dressing Assess/Train, [place]   minimum assist (75% patient effort)  -RC    Recorded by   [RC] ROHAN Gomez/L    Lower Body Dressing Assessment/Training    LB Dressing Assess/Train, Clothing Type  donning:;shoes  -RW doffing:;donning:;pants;shorts;shoes  -RC    LB Dressing Assess/Train, Position  sitting  -RW sitting;standing  -RC    LB Dressing Assess/Train, [place]  minimum assist (75% patient effort);set up required  -RW minimum assist (75% patient effort)  -RC    Recorded by  [RW] Danny Beebe PTA [RC] Ally Kwong MADRIGAL/L    Toileting Assessment/Training    Toileting Assess/Train, Position   sitting;standing  -RC    Toileting Assess/Train, [place] Level   minimum assist (75% patient effort);verbal cues required  -RC    Recorded by   [RC] ADRIANNA GomezA/L    Grooming Assessment/Training    Grooming Assess/Train, Position   sitting  -RC    Grooming Assess/Train, [place] Level   supervision required;verbal cues required  -RC    Recorded by   [RC] Ally Kwong MADRIGAL/L     Therapy Exercises    Bilateral Lower Extremities  AROM:;20 reps;supine;ankle pumps/circles;heel slides;hip abduction/adduction;SAQ   2 sets in part  -RW     Bilateral Upper Extremity   --   ue bike 10 min  -RC    Recorded by  [RW] Danny Beebe PTA [RC] Ally Kwong, MADRIGAL/L    Positioning and Restraints    Pre-Treatment Position  in bed  -RW sitting in chair/recliner  -RC    Post Treatment Position  wheelchair  -RW bed  -RC    In Bed  call light within reach  -RW exit alarm on;encouraged to call for assist;call light within reach  -RC    Recorded by  [RW] Danny Beebe PTA [RC] Ally Kwong MADRIGAL/L      06/14/17 0714          Rehab Assessment/Intervention    Discipline speech language pathologist  -CK      Document Type therapy note (daily note)  -CK      Subjective Information agree to therapy  -CK      Patient Effort, Rehab Treatment good  -CK      Recorded by [CK] Michelle Whitten MS CCC-SLP      Pain Assessment    Pain Assessment 0-10  -CK      Pain Score 2  -CK      Post Pain Score 2  -CK      Pain Type Chronic pain  -CK      Pain Location Back  -CK      Pain Orientation Lower  -CK      Pain Intervention(s) Repositioned  -CK      Recorded by [CK] Michelle Whitten MS CCC-SLP      Communication Treatment Objective and Progress    Receptive Language Treatment Objectives Improve ability to follow directions;Improve ability to comprehend questions  -CK      Expressive Treatment Objectives Improve word retrieval skills  -CK      Cognitive Linguistic Treatment Objectives Improve orientation;Improve memory skills  -CK      Recorded by [CK] Michelle Whitten MS CCC-SLP      Improve ability to follow directions    Improve ability to follow directions: one-step directions without objects;two-step commands;90%  -CK      Status: Improve ability to follow directions Progressing as expected  -CK      Ability to Follow Directions Progress 100%;20%  -CK      Recorded by [CK] Michelle Whitten MS CCC-SLP       Improve ability to comprehend questions    Improve ability to comprehend questions: simple yes/no questions;simple general questions;90%  -CK      Status: Improve ability to comprehend questions Progressing as expected  -CK      Ability to Comprehend Questions Progress 90%;50%  -CK      Comments: Improve ability to comprehend questions 90% w/basic yes/no; 50% w/comparative yes/no questions  -CK      Recorded by [CK] Michelle Whitten MS CCC-SLP      Improve word retrieval skills    Improve word retrieval skills by: naming an object/pic;answer WH question with one word;completing a divergent task;completing a convergent task;90%  -CK      Status: Improve word retrieval skills Progressing as expected  -CK      Word Retrieval Skills Progress 50%;with inconsistent cues  -CK      Recorded by [CK] Michelle Whitten MS CCC-SLP      Improve orientation    Improve orientation through: demonstrating orientation to day;demonstrating orientation to year;demonstrating orientation to place;80%  -CK      Status: Improve orientation through Progressing as expected  -CK      Orientation Progress 40%  -CK      Recorded by [CK] Michelle Whitten MS CCC-SLP      Improve memory skills    Improve memory skills through: recalling related word lists immediately;recall details of the day;80%  -CK      Status: Improve memory skills Progressing as expected  -CK      Memory Skills Progress 60%  -CK      Recorded by [CK] Michelle Whitten MS CCC-SLP      Dysphagia Treatment Objectives and Progress    Dysphagia Treatment Objectives Other 1  -CK      Recorded by [CK] Michelle Whitten MS CCC-SLP      Dysphagia Other 1    Dysphagia Other 1 Objective Pt will safely tolerate recommended diet w/no overt s/s of aspiration.  -CK      Status: Dysphagia Other 1 Progressing as expected  -CK      Dysphagia Other 1 Progress 90%;continue to address  -CK      Comments: Dysphagia Other 1 Pt safely tolerated [facility] soft solids/thin liquids via straw.  Pt  also safely tolerated regular solid trials w/no overt s/s of aspiration  -CK      Recorded by [CK] Michelle Whitten, MS CCC-SLP        User Key  (r) = Recorded By, (t) = Taken By, (c) = Cosigned By    Initials Name Effective Dates    EC Tamiko Couch, CCC-SLP 12/30/16 -     CK Michelle Whitten, MS CCC-SLP 10/17/16 -     RW Danny Beebe, PTA 10/17/16 -     RC Ally Kwong, MADRIGAL/L 10/17/16 -                 IP PT Goals       06/15/17 1353 06/14/17 1608 06/13/17 1036    Bed Mobility PT LTG    Bed Mobility PT LTG, Date Established   06/13/17  -LM    Bed Mobility PT LTG, Time to Achieve   by discharge  -LM    Bed Mobility PT LTG, Activity Type   supine to sit/sit to supine  -LM    Bed Mobility PT LTG, [place] Level   supervision required  -LM    Bed Mobility PT LTG, Additional Goal   HOB flat; No BR's  -LM    Bed Mobility PT LTG, Date Goal Reviewed 06/15/17  -RW 06/14/17  -RW     Bed Mobility PT LTG, Outcome goal ongoing  -RW goal ongoing  -RW goal ongoing  -LM    Transfer Training PT LTG    Transfer Training PT LTG, Date Established   06/13/17  -LM    Transfer Training PT LTG, Time to Achieve   by discharge  -LM    Transfer Training PT LTG, Activity Type   bed to chair /chair to bed  -LM    Transfer Training PT LTG, [place] Level   supervision required  -LM    Transfer Training PT LTG, Assist Device   --   AAD  -LM    Transfer Training PT  LTG, Date Goal Reviewed 06/15/17  -RW 06/14/17  -RW     Transfer Training PT LTG, Outcome goal ongoing  -RW goal ongoing  -RW goal ongoing  -LM    Gait Training PT LTG    Gait Training Goal PT LTG, Date Established   06/13/17  -LM    Gait Training Goal PT LTG, Time to Achieve   by discharge  -LM    Gait Training Goal PT LTG, [place] Level   supervision required  -LM    Gait Training Goal PT LTG, Assist Device   --   AAD  -LM    Gait Training Goal PT LTG, Distance to Achieve   150 feet  -LM    Gait Training Goal PT LTG, Date Goal Reviewed 06/15/17  -RW  06/14/17  -RW     Gait Training Goal PT LTG, Outcome goal ongoing  -RW goal ongoing  -RW goal ongoing  -LM    Stair Training PT LTG    Stair Training Goal PT LTG, Date Established   06/13/17  -LM    Stair Training Goal PT LTG, Time to Achieve   by discharge  -LM    Stair Training Goal PT LTG, Number of Steps   4 steps  -LM    Stair Training Goal PT LTG, [place] Level   contact guard assist  -LM    Stair Training Goal PT LTG, Assist Device   2 handrails  -LM    Stair Training Goal PT LTG, Date Goal Reviewed 06/15/17  -RW 06/14/17  -RW     Stair Training Goal PT LTG, Outcome goal met  -RW goal ongoing  -RW goal ongoing  -LM      06/11/17 1403 06/08/17 1110 06/07/17 1228    Bed Mobility PT LTG    Bed Mobility PT LTG, Date Goal Reviewed 06/11/17  -TW 06/08/17  -AM 06/07/17  -RW    Bed Mobility PT LTG, Outcome goal ongoing  -TW goal not met  -AM goal ongoing  -RW    Gait Training PT STG    Gait Training Goal PT STG, Date Goal Reviewed 06/11/17  -TW 06/08/17  -AM 06/07/17  -RW    Gait Training Goal PT STG, Outcome goal ongoing  -TW goal not met  -AM goal ongoing  -RW    Stair Training PT LTG    Stair Training Goal PT LTG, Date Goal Reviewed 06/11/17  -TW 06/08/17  -AM 06/07/17  -RW    Stair Training Goal PT LTG, Outcome goal ongoing  -TW goal not met  -AM goal ongoing  -RW      06/06/17 1325 06/05/17 1652       Bed Mobility PT LTG    Bed Mobility PT LTG, Date Established  06/05/17  -GB     Bed Mobility PT LTG, Time to Achieve  5 - 7 days  -GB     Bed Mobility PT LTG, Activity Type  all bed mobility  -GB     Bed Mobility PT LTG, [place] Level  conditional independence  -GB     Bed Mobility PT LTG, Date Goal Reviewed 06/06/17  -AM      Bed Mobility PT LTG, Outcome goal not met  -AM goal not met  -GB     Transfer Training PT LTG    Transfer Training PT LTG, Date Established  06/05/17  -GB     Transfer Training PT LTG, Time to Achieve  5 - 7 days  -GB     Transfer Training PT LTG, Activity Type  all transfers  -GB      Transfer Training PT LTG, [place] Level  contact guard assist  -GB     Transfer Training PT LTG, Assist Device  walker, rolling  -GB     Transfer Training PT  LTG, Date Goal Reviewed 06/06/17  -AM      Transfer Training PT LTG, Outcome goal met  -AM goal not met  -GB     Gait Training PT STG    Gait Training Goal PT STG, Date Established  06/05/17  -GB     Gait Training Goal PT STG, Time to Achieve  2 wks  -GB     Gait Training Goal PT STG, [place] Level  conditional independence  -GB     Gait Training Goal PT STG, Assist Device  walker, rolling  -GB     Gait Training Goal PT STG, Distance to Achieve  150 ft w/ RW and CGA x 1  -GB     Gait Training Goal PT STG, Additional Goal  300 ft w/ RW and SBA x 1   -GB     Gait Training Goal PT STG, Date Goal Reviewed 06/06/17  -AM      Gait Training Goal PT STG, Outcome goal not met  -AM goal not met  -GB     Stair Training PT LTG    Stair Training Goal PT LTG, Date Established  06/05/17  -GB     Stair Training Goal PT LTG, Time to Achieve  by discharge  -GB     Stair Training Goal PT LTG, [place] Level  conditional independence  -GB     Stair Training Goal PT LTG, Assist Device  walker, rolling  -GB     Stair Training Goal PT LTG, Distance to Achieve  up/down 1 step  -GB     Stair Training Goal PT LTG, Date Goal Reviewed 06/06/17  -AM      Stair Training Goal PT LTG, Outcome goal not met  -AM goal not met  -GB       User Key  (r) = Recorded By, (t) = Taken By, (c) = Cosigned By    Initials Name Provider Type    MADDY Kruse, PT Physical Therapist    LILIAN Matthews, PT Physical Therapist    CHARLES Sanchez, PTA Physical Therapy Assistant    TW Franco Byers, PTA Physical Therapy Assistant    RW Danny Beebe, PTA Physical Therapy Assistant          Physical Therapy Education     Title: PT OT SLP Therapies (Active)     Topic: Physical Therapy (Active)     Point: Mobility training (Done)    Learning Progress Summary    Learner  Readiness Method Response Comment Documented by Status   Patient Acceptance E VU,NR reviewed benifits of oob and mobility with assistance. RW 06/14/17 1252 Done    Acceptance E NR Reviewed safety with transfers - however, pt unable to retain.  Pt's family will require education.  06/13/17 1436 Active               Point: Precautions (Done)    Learning Progress Summary    Learner Readiness Method Response Comment Documented by Status   Patient Acceptance E VU,NR reviewed benifits of oob and mobility with assistance. RW 06/14/17 1252 Done    Acceptance E NR Reviewed safety with transfers - however, pt unable to retain.  Pt's family will require education.  06/13/17 1436 Active                      User Key     Initials Effective Dates Name Provider Type Discipline     06/15/16 -  Megan Kruse, PT Physical Therapist PT     10/17/16 -  Danny Beebe PTA Physical Therapy Assistant PT                    PT Recommendation and Plan  Anticipated Equipment Needs At Discharge: front wheeled walker  Anticipated Discharge Disposition: home with /7 care, home with home health  Planned Therapy Interventions: balance training, bed mobility training, gait training, home exercise program, manual therapy techniques, motor coordination training, neuromuscular re-education, patient/family education, postural re-education, ROM (Range of Motion), stair training, strengthening, stretching, transfer training, wheelchair management/propulsion training  PT Frequency: other (see comments) (5-14 times/wk)  Plan of Care Review  Plan Of Care Reviewed With: patient  Progress: progress toward functional goals is gradual  Outcome Summary/Follow up Plan: got dizzy this am after steps but did well this pm and able to increase activity tolerance. cont with gt and therex          Outcome Measures       06/15/17 0800 06/14/17 1200 06/14/17 0905    How much help from another person do you currently need...    Turning from your back to your  side while in flat bed without using bedrails? 3  -RW 3  -RW     Moving from lying on back to sitting on the side of a flat bed without bedrails? 3  -RW 3  -RW     Moving to and from a bed to a chair (including a wheelchair)? 3  -RW 3  -RW     Standing up from a chair using your arms (e.g., wheelchair, bedside chair)? 3  -RW 3  -RW     Climbing 3-5 steps with a railing? 3  -RW 3  -RW     To walk in hospital room? 3  -RW 3  -RW     AM-PAC 6 Clicks Score 18  -RW 18  -RW     How much help from another is currently needed...    Putting on and taking off regular lower body clothing?   3  -RC    Bathing (including washing, rinsing, and drying)   3  -RC    Toileting (which includes using toilet bed pan or urinal)   3  -RC    Putting on and taking off regular upper body clothing   3  -RC    Taking care of personal grooming (such as brushing teeth)   3  -RC    Eating meals   3  -RC    Score   18  -RC      06/13/17 1036 06/13/17 0759       How much help from another person do you currently need...    Turning from your back to your side while in flat bed without using bedrails? 3  -LM      Moving from lying on back to sitting on the side of a flat bed without bedrails? 3  -LM      Moving to and from a bed to a chair (including a wheelchair)? 3  -LM      Standing up from a chair using your arms (e.g., wheelchair, bedside chair)? 3  -LM      Climbing 3-5 steps with a railing? 3  -LM      To walk in hospital room? 3  -LM      AM-PAC 6 Clicks Score 18  -LM      How much help from another is currently needed...    Putting on and taking off regular lower body clothing?  2  -BH     Bathing (including washing, rinsing, and drying)  2  -BH     Toileting (which includes using toilet bed pan or urinal)  3  -BH     Putting on and taking off regular upper body clothing  3  -BH     Taking care of personal grooming (such as brushing teeth)  3  -BH     Eating meals  3  -BH     Score  16  -BH     Functional Assessment    Outcome Measure  Options AM-PAC 6 Clicks Basic Mobility (PT)  - AM-PAC 6 Clicks Daily Activity (OT)  -       User Key  (r) = Recorded By, (t) = Taken By, (c) = Cosigned By    Initials Name Provider Type    LM Megan Kruse, PT Physical Therapist     Rebeka Carrillo, OTR/L Occupational Therapist    RESHMA Beebe PTA Physical Therapy Assistant    TAHIR Kwong, MADRIGAL/L Occupational Therapy Assistant           Time Calculation:         PT Charges       06/15/17 1356 06/15/17 1021       Time Calculation    Start Time 1301  -RW 0800  -RW     Stop Time 1340  -RW 0902  -RW     Time Calculation (min) 39 min  -RW 62 min  -RW     Time Calculation- PT    Total Timed Code Minutes- PT 39 minute(s)  -RW 62 minute(s)  -RW       User Key  (r) = Recorded By, (t) = Taken By, (c) = Cosigned By    Initials Name Provider Type    RW Danny Beebe PTA Physical Therapy Assistant          Therapy Charges for Today     Code Description Service Date Service Provider Modifiers Qty    70025583926 HC GAIT TRAINING EA 15 MIN 6/14/2017 Danny Beebe PTA GP 1    62010452871 HC PT THER PROC EA 15 MIN 6/14/2017 Danny Beebe PTA GP 1    22985778754 HC PT THERAPEUTIC ACT EA 15 MIN 6/14/2017 Danny Beebe PTA GP 1    39032748909 HC GAIT TRAINING EA 15 MIN 6/14/2017 Danny Beebe PTA GP 1    06522555171 HC PT THERAPEUTIC ACT EA 15 MIN 6/14/2017 Danny Beebe PTA GP 1    05867125359 HC PT THER PROC EA 15 MIN 6/14/2017 Danny Beebe PTA GP 1    16891309135 HC GAIT TRAINING EA 15 MIN 6/15/2017 Danny Beebe PTA GP 1    87164695723 HC PT THER PROC EA 15 MIN 6/15/2017 Danny Beebe PTA GP 2    15773587427 HC PT THERAPEUTIC ACT EA 15 MIN 6/15/2017 Danny Beebe PTA GP 1    41019048938 HC GAIT TRAINING EA 15 MIN 6/15/2017 Danny Beebe PTA GP 2    59017167405 HC PT THER PROC EA 15 MIN 6/15/2017 Danny Beebe, PTA GP 1          PT G-Codes  PT Professional Judgement Used?: Yes  Outcome Measure Options: AM-PAC 6 Clicks Basic Mobility  (PT)  Score: 18  Functional Limitation: Mobility: Walking and moving around  Mobility: Walking and Moving Around Current Status (): At least 40 percent but less than 60 percent impaired, limited or restricted  Mobility: Walking and Moving Around Goal Status (): At least 20 percent but less than 40 percent impaired, limited or restricted    Danny Beebe, PTA  6/15/2017

## 2017-06-16 LAB
ALBUMIN SERPL-MCNC: 2.9 G/DL (ref 3.4–4.8)
ANION GAP SERPL CALCULATED.3IONS-SCNC: 8 MMOL/L (ref 5–15)
BASOPHILS # BLD AUTO: 0 10*3/MM3 (ref 0–0.2)
BASOPHILS NFR BLD AUTO: 0 % (ref 0–2)
BUN BLD-MCNC: 25 MG/DL (ref 7–21)
BUN/CREAT SERPL: 31.6 (ref 7–25)
CALCIUM SPEC-SCNC: 8.6 MG/DL (ref 8.4–10.2)
CHLORIDE SERPL-SCNC: 103 MMOL/L (ref 95–110)
CO2 SERPL-SCNC: 25 MMOL/L (ref 22–31)
CREAT BLD-MCNC: 0.79 MG/DL (ref 0.5–1)
DEPRECATED RDW RBC AUTO: 47 FL (ref 36.4–46.3)
EOSINOPHIL # BLD AUTO: 0.02 10*3/MM3 (ref 0–0.7)
EOSINOPHIL NFR BLD AUTO: 0.3 % (ref 0–7)
ERYTHROCYTE [DISTWIDTH] IN BLOOD BY AUTOMATED COUNT: 14 % (ref 11.5–14.5)
GFR SERPL CREATININE-BSD FRML MDRD: 83 ML/MIN/1.73 (ref 39–90)
GLUCOSE BLD-MCNC: 131 MG/DL (ref 60–100)
GLUCOSE BLDC GLUCOMTR-MCNC: 138 MG/DL (ref 70–130)
GLUCOSE BLDC GLUCOMTR-MCNC: 251 MG/DL (ref 70–130)
GLUCOSE BLDC GLUCOMTR-MCNC: 271 MG/DL (ref 70–130)
GLUCOSE BLDC GLUCOMTR-MCNC: 312 MG/DL (ref 70–130)
HCT VFR BLD AUTO: 28.5 % (ref 35–45)
HGB BLD-MCNC: 9.4 G/DL (ref 12–15.5)
IMM GRANULOCYTES # BLD: 0.04 10*3/MM3 (ref 0–0.02)
IMM GRANULOCYTES NFR BLD: 0.5 % (ref 0–0.5)
LYMPHOCYTES # BLD AUTO: 1.07 10*3/MM3 (ref 0.6–4.2)
LYMPHOCYTES NFR BLD AUTO: 14.6 % (ref 10–50)
MCH RBC QN AUTO: 30.3 PG (ref 26.5–34)
MCHC RBC AUTO-ENTMCNC: 33 G/DL (ref 31.4–36)
MCV RBC AUTO: 91.9 FL (ref 80–98)
MONOCYTES # BLD AUTO: 0.5 10*3/MM3 (ref 0–0.9)
MONOCYTES NFR BLD AUTO: 6.8 % (ref 0–12)
NEUTROPHILS # BLD AUTO: 5.68 10*3/MM3 (ref 2–8.6)
NEUTROPHILS NFR BLD AUTO: 77.8 % (ref 37–80)
PHOSPHATE SERPL-MCNC: 2.5 MG/DL (ref 2.4–4.4)
PLATELET # BLD AUTO: 303 10*3/MM3 (ref 150–450)
PMV BLD AUTO: 10.5 FL (ref 8–12)
POTASSIUM BLD-SCNC: 3.9 MMOL/L (ref 3.5–5.1)
RBC # BLD AUTO: 3.1 10*6/MM3 (ref 3.77–5.16)
SODIUM BLD-SCNC: 136 MMOL/L (ref 137–145)
WBC NRBC COR # BLD: 7.31 10*3/MM3 (ref 3.2–9.8)

## 2017-06-16 PROCEDURE — 85025 COMPLETE CBC W/AUTO DIFF WBC: CPT | Performed by: FAMILY MEDICINE

## 2017-06-16 PROCEDURE — 97530 THERAPEUTIC ACTIVITIES: CPT

## 2017-06-16 PROCEDURE — 63710000001 INSULIN DETEMIR PER 5 UNITS: Performed by: FAMILY MEDICINE

## 2017-06-16 PROCEDURE — 97110 THERAPEUTIC EXERCISES: CPT

## 2017-06-16 PROCEDURE — 99232 SBSQ HOSP IP/OBS MODERATE 35: CPT | Performed by: FAMILY MEDICINE

## 2017-06-16 PROCEDURE — 97116 GAIT TRAINING THERAPY: CPT

## 2017-06-16 PROCEDURE — 97535 SELF CARE MNGMENT TRAINING: CPT

## 2017-06-16 PROCEDURE — 82962 GLUCOSE BLOOD TEST: CPT

## 2017-06-16 PROCEDURE — 92507 TX SP LANG VOICE COMM INDIV: CPT | Performed by: SPEECH-LANGUAGE PATHOLOGIST

## 2017-06-16 PROCEDURE — 63710000001 INSULIN ASPART PER 5 UNITS: Performed by: FAMILY MEDICINE

## 2017-06-16 PROCEDURE — 63710000001 DIPHENHYDRAMINE PER 50 MG: Performed by: FAMILY MEDICINE

## 2017-06-16 PROCEDURE — 80069 RENAL FUNCTION PANEL: CPT | Performed by: FAMILY MEDICINE

## 2017-06-16 PROCEDURE — 97542 WHEELCHAIR MNGMENT TRAINING: CPT

## 2017-06-16 RX ORDER — DIPHENHYDRAMINE HCL 25 MG
25 CAPSULE ORAL EVERY 4 HOURS PRN
Status: DISCONTINUED | OUTPATIENT
Start: 2017-06-16 | End: 2017-06-23 | Stop reason: HOSPADM

## 2017-06-16 RX ADMIN — HYDROCODONE BITARTRATE AND ACETAMINOPHEN 1 TABLET: 5; 325 TABLET ORAL at 06:10

## 2017-06-16 RX ADMIN — FERROUS SULFATE TAB EC 324 MG (65 MG FE EQUIVALENT) 324 MG: 324 (65 FE) TABLET DELAYED RESPONSE at 17:07

## 2017-06-16 RX ADMIN — INSULIN ASPART 8 UNITS: 100 INJECTION, SOLUTION INTRAVENOUS; SUBCUTANEOUS at 17:07

## 2017-06-16 RX ADMIN — MEMANTINE 5 MG: 5 TABLET ORAL at 17:08

## 2017-06-16 RX ADMIN — DOCUSATE SODIUM 100 MG: 100 CAPSULE, LIQUID FILLED ORAL at 08:01

## 2017-06-16 RX ADMIN — Medication 40 MG: at 20:12

## 2017-06-16 RX ADMIN — Medication 1 TABLET: at 08:01

## 2017-06-16 RX ADMIN — MEMANTINE 5 MG: 5 TABLET ORAL at 08:01

## 2017-06-16 RX ADMIN — DIPHENHYDRAMINE HYDROCHLORIDE 25 MG: 25 CAPSULE ORAL at 11:52

## 2017-06-16 RX ADMIN — FERROUS SULFATE TAB EC 324 MG (65 MG FE EQUIVALENT) 324 MG: 324 (65 FE) TABLET DELAYED RESPONSE at 08:01

## 2017-06-16 RX ADMIN — DILTIAZEM HYDROCHLORIDE 120 MG: 120 CAPSULE, COATED, EXTENDED RELEASE ORAL at 08:01

## 2017-06-16 RX ADMIN — POTASSIUM CHLORIDE 10 MEQ: 750 CAPSULE, EXTENDED RELEASE ORAL at 08:01

## 2017-06-16 RX ADMIN — DONEPEZIL HYDROCHLORIDE 10 MG: 10 TABLET, FILM COATED ORAL at 20:13

## 2017-06-16 RX ADMIN — POTASSIUM CHLORIDE 10 MEQ: 750 CAPSULE, EXTENDED RELEASE ORAL at 17:08

## 2017-06-16 RX ADMIN — Medication 40 MG: at 08:01

## 2017-06-16 RX ADMIN — MAGNESIUM OXIDE TAB 400 MG (241.3 MG ELEMENTAL MG) 400 MG: 400 (241.3 MG) TAB at 08:01

## 2017-06-16 RX ADMIN — DOCUSATE SODIUM 100 MG: 100 CAPSULE, LIQUID FILLED ORAL at 17:07

## 2017-06-16 RX ADMIN — INSULIN DETEMIR 15 UNITS: 100 INJECTION, SOLUTION SUBCUTANEOUS at 20:33

## 2017-06-16 RX ADMIN — Medication 2.5 MG: at 08:01

## 2017-06-16 RX ADMIN — INSULIN ASPART 8 UNITS: 100 INJECTION, SOLUTION INTRAVENOUS; SUBCUTANEOUS at 20:13

## 2017-06-16 RX ADMIN — DIPHENHYDRAMINE HYDROCHLORIDE 25 MG: 25 CAPSULE ORAL at 20:12

## 2017-06-16 RX ADMIN — INSULIN ASPART 10 UNITS: 100 INJECTION, SOLUTION INTRAVENOUS; SUBCUTANEOUS at 11:30

## 2017-06-16 NOTE — THERAPY TREATMENT NOTE
Acute Care - Physical Therapy Treatment Note  [facility]     Patient Name: Kierra Scales  : 1929  MRN: [id number]  Today's Date: 2017  Onset of Illness/Injury or Date of Surgery Date: 17  Date of Referral to PT: 17  Referring Physician: Dr. Lucia    Admit Date: 2017    Visit Dx:    ICD-10-CM ICD-9-CM   1. Oral phase dysphagia R13.11 787.21   2. Impaired mobility and ADLs Z74.09 799.89   3. Muscle weakness (generalized) M62.81 728.87   4. Abnormality of gait and mobility R26.9 781.2   5. 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)   • Hypothyroidism   • Dyspnea   • Hyperthyroidism   • Gastrointestinal hemorrhage with melena   • Type 2 diabetes mellitus with hyperglycemia   • Colon cancer   • Chronic blood loss anemia   • Physical deconditioning   • Senile dementia, uncomplicated   • Encounter for rehabilitation   • Adenocarcinoma of sigmoid colon   • Hypokalemia               Adult Rehabilitation Note       17 1245 17 1055 17 0900    Rehab Assessment/Intervention    Discipline physical therapy assistant  -MARLENE occupational therapy assistant  -KD speech language pathologist  -EC    Document Type therapy note (daily note)  -MARLENE therapy note (daily note)  -KD therapy note (daily note)  -EC    Subjective Information agree to therapy  -MARLENE agree to therapy  -KD agree to therapy;no complaints  -EC    Patient Effort, Rehab Treatment fair  -MARLENE  fair  -EC    Precautions/Limitations fall precautions;other (see comments)   gait belt placement 2* incision  -MARLENE      Recorded by [MARLENE] Jaspreet Rodas PTA [KD] ROHAN Garcia/POONAM [EC] Tamiko Couch CCC-SLP    Vital Signs     Pretreatment Heart Rate (beats/min) 79  -MARLENE 82  -KD     Posttreatment Heart Rate (beats/min) 78  -MARLENE 77  -KD     Pre SpO2 (%) 98  -MARLENE 97  -KD     O2 Delivery Pre Treatment room air  -MARLENE room air  -KD     Post SpO2 (%) 98  -MARLENE 98  -KD     O2 Delivery Post Treatment room air  -MARLENE room air  -KD     Pre Patient Position Sitting  -MARLENE Sitting  -KD     Intra Patient Position  Standing  -KD     Post Patient Position Sitting  -MARLENE Sitting  -KD     Recorded by [MARLENE] Jaspreet Rodas PTA [KD] ADRIANNA GarciaA/L     Pain Assessment    Pain Assessment No/denies pain  -MARLENE No/denies pain  -KD No/denies pain  -EC    Recorded by [MARLENE] Jaspreet Rodas PTA [KD] ROHAN Garcia/POONAM [EC] Tamiko Couch CCC-SLP    Vision Assessment/Intervention    Visual Impairment WFL with corrective lenses  -MARLENE      Recorded by [MARLENE] Jaspreet Rodas PTA      Cognitive Assessment/Intervention    Current Cognitive/Communication Assessment impaired  -MARLENE impaired  -KD     Orientation Status oriented to;person  -MARLENE person  -KD     Follows Commands/Answers Questions 50% of the time  -MARLENE 50% of the time  -KD     Personal Safety Interventions gait belt;muscle strengthening facilitated;nonskid shoes/slippers when out of bed;supervised activity  -MARLENE      Problem Solving ADL/Func. Task  with max verbal cues  -KD     Long Term Memory Interventions  --   25 pc shape sorter  -KD     Recorded by [MARLENE] Jaspreet Rodas PTA [KD] ADRIANNA GarciaA/L     Communication Treatment Objective and Progress    Receptive Language Treatment Objectives   Improve ability to follow directions;Improve ability to comprehend questions  -EC    Expressive Treatment Objectives   Improve word retrieval skills  -EC    Cognitive Linguistic Treatment Objectives   Improve orientation;Improve memory skills  -EC    Recorded by   [EC] Tamiko Couch CCC-SLP    Improve ability to follow directions    Improve ability to follow directions:   one-step directions without objects   -EC    Status: Improve ability to follow directions   Progress slower than expected  -EC    Ability to Follow Directions Progress   20%  -EC    Comments: Improve ability to follow directions  Pt was unable to tell ROHAN what she would do in case of a fire  -KD     Recorded by  [KD] ROHAN Garcia/POONAM [EC] OSIRIS Lin    Improve ability to comprehend questions    Improve ability to comprehend questions:   simple yes/no questions;simple general questions;90%  -EC    Status: Improve ability to comprehend questions   Progressing as expected  -EC    Ability to Comprehend Questions Progress   30%;50%  -EC    Recorded by   [EC] OSIRIS Lin    Improve word retrieval skills    Improve word retrieval skills by:   naming an object/pic;answer WH question with one word;completing a divergent task;completing a convergent task;90%  -EC    Status: Improve word retrieval skills   Progressing as expected  -EC    Word Retrieval Skills Progress   50%  -EC    Recorded by   [EC] OSIRIS Lni    Improve orientation    Improve orientation through:   demonstrating orientation to day;demonstrating orientation to year;demonstrating orientation to place;80%  -EC    Status: Improve orientation through   Progress slower than expected  -EC    Orientation Progress   30%  -EC    Recorded by   [EC] OSIRIS Lin    Improve memory skills    Improve memory skills through:   recall details of the day;50%  -EC    Status: Improve memory skills   Progress slower than expected  -EC    Memory Skills Progress   50%  -EC    Comments: Improve memory skills  +Pt unable to recall full address  -KD     Recorded by  [KD] ROHAN Garcia/POONAM [EC] OSIRIS Lin    Bed Mobility, Assessment/Treatment    Bed Mobility, Roll Left, [place] not tested  -MARLENE      Bed Mobility, Roll Right, [place]  supervision required  -KD     Bed Mobility, Scoot/Bridge, [place]  not tested  -KD      Bed Mob, Supine to Sit, [place]  contact guard assist  -KD     Bed Mob, Sit to Supine, [place]  not tested  -KD     Bed Mob, Sidelying to Sit, [place]  supervision required  -KD     Bed Mob, Sit to Sidelying, [place]  not tested  -KD     Bed Mobility, Safety Issues  decreased use of arms for pushing/pulling  -KD     Recorded by [MARLENE] Jaspreet Rodas PTA [KD] ADRIANNA GarciaA/L     Transfer Assessment/Treatment    Transfers, Bed-Chair [place]  contact guard assist  -KD     Transfers, Chair-Bed [place]  contact guard assist  -KD     Transfers, Bed-Chair-Bed, Assist Device  rolling walker  -KD     Transfers, Sit-Stand [place] contact guard assist  -MARLENE contact guard assist  -KD     Transfers, Stand-Sit [place] contact guard assist  -MARLENE contact guard assist  -KD     Transfers, Sit-Stand-Sit, Assist Device rolling walker  -MARLENE rolling walker  -KD     Toilet Transfer, [place] minimum assist (75% patient effort)  -MARLENE      Toilet Transfer, Assistive Device rolling walker;bedside commode with drop arms  -MARLENE      Transfer, Comment pt completed own pericare. requires close supervision and cueing to wash hands and cueing to turn water off.   -MARLENE      Recorded by [MARLENE] Jaspreet Rodas PTA [KD] ADRIANNA GarciaA/L     Gait Assessment/Treatment    Gait, [place] Level contact guard assist  -MARLENE      Gait, Assistive Device rolling walker  -MARLENE      Gait, Distance (Feet) 130  -MARLENE      Gait, Gait Deviations [place] decreased  -MARLENE      Gait, Safety Issues step length decreased  -MARLENE      Recorded by [MARLENE] Jaspreet Rodsa PTA      Wheelchair Training/Management    Wheelchair, Distance Propelled  --   50' with VC's  -KD     Recorded by  [KD] ADRIANNA GarciaA/L     Upper Body Bathing Assessment/Training    UB Bathing Assess/Train Assistive Device  bath mitt  -KD     UB Bathing Assess/Train, Position  edge of bed  -KD     UB Bathing Assess/Train, [place] Level  supervision  required  -KD     Recorded by  [KD] JOSÉ MIGUEL Garcia     Lower Body Bathing Assessment/Training    LB Bathing Assess/Train Assistive Device  bath mitt  -KD     LB Bathing Assess/Train, Position  sitting;standing  -KD     LB Bathing Assess/Train, [place] Level  verbal cues required;contact guard assist  -KD     Recorded by  [KD] JOSÉ MIGUEL Garcia     Upper Body Dressing Assessment/Training    UB Dressing Assess/Train, Clothing Type  doffing:;donning:;hospital gown;t-shirt  -KD     UB Dressing Assess/Train, Position  sitting;standing  -KD     UB Dressing Assess/Train, [place]  supervision required  -KD     Recorded by  [KD] JOSÉ MIGUEL Garcia     Lower Body Dressing Assessment/Training    LB Dressing Assess/Train, Clothing Type  doffing:;donning:;pants;shoes;socks  -KD     LB Dressing Assess/Train, Position  edge of bed;sitting;standing  -KD     LB Dressing Assess/Train, [place]  contact guard assist;verbal cues required  -KD     Recorded by  [KD] JOSÉ MIGUEL Garcia     Grooming Assessment/Training    Grooming Assess/Train, Assistive Device  --   wash mitt  -KD     Grooming Assess/Train, Position  sitting  -KD     Grooming Assess/Train, [place] Level  set up required  -KD     Recorded by  [KD] JOSÉ MIGUEL Garcia     Balance Skills Training    Standing-Level of Assistance Minimum assistance  -MARLENE      Standing-Balance Activities Compliant surfaces  -MARLENE      Standing Balance # of Minutes 5  -MARLENE      Gait Balance-Level of Assistance Minimum assistance  -MARLENE      Gait Balance Support parallel bars  -MARLENE      Gait Balance Activities side-stepping;backwards;tandem  -MARLENE      Gait Balance # of Minutes 20  -MARLENE      Recorded by [MARLENE] Jaspreet Rodas, ROYAL      Positioning and Restraints    Pre-Treatment Position sitting in chair/recliner  -MARLENE in bed  -KD     Post Treatment Position wheelchair  -MARLENE bed  -KD     In Bed  fowlers;call light within reach;encouraged to call for assist  -KD     In  Wheelchair notified nsg;sitting;call light within reach;encouraged to call for assist;exit alarm on   all needs met  -MARLENE      Recorded by [MARLENE] Jaspreet Rodas PTA [KD] ADRIANNA GarciaA/POONAM       06/16/17 0816 06/15/17 1341 06/15/17 1301    Rehab Assessment/Intervention    Discipline physical therapy assistant  -MARLENE occupational therapy assistant  -RC physical therapy assistant  -RW    Document Type therapy note (daily note)  -MARLENE therapy note (daily note)  -RC therapy note (daily note)  -RW    Subjective Information agree to therapy  -MARLENE agree to therapy  -RC agree to therapy  -RW    Patient Effort, Rehab Treatment good  -MARLENE good  -RC good  -RW    Precautions/Limitations fall precautions  -MARLENE      Precautions/Limitations, Vision other (see comments)   gait belt placement 2* incision.  -MARLENE      Recorded by [MARLENE] Jaspreet Rodas PTA [RC] ADRIANNA GomezA/L [RW] Danny Beebe PTA    Vital Signs    Pre Systolic BP Rehab 121  -MARLENE      Pre Treatment Diastolic BP 57  -MARLENE      Pretreatment Heart Rate (beats/min) 79  -MARLENE      Intratreatment Heart Rate (beats/min) 90  -MARLENE      Posttreatment Heart Rate (beats/min) 77  -MARLENE      Pre SpO2 (%) 97  -MARLENE      O2 Delivery Pre Treatment room air  -MARLENE      Intra SpO2 (%) 97  -MARLENE      O2 Delivery Intra Treatment room air  -MARLENE      Post SpO2 (%) 98  -MARLENE      O2 Delivery Post Treatment room air  -MARLENE      Pre Patient Position Sitting  -MARLENE      Post Patient Position Sitting  -MARLENE      Recorded by [MARLENE] Jaspreet Rodas PTA      Pain Assessment    Pain Assessment No/denies pain  -MARLENE No/denies pain  -RC No/denies pain  -RW    Recorded by [MARLENE] Jaspreet Rodas PTA [RC] ADRIANNA GomezA/L [RW] Danny Beebe PTA    Vision Assessment/Intervention    Visual Impairment WFL with corrective lenses  -MARLENE  WFL with corrective lenses  -RW    Recorded by [MARLENE] Jaspreet Rodas PTA  [RW] Danny Beebe PTA    Cognitive Assessment/Intervention    Current Cognitive/Communication Assessment  impaired  -MARLENE impaired  -RC functional  -RW    Orientation Status person  -MARLENE person  -RC oriented to;person  -RW    Follows Commands/Answers Questions 75% of the time  -MARLENE  75% of the time  -RW    Personal Safety   mild impairment  -RW    Personal Safety Interventions gait belt;muscle strengthening facilitated;nonskid shoes/slippers when out of bed;supervised activity  -MARLENE  gait belt;nonskid shoes/slippers when out of bed  -RW    Recorded by [MARLENE] Jaspreet Rodas PTA [RC] ROHAN Gomez/L [RW] Danny Beebe PTA    Bed Mobility, Assessment/Treatment    Bed Mobility, Roll Left, [place] not tested  -MARLENE      Recorded by [MARLENE] Jaspreet Rodas PTA      Transfer Assessment/Treatment    Transfers, Sit-Stand [place] contact guard assist  -MARLENE  contact guard assist;verbal cues required  -RW    Transfers, Stand-Sit [place] contact guard assist  -MARLENE  contact guard assist;verbal cues required  -RW    Transfers, Sit-Stand-Sit, Assist Device rolling walker  -MARLENE  rolling walker  -RW    Toilet Transfer, [place] contact guard assist  -MARLENE contact guard assist  -RC     Toilet Transfer, Assistive Device rolling walker  -MARLENE      Transfer, Comment pt completed sit-stands x5.   -MARLENE  sit to stand x 5  -RW    Recorded by [MARLENE] Jaspreet Rodas PTA [RC] ROHAN Gomez/L [RW] Danny Beebe PTA    Gait Assessment/Treatment    Gait, [place] Level contact guard assist  -MARLENE  contact guard assist;verbal cues required  -RW    Gait, Assistive Device rolling walker  -MARLENE  rolling walker  -RW    Gait, Distance (Feet) 242   40 ft  -MARLENE  200   150' x 3  -RW    Gait, Gait Deviations [place] decreased  -MARLENE      Gait, Impairments   impaired balance  -RW    Gait, Comment pt required some encouragement to ambulate  -MARLENE      Recorded by [MARLENE] Jaspreet Rodas PTA  [RW] Danny Beebe PTA    Wheelchair Training/Management    Wheelchair, Distance Propelled   120  -RW    Wheelchair Training Comment   min assist   -RW     Recorded by   [RW] Danny Beebe, PTA    Upper Body Bathing Assessment/Training    UB Bathing Assess/Train, Position  sitting;sink side  -RC     UB Bathing Assess/Train, [place] Level  supervision required;verbal cues required  -RC     UB Bathing Assess/Train, Comment  pt requires vc's to complete task, pt pulled IV out   -RC     Recorded by  [RC] Ally Kwong, MADRIGAL/L     Lower Body Bathing Assessment/Training    LB Bathing Assess/Train, Position  sink side;sitting;standing  -RC     LB Bathing Assess/Train, [place] Level  verbal cues required;contact guard assist  -RC     Recorded by  [RC] Ally Kwong, MADRIGAL/L     Upper Body Dressing Assessment/Training    UB Dressing Assess/Train, Clothing Type  donning:;doffing:;pull over  -RC     UB Dressing Assess/Train, Position  sitting  -RC     UB Dressing Assess/Train, [place]  supervision required  -RC     Recorded by  [RC] Ally Kwong, MADRIGAL/L     Lower Body Dressing Assessment/Training    LB Dressing Assess/Train, Clothing Type  doffing:;donning:;pants;shoes;shorts  -RC     LB Dressing Assess/Train, Position  sitting;standing  -RC     LB Dressing Assess/Train, [place]  verbal cues required;contact guard assist  -RC     Recorded by  [RC] ADRIANNA GomezA/L     Toileting Assessment/Training    Toileting Assess/Train, Assistive Device  grab bars  -RC     Toileting Assess/Train, Position  sitting;standing  -RC     Toileting Assess/Train, [place] Level  contact guard assist  -RC     Recorded by  [RC] ADRIANNA GomezA/L     Grooming Assessment/Training    Grooming Assess/Train, Position  sitting  -RC     Grooming Assess/Train, [place] Level  supervision required  -RC     Recorded by  [RC] ADRIANNA GomezA/L     Therapy Exercises    Bilateral Lower Extremities 15 reps;sitting;hip flexion;hip abduction/adduction   dorsiflexion and hamstring curls 15x2 red tband  -MARLENE  AROM:;ankle pumps/circles;sitting;15 reps;hip  abduction/adduction;knee flexion;hamstring stretch;hip flexion;mini squats;standing;heel raises   2 sets  -RW    BLE Resistance theraband   lvl 2  -MARLENE  theraband  -RW    Recorded by [MARLENE] Jaspreet Rodas PTA  [RW] Danny Beebe PTA    Positioning and Restraints    Pre-Treatment Position sitting in chair/recliner  -MARLENE sitting in chair/recliner  -RC sitting in chair/recliner  -RW    Post Treatment Position wheelchair  -MARLENE wheelchair  -RC wheelchair  -RW    In Bed  notified nsg;encouraged to call for assist   in nursing area  -RC     In Wheelchair sitting   w/ SLP  -MARLENE  with OT  -RW    Recorded by [MARLENE] Jaspreet Rodas PTA [RC] ROHAN Gomez/L [RW] Danny eBebe PTA      06/15/17 0950 06/15/17 0945 06/15/17 0905    Rehab Assessment/Intervention    Discipline occupational therapy assistant  -RC  speech language pathologist  -EC    Document Type therapy note (daily note)  -RC  therapy note (daily note)  -EC    Subjective Information agree to therapy  -RC  agree to therapy;no complaints  -EC    Patient Effort, Rehab Treatment fair  -RC  fair  -EC    Recorded by [RC] ROHAN Gomez/POONAM  [EC] Tamiko Couch CCC-SLP    Vital Signs    Pre Systolic BP Rehab 102  -RC --  -RC     Pre Treatment Diastolic BP 50  -RC --  -RC     Post Systolic BP Rehab 117  -RC --  -RC     Post Treatment Diastolic BP 55  -RC --  -RC     Recorded by [RC] ADRIANNA GomezA/POONAM [RC] ADRIANNA GomezA/POONAM     Pain Assessment    Pain Assessment   No/denies pain  -EC    Pain Type Chronic pain  -RC      Pain Location Back  -RC      Pain Intervention(s) Rest  -RC      Recorded by [RC] ROHAN Gomez/POONAM  [EC] Tamiko Couch CCC-SLP    Communication Treatment Objective and Progress    Receptive Language Treatment Objectives   Improve ability to follow directions;Improve ability to comprehend questions  -EC    Expressive Treatment Objectives   Improve word retrieval skills  -EC    Cognitive Linguistic Treatment Objectives    Improve orientation;Improve memory skills  -EC    Recorded by   [EC] OSIRIS Lin    Improve ability to follow directions    Improve ability to follow directions:   two-step commands;80%;without cues  -EC    Status: Improve ability to follow directions   Progress slower than expected  -EC    Ability to Follow Directions Progress   20%  -EC    Recorded by   [EC] OSIRIS Lin    Improve ability to comprehend questions    Improve ability to comprehend questions:   simple yes/no questions;simple general questions;90%  -EC    Status: Improve ability to comprehend questions   Progressing as expected  -EC    Ability to Comprehend Questions Progress   30%;50%  -EC    Recorded by   [EC] OSIRIS Lin    Improve word retrieval skills    Improve word retrieval skills by:   naming an object/pic;answer WH question with one word;completing a divergent task;completing a convergent task;90%  -EC    Status: Improve word retrieval skills   Progressing as expected  -EC    Word Retrieval Skills Progress   50%  -EC    Recorded by   [EC] OSIRIS Lin    Improve orientation    Improve orientation through:   demonstrating orientation to day;demonstrating orientation to year;demonstrating orientation to place;80%  -EC    Status: Improve orientation through   Progress slower than expected  -EC    Orientation Progress   30%  -EC    Recorded by   [EC] OSIRIS Lin    Improve memory skills    Improve memory skills through:   recall details of the day;50%  -EC    Status: Improve memory skills   Progress slower than expected  -EC    Memory Skills Progress   50%  -EC    Recorded by   [EC] OSIRIS Lin    Dysphagia Treatment Objectives and Progress    Dysphagia Treatment Objectives   Other 1  -EC    Recorded by   [EC] OSIRIS Lin    Dysphagia Other 1    Dysphagia Other 1 Objective   Pt will safely tolerate recommended diet w/no overt s/s of  aspiration.  -EC    Status: Dysphagia Other 1   Achieved  -EC    Dysphagia Other 1 Progress   90%;with consistent cues  -EC    Recorded by   [EC] Tamiko Couch, CCC-SLP    Transfer Assessment/Treatment    Transfers, Chair-Bed [place] contact guard assist;verbal cues required  -RC      Transfers, Bed-Chair-Bed, Assist Device rolling walker  -RC      Recorded by [RC] ADRIANNA GomezA/L      Lower Body Dressing Assessment/Training    LB Dressing Assess/Train, Clothing Type doffing:;shoes  -RC      LB Dressing Assess/Train, Position sitting  -RC      LB Dressing Assess/Train, [place] verbal cues required  -RC      Recorded by [RC] ADRIANNA GomezA/L      Motor Skills/Interventions    Additional Documentation --   , cone stack and pass, clothes pin tree  -RC      Recorded by [RC] ADRIANNA GomezA/L      Therapy Exercises    Bilateral Upper Extremity --   ue bike 15 min multi vc's to stay on task  -RC      Recorded by [RC] ROHAN Gomez/L      Positioning and Restraints    Pre-Treatment Position sitting in chair/recliner  -RC      Post Treatment Position bed  -RC      In Bed encouraged to call for assist;call light within reach;notified nsg  -RC      Recorded by [RC] ROHAN Gomez/POONAM        06/15/17 0800 06/14/17 1454 06/14/17 1205    Rehab Assessment/Intervention    Discipline physical therapy assistant  -RW physical therapy assistant  -RW speech language pathologist  -CK    Document Type therapy note (daily note)  -RW therapy note (daily note)  -RW therapy note (daily note)  -CK    Subjective Information agree to therapy  -RW agree to therapy  -RW agree to therapy  -CK    Patient Effort, Rehab Treatment fair  -RW fair  -RW adequate  -CK    Patient Response to Treatment got dizzy after steps and needed to be lowered into wc. bp 103/59 hr 70  -RW c/o back hurting and tired  -RW     Recorded by [RW] Danny Beebe, PTA [RW] Danny Beebe, PTA [CK] Michelle Whitten, MS CCC-SLP     Vital Signs    Intra Systolic BP Rehab 103  -RW      Intra Treatment Diastolic BP 59  -RW      Intratreatment Heart Rate (beats/min) 70  -RW      Intra SpO2 (%) 97  -RW      O2 Delivery Intra Treatment room air  -RW      Post SpO2 (%) 92  -RW      O2 Delivery Post Treatment room air  -RW      Recorded by [RW] Danny Beebe PTA      Pain Assessment    Pain Assessment --   c/o back pain with transiton moves and gt  -RW  No/denies pain  -CK    Jason-Batista FACES Pain Rating  --   back hurts  -RW     Pain Score   0  -CK    Post Pain Score   0  -CK    Pain Location Back  -RW Back  -RW     Pain Orientation Left;Lower  -RW Left;Lower  -RW     Pain Intervention(s) Rest  -RW Rest  -RW     Recorded by [RW] Danny Beebe PTA [RW] Danny Beebe PTA [CK] Michelle Whitten, MS CCC-SLP    Vision Assessment/Intervention    Visual Impairment WFL with corrective lenses  -RW WFL with corrective lenses  -RW     Recorded by [RW] Danny Beebe PTA [RW] Danny Beebe PTA     Cognitive Assessment/Intervention    Current Cognitive/Communication Assessment impaired  -RW impaired  -RW     Orientation Status oriented to;person  -RW oriented to;person  -RW     Follows Commands/Answers Questions 50% of the time;needs cueing  -RW 50% of the time;needs cueing  -RW     Personal Safety moderate impairment;mild impairment  -RW      Personal Safety Interventions gait belt;nonskid shoes/slippers when out of bed  -RW      Recorded by [RW] Danny Beebe PTA [RW] Danny Beebe PTA     Dysphagia Treatment Objectives and Progress    Dysphagia Treatment Objectives   Other 1  -CK    Recorded by   [CK] Michelle Whitten MS CCC-SLP    Dysphagia Other 1    Dysphagia Other 1 Objective   Pt will safely tolerate recommended diet w/no overt s/s of aspiration.  -CK    Status: Dysphagia Other 1   Progressing as expected  -CK    Dysphagia Other 1 Progress   90%;continue to address  -CK    Comments: Dysphagia Other 1   Pt safely tolerated regular  solids/thin liquids.  Pt did require initiation to cut and eat meat.  -CK    Recorded by   [CK] Michelle Whitten MS CCC-SLP    Transfer Assessment/Treatment    Transfers, Sit-Stand [place] contact guard assist;verbal cues required;minimum assist (75% patient effort)  -RW contact guard assist;verbal cues required;minimum assist (75% patient effort)  -RW     Transfers, Stand-Sit [place] contact guard assist;verbal cues required  -RW contact guard assist;verbal cues required  -RW     Transfers, Sit-Stand-Sit, Assist Device rolling walker  -RW rolling walker  -RW     Toilet Transfer, [place] minimum assist (75% patient effort)  -RW      Toilet Transfer, Assistive Device rolling walker  -RW      Recorded by [RW] Danny Beebe PTA [RW] Danny Beebe PTA     Gait Assessment/Treatment    Gait, [place] Level contact guard assist;verbal cues required  -RW contact guard assist;verbal cues required  -RW     Gait, Assistive Device rolling walker  -RW rolling walker  -RW     Gait, Distance (Feet) 150   70'  -  -RW     Gait, Impairments impaired balance;motor control impaired  -RW impaired balance;motor control impaired  -RW     Gait, Comment  needed alot encouragement  -RW     Recorded by [RW] Danny Beebe PTA [RW] Danny Beebe PTA     Stairs Assessment/Treatment    Number of Stairs 4  -RW curb  -RW     Stairs, Handrail Location both sides  -RW      Stairs, [place] Level contact guard assist  -RW minimum assist (75% patient effort)  -RW     Stairs, Assistive Device --  -RW walker  -RW     Stairs, Comment pt got dizzy and had to be lowered into her wc  -RW      Recorded by [RW] Danny Beebe PTA [RW] Danny Beebe PTA     Wheelchair Training/Management    Wheelchair, Distance Propelled 100  -RW      Wheelchair Training Comment min assist  -RW      Recorded by [RW] Danny Beebe PTA      Lower Body Dressing Assessment/Training    LB Dressing Assess/Train, Clothing Type --  -RW  donning:;shoes  -RW     Recorded by [RW] Danny Beebe PTA [RW] Danny Beebe PTA     Balance Skills Training    Standing-Balance Activities --  -RW --   static stance at windows searching for daughter outside.  -RW     Recorded by [RW] Danny Beebe PTA [RW] Danny Beebe PTA     Therapy Exercises    Bilateral Lower Extremities AROM:;ankle pumps/circles;sitting;15 reps;hip abduction/adduction;knee flexion;LAQ   2 sets  -RW AROM:;ankle pumps/circles;10 reps;sitting;hip flexion   2 sets in part  -RW     BLE Resistance theraband  -RW      Recorded by [RW] Danny Beebe PTA [RW] Danny Beebe PTA     Positioning and Restraints    Pre-Treatment Position sitting in chair/recliner  -RW sitting in chair/recliner  -RW     Post Treatment Position wheelchair  -RW wheelchair  -RW     In Bed  patient within staff view  -RW     In Wheelchair with SLP  -RW      Recorded by [RW] Danny Beebe PTA [RW] Danny Beebe PTA       06/14/17 1106 06/14/17 0905 06/14/17 0714    Rehab Assessment/Intervention    Discipline physical therapy assistant  -RW occupational therapy assistant  -RC speech language pathologist  -CK    Document Type therapy note (daily note)  -RW therapy note (daily note)  -RC therapy note (daily note)  -CK    Subjective Information agree to therapy  -RW agree to therapy  -RC agree to therapy  -CK    Patient Effort, Rehab Treatment adequate  -RW good  -RC good  -CK    Patient Response to Treatment needed alot of encouragement and daughter assisted  -RW      Recorded by [RW] Danny Beebe PTA [RC] ROHAN Gomez/POONAM [CK] Michelle Whitten, MS CCC-SLP    Pain Assessment    Pain Assessment   0-10  -CK    Pain Score   2  -CK    Post Pain Score unable to assess  -RW unable to assess  -RC 2  -CK    Pain Type Chronic pain  -RW Chronic pain  -RC Chronic pain  -CK    Pain Location Back  -RW Back  -RC Back  -CK    Pain Orientation Left;Lower  -RW Lower  -RC Lower  -CK    Pain Intervention(s)  Rest  -RC  Repositioned  -CK    Recorded by [RW] Danny Beebe PTA [RC] Ally Kwong, MADRIGAL/L [CK] Michelle Whitten MS CCC-SLP    Vision Assessment/Intervention    Visual Impairment WFL with corrective lenses  -RW      Recorded by [RW] Danny Beebe PTA      Cognitive Assessment/Intervention    Current Cognitive/Communication Assessment impaired  -RW impaired  -RC     Orientation Status oriented to;person;place;disoriented to;time;situation  -RW person  -RC     Recorded by [RW] Danny Beebe PTA [RC] Ally Kwong, MADRIGAL/L     Communication Treatment Objective and Progress    Receptive Language Treatment Objectives   Improve ability to follow directions;Improve ability to comprehend questions  -CK    Expressive Treatment Objectives   Improve word retrieval skills  -CK    Cognitive Linguistic Treatment Objectives   Improve orientation;Improve memory skills  -CK    Recorded by   [CK] Michelle Whitten MS CCC-SLP    Improve ability to follow directions    Improve ability to follow directions:   one-step directions without objects;two-step commands;90%  -CK    Status: Improve ability to follow directions   Progressing as expected  -CK    Ability to Follow Directions Progress   100%;20%  -CK    Recorded by   [CK] Michelle Whitten MS CCC-SLP    Improve ability to comprehend questions    Improve ability to comprehend questions:   simple yes/no questions;simple general questions;90%  -CK    Status: Improve ability to comprehend questions   Progressing as expected  -CK    Ability to Comprehend Questions Progress   90%;50%  -CK    Comments: Improve ability to comprehend questions   90% w/basic yes/no; 50% w/comparative yes/no questions  -CK    Recorded by   [CK] Michelle Whitten MS CCC-SLP    Improve word retrieval skills    Improve word retrieval skills by:   naming an object/pic;answer WH question with one word;completing a divergent task;completing a convergent task;90%  -CK    Status: Improve word retrieval skills    Progressing as expected  -CK    Word Retrieval Skills Progress   50%;with inconsistent cues  -CK    Recorded by   [CK] Michelle Whitten MS CCC-SLP    Improve orientation    Improve orientation through:   demonstrating orientation to day;demonstrating orientation to year;demonstrating orientation to place;80%  -CK    Status: Improve orientation through   Progressing as expected  -CK    Orientation Progress   40%  -CK    Recorded by   [CK] Michelle Whitten MS CCC-SLP    Improve memory skills    Improve memory skills through:   recalling related word lists immediately;recall details of the day;80%  -CK    Status: Improve memory skills   Progressing as expected  -CK    Memory Skills Progress   60%  -CK    Recorded by   [CK] Michelle Whitten MS CCC-SLP    Dysphagia Treatment Objectives and Progress    Dysphagia Treatment Objectives   Other 1  -CK    Recorded by   [CK] Michelle Whitten MS CCC-SLP    Dysphagia Other 1    Dysphagia Other 1 Objective   Pt will safely tolerate recommended diet w/no overt s/s of aspiration.  -CK    Status: Dysphagia Other 1   Progressing as expected  -CK    Dysphagia Other 1 Progress   90%;continue to address  -CK    Comments: Dysphagia Other 1   Pt safely tolerated [facility] soft solids/thin liquids via straw.  Pt also safely tolerated regular solid trials w/no overt s/s of aspiration  -CK    Recorded by   [CK] Michelle Whitten MS CCC-SLP    Bed Mobility, Assessment/Treatment    Bed Mobility, Assistive Device bed rails;head of bed elevated  -RW      Bed Mob, Supine to Sit, [place] supervision required  -RW      Bed Mob, Sit to Supine, [place]  supervision required  -RC     Recorded by [RW] Danny Beebe, PTA [RC] Ally Kwong, MADRIGAL/L     Transfer Assessment/Treatment    Transfers, Chair-Bed [place]  contact guard assist  -RC     Transfers, Bed-Chair-Bed, Assist Device  rolling walker  -RC     Transfers, Sit-Stand [place] contact guard assist;verbal cues required   -RW      Transfers, Stand-Sit [place] contact guard assist;verbal cues required  -RW      Transfers, Sit-Stand-Sit, Assist Device rolling walker  -RW      Toilet Transfer, [place] contact guard assist;minimum assist (75% patient effort)   daughter assist  -RW contact guard assist;verbal cues required  -RC     Toilet Transfer, Assistive Device rolling walker  -RW rolling walker  -RC     Recorded by [RW] Danny Beebe PTA [RC] ADRIANNA GomezA/L     Gait Assessment/Treatment    Gait, [place] Level contact guard assist;verbal cues required  -RW      Gait, Assistive Device rolling walker  -RW      Gait, Distance (Feet) 150  -RW      Gait, Impairments impaired balance  -RW      Recorded by [RW] Danny Beebe PTA      Functional Mobility    Functional Mobility- [place] Level  contact guard assist  -RC     Functional Mobility- Device  rolling walker  -RC     Functional Mobility-Distance (Feet)  15  -RC     Recorded by  [RC] ADRIANNA GomezA/L     Upper Body Bathing Assessment/Training    UB Bathing Assess/Train, Position  sitting;sink side  -RC     UB Bathing Assess/Train, [place] Level  supervision required;verbal cues required  -RC     Recorded by  [RC] ADRIANNA GomezA/L     Lower Body Bathing Assessment/Training    LB Bathing Assess/Train, Position  sitting;standing  -RC     LB Bathing Assess/Train, [place] Level  contact guard assist  -RC     Recorded by  [RC] ADRIANNA GomezA/L     Upper Body Dressing Assessment/Training    UB Dressing Assess/Train, Clothing Type  doffing:;donning:;pull over  -RC     UB Dressing Assess/Train, Position  sitting  -RC     UB Dressing Assess/Train, [place]  minimum assist (75% patient effort)  -RC     Recorded by  [RC] ADRIANNA GomezA/L     Lower Body Dressing Assessment/Training    LB Dressing Assess/Train, Clothing Type donning:;shoes  -RW doffing:;donning:;pants;shorts;shoes  -RC     LB Dressing Assess/Train, Position sitting   -RW sitting;standing  -RC     LB Dressing Assess/Train, [place] minimum assist (75% patient effort);set up required  -RW minimum assist (75% patient effort)  -RC     Recorded by [RW] Danny Beebe PTA [RC] ROHAN Gomez/L     Toileting Assessment/Training    Toileting Assess/Train, Position  sitting;standing  -RC     Toileting Assess/Train, [place] Level  minimum assist (75% patient effort);verbal cues required  -RC     Recorded by  [RC] JOSÉ MIGUEL Gomez     Grooming Assessment/Training    Grooming Assess/Train, Position  sitting  -RC     Grooming Assess/Train, [place] Level  supervision required;verbal cues required  -RC     Recorded by  [RC] ROHAN Gomez/L     Therapy Exercises    Bilateral Lower Extremities AROM:;20 reps;supine;ankle pumps/circles;heel slides;hip abduction/adduction;SAQ   2 sets in part  -RW      Bilateral Upper Extremity  --   ue bike 10 min  -RC     Recorded by [RW] Danny Beebe PTA [RC] ROHAN Gomez/L     Positioning and Restraints    Pre-Treatment Position in bed  -RW sitting in chair/recliner  -RC     Post Treatment Position wheelchair  -RW bed  -RC     In Bed call light within reach  -RW exit alarm on;encouraged to call for assist;call light within reach  -RC     Recorded by [RW] Danny Beebe PTA [RC] ROHAN Gomez/L       User Key  (r) = Recorded By, (t) = Taken By, (c) = Cosigned By    Initials Name Effective Dates    EC Tamiko Couch, CCC-SLP 12/30/16 -     CK Michelle Whitten, MS CCC-SLP 10/17/16 -     MARLENE Jaspreet Rodas, ROYAL 10/17/16 -     RW Danny Beebe, ROYAL 10/17/16 -     RC ADRIANNA GomezA/L 10/17/16 -     KD Nathalia Irizarry MADRIGAL/L 10/17/16 -                 IP PT Goals       06/16/17 1245 06/16/17 0816 06/15/17 1353    Bed Mobility PT LTG    Bed Mobility PT LTG, Date Goal Reviewed (P)  06/16/17  -MARLENE 06/16/17  -MARLENE 06/15/17  -RW    Bed Mobility PT LTG, Outcome (P)  goal ongoing  -MARLENE goal ongoing  -MARLENE goal ongoing   -RW    Transfer Training PT LTG    Transfer Training PT  LTG, Date Goal Reviewed (P)  06/16/17  -MARLENE 06/16/17  -MARLENE 06/15/17  -RW    Transfer Training PT LTG, Outcome (P)  goal ongoing  -MARLENE goal ongoing  -MARLENE goal ongoing  -RW    Gait Training PT LTG    Gait Training Goal PT LTG, Date Goal Reviewed (P)  06/16/17  -MARLENE 06/16/17  -MARLENE 06/15/17  -RW    Gait Training Goal PT LTG, Outcome (P)  goal ongoing  -MARLENE goal ongoing  -MARLENE goal ongoing  -RW    Stair Training PT LTG    Stair Training Goal PT LTG, Date Goal Reviewed   06/15/17  -RW    Stair Training Goal PT LTG, Outcome   goal met  -RW      06/14/17 1608 06/13/17 1036 06/11/17 1403    Bed Mobility PT LTG    Bed Mobility PT LTG, Date Established  06/13/17  -LM     Bed Mobility PT LTG, Time to Achieve  by discharge  -LM     Bed Mobility PT LTG, Activity Type  supine to sit/sit to supine  -LM     Bed Mobility PT LTG, [place] Level  supervision required  -LM     Bed Mobility PT LTG, Additional Goal  HOB flat; No BR's  -LM     Bed Mobility PT LTG, Date Goal Reviewed 06/14/17  -RW  06/11/17  -TW    Bed Mobility PT LTG, Outcome goal ongoing  -RW goal ongoing  -LM goal ongoing  -TW    Transfer Training PT LTG    Transfer Training PT LTG, Date Established  06/13/17  -LM     Transfer Training PT LTG, Time to Achieve  by discharge  -LM     Transfer Training PT LTG, Activity Type  bed to chair /chair to bed  -LM     Transfer Training PT LTG, [place] Level  supervision required  -LM     Transfer Training PT LTG, Assist Device  --   AAD  -LM     Transfer Training PT  LTG, Date Goal Reviewed 06/14/17  -RW      Transfer Training PT LTG, Outcome goal ongoing  -RW goal ongoing  -LM     Gait Training PT STG    Gait Training Goal PT STG, Date Goal Reviewed   06/11/17  -TW    Gait Training Goal PT STG, Outcome   goal ongoing  -TW    Gait Training PT LTG    Gait Training Goal PT LTG, Date Established  06/13/17  -LM     Gait Training Goal PT LTG, Time to Achieve  by discharge  -LM      Gait Training Goal PT LTG, [place] Level  supervision required  -LM     Gait Training Goal PT LTG, Assist Device  --   AAD  -LM     Gait Training Goal PT LTG, Distance to Achieve  150 feet  -LM     Gait Training Goal PT LTG, Date Goal Reviewed 06/14/17  -RW      Gait Training Goal PT LTG, Outcome goal ongoing  -RW goal ongoing  -LM     Stair Training PT LTG    Stair Training Goal PT LTG, Date Established  06/13/17  -LM     Stair Training Goal PT LTG, Time to Achieve  by discharge  -LM     Stair Training Goal PT LTG, Number of Steps  4 steps  -LM     Stair Training Goal PT LTG, [place] Level  contact guard assist  -LM     Stair Training Goal PT LTG, Assist Device  2 handrails  -LM     Stair Training Goal PT LTG, Date Goal Reviewed 06/14/17  -RW  06/11/17  -TW    Stair Training Goal PT LTG, Outcome goal ongoing  -RW goal ongoing  -LM goal ongoing  -TW      06/08/17 1110 06/07/17 1228 06/06/17 1325    Bed Mobility PT LTG    Bed Mobility PT LTG, Date Goal Reviewed 06/08/17  -AM 06/07/17  -RW 06/06/17  -AM    Bed Mobility PT LTG, Outcome goal not met  -AM goal ongoing  -RW goal not met  -AM    Transfer Training PT LTG    Transfer Training PT  LTG, Date Goal Reviewed   06/06/17  -AM    Transfer Training PT LTG, Outcome   goal met  -AM    Gait Training PT STG    Gait Training Goal PT STG, Date Goal Reviewed 06/08/17  -AM 06/07/17  -RW 06/06/17  -AM    Gait Training Goal PT STG, Outcome goal not met  -AM goal ongoing  -RW goal not met  -AM    Stair Training PT LTG    Stair Training Goal PT LTG, Date Goal Reviewed 06/08/17  -AM 06/07/17  -RW 06/06/17  -AM    Stair Training Goal PT LTG, Outcome goal not met  -AM goal ongoing  -RW goal not met  -AM      06/05/17 1652          Bed Mobility PT LTG    Bed Mobility PT LTG, Date Established 06/05/17  -GB      Bed Mobility PT LTG, Time to Achieve 5 - 7 days  -GB      Bed Mobility PT LTG, Activity Type all bed mobility  -GB      Bed Mobility PT LTG, [place] Level  conditional independence  -GB      Bed Mobility PT LTG, Outcome goal not met  -GB      Transfer Training PT LTG    Transfer Training PT LTG, Date Established 06/05/17  -GB      Transfer Training PT LTG, Time to Achieve 5 - 7 days  -GB      Transfer Training PT LTG, Activity Type all transfers  -GB      Transfer Training PT LTG, [place] Level contact guard assist  -GB      Transfer Training PT LTG, Assist Device walker, rolling  -GB      Transfer Training PT LTG, Outcome goal not met  -GB      Gait Training PT STG    Gait Training Goal PT STG, Date Established 06/05/17  -GB      Gait Training Goal PT STG, Time to Achieve 2 wks  -GB      Gait Training Goal PT STG, [place] Level conditional independence  -GB      Gait Training Goal PT STG, Assist Device walker, rolling  -GB      Gait Training Goal PT STG, Distance to Achieve 150 ft w/ RW and CGA x 1  -GB      Gait Training Goal PT STG, Additional Goal 300 ft w/ RW and SBA x 1   -GB      Gait Training Goal PT STG, Outcome goal not met  -GB      Stair Training PT LTG    Stair Training Goal PT LTG, Date Established 06/05/17  -GB      Stair Training Goal PT LTG, Time to Achieve by discharge  -GB      Stair Training Goal PT LTG, [place] Level conditional independence  -GB      Stair Training Goal PT LTG, Assist Device walker, rolling  -GB      Stair Training Goal PT LTG, Distance to Achieve up/down 1 step  -GB      Stair Training Goal PT LTG, Outcome goal not met  -GB        User Key  (r) = Recorded By, (t) = Taken By, (c) = Cosigned By    Initials Name Provider Type    MADDY Kruse, PT Physical Therapist    LILIAN Matthews, PT Physical Therapist    MARLENE Rodas, PTA Physical Therapy Assistant    CHARLES Sanchez, PTA Physical Therapy Assistant    CORRIE Byers, PTA Physical Therapy Assistant    RW Danny Beebe, PTA Physical Therapy Assistant          Physical Therapy Education     Title: PT OT SLP Therapies (Active)      Topic: Physical Therapy (Active)     Point: Mobility training (Active)    Learning Progress Summary    Learner Readiness Method Response Comment Documented by Status   Patient Acceptance E NR   06/16/17 0913 Active    Acceptance E VU,NR reviewed benifits of oob and mobility with assistance.  06/14/17 1252 Done    Acceptance E NR Reviewed safety with transfers - however, pt unable to retain.  Pt's family will require education.  06/13/17 1436 Active               Point: Precautions (Done)    Learning Progress Summary    Learner Readiness Method Response Comment Documented by Status   Patient Acceptance E VU,NR reviewed benifits of oob and mobility with assistance. RW 06/14/17 1252 Done    Acceptance E NR Reviewed safety with transfers - however, pt unable to retain.  Pt's family will require education.  06/13/17 1436 Active                      User Key     Initials Effective Dates Name Provider Type Discipline     06/15/16 -  Megan Kruse, PT Physical Therapist PT     10/17/16 -  Jaspreet Rodas, PTA Physical Therapy Assistant PT     10/17/16 -  Danny Beebe, PTA Physical Therapy Assistant PT                    PT Recommendation and Plan  Anticipated Equipment Needs At Discharge: front wheeled walker  Anticipated Discharge Disposition: home with 24/7 care, home with home health  Planned Therapy Interventions: balance training, bed mobility training, gait training, home exercise program, manual therapy techniques, motor coordination training, neuromuscular re-education, patient/family education, postural re-education, ROM (Range of Motion), stair training, strengthening, stretching, transfer training, wheelchair management/propulsion training  PT Frequency: other (see comments) (5-14 times/wk)  Plan of Care Review  Plan Of Care Reviewed With: (P) patient  Progress: (P) improving  Outcome Summary/Follow up Plan: (P) pt responded well to rx w/ balance activities. pt requires constant cueing for safety.  Recommend 24/7 care upon DC. no new goals met at this time. pt would continue to benefit from PT services          Outcome Measures       06/16/17 0816 06/15/17 1341 06/15/17 0800    How much help from another person do you currently need...    Turning from your back to your side while in flat bed without using bedrails? 3  -MARLENE  3  -RW    Moving from lying on back to sitting on the side of a flat bed without bedrails? 3  -MARLENE  3  -RW    Moving to and from a bed to a chair (including a wheelchair)? 3  -MARLENE  3  -RW    Standing up from a chair using your arms (e.g., wheelchair, bedside chair)? 3  -MARLENE  3  -RW    Climbing 3-5 steps with a railing? 3  -MARLENE  3  -RW    To walk in hospital room? 3  -MARLENE  3  -RW    AM-PAC 6 Clicks Score 18  -MARLENE  18  -RW    How much help from another is currently needed...    Putting on and taking off regular lower body clothing?  3  -RC     Bathing (including washing, rinsing, and drying)  3  -RC     Toileting (which includes using toilet bed pan or urinal)  3  -RC     Putting on and taking off regular upper body clothing  3  -RC     Taking care of personal grooming (such as brushing teeth)  3  -RC     Eating meals  3  -RC     Score  18  -RC     Functional Assessment    Outcome Measure Options AM-PAC 6 Clicks Basic Mobility (PT)  -MARLENE        06/14/17 1200 06/14/17 0905       How much help from another person do you currently need...    Turning from your back to your side while in flat bed without using bedrails? 3  -RW      Moving from lying on back to sitting on the side of a flat bed without bedrails? 3  -RW      Moving to and from a bed to a chair (including a wheelchair)? 3  -RW      Standing up from a chair using your arms (e.g., wheelchair, bedside chair)? 3  -RW      Climbing 3-5 steps with a railing? 3  -RW      To walk in hospital room? 3  -RW      AM-PAC 6 Clicks Score 18  -RW      How much help from another is currently needed...    Putting on and taking off regular lower body clothing?   3  -RC     Bathing (including washing, rinsing, and drying)  3  -RC     Toileting (which includes using toilet bed pan or urinal)  3  -RC     Putting on and taking off regular upper body clothing  3  -RC     Taking care of personal grooming (such as brushing teeth)  3  -RC     Eating meals  3  -RC     Score  18  -RC       User Key  (r) = Recorded By, (t) = Taken By, (c) = Cosigned By    Initials Name Provider Type    MARLENE Rodas PTA Physical Therapy Assistant    RESHMA Beebe PTA Physical Therapy Assistant    TAIHR Kwong, MADRIGAL/L Occupational Therapy Assistant           Time Calculation:         PT Charges       06/16/17 1351 06/16/17 0917       Time Calculation    Start Time 1245  -MARLENE 0816  -MARLENE     Stop Time 1336  -MARLENE 0900  -MARLENE     Time Calculation (min) 51 min  -MARLENE 44 min  -MARLENE     Time Calculation- PT    Total Timed Code Minutes- PT 51 minute(s)  -MARLENE 44 minute(s)  -MARLENE       User Key  (r) = Recorded By, (t) = Taken By, (c) = Cosigned By    Initials Name Provider Type    MARLENE Rodas PTA Physical Therapy Assistant          Therapy Charges for Today     Code Description Service Date Service Provider Modifiers Qty    98249476323 HC GAIT TRAINING EA 15 MIN 6/16/2017 Jaspreet Rodas PTA GP 1    49794604466 HC PT THER PROC EA 15 MIN 6/16/2017 Jaspreet Rodas PTA GP 1    52513369126 HC PT THERAPEUTIC ACT EA 15 MIN 6/16/2017 Jaspreet Rodas PTA GP 1    84328867651 HC GAIT TRAINING EA 15 MIN 6/16/2017 Jaspreet Rodas PTA GP 1    30268664986 HC PT THERAPEUTIC ACT EA 15 MIN 6/16/2017 Jaspreet Rodas PTA GP 2          PT G-Codes  PT Professional Judgement Used?: Yes  Outcome Measure Options: AM-PAC 6 Clicks Basic Mobility (PT)  Score: 18  Functional Limitation: Mobility: Walking and moving around  Mobility: Walking and Moving Around Current Status (): At least 40 percent but less than 60 percent impaired, limited or restricted  Mobility: Walking and Moving Around Goal Status (): At  least 20 percent but less than 40 percent impaired, limited or restricted    Jaspreet Rodas, PTA  6/16/2017

## 2017-06-16 NOTE — PLAN OF CARE
Problem: Patient Care Overview (Adult)  Goal: Plan of Care Review  Outcome: Ongoing (interventions implemented as appropriate)    06/16/17 1245   Coping/Psychosocial Response Interventions   Plan Of Care Reviewed With patient   Patient Care Overview   Progress improving   Outcome Evaluation   Outcome Summary/Follow up Plan pt responded well to rx w/ balance activities. pt requires constant cueing for safety. Recommend 24/7 care upon DC. no new goals met at this time. pt would continue to benefit from PT services       Goal: Discharge Needs Assessment  Outcome: Ongoing (interventions implemented as appropriate)    06/12/17 1400 06/12/17 1502 06/13/17 1036   Discharge Needs Assessment   Concerns To Be Addressed --  no discharge needs identified --    Equipment Needed After Discharge --  --  walker, rolling   Discharge Facility/Level Of Care Needs --  --  nursing facility, skilled;home with home health  (Or 24/7 care from family -depending on progress while on ARU)   Current Health   Outpatient/Agency/Support Group Needs skilled nursing facility (specify) --  --    Anticipated Changes Related to Illness inability to care for self --  --    Living Environment   Transportation Available family or friend will provide --  --    Self-Care   Equipment Currently Used at Home --  --  commode         Problem: Inpatient Physical Therapy  Goal: Bed Mobility Goal LTG- PT  Outcome: Ongoing (interventions implemented as appropriate)    06/13/17 1036 06/16/17 1245   Bed Mobility PT LTG   Bed Mobility PT LTG, Date Established 06/13/17 --    Bed Mobility PT LTG, Time to Achieve by discharge --    Bed Mobility PT LTG, Activity Type supine to sit/sit to supine --    Bed Mobility PT LTG, [place] Level supervision required --    Bed Mobility PT LTG, Additional Goal HOB flat; No BR's --    Bed Mobility PT LTG, Date Goal Reviewed --  06/16/17   Bed Mobility PT LTG, Outcome --  goal ongoing       Goal: Transfer Training Goal 1 LTG-  PT  Outcome: Ongoing (interventions implemented as appropriate)    06/13/17 1036 06/16/17 1245   Transfer Training PT LTG   Transfer Training PT LTG, Date Established 06/13/17 --    Transfer Training PT LTG, Time to Achieve by discharge --    Transfer Training PT LTG, Activity Type bed to chair /chair to bed --    Transfer Training PT LTG, [place] Level supervision required --    Transfer Training PT LTG, Assist Device (AAD) --    Transfer Training PT LTG, Date Goal Reviewed --  06/16/17   Transfer Training PT LTG, Outcome --  goal ongoing         06/13/17 1036 06/16/17 1245   Transfer Training PT LTG   Transfer Training PT LTG, Date Established 06/13/17 --    Transfer Training PT LTG, Time to Achieve by discharge --    Transfer Training PT LTG, Activity Type bed to chair /chair to bed --    Transfer Training PT LTG, [place] Level supervision required --    Transfer Training PT LTG, Assist Device (AAD) --    Transfer Training PT LTG, Date Goal Reviewed --  06/16/17   Transfer Training PT LTG, Outcome --  goal ongoing       Goal: Gait Training Goal LTG- PT  Outcome: Ongoing (interventions implemented as appropriate)    06/13/17 1036 06/16/17 1245   Gait Training PT LTG   Gait Training Goal PT LTG, Date Established 06/13/17 --    Gait Training Goal PT LTG, Time to Achieve by discharge --    Gait Training Goal PT LTG, [place] Level supervision required --    Gait Training Goal PT LTG, Assist Device (AAD) --    Gait Training Goal PT LTG, Distance to Achieve 150 feet --    Gait Training Goal PT LTG, Date Goal Reviewed --  06/16/17   Gait Training Goal PT LTG, Outcome --  goal ongoing

## 2017-06-16 NOTE — PLAN OF CARE
Problem: Patient Care Overview (Adult)  Goal: Plan of Care Review  Outcome: Ongoing (interventions implemented as appropriate)    06/16/17 0316   Coping/Psychosocial Response Interventions   Plan Of Care Reviewed With patient   Patient Care Overview   Progress no change   Outcome Evaluation   Outcome Summary/Follow up Plan pt resting well this shift.          Problem: Functional Deficit (Adult,Obstetrics,Pediatric)  Goal: Signs and Symptoms of Listed Potential Problems Will be Absent or Manageable (Functional Deficit)  Outcome: Ongoing (interventions implemented as appropriate)    06/16/17 0316   Functional Deficit   Problems Assessed (Functional Deficit) all   Problems Present (Functional Deficit) functional activity tolerance impairment         Problem: Fall Risk (Adult)  Goal: Absence of Falls  Outcome: Ongoing (interventions implemented as appropriate)    06/16/17 0316   Fall Risk (Adult)   Absence of Falls achieves outcome         Problem: Infection, Risk/Actual (Adult)  Goal: Infection Prevention/Resolution  Outcome: Ongoing (interventions implemented as appropriate)    06/16/17 0316   Infection, Risk/Actual (Adult)   Infection Prevention/Resolution making progress toward outcome

## 2017-06-16 NOTE — THERAPY TREATMENT NOTE
Inpatient Rehabilitation - Occupational Therapy Treatment Note  [facility]     Patient Name: Kierra Scales  : 1929  MRN: [id number]  Today's Date: 2017  Onset of Illness/Injury or Date of Surgery Date: 17  Date of Referral to OT: 17  Referring Physician: Dr. Lucia      Admit Date: 2017    Visit Dx:     ICD-10-CM ICD-9-CM   1. Oral phase dysphagia R13.11 787.21   2. Impaired mobility and ADLs Z74.09 799.89   3. Muscle weakness (generalized) M62.81 728.87   4. Abnormality of gait and mobility R26.9 781.2   5. 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)   • Hypothyroidism   • Dyspnea   • Hyperthyroidism   • Gastrointestinal hemorrhage with melena   • Type 2 diabetes mellitus with hyperglycemia   • Colon cancer   • Chronic blood loss anemia   • Physical deconditioning   • Senile dementia, uncomplicated   • Encounter for rehabilitation   • Adenocarcinoma of sigmoid colon   • Hypokalemia             Adult Rehabilitation Note       17 1055 17 0900 17 0816    Rehab Assessment/Intervention    Discipline occupational therapy assistant  -KD speech language pathologist  -EC physical therapy assistant  -MARLENE    Document Type therapy note (daily note)  -KD therapy note (daily note)  -EC therapy note (daily note)  -MARLENE    Subjective Information agree to therapy  -KD agree to therapy;no complaints  -EC agree to therapy  -MARLENE    Patient Effort, Rehab Treatment  fair  -EC good  -MARLENE    Precautions/Limitations   fall precautions  -MARLENE    Precautions/Limitations, Vision   other (see comments)   gait belt placement 2* incision.  -MARLENE    Recorded by [KD] ROHAN Garcia/POONAM [EC]  Tamiko Couch CCC-SLP [MARLENE] Jaspreet Rodas PTA    Vital Signs    Pre Systolic BP Rehab   121  -MARLENE    Pre Treatment Diastolic BP   57  -MARLENE    Pretreatment Heart Rate (beats/min) 82  -KD  79  -MARLENE    Intratreatment Heart Rate (beats/min)   90  -MARLENE    Posttreatment Heart Rate (beats/min) 77  -KD  77  -MARLENE    Pre SpO2 (%) 97  -KD  97  -MARLENE    O2 Delivery Pre Treatment room air  -KD  room air  -MARLENE    Intra SpO2 (%)   97  -MARLENE    O2 Delivery Intra Treatment   room air  -MARLENE    Post SpO2 (%) 98  -KD  98  -MARLENE    O2 Delivery Post Treatment room air  -KD  room air  -MARLENE    Pre Patient Position Sitting  -KD  Sitting  -MARLENE    Intra Patient Position Standing  -KD      Post Patient Position Sitting  -KD  Sitting  -MARLENE    Recorded by [KD] ROHAN Garcia/L  [MARLENE] Jaspreet Rodas PTA    Pain Assessment    Pain Assessment No/denies pain  -KD No/denies pain  -EC No/denies pain  -MARLENE    Recorded by [KD] ROHAN Garcia/POONAM [EC] Tamiko Couch CCC-SLP [MARLENE] Jaspreet Rodas PTA    Vision Assessment/Intervention    Visual Impairment   WFL with corrective lenses  -MARLENE    Recorded by   [MARLENE] Jaspreet Rodas PTA    Cognitive Assessment/Intervention    Current Cognitive/Communication Assessment impaired  -KD  impaired  -MARLENE    Orientation Status person  -KD  person  -MARLENE    Follows Commands/Answers Questions 50% of the time  -KD  75% of the time  -MARLENE    Personal Safety Interventions   gait belt;muscle strengthening facilitated;nonskid shoes/slippers when out of bed;supervised activity  -MARLENE    Problem Solving ADL/Func. Task with max verbal cues  -KD      Long Term Memory Interventions --   25 pc shape sorter  -KD      Recorded by [KD] ROHAN Garcia/POONAM  [MARLENE] Jaspreet Rodas PTA    Communication Treatment Objective and Progress    Receptive Language Treatment Objectives  Improve ability to follow directions;Improve ability to comprehend questions  -EC     Expressive Treatment Objectives  Improve word retrieval skills  -EC      Cognitive Linguistic Treatment Objectives  Improve orientation;Improve memory skills  -EC     Recorded by  [EC] Tamiko Couch CCC-SLP     Improve ability to follow directions    Improve ability to follow directions:  one-step directions without objects  -EC     Status: Improve ability to follow directions  Progress slower than expected  -EC     Ability to Follow Directions Progress  20%  -EC     Comments: Improve ability to follow directions Pt was unable to tell MADRIGAL what she would do in case of a fire  -KD      Recorded by [KD] ROHAN Garcia/L [EC] Tamiko Couch CCC-SLP     Improve ability to comprehend questions    Improve ability to comprehend questions:  simple yes/no questions;simple general questions;90%  -EC     Status: Improve ability to comprehend questions  Progressing as expected  -EC     Ability to Comprehend Questions Progress  30%;50%  -EC     Recorded by  [EC] Tamiko Couch CCC-SLP     Improve word retrieval skills    Improve word retrieval skills by:  naming an object/pic;answer WH question with one word;completing a divergent task;completing a convergent task;90%  -EC     Status: Improve word retrieval skills  Progressing as expected  -EC     Word Retrieval Skills Progress  50%  -EC     Recorded by  [EC] Tamiko Couch CCC-SLP     Improve orientation    Improve orientation through:  demonstrating orientation to day;demonstrating orientation to year;demonstrating orientation to place;80%  -EC     Status: Improve orientation through  Progress slower than expected  -EC     Orientation Progress  30%  -EC     Recorded by  [EC] LILIAN LinSLP     Improve memory skills    Improve memory skills through:  recall details of the day;50%  -EC     Status: Improve memory skills  Progress slower than expected  -EC     Memory Skills Progress  50%  -EC     Comments: Improve memory skills +Pt unable to recall full address  -KD      Recorded by [KD] ROHAN Garcia/L  [EC] Tamiko Couch, [facility]-SLP     Bed Mobility, Assessment/Treatment    Bed Mobility, Roll Left, [place]   not tested  -MARLENE    Bed Mobility, Roll Right, [place] supervision required  -KD      Bed Mobility, Scoot/Bridge, [place] not tested  -KD      Bed Mob, Supine to Sit, [place] contact guard assist  -KD      Bed Mob, Sit to Supine, [place] not tested  -KD      Bed Mob, Sidelying to Sit, [place] supervision required  -KD      Bed Mob, Sit to Sidelying, [place] not tested  -KD      Bed Mobility, Safety Issues decreased use of arms for pushing/pulling  -KD      Recorded by [KD] ADRIANNA GarciaA/L  [MARLENE] Jaspreet Rodas, PTA    Transfer Assessment/Treatment    Transfers, Bed-Chair [place] contact guard assist  -KD      Transfers, Chair-Bed [place] contact guard assist  -KD      Transfers, Bed-Chair-Bed, Assist Device rolling walker  -KD      Transfers, Sit-Stand [place] contact guard assist  -KD  contact guard assist  -MARLENE    Transfers, Stand-Sit [place] contact guard assist  -KD  contact guard assist  -MARLENE    Transfers, Sit-Stand-Sit, Assist Device rolling walker  -KD  rolling walker  -MARLENE    Toilet Transfer, [place]   contact guard assist  -MARLENE    Toilet Transfer, Assistive Device   rolling walker  -MARLENE    Transfer, Comment   pt completed sit-stands x5.   -MARLENE    Recorded by [KD] ROHAN Garcia/L  [MARLENE] Jaspreet Rodas, PTA    Gait Assessment/Treatment    Gait, [place] Level   contact guard assist  -MARLENE    Gait, Assistive Device   rolling walker  -MARLENE    Gait, Distance (Feet)   242   40 ft  -MARLENE    Gait, Gait Deviations   timothy decreased  -MARLENE    Gait, Comment   pt required some encouragement to ambulate  -MARLENE    Recorded by   [MARLENE] Jaspreet Rodas, PTA    Wheelchair Training/Management    Wheelchair, Distance Propelled --   50' with VC's  -KD      Recorded by [KD] ADRIANNA GarciaA/L      Upper Body Bathing Assessment/Training    UB Bathing  Assess/Train Assistive Device bath mitt  -KD      UB Bathing Assess/Train, Position edge of bed  -KD      UB Bathing Assess/Train, [place] Level supervision required  -KD      Recorded by [KD] JOSÉ MIGUEL Garcia      Lower Body Bathing Assessment/Training    LB Bathing Assess/Train Assistive Device bath mitt  -KD      LB Bathing Assess/Train, Position sitting;standing  -KD      LB Bathing Assess/Train, [place] Level verbal cues required;contact guard assist  -KD      Recorded by [KD] JOSÉ MIGUEL Garcia      Upper Body Dressing Assessment/Training    UB Dressing Assess/Train, Clothing Type doffing:;donning:;hospital gown;t-shirt  -KD      UB Dressing Assess/Train, Position sitting;standing  -KD      UB Dressing Assess/Train, [place] supervision required  -KD      Recorded by [KD] ROHAN Garcia/L      Lower Body Dressing Assessment/Training    LB Dressing Assess/Train, Clothing Type doffing:;donning:;pants;shoes;socks  -KD      LB Dressing Assess/Train, Position edge of bed;sitting;standing  -KD      LB Dressing Assess/Train, [place] contact guard assist;verbal cues required  -KD      Recorded by [KD] JOSÉ MIGUEL Garcia      Grooming Assessment/Training    Grooming Assess/Train, Assistive Device --   wash mitt  -KD      Grooming Assess/Train, Position sitting  -KD      Grooming Assess/Train, [place]ep[place] Level set up required  -KD      Recorded by [KD] ROHAN Garcia/L      Therapy Exercises    Bilateral Lower Extremities   15 reps;sitting;hip flexion;hip abduction/adduction   dorsiflexion and hamstring curls 15x2 red tband  -MARLENE    BLE Resistance   theraband   lvl 2  -MARLENE    Recorded by   [MARLENE] Jaspreet Rodas, PTA    Positioning and Restraints    Pre-Treatment Position in bed  -KD  sitting in chair/recliner  -MARLENE    Post Treatment Position bed  -KD  wheelchair  -MARLENE    In Bed fowlers;call light within reach;encouraged to call for assist  -KD      In Wheelchair   sitting   w/ SLP   -MARLENE    Recorded by [KD] ROHAN Garcia/POONAM  [MARLENE] Jaspreet Rodas, PTA      06/15/17 1341 06/15/17 1301 06/15/17 0950    Rehab Assessment/Intervention    Discipline occupational therapy assistant  -RC physical therapy assistant  -RW occupational therapy assistant  -RC    Document Type therapy note (daily note)  -RC therapy note (daily note)  -RW therapy note (daily note)  -RC    Subjective Information agree to therapy  -RC agree to therapy  -RW agree to therapy  -RC    Patient Effort, Rehab Treatment good  -RC good  -RW fair  -RC    Recorded by [RC] ADRIANNA GomezA/L [RW] Danny Beebe PTA [RC] ADRIANNA GomezA/L    Vital Signs    Pre Systolic BP Rehab   102  -RC    Pre Treatment Diastolic BP   50  -RC    Post Systolic BP Rehab   117  -RC    Post Treatment Diastolic BP   55  -RC    Recorded by   [RC] ADRIANNA GomezA/L    Pain Assessment    Pain Assessment No/denies pain  -RC No/denies pain  -RW     Pain Type   Chronic pain  -RC    Pain Location   Back  -RC    Pain Intervention(s)   Rest  -RC    Recorded by [RC] ADRIANNA GomezA/L [RW] Danny Beebe PTA [RC] ADRIANNA GomezA/L    Vision Assessment/Intervention    Visual Impairment  WFL with corrective lenses  -RW     Recorded by  [RW] Danny Beebe PTA     Cognitive Assessment/Intervention    Current Cognitive/Communication Assessment impaired  -RC functional  -RW     Orientation Status person  -RC oriented to;person  -RW     Follows Commands/Answers Questions  75% of the time  -RW     Personal Safety  mild impairment  -RW     Personal Safety Interventions  gait belt;nonskid shoes/slippers when out of bed  -RW     Recorded by [RC] ADRIANNA GomezA/L [RW] Danny Beebe PTA     Transfer Assessment/Treatment    Transfers, Chair-Bed [place]   contact guard assist;verbal cues required  -RC    Transfers, Bed-Chair-Bed, Assist Device   rolling walker  -RC    Transfers, Sit-Stand [place]  contact guard assist;verbal cues  required  -RW     Transfers, Stand-Sit [place]  contact guard assist;verbal cues required  -RW     Transfers, Sit-Stand-Sit, Assist Device  rolling walker  -RW     Toilet Transfer, [place] contact guard assist  -RC      Transfer, Comment  sit to stand x 5  -RW     Recorded by [RC] ROHAN Gomez/L [RW] Danny Beebe PTA [RC] ROHAN Gomez/L    Gait Assessment/Treatment    Gait, [place] Level  contact guard assist;verbal cues required  -RW     Gait, Assistive Device  rolling walker  -RW     Gait, Distance (Feet)  200   150' x 3  -RW     Gait, Impairments  impaired balance  -RW     Recorded by  [RW] Danny Beebe PTA     Wheelchair Training/Management    Wheelchair, Distance Propelled  120  -RW     Wheelchair Training Comment  min assist   -RW     Recorded by  [RW] Danny Beebe PTA     Upper Body Bathing Assessment/Training    UB Bathing Assess/Train, Position sitting;sink side  -RC      UB Bathing Assess/Train, [place] Level supervision required;verbal cues required  -RC      UB Bathing Assess/Train, Comment pt requires vc's to complete task, pt pulled IV out   -RC      Recorded by [RC] ROHAN Gomez/L      Lower Body Bathing Assessment/Training    LB Bathing Assess/Train, Position sink side;sitting;standing  -RC      LB Bathing Assess/Train, [place] Level verbal cues required;contact guard assist  -RC      Recorded by [RC] ROHAN Gomez/L      Upper Body Dressing Assessment/Training    UB Dressing Assess/Train, Clothing Type donning:;doffing:;pull over  -RC      UB Dressing Assess/Train, Position sitting  -RC      UB Dressing Assess/Train, [place] supervision required  -RC      Recorded by [RC] ROHAN Gomez/L      Lower Body Dressing Assessment/Training    LB Dressing Assess/Train, Clothing Type doffing:;donning:;pants;shoes;shorts  -RC  doffing:;shoes  -RC    LB Dressing Assess/Train, Position sitting;standing  -RC  sitting  -RC    LB  Dressing Assess/Train, [place] verbal cues required;contact guard assist  -RC  verbal cues required  -RC    Recorded by [RC] ROHAN Gomez/L  [RC] ADRIANNA GomezA/L    Toileting Assessment/Training    Toileting Assess/Train, Assistive Device grab bars  -RC      Toileting Assess/Train, Position sitting;standing  -RC      Toileting Assess/Train, [place] Level contact guard assist  -RC      Recorded by [RC] ADRIANNA GomezA/L      Grooming Assessment/Training    Grooming Assess/Train, Position sitting  -RC      Grooming Assess/Train, [place] Level supervision required  -RC      Recorded by [RC] ADRIANNA GomezA/L      Motor Skills/Interventions    Additional Documentation   --   , cone stack and pass, clothes pin tree  -RC    Recorded by   [RC] ADRIANNA GomezA/L    Therapy Exercises    Bilateral Lower Extremities  AROM:;ankle pumps/circles;sitting;15 reps;hip abduction/adduction;knee flexion;hamstring stretch;hip flexion;mini squats;standing;heel raises   2 sets  -RW     BLE Resistance  theraband  -RW     Bilateral Upper Extremity   --   ue bike 15 min multi vc's to stay on task  -RC    Recorded by  [RW] Danny Beebe, PTA [RC] ROHAN Gomez/L    Positioning and Restraints    Pre-Treatment Position sitting in chair/recliner  -RC sitting in chair/recliner  -RW sitting in chair/recliner  -RC    Post Treatment Position wheelchair  -RC wheelchair  -RW bed  -RC    In Bed notified nsg;encouraged to call for assist   in nursing area  -RC  encouraged to call for assist;call light within reach;notified nsg  -RC    In Wheelchair  with OT  -RW     Recorded by [RC] ROHAN Gomez/L [RW] Danny Beebe, PTA [RC] ROHAN Gomez/POONAM      06/15/17 0945 06/15/17 0905 06/15/17 0800    Rehab Assessment/Intervention    Discipline  speech language pathologist  -EC physical therapy assistant  -RW    Document Type  therapy note (daily note)  -EC therapy note (daily note)  -RW     Subjective Information  agree to therapy;no complaints  -EC agree to therapy  -RW    Patient Effort, Rehab Treatment  fair  -EC fair  -RW    Patient Response to Treatment   got dizzy after steps and needed to be lowered into wc. bp 103/59 hr 70  -RW    Recorded by  [EC] Tamiko Couch, GURMEET-SLP [RW] Danny Beebe PTA    Vital Signs    Pre Systolic BP Rehab --  -RC      Pre Treatment Diastolic BP --  -RC      Intra Systolic BP Rehab   103  -RW    Intra Treatment Diastolic BP   59  -RW    Post Systolic BP Rehab --  -RC      Post Treatment Diastolic BP --  -RC      Intratreatment Heart Rate (beats/min)   70  -RW    Intra SpO2 (%)   97  -RW    O2 Delivery Intra Treatment   room air  -RW    Post SpO2 (%)   92  -RW    O2 Delivery Post Treatment   room air  -RW    Recorded by [RC] ADRIANNA GomezA/L  [RW] Danny Beebe PTA    Pain Assessment    Pain Assessment  No/denies pain  -EC --   c/o back pain with transiton moves and gt  -RW    Pain Location   Back  -RW    Pain Orientation   Left;Lower  -RW    Pain Intervention(s)   Rest  -RW    Recorded by  [EC] Tamiko Couch, GURMEET-SLP [RW] Danny Beebe PTA    Vision Assessment/Intervention    Visual Impairment   WFL with corrective lenses  -RW    Recorded by   [RW] Danny Beeeb PTA    Cognitive Assessment/Intervention    Current Cognitive/Communication Assessment   impaired  -RW    Orientation Status   oriented to;person  -RW    Follows Commands/Answers Questions   50% of the time;needs cueing  -RW    Personal Safety   moderate impairment;mild impairment  -RW    Personal Safety Interventions   gait belt;nonskid shoes/slippers when out of bed  -RW    Recorded by   [RW] Danny Beebe PTA    Communication Treatment Objective and Progress    Receptive Language Treatment Objectives  Improve ability to follow directions;Improve ability to comprehend questions  -EC     Expressive Treatment Objectives  Improve word retrieval skills  -EC     Cognitive Linguistic  Treatment Objectives  Improve orientation;Improve memory skills  -EC     Recorded by  [EC] OSIRIS Lin     Improve ability to follow directions    Improve ability to follow directions:  two-step commands;80%;without cues  -EC     Status: Improve ability to follow directions  Progress slower than expected  -EC     Ability to Follow Directions Progress  20%  -EC     Recorded by  [EC] OSIRIS Lin     Improve ability to comprehend questions    Improve ability to comprehend questions:  simple yes/no questions;simple general questions;90%  -EC     Status: Improve ability to comprehend questions  Progressing as expected  -EC     Ability to Comprehend Questions Progress  30%;50%  -EC     Recorded by  [EC] OSIRIS Lin     Improve word retrieval skills    Improve word retrieval skills by:  naming an object/pic;answer WH question with one word;completing a divergent task;completing a convergent task;90%  -EC     Status: Improve word retrieval skills  Progressing as expected  -EC     Word Retrieval Skills Progress  50%  -EC     Recorded by  [EC] OSIRIS Lin     Improve orientation    Improve orientation through:  demonstrating orientation to day;demonstrating orientation to year;demonstrating orientation to place;80%  -EC     Status: Improve orientation through  Progress slower than expected  -EC     Orientation Progress  30%  -EC     Recorded by  [EC] OSIRIS Lin     Improve memory skills    Improve memory skills through:  recall details of the day;50%  -EC     Status: Improve memory skills  Progress slower than expected  -EC     Memory Skills Progress  50%  -EC     Recorded by  [EC] OSIRIS Lin     Dysphagia Treatment Objectives and Progress    Dysphagia Treatment Objectives  Other 1  -EC     Recorded by  [EC] OSIRIS Lin     Dysphagia Other 1    Dysphagia Other 1 Objective  Pt will safely tolerate recommended diet  w/no overt s/s of aspiration.  -EC     Status: Dysphagia Other 1  Achieved  -EC     Dysphagia Other 1 Progress  90%;with consistent cues  -EC     Recorded by  [EC] Tamiko Couch, CCC-SLP     Transfer Assessment/Treatment    Transfers, Sit-Stand [place]   contact guard assist;verbal cues required;minimum assist (75% patient effort)  -RW    Transfers, Stand-Sit [place]   contact guard assist;verbal cues required  -RW    Transfers, Sit-Stand-Sit, Assist Device   rolling walker  -RW    Toilet Transfer, [place]   minimum assist (75% patient effort)  -RW    Toilet Transfer, Assistive Device   rolling walker  -RW    Recorded by   [RW] aDnny Beebe PTA    Gait Assessment/Treatment    Gait, [place] Level   contact guard assist;verbal cues required  -RW    Gait, Assistive Device   rolling walker  -RW    Gait, Distance (Feet)   150   70'  -RW    Gait, Impairments   impaired balance;motor control impaired  -RW    Recorded by   [RW] Danny Beebe PTA    Stairs Assessment/Treatment    Number of Stairs   4  -RW    Stairs, Handrail Location   both sides  -RW    Stairs, [place] Level   contact guard assist  -RW    Stairs, Assistive Device   --  -RW    Stairs, Comment   pt got dizzy and had to be lowered into her wc  -RW    Recorded by   [RW] Danny Beebe PTA    Wheelchair Training/Management    Wheelchair, Distance Propelled   100  -RW    Wheelchair Training Comment   min assist  -RW    Recorded by   [RW] Danny Beebe PTA    Lower Body Dressing Assessment/Training    LB Dressing Assess/Train, Clothing Type   --  -RW    Recorded by   [RW] Danny Beebe PTA    Balance Skills Training    Standing-Balance Activities   --  -RW    Recorded by   [RW] Danny Beebe PTA    Therapy Exercises    Bilateral Lower Extremities   AROM:;ankle pumps/circles;sitting;15 reps;hip abduction/adduction;knee flexion;LAQ   2 sets  -RW    BLE Resistance   theraband  -RW    Recorded by   [RW] Danny Beebe, PTA     Positioning and Restraints    Pre-Treatment Position   sitting in chair/recliner  -RW    Post Treatment Position   wheelchair  -RW    In Wheelchair   with SLP  -RW    Recorded by   [RW] Danny Beebe PTA      06/14/17 1454 06/14/17 1205 06/14/17 1106    Rehab Assessment/Intervention    Discipline physical therapy assistant  -RW speech language pathologist  -CK physical therapy assistant  -RW    Document Type therapy note (daily note)  -RW therapy note (daily note)  -CK therapy note (daily note)  -RW    Subjective Information agree to therapy  -RW agree to therapy  -CK agree to therapy  -RW    Patient Effort, Rehab Treatment fair  -RW adequate  -CK adequate  -RW    Patient Response to Treatment c/o back hurting and tired  -RW  needed alot of encouragement and daughter assisted  -RW    Recorded by [RW] Danny Beebe PTA [CK] Michelle Whitten, MS CCC-SLP [RW] Danny Beebe PTA    Pain Assessment    Pain Assessment  No/denies pain  -CK     Jason-[place] FACES Pain Rating --   back hurts  -RW      Pain Score  0  -CK     Post Pain Score  0  -CK unable to assess  -RW    Pain Type   Chronic pain  -RW    Pain Location Back  -RW  Back  -RW    Pain Orientation Left;Lower  -RW  Left;Lower  -RW    Pain Intervention(s) Rest  -RW      Recorded by [RW] Danny Beebe PTA [CK] Michelle Whitten, MS CCC-SLP [RW] Danny Beebe PTA    Vision Assessment/Intervention    Visual Impairment WFL with corrective lenses  -RW  WFL with corrective lenses  -RW    Recorded by [RW] Danny Beebe PTA  [RW] Danny Beebe PTA    Cognitive Assessment/Intervention    Current Cognitive/Communication Assessment impaired  -RW  impaired  -RW    Orientation Status oriented to;person  -RW  oriented to;person;place;disoriented to;time;situation  -RW    Follows Commands/Answers Questions 50% of the time;needs cueing  -RW      Recorded by [RW] Danny Beebe PTA  [RW] Danny Beebe PTA    Dysphagia Treatment Objectives and Progress    Dysphagia  Treatment Objectives  Other 1  -CK     Recorded by  [CK] Michelle Whitten MS CCC-SLP     Dysphagia Other 1    Dysphagia Other 1 Objective  Pt will safely tolerate recommended diet w/no overt s/s of aspiration.  -CK     Status: Dysphagia Other 1  Progressing as expected  -CK     Dysphagia Other 1 Progress  90%;continue to address  -CK     Comments: Dysphagia Other 1  Pt safely tolerated regular solids/thin liquids.  Pt did require initiation to cut and eat meat.  -CK     Recorded by  [CK] Michelle Whitten MS CCC-SLP     Bed Mobility, Assessment/Treatment    Bed Mobility, Assistive Device   bed rails;head of bed elevated  -RW    Bed Mob, Supine to Sit, [facility]   supervision required  -RW    Recorded by   [RW] Danny Beebe PTA    Transfer Assessment/Treatment    Transfers, Sit-Stand [facility] contact guard assist;verbal cues required;minimum assist (75% patient effort)  -RW  contact guard assist;verbal cues required  -RW    Transfers, Stand-Sit [facility] contact guard assist;verbal cues required  -RW  contact guard assist;verbal cues required  -RW    Transfers, Sit-Stand-Sit, Assist Device rolling walker  -RW  rolling walker  -RW    Toilet Transfer, [facility]   contact guard assist;minimum assist (75% patient effort)   daughter assist  -RW    Toilet Transfer, Assistive Device   rolling walker  -RW    Recorded by [RW] Danny Beebe PTA  [RW] Danny Beebe PTA    Gait Assessment/Treatment    Gait, [facility] Level contact guard assist;verbal cues required  -RW  contact guard assist;verbal cues required  -RW    Gait, Assistive Device rolling walker  -RW  rolling walker  -RW    Gait, Distance (Feet) 200  -RW  150  -RW    Gait, Impairments impaired balance;motor control impaired  -RW  impaired balance  -RW    Gait, Comment needed alot encouragement  -RW      Recorded by [RW] Danny Beebe PTA  [RW] Danny Beebe PTA    Stairs Assessment/Treatment    Number of Stairs curb  -RW      Stairs,  [place] Level minimum assist (75% patient effort)  -RW      Stairs, Assistive Device walker  -RW      Recorded by [RW] Danny Beebe PTA      Lower Body Dressing Assessment/Training    LB Dressing Assess/Train, Clothing Type donning:;shoes  -RW  donning:;shoes  -RW    LB Dressing Assess/Train, Position   sitting  -RW    LB Dressing Assess/Train, [place]   minimum assist (75% patient effort);set up required  -RW    Recorded by [RW] Danny Beebe PTA  [RW] Danny Beebe PTA    Balance Skills Training    Standing-Balance Activities --   static stance at windows searching for daughter outside.  -RW      Recorded by [RW] Danny Beebe PTA      Therapy Exercises    Bilateral Lower Extremities AROM:;ankle pumps/circles;10 reps;sitting;hip flexion   2 sets in part  -RW  AROM:;20 reps;supine;ankle pumps/circles;heel slides;hip abduction/adduction;SAQ   2 sets in part  -RW    Recorded by [RW] Danny Beebe PTA  [RW] Danny Beebe PTA    Positioning and Restraints    Pre-Treatment Position sitting in chair/recliner  -RW  in bed  -RW    Post Treatment Position wheelchair  -RW  wheelchair  -RW    In Bed patient within staff view  -RW  call light within reach  -RW    Recorded by [RW] Danny Beebe PTA  [RW] Danny Beebe PTA      06/14/17 0905 06/14/17 0714       Rehab Assessment/Intervention    Discipline occupational therapy assistant  -RC speech language pathologist  -CK     Document Type therapy note (daily note)  -RC therapy note (daily note)  -CK     Subjective Information agree to therapy  -RC agree to therapy  -CK     Patient Effort, Rehab Treatment good  -RC good  -CK     Recorded by [RC] ROHAN Gomez/POONAM [CK] Michelle Whitten MS CCC-SLP     Pain Assessment    Pain Assessment  0-10  -CK     Pain Score  2  -CK     Post Pain Score unable to assess  -RC 2  -CK     Pain Type Chronic pain  -RC Chronic pain  -CK     Pain Location Back  -RC Back  -CK     Pain Orientation Lower  -RC Lower  -CK      Pain Intervention(s) Rest  -RC Repositioned  -CK     Recorded by [RC] ROHAN Gomez/POONAM [CK] Michelle Whitten MS CCC-SLP     Cognitive Assessment/Intervention    Current Cognitive/Communication Assessment impaired  -RC      Orientation Status person  -RC      Recorded by [RC] ROHAN Gomez/POONAM      Communication Treatment Objective and Progress    Receptive Language Treatment Objectives  Improve ability to follow directions;Improve ability to comprehend questions  -CK     Expressive Treatment Objectives  Improve word retrieval skills  -CK     Cognitive Linguistic Treatment Objectives  Improve orientation;Improve memory skills  -CK     Recorded by  [CK] Michelle Whitten MS CCC-SLP     Improve ability to follow directions    Improve ability to follow directions:  one-step directions without objects;two-step commands;90%  -CK     Status: Improve ability to follow directions  Progressing as expected  -CK     Ability to Follow Directions Progress  100%;20%  -CK     Recorded by  [CK] Michelle Whitten MS CCC-SLP     Improve ability to comprehend questions    Improve ability to comprehend questions:  simple yes/no questions;simple general questions;90%  -CK     Status: Improve ability to comprehend questions  Progressing as expected  -CK     Ability to Comprehend Questions Progress  90%;50%  -CK     Comments: Improve ability to comprehend questions  90% w/basic yes/no; 50% w/comparative yes/no questions  -CK     Recorded by  [CK] Michelle Whitten MS CCC-SLP     Improve word retrieval skills    Improve word retrieval skills by:  naming an object/pic;answer WH question with one word;completing a divergent task;completing a convergent task;90%  -CK     Status: Improve word retrieval skills  Progressing as expected  -CK     Word Retrieval Skills Progress  50%;with inconsistent cues  -CK     Recorded by  [CK] Michelle Whitten MS CCC-SLP     Improve orientation    Improve orientation through:   demonstrating orientation to day;demonstrating orientation to year;demonstrating orientation to place;80%  -CK     Status: Improve orientation through  Progressing as expected  -CK     Orientation Progress  40%  -CK     Recorded by  [CK] Michelle Whitten MS CCC-SLP     Improve memory skills    Improve memory skills through:  recalling related word lists immediately;recall details of the day;80%  -CK     Status: Improve memory skills  Progressing as expected  -CK     Memory Skills Progress  60%  -CK     Recorded by  [CK] Michelle Whitten MS CCC-SLP     Dysphagia Treatment Objectives and Progress    Dysphagia Treatment Objectives  Other 1  -CK     Recorded by  [CK] Michelle Whitten MS CCC-SLP     Dysphagia Other 1    Dysphagia Other 1 Objective  Pt will safely tolerate recommended diet w/no overt s/s of aspiration.  -CK     Status: Dysphagia Other 1  Progressing as expected  -CK     Dysphagia Other 1 Progress  90%;continue to address  -CK     Comments: Dysphagia Other 1  Pt safely tolerated mech soft solids/thin liquids via straw.  Pt also safely tolerated regular solid trials w/no overt s/s of aspiration  -CK     Recorded by  [CK] Michelle Whitten MS CCC-SLP     Bed Mobility, Assessment/Treatment    Bed Mob, Sit to Supine, [place] supervision required  -RC      Recorded by [RC] ROHAN Gomez/L      Transfer Assessment/Treatment    Transfers, Chair-Bed [place] contact guard assist  -RC      Transfers, Bed-Chair-Bed, Assist Device rolling walker  -RC      Toilet Transfer, [place] contact guard assist;verbal cues required  -RC      Toilet Transfer, Assistive Device rolling walker  -RC      Recorded by [RC] ROHAN Gomez/POONAM      Functional Mobility    Functional Mobility- [place] Level contact guard assist  -RC      Functional Mobility- Device rolling walker  -RC      Functional Mobility-Distance (Feet) 15  -RC      Recorded by [RC] ROHAN Gomez/L      Upper Body Bathing  Assessment/Training    UB Bathing Assess/Train, Position sitting;sink side  -RC      UB Bathing Assess/Train, [place] Level supervision required;verbal cues required  -RC      Recorded by [RC] JOSÉ MIGUEL Gomez      Lower Body Bathing Assessment/Training    LB Bathing Assess/Train, Position sitting;standing  -RC      LB Bathing Assess/Train, [place] Level contact guard assist  -RC      Recorded by [RC] JOSÉ MIGUEL Gomez      Upper Body Dressing Assessment/Training    UB Dressing Assess/Train, Clothing Type doffing:;donning:;pull over  -RC      UB Dressing Assess/Train, Position sitting  -RC      UB Dressing Assess/Train, [place] minimum assist (75% patient effort)  -RC      Recorded by [RC] JOSÉ MIGUEL Gomez      Lower Body Dressing Assessment/Training    LB Dressing Assess/Train, Clothing Type doffing:;donning:;pants;shorts;shoes  -RC      LB Dressing Assess/Train, Position sitting;standing  -RC      LB Dressing Assess/Train, [place] minimum assist (75% patient effort)  -RC      Recorded by [RC] JOSÉ MIGUEL Gomez      Toileting Assessment/Training    Toileting Assess/Train, Position sitting;standing  -RC      Toileting Assess/Train, [place] Level minimum assist (75% patient effort);verbal cues required  -RC      Recorded by [RC] JOSÉ MIGUEL Gomez      Grooming Assessment/Training    Grooming Assess/Train, Position sitting  -RC      Grooming Assess/Train, [place] Level supervision required;verbal cues required  -RC      Recorded by [RC] JOSÉ MIGUEL Gomez      Therapy Exercises    Bilateral Upper Extremity --   ue bike 10 min  -RC      Recorded by [RC] JOSÉ MIGUEL Gomez      Positioning and Restraints    Pre-Treatment Position sitting in chair/recliner  -RC      Post Treatment Position bed  -RC      In Bed exit alarm on;encouraged to call for assist;call light within reach  -RC      Recorded by [RC] JOSÉ MIGUEL Gomez        User Key  (r) = Recorded  By, (t) = Taken By, (c) = Cosigned By    Initials Name Effective Dates    EC Tamikoneri Couch, CCC-SLP 12/30/16 -     CK Michelle Whitten, MS CCC-SLP 10/17/16 -     MARLENE Jaspreet Rodas, PTA 10/17/16 -     RW Danny M Abiel, PTA 10/17/16 -     RC Ally ENRIQUEZ Varghese, MADRIGAL/L 10/17/16 -     KD Nathalia Irizarry, MADRIGAL/L 10/17/16 -                 OT Goals       06/15/17 1341 06/14/17 1431 06/14/17 0905    Transfer Training OT STG    Transfer Training OT STG, Date Goal Reviewed 06/15/17  -RC  06/14/17  -RC    Transfer Training OT STG, Outcome goal ongoing  -RC  goal met  -RC    Dynamic Standing Balance OT STG    Dynamic Standing Balance OT STG, Date Goal Reviewed 06/15/17  -RC  06/14/17  -RC    Dynamic Standing Balance OT STG, Outcome goal ongoing  -RC  goal ongoing  -RC    Caregiver Training OT LTG    Caregiver Training OT LTG, Date Goal Reviewed 06/15/17  -RC  06/14/17  -RC    Caregiver Training OT LTG, Outcome goal ongoing  -RC  goal ongoing  -RC    ADL OT LTG    ADL OT LTG, Date Goal Reviewed 06/15/17  -RC (P)  06/14/17  -RC 06/14/17  -RC    ADL OT LTG, Outcome goal ongoing  -RC (P)  goal ongoing  -RC goal ongoing  -RC    Activity Tolerance OT STG    Activity Tolerance Goal OT STG, Date Goal Reviewed 06/15/17  -RC  06/14/17  -RC    Activity Tolerance Goal OT STG, Outcome goal ongoing  -RC  goal ongoing  -RC      06/13/17 0759 06/12/17 1414 06/09/17 1725    Transfer Training OT STG    Transfer Training OT STG, Date Established 06/13/17  -BH  06/09/17  -RW    Transfer Training OT STG, Time to Achieve 2 wks  -BH  5 - 7 days  -RW    Transfer Training OT STG, Activity Type sit to stand/stand to sit;bed to chair /chair to bed;toilet  -BH  toilet  -RW    Transfer Training OT STG, [place] Level supervision required;set up required   AE/AD as needed  -  supervision required  -RW    Transfer Training OT STG, Date Goal Reviewed  06/12/17  -RM     Transfer Training OT STG, Outcome  goal not met  -RM     Transfer Training OT  STG, Reason Goal Not Met  discharged from facility  -     Dynamic Standing Balance OT STG    Dynamic Standing Balance OT STG, Date Established 06/13/17  -  06/09/17  -    Dynamic Standing Balance OT STG, Time to Achieve 2 wks  -  5 - 7 days  -    Dynamic Standing Balance OT STG, [place] Level supervision required   5 minute no LOB or increased fatigue  -  supervision required  -    Dynamic Standing Balance OT STG, Assist Device   assistive Device  -    Dynamic Standing Balance OT STG, Additional Goal   5 min  -RW    Dynamic Standing Balance OT STG, Date Goal Reviewed  06/12/17  -     Dynamic Standing Balance OT STG, Outcome  goal not met  -     Dynamic Standing Balance OT STG, Reason Goal Not Met  discharged from facility  -     Caregiver Training OT LTG    Caregiver Training OT LTG, Date Established 06/13/17  -      Caregiver Training OT LTG, Time to Achieve by discharge  -      Caregiver Training OT LTG, [place] Level able to assist adequately;able to cue patient adequately   t/f, safety at home, [facility]  -      ADL OT LTG    ADL OT LTG, Date Established 06/13/17  -  06/09/17  -    ADL OT LTG, Time to Achieve by discharge  -  2 wks  -    ADL OT LTG, Activity Type ADL skills  -  ADL skills  -    ADL OT LTG, [place] Level standby assist;modified independent;setup;assistive device  -  standby assist  -    ADL OT LTG, Date Goal Reviewed  06/12/17  -     ADL OT LTG, Outcome  goal not met  -     ADL OT LTG, Reason Goal Not Met  discharged from facility  -     Functional Mobility OT LTG    Functional Mobility Goal OT LTG, Date Established   06/09/17  -    Functional Mobility Goal OT LTG, Time to Achieve   2 wks  -    Functional Mobility Goal OT LTG, [place] Level   supervision  -    Functional Mobility Goal OT LTG, Distance to Achieve   to the bathroom  -    Functional Mobility Goal OT LTG, Date Goal Reviewed  06/12/17  -     Functional Mobility  Goal OT LTG, Outcome  goal not met  -     Functional Mobility Goal OT LTG, Reason Goal Not Met  discharged from facility  -     Activity Tolerance OT STG    Activity Tolerance Goal OT STG, Date Established 06/13/17  -      Activity Tolerance Goal OT STG, Time to Achieve 2 wks  -      Activity Tolerance Goal OT STG, [place] Level 20 min activity;O2 sat >/equal to 90%;with 1 rest break  -        User Key  (r) = Recorded By, (t) = Taken By, (c) = Cosigned By    Initials Name Provider Type     Rebeka Carrillo, OTR/L Occupational Therapist    RW Cami Mon, OTR/L Occupational Therapist    RC Ally Kwong, MADRIGAL/L Occupational Therapy Assistant    RM Catie Sheriff, OT Occupational Therapist          Occupational Therapy Education     Title: PT OT SLP Therapies (Active)     Topic: Occupational Therapy (Active)     Point: ADL training (Active)    Description: Instruct learner(s) on proper safety adaptation and remediation techniques during self care or transfers.   Instruct in proper use of assistive devices.    Learning Progress Summary    Learner Readiness Method Response Comment Documented by Status   Patient Acceptance E NR   06/15/17 1425 Active    Acceptance E NR   06/14/17 1430 Active    Acceptance E VU,NR Educated pt about OT and POC. Educated pt not to get up on her own and how to use call button. Educated pt on safety with t/f and ADL.  06/13/17 1342 Done               Point: Precautions (Done)    Description: Instruct learner(s) on prescribed precautions during self-care and functional transfers.    Learning Progress Summary    Learner Readiness Method Response Comment Documented by Status   Patient Acceptance E VU,NR Educated pt about OT and POC. Educated pt not to get up on her own and how to use call button. Educated pt on safety with t/f and ADL.  06/13/17 1342 Done               Point: Body mechanics (Done)    Description: Instruct learner(s) on proper positioning and spine  alignment during self-care, functional mobility activities and/or exercises.    Learning Progress Summary    Learner Readiness Method Response Comment Documented by Status   Patient Acceptance E VU,NR Educated pt about OT and POC. Educated pt not to get up on her own and how to use call button. Educated pt on safety with t/f and ADL.  06/13/17 1342 Done                      User Key     Initials Effective Dates Name Provider Type Discipline     10/17/16 -  EDIS Tolbert/L Occupational Therapist OT     10/17/16 -  ROHAN Gomez/L Occupational Therapy Assistant OT                  OT Recommendation and Plan  Anticipated Discharge Disposition: home with 24/7 care, home with home health, skilled nursing facility (depends on progress)  Planned Therapy Interventions: activity intolerance, adaptive equipment training, ADL retraining, balance training, bed mobility training, energy conservation, fine motor coordination training, home exercise program, motor coordination training, strengthening, transfer training  Therapy Frequency: other (see comments) (3-14x a week)           Outcome Measures       06/16/17 0816 06/15/17 1341 06/15/17 0800    How much help from another person do you currently need...    Turning from your back to your side while in flat bed without using bedrails? 3  -MARLENE  3  -RW    Moving from lying on back to sitting on the side of a flat bed without bedrails? 3  -MARLENE  3  -RW    Moving to and from a bed to a chair (including a wheelchair)? 3  -MARLENE  3  -RW    Standing up from a chair using your arms (e.g., wheelchair, bedside chair)? 3  -MARLENE  3  -RW    Climbing 3-5 steps with a railing? 3  -MARLENE  3  -RW    To walk in hospital room? 3  -MARLENE  3  -RW    AM-PAC 6 Clicks Score 18  -MARLENE  18  -RW    How much help from another is currently needed...    Putting on and taking off regular lower body clothing?  3  -RC     Bathing (including washing, rinsing, and drying)  3  -RC     Toileting (which  includes using toilet bed pan or urinal)  3  -RC     Putting on and taking off regular upper body clothing  3  -RC     Taking care of personal grooming (such as brushing teeth)  3  -RC     Eating meals  3  -RC     Score  18  -RC     Functional Assessment    Outcome Measure Options AM-PAC 6 Clicks Basic Mobility (PT)  -MARLENE        06/14/17 1200 06/14/17 0905       How much help from another person do you currently need...    Turning from your back to your side while in flat bed without using bedrails? 3  -RW      Moving from lying on back to sitting on the side of a flat bed without bedrails? 3  -RW      Moving to and from a bed to a chair (including a wheelchair)? 3  -RW      Standing up from a chair using your arms (e.g., wheelchair, bedside chair)? 3  -RW      Climbing 3-5 steps with a railing? 3  -RW      To walk in hospital room? 3  -RW      AM-PAC 6 Clicks Score 18  -RW      How much help from another is currently needed...    Putting on and taking off regular lower body clothing?  3  -RC     Bathing (including washing, rinsing, and drying)  3  -RC     Toileting (which includes using toilet bed pan or urinal)  3  -RC     Putting on and taking off regular upper body clothing  3  -RC     Taking care of personal grooming (such as brushing teeth)  3  -RC     Eating meals  3  -RC     Score  18  -RC       User Key  (r) = Recorded By, (t) = Taken By, (c) = Cosigned By    Initials Name Provider Type     Jaspreet Rodas PTA Physical Therapy Assistant    RESHMA Beebe PTA Physical Therapy Assistant    ROHAN Rodas/L Occupational Therapy Assistant           Time Calculation:         Time Calculation- OT       06/16/17 1150          Time Calculation- OT    OT Start Time 0955  -KD      OT Stop Time 1125  -KD      OT Time Calculation (min) 90 min  -KD      Total Timed Code Minutes- OT 90 minute(s)  -KD      OT Non-Billable Time (min) 1 min  -KD      OT Received On 06/16/17  -KD        User Key  (r) =  Recorded By, (t) = Taken By, (c) = Cosigned By    Initials Name Provider Type     ROHAN Garcia/L Occupational Therapy Assistant           Therapy Charges for Today     Code Description Service Date Service Provider Modifiers Qty    33381574732 HC OT THER PROC EA 15 MIN 6/16/2017 ROHAN Garcia/L GO 1    32677286771 HC OT WHEELCHAIR MGMT EA 15 MIN 6/16/2017 ROHAN Garcia/L GO 1    98288223515 HC OT THERAPEUTIC ACT EA 15 MIN 6/16/2017 ROHAN Garcia/L GO 1    55172840005 HC OT SELF CARE/MGMT/TRAIN EA 15 MIN 6/16/2017 ROHAN Garcia/L GO 3          OT G-codes  OT Professional Judgement Used?: Yes  OT Functional Scales Options: AM-PAC 6 Clicks Daily Activity (OT)  Score: 16  Functional Limitation: Self care  Self Care Current Status (): At least 40 percent but less than 60 percent impaired, limited or restricted  Self Care Goal Status (): At least 20 percent but less than 40 percent impaired, limited or restricted    JOSÉ MIGUEL Garcia  6/16/2017

## 2017-06-16 NOTE — PLAN OF CARE
Problem: Patient Care Overview (Adult)  Goal: Plan of Care Review  Outcome: Ongoing (interventions implemented as appropriate)    06/16/17 0816   Coping/Psychosocial Response Interventions   Plan Of Care Reviewed With patient   Patient Care Overview   Progress improving   Outcome Evaluation   Outcome Summary/Follow up Plan pt responded well to therapy w/ increased gait. pt is forgetful and requires constant cueing for ther ex and activites. pt completed ther ex w/ lvl 2 theraband. no new goals met at this time. pt would continue to benefit from PT services.       Goal: Discharge Needs Assessment  Outcome: Ongoing (interventions implemented as appropriate)    06/12/17 1400 06/12/17 1502 06/13/17 1036   Discharge Needs Assessment   Concerns To Be Addressed --  no discharge needs identified --    Equipment Needed After Discharge --  --  walker, rolling   Discharge Facility/Level Of Care Needs --  --  nursing facility, skilled;home with home health  (Or 24/7 care from family -depending on progress while on ARU)   Current Health   Outpatient/Agency/Support Group Needs skilled nursing facility (specify) --  --    Anticipated Changes Related to Illness inability to care for self --  --    Living Environment   Transportation Available family or friend will provide --  --    Self-Care   Equipment Currently Used at Home --  --  commode         Problem: Inpatient Physical Therapy  Goal: Bed Mobility Goal LTG- PT  Outcome: Ongoing (interventions implemented as appropriate)    06/13/17 1036 06/16/17 0816   Bed Mobility PT LTG   Bed Mobility PT LTG, Date Established 06/13/17 --    Bed Mobility PT LTG, Time to Achieve by discharge --    Bed Mobility PT LTG, Activity Type supine to sit/sit to supine --    Bed Mobility PT LTG, [place] Level supervision required --    Bed Mobility PT LTG, Additional Goal HOB flat; No BR's --    Bed Mobility PT LTG, Date Goal Reviewed --  06/16/17   Bed Mobility PT LTG, Outcome --  goal ongoing        Goal: Transfer Training Goal 1 LTG- PT  Outcome: Ongoing (interventions implemented as appropriate)    06/13/17 1036 06/16/17 0816   Transfer Training PT LTG   Transfer Training PT LTG, Date Established 06/13/17 --    Transfer Training PT LTG, Time to Achieve by discharge --    Transfer Training PT LTG, Activity Type bed to chair /chair to bed --    Transfer Training PT LTG, [place] Level supervision required --    Transfer Training PT LTG, Assist Device (AAD) --    Transfer Training PT LTG, Date Goal Reviewed --  06/16/17   Transfer Training PT LTG, Outcome --  goal ongoing       Goal: Gait Training Goal LTG- PT  Outcome: Ongoing (interventions implemented as appropriate)    06/13/17 1036 06/16/17 0816   Gait Training PT LTG   Gait Training Goal PT LTG, Date Established 06/13/17 --    Gait Training Goal PT LTG, Time to Achieve by discharge --    Gait Training Goal PT LTG, [place] Level supervision required --    Gait Training Goal PT LTG, Assist Device (AAD) --    Gait Training Goal PT LTG, Distance to Achieve 150 feet --    Gait Training Goal PT LTG, Date Goal Reviewed --  06/16/17   Gait Training Goal PT LTG, Outcome --  goal ongoing

## 2017-06-16 NOTE — CONSULTS
Adult Nutrition  Assessment    Patient Name:  Kierra Scales  YOB: 1929  MRN: [id number]  Admit Date:  6/12/2017    Assessment Date:  6/16/2017          Reason for Assessment       06/16/17 1520    Reason for Assessment    Reason For Assessment/Visit follow up protocol    Identified At Risk By Screening Criteria large or nonhealing wound, burn or pressure ulcer    Time Spent (min) 5    Diagnosis Diagnosis    Gastrointestinal Bowel resection                Nutrition/Diet History       06/16/17 1520    Nutrition/Diet History    Patient Reported Diet/Restrictions/Preferences diabetic              Labs/Tests/Procedures/Meds       06/16/17 1521    Labs/Tests/Procedures/Meds    Labs/Tests Review Reviewed;Glucose;BUN;Na+;Hgb Hct    Medication Review Reviewed, pertinent    Significant Vitals reviewed            Physical Findings       06/16/17 1522    Physical Appearance    Gastrointestinal abdominal tenderness    Skin surgical wound              Nutrition Prescription Ordered       06/16/17 1522    Nutrition Prescription PO    Current PO Diet Regular    Common Modifiers Consistent Carbohydrate            Evaluation of Received Nutrient/Fluid Intake       06/16/17 1524    PO Evaluation    Number of Meals 4    % PO Intake 66            Comments:  Spoke with nursing re pt's intake since pt isn't a good historian. Nursing reports family is happy pt is eating as well as she is since she didn't eat well in acute care(average is 66%). Glucose is variable. Na and h/h are below normal limits.Glucerna is being refused. Will d/c since pt is eating better and continue to monitor.        Electronically signed by:  Megan Pompa RD  06/16/17 3:25 PM

## 2017-06-16 NOTE — PLAN OF CARE
Problem: Patient Care Overview (Adult)  Goal: Plan of Care Review  Outcome: Ongoing (interventions implemented as appropriate)    06/16/17 6144   Coping/Psychosocial Response Interventions   Plan Of Care Reviewed With patient   Patient Care Overview   Progress no change   Outcome Evaluation   Outcome Summary/Follow up Plan pt has had swelling of lips - using pt's fixodent for dentures now - not [facility]'s sparkle, pt has had itchy welps on skin today, took PRN Benadryl - test-trying not using bath wipes on pt's skin.        Goal: Adult Individualization and Mutuality  Outcome: Ongoing (interventions implemented as appropriate)  Goal: Discharge Needs Assessment  Outcome: Ongoing (interventions implemented as appropriate)    Problem: Functional Deficit (Adult,Obstetrics,Pediatric)  Goal: Signs and Symptoms of Listed Potential Problems Will be Absent or Manageable (Functional Deficit)  Outcome: Ongoing (interventions implemented as appropriate)    Problem: Fall Risk (Adult)  Goal: Absence of Falls  Outcome: Ongoing (interventions implemented as appropriate)    Problem: Infection, Risk/Actual (Adult)  Goal: Infection Prevention/Resolution  Outcome: Ongoing (interventions implemented as appropriate)

## 2017-06-16 NOTE — PLAN OF CARE
Problem: Patient Care Overview (Adult)  Goal: Plan of Care Review  Outcome: Ongoing (interventions implemented as appropriate)    06/16/17 0940   Coping/Psychosocial Response Interventions   Plan Of Care Reviewed With patient   Patient Care Overview   Progress progress toward functional goals as expected   Outcome Evaluation   Outcome Summary/Follow up Plan pt sleepy during therapy this date. did well with answering wh questions and naming pictures. difficulty with 2 step directions and yes/no questions.         Problem: Inpatient SLP  Goal: Cognitive-linguistic-Patient will improve Cognitive-linguistic skills to allow optimal participation in care  Outcome: Ongoing (interventions implemented as appropriate)    06/13/17 1506 06/16/17 0940   Cognitive Linguistic- Optimal Participation in Care   Cognitive Linguistic Optimal Participation in Care- SLP, Date Established 06/13/17 --    Cognitive Linguistic Optimal Participation in Care- SLP, Time to Achieve by discharge --    Cognitive Linguistic Optimal Participation in Care- SLP, Activity Level Patient will improve orientation for increased safety in environment;Patient will improve memory skills for increased safety in environment --    Cognitive Linguistic Optimal Participation in Care- SLP, Date Goal Reviewed --  06/16/17   Cognitive Linguistic Optimal Participation in Care- SLP, Outcome --  goal ongoing       Goal: Receptive Language-Patient will improve receptive language skills to allow optimal participation in care  Outcome: Ongoing (interventions implemented as appropriate)    06/13/17 1506 06/16/17 0940   Receptive Language- Optimal Participation in Care   Receptive Language Optimal Participation in Care- SLP, Date Established 06/13/17 --    Receptive Language Optimal Participation in Care- SLP, Time to Achieve by discharge --    Receptive Language Optimal Participation in Care- SLP, Activity Level Patient will improve ability to follow directions;Patient will  improve ability to comprehend questions --    Receptive Language Optimal Participation in Care- SLP, Date Goal Reviewed --  06/16/17   Receptive Language Optimal Participation in Care- SLP, Outcome --  goal ongoing

## 2017-06-16 NOTE — PLAN OF CARE
Problem: Patient Care Overview (Adult)  Goal: Plan of Care Review  Outcome: Ongoing (interventions implemented as appropriate)    06/16/17 1245 06/16/17 1416   Coping/Psychosocial Response Interventions   Plan Of Care Reviewed With patient --    Patient Care Overview   Progress improving --    Outcome Evaluation   Outcome Summary/Follow up Plan --  No new goals met this tx       Goal: Discharge Needs Assessment  Outcome: Ongoing (interventions implemented as appropriate)    06/12/17 1400 06/12/17 1502 06/13/17 1036   Discharge Needs Assessment   Concerns To Be Addressed --  no discharge needs identified --    Equipment Needed After Discharge --  --  walker, rolling   Discharge Facility/Level Of Care Needs --  --  nursing facility, skilled;home with home health  (Or 24/7 care from family -depending on progress while on ARU)   Current Health   Outpatient/Agency/Support Group Needs skilled nursing facility (specify) --  --    Anticipated Changes Related to Illness inability to care for self --  --    Living Environment   Transportation Available family or friend will provide --  --    Self-Care   Equipment Currently Used at Home --  --  commode         Problem: Inpatient Occupational Therapy  Goal: Transfer Training Goal 1 STG- OT  Outcome: Ongoing (interventions implemented as appropriate)    06/13/17 0759 06/15/17 1341 06/16/17 0955   Transfer Training OT STG   Transfer Training OT STG, Date Established 06/13/17 --  --    Transfer Training OT STG, Time to Achieve 2 wks --  --    Transfer Training OT STG, Activity Type sit to stand/stand to sit;bed to chair /chair to bed;toilet --  --    Transfer Training OT STG, [place] Level supervision required;set up required  (AE/AD as needed) --  --    Transfer Training OT STG, Date Goal Reviewed --  --  06/16/17   Transfer Training OT STG, Outcome --  goal ongoing --        Goal: Dymanic Standing Balance Goal STG- OT  Outcome: Ongoing (interventions implemented as  appropriate)    06/13/17 0759 06/15/17 1341 06/16/17 0955   Dynamic Standing Balance OT STG   Dynamic Standing Balance OT STG, Date Established 06/13/17 --  --    Dynamic Standing Balance OT STG, Time to Achieve 2 wks --  --    Dynamic Standing Balance OT STG, [place] Level supervision required  (5 minute no LOB or increased fatigue) --  --    Dynamic Standing Balance OT STG, Date Goal Reviewed --  --  06/16/17   Dynamic Standing Balance OT STG, Outcome --  goal ongoing --        Goal: Caregiver Training Goal LTG- OT  Outcome: Ongoing (interventions implemented as appropriate)    06/13/17 0759 06/15/17 1341 06/16/17 0955   Caregiver Training OT LTG   Caregiver Training OT LTG, Date Established 06/13/17 --  --    Caregiver Training OT LTG, Time to Achieve by discharge --  --    Caregiver Training OT LTG, [place] Level able to assist adequately;able to cue patient adequately  (t/f, safety at home, HEP) --  --    Caregiver Training OT LTG, Date Goal Reviewed --  --  06/16/17   Caregiver Training OT LTG, Outcome --  goal ongoing --        Goal: ADL Goal LTG- OT  Outcome: Ongoing (interventions implemented as appropriate)    06/13/17 0759 06/15/17 1341 06/16/17 0955   ADL OT LTG   ADL OT LTG, Date Established 06/13/17 --  --    ADL OT LTG, Time to Achieve by discharge --  --    ADL OT LTG, Activity Type ADL skills --  --    ADL OT LTG, [place] Level standby assist;modified independent;setup;assistive device --  --    ADL OT LTG, Date Goal Reviewed --  --  06/16/17   ADL OT LTG, Outcome --  goal ongoing --        Goal: Activity Tolerance Goal STG- OT  Outcome: Ongoing (interventions implemented as appropriate)    06/13/17 0759 06/15/17 1341 06/16/17 0955   Activity Tolerance OT STG   Activity Tolerance Goal OT STG, Date Established 06/13/17 --  --    Activity Tolerance Goal OT STG, Time to Achieve 2 wks --  --    Activity Tolerance Goal OT STG, Activity Level 20 min activity;O2 sat >/equal to 90%;with 1  rest break --  --    Activity Tolerance Goal OT [facility], Date Goal Reviewed --  --  06/16/17   Activity Tolerance Goal OT [facility], Outcome --  goal ongoing --

## 2017-06-16 NOTE — THERAPY TREATMENT NOTE
"Inpatient Rehabilitation - Speech Language Pathology Treatment Note  [facility]     Patient Name: Kierra Scales  : 1929  MRN: [id number]  Today's Date: 2017  Referring Physician: Khari      Admit Date: 2017  pt sleepy during therapy this date. did well with answering wh questions and naming pictures. difficulty with 2 step directions and yes/no questions.  Goals to be addressed:  1. Pt will safely tolerate recommended diet w/no overt s/s of aspiration:GOAL MET previously.    2. Pt will complete one-step directions without objects w/90% acc:  GOAL MET previously  3. Pt will complete two-step commands w/90% acc: Pt was 10% acc w/2-step directions using body parts.  4. Pt will complete simple yes/no questions w/90% acc: Pt was 50% acc w/basic yes/no questions  5. Pt will answer simple \"WH\" questions w/90% acc: Pt was 80% acc w/wh-questions.   6. Pt will complete object/picture naming w/80% acc: Pt was 100% acc w/simple picture naming.GOAL MET  7. Pt will complete convergent naming task w/80% acc: Obtained 40% accuracy.   8. Pt will complete simple divergent naming task w/an average of 5 wpm for simple concrete categories: Pt had an average of 2 wpm (fruit 3,things in kitchen 4, clothes 0, veggies 0). Moderate cues provided. Pt was very sleepy during this task.    9. Pt will complete immediate recall of related words w/80% acc: Discontinued goal previously  10: Pt will recall details of the day w/80% acc: given a written schedule and calendar to look at, but required max cues to use functionally.  30% recall with use of aides.    11. Pt will complete orientation to [place], [place], and Place w/80% acc with use of visual aide: 0% did not know place, month, year. Did not recall why in hospital. (Goal is changed this date to incorporate use of visual cues) identified city/state, and had more success with recall of month and year with calendar.    Therapy provided by student clinician with total " supervision.    Tamiko Couch CCC-SLP 6/16/2017 9:50 AM     Visit Dx:      ICD-10-CM ICD-9-CM   1. Oral phase dysphagia R13.11 787.21   2. Impaired mobility and ADLs Z74.09 799.89   3. Muscle weakness (generalized) M62.81 728.87   4. Abnormality of gait and mobility R26.9 781.2   5. 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)   • Hypothyroidism   • Dyspnea   • Hyperthyroidism   • Gastrointestinal hemorrhage with melena   • Type 2 diabetes mellitus with hyperglycemia   • Colon cancer   • Chronic blood loss anemia   • Physical deconditioning   • Senile dementia, uncomplicated   • Encounter for rehabilitation   • Adenocarcinoma of sigmoid colon   • Hypokalemia              Adult Rehabilitation Note       06/16/17 0900 06/16/17 0816 06/15/17 1341    Rehab Assessment/Intervention    Discipline speech language pathologist  -EC physical therapy assistant  -MARLENE occupational therapy assistant  -RC    Document Type therapy note (daily note)  -EC therapy note (daily note)  -MARLENE therapy note (daily note)  -RC    Subjective Information agree to therapy;no complaints  -EC agree to therapy  -MARLENE agree to therapy  -RC    Patient Effort, Rehab Treatment fair  -EC good  -MARLENE good  -RC    Precautions/Limitations  fall precautions  -MARLENE     Precautions/Limitations, Vision  other (see comments)   gait belt placement 2* incision.  -MARLENE     Recorded by [EC] Tamiko Couch CCC-SLP [MARLENE] Jaspreet Rodas PTA [RC] ADRIANNA GomezA/L    Vital Signs    Pre Systolic BP Rehab  121  -MARLENE     Pre Treatment Diastolic BP  57  -MARLENE     Pretreatment Heart Rate (beats/min)  79  -MARLENE     Intratreatment Heart Rate (beats/min)  90  -MARLENE      Posttreatment Heart Rate (beats/min)  77  -MARLENE     Pre SpO2 (%)  97  -MARLENE     O2 Delivery Pre Treatment  room air  -MARLENE     Intra SpO2 (%)  97  -MARLENE     O2 Delivery Intra Treatment  room air  -MARLENE     Post SpO2 (%)  98  -MARLENE     O2 Delivery Post Treatment  room air  -MARLENE     Pre Patient Position  Sitting  -MARLENE     Post Patient Position  Sitting  -MARLENE     Recorded by  [MARLENE] Jaspreet Rodas PTA     Pain Assessment    Pain Assessment No/denies pain  -EC No/denies pain  -MARLENE No/denies pain  -RC    Recorded by [EC] Tamiko Couch CCC-SLP [MARLENE] Jaspreet Rodas PTA [RC] ADRIANNA GomezA/L    Vision Assessment/Intervention    Visual Impairment  WFL with corrective lenses  -MARLENE     Recorded by  [MARLENE] Jaspreet Rodas PTA     Cognitive Assessment/Intervention    Current Cognitive/Communication Assessment  impaired  -MARLENE impaired  -RC    Orientation Status  person  -MARLENE person  -RC    Follows Commands/Answers Questions  75% of the time  -MARLENE     Personal Safety Interventions  gait belt;muscle strengthening facilitated;nonskid shoes/slippers when out of bed;supervised activity  -MARLENE     Recorded by  [MARLENE] Jaspreet Rodas PTA [RC] Ally Kwong, MADRIGAL/L    Communication Treatment Objective and Progress    Receptive Language Treatment Objectives Improve ability to follow directions;Improve ability to comprehend questions  -EC      Expressive Treatment Objectives Improve word retrieval skills  -EC      Cognitive Linguistic Treatment Objectives Improve orientation;Improve memory skills  -EC      Recorded by [EC] Tamiko Couch CCC-SLP      Improve ability to follow directions    Improve ability to follow directions: one-step directions without objects  -EC      Status: Improve ability to follow directions Progress slower than expected  -EC      Ability to Follow Directions Progress 20%  -EC      Recorded by [EC] Tamiko Couch CCC-SLP      Improve ability to comprehend questions    Improve ability to comprehend questions:  simple yes/no questions;simple general questions;90%  -EC      Status: Improve ability to comprehend questions Progressing as expected  -EC      Ability to Comprehend Questions Progress 30%;50%  -EC      Recorded by [EC] OSIRIS Lin      Improve word retrieval skills    Improve word retrieval skills by: naming an object/pic;answer WH question with one word;completing a divergent task;completing a convergent task;90%  -EC      Status: Improve word retrieval skills Progressing as expected  -EC      Word Retrieval Skills Progress 50%  -EC      Recorded by [EC] OSIRIS Lin      Improve orientation    Improve orientation through: demonstrating orientation to day;demonstrating orientation to year;demonstrating orientation to place;80%  -EC      Status: Improve orientation through Progress slower than expected  -EC      Orientation Progress 30%  -EC      Recorded by [EC] OSIRIS Lin      Improve memory skills    Improve memory skills through: recall details of the day;50%  -EC      Status: Improve memory skills Progress slower than expected  -EC      Memory Skills Progress 50%  -EC      Recorded by [EC] OSIRIS Lin      Bed Mobility, Assessment/Treatment    Bed Mobility, Roll Left, [place]  not tested  -MARLENE     Recorded by  [MARLENE] Jaspreet Rodas PTA     Transfer Assessment/Treatment    Transfers, Sit-Stand [place]  contact guard assist  -MARLENE     Transfers, Stand-Sit [place]  contact guard assist  -MARLENE     Transfers, Sit-Stand-Sit, Assist Device  rolling walker  -MARLENE     Toilet Transfer, [place]  contact guard assist  -MARLENE contact guard assist  -RC    Toilet Transfer, Assistive Device  rolling walker  -MARLENE     Transfer, Comment  pt completed sit-stands x5.   -MARLENE     Recorded by  [MARLENE] Jaspreet Rodas PTA [RC] Ally Kwong, MADRIGAL/L    Gait Assessment/Treatment    Gait, [place] Level  contact guard assist  -MARLENE     Gait, Assistive Device   rolling walker  -MARLENE     Gait, Distance (Feet)  242   40 ft  -MARLENE     Gait, Gait Deviations  timothy decreased  -MARLENE     Gait, Comment  pt required some encouragement to ambulate  -MARLENE     Recorded by  [MARLENE] Jaspreet Rodas, PTA     Upper Body Bathing Assessment/Training    UB Bathing Assess/Train, Position   sitting;sink side  -RC    UB Bathing Assess/Train, [place] Level   supervision required;verbal cues required  -RC    UB Bathing Assess/Train, Comment   pt requires vc's to complete task, pt pulled IV out   -RC    Recorded by   [RC] ROHAN Gomez/L    Lower Body Bathing Assessment/Training    LB Bathing Assess/Train, Position   sink side;sitting;standing  -RC    LB Bathing Assess/Train, [place] Level   verbal cues required;contact guard assist  -RC    Recorded by   [RC] ROHAN Gomez/L    Upper Body Dressing Assessment/Training    UB Dressing Assess/Train, Clothing Type   donning:;doffing:;pull over  -RC    UB Dressing Assess/Train, Position   sitting  -RC    UB Dressing Assess/Train, [place]   supervision required  -RC    Recorded by   [RC] ROHAN Gomez/L    Lower Body Dressing Assessment/Training    LB Dressing Assess/Train, Clothing Type   doffing:;donning:;pants;shoes;shorts  -RC    LB Dressing Assess/Train, Position   sitting;standing  -RC    LB Dressing Assess/Train, [place]   verbal cues required;contact guard assist  -RC    Recorded by   [RC] ADRIANNA GomezA/L    Toileting Assessment/Training    Toileting Assess/Train, Assistive Device   grab bars  -RC    Toileting Assess/Train, Position   sitting;standing  -RC    Toileting Assess/Train, [place]ep[place] Level   contact guard assist  -RC    Recorded by   [RC] ROHAN Gomez/L    Grooming Assessment/Training    Grooming Assess/Train, Position   sitting  -RC    Grooming Assess/Train, [place]ep[place] Level   supervision required  -RC    Recorded by   [RC] ROHAN Gomez/L    Therapy Exercises    Bilateral Lower  Extremities  15 reps;sitting;hip flexion;hip abduction/adduction   dorsiflexion and hamstring curls 15x2 red tband  -MARLENE     BLE Resistance  theraband   lvl 2  -MARLENE     Recorded by  [MARLENE] Jaspreet Rodas PTA     Positioning and Restraints    Pre-Treatment Position  sitting in chair/recliner  -MARLENE sitting in chair/recliner  -RC    Post Treatment Position  wheelchair  -MARLENE wheelchair  -RC    In Bed   notified nsg;encouraged to call for assist   in nursing area  -RC    In Wheelchair  sitting   w/ SLP  -MARLENE     Recorded by  [MARLENE] Jaspreet Rodas PTA [RC] Ally Kwong MADRIGAL/L      06/15/17 1301 06/15/17 0950 06/15/17 0945    Rehab Assessment/Intervention    Discipline physical therapy assistant  -RW occupational therapy assistant  -RC     Document Type therapy note (daily note)  -RW therapy note (daily note)  -RC     Subjective Information agree to therapy  -RW agree to therapy  -RC     Patient Effort, Rehab Treatment good  -RW fair  -RC     Recorded by [RW] Danny Beebe PTA [RC] Ally Kwong MADRIGAL/L     Vital Signs    Pre Systolic BP Rehab  102  -RC --  -RC    Pre Treatment Diastolic BP  50  -RC --  -RC    Post Systolic BP Rehab  117  -RC --  -RC    Post Treatment Diastolic BP  55  -RC --  -RC    Recorded by  [RC] Ally Kwong MADRIGAL/L [RC] Ally Kwong, MADRIGAL/L    Pain Assessment    Pain Assessment No/denies pain  -RW      Pain Type  Chronic pain  -RC     Pain Location  Back  -RC     Pain Intervention(s)  Rest  -RC     Recorded by [RW] Danny Beebe PTA [RC] Ally Kwong MADRIGAL/L     Vision Assessment/Intervention    Visual Impairment WFL with corrective lenses  -RW      Recorded by [RW] Danny Beebe PTA      Cognitive Assessment/Intervention    Current Cognitive/Communication Assessment functional  -RW      Orientation Status oriented to;person  -RW      Follows Commands/Answers Questions 75% of the time  -RW      Personal Safety mild impairment  -RW      Personal Safety Interventions gait belt;nonskid  shoes/slippers when out of bed  -RW      Recorded by [RW] Danny Beebe PTA      Transfer Assessment/Treatment    Transfers, Chair-Bed [place]  contact guard assist;verbal cues required  -RC     Transfers, Bed-Chair-Bed, Assist Device  rolling walker  -RC     Transfers, Sit-Stand [place] contact guard assist;verbal cues required  -RW      Transfers, Stand-Sit [place] contact guard assist;verbal cues required  -RW      Transfers, Sit-Stand-Sit, Assist Device rolling walker  -RW      Transfer, Comment sit to stand x 5  -RW      Recorded by [RW] Danny Beebe PTA [RC] ADRIANNA GomezA/L     Gait Assessment/Treatment    Gait, [place] Level contact guard assist;verbal cues required  -RW      Gait, Assistive Device rolling walker  -RW      Gait, Distance (Feet) 200   150' x 3  -RW      Gait, Impairments impaired balance  -RW      Recorded by [RW] Danny Beebe PTA      Wheelchair Training/Management    Wheelchair, Distance Propelled 120  -RW      Wheelchair Training Comment min assist   -RW      Recorded by [RW] Danny Beebe PTA      Lower Body Dressing Assessment/Training    LB Dressing Assess/Train, Clothing Type  doffing:;shoes  -RC     LB Dressing Assess/Train, Position  sitting  -RC     LB Dressing Assess/Train, [place]  verbal cues required  -RC     Recorded by  [RC] ROHAN Gomez/L     Motor Skills/Interventions    Additional Documentation  --   , cone stack and pass, clothes pin tree  -RC     Recorded by  [RC] ADRIANNA GomezA/L     Therapy Exercises    Bilateral Lower Extremities AROM:;ankle pumps/circles;sitting;15 reps;hip abduction/adduction;knee flexion;hamstring stretch;hip flexion;mini squats;standing;heel raises   2 sets  -RW      BLE Resistance theraband  -RW      Bilateral Upper Extremity  --   ue bike 15 min multi vc's to stay on task  -RC     Recorded by [RW] Danny Beebe PTA [RC] ROHAN Gomez/L     Positioning and Restraints     Pre-Treatment Position sitting in chair/recliner  -RW sitting in chair/recliner  -RC     Post Treatment Position wheelchair  -RW bed  -RC     In Bed  encouraged to call for assist;call light within reach;notified nsg  -RC     In Wheelchair with OT  -RW      Recorded by [RW] Danny Beebe PTA [RC] Ally Kwong, MADRIGAL/POONAM       06/15/17 0905 06/15/17 0800 06/14/17 1454    Rehab Assessment/Intervention    Discipline speech language pathologist  -EC physical therapy assistant  -RW physical therapy assistant  -RW    Document Type therapy note (daily note)  -EC therapy note (daily note)  -RW therapy note (daily note)  -RW    Subjective Information agree to therapy;no complaints  -EC agree to therapy  -RW agree to therapy  -RW    Patient Effort, Rehab Treatment fair  -EC fair  -RW fair  -RW    Patient Response to Treatment  got dizzy after steps and needed to be lowered into wc. bp 103/59 hr 70  -RW c/o back hurting and tired  -RW    Recorded by [EC] Tamiko Couch, GURMEET-SLP [RW] Danny Beebe PTA [RW] Danny Beebe PTA    Vital Signs    Intra Systolic BP Rehab  103  -RW     Intra Treatment Diastolic BP  59  -RW     Intratreatment Heart Rate (beats/min)  70  -RW     Intra SpO2 (%)  97  -RW     O2 Delivery Intra Treatment  room air  -RW     Post SpO2 (%)  92  -RW     O2 Delivery Post Treatment  room air  -RW     Recorded by  [RW] Danny Beebe PTA     Pain Assessment    Pain Assessment No/denies pain  -EC --   c/o back pain with transiton moves and gt  -RW     [facility]-[place] FACES Pain Rating   --   back hurts  -RW    Pain Location  Back  -RW Back  -RW    Pain Orientation  Left;Lower  -RW Left;Lower  -RW    Pain Intervention(s)  Rest  -RW Rest  -RW    Recorded by [EC] Tamiko Couch CCC-SLP [RW] Danny Beebe PTA [RW] Danny Beebe PTA    Vision Assessment/Intervention    Visual Impairment  WFL with corrective lenses  -RW WFL with corrective lenses  -RW    Recorded by  [RW] Danny Beebe PTA [RW] Danny PARRA  ROYAL Beebe    Cognitive Assessment/Intervention    Current Cognitive/Communication Assessment  impaired  -RW impaired  -RW    Orientation Status  oriented to;person  -RW oriented to;person  -RW    Follows Commands/Answers Questions  50% of the time;needs cueing  -RW 50% of the time;needs cueing  -RW    Personal Safety  moderate impairment;mild impairment  -RW     Personal Safety Interventions  gait belt;nonskid shoes/slippers when out of bed  -RW     Recorded by  [RW] Danny Beebe PTA [RW] Danny Beebe PTA    Communication Treatment Objective and Progress    Receptive Language Treatment Objectives Improve ability to follow directions;Improve ability to comprehend questions  -EC      Expressive Treatment Objectives Improve word retrieval skills  -EC      Cognitive Linguistic Treatment Objectives Improve orientation;Improve memory skills  -EC      Recorded by [EC] OSIRIS Lin      Improve ability to follow directions    Improve ability to follow directions: two-step commands;80%;without cues  -EC      Status: Improve ability to follow directions Progress slower than expected  -EC      Ability to Follow Directions Progress 20%  -EC      Recorded by [EC] OSIRIS Lin      Improve ability to comprehend questions    Improve ability to comprehend questions: simple yes/no questions;simple general questions;90%  -EC      Status: Improve ability to comprehend questions Progressing as expected  -EC      Ability to Comprehend Questions Progress 30%;50%  -EC      Recorded by [EC] OSIRIS Lin      Improve word retrieval skills    Improve word retrieval skills by: naming an object/pic;answer WH question with one word;completing a divergent task;completing a convergent task;90%  -EC      Status: Improve word retrieval skills Progressing as expected  -EC      Word Retrieval Skills Progress 50%  -EC      Recorded by [EC] OSIRIS Lin      Improve orientation     Improve orientation through: demonstrating orientation to day;demonstrating orientation to year;demonstrating orientation to place;80%  -EC      Status: Improve orientation through Progress slower than expected  -EC      Orientation Progress 30%  -EC      Recorded by [EC] OSIRIS Lin      Improve memory skills    Improve memory skills through: recall details of the day;50%  -EC      Status: Improve memory skills Progress slower than expected  -EC      Memory Skills Progress 50%  -EC      Recorded by [EC] OSIRIS Lin      Dysphagia Treatment Objectives and Progress    Dysphagia Treatment Objectives Other 1  -EC      Recorded by [EC] OSIRIS Lin      Dysphagia Other 1    Dysphagia Other 1 Objective Pt will safely tolerate recommended diet w/no overt s/s of aspiration.  -EC      Status: Dysphagia Other 1 Achieved  -EC      Dysphagia Other 1 Progress 90%;with consistent cues  -EC      Recorded by [EC] OSIRIS Lin      Transfer Assessment/Treatment    Transfers, Sit-Stand [place]  contact guard assist;verbal cues required;minimum assist (75% patient effort)  -RW contact guard assist;verbal cues required;minimum assist (75% patient effort)  -RW    Transfers, Stand-Sit [place]  contact guard assist;verbal cues required  -RW contact guard assist;verbal cues required  -RW    Transfers, Sit-Stand-Sit, Assist Device  rolling walker  -RW rolling walker  -RW    Toilet Transfer, [place]  minimum assist (75% patient effort)  -RW     Toilet Transfer, Assistive Device  rolling walker  -RW     Recorded by  [RW] Danny Beebe, PTA [RW] Danny Beebe, PTA    Gait Assessment/Treatment    Gait, [place] Level  contact guard assist;verbal cues required  -RW contact guard assist;verbal cues required  -RW    Gait, Assistive Device  rolling walker  -RW rolling walker  -RW    Gait, Distance (Feet)  150   70'  -  -RW    Gait, Impairments  impaired  balance;motor control impaired  -RW impaired balance;motor control impaired  -RW    Gait, Comment   needed alot encouragement  -RW    Recorded by  [RW] Danny Beebe PTA [RW] Danny Beebe PTA    Stairs Assessment/Treatment    Number of Stairs  4  -RW curb  -RW    Stairs, Handrail Location  both sides  -RW     Stairs, [place] Level  contact guard assist  -RW minimum assist (75% patient effort)  -RW    Stairs, Assistive Device  --  -RW walker  -RW    Stairs, Comment  pt got dizzy and had to be lowered into her wc  -RW     Recorded by  [RW] Danny Beebe PTA [RW] Danny Beebe PTA    Wheelchair Training/Management    Wheelchair, Distance Propelled  100  -RW     Wheelchair Training Comment  min assist  -RW     Recorded by  [RW] Danny Beebe PTA     Lower Body Dressing Assessment/Training    LB Dressing Assess/Train, Clothing Type  --  -RW donning:;shoes  -RW    Recorded by  [RW] Danny Beebe PTA [RW] Danny Beebe PTA    Balance Skills Training    Standing-Balance Activities  --  -RW --   static stance at windows searching for daughter outside.  -RW    Recorded by  [RW] Danny Beebe PTA [RW] Danny Beebe PTA    Therapy Exercises    Bilateral Lower Extremities  AROM:;ankle pumps/circles;sitting;15 reps;hip abduction/adduction;knee flexion;LAQ   2 sets  -RW AROM:;ankle pumps/circles;10 reps;sitting;hip flexion   2 sets in part  -RW    BLE Resistance  theraband  -RW     Recorded by  [RW] Danny Beebe PTA [RW] Danny Beebe PTA    Positioning and Restraints    Pre-Treatment Position  sitting in chair/recliner  -RW sitting in chair/recliner  -RW    Post Treatment Position  wheelchair  -RW wheelchair  -RW    In Bed   patient within staff view  -RW    In Wheelchair  with SLP  -RW     Recorded by  [RW] Danny Beebe PTA [RW] Danny Beebe PTA      06/14/17 1205 06/14/17 1106 06/14/17 0905    Rehab Assessment/Intervention    Discipline speech language pathologist  -CK physical therapy  assistant  -RW occupational therapy assistant  -RC    Document Type therapy note (daily note)  -CK therapy note (daily note)  -RW therapy note (daily note)  -RC    Subjective Information agree to therapy  -CK agree to therapy  -RW agree to therapy  -RC    Patient Effort, Rehab Treatment adequate  -CK adequate  -RW good  -RC    Patient Response to Treatment  needed alot of encouragement and daughter assisted  -RW     Recorded by [CK] Michelle Whitten MS CCC-SLP [RW] Danny Beebe PTA [RC] ADRIANNA GomezA/L    Pain Assessment    Pain Assessment No/denies pain  -CK      Pain Score 0  -CK      Post Pain Score 0  -CK unable to assess  -RW unable to assess  -RC    Pain Type  Chronic pain  -RW Chronic pain  -RC    Pain Location  Back  -RW Back  -RC    Pain Orientation  Left;Lower  -RW Lower  -RC    Pain Intervention(s)   Rest  -RC    Recorded by [CK] Michelle Whitten MS CCC-SLP [RW] Danny Beebe PTA [RC] ADRIANNA GomezA/L    Vision Assessment/Intervention    Visual Impairment  WFL with corrective lenses  -RW     Recorded by  [RW] Danny Beebe PTA     Cognitive Assessment/Intervention    Current Cognitive/Communication Assessment  impaired  -RW impaired  -RC    Orientation Status  oriented to;person;place;disoriented to;time;situation  -RW person  -RC    Recorded by  [RW] Danny Beebe PTA [RC] Ally Kwong MADRIGAL/L    Dysphagia Treatment Objectives and Progress    Dysphagia Treatment Objectives Other 1  -CK      Recorded by [CK] Michelle Whitten MS CCC-SLP      Dysphagia Other 1    Dysphagia Other 1 Objective Pt will safely tolerate recommended diet w/no overt s/s of aspiration.  -CK      Status: Dysphagia Other 1 Progressing as expected  -CK      Dysphagia Other 1 Progress 90%;continue to address  -CK      Comments: Dysphagia Other 1 Pt safely tolerated regular solids/thin liquids.  Pt did require initiation to cut and eat meat.  -CK      Recorded by [CK] Michelle Whitten MS CCC-SLP       Bed Mobility, Assessment/Treatment    Bed Mobility, Assistive Device  bed rails;head of bed elevated  -RW     Bed Mob, Supine to Sit, [place]  supervision required  -RW     Bed Mob, Sit to Supine, [place]   supervision required  -RC    Recorded by  [RW] Danny Beebe PTA [RC] Ally Kwong, MADRIGAL/L    Transfer Assessment/Treatment    Transfers, Chair-Bed [place]   contact guard assist  -RC    Transfers, Bed-Chair-Bed, Assist Device   rolling walker  -RC    Transfers, Sit-Stand [place]  contact guard assist;verbal cues required  -RW     Transfers, Stand-Sit [place]  contact guard assist;verbal cues required  -RW     Transfers, Sit-Stand-Sit, Assist Device  rolling walker  -RW     Toilet Transfer, [place]  contact guard assist;minimum assist (75% patient effort)   daughter assist  -RW contact guard assist;verbal cues required  -RC    Toilet Transfer, Assistive Device  rolling walker  -RW rolling walker  -RC    Recorded by  [RW] Danny Beebe PTA [RC] Ally Kwong, MADRIGAL/L    Gait Assessment/Treatment    Gait, Mathews Level  contact guard assist;verbal cues required  -RW     Gait, Assistive Device  rolling walker  -RW     Gait, Distance (Feet)  150  -RW     Gait, Impairments  impaired balance  -RW     Recorded by  [RW] Danny Beebe PTA     Functional Mobility    Functional Mobility- Ind. Level   contact guard assist  -RC    Functional Mobility- Device   rolling walker  -RC    Functional Mobility-Distance (Feet)   15  -RC    Recorded by   [RC] Ally Kwong, MADRIGAL/L    Upper Body Bathing Assessment/Training    UB Bathing Assess/Train, Position   sitting;sink side  -RC    UB Bathing Assess/Train, Mathews Level   supervision required;verbal cues required  -RC    Recorded by   [RC] ADRIANNA GomezA/L    Lower Body Bathing Assessment/Training    LB Bathing Assess/Train, Position   sitting;standing  -RC    LB Bathing Assess/Train, Mathews Level   contact guard  assist  -RC    Recorded by   [RC] ROHAN Gomez/L    Upper Body Dressing Assessment/Training    UB Dressing Assess/Train, Clothing Type   doffing:;donning:;pull over  -RC    UB Dressing Assess/Train, Position   sitting  -RC    UB Dressing Assess/Train, [place]   minimum assist (75% patient effort)  -RC    Recorded by   [RC] ADRIANNA GomezA/L    Lower Body Dressing Assessment/Training    LB Dressing Assess/Train, Clothing Type  donning:;shoes  -RW doffing:;donning:;pants;shorts;shoes  -RC    LB Dressing Assess/Train, Position  sitting  -RW sitting;standing  -RC    LB Dressing Assess/Train, [place]  minimum assist (75% patient effort);set up required  -RW minimum assist (75% patient effort)  -RC    Recorded by  [RW] Danny Beebe PTA [RC] ROHAN Gomez/L    Toileting Assessment/Training    Toileting Assess/Train, Position   sitting;standing  -RC    Toileting Assess/Train, Indepen Level   minimum assist (75% patient effort);verbal cues required  -RC    Recorded by   [RC] ROHAN Gomez/L    Grooming Assessment/Training    Grooming Assess/Train, Position   sitting  -RC    Grooming Assess/Train, Indepen Level   supervision required;verbal cues required  -RC    Recorded by   [RC] ROHAN Gomez/L    Therapy Exercises    Bilateral Lower Extremities  AROM:;20 reps;supine;ankle pumps/circles;heel slides;hip abduction/adduction;SAQ   2 sets in part  -RW     Bilateral Upper Extremity   --   ue bike 10 min  -RC    Recorded by  [RW] Danny Beebe PTA [RC] ROHAN Gomez/L    Positioning and Restraints    Pre-Treatment Position  in bed  -RW sitting in chair/recliner  -RC    Post Treatment Position  wheelchair  -RW bed  -RC    In Bed  call light within reach  -RW exit alarm on;encouraged to call for assist;call light within reach  -RC    Recorded by  [RW] Danny Beebe PTA [RC] ROHAN Gomez/POONAM      06/14/17 0714          Rehab Assessment/Intervention    Discipline  speech language pathologist  -CK      Document Type therapy note (daily note)  -CK      Subjective Information agree to therapy  -CK      Patient Effort, Rehab Treatment good  -CK      Recorded by [CK] Michelle Whitten MS CCC-SLP      Pain Assessment    Pain Assessment 0-10  -CK      Pain Score 2  -CK      Post Pain Score 2  -CK      Pain Type Chronic pain  -CK      Pain Location Back  -CK      Pain Orientation Lower  -CK      Pain Intervention(s) Repositioned  -CK      Recorded by [CK] Michelle Whitten MS CCC-SLP      Communication Treatment Objective and Progress    Receptive Language Treatment Objectives Improve ability to follow directions;Improve ability to comprehend questions  -CK      Expressive Treatment Objectives Improve word retrieval skills  -CK      Cognitive Linguistic Treatment Objectives Improve orientation;Improve memory skills  -CK      Recorded by [CK] Michelle Whitten MS CCC-SLP      Improve ability to follow directions    Improve ability to follow directions: one-step directions without objects;two-step commands;90%  -CK      Status: Improve ability to follow directions Progressing as expected  -CK      Ability to Follow Directions Progress 100%;20%  -CK      Recorded by [CK] Michelle Whitten MS CCC-SLP      Improve ability to comprehend questions    Improve ability to comprehend questions: simple yes/no questions;simple general questions;90%  -CK      Status: Improve ability to comprehend questions Progressing as expected  -CK      Ability to Comprehend Questions Progress 90%;50%  -CK      Comments: Improve ability to comprehend questions 90% w/basic yes/no; 50% w/comparative yes/no questions  -CK      Recorded by [CK] Michelle Whitten MS CCC-SLP      Improve word retrieval skills    Improve word retrieval skills by: naming an object/pic;answer WH question with one word;completing a divergent task;completing a convergent task;90%  -CK      Status: Improve word retrieval skills  Progressing as expected  -CK      Word Retrieval Skills Progress 50%;with inconsistent cues  -CK      Recorded by [CK] Michelle Whitten MS CCC-SLP      Improve orientation    Improve orientation through: demonstrating orientation to day;demonstrating orientation to year;demonstrating orientation to place;80%  -CK      Status: Improve orientation through Progressing as expected  -CK      Orientation Progress 40%  -CK      Recorded by [CK] Michelle Whitten MS CCC-SLP      Improve memory skills    Improve memory skills through: recalling related word lists immediately;recall details of the day;80%  -CK      Status: Improve memory skills Progressing as expected  -CK      Memory Skills Progress 60%  -CK      Recorded by [CK] Michelle Whitten MS CCC-SLP      Dysphagia Treatment Objectives and Progress    Dysphagia Treatment Objectives Other 1  -CK      Recorded by [CK] Michelle Whitten MS CCC-SLP      Dysphagia Other 1    Dysphagia Other 1 Objective Pt will safely tolerate recommended diet w/no overt s/s of aspiration.  -CK      Status: Dysphagia Other 1 Progressing as expected  -CK      Dysphagia Other 1 Progress 90%;continue to address  -CK      Comments: Dysphagia Other 1 Pt safely tolerated [facility] soft solids/thin liquids via straw.  Pt also safely tolerated regular solid trials w/no overt s/s of aspiration  -CK      Recorded by [CK] Michelle Whitten, MS CCC-SLP        User Key  (r) = Recorded By, (t) = Taken By, (c) = Cosigned By    Initials Name Effective Dates    EC Tamiko Couch, CCC-SLP 12/30/16 -     CK Michelle Whitten, MS CCC-SLP 10/17/16 -     MARLENE Rodas, PTA 10/17/16 -     RW Danny Beebe, PTA 10/17/16 -     ROHAN Rodas/POONAM 10/17/16 -               IP SLP Goals       06/16/17 0940 06/15/17 1103 06/14/17 1434    Safely Consume Diet    Safely Consume Diet- SLP, Date Goal Reviewed  06/15/17  -EC 06/14/17  -CK    Safely Consume Diet- SLP, Outcome  goal met  -EC goal partially  met  -CK    Receptive Language- Optimal Participation in Care    Receptive Language Optimal Participation in Care- SLP, Date Goal Reviewed 06/16/17  -EC 06/15/17  -EC 06/14/17  -CK    Receptive Language Optimal Participation in Care- SLP, Outcome goal ongoing  -EC goal ongoing  -EC goal ongoing  -CK    Expressive- Optimal Participation in Care    Expressive Optimal Participation in Care- SLP, Date Goal Reviewed  06/15/17  -EC 06/14/17  -CK    Expressive Optimal Participation in Care- SLP, Outcome  goal ongoing  -EC goal ongoing  -CK    Cognitive Linguistic- Optimal Participation in Care    Cognitive Linguistic Optimal Participation in Care- SLP, Date Goal Reviewed 06/16/17  -EC 06/15/17  -EC 06/14/17  -CK    Cognitive Linguistic Optimal Participation in Care- SLP, Outcome goal ongoing  -EC goal ongoing  -EC goal ongoing  -CK      06/13/17 1506          Safely Consume Diet    Safely Consume Diet- SLP, Date Established 06/13/17  -CK      Safely Consume Diet- SLP, Time to Achieve by discharge  -CK      Safely Consume Diet- SLP, Additional Goal Pt will safely tolerate recommended diet w/no overt s/s of aspiration.  -CK      Safely Consume Diet- SLP, Date Goal Reviewed 06/13/17  -CK      Receptive Language- Optimal Participation in Care    Receptive Language Optimal Participation in Care- SLP, Date Established 06/13/17  -CK      Receptive Language Optimal Participation in Care- SLP, Time to Achieve by discharge  -CK      Receptive Language Optimal Participation in Care- SLP, Activity Level Patient will improve ability to follow directions;Patient will improve ability to comprehend questions  -CK      Receptive Language Optimal Participation in Care- SLP, Date Goal Reviewed 06/13/17  -CK      Expressive- Optimal Participation in Care    Expressive Optimal Participation in Care- SLP, Date Established 06/13/17  -CK      Expressive Optimal Participation in Care- SLP, Time to Achieve by discharge  -CK      Expressive Optimal  Participation in Care- SLP, Activity Level Patient will improve word retrieval skills  -CK      Expressive Optimal Participation in Care- SLP, Date Goal Reviewed 06/13/17  -CK      Cognitive Linguistic- Optimal Participation in Care    Cognitive Linguistic Optimal Participation in Care- SLP, Date Established 06/13/17  -CK      Cognitive Linguistic Optimal Participation in Care- SLP, Time to Achieve by discharge  -CK      Cognitive Linguistic Optimal Participation in Care- SLP, Activity Level Patient will improve orientation for increased safety in environment;Patient will improve memory skills for increased safety in environment  -CK      Cognitive Linguistic Optimal Participation in Care- SLP, Date Goal Reviewed 06/13/17  -CK        User Key  (r) = Recorded By, (t) = Taken By, (c) = Cosigned By    Initials Name Provider Type    LUCERO Couch CCC-SLP Speech and Language Pathologist    GLENYS Whitten, MS CCC-SLP Speech and Language Pathologist          EDUCATION  The patient has been educated in the following areas:   Cognitive Impairment.    SLP Recommendation and Plan                               Plan of Care Review  Plan Of Care Reviewed With: patient  Progress: progress toward functional goals as expected  Outcome Summary/Follow up Plan: pt sleepy during therapy this date.  did well with answering wh questions and naming pictures.  difficulty with 2 step directions and yes/no questions.          Time Calculation:         Time Calculation- SLP       06/16/17 0941          Time Calculation- SLP    SLP Start Time 0900  -EC      SLP Stop Time 0940  -EC      SLP Time Calculation (min) 40 min  -EC      Total Timed Code Minutes- SLP 40 minute(s)  -EC      SLP Received On 06/16/17  -EC        User Key  (r) = Recorded By, (t) = Taken By, (c) = Cosigned By    Initials Name Provider Type    LUCERO Couch CCC-SLP Speech and Language Pathologist          Therapy Charges for Today     Code Description  Service Date Service Provider Modifiers Qty    99267794330 HC ST TREATMENT SWALLOW 1 6/15/2017 LILIAN LinSLP GN 1    71628259167 HC ST TREATMENT SPEECH 2 6/15/2017 OSIRIS Lin GN 1    15742338736 HC ST TREATMENT SPEECH 3 6/16/2017 OSIRIS Lin GN 1               OSIRIS Soliman  6/16/2017

## 2017-06-16 NOTE — PROGRESS NOTES
LOS: 4 days   Patient Care Team:  Melissa Ch MD as PCP - General  Lindsay Ocampo MA as Medical Assistant    Chief Complaint:  Deconditioning after left hemicolectomy for adenocarcinoma of the sigmoid colon          Interval History:     SUBJECTIVE:  No pain today eating well tolerating diet  She is participating with therapy and doing quite well  Speech therapy slow to improve  History taken from: patient chart family RN Therapy staff and all  notes reviewed    Objective     Vital Signs  Temp:  [96.6 °F (35.9 °C)-97.1 °F (36.2 °C)] 97.1 °F (36.2 °C)  Heart Rate:  [65-72] 72  Resp:  [18-20] 18  BP: (107-124)/(53-58) 124/58  Last 3 weights    06/14/17  0300 06/15/17  0449 06/16/17  0550   Weight: 166 lb (75.3 kg) 170 lb (77.1 kg) 171 lb 8 oz (77.8 kg)         Physical Exam:     General Appearance:    Alert, cooperative, in no acute distress   Head:    Normocephalic, without obvious abnormality, atraumatic   Eyes:            Lids and lashes normal, conjunctivae and sclerae normal, no   icterus, no pallor, corneas clear, PERRLA   Throat:   No oral lesions, no thrush, oral mucosa moist   Neck:   No adenopathy, supple, trachea midline, no thyromegaly, no   carotid bruit, no JVD       Lungs:     Clear to auscultation,respirations regular, even and                  Unlabored Good bilateral air movement     Heart:    Regular rhythm and normal rate, normal S1 and S2, Holosystolic murmur unchanged heard all across the chest, no gallop, no rub, no click    Chest Wall:    No abnormalities observed   Abdomen:     Normal bowel sounds, no masses, no organomegaly, soft        non-tender, non-distended, no guarding, no rebound                Tenderness Incision healing not distended    Extremities:   Moves all extremities well, no edema, no cyanosis, no             Redness        Skin:   No bleeding, bruising or rash   Lymph nodes:   No palpable adenopathy   Neurologic:   Cranial nerves 2 - 12 grossly intact,  sensation intact, DTR       present and equal bilaterally        RESULTS REVIEW:     Lab Results (last 24 hours)     Procedure Component Value Units Date/Time    Renal Function Panel [[id number]]  (Abnormal) Collected:  06/15/17 1002    Specimen:  Blood Updated:  06/15/17 1031     Glucose 220 (H) mg/dL      BUN 27 (H) mg/dL      Creatinine 0.86 mg/dL      Sodium 138 mmol/L      Potassium 3.8 mmol/L      Chloride 99 mmol/L      CO2 29.0 mmol/L      Calcium 8.7 mg/dL      Albumin 3.30 (L) g/dL      Phosphorus 3.3 mg/dL      Anion Gap 10.0 mmol/L      BUN/Creatinine Ratio 31.4 (H)     eGFR  African Amer 75 mL/min/1.73     Narrative:       The MDRD GFR formula is only valid for adults with stable renal function between ages 18 and 70.    POC Glucose Fingerstick [[id number]]  (Abnormal) Collected:  06/15/17 1215    Specimen:  Blood Updated:  06/15/17 1938     Glucose 293 (H) mg/dL       Meter: [id number]: [id number] [employer]ompium       POC Glucose Fingerstick [[id number]]  (Abnormal) Collected:  06/15/17 1557    Specimen:  Blood Updated:  06/15/17 1938     Glucose 268 (H) mg/dL       Meter: [id number]: [id number] [employer]ompium       POC Glucose Fingerstick [[id number]]  (Abnormal) Collected:  06/15/17 2116    Specimen:  Blood Updated:  06/15/17 2133     Glucose 273 (H) mg/dL       Meter: [id number]: [id number] MARISA CRABTREE       POC Glucose Fingerstick [[id number]]  (Abnormal) Collected:  06/16/17 0552    Specimen:  Blood Updated:  06/16/17 0603     Glucose 138 (H) mg/dL       RN NotifiedMeter: [id number]: [id number] MARISA CRABTREE       CBC & Differential [[id number]] Collected:  06/16/17 0536    Specimen:  Blood Updated:  06/16/17 0619    Narrative:       The following orders were created for panel order CBC & Differential.  Procedure                               Abnormality         Status                     ---------                               -----------         ------                      CBC Auto Differential[[id number]]        Abnormal            Final result                 Please view results for these tests on the individual orders.    CBC Auto Differential [[id number]]  (Abnormal) Collected:  06/16/17 0536    Specimen:  Blood Updated:  06/16/17 0619     WBC 7.31 10*3/mm3      RBC 3.10 (L) 10*6/mm3      Hemoglobin 9.4 (L) g/dL      Hematocrit 28.5 (L) %      MCV 91.9 fL      MCH 30.3 pg      MCHC 33.0 g/dL      RDW 14.0 %      RDW-SD 47.0 (H) fl      MPV 10.5 fL      Platelets 303 10*3/mm3      Neutrophil % 77.8 %      Lymphocyte % 14.6 %      Monocyte % 6.8 %      Eosinophil % 0.3 %      Basophil % 0.0 %      Immature Grans % 0.5 %      Neutrophils, Absolute 5.68 10*3/mm3      Lymphocytes, Absolute 1.07 10*3/mm3      Monocytes, Absolute 0.50 10*3/mm3      Eosinophils, Absolute 0.02 10*3/mm3      Basophils, Absolute 0.00 10*3/mm3      Immature Grans, Absolute 0.04 (H) 10*3/mm3     Renal Function Panel [[id number]]  (Abnormal) Collected:  06/16/17 0536    Specimen:  Blood Updated:  06/16/17 0628     Glucose 131 (H) mg/dL      BUN 25 (H) mg/dL      Creatinine 0.79 mg/dL      Sodium 136 (L) mmol/L      Potassium 3.9 mmol/L      Chloride 103 mmol/L      CO2 25.0 mmol/L      Calcium 8.6 mg/dL      Albumin 2.90 (L) g/dL      Phosphorus 2.5 mg/dL      Anion Gap 8.0 mmol/L      BUN/Creatinine Ratio 31.6 (H)     eGFR   Amer 83 mL/min/1.73     Narrative:       The MDRD GFR formula is only valid for adults with stable renal function between ages 18 and 70.        Imaging Results (last 72 hours)     Procedure Component Value Units Date/Time    US Renal Complete [[id number]] Collected:  06/14/17 1350     Updated:  06/14/17 1523    Narrative:         EXAMINATION: Ultrasound, retroperitoneal / renal     CLINICAL INDICATION / HISTORY: юлия rule out hydro, R13.11  Dysphagia, oral phase Z74.09 Other reduced mobility M62.81 Muscle  weakness (generalized) R26.9 Unspecified abnormalities of gait  and mobility  R48.9 Unspecified symbolic dysfunctions  COMPARISON:  CT abdomen and pelvis dated 5/30/2017  TECHNIQUE:  Grayscale and color Doppler ultrasound.    FINDINGS:    Right kidney: The right kidney is normal in size and  echogenicity. It measures 11.3 cm in length. There are two cysts  in the right kidney, one measures 1.4 cm in the midpole which  correlates to the hyperdense cyst seen on CT. The other measures  3.2 cm located more inferiorly. Their is mild right  hydronephrosis. There is no evidence of suspicious mass or  calculus.     Left kidney: The left kidney is normal in size and echogenicity.  It measures 11.8 cm in length. There is a 3.8 cm cyst in the  upper pole of the left kidney. There is no evidence of  hydronephrosis, suspicious mass or calculus.     Urinary bladder: Unremarkable         Impression:       CONCLUSION: Bilateral renal cysts including that which was noted  on the CT in the right mid kidney. Mild right hydronephrosis.    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  Dementia seems to be her biggest problem at this time her memory is not good but she is able to do her word search puzzles very well  Still anemic and will replace iron and B12 and folic acid    Creatinine back to normal and continue to encourage by mouth fluids she is making progress with therapy progressing toward her at discharge she will need 24-hour day vision            Bennie HENRIQUEZ  MD Khari  06/16/17  10:23 AM

## 2017-06-17 LAB
ALBUMIN SERPL-MCNC: 2.8 G/DL (ref 3.4–4.8)
ANION GAP SERPL CALCULATED.3IONS-SCNC: 6 MMOL/L (ref 5–15)
BASOPHILS # BLD AUTO: 0.01 10*3/MM3 (ref 0–0.2)
BASOPHILS NFR BLD AUTO: 0.2 % (ref 0–2)
BUN BLD-MCNC: 19 MG/DL (ref 7–21)
BUN/CREAT SERPL: 24.7 (ref 7–25)
CALCIUM SPEC-SCNC: 8.9 MG/DL (ref 8.4–10.2)
CHLORIDE SERPL-SCNC: 103 MMOL/L (ref 95–110)
CO2 SERPL-SCNC: 28 MMOL/L (ref 22–31)
CREAT BLD-MCNC: 0.77 MG/DL (ref 0.5–1)
DEPRECATED RDW RBC AUTO: 48.2 FL (ref 36.4–46.3)
EOSINOPHIL # BLD AUTO: 0.01 10*3/MM3 (ref 0–0.7)
EOSINOPHIL NFR BLD AUTO: 0.2 % (ref 0–7)
ERYTHROCYTE [DISTWIDTH] IN BLOOD BY AUTOMATED COUNT: 14.2 % (ref 11.5–14.5)
GFR SERPL CREATININE-BSD FRML MDRD: 86 ML/MIN/1.73 (ref 39–90)
GLUCOSE BLD-MCNC: 104 MG/DL (ref 60–100)
GLUCOSE BLDC GLUCOMTR-MCNC: 107 MG/DL (ref 70–130)
GLUCOSE BLDC GLUCOMTR-MCNC: 235 MG/DL (ref 70–130)
GLUCOSE BLDC GLUCOMTR-MCNC: 273 MG/DL (ref 70–130)
GLUCOSE BLDC GLUCOMTR-MCNC: 310 MG/DL (ref 70–130)
HCT VFR BLD AUTO: 25.1 % (ref 35–45)
HGB BLD-MCNC: 8.2 G/DL (ref 12–15.5)
IMM GRANULOCYTES # BLD: 0.01 10*3/MM3 (ref 0–0.02)
IMM GRANULOCYTES NFR BLD: 0.2 % (ref 0–0.5)
LYMPHOCYTES # BLD AUTO: 1.14 10*3/MM3 (ref 0.6–4.2)
LYMPHOCYTES NFR BLD AUTO: 21.9 % (ref 10–50)
MCH RBC QN AUTO: 30.3 PG (ref 26.5–34)
MCHC RBC AUTO-ENTMCNC: 32.7 G/DL (ref 31.4–36)
MCV RBC AUTO: 92.6 FL (ref 80–98)
MONOCYTES # BLD AUTO: 0.43 10*3/MM3 (ref 0–0.9)
MONOCYTES NFR BLD AUTO: 8.3 % (ref 0–12)
NEUTROPHILS # BLD AUTO: 3.61 10*3/MM3 (ref 2–8.6)
NEUTROPHILS NFR BLD AUTO: 69.2 % (ref 37–80)
PHOSPHATE SERPL-MCNC: 2.6 MG/DL (ref 2.4–4.4)
PLATELET # BLD AUTO: 259 10*3/MM3 (ref 150–450)
PMV BLD AUTO: 10.1 FL (ref 8–12)
POTASSIUM BLD-SCNC: 4.4 MMOL/L (ref 3.5–5.1)
RBC # BLD AUTO: 2.71 10*6/MM3 (ref 3.77–5.16)
SODIUM BLD-SCNC: 137 MMOL/L (ref 137–145)
WBC NRBC COR # BLD: 5.21 10*3/MM3 (ref 3.2–9.8)

## 2017-06-17 PROCEDURE — 85025 COMPLETE CBC W/AUTO DIFF WBC: CPT | Performed by: FAMILY MEDICINE

## 2017-06-17 PROCEDURE — 92507 TX SP LANG VOICE COMM INDIV: CPT | Performed by: SPEECH-LANGUAGE PATHOLOGIST

## 2017-06-17 PROCEDURE — 80069 RENAL FUNCTION PANEL: CPT | Performed by: FAMILY MEDICINE

## 2017-06-17 PROCEDURE — 63710000001 INSULIN DETEMIR PER 5 UNITS: Performed by: FAMILY MEDICINE

## 2017-06-17 PROCEDURE — 82962 GLUCOSE BLOOD TEST: CPT

## 2017-06-17 PROCEDURE — 97535 SELF CARE MNGMENT TRAINING: CPT

## 2017-06-17 PROCEDURE — 97110 THERAPEUTIC EXERCISES: CPT

## 2017-06-17 PROCEDURE — 97530 THERAPEUTIC ACTIVITIES: CPT

## 2017-06-17 PROCEDURE — 97116 GAIT TRAINING THERAPY: CPT

## 2017-06-17 PROCEDURE — 63710000001 INSULIN ASPART PER 5 UNITS: Performed by: FAMILY MEDICINE

## 2017-06-17 PROCEDURE — 63710000001 DIPHENHYDRAMINE PER 50 MG: Performed by: FAMILY MEDICINE

## 2017-06-17 PROCEDURE — 97542 WHEELCHAIR MNGMENT TRAINING: CPT

## 2017-06-17 RX ADMIN — POTASSIUM CHLORIDE 10 MEQ: 750 CAPSULE, EXTENDED RELEASE ORAL at 08:05

## 2017-06-17 RX ADMIN — Medication 2.5 MG: at 08:05

## 2017-06-17 RX ADMIN — INSULIN ASPART 10 UNITS: 100 INJECTION, SOLUTION INTRAVENOUS; SUBCUTANEOUS at 20:11

## 2017-06-17 RX ADMIN — DOCUSATE SODIUM 100 MG: 100 CAPSULE, LIQUID FILLED ORAL at 17:10

## 2017-06-17 RX ADMIN — MEMANTINE 5 MG: 5 TABLET ORAL at 17:11

## 2017-06-17 RX ADMIN — INSULIN ASPART 5 UNITS: 100 INJECTION, SOLUTION INTRAVENOUS; SUBCUTANEOUS at 11:00

## 2017-06-17 RX ADMIN — DONEPEZIL HYDROCHLORIDE 10 MG: 10 TABLET, FILM COATED ORAL at 20:08

## 2017-06-17 RX ADMIN — Medication 40 MG: at 08:05

## 2017-06-17 RX ADMIN — Medication 40 MG: at 20:08

## 2017-06-17 RX ADMIN — FERROUS SULFATE TAB EC 324 MG (65 MG FE EQUIVALENT) 324 MG: 324 (65 FE) TABLET DELAYED RESPONSE at 17:10

## 2017-06-17 RX ADMIN — MAGNESIUM OXIDE TAB 400 MG (241.3 MG ELEMENTAL MG) 400 MG: 400 (241.3 MG) TAB at 08:05

## 2017-06-17 RX ADMIN — MEMANTINE 5 MG: 5 TABLET ORAL at 08:05

## 2017-06-17 RX ADMIN — INSULIN ASPART 8 UNITS: 100 INJECTION, SOLUTION INTRAVENOUS; SUBCUTANEOUS at 17:10

## 2017-06-17 RX ADMIN — DIPHENHYDRAMINE HYDROCHLORIDE 25 MG: 25 CAPSULE ORAL at 20:08

## 2017-06-17 RX ADMIN — FERROUS SULFATE TAB EC 324 MG (65 MG FE EQUIVALENT) 324 MG: 324 (65 FE) TABLET DELAYED RESPONSE at 08:05

## 2017-06-17 RX ADMIN — Medication 1 TABLET: at 08:05

## 2017-06-17 RX ADMIN — DILTIAZEM HYDROCHLORIDE 120 MG: 120 CAPSULE, COATED, EXTENDED RELEASE ORAL at 08:05

## 2017-06-17 RX ADMIN — DOCUSATE SODIUM 100 MG: 100 CAPSULE, LIQUID FILLED ORAL at 08:05

## 2017-06-17 RX ADMIN — POTASSIUM CHLORIDE 10 MEQ: 750 CAPSULE, EXTENDED RELEASE ORAL at 17:12

## 2017-06-17 RX ADMIN — INSULIN DETEMIR 15 UNITS: 100 INJECTION, SOLUTION SUBCUTANEOUS at 20:09

## 2017-06-17 RX ADMIN — DIPHENHYDRAMINE HYDROCHLORIDE 25 MG: 25 CAPSULE ORAL at 08:05

## 2017-06-17 NOTE — PLAN OF CARE
Problem: Patient Care Overview (Adult)  Goal: Plan of Care Review  Outcome: Ongoing (interventions implemented as appropriate)    06/17/17 0835   Coping/Psychosocial Response Interventions   Plan Of Care Reviewed With patient   Patient Care Overview   Progress no change   Outcome Evaluation   Outcome Summary/Follow up Plan No goals met at this time. Focus of tx today was in room safety and use of call light.          Problem: Inpatient SLP  Goal: Expressive-Patient will improve expressive language skills to allow optimal participation in care  Outcome: Ongoing (interventions implemented as appropriate)    06/13/17 1506 06/15/17 1103 06/17/17 0835   Expressive- Optimal Participation in Care   Expressive Optimal Participation in Care- SLP, Date Established 06/13/17 --  --    Expressive Optimal Participation in Care- SLP, Time to Achieve by discharge --  --    Expressive Optimal Participation in Care- SLP, Activity Level Patient will improve word retrieval skills --  --    Expressive Optimal Participation in Care- SLP, Date Goal Reviewed --  --  06/17/17   Expressive Optimal Participation in Care- SLP, Outcome --  goal ongoing --        Goal: Cognitive-linguistic-Patient will improve Cognitive-linguistic skills to allow optimal participation in care  Outcome: Ongoing (interventions implemented as appropriate)    06/13/17 1506 06/16/17 0940 06/17/17 0835   Cognitive Linguistic- Optimal Participation in Care   Cognitive Linguistic Optimal Participation in Care- SLP, Date Established 06/13/17 --  --    Cognitive Linguistic Optimal Participation in Care- SLP, Time to Achieve by discharge --  --    Cognitive Linguistic Optimal Participation in Care- SLP, Activity Level Patient will improve orientation for increased safety in environment;Patient will improve memory skills for increased safety in environment --  --    Cognitive Linguistic Optimal Participation in Care- SLP, Date Goal Reviewed --  --  06/17/17   Cognitive  Linguistic Optimal Participation in Care- SLP, Outcome --  goal ongoing --        Goal: Receptive Language-Patient will improve receptive language skills to allow optimal participation in care  Outcome: Ongoing (interventions implemented as appropriate)    06/13/17 1506 06/16/17 0940 06/17/17 0835   Receptive Language- Optimal Participation in Care   Receptive Language Optimal Participation in Care- SLP, Date Established 06/13/17 --  --    Receptive Language Optimal Participation in Care- SLP, Time to Achieve by discharge --  --    Receptive Language Optimal Participation in Care- SLP, Activity Level Patient will improve ability to follow directions;Patient will improve ability to comprehend questions --  --    Receptive Language Optimal Participation in Care- SLP, Date Goal Reviewed --  --  06/17/17   Receptive Language Optimal Participation in Care- SLP, Outcome --  goal ongoing --

## 2017-06-17 NOTE — PLAN OF CARE
Problem: Patient Care Overview (Adult)  Goal: Plan of Care Review  Outcome: Ongoing (interventions implemented as appropriate)    06/17/17 0316 06/17/17 0600   Coping/Psychosocial Response Interventions   Plan Of Care Reviewed With patient --    Patient Care Overview   Progress no change --    Outcome Evaluation   Outcome Summary/Follow up Plan --  No new goals met this date       Goal: Discharge Needs Assessment  Outcome: Ongoing (interventions implemented as appropriate)    06/12/17 1400 06/12/17 1502 06/13/17 1036   Discharge Needs Assessment   Concerns To Be Addressed --  no discharge needs identified --    Equipment Needed After Discharge --  --  walker, rolling   Discharge Facility/Level Of Care Needs --  --  nursing facility, skilled;home with home health  (Or 24/7 care from family -depending on progress while on ARU)   Current Health   Outpatient/Agency/Support Group Needs skilled nursing facility (specify) --  --    Anticipated Changes Related to Illness inability to care for self --  --    Living Environment   Transportation Available family or friend will provide --  --    Self-Care   Equipment Currently Used at Home --  --  commode         Problem: Inpatient Occupational Therapy  Goal: Transfer Training Goal 1 STG- OT  Outcome: Ongoing (interventions implemented as appropriate)    06/13/17 0759 06/15/17 1341 06/17/17 0600   Transfer Training OT STG   Transfer Training OT STG, Date Established 06/13/17 --  --    Transfer Training OT STG, Time to Achieve 2 wks --  --    Transfer Training OT STG, Activity Type sit to stand/stand to sit;bed to chair /chair to bed;toilet --  --    Transfer Training OT STG, [place] Level supervision required;set up required  (AE/AD as needed) --  --    Transfer Training OT STG, Date Goal Reviewed --  --  06/17/17   Transfer Training OT STG, Outcome --  goal ongoing --        Goal: Dymanic Standing Balance Goal STG- OT  Outcome: Ongoing (interventions implemented as  appropriate)    06/13/17 0759 06/15/17 1341 06/17/17 0600   Dynamic Standing Balance OT STG   Dynamic Standing Balance OT STG, Date Established 06/13/17 --  --    Dynamic Standing Balance OT STG, Time to Achieve 2 wks --  --    Dynamic Standing Balance OT STG, [place] Level supervision required  (5 minute no LOB or increased fatigue) --  --    Dynamic Standing Balance OT STG, Date Goal Reviewed --  --  06/17/17   Dynamic Standing Balance OT STG, Outcome --  goal ongoing --        Goal: Caregiver Training Goal LTG- OT  Outcome: Ongoing (interventions implemented as appropriate)    06/13/17 0759 06/15/17 1341 06/17/17 0600   Caregiver Training OT LTG   Caregiver Training OT LTG, Date Established 06/13/17 --  --    Caregiver Training OT LTG, Time to Achieve by discharge --  --    Caregiver Training OT LTG, [place] Level able to assist adequately;able to cue patient adequately  (t/f, safety at home, HEP) --  --    Caregiver Training OT LTG, Date Goal Reviewed --  --  06/17/17   Caregiver Training OT LTG, Outcome --  goal ongoing --        Goal: ADL Goal LTG- OT  Outcome: Ongoing (interventions implemented as appropriate)    06/13/17 0759 06/15/17 1341 06/17/17 0600   ADL OT LTG   ADL OT LTG, Date Established 06/13/17 --  --    ADL OT LTG, Time to Achieve by discharge --  --    ADL OT LTG, Activity Type ADL skills --  --    ADL OT LTG, [place] Level standby assist;modified independent;setup;assistive device --  --    ADL OT LTG, Date Goal Reviewed --  --  06/17/17   ADL OT LTG, Outcome --  goal ongoing --        Goal: Activity Tolerance Goal STG- OT  Outcome: Ongoing (interventions implemented as appropriate)    06/13/17 0759 06/15/17 1341 06/17/17 0600   Activity Tolerance OT STG   Activity Tolerance Goal OT STG, Date Established 06/13/17 --  --    Activity Tolerance Goal OT STG, Time to Achieve 2 wks --  --    Activity Tolerance Goal OT STG, Activity Level 20 min activity;O2 sat >/equal to 90%;with 1  rest break --  --    Activity Tolerance Goal OT [facility], Date Goal Reviewed --  --  06/17/17   Activity Tolerance Goal OT [facility], Outcome --  goal ongoing --

## 2017-06-17 NOTE — THERAPY TREATMENT NOTE
"Inpatient Rehabilitation - Speech Language Pathology Treatment Note  [facility]     Patient Name: Kierra Scales  : 1929  MRN: [id number]  Today's Date: 2017  Referring Physician: Khari      Admit Date: 2017   1. Pt will safely tolerate recommended diet w/no overt s/s of aspiration:GOAL MET previously.   2. Pt will complete one-step directions without objects w/90% acc: GOAL MET previously  3. Pt will complete two-step commands w/90% acc: Pt was 10% acc w/2-step directions using body parts.  4. Pt will complete simple yes/no questions w/90% acc: Pt was 40% acc w/basic yes/no questions with moderate cues.   5. Pt will answer simple \"WH\" questions w/90% acc: Pt was 60% acc w/wh-questions.   6. Pt will complete object/picture naming w/80% acc: Pt was 100% acc w/simple picture naming.GOAL MET  7. Pt will complete convergent naming task w/80% acc: Pt completed at  50% accuracy with minimal cues.   8. Pt will complete simple divergent naming task w/an average of 5 wpm for simple concrete categories: Pt had an average of 6 wpm; moderate cues given.   9. Pt will complete immediate recall of related words w/80% acc: Discontinued goal previously  10: Pt will recall details of the day w/80% acc: given a written schedule and calendar to look at, but required max cues to use functionally. 50% recall with use of aides and max cues and repetition required  11. Pt will complete orientation to [facility], [facility], and Place w/80% acc with use of visual aide: 50% with use of visual aid and verbal cues. Pt continued to have difficulty with processing place as a hospital.     Visit Dx:      ICD-10-CM ICD-9-CM   1. Oral phase dysphagia R13.11 787.21   2. Impaired mobility and ADLs Z74.09 799.89   3. Muscle weakness (generalized) M62.81 728.87   4. Abnormality of gait and mobility R26.9 781.2   5. 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)   • Hypothyroidism   • Dyspnea   • Hyperthyroidism   • Gastrointestinal hemorrhage with melena   • Type 2 diabetes mellitus with hyperglycemia   • Colon cancer   • Chronic blood loss anemia   • Physical deconditioning   • Senile dementia, uncomplicated   • Encounter for rehabilitation   • Adenocarcinoma of sigmoid colon   • Hypokalemia              Adult Rehabilitation Note       06/17/17 0911 06/17/17 0759 06/17/17 0600    Rehab Assessment/Intervention    Discipline physical therapy assistant  -RW speech language pathologist  -EK occupational therapy assistant  -KD    Document Type therapy note (daily note)  -RW therapy note (daily note)  -EK therapy note (daily note)  -KD    Subjective Information agree to therapy  -RW no complaints;agree to therapy  -EK agree to therapy  -KD    Patient Effort, Rehab Treatment good  -RW good  -EK     Recorded by [RW] Danny Beebe PTA [EK] Tamiko Whitten, GURMEET-SLP [KD] Nathalia Irizarry, MADRIGAL/L    Vital Signs    Intra Systolic BP Rehab 133  -RW      Intra Treatment Diastolic BP 59  -RW      Intratreatment Heart Rate (beats/min) 69  -RW      Pre Patient Position   Sitting  -KD    Intra Patient Position   Standing  -KD    Post Patient Position   Sitting  -KD    Recorded by [RW] Danny Beebe PTA  [KD] Nathalia Irizarry, MADRIGAL/L    Pain Assessment    Pain Assessment No/denies pain  -RW No/denies pain  -EK No/denies pain  -KD    Jason-Batista FACES Pain Rating  0  -EK     Pain Score  0  -EK     Recorded by [RW] Danny Beebe PTA [EK] Tamiko Whitten, [facility]-SLP [KD] Nathalia Irizarry, MADRIGAL/L    Vision Assessment/Intervention    Visual Impairment WFL with corrective lenses  -RW      Recorded by [RW] Danny Beebe PTA      Cognitive  Assessment/Intervention    Current Cognitive/Communication Assessment impaired  -RW impaired  -EK impaired  -KD    Orientation Status oriented to;person  -RW disoriented x 4  -EK oriented to;person     -KD    Follows Commands/Answers Questions 75% of the time  -RW 50% of the time  -EK     Personal Safety decreased awareness, need for safety;decreased insight to deficits  -RW decreased awareness, need for safety;decreased awareness, need for assist;decreased insight to deficits  -EK     Problem Solving ADL/Func. Task   with min verbal cues   25 pc shape sorter  -KD    Recorded by [RW] Danny Beebe PTA [EK] Tamiko Whitten [facility]-SLP [KD] ROHAN Garcia/L    Improve ability to follow directions    Improve ability to follow directions:  two-step commands  -EK     Status: Improve ability to follow directions  Progress slower than expected  -EK     Recorded by  [EK] Tamiko Whitten CCC-SLP     Improve ability to comprehend questions    Improve ability to comprehend questions:  simple yes/no questions;simple general questions;90%  -EK     Status: Improve ability to comprehend questions  Progress slower than expected  -EK     Recorded by  [EK] Tamiko Whitten CCC-SLP     Improve word retrieval skills    Improve word retrieval skills by:  naming an object/pic;answer WH question with one word;completing a divergent task;completing a convergent task;90%  -EK     Status: Improve word retrieval skills  Progressing as expected  -EK     Recorded by  [EK] Tamiko Whitten CCC-SLP     Improve orientation    Improve orientation through:  demonstrating orientation to day;demonstrating orientation to year;demonstrating orientation to place;80%;use environmental aids to assist with orientation  -EK     Status: Improve orientation through  Progress slower than expected  -EK     Recorded by  [EK] Tamiko Whitten [facility]-SLP     Improve memory skills    Improve memory  skills through:  recall details of the day;50%  -EK     Status: Improve memory skills  Progress slower than expected  -EK     Recorded by  [EK] Tamiko Whitten, CCC-SLP     Bed Mobility, Assessment/Treatment    Bed Mobility, Assistive Device bed rails;head of bed elevated  -RW      Bed Mobility, Roll Left, [place] --  -RW      Bed Mob, Sit to Supine, [place] supervision required  -RW      Recorded by [RW] Danny Beebe PTA      Transfer Assessment/Treatment    Transfers, Chair-Bed [place] contact guard assist  -RW      Transfers, Sit-Stand [place] contact guard assist  -RW  contact guard assist  -KD    Transfers, Stand-Sit [place] contact guard assist  -RW  contact guard assist  -KD    Transfers, Sit-Stand-Sit, Assist Device rolling walker  -RW  rolling walker  -KD    Toilet Transfer, [place] minimum assist (75% patient effort)  -RW  minimum assist (75% patient effort)  -KD    Toilet Transfer, Assistive Device rolling walker  -RW      Recorded by [RW] Danny Beebe PTA  [KD] ADRIANNA GarciaA/L    Gait Assessment/Treatment    Gait, [place] Level contact guard assist  -RW      Gait, Assistive Device rolling walker  -RW      Gait, Distance (Feet) 170    x 2  -RW      Recorded by [RW] Danny Beebe PTA      Stairs Assessment/Treatment    Number of Stairs 4  -RW      Stairs, Handrail Location both sides  -RW      Stairs, [place] Level supervision required  -RW      Recorded by [RW] Danny Beebe PTA      Functional Mobility    Functional Mobility- Ind. Level   contact guard assist  -KD    Functional Mobility- Device   rolling walker  -KD    Functional Mobility-Distance (Feet)   --   100  -KD    Recorded by   [KD] ADRIANNA GarciaA/L    Wheelchair Training/Management    Wheelchair, Distance Propelled 150  -RW  --   100  -KD    Wheelchair Training Comment sba and lots of v cues  -RW  sba  -KD    Recorded by [RW] Danny Beebe PTA  [KD] Nathalia Irizarry,  MADRIGAL/L    Lower Body Dressing Assessment/Training    LB Dressing Assess/Train, Clothing Type doffing:;shoes  -RW  donning:;shoes  -KD    LB Dressing Assess/Train, Position   sitting  -KD    LB Dressing Assess/Train, [place]   set up required  -KD    Recorded by [RW] Danny Beebe PTA  [KD] ROHAN Garcia/L    Grooming Assessment/Training    Grooming Assess/Train, Assistive Device   --   oral care-comb hair- wash face  -KD    Grooming Assess/Train, Position   sink side;sitting  -KD    Grooming Assess/Train, Indepen Level   set up required  -KD    Recorded by   [KD] ROHAN Garcia/L    Balance Skills Training    Standing-Level of Assistance Minimum assistance  -RW  Contact guard  -KD    Standing-Balance Activities Compliant surfaces  -RW  --   static  -KD    Standing Balance # of Minutes   8  -KD    Gait Balance-Level of Assistance Minimum assistance  -RW      Gait Balance Support parallel bars  -RW      Gait Balance Activities side-stepping  -RW      Recorded by [RW] Danny Beebe PTA  [KD] ROHAN Garcia/L    Therapy Exercises    Bilateral Lower Extremities AROM:;20 reps;sitting;heel raises;hip flexion;knee flexion  -RW      BLE Resistance theraband  -RW      Bilateral Upper Extremity   --   UEE x 10 mins w/ RB's  -KD    Recorded by [RW] Danny Beebe PTA  [KD] ROHAN Garcia/L    Positioning and Restraints    Pre-Treatment Position sitting in chair/recliner  -RW sitting in chair/recliner  -EK sitting in chair/recliner  -KD    Post Treatment Position bed  -RW wheelchair  -EK wheelchair  -KD    In Bed call light within reach;encouraged to call for assist  -RW call light within reach;notified nsg;encouraged to call for assist   tag alarm  -EK     In Wheelchair   sitting   eating b'fast @ nsg station  -KD    Recorded by [RW] Danny Beebe PTA [EK] Tamiko Whitten, [facility]-SLP [KD] ROHAN Garcia/POONAM      06/16/17 1245 06/16/17 1055 06/16/17 0900    Rehab  Assessment/Intervention    Discipline physical therapy assistant  -MARLENE occupational therapy assistant  -KD speech language pathologist  -EC    Document Type therapy note (daily note)  -MARLENE therapy note (daily note)  -KD therapy note (daily note)  -EC    Subjective Information agree to therapy  -MARLENE agree to therapy  -KD agree to therapy;no complaints  -EC    Patient Effort, Rehab Treatment fair  -MARLENE  fair  -EC    Precautions/Limitations fall precautions;other (see comments)   gait belt placement 2* incision  -MARLENE      Recorded by [MARLENE] Jaspreet Rodas PTA [KD] ROHAN Garcia/POONAM [EC] Tamiko Couch CCC-SLP    Vital Signs    Pretreatment Heart Rate (beats/min) 79  -MARLENE 82  -KD     Posttreatment Heart Rate (beats/min) 78  -MARLENE 77  -KD     Pre SpO2 (%) 98  -MARLENE 97  -KD     O2 Delivery Pre Treatment room air  -MARLENE room air  -KD     Post SpO2 (%) 98  -MARLENE 98  -KD     O2 Delivery Post Treatment room air  -MARLENE room air  -KD     Pre Patient Position Sitting  -MARLENE Sitting  -KD     Intra Patient Position  Standing  -KD     Post Patient Position Sitting  -MARLENE Sitting  -KD     Recorded by [MARLENE] Jaspreet Rodas PTA [KD] ROHAN Garcia/L     Pain Assessment    Pain Assessment No/denies pain  -MARLENE No/denies pain  -KD No/denies pain  -EC    Recorded by [MARLENE] Japsreet Rodas PTA [KD] ROHAN Garcia/POONAM [EC] Tamiko Couch CCC-SLP    Vision Assessment/Intervention    Visual Impairment WFL with corrective lenses  -MARLENE      Recorded by [MARLENE] Jaspreet Rodas PTA      Cognitive Assessment/Intervention    Current Cognitive/Communication Assessment impaired  -MARLENE impaired  -KD     Orientation Status oriented to;person  -MARLENE person  -KD     Follows Commands/Answers Questions 50% of the time  -MARLENE 50% of the time  -KD     Personal Safety Interventions gait belt;muscle strengthening facilitated;nonskid shoes/slippers when out of bed;supervised activity  -MARLENE      Problem Solving ADL/Func. Task  with max verbal cues  -KD     Long Term  Memory Interventions  --   25 pc shape sorter  -KD     Recorded by [MARLENE] Jaspreet Rodas, PTA [KD] ADRIANNA GarciaA/L     Communication Treatment Objective and Progress    Receptive Language Treatment Objectives   Improve ability to follow directions;Improve ability to comprehend questions  -EC    Expressive Treatment Objectives   Improve word retrieval skills  -EC    Cognitive Linguistic Treatment Objectives   Improve orientation;Improve memory skills  -EC    Recorded by   [EC] Tamiko Couch CCC-SLP    Improve ability to follow directions    Improve ability to follow directions:   one-step directions without objects  -EC    Status: Improve ability to follow directions   Progress slower than expected  -EC    Ability to Follow Directions Progress   20%  -EC    Comments: Improve ability to follow directions  Pt was unable to tell ROHAN what she would do in case of a fire  -KD     Recorded by  [KD] ADRIANNA GarciaA/POONAM [EC] Tamiko Couch CCC-SLP    Improve ability to comprehend questions    Improve ability to comprehend questions:   simple yes/no questions;simple general questions;90%  -EC    Status: Improve ability to comprehend questions   Progressing as expected  -EC    Ability to Comprehend Questions Progress   30%;50%  -EC    Recorded by   [EC] Tamiko Couch CCC-SLP    Improve word retrieval skills    Improve word retrieval skills by:   naming an object/pic;answer WH question with one word;completing a divergent task;completing a convergent task;90%  -EC    Status: Improve word retrieval skills   Progressing as expected  -EC    Word Retrieval Skills Progress   50%  -EC    Recorded by   [EC] Tamiko Couch CCC-SLP    Improve orientation    Improve orientation through:   demonstrating orientation to day;demonstrating orientation to year;demonstrating orientation to place;80%  -EC    Status: Improve orientation through   Progress slower than expected  -EC    Orientation Progress   30%  -EC     Recorded by   [EC] Tamiko Couch, GURMEET-SLP    Improve memory skills    Improve memory skills through:   recall details of the day;50%  -EC    Status: Improve memory skills   Progress slower than expected  -EC    Memory Skills Progress   50%  -EC    Comments: Improve memory skills  +Pt unable to recall full address  -KD     Recorded by  [KD] Nathalia Irizarry, MADRIGAL/L [EC] Tamiko Couch CCC-SLP    Bed Mobility, Assessment/Treatment    Bed Mobility, Roll Left, [place] not tested  -MARLENE      Bed Mobility, Roll Right, [place]  supervision required  -KD     Bed Mobility, Scoot/Bridge, [place]  not tested  -KD     Bed Mob, Supine to Sit, [place]  contact guard assist  -KD     Bed Mob, Sit to Supine, [place]  not tested  -KD     Bed Mob, Sidelying to Sit, [place]  supervision required  -KD     Bed Mob, Sit to Sidelying, [place]  not tested  -KD     Bed Mobility, Safety Issues  decreased use of arms for pushing/pulling  -KD     Recorded by [MARLENE] Jaspreet Rodas, PTA [KD] Nathalia Irizarry, MADRIGAL/L     Transfer Assessment/Treatment    Transfers, Bed-Chair [place]  contact guard assist  -KD     Transfers, Chair-Bed [place]  contact guard assist  -KD     Transfers, Bed-Chair-Bed, Assist Device  rolling walker  -KD     Transfers, Sit-Stand [place] contact guard assist  -MARLENE contact guard assist  -KD     Transfers, Stand-Sit [place] contact guard assist  -MARLENE contact guard assist  -KD     Transfers, Sit-Stand-Sit, Assist Device rolling walker  -MARLENE rolling walker  -KD     Toilet Transfer, [place] minimum assist (75% patient effort)  -MARLENE      Toilet Transfer, Assistive Device rolling walker;bedside commode with drop arms  -MARLENE      Transfer, Comment pt completed own pericare. requires close supervision and cueing to wash hands and cueing to turn water off.   -MARLENE      Recorded by [MARLENE] Jaspreet Rodas, PTA [KD] Nathalia Irizarry, MADRIGAL/L     Gait Assessment/Treatment    Gait,  [place] Level contact guard assist  -MARLENE      Gait, Assistive Device rolling walker  -MARLENE      Gait, Distance (Feet) 130  -MARLENE      Gait, Gait Deviations timothy decreased  -MARLENE      Gait, Safety Issues step length decreased  -MARLENE      Recorded by [MARLENE] Jaspreet Rodas PTA      Wheelchair Training/Management    Wheelchair, Distance Propelled  --   50' with VC's  -KD     Recorded by  [KD] JOSÉ MIGUEL Garcia     Upper Body Bathing Assessment/Training    UB Bathing Assess/Train Assistive Device  bath mitt  -KD     UB Bathing Assess/Train, Position  edge of bed  -KD     UB Bathing Assess/Train, [place] Level  supervision required  -KD     Recorded by  [KD] JOSÉ MIGUEL Garcia     Lower Body Bathing Assessment/Training    LB Bathing Assess/Train Assistive Device  bath mitt  -KD     LB Bathing Assess/Train, Position  sitting;standing  -KD     LB Bathing Assess/Train, [place] Level  verbal cues required;contact guard assist  -KD     Recorded by  [KD] JOSÉ MIGUEL Garcia     Upper Body Dressing Assessment/Training    UB Dressing Assess/Train, Clothing Type  doffing:;donning:;hospital gown;t-shirt  -KD     UB Dressing Assess/Train, Position  sitting;standing  -KD     UB Dressing Assess/Train, [place]  supervision required  -KD     Recorded by  [KD] JOSÉ MIGUEL Garcia     Lower Body Dressing Assessment/Training    LB Dressing Assess/Train, Clothing Type  doffing:;donning:;pants;shoes;socks  -KD     LB Dressing Assess/Train, Position  edge of bed;sitting;standing  -KD     LB Dressing Assess/Train, [place]  contact guard assist;verbal cues required  -KD     Recorded by  [KD] JOSÉ MIGUEL Garcia     Grooming Assessment/Training    Grooming Assess/Train, Assistive Device  --   wash mitt  -KD     Grooming Assess/Train, Position  sitting  -KD     Grooming Assess/Train, Indepen Level  set up required  -KD     Recorded by  [KD] JOSÉ MIGUEL Garcia     Balance Skills Training    Standing-Level  of Assistance Minimum assistance  -MARLENE      Standing-Balance Activities Compliant surfaces  -MARLENE      Standing Balance # of Minutes 5  -MARLENE      Gait Balance-Level of Assistance Minimum assistance  -      Gait Balance Support parallel bars  -      Gait Balance Activities side-stepping;backwards;tandem  -MARLENE      Gait Balance # of Minutes 20  -MARLENE      Recorded by [MARLENE] Jaspreet Rodas PTA      Positioning and Restraints    Pre-Treatment Position sitting in chair/recliner  -MARLENE in bed  -KD     Post Treatment Position wheelchair  -MARLENE bed  -KD     In Bed  fowlers;call light within reach;encouraged to call for assist  -KD     In Wheelchair notified nsg;sitting;call light within reach;encouraged to call for assist;exit alarm on   all needs met  -MARLENE      Recorded by [MARLENE] Jaspreet Rodas PTA [KD] ROHAN Garcia/POONAM       06/16/17 0816 06/15/17 1341 06/15/17 1301    Rehab Assessment/Intervention    Discipline physical therapy assistant  -MARLENE occupational therapy assistant  -RC physical therapy assistant  -RW    Document Type therapy note (daily note)  -MARLENE therapy note (daily note)  -RC therapy note (daily note)  -RW    Subjective Information agree to therapy  -MARLENE agree to therapy  -RC agree to therapy  -RW    Patient Effort, Rehab Treatment good  -MARLENE good  - good  -RW    Precautions/Limitations fall precautions  -MARLENE      Precautions/Limitations, Vision other (see comments)   gait belt placement 2* incision.  -MARLENE      Recorded by [MARLENE] Jaspreet Rodas PTA [RC] ROHAN Gomez/L [RW] Danny Beebe PTA    Vital Signs    Pre Systolic BP Rehab 121  -MARLENE      Pre Treatment Diastolic BP 57  -MARLENE      Pretreatment Heart Rate (beats/min) 79  -MARLENE      Intratreatment Heart Rate (beats/min) 90  -MARLENE      Posttreatment Heart Rate (beats/min) 77  -MARLENE      Pre SpO2 (%) 97  -MARLENE      O2 Delivery Pre Treatment room air  -MARLENE      Intra SpO2 (%) 97  -MARLENE      O2 Delivery Intra Treatment room air  -MARLENE      Post SpO2 (%) 98  -MARLENE      O2  Delivery Post Treatment room air  -MARLENE      Pre Patient Position Sitting  -MARLENE      Post Patient Position Sitting  -MARLENE      Recorded by [MARLENE] Jaspreet Rodas PTA      Pain Assessment    Pain Assessment No/denies pain  -MARLENE No/denies pain  -RC No/denies pain  -RW    Recorded by [MARLENE] Jaspreet Rodas PTA [RC] ADRIANNA GomezA/L [RW] Danny Beebe PTA    Vision Assessment/Intervention    Visual Impairment WFL with corrective lenses  -MARLENE  WFL with corrective lenses  -RW    Recorded by [MARLENE] Jaspreet Rodas PTA  [RW] Danny Beebe PTA    Cognitive Assessment/Intervention    Current Cognitive/Communication Assessment impaired  -MARLENE impaired  -RC functional  -RW    Orientation Status person  -MARLENE person  -RC oriented to;person  -RW    Follows Commands/Answers Questions 75% of the time  -MARLENE  75% of the time  -RW    Personal Safety   mild impairment  -RW    Personal Safety Interventions gait belt;muscle strengthening facilitated;nonskid shoes/slippers when out of bed;supervised activity  -MARLENE  gait belt;nonskid shoes/slippers when out of bed  -RW    Recorded by [MARLENE] Jaspreet Rodas PTA [RC] ADRIANNA GomezA/L [RW] Danny Beebe PTA    Bed Mobility, Assessment/Treatment    Bed Mobility, Roll Left, [place] not tested  -MARLENE      Recorded by [MARLENE] Jaspreet Rodas PTA      Transfer Assessment/Treatment    Transfers, Sit-Stand [place] contact guard assist  -MARLENE  contact guard assist;verbal cues required  -RW    Transfers, Stand-Sit [place] contact guard assist  -MARLENE  contact guard assist;verbal cues required  -RW    Transfers, Sit-Stand-Sit, Assist Device rolling walker  -MARLENE  rolling walker  -RW    Toilet Transfer, [place] contact guard assist  -MARLENE contact guard assist  -RC     Toilet Transfer, Assistive Device rolling walker  -MARLENE      Transfer, Comment pt completed sit-stands x5.   -MARLENE  sit to stand x 5  -RW    Recorded by [MARLENE] Jaspreet Rodas PTA [RC] ADRIANNA GomezA/L [RW] Danny Beebe PTA     Gait Assessment/Treatment    Gait, [place] Level contact guard assist  -MARLENE  contact guard assist;verbal cues required  -RW    Gait, Assistive Device rolling walker  -MARLENE  rolling walker  -RW    Gait, Distance (Feet) 242   40 ft  -MARLENE  200   150' x 3  -RW    Gait, Gait Deviations timothy decreased  -MARLENE      Gait, Impairments   impaired balance  -RW    Gait, Comment pt required some encouragement to ambulate  -MARLENE      Recorded by [MARLENE] Jaspreet Rodas, PTA  [RW] Danny Beebe, PTA    Wheelchair Training/Management    Wheelchair, Distance Propelled   120  -RW    Wheelchair Training Comment   min assist   -RW    Recorded by   [RW] Danny Beebe PTA    Upper Body Bathing Assessment/Training    UB Bathing Assess/Train, Position  sitting;sink side  -RC     UB Bathing Assess/Train, [place] Level  supervision required;verbal cues required  -RC     UB Bathing Assess/Train, Comment  pt requires vc's to complete task, pt pulled IV out   -RC     Recorded by  [RC] Ally Kwong, MADRIGAL/L     Lower Body Bathing Assessment/Training    LB Bathing Assess/Train, Position  sink side;sitting;standing  -RC     LB Bathing Assess/Train, [place] Level  verbal cues required;contact guard assist  -RC     Recorded by  [RC] Ally Kwong, MADRIGAL/L     Upper Body Dressing Assessment/Training    UB Dressing Assess/Train, Clothing Type  donning:;doffing:;pull over  -RC     UB Dressing Assess/Train, Position  sitting  -RC     UB Dressing Assess/Train, [place]  supervision required  -RC     Recorded by  [RC] Ally Kwong, MADRIGAL/L     Lower Body Dressing Assessment/Training    LB Dressing Assess/Train, Clothing Type  doffing:;donning:;pants;shoes;shorts  -RC     LB Dressing Assess/Train, Position  sitting;standing  -RC     LB Dressing Assess/Train, [place]  verbal cues required;contact guard assist  -RC     Recorded by  [RC] Ally Kwong, MADRIGAL/L     Toileting Assessment/Training    Toileting Assess/Train, Assistive  Device  grab bars  -RC     Toileting Assess/Train, Position  sitting;standing  -RC     Toileting Assess/Train, Indepen Level  contact guard assist  -RC     Recorded by  [RC] ROHAN Gomez/L     Grooming Assessment/Training    Grooming Assess/Train, Position  sitting  -RC     Grooming Assess/Train, Indepen Level  supervision required  -RC     Recorded by  [RC] ROHAN Gomez/L     Therapy Exercises    Bilateral Lower Extremities 15 reps;sitting;hip flexion;hip abduction/adduction   dorsiflexion and hamstring curls 15x2 red tband  -MARLENE  AROM:;ankle pumps/circles;sitting;15 reps;hip abduction/adduction;knee flexion;hamstring stretch;hip flexion;mini squats;standing;heel raises   2 sets  -RW    BLE Resistance theraband   lvl 2  -MARLENE  theraband  -RW    Recorded by [MARLENE] Jaspreet Rodas PTA  [RW] Danny Beebe, ROYAL    Positioning and Restraints    Pre-Treatment Position sitting in chair/recliner  -MARLENE sitting in chair/recliner  -RC sitting in chair/recliner  -RW    Post Treatment Position wheelchair  -MARLENE wheelchair  -RC wheelchair  -RW    In Bed  notified nsg;encouraged to call for assist   in nursing area  -RC     In Wheelchair sitting   w/ SLP  -MARLENE  with OT  -RW    Recorded by [MARLENE] Jaspreet Rodas PTA [RC] ROHAN Gomez/L [RW] Danny Beebe, ROYAL      06/15/17 0950 06/15/17 0945 06/15/17 0905    Rehab Assessment/Intervention    Discipline occupational therapy assistant  -RC  speech language pathologist  -EC    Document Type therapy note (daily note)  -RC  therapy note (daily note)  -EC    Subjective Information agree to therapy  -RC  agree to therapy;no complaints  -EC    Patient Effort, Rehab Treatment fair  -RC  fair  -EC    Recorded by [RC] ROHAN Gomez/POONAM  [EC] Tamiko Couch CCC-SLP    Vital Signs    Pre Systolic BP Rehab 102  -RC --  -RC     Pre Treatment Diastolic BP 50  -RC --  -RC     Post Systolic BP Rehab 117  -RC --  -RC     Post Treatment Diastolic BP 55  -RC --  -RC      Recorded by [RC] ROHAN Gomez/POONAM [RC] ROHAN Gomez/POONAM     Pain Assessment    Pain Assessment   No/denies pain  -EC    Pain Type Chronic pain  -RC      Pain Location Back  -RC      Pain Intervention(s) Rest  -RC      Recorded by [RC] ROHAN Gomez/POONAM  [EC] Tamiko Couch CCC-SLP    Communication Treatment Objective and Progress    Receptive Language Treatment Objectives   Improve ability to follow directions;Improve ability to comprehend questions  -EC    Expressive Treatment Objectives   Improve word retrieval skills  -EC    Cognitive Linguistic Treatment Objectives   Improve orientation;Improve memory skills  -EC    Recorded by   [EC] Tamiko Couch CCC-SLP    Improve ability to follow directions    Improve ability to follow directions:   two-step commands;80%;without cues  -EC    Status: Improve ability to follow directions   Progress slower than expected  -EC    Ability to Follow Directions Progress   20%  -EC    Recorded by   [EC] Tamiko Couch CCC-SLP    Improve ability to comprehend questions    Improve ability to comprehend questions:   simple yes/no questions;simple general questions;90%  -EC    Status: Improve ability to comprehend questions   Progressing as expected  -EC    Ability to Comprehend Questions Progress   30%;50%  -EC    Recorded by   [EC] Tamiko Couch CCC-SLP    Improve word retrieval skills    Improve word retrieval skills by:   naming an object/pic;answer WH question with one word;completing a divergent task;completing a convergent task;90%  -EC    Status: Improve word retrieval skills   Progressing as expected  -EC    Word Retrieval Skills Progress   50%  -EC    Recorded by   [EC] Tamiko Couch CCC-SLP    Improve orientation    Improve orientation through:   demonstrating orientation to day;demonstrating orientation to year;demonstrating orientation to place;80%  -EC    Status: Improve orientation through   Progress slower than expected   -EC    Orientation Progress   30%  -EC    Recorded by   [EC] OSIRIS Lin    Improve memory skills    Improve memory skills through:   recall details of the day;50%  -EC    Status: Improve memory skills   Progress slower than expected  -EC    Memory Skills Progress   50%  -EC    Recorded by   [EC] OSIRIS Lin    Dysphagia Treatment Objectives and Progress    Dysphagia Treatment Objectives   Other 1  -EC    Recorded by   [EC] OSIRIS Lin    Dysphagia Other 1    Dysphagia Other 1 Objective   Pt will safely tolerate recommended diet w/no overt s/s of aspiration.  -EC    Status: Dysphagia Other 1   Achieved  -EC    Dysphagia Other 1 Progress   90%;with consistent cues  -EC    Recorded by   [EC] OSIRIS Lin    Transfer Assessment/Treatment    Transfers, Chair-Bed [place] contact guard assist;verbal cues required  -RC      Transfers, Bed-Chair-Bed, Assist Device rolling walker  -RC      Recorded by [RC] ROHAN Gomez/L      Lower Body Dressing Assessment/Training    LB Dressing Assess/Train, Clothing Type doffing:;shoes  -RC      LB Dressing Assess/Train, Position sitting  -RC      LB Dressing Assess/Train, [place] verbal cues required  -RC      Recorded by [RC] ROHAN Gomez/L      Motor Skills/Interventions    Additional Documentation --   , cone stack and pass, clothes pin tree  -RC      Recorded by [RC] ROHAN Gomez/L      Therapy Exercises    Bilateral Upper Extremity --   ue bike 15 min multi vc's to stay on task  -RC      Recorded by [RC] ROHAN Gomez/L      Positioning and Restraints    Pre-Treatment Position sitting in chair/recliner  -RC      Post Treatment Position bed  -RC      In Bed encouraged to call for assist;call light within reach;notified nsg  -RC      Recorded by [RC] ROHAN Gomez/POONAM        06/15/17 0800 06/14/17 0628       Rehab Assessment/Intervention    Discipline physical therapy  assistant  -RW physical therapy assistant  -RW     Document Type therapy note (daily note)  -RW therapy note (daily note)  -RW     Subjective Information agree to therapy  -RW agree to therapy  -RW     Patient Effort, Rehab Treatment fair  -RW fair  -RW     Patient Response to Treatment got dizzy after steps and needed to be lowered into wc. bp 103/59 hr 70  -RW c/o back hurting and tired  -RW     Recorded by [RW] Danny Beebe PTA [RW] Danny Beebe PTA     Vital Signs    Intra Systolic BP Rehab 103  -RW      Intra Treatment Diastolic BP 59  -RW      Intratreatment Heart Rate (beats/min) 70  -RW      Intra SpO2 (%) 97  -RW      O2 Delivery Intra Treatment room air  -RW      Post SpO2 (%) 92  -RW      O2 Delivery Post Treatment room air  -RW      Recorded by [RW] Danny Beebe PTA      Pain Assessment    Pain Assessment --   c/o back pain with transiton moves and gt  -RW      Jason-Batista FACES Pain Rating  --   back hurts  -RW     Pain Location Back  -RW Back  -RW     Pain Orientation Left;Lower  -RW Left;Lower  -RW     Pain Intervention(s) Rest  -RW Rest  -RW     Recorded by [RW] Danny Beebe PTA [RW] Danny Beebe PTA     Vision Assessment/Intervention    Visual Impairment WFL with corrective lenses  -RW WFL with corrective lenses  -RW     Recorded by [RW] Danny Beebe PTA [RW] Danny Beebe PTA     Cognitive Assessment/Intervention    Current Cognitive/Communication Assessment impaired  -RW impaired  -RW     Orientation Status oriented to;person  -RW oriented to;person  -RW     Follows Commands/Answers Questions 50% of the time;needs cueing  -RW 50% of the time;needs cueing  -RW     Personal Safety moderate impairment;mild impairment  -RW      Personal Safety Interventions gait belt;nonskid shoes/slippers when out of bed  -RW      Recorded by [RW] Danny Beebe PTA [RW] Danny Beebe PTA     Transfer Assessment/Treatment    Transfers, Sit-Stand [place] contact guard assist;verbal cues  required;minimum assist (75% patient effort)  -RW contact guard assist;verbal cues required;minimum assist (75% patient effort)  -RW     Transfers, Stand-Sit [place] contact guard assist;verbal cues required  -RW contact guard assist;verbal cues required  -RW     Transfers, Sit-Stand-Sit, Assist Device rolling walker  -RW rolling walker  -RW     Toilet Transfer, [place] minimum assist (75% patient effort)  -RW      Toilet Transfer, Assistive Device rolling walker  -RW      Recorded by [RW] Danny Beebe PTA [RW] Danny Beebe PTA     Gait Assessment/Treatment    Gait, [place] Level contact guard assist;verbal cues required  -RW contact guard assist;verbal cues required  -RW     Gait, Assistive Device rolling walker  -RW rolling walker  -RW     Gait, Distance (Feet) 150   70'  -  -RW     Gait, Impairments impaired balance;motor control impaired  -RW impaired balance;motor control impaired  -RW     Gait, Comment  needed alot encouragement  -RW     Recorded by [RW] Danny Beebe PTA [RW] Danny Beebe PTA     Stairs Assessment/Treatment    Number of Stairs 4  -RW curb  -RW     Stairs, Handrail Location both sides  -RW      Stairs, [place] Level contact guard assist  -RW minimum assist (75% patient effort)  -RW     Stairs, Assistive Device --  -RW walker  -RW     Stairs, Comment pt got dizzy and had to be lowered into her wc  -RW      Recorded by [RW] Danny Beebe PTA [RW] Danny Beebe PTA     Wheelchair Training/Management    Wheelchair, Distance Propelled 100  -RW      Wheelchair Training Comment min assist  -RW      Recorded by [RW] Danny Beebe PTA      Lower Body Dressing Assessment/Training    LB Dressing Assess/Train, Clothing Type --  -RW donning:;shoes  -RW     Recorded by [RW] Danny Beebe PTA [RW] Danny Beebe PTA     Balance Skills Training    Standing-Balance Activities --  -RW --   static stance at windows searching for daughter outside.  -RW     Recorded by  [RW] Danny Beebe, ROYAL [RW] Danny Beebe PTA     Therapy Exercises    Bilateral Lower Extremities AROM:;ankle pumps/circles;sitting;15 reps;hip abduction/adduction;knee flexion;LAQ   2 sets  -RW AROM:;ankle pumps/circles;10 reps;sitting;hip flexion   2 sets in part  -RW     BLE Resistance theraband  -RW      Recorded by [RW] Danny Beebe, ROYAL [RW] Danny Beebe PTA     Positioning and Restraints    Pre-Treatment Position sitting in chair/recliner  -RW sitting in chair/recliner  -RW     Post Treatment Position wheelchair  -RW wheelchair  -RW     In Bed  patient within staff view  -RW     In Wheelchair with SLP  -RW      Recorded by [RW] Danny Beebe, ROYAL [RW] Danny Beebe PTA       User Key  (r) = Recorded By, (t) = Taken By, (c) = Cosigned By    Initials Name Effective Dates    EC Tamiko Couch, CCC-SLP 12/30/16 -     EK Tamiko Whitten, [facility]-SLP 04/06/17 -     MARLENE Rodas, PTA 10/17/16 -     RW Danny Beebe, PTA 10/17/16 -     RC Ally Kwong, MADRIGAL/L 10/17/16 -     KD Nathalia Irizarry, MADRIGAL/L 10/17/16 -               IP SLP Goals       06/17/17 0835 06/16/17 0940 06/15/17 1103    Safely Consume Diet    Safely Consume Diet- SLP, Date Goal Reviewed   06/15/17  -EC    Safely Consume Diet- SLP, Outcome   goal met  -EC    Receptive Language- Optimal Participation in Care    Receptive Language Optimal Participation in Care- SLP, Date Goal Reviewed 06/17/17  -EK 06/16/17  -EC 06/15/17  -EC    Receptive Language Optimal Participation in Care- SLP, Outcome  goal ongoing  -EC goal ongoing  -EC    Expressive- Optimal Participation in Care    Expressive Optimal Participation in Care- SLP, Date Goal Reviewed 06/17/17  -EK  06/15/17  -EC    Expressive Optimal Participation in Care- SLP, Outcome   goal ongoing  -EC    Cognitive Linguistic- Optimal Participation in Care    Cognitive Linguistic Optimal Participation in Care- SLP, Date Goal Reviewed 06/17/17  -EK 06/16/17  -EC 06/15/17  -EC     Cognitive Linguistic Optimal Participation in Care- SLP, Outcome  goal ongoing  -EC goal ongoing  -EC      06/14/17 1434 06/13/17 1506       Safely Consume Diet    Safely Consume Diet- SLP, Date Established  06/13/17  -CK     Safely Consume Diet- SLP, Time to Achieve  by discharge  -CK     Safely Consume Diet- SLP, Additional Goal  Pt will safely tolerate recommended diet w/no overt s/s of aspiration.  -CK     Safely Consume Diet- SLP, Date Goal Reviewed 06/14/17  -CK 06/13/17  -CK     Safely Consume Diet- SLP, Outcome goal partially met  -CK      Receptive Language- Optimal Participation in Care    Receptive Language Optimal Participation in Care- SLP, Date Established  06/13/17  -CK     Receptive Language Optimal Participation in Care- SLP, Time to Achieve  by discharge  -CK     Receptive Language Optimal Participation in Care- SLP, Activity Level  Patient will improve ability to follow directions;Patient will improve ability to comprehend questions  -CK     Receptive Language Optimal Participation in Care- SLP, Date Goal Reviewed 06/14/17  -CK 06/13/17  -CK     Receptive Language Optimal Participation in Care- SLP, Outcome goal ongoing  -CK      Expressive- Optimal Participation in Care    Expressive Optimal Participation in Care- SLP, Date Established  06/13/17  -CK     Expressive Optimal Participation in Care- SLP, Time to Achieve  by discharge  -CK     Expressive Optimal Participation in Care- SLP, Activity Level  Patient will improve word retrieval skills  -CK     Expressive Optimal Participation in Care- SLP, Date Goal Reviewed 06/14/17  -CK 06/13/17  -CK     Expressive Optimal Participation in Care- SLP, Outcome goal ongoing  -CK      Cognitive Linguistic- Optimal Participation in Care    Cognitive Linguistic Optimal Participation in Care- SLP, Date Established  06/13/17  -CK     Cognitive Linguistic Optimal Participation in Care- SLP, Time to Achieve  by discharge  -CK     Cognitive Linguistic Optimal  Participation in Care- SLP, Activity Level  Patient will improve orientation for increased safety in environment;Patient will improve memory skills for increased safety in environment  -CK     Cognitive Linguistic Optimal Participation in Care- SLP, Date Goal Reviewed 06/14/17  -CK 06/13/17  -CK     Cognitive Linguistic Optimal Participation in Care- SLP, Outcome goal ongoing  -CK        User Key  (r) = Recorded By, (t) = Taken By, (c) = Cosigned By    Initials Name Provider Type    OSIRIS Thompson Speech and Language Pathologist    OSIRIS Rodriguez Speech and Language Pathologist    GLENYS Whitten, MS CCC-SLP Speech and Language Pathologist          EDUCATION  The patient has been educated in the following areas:   Cognitive Impairment.    SLP Recommendation and Plan                               Plan of Care Review  Plan Of Care Reviewed With: patient  Progress: no change  Outcome Summary/Follow up Plan: No goals met at this time. Focus of tx today was in room safety and use of call light.           Time Calculation:         Time Calculation- SLP       06/17/17 0828          Time Calculation- SLP    SLP Start Time 0759  -EK      SLP Stop Time 0838  -EK      SLP Time Calculation (min) 39 min  -EK      SLP Received On 06/17/17  -EK        User Key  (r) = Recorded By, (t) = Taken By, (c) = Cosigned By    Initials Name Provider Type    OSIRIS Rodriguez Speech and Language Pathologist          Therapy Charges for Today     Code Description Service Date Service Provider Modifiers Qty    91109258699 HC ST TREATMENT SPEECH 3 6/17/2017 OSIRIS Foley GN 1               OSIRIS Foley  6/17/2017

## 2017-06-17 NOTE — PROGRESS NOTES
"[facility]  [address] [place] [address]. [address]  T - [phone]  F - [phone]     Progress Note          PATIENT  DEMOGRAPHICS   PATIENT NAME: Kierra Scales                      PHYSICIAN: Marialuisa Lawrence MD  : 1929  MRN: [id number]   LOS: 5 days    Patient Care Team:  Melissa Ch [facility] PCP [facility]  Subjective   SUBJECTIVE   The patient states that she is doing okay.  She reports no chest pain shortness of breath or other complaints.  She is tolerating physical therapy fairly well         Objective   OBJECTIVE   Vital Signs  Temp:  [96.3 °F (35.7 °C)-96.6 °F (35.9 °C)] 96.6 °F (35.9 °C)  Heart Rate:  [72-87] 72  Resp:  [18] 18  BP: (106-148)/(52-70) 122/57    Flowsheet Rows         First Filed Value    Admission Height  72\" (182.9 cm) Documented at 2017 1322    Admission Weight  171 lb 6.4 oz (77.7 kg) [please use standing scale fo next weigh in.] Documented at 2017 1322           I/O last 3 completed shifts:  In: 1000 [P.O.:1000]  Out: -     PHYSICAL EXAM    Physical Exam   Lungs are fairly clear.  Heart regular rate and rhythm.  Abdomen soft and tender distended.  Extremities no clubbing cyanosis or edema    RESULTS   Results Review:      Results from last 7 days  Lab Units 17  0534 17  0536 06/15/17  1002  17  0533   SODIUM mmol/L 137 136* 138  < > 137   POTASSIUM mmol/L 4.4 3.9 3.8  < > 3.1*   CHLORIDE mmol/L 103 103 99  < > 97   TOTAL CO2 mmol/L 28.0 25.0 29.0  < > 28.0   BUN mg/dL 19 25* 27*  < > 18   CREATININE mg/dL 0.77 0.79 0.86  < > 0.97   CALCIUM mg/dL 8.9 8.6 8.7  < > 8.7   BILIRUBIN mg/dL  --   --   --   --  0.3   ALK PHOS U/L  --   --   --   --  50   ALT (SGPT) U/L  --   --   --   --  33   AST (SGOT) U/L  --   --   --   --  16   GLUCOSE mg/dL 104* 131* 220*  < > 134*   < > = values in this interval not displayed.    Estimated Creatinine Clearance: 56.1 mL/min " (by C-G formula based on Cr of 0.77).      Results from last 7 days  Lab Units 06/17/17  0534 06/16/17  0536 06/15/17  1002 06/14/17  0501  06/13/17  0533   MAGNESIUM mg/dL  --   --   --  2.0  --  1.9   PHOSPHORUS mg/dL 2.6 2.5 3.3 4.1  < >  --    < > = values in this interval not displayed.            Results from last 7 days  Lab Units 06/17/17  0534 06/16/17  0536 06/15/17  1002 06/14/17  0501 06/13/17  0533   WBC 10*3/mm3 5.21 7.31 8.23 6.47 5.46   HEMOGLOBIN g/dL 8.2* 9.4* 9.5* 8.8* 8.5*   PLATELETS 10*3/mm3 259 303 327 319 299               Imaging Results (last 24 hours)     ** No results found for the last 24 hours. **           MEDICATIONS      diltiaZEM  mg Oral Daily   docusate sodium 100 mg Oral BID   donepezil 10 mg Oral Nightly   ferrous sulfate 324 mg Oral BID With Meals   folic acid-vit B6-vit B12 1 tablet Oral Daily   insulin aspart 0-14 Units Subcutaneous 4x Daily AC & at Bedtime   insulin detemir 15 Units Subcutaneous Nightly   magnesium oxide 400 mg Oral Daily   memantine 5 mg Oral BID   methIMAzole 2.5 mg Oral Daily   potassium chloride 10 mEq Oral BID With Meals   sotalol 40 mg Oral Q12H          Assessment/Plan   ASSESSMENT / PLAN    Principal Problem:    Physical deconditioning  Active Problems:    Essential hypertension    Tricuspid valve disorders, 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  Acute kidney injury, completely resolved.  Blood pressure is under good control volume status is acceptable and electrolytes are normal so I will sign off for the time being I'll be happy to see her in the future if needed                This document has been electronically signed by Marialuisa Lawrence MD on June 17, 2017 3:29 PM

## 2017-06-17 NOTE — PLAN OF CARE
Problem: Patient Care Overview (Adult)  Goal: Plan of Care Review  Outcome: Ongoing (interventions implemented as appropriate)    06/17/17 0316   Coping/Psychosocial Response Interventions   Plan Of Care Reviewed With patient   Patient Care Overview   Progress no change   Outcome Evaluation   Outcome Summary/Follow up Plan pt has been pleasantly confused, no c/o of pain tonight, pt needs prompting, no acute distress noted          Problem: Functional Deficit (Adult,Obstetrics,Pediatric)  Goal: Signs and Symptoms of Listed Potential Problems Will be Absent or Manageable (Functional Deficit)  Outcome: Ongoing (interventions implemented as appropriate)    Problem: Fall Risk (Adult)  Goal: Absence of Falls  Outcome: Ongoing (interventions implemented as appropriate)    Problem: Infection, Risk/Actual (Adult)  Goal: Infection Prevention/Resolution  Outcome: Ongoing (interventions implemented as appropriate)

## 2017-06-17 NOTE — PLAN OF CARE
Problem: Patient Care Overview (Adult)  Goal: Plan of Care Review  Outcome: Ongoing (interventions implemented as appropriate)    06/17/17 1559   Coping/Psychosocial Response Interventions   Plan Of Care Reviewed With patient   Outcome Evaluation   Outcome Summary/Follow up Plan doing well with gt but needs consistant directions in all aspects of activity. cont with gt and therex         Problem: Inpatient Physical Therapy  Goal: Bed Mobility Goal LTG- PT  Outcome: Ongoing (interventions implemented as appropriate)    06/13/17 1036 06/17/17 1559   Bed Mobility PT LTG   Bed Mobility PT LTG, Date Established 06/13/17 --    Bed Mobility PT LTG, Time to Achieve by discharge --    Bed Mobility PT LTG, Activity Type supine to sit/sit to supine --    Bed Mobility PT LTG, [place] Level supervision required --    Bed Mobility PT LTG, Additional Goal HOB flat; No BR's --    Bed Mobility PT LTG, Date Goal Reviewed --  06/17/17   Bed Mobility PT LTG, Outcome --  goal ongoing       Goal: Transfer Training Goal 1 LTG- PT  Outcome: Ongoing (interventions implemented as appropriate)  Goal: Gait Training Goal LTG- PT  Outcome: Ongoing (interventions implemented as appropriate)    06/13/17 1036 06/17/17 1559   Gait Training PT LTG   Gait Training Goal PT LTG, Date Established 06/13/17 --    Gait Training Goal PT LTG, Time to Achieve by discharge --    Gait Training Goal PT LTG, [place] Level supervision required --    Gait Training Goal PT LTG, Assist Device (AAD) --    Gait Training Goal PT LTG, Distance to Achieve 150 feet --    Gait Training Goal PT LTG, Date Goal Reviewed --  06/17/17   Gait Training Goal PT LTG, Outcome --  goal ongoing

## 2017-06-17 NOTE — THERAPY TREATMENT NOTE
Inpatient Rehabilitation - Physical Therapy Treatment Note  [facility]     Patient Name: Kierra Scales  : 1929  MRN: [id number]  Today's Date: 2017  Onset of Illness/Injury or Date of Surgery Date: 17  Date of Referral to PT: 17  Referring Physician: Dr. Lucia    Admit Date: 2017    Visit Dx:    ICD-10-CM ICD-9-CM   1. Oral phase dysphagia R13.11 787.21   2. Impaired mobility and ADLs Z74.09 799.89   3. Muscle weakness (generalized) M62.81 728.87   4. Abnormality of gait and mobility R26.9 781.2   5. 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)   • Hypothyroidism   • Dyspnea   • Hyperthyroidism   • Gastrointestinal hemorrhage with melena   • Type 2 diabetes mellitus with hyperglycemia   • Colon cancer   • Chronic blood loss anemia   • Physical deconditioning   • Senile dementia, uncomplicated   • Encounter for rehabilitation   • Adenocarcinoma of sigmoid colon   • Hypokalemia               Adult Rehabilitation Note       17 1525 17 0911 17 0759    Rehab Assessment/Intervention    Discipline physical therapy assistant  -RW physical therapy assistant  -RW speech language pathologist  -EK    Document Type therapy note (daily note)  -RW therapy note (daily note)  -RW therapy note (daily note)  -EK    Subjective Information agree to therapy  -RW agree to therapy  -RW no complaints;agree to therapy  -EK    Patient Effort, Rehab Treatment fair  -RW good  -RW good  -EK    Recorded by [RW] Danny Beebe PTA [RW] Danny Beebe PTA [EK] Tamiko Whitten CCC-SLP    Vital Signs    Intra Systolic BP Rehab  133  -RW     Intra Treatment Diastolic BP  59  -RW      Intratreatment Heart Rate (beats/min)  69  -RW     Recorded by  [RW] Danny Beebe PTA     Pain Assessment    Pain Assessment No/denies pain  -RW No/denies pain  -RW No/denies pain  -EK    Jason-Batista FACES Pain Rating   0  -EK    Pain Score   0  -EK    Recorded by [RW] Danny Beebe PTA [RW] Danny Beebe PTA [EK] Tamiko Whitten, CCC-SLP    Vision Assessment/Intervention    Visual Impairment WFL with corrective lenses  -RW WFL with corrective lenses  -RW     Recorded by [RW] Danny Beebe, ROYAL [RW] Danny Beebe PTA     Cognitive Assessment/Intervention    Current Cognitive/Communication Assessment impaired  -RW impaired  -RW impaired  -EK    Orientation Status oriented to;person  -RW oriented to;person  -RW disoriented x 4  -EK    Follows Commands/Answers Questions 75% of the time  -RW 75% of the time  -RW 50% of the time  -EK    Personal Safety decreased awareness, need for safety;decreased insight to deficits  -RW decreased awareness, need for safety;decreased insight to deficits  -RW decreased awareness, need for safety;decreased awareness, need for assist;decreased insight to deficits  -EK    Recorded by [RW] Danny Beebe, ROYAL [RW] Danny Beebe, ROYAL [EK] Tamiko Whitten, CCC-SLP    Improve ability to follow directions    Improve ability to follow directions:   two-step commands  -EK    Status: Improve ability to follow directions   Progress slower than expected  -EK    Recorded by   [EK] Tamiko Whitten, CCC-SLP    Improve ability to comprehend questions    Improve ability to comprehend questions:   simple yes/no questions;simple general questions;90%  -EK    Status: Improve ability to comprehend questions   Progress slower than expected  -EK    Recorded by   [EK] Tamiko Whitten CCC-SLP    Improve word retrieval skills    Improve word retrieval skills by:   naming an object/pic;answer WH question with one word;completing a divergent task;completing  a convergent task;90%  -EK    Status: Improve word retrieval skills   Progressing as expected  -EK    Recorded by   [EK] Tamiko Whitten CCC-SLP    Improve orientation    Improve orientation through:   demonstrating orientation to day;demonstrating orientation to year;demonstrating orientation to place;80%;use environmental aids to assist with orientation  -EK    Status: Improve orientation through   Progress slower than expected  -EK    Recorded by   [EK] Tamiko Whitten CCC-SLP    Improve memory skills    Improve memory skills through:   recall details of the day;50%  -EK    Status: Improve memory skills   Progress slower than expected  -EK    Recorded by   [EK] Tamiko Whitten CCC-SLP    Bed Mobility, Assessment/Treatment    Bed Mobility, Assistive Device bed rails;head of bed elevated  -RW bed rails;head of bed elevated  -RW     Bed Mobility, Roll Left, [place]  --  -RW     Bed Mob, Supine to Sit, [place] supervision required  -RW      Bed Mob, Sit to Supine, [place] --  -RW supervision required  -RW     Recorded by [RW] Danny Beebe, PTA [RW] Danny Beebe, PTA     Transfer Assessment/Treatment    Transfers, Chair-Bed [place] --  -RW contact guard assist  -RW     Transfers, Sit-Stand [place] contact guard assist  -RW contact guard assist  -RW     Transfers, Stand-Sit [place] contact guard assist  -RW contact guard assist  -RW     Transfers, Sit-Stand-Sit, Assist Device rolling walker  -RW rolling walker  -RW     Toilet Transfer, [place] --  -RW minimum assist (75% patient effort)  -RW     Toilet Transfer, Assistive Device --  -RW rolling walker  -RW     Recorded by [RW] Danny Beebe, PTA [RW] Danny Beebe, PTA     Gait Assessment/Treatment    Gait, [place] Level contact guard assist  -RW contact guard assist  -RW     Gait, Assistive Device rolling walker  -RW rolling walker  -RW     Gait, Distance (Feet) 170   x 2  -     x 2  -RW     Recorded by [RW] Danny Beebe PTA [RW] Danny Beebe PTA     Stairs Assessment/Treatment    Number of Stairs  4  -RW     Stairs, Handrail Location --  -RW both sides  -RW     Stairs, [place] Level --  -RW supervision required  -RW     Recorded by [RW] Danny Beebe PTA [RW] Danny Beebe PTA     Wheelchair Training/Management    Wheelchair, Distance Propelled  150  -RW     Wheelchair Training Comment  sba and lots of v cues  -RW     Recorded by  [RW] Danny Beebe PTA     Lower Body Dressing Assessment/Training    LB Dressing Assess/Train, Clothing Type shoes;donning:  -RW doffing:;shoes  -RW     Recorded by [RW] Danny Beebe PTA [RW] Danny Beebe PTA     Balance Skills Training    Standing-Level of Assistance --  -RW Minimum assistance  -RW     Standing-Balance Activities --  -RW Compliant surfaces  -RW     Gait Balance-Level of Assistance --  -RW Minimum assistance  -RW     Gait Balance Support --  -RW parallel bars  -RW     Gait Balance Activities --  -RW side-stepping  -RW     Recorded by [RW] Danny Beebe PTA [RW] Danny Beebe PTA     Therapy Exercises    Bilateral Lower Extremities AROM:;20 reps;sitting;heel raises;hip flexion;knee flexion;15 reps;standing  -RW AROM:;20 reps;sitting;heel raises;hip flexion;knee flexion  -RW     BLE Resistance theraband  -RW theraband  -RW     Recorded by [RW] Danny Beebe PTA [RW] Danny Beebe PTA     Positioning and Restraints    Pre-Treatment Position in bed  -RW sitting in chair/recliner  -RW sitting in chair/recliner  -EK    Post Treatment Position wheelchair  -RW bed  -RW wheelchair  -EK    In Bed  call light within reach;encouraged to call for assist  -RW call light within reach;notified nsg;encouraged to call for assist   tag alarm  -EK    In Wheelchair call light within reach;encouraged to call for assist  -RW      Recorded by [RW] Danny Beebe PTA [RW] Danny Beebe PTA [EK] Tamiko Whitten, CCC-SLP       17 0600 17 1245 17 1055    Rehab Assessment/Intervention    Discipline occupational therapy assistant  -KD physical therapy assistant  -MARLENE occupational therapy assistant  -KD    Document Type therapy note (daily note)  -KD therapy note (daily note)  -MARLENE therapy note (daily note)  -KD    Subjective Information agree to therapy  -KD agree to therapy  -MARLENE agree to therapy  -KD    Patient Effort, Rehab Treatment  fair  -MARLENE     Precautions/Limitations  fall precautions;other (see comments)   gait belt placement 2* incision  -MARLENE     Recorded by [KD] ADRIANNA GarciaA/POONAM [MARLENE] Jaspreet Rodas PTA [KD] ADRIANNA GarciaA/L    Vital Signs    Pretreatment Heart Rate (beats/min)  79  -MARLENE 82  -KD    Posttreatment Heart Rate (beats/min)  78  -MARLENE 77  -KD    Pre SpO2 (%)  98  -MARLENE 97  -KD    O2 Delivery Pre Treatment  room air  -MARLENE room air  -KD    Post SpO2 (%)  98  -MARLENE 98  -KD    O2 Delivery Post Treatment  room air  -MARLENE room air  -KD    Pre Patient Position Sitting  -KD Sitting  -MARLENE Sitting  -KD    Intra Patient Position Standing  -KD  Standing  -KD    Post Patient Position Sitting  -KD Sitting  -MARLENE Sitting  -KD    Recorded by [KD] ADRIANNA GarciaA/L [MARLENE] Jaspreet Rodas PTA [KD] ADRIANNA GarciaA/L    Pain Assessment    Pain Assessment No/denies pain  -KD No/denies pain  -MARLENE No/denies pain  -KD    Recorded by [KD] ADRIANNA GarciaA/POONAM [MARLENE] Jaspreet Rodas PTA [KD] ADRIANNA GarciaA/L    Vision Assessment/Intervention    Visual Impairment  WFL with corrective lenses  -MARLENE     Recorded by  [MARLENE] Jaspreet Rodas PTA     Cognitive Assessment/Intervention    Current Cognitive/Communication Assessment impaired  -KD impaired  -MARLENE impaired  -KD    Orientation Status oriented to;person     -KD oriented to;person  -MARLENE person  -KD    Follows Commands/Answers Questions  50% of the time  -MARLENE 50% of the time  -KD    Personal Safety Interventions  gait belt;muscle strengthening facilitated;nonskid  shoes/slippers when out of bed;supervised activity  -MARLENE     Problem Solving ADL/Func. Task with min verbal cues   25 pc shape sorter  -KD  with max verbal cues  -KD    Long Term Memory Interventions   --   25 pc shape sorter  -KD    Recorded by [KD] ROHAN Garcia/L [MARLENE] Jaspreet Rodas, PTA [KD] ADRIANNA GarciaA/L    Improve ability to follow directions    Comments: Improve ability to follow directions   Pt was unable to tell MADRIGAL what she would do in case of a fire  -KD    Recorded by   [KD] ADRIANNA GarciaA/L    Improve memory skills    Comments: Improve memory skills   +Pt unable to recall full address  -KD    Recorded by   [KD] ADRIANNA GarciaA/L    Bed Mobility, Assessment/Treatment    Bed Mobility, Roll Left, [place]  not tested  -MARLENE     Bed Mobility, Roll Right, [place]   supervision required  -KD    Bed Mobility, Scoot/Bridge, [place]   not tested  -KD    Bed Mob, Supine to Sit, [place]   contact guard assist  -KD    Bed Mob, Sit to Supine, [place]   not tested  -KD    Bed Mob, Sidelying to Sit, [place]   supervision required  -KD    Bed Mob, Sit to Sidelying, [place]   not tested  -KD    Bed Mobility, Safety Issues   decreased use of arms for pushing/pulling  -KD    Recorded by  [MARLENE] Jaspreet Rodas, PTA [KD] ADRIANNA GarciaA/L    Transfer Assessment/Treatment    Transfers, Bed-Chair [place]   contact guard assist  -KD    Transfers, Chair-Bed [place]   contact guard assist  -KD    Transfers, Bed-Chair-Bed, Assist Device   rolling walker  -KD    Transfers, Sit-Stand [place] contact guard assist  -KD contact guard assist  -MARLENE contact guard assist  -KD    Transfers, Stand-Sit [place] contact guard assist  -KD contact guard assist  -MARLENE contact guard assist  -KD    Transfers, Sit-Stand-Sit, Assist Device rolling walker  -KD rolling walker  -MARLENE rolling walker  -KD    Toilet Transfer, [place] minimum assist (75% patient effort)  -KD  minimum assist (75% patient effort)  -MARLENE     Toilet Transfer, Assistive Device  rolling walker;bedside commode with drop arms  -MARLENE     Transfer, Comment  pt completed own pericare. requires close supervision and cueing to wash hands and cueing to turn water off.   -MARLENE     Recorded by [KD] ROHAN Garcia/POONAM [MARLENE] Jaspreet Rodas PTA [KD] ROHAN Garcia/L    Gait Assessment/Treatment    Gait, [place] Level  contact guard assist  -MARLENE     Gait, Assistive Device  rolling walker  -MARLENE     Gait, Distance (Feet)  130  -MARLENE     Gait, Gait Deviations  timothy decreased  -MARLENE     Gait, Safety Issues  step length decreased  -MARLENE     Recorded by  [MARLENE] Jaspreet Roads, PTA     Functional Mobility    Functional Mobility- Ind. Level contact guard assist  -KD      Functional Mobility- Device rolling walker  -KD      Functional Mobility-Distance (Feet) --   100  -KD      Recorded by [KD] ROHAN Garcia/L      Wheelchair Training/Management    Wheelchair, Distance Propelled --   100  -KD  --   50' with VC's  -KD    Wheelchair Training Comment sba  -KD      Recorded by [KD] ROHAN Garcia/POONAM  [KD] ROHAN Garcia/L    Upper Body Bathing Assessment/Training    UB Bathing Assess/Train Assistive Device   bath mitt  -KD    UB Bathing Assess/Train, Position   edge of bed  -KD    UB Bathing Assess/Train, [place] Level   supervision required  -KD    Recorded by   [KD] ADRIANNA GarciaA/L    Lower Body Bathing Assessment/Training    LB Bathing Assess/Train Assistive Device   bath mitt  -KD    LB Bathing Assess/Train, Position   sitting;standing  -KD    LB Bathing Assess/Train, [place] Level   verbal cues required;contact guard assist  -KD    Recorded by   [KD] ROHAN Garcia/L    Upper Body Dressing Assessment/Training    UB Dressing Assess/Train, Clothing Type   doffing:;donning:;hospital gown;t-shirt  -KD    UB Dressing Assess/Train, Position   sitting;standing  -KD    UB Dressing Assess/Train,  [place]   supervision required  -KD    Recorded by   [KD] ROHAN Garcia/L    Lower Body Dressing Assessment/Training    LB Dressing Assess/Train, Clothing Type donning:;shoes  -KD  doffing:;donning:;pants;shoes;socks  -KD    LB Dressing Assess/Train, Position sitting  -KD  edge of bed;sitting;standing  -KD    LB Dressing Assess/Train, [place] set up required  -KD  contact guard assist;verbal cues required  -KD    Recorded by [KD] ROHAN Garcia/POONAM  [KD] ROHAN Garcia/L    Grooming Assessment/Training    Grooming Assess/Train, Assistive Device --   oral care-comb hair- wash face  -KD  --   wash mitt  -KD    Grooming Assess/Train, Position sink side;sitting  -KD  sitting  -KD    Grooming Assess/Train, [place] Level set up required  -KD  set up required  -KD    Recorded by [KD] ROHAN Garcia/POONAM  [KD] ROHAN Garcia/L    Balance Skills Training    Standing-Level of Assistance Contact guard  -KD Minimum assistance  -MARLENE     Standing-Balance Activities --   static  -KD Compliant surfaces  -MARLENE     Standing Balance # of Minutes 8  -KD 5  -MARLENE     Gait Balance-Level of Assistance  Minimum assistance  -MARLENE     Gait Balance Support  parallel bars  -MARLENE     Gait Balance Activities  side-stepping;backwards;tandem  -MARLENE     Gait Balance # of Minutes  20  -MARLENE     Recorded by [KD] ROHAN Garcia/POONAM [MARLENE] Jaspreet Rodas, PTA     Therapy Exercises    Bilateral Upper Extremity --   UEE x 10 mins w/ RB's  -KD      Recorded by [KD] ROHAN Garcia/L      Positioning and Restraints    Pre-Treatment Position sitting in chair/recliner  -KD sitting in chair/recliner  -MARLENE in bed  -KD    Post Treatment Position wheelchair  -KD wheelchair  -MARLENE bed  -KD    In Bed   fowlers;call light within reach;encouraged to call for assist  -KD    In Wheelchair sitting   eating b'fast @ nsg station  -KD notified nsg;sitting;call light within reach;encouraged to call for assist;exit alarm on   all needs met  -      Recorded by [KD] ADRIANNA GarciaA/L [MARLENE] Jaspreet Rodas PTA [KD] ADRIANNA GarciaA/L      06/16/17 0900 06/16/17 0816 06/15/17 1341    Rehab Assessment/Intervention    Discipline speech language pathologist  -EC physical therapy assistant  -MARLENE occupational therapy assistant  -RC    Document Type therapy note (daily note)  -EC therapy note (daily note)  -MARLENE therapy note (daily note)  -RC    Subjective Information agree to therapy;no complaints  -EC agree to therapy  -MARLENE agree to therapy  -RC    Patient Effort, Rehab Treatment fair  -EC good  -MARLENE good  -RC    Precautions/Limitations  fall precautions  -MARLENE     Precautions/Limitations, Vision  other (see comments)   gait belt placement 2* incision.  -MARLENE     Recorded by [EC] Tamiko Couch CCC-SLP [MARLENE] Jaspreet Rodas PTA [RC] Ally Kwong, MADRIGAL/L    Vital Signs    Pre Systolic BP Rehab  121  -MARLENE     Pre Treatment Diastolic BP  57  -MARLENE     Pretreatment Heart Rate (beats/min)  79  -MARLENE     Intratreatment Heart Rate (beats/min)  90  -MARLENE     Posttreatment Heart Rate (beats/min)  77  -MARLENE     Pre SpO2 (%)  97  -MARLENE     O2 Delivery Pre Treatment  room air  -MARLENE     Intra SpO2 (%)  97  -MARLENE     O2 Delivery Intra Treatment  room air  -MARLENE     Post SpO2 (%)  98  -MARLENE     O2 Delivery Post Treatment  room air  -MARLENE     Pre Patient Position  Sitting  -MARLENE     Post Patient Position  Sitting  -MARLENE     Recorded by  [MARLENE] Jaspreet Rodas PTA     Pain Assessment    Pain Assessment No/denies pain  -EC No/denies pain  -MARLENE No/denies pain  -RC    Recorded by [EC] Tamiko Couch CCC-SLP [MARLENE] Jaspreet Rodas PTA [RC] Ally Kwong MADRIGAL/L    Vision Assessment/Intervention    Visual Impairment  WFL with corrective lenses  -MARLENE     Recorded by  [MARLENE] Jaspreet Rodas PTA     Cognitive Assessment/Intervention    Current Cognitive/Communication Assessment  impaired  -MARLENE impaired  -RC    Orientation Status  person  -MARLENE person  -RC    Follows Commands/Answers Questions  75% of the  time  -MARLENE     Personal Safety Interventions  gait belt;muscle strengthening facilitated;nonskid shoes/slippers when out of bed;supervised activity  -MARLENE     Recorded by  [MARLENE] Jaspreet Rodas PTA [RC] ROHAN Gomez/POONAM    Communication Treatment Objective and Progress    Receptive Language Treatment Objectives Improve ability to follow directions;Improve ability to comprehend questions  -EC      Expressive Treatment Objectives Improve word retrieval skills  -EC      Cognitive Linguistic Treatment Objectives Improve orientation;Improve memory skills  -EC      Recorded by [EC] Tamiko Couch CCC-SLP      Improve ability to follow directions    Improve ability to follow directions: one-step directions without objects  -EC      Status: Improve ability to follow directions Progress slower than expected  -EC      Ability to Follow Directions Progress 20%  -EC      Recorded by [EC] Tamiko Couch CCC-SLP      Improve ability to comprehend questions    Improve ability to comprehend questions: simple yes/no questions;simple general questions;90%  -EC      Status: Improve ability to comprehend questions Progressing as expected  -EC      Ability to Comprehend Questions Progress 30%;50%  -EC      Recorded by [EC] Tamiko Couch CCC-SLP      Improve word retrieval skills    Improve word retrieval skills by: naming an object/pic;answer WH question with one word;completing a divergent task;completing a convergent task;90%  -EC      Status: Improve word retrieval skills Progressing as expected  -EC      Word Retrieval Skills Progress 50%  -EC      Recorded by [EC] Tamiko Couch CCC-SLP      Improve orientation    Improve orientation through: demonstrating orientation to day;demonstrating orientation to year;demonstrating orientation to place;80%  -EC      Status: Improve orientation through Progress slower than expected  -EC      Orientation Progress 30%  -EC      Recorded by [EC] Tamiko Couch  CCC-SLP      Improve memory skills    Improve memory skills through: recall details of the day;50%  -EC      Status: Improve memory skills Progress slower than expected  -EC      Memory Skills Progress 50%  -EC      Recorded by [EC] Tamiko Couch, CCC-SLP      Bed Mobility, Assessment/Treatment    Bed Mobility, Roll Left, [place]  not tested  -MARLENE     Recorded by  [MARLENE] Jaspreet Rodas PTA     Transfer Assessment/Treatment    Transfers, Sit-Stand [place]  contact guard assist  -MARLENE     Transfers, Stand-Sit [place]  contact guard assist  -MARLENE     Transfers, Sit-Stand-Sit, Assist Device  rolling walker  -MARLENE     Toilet Transfer, [place]  contact guard assist  -MARLENE contact guard assist  -RC    Toilet Transfer, Assistive Device  rolling walker  -MARLENE     Transfer, Comment  pt completed sit-stands x5.   -MARLENE     Recorded by  [MARLENE] Jaspreet Rodas PTA [RC] Ally Kwong MADRIGAL/L    Gait Assessment/Treatment    Gait, [place] Level  contact guard assist  -MARLENE     Gait, Assistive Device  rolling walker  -MARLENE     Gait, Distance (Feet)  242   40 ft  -MARLENE     Gait, Gait Deviations  timothy decreased  -MARLENE     Gait, Comment  pt required some encouragement to ambulate  -MARLENE     Recorded by  [MARLENE] Jaspreet Rodas PTA     Upper Body Bathing Assessment/Training    UB Bathing Assess/Train, Position   sitting;sink side  -RC    UB Bathing Assess/Train, [place] Level   supervision required;verbal cues required  -RC    UB Bathing Assess/Train, Comment   pt requires vc's to complete task, pt pulled IV out   -RC    Recorded by   [RC] Ally Kwong, MADRIGAL/L    Lower Body Bathing Assessment/Training    LB Bathing Assess/Train, Position   sink side;sitting;standing  -RC    LB Bathing Assess/Train, [place] Level   verbal cues required;contact guard assist  -RC    Recorded by   [RC] Ally Kwong MADRIGAL/L    Upper Body Dressing Assessment/Training    UB Dressing Assess/Train, Clothing Type   donning:;doffing:;pull  over  -RC    UB Dressing Assess/Train, Position   sitting  -RC    UB Dressing Assess/Train, [place]   supervision required  -RC    Recorded by   [RC] ROHAN Gomez/L    Lower Body Dressing Assessment/Training    LB Dressing Assess/Train, Clothing Type   doffing:;donning:;pants;shoes;shorts  -RC    LB Dressing Assess/Train, Position   sitting;standing  -RC    LB Dressing Assess/Train, [place]   verbal cues required;contact guard assist  -RC    Recorded by   [RC] ROHAN Gomez/L    Toileting Assessment/Training    Toileting Assess/Train, Assistive Device   grab bars  -RC    Toileting Assess/Train, Position   sitting;standing  -RC    Toileting Assess/Train, [place] Level   contact guard assist  -RC    Recorded by   [RC] ROHAN Gomez/L    Grooming Assessment/Training    Grooming Assess/Train, Position   sitting  -RC    Grooming Assess/Train, [place] Level   supervision required  -RC    Recorded by   [RC] ROHAN Gomez/L    Therapy Exercises    Bilateral Lower Extremities  15 reps;sitting;hip flexion;hip abduction/adduction   dorsiflexion and hamstring curls 15x2 red tband  -MARLENE     BLE Resistance  theraband   lvl 2  -MARLENE     Recorded by  [MARLENE] Jaspreet Rodas PTA     Positioning and Restraints    Pre-Treatment Position  sitting in chair/recliner  -MARLENE sitting in chair/recliner  -RC    Post Treatment Position  wheelchair  -MARLENE wheelchair  -RC    In Bed   notified nsg;encouraged to call for assist   in nursing area  -RC    In Wheelchair  sitting   w/ SLP  -MARLENE     Recorded by  [MARLENE] Jaspreet Rodas PTA [RC] ROHAN Gomez/POONAM      06/15/17 1301 06/15/17 0950 06/15/17 0945    Rehab Assessment/Intervention    Discipline physical therapy assistant  -RW occupational therapy assistant  -RC     Document Type therapy note (daily note)  -RW therapy note (daily note)  -RC     Subjective Information agree to therapy  -RW agree to therapy  -RC     Patient Effort, Rehab Treatment good  -RW  fair  -RC     Recorded by [RW] Danny Beebe PTA [RC] Ally Kwong, MADRIGAL/L     Vital Signs    Pre Systolic BP Rehab  102  -RC --  -RC    Pre Treatment Diastolic BP  50  -RC --  -RC    Post Systolic BP Rehab  117  -RC --  -RC    Post Treatment Diastolic BP  55  -RC --  -RC    Recorded by  [RC] Ally Kwong, MADRIGAL/L [RC] Ally Kwong, MADRIGAL/L    Pain Assessment    Pain Assessment No/denies pain  -RW      Pain Type  Chronic pain  -RC     Pain Location  Back  -RC     Pain Intervention(s)  Rest  -RC     Recorded by [RW] Danny Beebe PTA [RC] Ally Kwong, MADRIGAL/L     Vision Assessment/Intervention    Visual Impairment WFL with corrective lenses  -RW      Recorded by [RW] Danny Beebe PTA      Cognitive Assessment/Intervention    Current Cognitive/Communication Assessment functional  -RW      Orientation Status oriented to;person  -RW      Follows Commands/Answers Questions 75% of the time  -RW      Personal Safety mild impairment  -RW      Personal Safety Interventions gait belt;nonskid shoes/slippers when out of bed  -RW      Recorded by [RW] Danny Beebe PTA      Transfer Assessment/Treatment    Transfers, Chair-Bed [place]  contact guard assist;verbal cues required  -RC     Transfers, Bed-Chair-Bed, Assist Device  rolling walker  -RC     Transfers, Sit-Stand [place] contact guard assist;verbal cues required  -RW      Transfers, Stand-Sit [place] contact guard assist;verbal cues required  -RW      Transfers, Sit-Stand-Sit, Assist Device rolling walker  -RW      Transfer, Comment sit to stand x 5  -RW      Recorded by [RW] Danny Beebe PTA [RC] Ally Kwong, MADRIGAL/L     Gait Assessment/Treatment    Gait, [place] Level contact guard assist;verbal cues required  -RW      Gait, Assistive Device rolling walker  -RW      Gait, Distance (Feet) 200   150' x 3  -RW      Gait, Impairments impaired balance  -RW      Recorded by [RW] Danny Beebe PTA      Wheelchair  Training/Management    Wheelchair, Distance Propelled 120  -RW      Wheelchair Training Comment min assist   -RW      Recorded by [RW] Danny Beebe PTA      Lower Body Dressing Assessment/Training    LB Dressing Assess/Train, Clothing Type  doffing:;shoes  -RC     LB Dressing Assess/Train, Position  sitting  -RC     LB Dressing Assess/Train, [place]  verbal cues required  -RC     Recorded by  [RC] ADRIANNA GomezA/L     Motor Skills/Interventions    Additional Documentation  --   , cone stack and pass, clothes pin tree  -RC     Recorded by  [RC] ADRIANNA GomezA/L     Therapy Exercises    Bilateral Lower Extremities AROM:;ankle pumps/circles;sitting;15 reps;hip abduction/adduction;knee flexion;hamstring stretch;hip flexion;mini squats;standing;heel raises   2 sets  -RW      BLE Resistance theraband  -RW      Bilateral Upper Extremity  --   ue bike 15 min multi vc's to stay on task  -RC     Recorded by [RW] Danny Beebe PTA [RC] ROHAN Gomez/L     Positioning and Restraints    Pre-Treatment Position sitting in chair/recliner  -RW sitting in chair/recliner  -RC     Post Treatment Position wheelchair  -RW bed  -RC     In Bed  encouraged to call for assist;call light within reach;notified nsg  -RC     In Wheelchair with OT  -RW      Recorded by [RW] Danny Beebe PTA [RC] ADRIANNA GomezA/POONAM       06/15/17 0905 06/15/17 0800       Rehab Assessment/Intervention    Discipline speech language pathologist  -EC physical therapy assistant  -RW     Document Type therapy note (daily note)  -EC therapy note (daily note)  -RW     Subjective Information agree to therapy;no complaints  -EC agree to therapy  -RW     Patient Effort, Rehab Treatment fair  -EC fair  -RW     Patient Response to Treatment  got dizzy after steps and needed to be lowered into wc. bp 103/59 hr 70  -RW     Recorded by [EC] Tamiko Couch CCC-SLP [RW] Danny Beebe PTA     Vital Signs    Intra Systolic BP Rehab  103   -RW     Intra Treatment Diastolic BP  59  -RW     Intratreatment Heart Rate (beats/min)  70  -RW     Intra SpO2 (%)  97  -RW     O2 Delivery Intra Treatment  room air  -RW     Post SpO2 (%)  92  -RW     O2 Delivery Post Treatment  room air  -RW     Recorded by  [RW] Danny Beebe PTA     Pain Assessment    Pain Assessment No/denies pain  -EC --   c/o back pain with transiton moves and gt  -RW     Pain Location  Back  -RW     Pain Orientation  Left;Lower  -RW     Pain Intervention(s)  Rest  -RW     Recorded by [EC] LILIAN LinSLP [RW] Danny Beebe PTA     Vision Assessment/Intervention    Visual Impairment  WFL with corrective lenses  -RW     Recorded by  [RW] Danny Beebe PTA     Cognitive Assessment/Intervention    Current Cognitive/Communication Assessment  impaired  -RW     Orientation Status  oriented to;person  -RW     Follows Commands/Answers Questions  50% of the time;needs cueing  -RW     Personal Safety  moderate impairment;mild impairment  -RW     Personal Safety Interventions  gait belt;nonskid shoes/slippers when out of bed  -RW     Recorded by  [RW] Danny Beebe PTA     Communication Treatment Objective and Progress    Receptive Language Treatment Objectives Improve ability to follow directions;Improve ability to comprehend questions  -EC      Expressive Treatment Objectives Improve word retrieval skills  -EC      Cognitive Linguistic Treatment Objectives Improve orientation;Improve memory skills  -EC      Recorded by [EC] OSIRIS Lin      Improve ability to follow directions    Improve ability to follow directions: two-step commands;80%;without cues  -EC      Status: Improve ability to follow directions Progress slower than expected  -EC      Ability to Follow Directions Progress 20%  -EC      Recorded by [EC] OSIRIS Lin      Improve ability to comprehend questions    Improve ability to comprehend questions: simple yes/no questions;simple general  questions;90%  -EC      Status: Improve ability to comprehend questions Progressing as expected  -EC      Ability to Comprehend Questions Progress 30%;50%  -EC      Recorded by [EC] OSIRIS Lin      Improve word retrieval skills    Improve word retrieval skills by: naming an object/pic;answer WH question with one word;completing a divergent task;completing a convergent task;90%  -EC      Status: Improve word retrieval skills Progressing as expected  -EC      Word Retrieval Skills Progress 50%  -EC      Recorded by [EC] OSIRIS Lin      Improve orientation    Improve orientation through: demonstrating orientation to day;demonstrating orientation to year;demonstrating orientation to place;80%  -EC      Status: Improve orientation through Progress slower than expected  -EC      Orientation Progress 30%  -EC      Recorded by [EC] OSIRIS Lin      Improve memory skills    Improve memory skills through: recall details of the day;50%  -EC      Status: Improve memory skills Progress slower than expected  -EC      Memory Skills Progress 50%  -EC      Recorded by [EC] OSIRIS Lin      Dysphagia Treatment Objectives and Progress    Dysphagia Treatment Objectives Other 1  -EC      Recorded by [EC] OSIRIS Lin      Dysphagia Other 1    Dysphagia Other 1 Objective Pt will safely tolerate recommended diet w/no overt s/s of aspiration.  -EC      Status: Dysphagia Other 1 Achieved  -EC      Dysphagia Other 1 Progress 90%;with consistent cues  -EC      Recorded by [EC] OSIRIS Lin      Transfer Assessment/Treatment    Transfers, Sit-Stand [place]  contact guard assist;verbal cues required;minimum assist (75% patient effort)  -RW     Transfers, Stand-Sit [place]  contact guard assist;verbal cues required  -RW     Transfers, Sit-Stand-Sit, Assist Device  rolling walker  -RW     Toilet Transfer, [place]  minimum assist (75%  patient effort)  -RW     Toilet Transfer, Assistive Device  rolling walker  -RW     Recorded by  [RW] Danny Beebe PTA     Gait Assessment/Treatment    Gait, [place] Level  contact guard assist;verbal cues required  -RW     Gait, Assistive Device  rolling walker  -RW     Gait, Distance (Feet)  150   70'  -RW     Gait, Impairments  impaired balance;motor control impaired  -RW     Recorded by  [RW] Danny Beebe PTA     Stairs Assessment/Treatment    Number of Stairs  4  -RW     Stairs, Handrail Location  both sides  -RW     Stairs, [place] Level  contact guard assist  -RW     Stairs, Assistive Device  --  -RW     Stairs, Comment  pt got dizzy and had to be lowered into her wc  -RW     Recorded by  [RW] Danny Beebe PTA     Wheelchair Training/Management    Wheelchair, Distance Propelled  100  -RW     Wheelchair Training Comment  min assist  -RW     Recorded by  [RW] Danny Beebe PTA     Lower Body Dressing Assessment/Training    LB Dressing Assess/Train, Clothing Type  --  -RW     Recorded by  [RW] Danny Beebe PTA     Balance Skills Training    Standing-Balance Activities  --  -RW     Recorded by  [RW] Danny Beebe PTA     Therapy Exercises    Bilateral Lower Extremities  AROM:;ankle pumps/circles;sitting;15 reps;hip abduction/adduction;knee flexion;LAQ   2 sets  -RW     BLE Resistance  theraband  -RW     Recorded by  [RESHMA] Danny Beebe PTA     Positioning and Restraints    Pre-Treatment Position  sitting in chair/recliner  -RW     Post Treatment Position  wheelchair  -RW     In Wheelchair  with SLP  -RW     Recorded by  [RW] Danny Beebe PTA       User Key  (r) = Recorded By, (t) = Taken By, (c) = Cosigned By    Initials Name Effective Dates    EC Tamiko Couch, CCC-SLP 12/30/16 -     EK Tamiko Whitten, [facility]SLP 04/06/17 -     MARLENE Rodas, PTA 10/17/16 -     RW Danny Beebe, PTA 10/17/16 -     TAHIR Kwong, MADRIGAL/L 10/17/16 -     KD Nathalia Irizarry,  MADRIGAL/L 10/17/16 -                 IP PT Goals       06/17/17 1559 06/16/17 1245 06/16/17 0816    Bed Mobility PT LTG    Bed Mobility PT LTG, Date Goal Reviewed 06/17/17  -RW (P)  06/16/17  -MARLENE 06/16/17  -MARLENE    Bed Mobility PT LTG, Outcome goal ongoing  -RW (P)  goal ongoing  -MARLENE goal ongoing  -MARLENE    Transfer Training PT LTG    Transfer Training PT  LTG, Date Goal Reviewed 06/17/17  -RW (P)  06/16/17  -MARLENE 06/16/17  -MARLENE    Transfer Training PT LTG, Outcome goal ongoing  -RW (P)  goal ongoing  -MARLENE goal ongoing  -MARLENE    Gait Training PT LTG    Gait Training Goal PT LTG, Date Goal Reviewed 06/17/17  -RW (P)  06/16/17  -MARLENE 06/16/17  -MARLENE    Gait Training Goal PT LTG, Outcome goal ongoing  -RW (P)  goal ongoing  -MARLENE goal ongoing  -MARLENE      06/15/17 1353 06/14/17 1608 06/13/17 1036    Bed Mobility PT LTG    Bed Mobility PT LTG, Date Established   06/13/17  -LM    Bed Mobility PT LTG, Time to Achieve   by discharge  -LM    Bed Mobility PT LTG, Activity Type   supine to sit/sit to supine  -LM    Bed Mobility PT LTG, [place] Level   supervision required  -LM    Bed Mobility PT LTG, Additional Goal   HOB flat; No BR's  -LM    Bed Mobility PT LTG, Date Goal Reviewed 06/15/17  -RW 06/14/17  -RW     Bed Mobility PT LTG, Outcome goal ongoing  -RW goal ongoing  -RW goal ongoing  -LM    Transfer Training PT LTG    Transfer Training PT LTG, Date Established   06/13/17  -LM    Transfer Training PT LTG, Time to Achieve   by discharge  -LM    Transfer Training PT LTG, Activity Type   bed to chair /chair to bed  -LM    Transfer Training PT LTG, [place] Level   supervision required  -LM    Transfer Training PT LTG, Assist Device   --   AAD  -LM    Transfer Training PT  LTG, Date Goal Reviewed 06/15/17  -RW 06/14/17  -RW     Transfer Training PT LTG, Outcome goal ongoing  -RW goal ongoing  -RW goal ongoing  -LM    Gait Training PT LTG    Gait Training Goal PT LTG, Date Established   06/13/17  -LM    Gait Training Goal PT LTG, Time to  Achieve   by discharge  -LM    Gait Training Goal PT LTG, [place] Level   supervision required  -LM    Gait Training Goal PT LTG, Assist Device   --   AAD  -LM    Gait Training Goal PT LTG, Distance to Achieve   150 feet  -LM    Gait Training Goal PT LTG, Date Goal Reviewed 06/15/17  -RW 06/14/17  -RW     Gait Training Goal PT LTG, Outcome goal ongoing  -RW goal ongoing  -RW goal ongoing  -LM    Stair Training PT LTG    Stair Training Goal PT LTG, Date Established   06/13/17  -LM    Stair Training Goal PT LTG, Time to Achieve   by discharge  -LM    Stair Training Goal PT LTG, Number of Steps   4 steps  -LM    Stair Training Goal PT LTG, [place] Level   contact guard assist  -LM    Stair Training Goal PT LTG, Assist Device   2 handrails  -LM    Stair Training Goal PT LTG, Date Goal Reviewed 06/15/17  -RW 06/14/17  -RW     Stair Training Goal PT LTG, Outcome goal met  -RW goal ongoing  -RW goal ongoing  -LM      06/11/17 1403 06/08/17 1110 06/07/17 1228    Bed Mobility PT LTG    Bed Mobility PT LTG, Date Goal Reviewed 06/11/17  -TW 06/08/17  -AM 06/07/17  -RW    Bed Mobility PT LTG, Outcome goal ongoing  -TW goal not met  -AM goal ongoing  -RW    Gait Training PT STG    Gait Training Goal PT STG, Date Goal Reviewed 06/11/17  -TW 06/08/17  -AM 06/07/17  -RW    Gait Training Goal PT STG, Outcome goal ongoing  -TW goal not met  -AM goal ongoing  -RW    Stair Training PT LTG    Stair Training Goal PT LTG, Date Goal Reviewed 06/11/17  -TW 06/08/17  -AM 06/07/17  -RW    Stair Training Goal PT LTG, Outcome goal ongoing  -TW goal not met  -AM goal ongoing  -RW      06/06/17 1325 06/05/17 1652       Bed Mobility PT LTG    Bed Mobility PT LTG, Date Established  06/05/17  -GB     Bed Mobility PT LTG, Time to Achieve  5 - 7 days  -GB     Bed Mobility PT LTG, Activity Type  all bed mobility  -GB     Bed Mobility PT LTG, [place] Level  conditional independence  -GB     Bed Mobility PT LTG, Date Goal Reviewed 06/06/17   -AM      Bed Mobility PT LTG, Outcome goal not met  -AM goal not met  -GB     Transfer Training PT LTG    Transfer Training PT LTG, Date Established  06/05/17  -GB     Transfer Training PT LTG, Time to Achieve  5 - 7 days  -GB     Transfer Training PT LTG, Activity Type  all transfers  -GB     Transfer Training PT LTG, [place] Level  contact guard assist  -GB     Transfer Training PT LTG, Assist Device  walker, rolling  -GB     Transfer Training PT  LTG, Date Goal Reviewed 06/06/17  -AM      Transfer Training PT LTG, Outcome goal met  -AM goal not met  -GB     Gait Training PT STG    Gait Training Goal PT STG, Date Established  06/05/17  -GB     Gait Training Goal PT STG, Time to Achieve  2 wks  -GB     Gait Training Goal PT STG, [place] Level  conditional independence  -GB     Gait Training Goal PT STG, Assist Device  walker, rolling  -GB     Gait Training Goal PT STG, Distance to Achieve  150 ft w/ RW and CGA x 1  -GB     Gait Training Goal PT STG, Additional Goal  300 ft w/ RW and SBA x 1   -GB     Gait Training Goal PT STG, Date Goal Reviewed 06/06/17  -AM      Gait Training Goal PT STG, Outcome goal not met  -AM goal not met  -GB     Stair Training PT LTG    Stair Training Goal PT LTG, Date Established  06/05/17  -GB     Stair Training Goal PT LTG, Time to Achieve  by discharge  -GB     Stair Training Goal PT LTG, [place] Level  conditional independence  -GB     Stair Training Goal PT LTG, Assist Device  walker, rolling  -GB     Stair Training Goal PT LTG, Distance to Achieve  up/down 1 step  -GB     Stair Training Goal PT LTG, Date Goal Reviewed 06/06/17  -AM      Stair Training Goal PT LTG, Outcome goal not met  -AM goal not met  -GB       User Key  (r) = Recorded By, (t) = Taken By, (c) = Cosigned By    Initials Name Provider Type    MADDY Kruse, PT Physical Therapist    LILIAN Matthews, PT Physical Therapist    MARLENE Rodas, PTA Physical Therapy Assistant    CHARLES MEREDITH  Daniel, PTA Physical Therapy Assistant     Franco Byers, PTA Physical Therapy Assistant    RW Danny Beebe, [facility] Physical Therapy Assistant          Physical Therapy Education     Title: PT OT SLP Therapies (Active)     Topic: Physical Therapy (Active)     Point: Mobility training (Active)    Learning Progress Summary    Learner Readiness Method Response Comment Documented by Status   Patient Acceptance E NR pt needs lots of directions and re-direct for most all activity.  06/17/17 1024 Active    Acceptance E NR  MARLENE 06/16/17 0913 Active    Acceptance E VU,NR reviewed benifits of oob and mobility with assistance.  06/14/17 1252 Done    Acceptance E NR Reviewed safety with transfers - however, pt unable to retain.  Pt's family will require education.  06/13/17 1436 Active               Point: Precautions (Done)    Learning Progress Summary    Learner Readiness Method Response Comment Documented by Status   Patient Acceptance E VU,NR reviewed benifits of oob and mobility with assistance.  06/14/17 1252 Done    Acceptance E NR Reviewed safety with transfers - however, pt unable to retain.  Pt's family will require education.  06/13/17 1436 Active                      User Key     Initials Effective Dates Name Provider Type Discipline     06/15/16 -  Megan Kruse, PT Physical Therapist PT     10/17/16 -  Jaspreet Rodas, PTA Physical Therapy Assistant PT     10/17/16 -  Danny Beebe, [facility] Physical Therapy Assistant PT                    PT Recommendation and Plan  Anticipated Equipment Needs At Discharge: front wheeled walker  Anticipated Discharge Disposition: home with 24/7 care, home with home health  Planned Therapy Interventions: balance training, bed mobility training, gait training, home exercise program, manual therapy techniques, motor coordination training, neuromuscular re-education, patient/family education, postural re-education, ROM (Range of Motion), stair training, strengthening,  stretching, transfer training, wheelchair management/propulsion training  PT Frequency: other (see comments) (5-14 times/wk)  Plan of Care Review  Plan Of Care Reviewed With: patient  Progress: progress toward functional goals is gradual  Outcome Summary/Follow up Plan: doing well with gt but needs consistant directions in all aspects of activity. cont with gt and therex          Outcome Measures       06/17/17 1000 06/17/17 0600 06/16/17 0955    How much help from another person do you currently need...    Turning from your back to your side while in flat bed without using bedrails? 3  -RW      Moving from lying on back to sitting on the side of a flat bed without bedrails? 3  -RW      Moving to and from a bed to a chair (including a wheelchair)? 3  -RW      Standing up from a chair using your arms (e.g., wheelchair, bedside chair)? 3  -RW      Climbing 3-5 steps with a railing? 3  -RW      To walk in hospital room? 3  -RW      AM-PAC 6 Clicks Score 18  -RW      How much help from another is currently needed...    Putting on and taking off regular lower body clothing?  3  -KD 3  -KD    Bathing (including washing, rinsing, and drying)  3  -KD 3  -KD    Toileting (which includes using toilet bed pan or urinal)  3  -KD 3  -KD    Putting on and taking off regular upper body clothing  4  -KD 3  -KD    Taking care of personal grooming (such as brushing teeth)  3  -KD 3  -KD    Eating meals  4  -KD 3  -KD    Score  20  -KD 18  -KD      06/16/17 0816 06/15/17 1341 06/15/17 0800    How much help from another person do you currently need...    Turning from your back to your side while in flat bed without using bedrails? 3  -MARLENE  3  -RW    Moving from lying on back to sitting on the side of a flat bed without bedrails? 3  -MARLENE  3  -RW    Moving to and from a bed to a chair (including a wheelchair)? 3  -MARLENE  3  -RW    Standing up from a chair using your arms (e.g., wheelchair, bedside chair)? 3  -MARLENE  3  -RW    Climbing 3-5 steps  with a railing? 3  -MARLENE  3  -RW    To walk in hospital room? 3  -MARLENE  3  -RW    AM-PAC 6 Clicks Score 18  -MARLENE  18  -RW    How much help from another is currently needed...    Putting on and taking off regular lower body clothing?  3  -RC     Bathing (including washing, rinsing, and drying)  3  -RC     Toileting (which includes using toilet bed pan or urinal)  3  -RC     Putting on and taking off regular upper body clothing  3  -RC     Taking care of personal grooming (such as brushing teeth)  3  -RC     Eating meals  3  -RC     Score  18  -RC     Functional Assessment    Outcome Measure Options AM-PAC 6 Clicks Basic Mobility (PT)  -MARLENE        User Key  (r) = Recorded By, (t) = Taken By, (c) = Cosigned By    Initials Name Provider Type    MARLENE Rodas PTA Physical Therapy Assistant    RESHMA Beebe PTA Physical Therapy Assistant    TAHIR Kwong, MADRIGAL/L Occupational Therapy Assistant    EVE Irizarry MADRIGAL/L Occupational Therapy Assistant           Time Calculation:         PT Charges       06/17/17 1601 06/17/17 1026       Time Calculation    Start Time 1525  -RW 0911  -RW     Stop Time 1554  -RW 1013  -RW     Time Calculation (min) 29 min  -RW 62 min  -RW     Time Calculation- PT    Total Timed Code Minutes- PT 29 minute(s)  -RW 62 minute(s)  -RW       User Key  (r) = Recorded By, (t) = Taken By, (c) = Cosigned By    Initials Name Provider Type    RESHMA Beebe PTA Physical Therapy Assistant          Therapy Charges for Today     Code Description Service Date Service Provider Modifiers Qty    96386884919 HC GAIT TRAINING EA 15 MIN 6/17/2017 Danny Beebe PTA GP 1    34237461115 HC PT THER PROC EA 15 MIN 6/17/2017 Danny Beebe PTA GP 1    57164364900 HC PT THERAPEUTIC ACT EA 15 MIN 6/17/2017 Danny Beebe PTA GP 2    30275224912 HC GAIT TRAINING EA 15 MIN 6/17/2017 Danny Beebe, PTA GP 1    74913593690 HC PT THER PROC EA 15 MIN 6/17/2017 Danny Beebe, PTA GP 1          PT  G-Codes  PT Professional Judgement Used?: Yes  Outcome Measure Options: AM-PAC 6 Clicks Basic Mobility (PT)  Score: 18  Functional Limitation: Mobility: Walking and moving around  Mobility: Walking and Moving Around Current Status (): At least 40 percent but less than 60 percent impaired, limited or restricted  Mobility: Walking and Moving Around Goal Status (): At least 20 percent but less than 40 percent impaired, limited or restricted    Danny Beebe, PTA  6/17/2017

## 2017-06-17 NOTE — PROGRESS NOTES
[facility] Medicine Services  INPATIENT PROGRESS NOTE    Length of Stay: 5  Date of Admission: 6/12/2017  Primary Care Physician: Melissa Ch MD    Subjective   Chief Complaint: Deconditioning, colon cancer s/p left hemicolectomy.  HPI:  88 year old female admitted to ARU for aggressive rehabilitation after hospitalization where she underwent left hemicolectomy for colon cancer.  She is participating with therapy.  Tolerating diet.  No new complaints.    Review of Systems   Constitutional: Negative for chills and fever.   Respiratory: Negative for shortness of breath.    Cardiovascular: Negative for chest pain and palpitations.   Gastrointestinal: Negative for abdominal pain, diarrhea, nausea and vomiting.      All pertinent negatives and positives are as above. All other systems have been reviewed and are negative unless otherwise stated.     Objective    Temp:  [96.3 °F (35.7 °C)-96.6 °F (35.9 °C)] 96.6 °F (35.9 °C)  Heart Rate:  [72-87] 72  Resp:  [18] 18  BP: (106-148)/(52-70) 122/57    Physical Exam   Constitutional: She appears well-developed and well-nourished.   HENT:   Head: Normocephalic and atraumatic.   Cardiovascular: Normal rate, regular rhythm and intact distal pulses.    Pulmonary/Chest: Effort normal and breath sounds normal. No respiratory distress.   Abdominal: Soft. Bowel sounds are normal. She exhibits no distension. There is no tenderness.   Musculoskeletal: Normal range of motion. She exhibits no edema.   Neurological: She is alert.   Skin: Skin is warm and dry. No erythema.   Vitals reviewed.      Results Review:  I have reviewed the labs, radiology results, and diagnostic studies.    Laboratory Data:     Results from last 7 days  Lab Units 06/17/17  0534 06/16/17  0536 06/15/17  1002  06/13/17  0533   SODIUM mmol/L 137 136* 138  < > 137   POTASSIUM mmol/L 4.4 3.9 3.8  < > 3.1*   CHLORIDE mmol/L 103 103 99  < > 97   TOTAL CO2 mmol/L 28.0  25.0 29.0  < > 28.0   BUN mg/dL 19 25* 27*  < > 18   CREATININE mg/dL 0.77 0.79 0.86  < > 0.97   GLUCOSE mg/dL 104* 131* 220*  < > 134*   CALCIUM mg/dL 8.9 8.6 8.7  < > 8.7   BILIRUBIN mg/dL  --   --   --   --  0.3   ALK PHOS U/L  --   --   --   --  50   ALT (SGPT) U/L  --   --   --   --  33   AST (SGOT) U/L  --   --   --   --  16   ANION GAP mmol/L 6.0 8.0 10.0  < > 12.0   < > = values in this interval not displayed.  Estimated Creatinine Clearance: 56.1 mL/min (by C-G formula based on Cr of 0.77).    Results from last 7 days  Lab Units 06/17/17  0534 06/16/17  0536 06/15/17  1002 06/14/17  0501 06/13/17  0533   MAGNESIUM mg/dL  --   --   --  2.0  --  1.9   PHOSPHORUS mg/dL 2.6 2.5 3.3 4.1  < >  --    < > = values in this interval not displayed.        Results from last 7 days  Lab Units 06/17/17  0534 06/16/17  0536 06/15/17  1002 06/14/17  0501 06/13/17  0533   WBC 10*3/mm3 5.21 7.31 8.23 6.47 5.46   HEMOGLOBIN g/dL 8.2* 9.4* 9.5* 8.8* 8.5*   HEMATOCRIT % 25.1* 28.5* 29.5* 27.3* 25.3*   PLATELETS 10*3/mm3 259 303 327 319 299           Culture Data:   No results found for: BLOODCX  No results found for: URINECX  No results found for: RESPCX  No results found for: WOUNDCX  No results found for: STOOLCX  No components found for: BODYFLD    Radiology Data:   Imaging Results (last 24 hours)     ** No results found for the last 24 hours. **          I have reviewed the patient current medications.     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

## 2017-06-17 NOTE — THERAPY TREATMENT NOTE
Inpatient Rehabilitation - Occupational Therapy Treatment Note  [facility]     Patient Name: Kierra Scales  : 1929  MRN: [id number]  Today's Date: 2017  Onset of Illness/Injury or Date of Surgery Date: 17  Date of Referral to OT: 17  Referring Physician: Dr. Lucia      Admit Date: 2017    Visit Dx:     ICD-10-CM ICD-9-CM   1. Oral phase dysphagia R13.11 787.21   2. Impaired mobility and ADLs Z74.09 799.89   3. Muscle weakness (generalized) M62.81 728.87   4. Abnormality of gait and mobility R26.9 781.2   5. 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)   • Hypothyroidism   • Dyspnea   • Hyperthyroidism   • Gastrointestinal hemorrhage with melena   • Type 2 diabetes mellitus with hyperglycemia   • Colon cancer   • Chronic blood loss anemia   • Physical deconditioning   • Senile dementia, uncomplicated   • Encounter for rehabilitation   • Adenocarcinoma of sigmoid colon   • Hypokalemia             Adult Rehabilitation Note       17 0600 17 1245 17 1055    Rehab Assessment/Intervention    Discipline occupational therapy assistant  -KD physical therapy assistant  -MARLENE occupational therapy assistant  -KD    Document Type therapy note (daily note)  -KD therapy note (daily note)  -MARLENE therapy note (daily note)  -KD    Subjective Information agree to therapy  -KD agree to therapy  -MARLENE agree to therapy  -KD    Patient Effort, Rehab Treatment  fair  -MARLENE     Precautions/Limitations  fall precautions;other (see comments)   gait belt placement 2* incision  -MARLENE     Recorded by [KD] ROHAN Garcia/POONAM [MARLENE] Jaspreet Rodas PTA [KD] ROHAN Garcia/L    Vital Signs     Pretreatment Heart Rate (beats/min)  79  -MARLENE 82  -KD    Posttreatment Heart Rate (beats/min)  78  -MARLENE 77  -KD    Pre SpO2 (%)  98  -MARLENE 97  -KD    O2 Delivery Pre Treatment  room air  -MARLENE room air  -KD    Post SpO2 (%)  98  -MARLENE 98  -KD    O2 Delivery Post Treatment  room air  -MARLENE room air  -KD    Pre Patient Position Sitting  -KD Sitting  -MARLENE Sitting  -KD    Intra Patient Position Standing  -KD  Standing  -KD    Post Patient Position Sitting  -KD Sitting  -MARLENE Sitting  -KD    Recorded by [KD] Nathalia Irizarry MADRIGAL/L [MARLENE] Jaspreet Rodas PTA [KD] Nathalia Irizarry MADRIGAL/L    Pain Assessment    Pain Assessment No/denies pain  -KD No/denies pain  -MARLENE No/denies pain  -KD    Recorded by [KD] ADRIANNA GarciaA/L [MARLENE] Jaspreet Rodas PTA [KD] Nathalia Irizarry MADRIGAL/L    Vision Assessment/Intervention    Visual Impairment  WFL with corrective lenses  -MARLENE     Recorded by  [MARLENE] Jaspreet Rodas PTA     Cognitive Assessment/Intervention    Current Cognitive/Communication Assessment impaired  -KD impaired  -MARLENE impaired  -KD    Orientation Status oriented to;person     -KD oriented to;person  -MARLENE person  -KD    Follows Commands/Answers Questions  50% of the time  -MARLENE 50% of the time  -KD    Personal Safety Interventions  gait belt;muscle strengthening facilitated;nonskid shoes/slippers when out of bed;supervised activity  -MARLENE     Problem Solving ADL/Func. Task with min verbal cues   25 pc shape sorter  -KD  with max verbal cues  -KD    Long Term Memory Interventions   --   25 pc shape sorter  -KD    Recorded by [KD] ADRIANNA GarciaA/L [MARLENE] Jaspreet Rodas, PTA [KD] ADRIANNA GarciaA/L    Improve ability to follow directions    Comments: Improve ability to follow directions   Pt was unable to tell MADRIGAL what she would do in case of a fire  -KD    Recorded by   [KD] ADRIANNA GarciaA/L    Improve memory skills    Comments: Improve memory skills   +Pt unable to recall full address  -KD    Recorded by   [KD] Nathalia Irizarry  MADRIGAL/L    Bed Mobility, Assessment/Treatment    Bed Mobility, Roll Left, [place]  not tested  -MARLENE     Bed Mobility, Roll Right, [place]   supervision required  -KD    Bed Mobility, Scoot/Bridge, [place]   not tested  -KD    Bed Mob, Supine to Sit, [place]   contact guard assist  -KD    Bed Mob, Sit to Supine, [place]   not tested  -KD    Bed Mob, Sidelying to Sit, [place]   supervision required  -KD    Bed Mob, Sit to Sidelying, [place]   not tested  -KD    Bed Mobility, Safety Issues   decreased use of arms for pushing/pulling  -KD    Recorded by  [MARLENE] Jaspreet Rodas, PTA [KD] Nathalia Irizarry, MADRIGAL/L    Transfer Assessment/Treatment    Transfers, Bed-Chair [place]   contact guard assist  -KD    Transfers, Chair-Bed [place]   contact guard assist  -KD    Transfers, Bed-Chair-Bed, Assist Device   rolling walker  -KD    Transfers, Sit-Stand [place] contact guard assist  -KD contact guard assist  -MARLENE contact guard assist  -KD    Transfers, Stand-Sit [place] contact guard assist  -KD contact guard assist  -MARLENE contact guard assist  -KD    Transfers, Sit-Stand-Sit, Assist Device rolling walker  -KD rolling walker  -MARLENE rolling walker  -KD    Toilet Transfer, [place] minimum assist (75% patient effort)  -KD minimum assist (75% patient effort)  -MARLENE     Toilet Transfer, Assistive Device  rolling walker;bedside commode with drop arms  -MARLENE     Transfer, Comment  pt completed own pericare. requires close supervision and cueing to wash hands and cueing to turn water off.   -MARLENE     Recorded by [KD] Nathalia Irizarry, MADRIGAL/POONAM [MARLENE] Jaspreet Rodas, PTA [KD] Nathalia Irizarry, MADRIGAL/L    Gait Assessment/Treatment    Gait, [place] Level  contact guard assist  -MARLENE     Gait, Assistive Device  rolling walker  -MARLENE     Gait, Distance (Feet)  130  -MARLENE     Gait, Gait Deviations  timothy decreased  -MARLENE     Gait, Safety Issues  step length decreased  -MARLENE     Recorded by  [MARLENE] Jaspreet KUMAR  Clark, PTA     Functional Mobility    Functional Mobility- Ind. Level contact guard assist  -KD      Functional Mobility- Device rolling walker  -KD      Functional Mobility-Distance (Feet) --   100  -KD      Recorded by [KD] JOSÉ MIGUEL Garcia      Wheelchair Training/Management    Wheelchair, Distance Propelled --   100  -KD  --   50' with VC's  -KD    Wheelchair Training Comment sba  -KD      Recorded by [KD] JOSÉ MIGUEL Garcia  [KD] JOSÉ MIGUEL Garcia    Upper Body Bathing Assessment/Training    UB Bathing Assess/Train Assistive Device   bath mitt  -KD    UB Bathing Assess/Train, Position   edge of bed  -KD    UB Bathing Assess/Train, [place] Level   supervision required  -KD    Recorded by   [KD] JOSÉ MIGUEL Garcia    Lower Body Bathing Assessment/Training    LB Bathing Assess/Train Assistive Device   bath mitt  -KD    LB Bathing Assess/Train, Position   sitting;standing  -KD    LB Bathing Assess/Train, [place] Level   verbal cues required;contact guard assist  -KD    Recorded by   [KD] JOSÉ MIGUEL Garcia    Upper Body Dressing Assessment/Training    UB Dressing Assess/Train, Clothing Type   doffing:;donning:;hospital gown;t-shirt  -KD    UB Dressing Assess/Train, Position   sitting;standing  -KD    UB Dressing Assess/Train, [place]   supervision required  -KD    Recorded by   [KD] JOSÉ MIGUEL Garcia    Lower Body Dressing Assessment/Training    LB Dressing Assess/Train, Clothing Type donning:;shoes  -KD  doffing:;donning:;pants;shoes;socks  -KD    LB Dressing Assess/Train, Position sitting  -KD  edge of bed;sitting;standing  -KD    LB Dressing Assess/Train, [place] set up required  -KD  contact guard assist;verbal cues required  -KD    Recorded by [KD] JOSÉ MIGUEL Garcia  [KD] JOSÉ MIGUEL Garcia    Grooming Assessment/Training    Grooming Assess/Train, Assistive Device --   oral care-comb hair- wash face  -KD  --   wash mitt  -KD    Grooming Assess/Train,  Position sink side;sitting  -KD  sitting  -KD    Grooming Assess/Train, Indepen Level set up required  -KD  set up required  -KD    Recorded by [KD] JOSÉ MIGUEL Garcia  [KD] ROHAN Garcia/L    Balance Skills Training    Standing-Level of Assistance Contact guard  -KD Minimum assistance  -MARLENE     Standing-Balance Activities --   static  -KD Compliant surfaces  -MARLENE     Standing Balance # of Minutes 8  -KD 5  -MARLENE     Gait Balance-Level of Assistance  Minimum assistance  -MARLENE     Gait Balance Support  parallel bars  -MARLENE     Gait Balance Activities  side-stepping;backwards;tandem  -MARLENE     Gait Balance # of Minutes  20  -MARLENE     Recorded by [KD] ROHAN Garcia/POONAM [MARLENE] Jaspreet Rodas PTA     Therapy Exercises    Bilateral Upper Extremity --   UEE x 10 mins w/ RB's  -KD      Recorded by [KD] ROHAN Garcia/L      Positioning and Restraints    Pre-Treatment Position sitting in chair/recliner  -KD sitting in chair/recliner  -MARLENE in bed  -KD    Post Treatment Position wheelchair  -KD wheelchair  -MARLENE bed  -KD    In Bed   fowlers;call light within reach;encouraged to call for assist  -KD    In Wheelchair sitting   eating b'fast @ nsg station  -KD notified nsg;sitting;call light within reach;encouraged to call for assist;exit alarm on   all needs met  -MARLENE     Recorded by [KD] ROHAN Garcia/POONAM [MARLENE] Jaspreet Rodas PTA [KD] JOSÉ MIGUEL Garcia      06/16/17 0900 06/16/17 0816 06/15/17 1341    Rehab Assessment/Intervention    Discipline speech language pathologist  -EC physical therapy assistant  -MARLENE occupational therapy assistant  -RC    Document Type therapy note (daily note)  -EC therapy note (daily note)  -MARLENE therapy note (daily note)  -RC    Subjective Information agree to therapy;no complaints  -EC agree to therapy  -MARLENE agree to therapy  -RC    Patient Effort, Rehab Treatment fair  -EC good  -MARLENE good  -RC    Precautions/Limitations  fall precautions  -MARLENE     Precautions/Limitations, Vision  other  (see comments)   gait belt placement 2* incision.  -MARLENE     Recorded by [EC] Tamiko Couch CCC-SLP [MARLENE] Jaspreet Rodas PTA [RC] ADRIANNA GomezA/L    Vital Signs    Pre Systolic BP Rehab  121  -MARLENE     Pre Treatment Diastolic BP  57  -MARLENE     Pretreatment Heart Rate (beats/min)  79  -MARLENE     Intratreatment Heart Rate (beats/min)  90  -MARLENE     Posttreatment Heart Rate (beats/min)  77  -MARLENE     Pre SpO2 (%)  97  -MARLENE     O2 Delivery Pre Treatment  room air  -MARLENE     Intra SpO2 (%)  97  -MARLENE     O2 Delivery Intra Treatment  room air  -MARLENE     Post SpO2 (%)  98  -MARLENE     O2 Delivery Post Treatment  room air  -MARLENE     Pre Patient Position  Sitting  -MARLENE     Post Patient Position  Sitting  -MARLENE     Recorded by  [MARLENE] Jaspreet Rodas PTA     Pain Assessment    Pain Assessment No/denies pain  -EC No/denies pain  -MARLENE No/denies pain  -RC    Recorded by [EC] Tamiko Couch CCC-SLP [MARLENE] Jaspreet Rodas PTA [RC] ADRIANNA GomezA/L    Vision Assessment/Intervention    Visual Impairment  WFL with corrective lenses  -MARLENE     Recorded by  [MARLENE] Jaspreet Rodas PTA     Cognitive Assessment/Intervention    Current Cognitive/Communication Assessment  impaired  -MARLENE impaired  -RC    Orientation Status  person  -MARLENE person  -RC    Follows Commands/Answers Questions  75% of the time  -MARLENE     Personal Safety Interventions  gait belt;muscle strengthening facilitated;nonskid shoes/slippers when out of bed;supervised activity  -MARLENE     Recorded by  [MARLENE] Jaspreet Rodas PTA [RC] ADRIANNA GomezA/POONAM    Communication Treatment Objective and Progress    Receptive Language Treatment Objectives Improve ability to follow directions;Improve ability to comprehend questions  -EC      Expressive Treatment Objectives Improve word retrieval skills  -EC      Cognitive Linguistic Treatment Objectives Improve orientation;Improve memory skills  -EC      Recorded by [EC] Tamiko Couch CCC-SLP      Improve ability to follow directions     Improve ability to follow directions: one-step directions without objects  -EC      Status: Improve ability to follow directions Progress slower than expected  -EC      Ability to Follow Directions Progress 20%  -EC      Recorded by [EC] OSIRIS Lin      Improve ability to comprehend questions    Improve ability to comprehend questions: simple yes/no questions;simple general questions;90%  -EC      Status: Improve ability to comprehend questions Progressing as expected  -EC      Ability to Comprehend Questions Progress 30%;50%  -EC      Recorded by [EC] OSIRIS Lin      Improve word retrieval skills    Improve word retrieval skills by: naming an object/pic;answer WH question with one word;completing a divergent task;completing a convergent task;90%  -EC      Status: Improve word retrieval skills Progressing as expected  -EC      Word Retrieval Skills Progress 50%  -EC      Recorded by [EC] OSIRIS Lin      Improve orientation    Improve orientation through: demonstrating orientation to day;demonstrating orientation to year;demonstrating orientation to place;80%  -EC      Status: Improve orientation through Progress slower than expected  -EC      Orientation Progress 30%  -EC      Recorded by [EC] OSIRIS Lin      Improve memory skills    Improve memory skills through: recall details of the day;50%  -EC      Status: Improve memory skills Progress slower than expected  -EC      Memory Skills Progress 50%  -EC      Recorded by [EC] OSIRIS Lin      Bed Mobility, Assessment/Treatment    Bed Mobility, Roll Left, [place]  not tested  -MARLENE     Recorded by  [MARLENE] Jaspreet Rodas, PTA     Transfer Assessment/Treatment    Transfers, Sit-Stand [place]  contact guard assist  -MARLENE     Transfers, Stand-Sit [place]  contact guard assist  -MARLENE     Transfers, Sit-Stand-Sit, Assist Device  rolling walker  -MARLENE     Toilet Transfer, [place]   contact guard assist  -MARLENE contact guard assist  -RC    Toilet Transfer, Assistive Device  rolling walker  -MARLENE     Transfer, Comment  pt completed sit-stands x5.   -MARLENE     Recorded by  [MARLENE] Jaspreet Rodas PTA [RC] ADRIANNA GomezA/L    Gait Assessment/Treatment    Gait, [place] Level  contact guard assist  -MARLENE     Gait, Assistive Device  rolling walker  -MARLENE     Gait, Distance (Feet)  242   40 ft  -MARLENE     Gait, Gait Deviations  timothy decreased  -MARLENE     Gait, Comment  pt required some encouragement to ambulate  -MARLENE     Recorded by  [MARLENE] Jaspreet Rodas PTA     Upper Body Bathing Assessment/Training    UB Bathing Assess/Train, Position   sitting;sink side  -RC    UB Bathing Assess/Train, [place] Level   supervision required;verbal cues required  -RC    UB Bathing Assess/Train, Comment   pt requires vc's to complete task, pt pulled IV out   -RC    Recorded by   [RC] Ally Kwong MADRIGAL/L    Lower Body Bathing Assessment/Training    LB Bathing Assess/Train, Position   sink side;sitting;standing  -RC    LB Bathing Assess/Train, [place] Level   verbal cues required;contact guard assist  -RC    Recorded by   [RC] ADRIANNA GomezA/L    Upper Body Dressing Assessment/Training    UB Dressing Assess/Train, Clothing Type   donning:;doffing:;pull over  -RC    UB Dressing Assess/Train, Position   sitting  -RC    UB Dressing Assess/Train, [place]   supervision required  -RC    Recorded by   [RC] Ally Kwong MADRIGAL/L    Lower Body Dressing Assessment/Training    LB Dressing Assess/Train, Clothing Type   doffing:;donning:;pants;shoes;shorts  -RC    LB Dressing Assess/Train, Position   sitting;standing  -RC    LB Dressing Assess/Train, [place]   verbal cues required;contact guard assist  -RC    Recorded by   [RC] Ally Kwong MADRIGAL/L    Toileting Assessment/Training    Toileting Assess/Train, Assistive Device   grab bars  -RC    Toileting Assess/Train, Position   sitting;standing  -RC     Toileting Assess/Train, Indepen Level   contact guard assist  -RC    Recorded by   [RC] ADRIANNA GomezA/L    Grooming Assessment/Training    Grooming Assess/Train, Position   sitting  -RC    Grooming Assess/Train, [place] Level   supervision required  -RC    Recorded by   [RC] ADRIANNA GomezA/L    Therapy Exercises    Bilateral Lower Extremities  15 reps;sitting;hip flexion;hip abduction/adduction   dorsiflexion and hamstring curls 15x2 red tband  -MARLENE     BLE Resistance  theraband   lvl 2  -MARLENE     Recorded by  [MARLENE] Jaspreet Rodas PTA     Positioning and Restraints    Pre-Treatment Position  sitting in chair/recliner  -MARLENE sitting in chair/recliner  -RC    Post Treatment Position  wheelchair  -MARLENE wheelchair  -RC    In Bed   notified nsg;encouraged to call for assist   in nursing area  -RC    In Wheelchair  sitting   w/ SLP  -MARLENE     Recorded by  [MARLENE] Jaspreet Rodas PTA [RC] ADRIANNA GomezA/POONAM      06/15/17 1301 06/15/17 0950 06/15/17 0945    Rehab Assessment/Intervention    Discipline physical therapy assistant  -RW occupational therapy assistant  -RC     Document Type therapy note (daily note)  -RW therapy note (daily note)  -RC     Subjective Information agree to therapy  -RW agree to therapy  -RC     Patient Effort, Rehab Treatment good  -RW fair  -RC     Recorded by [RW] Danny Beebe PTA [RC] ADRIANNA GomezA/L     Vital Signs    Pre Systolic BP Rehab  102  -RC --  -RC    Pre Treatment Diastolic BP  50  -RC --  -RC    Post Systolic BP Rehab  117  -RC --  -RC    Post Treatment Diastolic BP  55  -RC --  -RC    Recorded by  [RC] ADRIANNA GomezA/POONAM [RC] Ally Kwong MADRIGAL/POONAM    Pain Assessment    Pain Assessment No/denies pain  -RW      Pain Type  Chronic pain  -RC     Pain Location  Back  -RC     Pain Intervention(s)  Rest  -RC     Recorded by [RW] Danny Beebe PTA [RC] ADRIANNA GomezA/L     Vision Assessment/Intervention    Visual Impairment WFL with corrective lenses  -RW       Recorded by [RW] Danny Beebe PTA      Cognitive Assessment/Intervention    Current Cognitive/Communication Assessment functional  -RW      Orientation Status oriented to;person  -RW      Follows Commands/Answers Questions 75% of the time  -RW      Personal Safety mild impairment  -RW      Personal Safety Interventions gait belt;nonskid shoes/slippers when out of bed  -RW      Recorded by [RW] Danny Beebe PTA      Transfer Assessment/Treatment    Transfers, Chair-Bed [place]  contact guard assist;verbal cues required  -RC     Transfers, Bed-Chair-Bed, Assist Device  rolling walker  -RC     Transfers, Sit-Stand [place] contact guard assist;verbal cues required  -RW      Transfers, Stand-Sit [place] contact guard assist;verbal cues required  -RW      Transfers, Sit-Stand-Sit, Assist Device rolling walker  -RW      Transfer, Comment sit to stand x 5  -RW      Recorded by [RW] Danny Beebe PTA [RC] ADRIANNA GomezA/L     Gait Assessment/Treatment    Gait, [place] Level contact guard assist;verbal cues required  -RW      Gait, Assistive Device rolling walker  -RW      Gait, Distance (Feet) 200   150' x 3  -RW      Gait, Impairments impaired balance  -RW      Recorded by [RW] Danny Beebe PTA      Wheelchair Training/Management    Wheelchair, Distance Propelled 120  -RW      Wheelchair Training Comment min assist   -RW      Recorded by [RW] Danny Beebe PTA      Lower Body Dressing Assessment/Training    LB Dressing Assess/Train, Clothing Type  doffing:;shoes  -RC     LB Dressing Assess/Train, Position  sitting  -RC     LB Dressing Assess/Train, [place]  verbal cues required  -RC     Recorded by  [RC] ADRIANNA GomezA/L     Motor Skills/Interventions    Additional Documentation  --   , cone stack and pass, clothes pin tree  -RC     Recorded by  [RC] ADRIANNA GomezA/L     Therapy Exercises    Bilateral Lower Extremities AROM:;ankle pumps/circles;sitting;15  reps;hip abduction/adduction;knee flexion;hamstring stretch;hip flexion;mini squats;standing;heel raises   2 sets  -RW      BLE Resistance theraband  -RW      Bilateral Upper Extremity  --   ue bike 15 min multi vc's to stay on task  -RC     Recorded by [RW] Danny Beebe PTA [RC] Ally Kwong MADRIGAL/L     Positioning and Restraints    Pre-Treatment Position sitting in chair/recliner  -RW sitting in chair/recliner  -RC     Post Treatment Position wheelchair  -RW bed  -RC     In Bed  encouraged to call for assist;call light within reach;notified nsg  -RC     In Wheelchair with OT  -RW      Recorded by [RW] Danny Beebe PTA [RC] Ally Kwong, MADRIGAL/L       06/15/17 0905 06/15/17 0800 06/14/17 1454    Rehab Assessment/Intervention    Discipline speech language pathologist  -EC physical therapy assistant  -RW physical therapy assistant  -RW    Document Type therapy note (daily note)  -EC therapy note (daily note)  -RW therapy note (daily note)  -RW    Subjective Information agree to therapy;no complaints  -EC agree to therapy  -RW agree to therapy  -RW    Patient Effort, Rehab Treatment fair  -EC fair  -RW fair  -RW    Patient Response to Treatment  got dizzy after steps and needed to be lowered into wc. bp 103/59 hr 70  -RW c/o back hurting and tired  -RW    Recorded by [EC] Tamiko Couch, GURMEET-SLP [RW] Danny Beebe PTA [RW] Danny Beebe PTA    Vital Signs    Intra Systolic BP Rehab  103  -RW     Intra Treatment Diastolic BP  59  -RW     Intratreatment Heart Rate (beats/min)  70  -RW     Intra SpO2 (%)  97  -RW     O2 Delivery Intra Treatment  room air  -RW     Post SpO2 (%)  92  -RW     O2 Delivery Post Treatment  room air  -RW     Recorded by  [RW] Danny Beebe PTA     Pain Assessment    Pain Assessment No/denies pain  -EC --   c/o back pain with transiton moves and gt  -RW     Jason-Batista FACES Pain Rating   --   back hurts  -RW    Pain Location  Back  -RW Back  -RW    Pain Orientation   Left;Lower  -RW Left;Lower  -RW    Pain Intervention(s)  Rest  -RW Rest  -RW    Recorded by [EC] Tamiko Couch CCC-SLP [RW] Danny Beebe PTA [RW] Danny Beebe PTA    Vision Assessment/Intervention    Visual Impairment  WFL with corrective lenses  -RW WFL with corrective lenses  -RW    Recorded by  [RW] Danny Beebe PTA [RW] Danny Beebe PTA    Cognitive Assessment/Intervention    Current Cognitive/Communication Assessment  impaired  -RW impaired  -RW    Orientation Status  oriented to;person  -RW oriented to;person  -RW    Follows Commands/Answers Questions  50% of the time;needs cueing  -RW 50% of the time;needs cueing  -RW    Personal Safety  moderate impairment;mild impairment  -RW     Personal Safety Interventions  gait belt;nonskid shoes/slippers when out of bed  -RW     Recorded by  [RW] Danny Beebe PTA [RW] Danny Beebe PTA    Communication Treatment Objective and Progress    Receptive Language Treatment Objectives Improve ability to follow directions;Improve ability to comprehend questions  -EC      Expressive Treatment Objectives Improve word retrieval skills  -EC      Cognitive Linguistic Treatment Objectives Improve orientation;Improve memory skills  -EC      Recorded by [EC] Tamiko Couch CCC-SLP      Improve ability to follow directions    Improve ability to follow directions: two-step commands;80%;without cues  -EC      Status: Improve ability to follow directions Progress slower than expected  -EC      Ability to Follow Directions Progress 20%  -EC      Recorded by [EC] Tamiko Couch CCC-SLP      Improve ability to comprehend questions    Improve ability to comprehend questions: simple yes/no questions;simple general questions;90%  -EC      Status: Improve ability to comprehend questions Progressing as expected  -EC      Ability to Comprehend Questions Progress 30%;50%  -EC      Recorded by [EC] Tamiko Couch CCC-SLP      Improve word retrieval skills     Improve word retrieval skills by: naming an object/pic;answer WH question with one word;completing a divergent task;completing a convergent task;90%  -EC      Status: Improve word retrieval skills Progressing as expected  -EC      Word Retrieval Skills Progress 50%  -EC      Recorded by [EC] OSIRIS Lin      Improve orientation    Improve orientation through: demonstrating orientation to day;demonstrating orientation to year;demonstrating orientation to place;80%  -EC      Status: Improve orientation through Progress slower than expected  -EC      Orientation Progress 30%  -EC      Recorded by [EC] OSIRIS Lin      Improve memory skills    Improve memory skills through: recall details of the day;50%  -EC      Status: Improve memory skills Progress slower than expected  -EC      Memory Skills Progress 50%  -EC      Recorded by [EC] OSIRIS Lin      Dysphagia Treatment Objectives and Progress    Dysphagia Treatment Objectives Other 1  -EC      Recorded by [EC] OSIRIS Lin      Dysphagia Other 1    Dysphagia Other 1 Objective Pt will safely tolerate recommended diet w/no overt s/s of aspiration.  -EC      Status: Dysphagia Other 1 Achieved  -EC      Dysphagia Other 1 Progress 90%;with consistent cues  -EC      Recorded by [EC] OSIRIS Lin      Transfer Assessment/Treatment    Transfers, Sit-Stand [place]  contact guard assist;verbal cues required;minimum assist (75% patient effort)  -RW contact guard assist;verbal cues required;minimum assist (75% patient effort)  -RW    Transfers, Stand-Sit [place]  contact guard assist;verbal cues required  -RW contact guard assist;verbal cues required  -RW    Transfers, Sit-Stand-Sit, Assist Device  rolling walker  -RW rolling walker  -RW    Toilet Transfer, [place]  minimum assist (75% patient effort)  -RW     Toilet Transfer, Assistive Device  rolling walker  -RW     Recorded by  [RW]  Danny Beebe PTA [RW] Danny Beebe PTA    Gait Assessment/Treatment    Gait, [place] Level  contact guard assist;verbal cues required  -RW contact guard assist;verbal cues required  -RW    Gait, Assistive Device  rolling walker  -RW rolling walker  -RW    Gait, Distance (Feet)  150   70'  -  -RW    Gait, Impairments  impaired balance;motor control impaired  -RW impaired balance;motor control impaired  -RW    Gait, Comment   needed alot encouragement  -RW    Recorded by  [RW] Danny Beebe PTA [RW] Danny Beebe PTA    Stairs Assessment/Treatment    Number of Stairs  4  -RW curb  -RW    Stairs, Handrail Location  both sides  -RW     Stairs, [place] Level  contact guard assist  -RW minimum assist (75% patient effort)  -RW    Stairs, Assistive Device  --  -RW walker  -RW    Stairs, Comment  pt got dizzy and had to be lowered into her wc  -RW     Recorded by  [RW] Danny Beebe PTA [RW] Danny Beebe PTA    Wheelchair Training/Management    Wheelchair, Distance Propelled  100  -RW     Wheelchair Training Comment  min assist  -RW     Recorded by  [RW] Danny Beebe PTA     Lower Body Dressing Assessment/Training    LB Dressing Assess/Train, Clothing Type  --  -RW donning:;shoes  -RW    Recorded by  [RW] Danny Beebe PTA [RW] Danny Beebe PTA    Balance Skills Training    Standing-Balance Activities  --  -RW --   static stance at windows searching for daughter outside.  -RW    Recorded by  [RW] Danny Beebe PTA [RW] Danny Beebe PTA    Therapy Exercises    Bilateral Lower Extremities  AROM:;ankle pumps/circles;sitting;15 reps;hip abduction/adduction;knee flexion;LAQ   2 sets  -RW AROM:;ankle pumps/circles;10 reps;sitting;hip flexion   2 sets in part  -RW    BLE Resistance  theraband  -RW     Recorded by  [RW] Danny Beebe PTA [RW] Danny Beebe PTA    Positioning and Restraints    Pre-Treatment Position  sitting in chair/recliner  -RW sitting in chair/recliner  -RW    Post  Treatment Position  wheelchair  -RW wheelchair  -RW    In Bed   patient within staff view  -RW    In Wheelchair  with SLP  -RW     Recorded by  [RW] Danny Beebe PTA [RW] Danny Beebe PTA      06/14/17 1205 06/14/17 1106 06/14/17 0905    Rehab Assessment/Intervention    Discipline speech language pathologist  -CK physical therapy assistant  -RW occupational therapy assistant  -RC    Document Type therapy note (daily note)  -CK therapy note (daily note)  -RW therapy note (daily note)  -RC    Subjective Information agree to therapy  -CK agree to therapy  -RW agree to therapy  -RC    Patient Effort, Rehab Treatment adequate  -CK adequate  -RW good  -RC    Patient Response to Treatment  needed alot of encouragement and daughter assisted  -RW     Recorded by [CK] Michelle Whitten MS CCC-SLP [RW] Danny Beebe PTA [RC] ADRIANNA GomezA/L    Pain Assessment    Pain Assessment No/denies pain  -CK      Pain Score 0  -CK      Post Pain Score 0  -CK unable to assess  -RW unable to assess  -RC    Pain Type  Chronic pain  -RW Chronic pain  -RC    Pain Location  Back  -RW Back  -RC    Pain Orientation  Left;Lower  -RW Lower  -RC    Pain Intervention(s)   Rest  -RC    Recorded by [CK] Michelle Whitten MS CCC-SLP [RW] Danny Beebe PTA [RC] ADRIANNA GomezA/L    Vision Assessment/Intervention    Visual Impairment  WFL with corrective lenses  -RW     Recorded by  [RW] Danny Beebe PTA     Cognitive Assessment/Intervention    Current Cognitive/Communication Assessment  impaired  -RW impaired  -RC    Orientation Status  oriented to;person;place;disoriented to;time;situation  -RW person  -RC    Recorded by  [RW] Danny Beebe PTA [RC] ADRIANNA GomezA/L    Dysphagia Treatment Objectives and Progress    Dysphagia Treatment Objectives Other 1  -CK      Recorded by [CK] Michelle Whitten MS CCC-SLP      Dysphagia Other 1    Dysphagia Other 1 Objective Pt will safely tolerate recommended diet w/no overt  s/s of aspiration.  -CK      Status: Dysphagia Other 1 Progressing as expected  -CK      Dysphagia Other 1 Progress 90%;continue to address  -CK      Comments: Dysphagia Other 1 Pt safely tolerated regular solids/thin liquids.  Pt did require initiation to cut and eat meat.  -CK      Recorded by [CK] Michelle Whitten MS CCC-SLP      Bed Mobility, Assessment/Treatment    Bed Mobility, Assistive Device  bed rails;head of bed elevated  -RW     Bed Mob, Supine to Sit, [place]  supervision required  -RW     Bed Mob, Sit to Supine, [place]   supervision required  -RC    Recorded by  [RW] Danny Beebe PTA [RC] ADRIANNA GomezA/L    Transfer Assessment/Treatment    Transfers, Chair-Bed [place]   contact guard assist  -RC    Transfers, Bed-Chair-Bed, Assist Device   rolling walker  -RC    Transfers, Sit-Stand [place]  contact guard assist;verbal cues required  -RW     Transfers, Stand-Sit [place]  contact guard assist;verbal cues required  -RW     Transfers, Sit-Stand-Sit, Assist Device  rolling walker  -RW     Toilet Transfer, [place]  contact guard assist;minimum assist (75% patient effort)   daughter assist  -RW contact guard assist;verbal cues required  -RC    Toilet Transfer, Assistive Device  rolling walker  -RW rolling walker  -RC    Recorded by  [RW] Danny Beebe PTA [RC] ADRIANNA GomezA/L    Gait Assessment/Treatment    Gait, [place] Level  contact guard assist;verbal cues required  -RW     Gait, Assistive Device  rolling walker  -RW     Gait, Distance (Feet)  150  -RW     Gait, Impairments  impaired balance  -RW     Recorded by  [RW] Danny Beebe PTA     Functional Mobility    Functional Mobility- Ind. Level   contact guard assist  -RC    Functional Mobility- Device   rolling walker  -RC    Functional Mobility-Distance (Feet)   15  -RC    Recorded by   [RC] Ally Kwong, MADRIGAL/L    Upper Body Bathing Assessment/Training    UB Bathing Assess/Train, Position    sitting;sink side  -RC    UB Bathing Assess/Train, [place] Level   supervision required;verbal cues required  -RC    Recorded by   [RC] ADRIANNA GomezA/L    Lower Body Bathing Assessment/Training    LB Bathing Assess/Train, Position   sitting;standing  -RC    LB Bathing Assess/Train, [place] Level   contact guard assist  -RC    Recorded by   [RC] ADRIANNA GomezA/L    Upper Body Dressing Assessment/Training    UB Dressing Assess/Train, Clothing Type   doffing:;donning:;pull over  -RC    UB Dressing Assess/Train, Position   sitting  -RC    UB Dressing Assess/Train, [place]   minimum assist (75% patient effort)  -RC    Recorded by   [RC] ADRIANNA GomezA/L    Lower Body Dressing Assessment/Training    LB Dressing Assess/Train, Clothing Type  donning:;shoes  -RW doffing:;donning:;pants;shorts;shoes  -RC    LB Dressing Assess/Train, Position  sitting  -RW sitting;standing  -RC    LB Dressing Assess/Train, [place]  minimum assist (75% patient effort);set up required  -RW minimum assist (75% patient effort)  -RC    Recorded by  [RW] Danny Beebe PTA [RC] ADRIANNA GomezA/L    Toileting Assessment/Training    Toileting Assess/Train, Position   sitting;standing  -RC    Toileting Assess/Train, [place] Level   minimum assist (75% patient effort);verbal cues required  -RC    Recorded by   [RC] ROHAN Gomez/L    Grooming Assessment/Training    Grooming Assess/Train, Position   sitting  -RC    Grooming Assess/Train, [place] Level   supervision required;verbal cues required  -RC    Recorded by   [RC] ROHAN Gomez/L    Therapy Exercises    Bilateral Lower Extremities  AROM:;20 reps;supine;ankle pumps/circles;heel slides;hip abduction/adduction;SAQ   2 sets in part  -RW     Bilateral Upper Extremity   --   ue bike 10 min  -RC    Recorded by  [RW] Danny Beebe PTA [RC] ROHAN Gomez/L    Positioning and Restraints    Pre-Treatment Position  in bed  -RW sitting  in chair/recliner  -RC    Post Treatment Position  wheelchair  -RW bed  -RC    In Bed  call light within reach  -RW exit alarm on;encouraged to call for assist;call light within reach  -RC    Recorded by  [RW] Danny Beebe, PTA [RC] Ally Kwong, MADRIGAL/L      User Key  (r) = Recorded By, (t) = Taken By, (c) = Cosigned By    Initials Name Effective Dates    EC Tamiko Couch, CCC-SLP 12/30/16 -     CK Michelle Whitten, MS CCC-SLP 10/17/16 -     MARLENE Jaspreet Rodas, PTA 10/17/16 -     RW Danny Beebe, PTA 10/17/16 -     RC Ally Kwong, MADRIGAL/L 10/17/16 -     KD Nathalia Irizarry, MADRIGAL/L 10/17/16 -                 OT Goals       06/17/17 0600 06/16/17 0955 06/15/17 1341    Transfer Training OT STG    Transfer Training OT STG, Date Goal Reviewed 06/17/17  -KD 06/16/17  -KD 06/15/17  -RC    Transfer Training OT STG, Outcome   goal ongoing  -RC    Dynamic Standing Balance OT STG    Dynamic Standing Balance OT STG, Date Goal Reviewed 06/17/17  -KD 06/16/17  -KD 06/15/17  -RC    Dynamic Standing Balance OT STG, Outcome   goal ongoing  -RC    Caregiver Training OT LTG    Caregiver Training OT LTG, Date Goal Reviewed 06/17/17  -KD 06/16/17  -KD 06/15/17  -RC    Caregiver Training OT LTG, Outcome   goal ongoing  -RC    ADL OT LTG    ADL OT LTG, Date Goal Reviewed 06/17/17  -KD 06/16/17  -KD 06/15/17  -RC    ADL OT LTG, Outcome   goal ongoing  -RC    Activity Tolerance OT STG    Activity Tolerance Goal OT STG, Date Goal Reviewed 06/17/17  -KD 06/16/17  -KD 06/15/17  -RC    Activity Tolerance Goal OT STG, Outcome   goal ongoing  -RC      06/14/17 1431 06/14/17 0905 06/13/17 0759    Transfer Training OT STG    Transfer Training OT STG, Date Established   06/13/17  -    Transfer Training OT STG, Time to Achieve   2 wks  -BH    Transfer Training OT STG, Activity Type   sit to stand/stand to sit;bed to chair /chair to bed;toilet  -BH    Transfer Training OT STG, [place] Level   supervision required;set up required    AE/AD as needed  -    Transfer Training OT STG, Date Goal Reviewed  06/14/17  -     Transfer Training OT STG, Outcome  goal met  -     Dynamic Standing Balance OT STG    Dynamic Standing Balance OT STG, Date Established   06/13/17  -    Dynamic Standing Balance OT STG, Time to Achieve   2 wks  -    Dynamic Standing Balance OT STG, [place] Level   supervision required   5 minute no LOB or increased fatigue  -    Dynamic Standing Balance OT STG, Date Goal Reviewed  06/14/17  -     Dynamic Standing Balance OT STG, Outcome  goal ongoing  [facility]     Caregiver Training OT LTG    Caregiver Training OT LTG, Date Established   06/13/17  -    Caregiver Training OT LTG, Time to Achieve   by discharge  -    Caregiver Training OT LTG, [place] Level   able to assist adequately;able to cue patient adequately   t/f, safety at home, [facility]  -    Caregiver Training OT LTG, Date Goal Reviewed  06/14/17  -     Caregiver Training OT LTG, Outcome  goal ongoing  -     ADL OT LTG    ADL OT LTG, Date Established   06/13/17  -    ADL OT LTG, Time to Achieve   by discharge  -    ADL OT LTG, Activity Type   ADL skills  -    ADL OT LTG, [place] Level   standby assist;modified independent;setup;assistive device  -    ADL OT LTG, Date Goal Reviewed (P)  06/14/17  - 06/14/17  -     ADL OT LTG, Outcome (P)  goal ongoing  - goal ongoing  -     Activity Tolerance OT STG    Activity Tolerance Goal OT STG, Date Established   06/13/17  -    Activity Tolerance Goal OT STG, Time to Achieve   2 wks  -    Activity Tolerance Goal OT STG, Activity Level   20 min activity;O2 sat >/equal to 90%;with 1 rest break  -    Activity Tolerance Goal OT STG, Date Goal Reviewed  06/14/17  -     Activity Tolerance Goal OT STG, Outcome  goal ongoing  -       06/12/17 1414 06/09/17 1725       Transfer Training OT STG    Transfer Training OT STG, Date Established  06/09/17  -     Transfer Training OT STG, Time to  Achieve  5 - 7 days  -RW     Transfer Training OT STG, Activity Type  toilet  -RW     Transfer Training OT STG, [place] Level  supervision required  -RW     Transfer Training OT STG, Date Goal Reviewed 06/12/17  -RM      Transfer Training OT STG, Outcome goal not met  -RM      Transfer Training OT STG, Reason Goal Not Met discharged from facility  -RM      Dynamic Standing Balance OT STG    Dynamic Standing Balance OT STG, Date Established  06/09/17  -RW     Dynamic Standing Balance OT STG, Time to Achieve  5 - 7 days  -RW     Dynamic Standing Balance OT STG, [place] Level  supervision required  -RW     Dynamic Standing Balance OT STG, Assist Device  assistive Device  -RW     Dynamic Standing Balance OT STG, Additional Goal  5 min  -RW     Dynamic Standing Balance OT STG, Date Goal Reviewed 06/12/17  -RM      Dynamic Standing Balance OT STG, Outcome goal not met  -RM      Dynamic Standing Balance OT STG, Reason Goal Not Met discharged from facility  -RM      ADL OT LTG    ADL OT LTG, Date Established  06/09/17  -RW     ADL OT LTG, Time to Achieve  2 wks  -RW     ADL OT LTG, Activity Type  ADL skills  -RW     ADL OT LTG, [place] Level  standby assist  -RW     ADL OT LTG, Date Goal Reviewed 06/12/17  -RM      ADL OT LTG, Outcome goal not met  -RM      ADL OT LTG, Reason Goal Not Met discharged from facility  -RM      Functional Mobility OT LTG    Functional Mobility Goal OT LTG, Date Established  06/09/17  -RW     Functional Mobility Goal OT LTG, Time to Achieve  2 wks  -RW     Functional Mobility Goal OT LTG, [place] Level  supervision  -RW     Functional Mobility Goal OT LTG, Distance to Achieve  to the bathroom  -RW     Functional Mobility Goal OT LTG, Date Goal Reviewed 06/12/17  -RM      Functional Mobility Goal OT LTG, Outcome goal not met  -RM      Functional Mobility Goal OT LTG, Reason Goal Not Met discharged from facility  -RM        User Key  (r) = Recorded By, (t) = Taken By, (c) =  Cosigned By    Initials Name Provider Type     Rebeka Carrillo, OTR/L Occupational Therapist    RW Cami Mon, OTR/L Occupational Therapist    RC Ally Kwong, MADRIGAL/L Occupational Therapy Assistant    EVE Irizarry MADRIGAL/L Occupational Therapy Assistant    RM Catie Sheriff, OT Occupational Therapist          Occupational Therapy Education     Title: PT OT SLP Therapies (Active)     Topic: Occupational Therapy (Active)     Point: ADL training (Active)    Description: Instruct learner(s) on proper safety adaptation and remediation techniques during self care or transfers.   Instruct in proper use of assistive devices.    Learning Progress Summary    Learner Readiness Method Response Comment Documented by Status   Patient Acceptance E NR   06/15/17 1425 Active    Acceptance E NR   06/14/17 1430 Active    Acceptance E VU,NR Educated pt about OT and POC. Educated pt not to get up on her own and how to use call button. Educated pt on safety with t/f and ADL.  06/13/17 1342 Done               Point: Precautions (Done)    Description: Instruct learner(s) on prescribed precautions during self-care and functional transfers.    Learning Progress Summary    Learner Readiness Method Response Comment Documented by Status   Patient Acceptance E VU,NR Educated pt about OT and POC. Educated pt not to get up on her own and how to use call button. Educated pt on safety with t/f and ADL.  06/13/17 1342 Done               Point: Body mechanics (Done)    Description: Instruct learner(s) on proper positioning and spine alignment during self-care, functional mobility activities and/or exercises.    Learning Progress Summary    Learner Readiness Method Response Comment Documented by Status   Patient Acceptance E VU,NR Educated pt about OT and POC. Educated pt not to get up on her own and how to use call button. Educated pt on safety with t/f and ADL.  06/13/17 1342 Done                      User Key     Initials  Effective Dates Name Provider Type Discipline     10/17/16 -  Rebeka Carrillo OTR/L Occupational Therapist OT    RC 10/17/16 -  ROHAN Gomez/L Occupational Therapy Assistant MADELEINE SALVADOR Recommendation and Plan  Anticipated Discharge Disposition: home with 24/7 care, home with home health, skilled nursing facility (depends on progress)  Planned Therapy Interventions: activity intolerance, adaptive equipment training, ADL retraining, balance training, bed mobility training, energy conservation, fine motor coordination training, home exercise program, motor coordination training, strengthening, transfer training  Therapy Frequency: other (see comments) (3-14x a week)  Plan of Care Review  Outcome Summary/Follow up Plan: No new goals met this date        Outcome Measures       06/17/17 0600 06/16/17 0955 06/16/17 0816    How much help from another person do you currently need...    Turning from your back to your side while in flat bed without using bedrails?   3  -MARLENE    Moving from lying on back to sitting on the side of a flat bed without bedrails?   3  -MARLENE    Moving to and from a bed to a chair (including a wheelchair)?   3  -MARLENE    Standing up from a chair using your arms (e.g., wheelchair, bedside chair)?   3  -MARLENE    Climbing 3-5 steps with a railing?   3  -MARLENE    To walk in hospital room?   3  -MARLENE    AM-PAC 6 Clicks Score   18  -MARLENE    How much help from another is currently needed...    Putting on and taking off regular lower body clothing? 3  -KD 3  -KD     Bathing (including washing, rinsing, and drying) 3  -KD 3  -KD     Toileting (which includes using toilet bed pan or urinal) 3  -KD 3  -KD     Putting on and taking off regular upper body clothing 4  -KD 3  -KD     Taking care of personal grooming (such as brushing teeth) 3  -KD 3  -KD     Eating meals 4  -KD 3  -KD     Score 20  -KD 18  -KD     Functional Assessment    Outcome Measure Options   AM-PAC 6 Clicks Basic Mobility (PT)  -MARLENE       06/15/17 1341 06/15/17 0800 06/14/17 1200    How much help from another person do you currently need...    Turning from your back to your side while in flat bed without using bedrails?  3  -RW 3  -RW    Moving from lying on back to sitting on the side of a flat bed without bedrails?  3  -RW 3  -RW    Moving to and from a bed to a chair (including a wheelchair)?  3  -RW 3  -RW    Standing up from a chair using your arms (e.g., wheelchair, bedside chair)?  3  -RW 3  -RW    Climbing 3-5 steps with a railing?  3  -RW 3  -RW    To walk in hospital room?  3  -RW 3  -RW    AM-PAC 6 Clicks Score  18  -RW 18  -RW    How much help from another is currently needed...    Putting on and taking off regular lower body clothing? 3  -RC      Bathing (including washing, rinsing, and drying) 3  -RC      Toileting (which includes using toilet bed pan or urinal) 3  -RC      Putting on and taking off regular upper body clothing 3  -RC      Taking care of personal grooming (such as brushing teeth) 3  -RC      Eating meals 3  -RC      Score 18  -RC        06/14/17 0905          How much help from another is currently needed...    Putting on and taking off regular lower body clothing? 3  -RC      Bathing (including washing, rinsing, and drying) 3  -RC      Toileting (which includes using toilet bed pan or urinal) 3  -RC      Putting on and taking off regular upper body clothing 3  -RC      Taking care of personal grooming (such as brushing teeth) 3  -RC      Eating meals 3  -RC      Score 18  -RC        User Key  (r) = Recorded By, (t) = Taken By, (c) = Cosigned By    Initials Name Provider Type    MARLENE Rodas PTA Physical Therapy Assistant    RESHMA Beebe PTA Physical Therapy Assistant    ROHAN Rodas/L Occupational Therapy Assistant    ROHAN Sanabria/L Occupational Therapy Assistant           Time Calculation:         Time Calculation- OT       06/17/17 0749          Time Calculation- OT    OT Start Time  0600  -KD      OT Stop Time 0723  -KD      OT Time Calculation (min) 83 min  -KD      Total Timed Code Minutes- OT 83 minute(s)  -KD      OT Received On 06/17/17  -KD        User Key  (r) = Recorded By, (t) = Taken By, (c) = Cosigned By    Initials Name Provider Type     ROHAN Garcia/L Occupational Therapy Assistant           Therapy Charges for Today     Code Description Service Date Service Provider Modifiers Qty    90993347068 HC OT THER PROC EA 15 MIN 6/16/2017 ADRIANNA GarciaA/L GO 1    02143105998 HC OT WHEELCHAIR MGMT EA 15 MIN 6/16/2017 ADRIANNA GarciaA/L GO 1    21959025004 HC OT THERAPEUTIC ACT EA 15 MIN 6/16/2017 ROHAN Garcia/L GO 1    27651702391 HC OT SELF CARE/MGMT/TRAIN EA 15 MIN 6/16/2017 ROHAN Garcia/L GO 3    07191988997 HC OT THER PROC EA 15 MIN 6/17/2017 ADRIANNA GarciaA/L GO 1    58505863815 HC OT WHEELCHAIR MGMT EA 15 MIN 6/17/2017 ROHAN Garcia/L GO 1    68903254959 HC OT THERAPEUTIC ACT EA 15 MIN 6/17/2017 ROHAN Garcia/L GO 2    07892589755 HC OT SELF CARE/MGMT/TRAIN EA 15 MIN 6/17/2017 ROHAN Garcia/L GO 2          OT G-codes  OT Professional Judgement Used?: Yes  OT Functional Scales Options: AM-PAC 6 Clicks Daily Activity (OT)  Score: 16  Functional Limitation: Self care  Self Care Current Status (): At least 40 percent but less than 60 percent impaired, limited or restricted  Self Care Goal Status (): At least 20 percent but less than 40 percent impaired, limited or restricted    ROHAN Garcia/POONAM  6/17/2017

## 2017-06-18 LAB
ALBUMIN SERPL-MCNC: 2.8 G/DL (ref 3.4–4.8)
ANION GAP SERPL CALCULATED.3IONS-SCNC: 10 MMOL/L (ref 5–15)
BASOPHILS # BLD AUTO: 0.01 10*3/MM3 (ref 0–0.2)
BASOPHILS NFR BLD AUTO: 0.2 % (ref 0–2)
BUN BLD-MCNC: 15 MG/DL (ref 7–21)
BUN/CREAT SERPL: 22.4 (ref 7–25)
CALCIUM SPEC-SCNC: 8.7 MG/DL (ref 8.4–10.2)
CHLORIDE SERPL-SCNC: 106 MMOL/L (ref 95–110)
CO2 SERPL-SCNC: 24 MMOL/L (ref 22–31)
CREAT BLD-MCNC: 0.67 MG/DL (ref 0.5–1)
DEPRECATED RDW RBC AUTO: 48.5 FL (ref 36.4–46.3)
EOSINOPHIL # BLD AUTO: 0.01 10*3/MM3 (ref 0–0.7)
EOSINOPHIL NFR BLD AUTO: 0.2 % (ref 0–7)
ERYTHROCYTE [DISTWIDTH] IN BLOOD BY AUTOMATED COUNT: 14.3 % (ref 11.5–14.5)
GFR SERPL CREATININE-BSD FRML MDRD: 101 ML/MIN/1.73 (ref 39–90)
GLUCOSE BLD-MCNC: 118 MG/DL (ref 60–100)
GLUCOSE BLDC GLUCOMTR-MCNC: 131 MG/DL (ref 70–130)
GLUCOSE BLDC GLUCOMTR-MCNC: 267 MG/DL (ref 70–130)
GLUCOSE BLDC GLUCOMTR-MCNC: 276 MG/DL (ref 70–130)
GLUCOSE BLDC GLUCOMTR-MCNC: 316 MG/DL (ref 70–130)
HCT VFR BLD AUTO: 25.1 % (ref 35–45)
HGB BLD-MCNC: 8.1 G/DL (ref 12–15.5)
IMM GRANULOCYTES # BLD: 0.02 10*3/MM3 (ref 0–0.02)
IMM GRANULOCYTES NFR BLD: 0.5 % (ref 0–0.5)
LYMPHOCYTES # BLD AUTO: 1.06 10*3/MM3 (ref 0.6–4.2)
LYMPHOCYTES NFR BLD AUTO: 25.2 % (ref 10–50)
MCH RBC QN AUTO: 30 PG (ref 26.5–34)
MCHC RBC AUTO-ENTMCNC: 32.3 G/DL (ref 31.4–36)
MCV RBC AUTO: 93 FL (ref 80–98)
MONOCYTES # BLD AUTO: 0.3 10*3/MM3 (ref 0–0.9)
MONOCYTES NFR BLD AUTO: 7.1 % (ref 0–12)
NEUTROPHILS # BLD AUTO: 2.81 10*3/MM3 (ref 2–8.6)
NEUTROPHILS NFR BLD AUTO: 66.8 % (ref 37–80)
PHOSPHATE SERPL-MCNC: 2.9 MG/DL (ref 2.4–4.4)
PLATELET # BLD AUTO: 240 10*3/MM3 (ref 150–450)
PMV BLD AUTO: 10.3 FL (ref 8–12)
POTASSIUM BLD-SCNC: 4.4 MMOL/L (ref 3.5–5.1)
RBC # BLD AUTO: 2.7 10*6/MM3 (ref 3.77–5.16)
SODIUM BLD-SCNC: 140 MMOL/L (ref 137–145)
WBC NRBC COR # BLD: 4.21 10*3/MM3 (ref 3.2–9.8)

## 2017-06-18 PROCEDURE — 63710000001 INSULIN DETEMIR PER 5 UNITS: Performed by: FAMILY MEDICINE

## 2017-06-18 PROCEDURE — 80069 RENAL FUNCTION PANEL: CPT | Performed by: FAMILY MEDICINE

## 2017-06-18 PROCEDURE — 82962 GLUCOSE BLOOD TEST: CPT

## 2017-06-18 PROCEDURE — 85025 COMPLETE CBC W/AUTO DIFF WBC: CPT | Performed by: FAMILY MEDICINE

## 2017-06-18 PROCEDURE — 63710000001 INSULIN ASPART PER 5 UNITS: Performed by: FAMILY MEDICINE

## 2017-06-18 PROCEDURE — 63710000001 DIPHENHYDRAMINE PER 50 MG: Performed by: FAMILY MEDICINE

## 2017-06-18 RX ADMIN — DONEPEZIL HYDROCHLORIDE 10 MG: 10 TABLET, FILM COATED ORAL at 20:14

## 2017-06-18 RX ADMIN — FERROUS SULFATE TAB EC 324 MG (65 MG FE EQUIVALENT) 324 MG: 324 (65 FE) TABLET DELAYED RESPONSE at 08:33

## 2017-06-18 RX ADMIN — INSULIN ASPART 8 UNITS: 100 INJECTION, SOLUTION INTRAVENOUS; SUBCUTANEOUS at 17:30

## 2017-06-18 RX ADMIN — POTASSIUM CHLORIDE 10 MEQ: 750 CAPSULE, EXTENDED RELEASE ORAL at 18:00

## 2017-06-18 RX ADMIN — MEMANTINE 5 MG: 5 TABLET ORAL at 18:00

## 2017-06-18 RX ADMIN — INSULIN DETEMIR 15 UNITS: 100 INJECTION, SOLUTION SUBCUTANEOUS at 21:24

## 2017-06-18 RX ADMIN — Medication 2.5 MG: at 08:33

## 2017-06-18 RX ADMIN — FERROUS SULFATE TAB EC 324 MG (65 MG FE EQUIVALENT) 324 MG: 324 (65 FE) TABLET DELAYED RESPONSE at 18:00

## 2017-06-18 RX ADMIN — INSULIN ASPART 8 UNITS: 100 INJECTION, SOLUTION INTRAVENOUS; SUBCUTANEOUS at 21:23

## 2017-06-18 RX ADMIN — POTASSIUM CHLORIDE 10 MEQ: 750 CAPSULE, EXTENDED RELEASE ORAL at 08:33

## 2017-06-18 RX ADMIN — DOCUSATE SODIUM 100 MG: 100 CAPSULE, LIQUID FILLED ORAL at 18:00

## 2017-06-18 RX ADMIN — Medication 1 TABLET: at 08:34

## 2017-06-18 RX ADMIN — Medication 40 MG: at 08:33

## 2017-06-18 RX ADMIN — DIPHENHYDRAMINE HYDROCHLORIDE 25 MG: 25 CAPSULE ORAL at 17:56

## 2017-06-18 RX ADMIN — INSULIN ASPART 10 UNITS: 100 INJECTION, SOLUTION INTRAVENOUS; SUBCUTANEOUS at 11:27

## 2017-06-18 RX ADMIN — MEMANTINE 5 MG: 5 TABLET ORAL at 08:33

## 2017-06-18 RX ADMIN — DOCUSATE SODIUM 100 MG: 100 CAPSULE, LIQUID FILLED ORAL at 08:34

## 2017-06-18 RX ADMIN — DILTIAZEM HYDROCHLORIDE 120 MG: 120 CAPSULE, COATED, EXTENDED RELEASE ORAL at 08:33

## 2017-06-18 RX ADMIN — MAGNESIUM OXIDE TAB 400 MG (241.3 MG ELEMENTAL MG) 400 MG: 400 (241.3 MG) TAB at 08:34

## 2017-06-18 RX ADMIN — DIPHENHYDRAMINE HYDROCHLORIDE 25 MG: 25 CAPSULE ORAL at 06:45

## 2017-06-18 RX ADMIN — Medication 40 MG: at 20:14

## 2017-06-18 NOTE — PLAN OF CARE
Problem: Patient Care Overview (Adult)  Goal: Plan of Care Review  Outcome: Ongoing (interventions implemented as appropriate)    06/18/17 1657   Coping/Psychosocial Response Interventions   Plan Of Care Reviewed With patient   Patient Care Overview   Progress no change   Outcome Evaluation   Outcome Summary/Follow up Plan reinforce call light use. pt still unsteady on feet. continues to work with therapy towards mobility goals. vitals stable. no c/o pain or altered comfort.        Goal: Adult Individualization and Mutuality  Outcome: Ongoing (interventions implemented as appropriate)  Goal: Discharge Needs Assessment  Outcome: Ongoing (interventions implemented as appropriate)    Problem: Functional Deficit (Adult,Obstetrics,Pediatric)  Goal: Signs and Symptoms of Listed Potential Problems Will be Absent or Manageable (Functional Deficit)  Outcome: Ongoing (interventions implemented as appropriate)    06/18/17 0308   Functional Deficit   Problems Assessed (Functional Deficit) all   Problems Present (Functional Deficit) functional activity tolerance impairment;mobility impairment         Problem: Fall Risk (Adult)  Goal: Absence of Falls  Outcome: Ongoing (interventions implemented as appropriate)    Problem: Infection, Risk/Actual (Adult)  Goal: Infection Prevention/Resolution  Outcome: Ongoing (interventions implemented as appropriate)    06/18/17 1657   Infection, Risk/Actual (Adult)   Infection Prevention/Resolution making progress toward outcome

## 2017-06-18 NOTE — PROGRESS NOTES
[facility] Medicine Services  INPATIENT PROGRESS NOTE    Length of Stay: 6  Date of Admission: 6/12/2017  Primary Care Physician: Melissa Ch MD    Subjective   Chief Complaint: Deconditioning, colon cancer s/p left hemicolectomy.  HPI:  88 year old female admitted to ARU for aggressive rehabilitation after hospitalization where she underwent left hemicolectomy for colon cancer.  She is participating with therapy. Eating and drinking.  Having BMs.  No new complaints.    Review of Systems   Constitutional: Negative for chills and fever.   Respiratory: Negative for shortness of breath.    Cardiovascular: Negative for chest pain and palpitations.   Gastrointestinal: Negative for abdominal pain, diarrhea, nausea and vomiting.      All pertinent negatives and positives are as above. All other systems have been reviewed and are negative unless otherwise stated.     Objective    Temp:  [96.6 °F (35.9 °C)-96.8 °F (36 °C)] 96.8 °F (36 °C)  Heart Rate:  [66-72] 66  Resp:  [18] 18  BP: (110-122)/(56-57) 110/56    Physical Exam   Constitutional: She appears well-developed and well-nourished.   HENT:   Head: Normocephalic and atraumatic.   Cardiovascular: Normal rate, regular rhythm and intact distal pulses.    Pulmonary/Chest: Effort normal and breath sounds normal. No respiratory distress.   Abdominal: Soft. Bowel sounds are normal. She exhibits no distension. There is no tenderness.   Musculoskeletal: Normal range of motion. She exhibits no edema.   Neurological: She is alert.   Skin: Skin is warm and dry. No erythema.   Vitals reviewed.      Results Review:  I have reviewed the labs, radiology results, and diagnostic studies.    Laboratory Data:     Results from last 7 days  Lab Units 06/18/17  0537 06/17/17  0534 06/16/17  0536  06/13/17  0533   SODIUM mmol/L 140 137 136*  < > 137   POTASSIUM mmol/L 4.4 4.4 3.9  < > 3.1*   CHLORIDE mmol/L 106 103 103  < > 97   TOTAL CO2  mmol/L 24.0 28.0 25.0  < > 28.0   BUN mg/dL 15 19 25*  < > 18   CREATININE mg/dL 0.67 0.77 0.79  < > 0.97   GLUCOSE mg/dL 118* 104* 131*  < > 134*   CALCIUM mg/dL 8.7 8.9 8.6  < > 8.7   BILIRUBIN mg/dL  --   --   --   --  0.3   ALK PHOS U/L  --   --   --   --  50   ALT (SGPT) U/L  --   --   --   --  33   AST (SGOT) U/L  --   --   --   --  16   ANION GAP mmol/L 10.0 6.0 8.0  < > 12.0   < > = values in this interval not displayed.  Estimated Creatinine Clearance: 56.1 mL/min (by C-G formula based on Cr of 0.67).    Results from last 7 days  Lab Units 06/18/17  0537 06/17/17  0534 06/16/17  0536  06/14/17  0501 06/13/17  0533   MAGNESIUM mg/dL  --   --   --   --  2.0 1.9   PHOSPHORUS mg/dL 2.9 2.6 2.5  < > 4.1  --    < > = values in this interval not displayed.        Results from last 7 days  Lab Units 06/18/17  0537 06/17/17  0534 06/16/17  0536 06/15/17  1002 06/14/17  0501   WBC 10*3/mm3 4.21 5.21 7.31 8.23 6.47   HEMOGLOBIN g/dL 8.1* 8.2* 9.4* 9.5* 8.8*   HEMATOCRIT % 25.1* 25.1* 28.5* 29.5* 27.3*   PLATELETS 10*3/mm3 240 259 303 327 319           Culture Data:   No results found for: BLOODCX  No results found for: URINECX  No results found for: RESPCX  No results found for: WOUNDCX  No results found for: STOOLCX  No components found for: BODYFLD    Radiology Data:   Imaging Results (last 24 hours)     ** No results found for the last 24 hours. **          I have reviewed the patient current medications.     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

## 2017-06-18 NOTE — PLAN OF CARE
Problem: Patient Care Overview (Adult)  Goal: Plan of Care Review  Outcome: Ongoing (interventions implemented as appropriate)    06/18/17 0308   Coping/Psychosocial Response Interventions   Plan Of Care Reviewed With patient   Patient Care Overview   Progress no change   Outcome Evaluation   Outcome Summary/Follow up Plan rested well          Problem: Functional Deficit (Adult,Obstetrics,Pediatric)  Goal: Signs and Symptoms of Listed Potential Problems Will be Absent or Manageable (Functional Deficit)  Outcome: Ongoing (interventions implemented as appropriate)    Problem: Fall Risk (Adult)  Goal: Absence of Falls  Outcome: Ongoing (interventions implemented as appropriate)    Problem: Infection, Risk/Actual (Adult)  Goal: Infection Prevention/Resolution  Outcome: Ongoing (interventions implemented as appropriate)

## 2017-06-19 LAB
ALBUMIN SERPL-MCNC: 3.2 G/DL (ref 3.4–4.8)
ANION GAP SERPL CALCULATED.3IONS-SCNC: 8 MMOL/L (ref 5–15)
BACTERIA UR QL AUTO: ABNORMAL /HPF
BASOPHILS # BLD AUTO: 0 10*3/MM3 (ref 0–0.2)
BASOPHILS NFR BLD AUTO: 0 % (ref 0–2)
BILIRUB UR QL STRIP: NEGATIVE
BUN BLD-MCNC: 10 MG/DL (ref 7–21)
BUN/CREAT SERPL: 14.7 (ref 7–25)
CALCIUM SPEC-SCNC: 9.1 MG/DL (ref 8.4–10.2)
CHLORIDE SERPL-SCNC: 104 MMOL/L (ref 95–110)
CLARITY UR: CLEAR
CO2 SERPL-SCNC: 27 MMOL/L (ref 22–31)
COLOR UR: YELLOW
CREAT BLD-MCNC: 0.68 MG/DL (ref 0.5–1)
DEPRECATED RDW RBC AUTO: 48.5 FL (ref 36.4–46.3)
EOSINOPHIL # BLD AUTO: 0.02 10*3/MM3 (ref 0–0.7)
EOSINOPHIL NFR BLD AUTO: 0.5 % (ref 0–7)
ERYTHROCYTE [DISTWIDTH] IN BLOOD BY AUTOMATED COUNT: 14.2 % (ref 11.5–14.5)
GFR SERPL CREATININE-BSD FRML MDRD: 99 ML/MIN/1.73 (ref 39–90)
GLUCOSE BLD-MCNC: 109 MG/DL (ref 60–100)
GLUCOSE BLDC GLUCOMTR-MCNC: 102 MG/DL (ref 70–130)
GLUCOSE BLDC GLUCOMTR-MCNC: 280 MG/DL (ref 70–130)
GLUCOSE BLDC GLUCOMTR-MCNC: 326 MG/DL (ref 70–130)
GLUCOSE UR STRIP-MCNC: ABNORMAL MG/DL
HCT VFR BLD AUTO: 28.1 % (ref 35–45)
HGB BLD-MCNC: 9 G/DL (ref 12–15.5)
HGB UR QL STRIP.AUTO: ABNORMAL
HYALINE CASTS UR QL AUTO: ABNORMAL /LPF
IMM GRANULOCYTES # BLD: 0.01 10*3/MM3 (ref 0–0.02)
IMM GRANULOCYTES NFR BLD: 0.3 % (ref 0–0.5)
KETONES UR QL STRIP: NEGATIVE
LEUKOCYTE ESTERASE UR QL STRIP.AUTO: ABNORMAL
LYMPHOCYTES # BLD AUTO: 0.99 10*3/MM3 (ref 0.6–4.2)
LYMPHOCYTES NFR BLD AUTO: 25.4 % (ref 10–50)
MCH RBC QN AUTO: 29.9 PG (ref 26.5–34)
MCHC RBC AUTO-ENTMCNC: 32 G/DL (ref 31.4–36)
MCV RBC AUTO: 93.4 FL (ref 80–98)
MONOCYTES # BLD AUTO: 0.27 10*3/MM3 (ref 0–0.9)
MONOCYTES NFR BLD AUTO: 6.9 % (ref 0–12)
NEUTROPHILS # BLD AUTO: 2.6 10*3/MM3 (ref 2–8.6)
NEUTROPHILS NFR BLD AUTO: 66.9 % (ref 37–80)
NITRITE UR QL STRIP: NEGATIVE
PH UR STRIP.AUTO: <=5 [PH] (ref 5–9)
PHOSPHATE SERPL-MCNC: 3.2 MG/DL (ref 2.4–4.4)
PLATELET # BLD AUTO: 233 10*3/MM3 (ref 150–450)
PMV BLD AUTO: 10.1 FL (ref 8–12)
POTASSIUM BLD-SCNC: 4.3 MMOL/L (ref 3.5–5.1)
PROT UR QL STRIP: ABNORMAL
RBC # BLD AUTO: 3.01 10*6/MM3 (ref 3.77–5.16)
RBC # UR: ABNORMAL /HPF
REF LAB TEST METHOD: ABNORMAL
SODIUM BLD-SCNC: 139 MMOL/L (ref 137–145)
SP GR UR STRIP: 1.02 (ref 1–1.03)
SQUAMOUS #/AREA URNS HPF: ABNORMAL /HPF
UROBILINOGEN UR QL STRIP: ABNORMAL
WBC NRBC COR # BLD: 3.89 10*3/MM3 (ref 3.2–9.8)
WBC UR QL AUTO: ABNORMAL /HPF

## 2017-06-19 PROCEDURE — 97116 GAIT TRAINING THERAPY: CPT

## 2017-06-19 PROCEDURE — 82962 GLUCOSE BLOOD TEST: CPT

## 2017-06-19 PROCEDURE — 97530 THERAPEUTIC ACTIVITIES: CPT

## 2017-06-19 PROCEDURE — 97110 THERAPEUTIC EXERCISES: CPT

## 2017-06-19 PROCEDURE — 99024 POSTOP FOLLOW-UP VISIT: CPT | Performed by: SURGERY

## 2017-06-19 PROCEDURE — 92507 TX SP LANG VOICE COMM INDIV: CPT | Performed by: SPEECH-LANGUAGE PATHOLOGIST

## 2017-06-19 PROCEDURE — 63710000001 INSULIN DETEMIR PER 5 UNITS: Performed by: FAMILY MEDICINE

## 2017-06-19 PROCEDURE — 81001 URINALYSIS AUTO W/SCOPE: CPT | Performed by: FAMILY MEDICINE

## 2017-06-19 PROCEDURE — 85025 COMPLETE CBC W/AUTO DIFF WBC: CPT | Performed by: FAMILY MEDICINE

## 2017-06-19 PROCEDURE — 63710000001 DIPHENHYDRAMINE PER 50 MG: Performed by: FAMILY MEDICINE

## 2017-06-19 PROCEDURE — 97535 SELF CARE MNGMENT TRAINING: CPT

## 2017-06-19 PROCEDURE — 80069 RENAL FUNCTION PANEL: CPT | Performed by: FAMILY MEDICINE

## 2017-06-19 PROCEDURE — 99232 SBSQ HOSP IP/OBS MODERATE 35: CPT | Performed by: FAMILY MEDICINE

## 2017-06-19 PROCEDURE — 63710000001 INSULIN ASPART PER 5 UNITS: Performed by: FAMILY MEDICINE

## 2017-06-19 RX ORDER — ACETAMINOPHEN 325 MG/1
650 TABLET ORAL EVERY 4 HOURS PRN
Status: DISCONTINUED | OUTPATIENT
Start: 2017-06-19 | End: 2017-06-23 | Stop reason: HOSPADM

## 2017-06-19 RX ADMIN — FERROUS SULFATE TAB EC 324 MG (65 MG FE EQUIVALENT) 324 MG: 324 (65 FE) TABLET DELAYED RESPONSE at 17:37

## 2017-06-19 RX ADMIN — MEMANTINE 5 MG: 5 TABLET ORAL at 17:37

## 2017-06-19 RX ADMIN — Medication 2.5 MG: at 09:00

## 2017-06-19 RX ADMIN — ACETAMINOPHEN 650 MG: 325 TABLET ORAL at 17:37

## 2017-06-19 RX ADMIN — FERROUS SULFATE TAB EC 324 MG (65 MG FE EQUIVALENT) 324 MG: 324 (65 FE) TABLET DELAYED RESPONSE at 08:17

## 2017-06-19 RX ADMIN — Medication 40 MG: at 21:25

## 2017-06-19 RX ADMIN — DONEPEZIL HYDROCHLORIDE 10 MG: 10 TABLET, FILM COATED ORAL at 20:02

## 2017-06-19 RX ADMIN — MAGNESIUM OXIDE TAB 400 MG (241.3 MG ELEMENTAL MG) 400 MG: 400 (241.3 MG) TAB at 08:17

## 2017-06-19 RX ADMIN — DIPHENHYDRAMINE HYDROCHLORIDE 25 MG: 25 CAPSULE ORAL at 08:17

## 2017-06-19 RX ADMIN — INSULIN DETEMIR 15 UNITS: 100 INJECTION, SOLUTION SUBCUTANEOUS at 20:05

## 2017-06-19 RX ADMIN — DIPHENHYDRAMINE HYDROCHLORIDE 25 MG: 25 CAPSULE ORAL at 17:40

## 2017-06-19 RX ADMIN — Medication 40 MG: at 08:17

## 2017-06-19 RX ADMIN — POTASSIUM CHLORIDE 10 MEQ: 750 CAPSULE, EXTENDED RELEASE ORAL at 08:17

## 2017-06-19 RX ADMIN — INSULIN ASPART 8 UNITS: 100 INJECTION, SOLUTION INTRAVENOUS; SUBCUTANEOUS at 17:40

## 2017-06-19 RX ADMIN — POTASSIUM CHLORIDE 10 MEQ: 750 CAPSULE, EXTENDED RELEASE ORAL at 17:37

## 2017-06-19 RX ADMIN — Medication 1 TABLET: at 08:17

## 2017-06-19 RX ADMIN — MEMANTINE 5 MG: 5 TABLET ORAL at 08:17

## 2017-06-19 RX ADMIN — DOCUSATE SODIUM 100 MG: 100 CAPSULE, LIQUID FILLED ORAL at 08:17

## 2017-06-19 RX ADMIN — DOCUSATE SODIUM 100 MG: 100 CAPSULE, LIQUID FILLED ORAL at 17:39

## 2017-06-19 RX ADMIN — ACETAMINOPHEN 650 MG: 325 TABLET ORAL at 11:56

## 2017-06-19 RX ADMIN — INSULIN ASPART 10 UNITS: 100 INJECTION, SOLUTION INTRAVENOUS; SUBCUTANEOUS at 20:04

## 2017-06-19 RX ADMIN — DILTIAZEM HYDROCHLORIDE 120 MG: 120 CAPSULE, COATED, EXTENDED RELEASE ORAL at 08:19

## 2017-06-19 NOTE — PROGRESS NOTES
LOS: 7 days   Patient Care Team:  Melissa Ch MD as PCP - General  Lindsay Ocampo MA as Medical Assistant    Chief Complaint:  Left hemicolectomy for carcinoma of the colon.        Interval History:     SUBJECTIVE:  She and family continue to complain of itching and whelps  Also complains of back pain and takes ibuprofen and Aleve and Tylenol for that at home   She does not have frequency urgency or dysuria.  She has some itching in the vaginal area as she does elsewhere   history taken from: patient chart family RN    Objective     Vital Signs  Temp:  [97.2 °F (36.2 °C)-97.6 °F (36.4 °C)] 97.2 °F (36.2 °C)  Heart Rate:  [68-74] 74  Resp:  [18] 18  BP: (126-167)/(58-72) 126/58  Last 3 weights    06/17/17  0452 06/18/17  0543 06/19/17  0507   Weight: 169 lb 4.8 oz (76.8 kg) 169 lb 4.8 oz (76.8 kg) 174 lb 9 oz (79.2 kg)         Physical Exam:     General Appearance:    Alert, cooperative, in no acute distress, she is quite forgetful    Head:    Normocephalic, without obvious abnormality, atraumatic   Eyes:            Lids and lashes normal, conjunctivae and sclerae normal, no   icterus, no pallor, corneas clear, PERRLA   Throat:   No oral lesions, no thrush, oral mucosa moist   Neck:   No adenopathy, supple, trachea midline, no thyromegaly, no   carotid bruit, no JVD       Lungs:     Clear to auscultation,respirations regular, even and                  Unlabored     Heart:    Regular rhythm and normal rate, normal S1 and S2, no            murmur, no gallop, no rub, no click    Chest Wall:    No abnormalities observed   Abdomen:     Normal bowel sounds, no masses, no organomegaly, soft        non-tender, non-distended, no guarding, no rebound                Tenderness    Extremities:   Moves all extremities well, no edema, no cyanosis, no             Redness        Skin:   No bleeding, bruising , She has no redness but she does have some raised indurated areas on the back and on the legs     Lymph  nodes:   No palpable adenopathy   Neurologic:   Cranial nerves 2 - 12 grossly intact, sensation intact, DTR       present and equal bilaterally          RESULTS REVIEW:     Lab Results (last 24 hours)     Procedure Component Value Units Date/Time    POC Glucose Fingerstick [[id number]]  (Abnormal) Collected:  06/18/17 1124    Specimen:  Blood Updated:  06/18/17 1235     Glucose 316 (H) mg/dL       RN NotifiedMeter: [id number]: [id number] SAMMI OBRIEN       POC Glucose Fingerstick [[id number]]  (Abnormal) Collected:  06/18/17 1645    Specimen:  Blood Updated:  06/18/17 1714     Glucose 276 (H) mg/dL       RN NotifiedMeter: [id number]: [id number] SAMMI VIRAMONTESISTA       POC Glucose Fingerstick [[id number]]  (Abnormal) Collected:  06/18/17 2012    Specimen:  Blood Updated:  06/18/17 2034     Glucose 267 (H) mg/dL       RN NotifiedMeter: [id number]: [id number] MARISA CRABTREE       POC Glucose Fingerstick [[id number]]  (Normal) Collected:  06/19/17 0602    Specimen:  Blood Updated:  06/19/17 0614     Glucose 102 mg/dL       RN NotifiedMeter: [id number]: [id number] MARISA CRABTREE       CBC & Differential [[id number]] Collected:  06/19/17 0611    Specimen:  Blood Updated:  06/19/17 0638    Narrative:       The following orders were created for panel order CBC & Differential.  Procedure                               Abnormality         Status                     ---------                               -----------         ------                     CBC Auto Differential[[id number]]        Abnormal            Final result                 Please view results for these tests on the individual orders.    CBC Auto Differential [[id number]]  (Abnormal) Collected:  06/19/17 0611    Specimen:  Blood Updated:  06/19/17 0638     WBC 3.89 10*3/mm3      RBC 3.01 (L) 10*6/mm3      Hemoglobin 9.0 (L) g/dL      Hematocrit 28.1 (L) %      MCV 93.4 fL      MCH 29.9 pg      MCHC 32.0 g/dL      RDW 14.2 %      RDW-SD 48.5 (H)  fl      MPV 10.1 fL      Platelets 233 10*3/mm3      Neutrophil % 66.9 %      Lymphocyte % 25.4 %      Monocyte % 6.9 %      Eosinophil % 0.5 %      Basophil % 0.0 %      Immature Grans % 0.3 %      Neutrophils, Absolute 2.60 10*3/mm3      Lymphocytes, Absolute 0.99 10*3/mm3      Monocytes, Absolute 0.27 10*3/mm3      Eosinophils, Absolute 0.02 10*3/mm3      Basophils, Absolute 0.00 10*3/mm3      Immature Grans, Absolute 0.01 10*3/mm3     Renal Function Panel [[id number]]  (Abnormal) Collected:  06/19/17 0611    Specimen:  Blood Updated:  06/19/17 0647     Glucose 109 (H) mg/dL      BUN 10 mg/dL      Creatinine 0.68 mg/dL      Sodium 139 mmol/L      Potassium 4.3 mmol/L      Chloride 104 mmol/L      CO2 27.0 mmol/L      Calcium 9.1 mg/dL      Albumin 3.20 (L) g/dL      Phosphorus 3.2 mg/dL      Anion Gap 8.0 mmol/L      BUN/Creatinine Ratio 14.7     eGFR   Amer 99 (H) mL/min/1.73     Narrative:       The MDRD GFR formula is only valid for adults with stable renal function between ages 18 and 70.        Imaging Results (last 72 hours)     ** No results found for the last 72 hours. 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  Not having urinary symptoms other than itching and will order a UA she is participating with therapy and she is making progress.   Suspect that she has a contact irritation of the skin    Continue intensive therapy discussed with daughter         Bennie Lucia MD  06/19/17  9:39 AM

## 2017-06-19 NOTE — PLAN OF CARE
Problem: Patient Care Overview (Adult)  Goal: Plan of Care Review  Outcome: Ongoing (interventions implemented as appropriate)    06/19/17 1422   Coping/Psychosocial Response Interventions   Plan Of Care Reviewed With patient   Outcome Evaluation   Outcome Summary/Follow up Plan pt not wanting to participate but does when daughter arrives. improving overall. cont with gt and therex         Problem: Inpatient Physical Therapy  Goal: Bed Mobility Goal LTG- PT  Outcome: Ongoing (interventions implemented as appropriate)    06/13/17 1036 06/19/17 1422   Bed Mobility PT LTG   Bed Mobility PT LTG, Date Established 06/13/17 --    Bed Mobility PT LTG, Time to Achieve by discharge --    Bed Mobility PT LTG, Activity Type supine to sit/sit to supine --    Bed Mobility PT LTG, Cascade Level supervision required --    Bed Mobility PT LTG, Additional Goal HOB flat; No BR's --    Bed Mobility PT LTG, Date Goal Reviewed --  06/19/17   Bed Mobility PT LTG, Outcome --  goal ongoing       Goal: Transfer Training Goal 1 LTG- PT  Outcome: Ongoing (interventions implemented as appropriate)    06/13/17 1036 06/19/17 1422   Transfer Training PT LTG   Transfer Training PT LTG, Date Established 06/13/17 --    Transfer Training PT LTG, Time to Achieve by discharge --    Transfer Training PT LTG, Activity Type bed to chair /chair to bed --    Transfer Training PT LTG, Cascade Level supervision required --    Transfer Training PT LTG, Assist Device (AAD) --    Transfer Training PT LTG, Date Goal Reviewed --  06/19/17   Transfer Training PT LTG, Outcome --  goal ongoing       Goal: Gait Training Goal LTG- PT  Outcome: Ongoing (interventions implemented as appropriate)    06/13/17 1036 06/19/17 1422   Gait Training PT LTG   Gait Training Goal PT LTG, Date Established 06/13/17 --    Gait Training Goal PT LTG, Time to Achieve by discharge --    Gait Training Goal PT LTG, Cascade Level supervision required --    Gait Training Goal PT  LTG, Assist Device (AAD) --    Gait Training Goal PT LTG, Distance to Achieve 150 feet --    Gait Training Goal PT LTG, Date Goal Reviewed --  06/19/17   Gait Training Goal PT LTG, Outcome --  goal ongoing

## 2017-06-19 NOTE — CONSULTS
Adult Nutrition  Assessment    Patient Name:  Kierra Scales  YOB: 1929  MRN: [id number]  Admit Date:  6/12/2017    Assessment Date:  6/19/2017          Reason for Assessment       06/19/17 1502    Reason for Assessment    Reason For Assessment/Visit follow up protocol    Identified At Risk By Screening Criteria large or nonhealing wound, burn or pressure ulcer    Time Spent (min) 5    Diagnosis Diagnosis    Gastrointestinal Bowel resection                    Labs/Tests/Procedures/Meds       06/19/17 1503    Labs/Tests/Procedures/Meds    Labs/Tests Review Reviewed;Glucose;Alb    Medication Review Reviewed, pertinent    Significant Vitals reviewed            Physical Findings       06/19/17 1503    Physical Appearance    Gastrointestinal abdominal tenderness    Skin surgical wound              Nutrition Prescription Ordered       06/19/17 1503    Nutrition Prescription PO    Current PO Diet Regular    Supplement --   refusing glucerna            Evaluation of Received Nutrient/Fluid Intake       06/19/17 1505    PO Evaluation    Number of Meals 2    % PO Intake 85            Comments:  Pt was talking to PTA about her mother she believes is still alive. Intake is good. Will d/c glucerna since family said pt is refusing. Will observe pt eating and continue to monitor. Will continue to monitor.        Electronically signed by:  Megan Pompa RD  06/19/17 3:05 PM

## 2017-06-19 NOTE — PLAN OF CARE
Problem: Patient Care Overview (Adult)  Goal: Plan of Care Review  Outcome: Ongoing (interventions implemented as appropriate)    06/19/17 1255   Coping/Psychosocial Response Interventions   Plan Of Care Reviewed With patient   Patient Care Overview   Progress improving   Outcome Evaluation   Outcome Summary/Follow up Plan pt has developed a rasha nd new orders received,pt is cofused at times but is doing well         Problem: Functional Deficit (Adult,Obstetrics,Pediatric)  Goal: Signs and Symptoms of Listed Potential Problems Will be Absent or Manageable (Functional Deficit)  Outcome: Ongoing (interventions implemented as appropriate)    Problem: Fall Risk (Adult)  Goal: Absence of Falls  Outcome: Ongoing (interventions implemented as appropriate)    Problem: Infection, Risk/Actual (Adult)  Goal: Infection Prevention/Resolution  Outcome: Ongoing (interventions implemented as appropriate)

## 2017-06-19 NOTE — THERAPY TREATMENT NOTE
Inpatient Rehabilitation - Occupational Therapy Treatment Note  [facility]     Patient Name: Kierra Scales  : 1929  MRN: [id number]  Today's Date: 2017  Onset of Illness/Injury or Date of Surgery Date: 17  Date of Referral to OT: 17  Referring Physician: Dr. Lucia      Admit Date: 2017    Visit Dx:     ICD-10-CM ICD-9-CM   1. Oral phase dysphagia R13.11 787.21   2. Impaired mobility and ADLs Z74.09 799.89   3. Muscle weakness (generalized) M62.81 728.87   4. Abnormality of gait and mobility R26.9 781.2   5. 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)   • Hypothyroidism   • Dyspnea   • Hyperthyroidism   • Gastrointestinal hemorrhage with melena   • Type 2 diabetes mellitus with hyperglycemia   • Colon cancer   • Chronic blood loss anemia   • Physical deconditioning   • Senile dementia, uncomplicated   • Encounter for rehabilitation   • Adenocarcinoma of sigmoid colon   • Hypokalemia             Adult Rehabilitation Note       17 1410 17 1315 17 1051    Rehab Assessment/Intervention    Discipline occupational therapy assistant  -RC physical therapy assistant  -RW occupational therapy assistant  -RC    Document Type therapy note (daily note)  -RC therapy note (daily note)  -RW therapy note (daily note)  -RC    Subjective Information agree to therapy  -RC agree to therapy  -RW agree to therapy  -RC    Patient Effort, Rehab Treatment adequate  -RC adequate  -RW poor  -RC    Precautions/Limitations fall precautions   gait belt placement (abd incision)  -RC      Recorded by [RC] ROHAN Gomez/L [RW] Danny Beebe PTA [RC] ROHAN Gomez/POONAM    Vital  Signs    Intra Systolic BP Rehab 109  -RC      Intra Treatment Diastolic BP 56  -RC      Recorded by [RC] Ally Kwong, MADRIGAL/L      Pain Assessment    Pain Assessment  No/denies pain  -RW No/denies pain  -RC    Pain Score --   c/o back pain gave no number  -RC      Pain Intervention(s) Rest;Medication (See MAR)  -RC      Recorded by [RC] ADRIANNA GomezA/L [RW] Danny Beebe PTA [RC] Ally Kwong, MADRIGAL/L    Vision Assessment/Intervention    Visual Impairment  WFL with corrective lenses  -RW     Recorded by  [RW] Danny Beebe PTA     Cognitive Assessment/Intervention    Current Cognitive/Communication Assessment impaired  -RC impaired  -RW impaired  -RC    Orientation Status person  -RC oriented to;person  -RW person;place  -RC    Short/Long Term Memory  moderate impairment, short term memory  -RW     Problem Solving ADL/Func. Task   --   pt completed 5 color sorting task w/ extra time and vc's  -RC    Additional Documentation   --   unable to recall names of children 2/4 w/o cues  -RC    Recorded by [RC] ADRIANNA GomezA/L [RW] Danny Beebe, PTA [RC] Ally Kwong, MADRIGAL/L    Transfer Assessment/Treatment    Transfers, Sit-Stand [place]  contact guard assist  -RW     Transfers, Stand-Sit [place] contact guard assist  -RC contact guard assist  -RW     Transfers, Sit-Stand-Sit, Assist Device rolling walker  -RC rolling walker  -RW     Toilet Transfer, [place] contact guard assist  -RC minimum assist (75% patient effort)  -RW contact guard assist  -RC    Toilet Transfer, Assistive Device  rolling walker  -RW rolling walker  -RC    Transfer, Comment   --   vc's to use r/w  -RC    Recorded by [RC] ADRIANNA GomezA/L [RW] Danny Beebe, PTA [RC] Ally Kwong, MADRIGAL/L    Gait Assessment/Treatment    Gait, [place] Level  contact guard assist  -RW     Gait, Assistive Device  rolling walker  -RW     Gait, Distance (Feet)  150  -RW     Recorded by  [RW] Danny Beebe PTA      Functional Mobility    Functional Mobility- Ind. Level contact guard assist  -RC contact guard assist  -RW contact guard assist  -RC    Functional Mobility- Device rolling walker  -RC rolling walker  -RW     Functional Mobility-Distance (Feet) 150  -RC  40  -RC    Recorded by [RC] ROHAN Gomez/L [RW] Danny Beebe PTA [RC] ADRIANNA GomezA/L    Lower Body Dressing Assessment/Training    LB Dressing Assess/Train, Clothing Type shoes;doffing:  -RC      LB Dressing Assess/Train, Position sitting  -RC      LB Dressing Assess/Train, Register conditional independence  -RC      Recorded by [RC] ROHAN Gomez/L      Balance Skills Training    Sitting-Level of Assistance  Distant supervision  -RW     Sitting-Balance Activities Reaching for objects;Ball toss;Trunk control activities  -RC Lateral lean;Forward lean;Reaching for objects;Reaching across midline;Trunk control activities  -RW     Sitting # of Minutes 30  -RC 30+  -RW     Recorded by [RC] ROHAN Gomez/L [RW] Danny Beebe PTA     Therapy Exercises    Bilateral Lower Extremities  --  -RW     BLE Resistance  --  -RW     Bilateral Upper Extremity   --   ue bike 15 min  -RC    Recorded by  [RW] Danny Beebe PTA [RC] ROHAN Gomez/L    Positioning and Restraints    Pre-Treatment Position --   eom  -RC  sitting in chair/recliner  -RC    Post Treatment Position bed  -RC  bed  -RC    In Bed exit alarm on;encouraged to call for assist;call light within reach;notified ns  -  call light within reach;encouraged to call for assist  -RC    Recorded by [RC] ROHAN Gomez/POONAM  [RC] ROHAN Gomez/POONAM      06/19/17 0937 06/19/17 0820 06/17/17 1525    Rehab Assessment/Intervention    Discipline speech language pathologist  -EK physical therapy assistant  -RW physical therapy assistant  -RW    Document Type  therapy note (daily note)  -RW therapy note (daily note)  -RW    Subjective Information no complaints;agree to  therapy  -EK agree to therapy  -RW agree to therapy  -RW    Patient Effort, Rehab Treatment fair  -EK fair  -RW fair  -RW    Recorded by [EK] Tamiko Whitten, CCC-SLP [RW] Danny Beebe PTA [RW] Danny Beebe, ROYAL    Vital Signs    Pretreatment Heart Rate (beats/min)  74  -RW     Pre SpO2 (%)  94  -RW     Recorded by  [RW] Danny Beebe PTA     Pain Assessment    Pain Assessment No/denies pain  -EK  No/denies pain  -RW    Pain Score  --   gives not rating  -RW     Pain Location  Back  -RW     Recorded by [EK] Tamiko Whitten, CCC-SLP [RW] Danny Beebe, ROYAL [RW] Danny Beebe PTA    Vision Assessment/Intervention    Visual Impairment WFL with corrective lenses  -EK WFL with corrective lenses  -RW WFL with corrective lenses  -RW    Recorded by [EK] Tamiko Whitten, CCC-SLP [RW] Danny Beebe PTA [RW] Danny Beebe PTA    Cognitive Assessment/Intervention    Current Cognitive/Communication Assessment impaired  -EK impaired  -RW impaired  -RW    Orientation Status oriented to;person  -EK oriented to;person  -RW oriented to;person  -RW    Follows Commands/Answers Questions 50% of the time;needs cueing;needs increased time;needs repetition  -EK 50% of the time;needs cueing  -RW 75% of the time  -RW    Personal Safety   decreased awareness, need for safety;decreased insight to deficits  -RW    Short/Long Term Memory severe impairment, short term memory  -EK moderate impairment, short term memory  -RW     Recorded by [EK] Tamiko Whitten, [facility]-SLP [RW] Danny Beebe, PTA [RW] Danny Beebe, PTA    Improve ability to follow directions    Improve ability to follow directions: two-step commands  -EK      Status: Improve ability to follow directions Progress slower than expected  -EK      Ability to Follow Directions Progress 30%;with consistent cues  -EK      Comments: Improve ability to follow directions pt requires mod to max cues for task completion  -EK       Recorded by [EK] OSIRIS Foley      Improve ability to comprehend questions    Improve ability to comprehend questions: simple yes/no questions;simple general questions;90%  -EK      Status: Improve ability to comprehend questions Progress slower than expected  -EK      Ability to Comprehend Questions Progress 50%;with consistent cues  -EK      Recorded by [EK] OSIRIS Foley      Improve word retrieval skills    Improve word retrieval skills by: naming an object/pic;answer WH question with one word;completing a divergent task;completing a convergent task;90%  -EK      Status: Improve word retrieval skills Progress slower than expected  -EK      Word Retrieval Skills Progress 50%  -EK      Comments: Improve word retrieval skills Pt had difficulty with completing simple sentences and answering questions about a pictured scene.   -EK      Recorded by [EK] OSIRIS Foley      Improve orientation    Improve orientation through: demonstrating orientation to day;demonstrating orientation to year;demonstrating orientation to place;80%;use environmental aids to assist with orientation  -EK      Status: Improve orientation through Progress slower than expected  -EK      Orientation Progress 10%;with consistent cues  -EK      Comments: Improve orientation through mod to max cues; F;2  -EK      Recorded by [EK] OSIRIS Foley      Improve memory skills    Improve memory skills through: recall details of the day;50%  -EK      Status: Improve memory skills Progress slower than expected  -EK      Memory Skills Progress 10%  -EK      Comments: Improve memory skills Pt could not recall morning meal and name of grandchild who was present and then stepped away.   -EK      Recorded by [EK] LILIAN FoleySLP      Bed Mobility, Assessment/Treatment    Bed Mobility, Assistive Device   bed rails;head of bed elevated  -RW    Bed Mob,  Supine to Sit, [place]   supervision required  -RW    Bed Mob, Sit to Supine, [place]   --  -RW    Recorded by   [RW] Danny Beebe PTA    Transfer Assessment/Treatment    Transfers, Chair-Bed [place]   --  -RW    Transfers, Sit-Stand [place]  contact guard assist  -RW contact guard assist  -RW    Transfers, Stand-Sit [place]  contact guard assist  -RW contact guard assist  -RW    Transfers, Sit-Stand-Sit, Assist Device  rolling walker  -RW rolling walker  -RW    Toilet Transfer, [place]  minimum assist (75% patient effort)  -RW --  -RW    Toilet Transfer, Assistive Device  rolling walker  -RW --  -RW    Transfer, Comment  pt came walking out of bathroom w/o calling for assist. no walker  -RW     Recorded by  [RW] Danny Beebe PTA [RW] Danny Beebe PTA    Gait Assessment/Treatment    Gait, [place] Level  contact guard assist  -RW contact guard assist  -RW    Gait, Assistive Device  rolling walker  -RW rolling walker  -RW    Gait, Distance (Feet)  150    x 2  -   x 2  -RW    Recorded by  [RW] Danny Beebe PTA [RW] Danny Beebe PTA    Stairs Assessment/Treatment    Stairs, Handrail Location   --  -RW    Stairs, [place] Level   --  -RW    Recorded by   [RW] Danny Beebe PTA    Lower Body Dressing Assessment/Training    LB Dressing Assess/Train, Clothing Type  shoes;donning:  -RW shoes;donning:  -RW    Recorded by  [RW] Danny Beebe PTA [RW] Danny Beebe, PTA    Balance Skills Training    Standing-Level of Assistance   --  -RW    Standing-Balance Activities   --  -RW    Gait Balance-Level of Assistance   --  -RW    Gait Balance Support   --  -RW    Gait Balance Activities   --  -RW    Recorded by   [RW] Danny Beebe PTA    Therapy Exercises    Bilateral Lower Extremities  AROM:;20 reps;sitting;heel raises;hip flexion;knee flexion;15 reps;standing  -RW AROM:;20 reps;sitting;heel raises;hip flexion;knee flexion;15 reps;standing  -RW    BLE Resistance   theraband  -RW theraband  -RW    Recorded by  [RW] Danny Beebe PTA [RW] Danny Beebe PTA    Positioning and Restraints    Pre-Treatment Position sitting in chair/recliner  -EK sitting in chair/recliner  -RW in bed  -RW    Post Treatment Position chair  -EK bathroom  -RW wheelchair  -RW    In Wheelchair   call light within reach;encouraged to call for assist  -RW    Bathroom notified nsg  -EK with nsg;call light within reach;encouraged to call for assist  -RW     Recorded by [EK] Tamiko Whitten, CCC-SLP [RW] Danny Beebe PTA [RW] Danny Beebe PTA      06/17/17 0911 06/17/17 0759 06/17/17 0600    Rehab Assessment/Intervention    Discipline physical therapy assistant  -RW speech language pathologist  -EK occupational therapy assistant  -KD    Document Type therapy note (daily note)  -RW therapy note (daily note)  -EK therapy note (daily note)  -KD    Subjective Information agree to therapy  -RW no complaints;agree to therapy  -EK agree to therapy  -KD    Patient Effort, Rehab Treatment good  -RW good  -EK     Recorded by [RW] Danny Beebe PTA [EK] Tamiko Whitten, CCC-SLP [KD] Nathalia Irizarry, MADRIGAL/L    Vital Signs    Intra Systolic BP Rehab 133  -RW      Intra Treatment Diastolic BP 59  -RW      Intratreatment Heart Rate (beats/min) 69  -RW      Pre Patient Position   Sitting  -KD    Intra Patient Position   Standing  -KD    Post Patient Position   Sitting  -KD    Recorded by [RW] Danny Beebe PTA  [KD] Nathalia Irizarry, MADRIGAL/L    Pain Assessment    Pain Assessment No/denies pain  -RW No/denies pain  -EK No/denies pain  -KD    [place] FACES Pain Rating  0  -EK     Pain Score  0  -EK     Recorded by [RW] Danny Beebe PTA [EK] Tamiko Whitten, CCC-SLP [KD] Nathalia Irizarry, MADRIGAL/L    Vision Assessment/Intervention    Visual Impairment WFL with corrective lenses  -RW      Recorded by [RW] Danny Beebe PTA      Cognitive Assessment/Intervention    Current  Cognitive/Communication Assessment impaired  -RW impaired  -EK impaired  -KD    Orientation Status oriented to;person  -RW disoriented x 4  -EK oriented to;person     -KD    Follows Commands/Answers Questions 75% of the time  -RW 50% of the time  -EK     Personal Safety decreased awareness, need for safety;decreased insight to deficits  -RW decreased awareness, need for safety;decreased awareness, need for assist;decreased insight to deficits  -EK     Problem Solving ADL/Func. Task   with min verbal cues   25 pc shape sorter  -KD    Recorded by [RW] Danny Beebe, ROYAL [EK] Tamiko Whitten [facility]-SLP [KD] ROHAN Garcia/L    Improve ability to follow directions    Improve ability to follow directions:  two-step commands  -EK     Status: Improve ability to follow directions  Progress slower than expected  -EK     Recorded by  [EK] Tamiko Whitten CCC-SLP     Improve ability to comprehend questions    Improve ability to comprehend questions:  simple yes/no questions;simple general questions;90%  -EK     Status: Improve ability to comprehend questions  Progress slower than expected  -EK     Recorded by  [EK] Tamiko Whitten CCC-SLP     Improve word retrieval skills    Improve word retrieval skills by:  naming an object/pic;answer WH question with one word;completing a divergent task;completing a convergent task;90%  -EK     Status: Improve word retrieval skills  Progressing as expected  -EK     Recorded by  [EK] Tamiko Whitten CCC-SLP     Improve orientation    Improve orientation through:  demonstrating orientation to day;demonstrating orientation to year;demonstrating orientation to place;80%;use environmental aids to assist with orientation  -EK     Status: Improve orientation through  Progress slower than expected  -EK     Recorded by  [EK] Tamiko Whitten [facility]-SLP     Improve memory skills    Improve memory skills through:  recall details of  the day;50%  -EK     Status: Improve memory skills  Progress slower than expected  -EK     Recorded by  [EK] Tamiko Whitten, CCC-SLP     Bed Mobility, Assessment/Treatment    Bed Mobility, Assistive Device bed rails;head of bed elevated  -RW      Bed Mobility, Roll Left, [place] --  -RW      Bed Mob, Sit to Supine, [place] supervision required  -RW      Recorded by [RW] Danny Beebe PTA      Transfer Assessment/Treatment    Transfers, Chair-Bed [place] contact guard assist  -RW      Transfers, Sit-Stand [place] contact guard assist  -RW  contact guard assist  -KD    Transfers, Stand-Sit [place] contact guard assist  -RW  contact guard assist  -KD    Transfers, Sit-Stand-Sit, Assist Device rolling walker  -RW  rolling walker  -KD    Toilet Transfer, [place] minimum assist (75% patient effort)  -RW  minimum assist (75% patient effort)  -KD    Toilet Transfer, Assistive Device rolling walker  -RW      Recorded by [RW] Danny Beebe PTA  [KD] Nathalia Irizarry, MADRIGAL/L    Gait Assessment/Treatment    Gait, [place] Level contact guard assist  -RW      Gait, Assistive Device rolling walker  -RW      Gait, Distance (Feet) 170    x 2  -RW      Recorded by [RW] Danny Beebe PTA      Stairs Assessment/Treatment    Number of Stairs 4  -RW      Stairs, Handrail Location both sides  -RW      Stairs, [place] Level supervision required  -RW      Recorded by [RW] Danny Beebe PTA      Functional Mobility    Functional Mobility- Ind. Level   contact guard assist  -KD    Functional Mobility- Device   rolling walker  -KD    Functional Mobility-Distance (Feet)   --   100  -KD    Recorded by   [KD] ADRIANNA GarciaA/L    Wheelchair Training/Management    Wheelchair, Distance Propelled 150  -RW  --   100  -KD    Wheelchair Training Comment sba and lots of v cues  -RW  sba  -KD    Recorded by [RW] Danny Beebe PTA  [KD] Nathalia Irizarry, MADRIGAL/L    Lower Body Dressing  Assessment/Training    LB Dressing Assess/Train, Clothing Type doffing:;shoes  -RW  donning:;shoes  -KD    LB Dressing Assess/Train, Position   sitting  -KD    LB Dressing Assess/Train, [place]   set up required  -KD    Recorded by [RW] Danny Beebe PTA  [KD] ROHAN Garcia/L    Grooming Assessment/Training    Grooming Assess/Train, Assistive Device   --   oral care-comb hair- wash face  -KD    Grooming Assess/Train, Position   sink side;sitting  -KD    Grooming Assess/Train, [place] Level   set up required  -KD    Recorded by   [KD] ADRIANNA GarciaA/L    Balance Skills Training    Standing-Level of Assistance Minimum assistance  -RW  Contact guard  -KD    Standing-Balance Activities Compliant surfaces  -RW  --   static  -KD    Standing Balance # of Minutes   8  -KD    Gait Balance-Level of Assistance Minimum assistance  -RW      Gait Balance Support parallel bars  -RW      Gait Balance Activities side-stepping  -RW      Recorded by [RW] Danny Beebe PTA  [KD] ROHAN Garcia/L    Therapy Exercises    Bilateral Lower Extremities AROM:;20 reps;sitting;heel raises;hip flexion;knee flexion  -RW      BLE Resistance theraband  -RW      Bilateral Upper Extremity   --   UEE x 10 mins w/ RB's  -KD    Recorded by [RW] Danny Beebe PTA  [KD] ROHAN Garcia/L    Positioning and Restraints    Pre-Treatment Position sitting in chair/recliner  -RW sitting in chair/recliner  -EK sitting in chair/recliner  -KD    Post Treatment Position bed  -RW wheelchair  -EK wheelchair  -KD    In Bed call light within reach;encouraged to call for assist  -RW call light within reach;notified nsg;encouraged to call for assist   tag alarm  -EK     In Wheelchair   sitting   eating b'fast [place] [facility]  -KD    Recorded by [RW] Danny Beebe PTA [EK] Tamiko Whitten, [facility]-SLP [KD] ROHAN Garcia/L      User Key  (r) = Recorded By, (t) = Taken By, (c) = Cosigned By    Initials Name Effective Dates     KEVIN Whitten, CCC-SLP 04/06/17 -     RW Danny Beebe, PTA 10/17/16 -     RC Ally Kwong, MADRIGAL/L 10/17/16 -     KD Nathalia Irizarry, MADRIGAL/L 10/17/16 -                 OT Goals       06/19/17 1410 06/17/17 0600 06/16/17 0955    Transfer Training OT STG    Transfer Training OT STG, Date Goal Reviewed 06/19/17  -RC 06/17/17  -KD 06/16/17  -KD    Transfer Training OT STG, Outcome goal ongoing  -RC      Dynamic Standing Balance OT STG    Dynamic Standing Balance OT STG, Date Goal Reviewed 06/19/17  -RC 06/17/17  -KD 06/16/17  -KD    Dynamic Standing Balance OT STG, Outcome goal ongoing  -RC      Caregiver Training OT LTG    Caregiver Training OT LTG, Date Goal Reviewed 06/19/17  -RC 06/17/17  -KD 06/16/17  -KD    Caregiver Training OT LTG, Outcome goal ongoing  -RC      ADL OT LTG    ADL OT LTG, Date Goal Reviewed 06/19/17  -RC 06/17/17  -KD 06/16/17  -KD    ADL OT LTG, Outcome goal ongoing  -RC      Activity Tolerance OT STG    Activity Tolerance Goal OT STG, Date Goal Reviewed 06/19/17  -RC 06/17/17  -KD 06/16/17  -KD    Activity Tolerance Goal OT STG, Outcome goal ongoing  -RC        06/15/17 1341 06/14/17 1431 06/14/17 0905    Transfer Training OT STG    Transfer Training OT STG, Date Goal Reviewed 06/15/17  -RC  06/14/17  -RC    Transfer Training OT STG, Outcome goal ongoing  -RC  goal met  -RC    Dynamic Standing Balance OT STG    Dynamic Standing Balance OT STG, Date Goal Reviewed 06/15/17  -RC  06/14/17  -RC    Dynamic Standing Balance OT STG, Outcome goal ongoing  -RC  goal ongoing  -RC    Caregiver Training OT LTG    Caregiver Training OT LTG, Date Goal Reviewed 06/15/17  -RC  06/14/17  -RC    Caregiver Training OT LTG, Outcome goal ongoing  -RC  goal ongoing  -RC    ADL OT LTG    ADL OT LTG, Date Goal Reviewed 06/15/17  -RC (P)  06/14/17  -RC 06/14/17  -RC    ADL OT LTG, Outcome goal ongoing  -RC (P)  goal ongoing  -RC goal ongoing  -RC    Activity Tolerance OT STG    Activity Tolerance  Goal OT STG, Date Goal Reviewed 06/15/17  -RC  06/14/17  -    Activity Tolerance Goal OT STG, Outcome goal ongoing  -  goal ongoing  -RC      06/13/17 0759 06/12/17 1414 06/09/17 1725    Transfer Training OT STG    Transfer Training OT STG, Date Established 06/13/17  -  06/09/17  -RW    Transfer Training OT STG, Time to Achieve 2 wks  -BH  5 - 7 days  -RW    Transfer Training OT STG, Activity Type sit to stand/stand to sit;bed to chair /chair to bed;toilet  -  toilet  -RW    Transfer Training OT STG, [place] Level supervision required;set up required   AE/AD as needed  -  supervision required  -    Transfer Training OT STG, Date Goal Reviewed  06/12/17  -     Transfer Training OT STG, Outcome  goal not met  -     Transfer Training OT STG, Reason Goal Not Met  discharged from facility  -     Dynamic Standing Balance OT STG    Dynamic Standing Balance OT STG, Date Established 06/13/17  -  06/09/17  -    Dynamic Standing Balance OT STG, Time to Achieve 2 wks  -BH  5 - 7 days  -RW    Dynamic Standing Balance OT STG, [place] Level supervision required   5 minute no LOB or increased fatigue  -  supervision required  -    Dynamic Standing Balance OT STG, Assist Device   assistive Device  -    Dynamic Standing Balance OT STG, Additional Goal   5 min  -RW    Dynamic Standing Balance OT STG, Date Goal Reviewed  06/12/17  -     Dynamic Standing Balance OT STG, Outcome  goal not met  -     Dynamic Standing Balance OT STG, Reason Goal Not Met  discharged from facility  -     Caregiver Training OT LTG    Caregiver Training OT LTG, Date Established 06/13/17  -      Caregiver Training OT LTG, Time to Achieve by discharge  -      Caregiver Training OT LTG, [place] Level able to assist adequately;able to cue patient adequately   t/f, safety at home, [facility]  -      ADL OT LTG    ADL OT LTG, Date Established 06/13/17  -  06/09/17  -    ADL OT LTG, Time to Achieve by discharge  -   2 wks  -    ADL OT LTG, Activity Type ADL skills  -  ADL skills  -    ADL OT LTG, [place] Level standby assist;modified independent;setup;assistive device  -  standby assist  -    ADL OT LTG, Date Goal Reviewed  06/12/17  -     ADL OT LTG, Outcome  goal not met  -     ADL OT LTG, Reason Goal Not Met  discharged from facility  -     Functional Mobility OT LTG    Functional Mobility Goal OT LTG, Date Established   06/09/17  -    Functional Mobility Goal OT LTG, Time to Achieve   2 wks  -    Functional Mobility Goal OT LTG, [place] Level   supervision  -    Functional Mobility Goal OT LTG, Distance to Achieve   to the bathroom  -    Functional Mobility Goal OT LTG, Date Goal Reviewed  06/12/17  -     Functional Mobility Goal OT LTG, Outcome  goal not met  -     Functional Mobility Goal OT LTG, Reason Goal Not Met  discharged from facility  -     Activity Tolerance OT STG    Activity Tolerance Goal OT STG, Date Established 06/13/17  -      Activity Tolerance Goal OT STG, Time to Achieve 2 wks  -      Activity Tolerance Goal OT STG, Activity Level 20 min activity;O2 sat >/equal to 90%;with 1 rest break  -        User Key  (r) = Recorded By, (t) = Taken By, (c) = Cosigned By    Initials Name Provider Type     Rebeka Carrillo, OTR/L Occupational Therapist    RW Cami Mon, OTR/L Occupational Therapist    RC Ally Kwong MADRIGAL/L Occupational Therapy Assistant    EVE Irizarry MADRIGAL/L Occupational Therapy Assistant    RM Catie Sheriff, OT Occupational Therapist          Occupational Therapy Education     Title: PT OT SLP Therapies (Active)     Topic: Occupational Therapy (Active)     Point: ADL training (Active)    Description: Instruct learner(s) on proper safety adaptation and remediation techniques during self care or transfers.   Instruct in proper use of assistive devices.    Learning Progress Summary    Learner Readiness Method Response Comment Documented  by Status   Patient Acceptance E NR,NL   06/19/17 1515 Active    Acceptance E NR   06/15/17 1425 Active    Acceptance E NR   06/14/17 1430 Active    Acceptance E VU,NR Educated pt about OT and POC. Educated pt not to get up on her own and how to use call button. Educated pt on safety with t/f and ADL.  06/13/17 1342 Done               Point: Precautions (Active)    Description: Instruct learner(s) on prescribed precautions during self-care and functional transfers.    Learning Progress Summary    Learner Readiness Method Response Comment Documented by Status   Patient Acceptance E NR,NL   06/19/17 1515 Active    Acceptance E VU   06/18/17 1701 Done    Acceptance E VU,NR Educated pt about OT and POC. Educated pt not to get up on her own and how to use call button. Educated pt on safety with t/f and ADL.  06/13/17 1342 Done               Point: Body mechanics (Active)    Description: Instruct learner(s) on proper positioning and spine alignment during self-care, functional mobility activities and/or exercises.    Learning Progress Summary    Learner Readiness Method Response Comment Documented by Status   Patient Acceptance E NR,NL   06/19/17 1515 Active    Acceptance E VU   06/18/17 1701 Done    Acceptance E VU,NR Educated pt about OT and POC. Educated pt not to get up on her own and how to use call button. Educated pt on safety with t/f and ADL.  06/13/17 1342 Done                      User Key     Initials Effective Dates Name Provider Type Discipline     10/17/16 -  EDIS Tolbert/L Occupational Therapist OT     10/17/16 -  Sadia Neal, RN Registered Nurse Nurse     10/17/16 -  ROHAN Gomez/L Occupational Therapy Assistant OT                  OT Recommendation and Plan  Anticipated Discharge Disposition: home with 24/7 care, home with home health, skilled nursing facility (depends on progress)  Planned Therapy Interventions: activity intolerance, adaptive equipment  training, ADL retraining, balance training, bed mobility training, energy conservation, fine motor coordination training, home exercise program, motor coordination training, strengthening, transfer training  Therapy Frequency: other (see comments) (3-14x a week)  Plan of Care Review  Plan Of Care Reviewed With: patient  Progress: progress toward functional goals is gradual  Outcome Summary/Follow up Plan: required vc's to stay on task innitate task, pt pulled her IV out while washing arms. if d/c recommend 24/7 supervision        Outcome Measures       06/19/17 1410 06/19/17 0820 06/17/17 1000    How much help from another person do you currently need...    Turning from your back to your side while in flat bed without using bedrails?  3  -RW 3  -RW    Moving from lying on back to sitting on the side of a flat bed without bedrails?  3  -RW 3  -RW    Moving to and from a bed to a chair (including a wheelchair)?  3  -RW 3  -RW    Standing up from a chair using your arms (e.g., wheelchair, bedside chair)?  3  -RW 3  -RW    Climbing 3-5 steps with a railing?  3  -RW 3  -RW    To walk in hospital room?  3  -RW 3  -RW    AM-PAC 6 Clicks Score  18  -RW 18  -RW    How much help from another is currently needed...    Putting on and taking off regular lower body clothing? 3  -RC      Bathing (including washing, rinsing, and drying) 3  -RC      Toileting (which includes using toilet bed pan or urinal) 3  -RC      Putting on and taking off regular upper body clothing 4  -RC      Taking care of personal grooming (such as brushing teeth) 3  -RC      Eating meals 4  -RC      Score 20  -RC        06/17/17 0600          How much help from another is currently needed...    Putting on and taking off regular lower body clothing? 3  -KD      Bathing (including washing, rinsing, and drying) 3  -KD      Toileting (which includes using toilet bed pan or urinal) 3  -KD      Putting on and taking off regular upper body clothing 4  -KD       Taking care of personal grooming (such as brushing teeth) 3  -KD      Eating meals 4  -KD      Score 20  -KD        User Key  (r) = Recorded By, (t) = Taken By, (c) = Cosigned By    Initials Name Provider Type    RESHMA Beebe, PTA Physical Therapy Assistant    RC Ally ZOE Varghese, MADRIGAL/L Occupational Therapy Assistant    EVE Irizarry MADRIGAL/L Occupational Therapy Assistant           Time Calculation:         Time Calculation- OT       06/19/17 1517 06/19/17 1358       Time Calculation- OT    OT Start Time 1410  -RC 1051  -RC     OT Stop Time 1448  -RC 1151  -RC     OT Time Calculation (min) 38 min  -RC 60 min  -RC     Total Timed Code Minutes- OT 38 minute(s)  -RC 60 minute(s)  -RC       User Key  (r) = Recorded By, (t) = Taken By, (c) = Cosigned By    Initials Name Provider Type     Ally Kwong, MADRIGAL/L Occupational Therapy Assistant           Therapy Charges for Today     Code Description Service Date Service Provider Modifiers Qty    83360620100 HC OT THERAPEUTIC ACT EA 15 MIN 6/19/2017 Ally ZOE Varghese, MADRIGAL/L GO 4    60971215820 HC OT SELF CARE/MGMT/TRAIN EA 15 MIN 6/19/2017 Ally G Varghese, MADRIGAL/L GO 1    37328269766 HC OT THERAPEUTIC ACT EA 15 MIN 6/19/2017 Ally G Varghese, MADRIGAL/L GO 2          OT G-codes  OT Professional Judgement Used?: Yes  OT Functional Scales Options: AM-PAC 6 Clicks Daily Activity (OT)  Score: 16  Functional Limitation: Self care  Self Care Current Status (): At least 40 percent but less than 60 percent impaired, limited or restricted  Self Care Goal Status (): At least 20 percent but less than 40 percent impaired, limited or restricted    Ally G Varghese, MADRIGAL/L  6/19/2017

## 2017-06-19 NOTE — PLAN OF CARE
Problem: Patient Care Overview (Adult)  Goal: Discharge Needs Assessment  Outcome: Ongoing (interventions implemented as appropriate)    06/12/17 1400 06/12/17 1502 06/13/17 1036   Discharge Needs Assessment   Concerns To Be Addressed --  no discharge needs identified --    Equipment Needed After Discharge --  --  walker, rolling   Discharge Facility/Level Of Care Needs --  --  nursing facility, skilled;home with home health  (Or 24/7 care from family -depending on progress while on ARU)   Discharge Planning Comments --  --  --    Current Health   Outpatient/Agency/Support Group Needs skilled nursing facility (specify) --  --    Anticipated Changes Related to Illness inability to care for self --  --    Living Environment   Transportation Available family or friend will provide --  --    Self-Care   Equipment Currently Used at Home --  --  commode     06/19/17 1410   Discharge Needs Assessment   Concerns To Be Addressed --    Equipment Needed After Discharge --    Discharge Facility/Level Of Care Needs --    Discharge Planning Comments 24/7 supervision   Current Health   Outpatient/Agency/Support Group Needs --    Anticipated Changes Related to Illness --    Living Environment   Transportation Available --    Self-Care   Equipment Currently Used at Home --          Problem: Inpatient Occupational Therapy  Goal: Transfer Training Goal 1 STG- OT  Outcome: Ongoing (interventions implemented as appropriate)    06/19/17 1410   Transfer Training OT STG   Transfer Training OT STG, Date Goal Reviewed 06/19/17   Transfer Training OT STG, Outcome goal ongoing       Goal: Dymanic Standing Balance Goal STG- OT  Outcome: Ongoing (interventions implemented as appropriate)    06/19/17 1410   Dynamic Standing Balance OT STG   Dynamic Standing Balance OT STG, Date Goal Reviewed 06/19/17   Dynamic Standing Balance OT STG, Outcome goal ongoing       Goal: Caregiver Training Goal LTG- OT  Outcome: Ongoing (interventions implemented as  appropriate)    06/19/17 1410   Caregiver Training OT LTG   Caregiver Training OT LTG, Date Goal Reviewed 06/19/17   Caregiver Training OT LTG, Outcome goal ongoing       Goal: ADL Goal LTG- OT  Outcome: Ongoing (interventions implemented as appropriate)    06/19/17 1410   ADL OT LTG   ADL OT LTG, Date Goal Reviewed 06/19/17   ADL OT LTG, Outcome goal ongoing       Goal: Activity Tolerance Goal STG- OT  Outcome: Ongoing (interventions implemented as appropriate)    06/19/17 1410   Activity Tolerance OT STG   Activity Tolerance Goal OT STG, Date Goal Reviewed 06/19/17   Activity Tolerance Goal OT STG, Outcome goal ongoing

## 2017-06-19 NOTE — PLAN OF CARE
Problem: Patient Care Overview (Adult)  Goal: Plan of Care Review  Outcome: Ongoing (interventions implemented as appropriate)    06/19/17 0310   Coping/Psychosocial Response Interventions   Plan Of Care Reviewed With patient   Patient Care Overview   Progress no change   Outcome Evaluation   Outcome Summary/Follow up Plan pt resting well this shift.         Problem: Functional Deficit (Adult,Obstetrics,Pediatric)  Goal: Signs and Symptoms of Listed Potential Problems Will be Absent or Manageable (Functional Deficit)  Outcome: Ongoing (interventions implemented as appropriate)    06/19/17 0310   Functional Deficit   Problems Assessed (Functional Deficit) all   Problems Present (Functional Deficit) functional activity tolerance impairment;mobility impairment         Problem: Fall Risk (Adult)  Goal: Absence of Falls  Outcome: Ongoing (interventions implemented as appropriate)    06/19/17 0310   Fall Risk (Adult)   Absence of Falls achieves outcome         Problem: Infection, Risk/Actual (Adult)  Goal: Infection Prevention/Resolution  Outcome: Ongoing (interventions implemented as appropriate)    06/19/17 0310   Infection, Risk/Actual (Adult)   Infection Prevention/Resolution making progress toward outcome

## 2017-06-19 NOTE — THERAPY TREATMENT NOTE
Inpatient Rehabilitation - Physical Therapy Treatment Note  [facility]     Patient Name: Kierra Scales  : 1929  MRN: [id number]  Today's Date: 2017  Onset of Illness/Injury or Date of Surgery Date: 17  Date of Referral to PT: 17  Referring Physician: Dr. Lucia    Admit Date: 2017    Visit Dx:    ICD-10-CM ICD-9-CM   1. Oral phase dysphagia R13.11 787.21   2. Impaired mobility and ADLs Z74.09 799.89   3. Muscle weakness (generalized) M62.81 728.87   4. Abnormality of gait and mobility R26.9 781.2   5. 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)   • Hypothyroidism   • Dyspnea   • Hyperthyroidism   • Gastrointestinal hemorrhage with melena   • Type 2 diabetes mellitus with hyperglycemia   • Colon cancer   • Chronic blood loss anemia   • Physical deconditioning   • Senile dementia, uncomplicated   • Encounter for rehabilitation   • Adenocarcinoma of sigmoid colon   • Hypokalemia               Adult Rehabilitation Note       17 1315 17 1051 17 0937    Rehab Assessment/Intervention    Discipline physical therapy assistant  -RW occupational therapy assistant  -RC speech language pathologist  -EK    Document Type therapy note (daily note)  -RW therapy note (daily note)  -RC     Subjective Information agree to therapy  -RW agree to therapy  -RC no complaints;agree to therapy  -EK    Patient Effort, Rehab Treatment adequate  -RW poor  -RC fair  -EK    Recorded by [RW] Danny Beebe PTA [RC] ROHAN Gomez/POONAM [EK] Tamiko Whitten CCC-SLP    Pain Assessment    Pain Assessment No/denies pain  -RW No/denies pain  -RC No/denies pain  -EK    Recorded by  [RW] Danny Beebe PTA [RC] ADRIANNA GomezA/L [EK] Tamiko Whitten, CCC-SLP    Vision Assessment/Intervention    Visual Impairment WFL with corrective lenses  -RW  WFL with corrective lenses  -EK    Recorded by [RW] Danny Beebe PTA  [EK] Tamiko Whitten, CCC-SLP    Cognitive Assessment/Intervention    Current Cognitive/Communication Assessment impaired  -RW impaired  -RC impaired  -EK    Orientation Status oriented to;person  -RW person;place  -RC oriented to;person  -EK    Follows Commands/Answers Questions   50% of the time;needs cueing;needs increased time;needs repetition  -EK    Short/Long Term Memory moderate impairment, short term memory  -RW  severe impairment, short term memory  -EK    Problem Solving ADL/Func. Task  --   pt completed 5 color sorting task w/ extra time and vc's  -RC     Additional Documentation  --   unable to recall names of children 2/4 w/o cues  -RC     Recorded by [RW] Danny Beebe PTA [RC] Ally Kwong, MADRIGAL/L [EK] Tamiko Whitten, CCC-SLP    Improve ability to follow directions    Improve ability to follow directions:   two-step commands  -EK    Status: Improve ability to follow directions   Progress slower than expected  -EK    Ability to Follow Directions Progress   30%;with consistent cues  -EK    Comments: Improve ability to follow directions   pt requires mod to max cues for task completion  -EK    Recorded by   [EK] Tamiko Whitten, CCC-SLP    Improve ability to comprehend questions    Improve ability to comprehend questions:   simple yes/no questions;simple general questions;90%  -EK    Status: Improve ability to comprehend questions   Progress slower than expected  -EK    Ability to Comprehend Questions Progress   50%;with consistent cues  -EK    Recorded by   [EK] Tamiko Whitten CCC-SLP    Improve word retrieval skills    Improve word retrieval skills by:   naming an object/pic;answer WH question  with one word;completing a divergent task;completing a convergent task;90%  -EK    Status: Improve word retrieval skills   Progress slower than expected  -EK    Word Retrieval Skills Progress   50%  -EK    Comments: Improve word retrieval skills   Pt had difficulty with completing simple sentences and answering questions about a pictured scene.   -EK    Recorded by   [EK] Tamiko Whitten CCC-SLP    Improve orientation    Improve orientation through:   demonstrating orientation to day;demonstrating orientation to year;demonstrating orientation to place;80%;use environmental aids to assist with orientation  -EK    Status: Improve orientation through   Progress slower than expected  -EK    Orientation Progress   10%;with consistent cues  -EK    Comments: Improve orientation through   mod to max cues; F;2  -EK    Recorded by   [EK] Tamiko Whitten CCC-SLP    Improve memory skills    Improve memory skills through:   recall details of the day;50%  -EK    Status: Improve memory skills   Progress slower than expected  -EK    Memory Skills Progress   10%  -EK    Comments: Improve memory skills   Pt could not recall morning meal and name of grandchild who was present and then stepped away.   -EK    Recorded by   [EK] Tamiko Whitten CCC-SLP    Transfer Assessment/Treatment    Transfers, Sit-Stand [place] contact guard assist  -RW      Transfers, Stand-Sit [place] contact guard assist  -RW      Transfers, Sit-Stand-Sit, Assist Device rolling walker  -RW      Toilet Transfer, [place] minimum assist (75% patient effort)  -RW contact guard assist  -RC     Toilet Transfer, Assistive Device rolling walker  -RW rolling walker  -RC     Transfer, Comment  --   vc's to use r/w  -RC     Recorded by [RW] Danny Beebe, PTA [RC] ROHAN Gomez/L     Gait Assessment/Treatment    Gait, [place] Level contact guard assist  -RW      Gait, Assistive Device rolling walker  -RW       Gait, Distance (Feet) 150  -RW      Recorded by [RW] Danny Beebe PTA      Functional Mobility    Functional Mobility- Ind. Level contact guard assist  -RW contact guard assist  -RC     Functional Mobility- Device rolling walker  -RW      Functional Mobility-Distance (Feet)  40  -RC     Recorded by [RW] Danny Beebe PTA [RC] ROHAN Gomez/L     Balance Skills Training    Sitting-Level of Assistance Distant supervision  -RW      Sitting-Balance Activities Lateral lean;Forward lean;Reaching for objects;Reaching across midline;Trunk control activities  -RW      Sitting # of Minutes 30+  -RW      Recorded by [RW] Danny Beebe PTA      Therapy Exercises    Bilateral Lower Extremities --  -RW      BLE Resistance --  -RW      Bilateral Upper Extremity  --   ue bike 15 min  -RC     Recorded by [RW] Danny Beebe PTA [RC] ROHAN Gomez/L     Positioning and Restraints    Pre-Treatment Position  sitting in chair/recliner  -RC sitting in chair/recliner  -EK    Post Treatment Position  bed  -RC chair  -EK    In Bed  call light within reach;encouraged to call for assist  -RC     Bathroom   notified nsg  -EK    Recorded by  [RC] ROHAN Gomez/POONAM [EK] Tamiko Whitten, CCC-SLP      06/19/17 0820 06/17/17 1525 06/17/17 0911    Rehab Assessment/Intervention    Discipline physical therapy assistant  -RW physical therapy assistant  -RW physical therapy assistant  -RW    Document Type therapy note (daily note)  -RW therapy note (daily note)  -RW therapy note (daily note)  -RW    Subjective Information agree to therapy  -RW agree to therapy  -RW agree to therapy  -RW    Patient Effort, Rehab Treatment fair  -RW fair  -RW good  -RW    Recorded by [RW] Danny Beebe PTA [RW] Danny Beebe PTA [RW] Danny Beebe PTA    Vital Signs    Intra Systolic BP Rehab   133  -RW    Intra Treatment Diastolic BP   59  -RW    Pretreatment Heart Rate (beats/min) 74  -RW      Intratreatment Heart Rate  (beats/min)   69  -RW    Pre SpO2 (%) 94  -RW      Recorded by [RW] Danny Beebe PTA  [RW] Danny Beebe PTA    Pain Assessment    Pain Assessment  No/denies pain  -RW No/denies pain  -RW    Pain Score --   gives not rating  -RW      Pain Location Back  -RW      Recorded by [RW] Danny Beebe PTA [RW] Danny Beebe, PTA [RW] Danny Beebe PTA    Vision Assessment/Intervention    Visual Impairment WFL with corrective lenses  -RW WFL with corrective lenses  -RW WFL with corrective lenses  -RW    Recorded by [RW] Danny Beebe, ROYAL [RW] Danny Beebe, PTA [RW] Danny Beebe PTA    Cognitive Assessment/Intervention    Current Cognitive/Communication Assessment impaired  -RW impaired  -RW impaired  -RW    Orientation Status oriented to;person  -RW oriented to;person  -RW oriented to;person  -RW    Follows Commands/Answers Questions 50% of the time;needs cueing  -RW 75% of the time  -RW 75% of the time  -RW    Personal Safety  decreased awareness, need for safety;decreased insight to deficits  -RW decreased awareness, need for safety;decreased insight to deficits  -RW    Short/Long Term Memory moderate impairment, short term memory  -RW      Recorded by [RW] Danny Beebe PTA [RW] Danny Beebe, ROYAL [RW] Danny Beebe PTA    Bed Mobility, Assessment/Treatment    Bed Mobility, Assistive Device  bed rails;head of bed elevated  -RW bed rails;head of bed elevated  -RW    Bed Mobility, Roll Left, [place]   --  -RW    Bed Mob, Supine to Sit, [place]  supervision required  -RW     Bed Mob, Sit to Supine, [place]  --  -RW supervision required  -RW    Recorded by  [RW] Danny Beebe, PTA [RW] Danny Beebe PTA    Transfer Assessment/Treatment    Transfers, Chair-Bed [place]  --  -RW contact guard assist  -RW    Transfers, Sit-Stand [place] contact guard assist  -RW contact guard assist  -RW contact guard assist  -RW    Transfers, Stand-Sit [place] contact guard assist  -RW  contact guard assist  -RW contact guard assist  -RW    Transfers, Sit-Stand-Sit, Assist Device rolling walker  -RW rolling walker  -RW rolling walker  -RW    Toilet Transfer, [place] minimum assist (75% patient effort)  -RW --  -RW minimum assist (75% patient effort)  -RW    Toilet Transfer, Assistive Device rolling walker  -RW --  -RW rolling walker  -RW    Transfer, Comment pt came walking out of bathroom w/o calling for assist. no walker  -RW      Recorded by [RW] Danny Beebe, ROYAL [RW] Danny Beebe, PTA [RW] Danny Beebe PTA    Gait Assessment/Treatment    Gait, [place] Level contact guard assist  -RW contact guard assist  -RW contact guard assist  -RW    Gait, Assistive Device rolling walker  -RW rolling walker  -RW rolling walker  -RW    Gait, Distance (Feet) 150    x 2  -   x 2  -    x 2  -RW    Recorded by [RW] Danny Beebe PTA [RW] Danny Beebe, PTA [RW] Danny Beebe PTA    Stairs Assessment/Treatment    Number of Stairs   4  -RW    Stairs, Handrail Location  --  -RW both sides  -RW    Stairs, [place] Level  --  -RW supervision required  -RW    Recorded by  [RW] Danny Beebe, ROYAL [RW] Danny Beebe PTA    Wheelchair Training/Management    Wheelchair, Distance Propelled   150  -RW    Wheelchair Training Comment   sba and lots of v cues  -RW    Recorded by   [RW] Danny Beebe PTA    Lower Body Dressing Assessment/Training    LB Dressing Assess/Train, Clothing Type shoes;donning:  -RW shoes;donning:  -RW doffing:;shoes  -RW    Recorded by [RW] Danny Beebe PTA [RW] Danny Beebe, PTA [RW] Danny Beebe, ROYAL    Balance Skills Training    Standing-Level of Assistance  --  -RW Minimum assistance  -RW    Standing-Balance Activities  --  -RW Compliant surfaces  -RW    Gait Balance-Level of Assistance  --  -RW Minimum assistance  -RW    Gait Balance Support  --  -RW parallel bars  -RW    Gait Balance Activities  --  -RW side-stepping  -RW    Recorded by  [RW]  Danny Beebe PTA [RW] Danny Beebe PTA    Therapy Exercises    Bilateral Lower Extremities AROM:;20 reps;sitting;heel raises;hip flexion;knee flexion;15 reps;standing  -RW AROM:;20 reps;sitting;heel raises;hip flexion;knee flexion;15 reps;standing  -RW AROM:;20 reps;sitting;heel raises;hip flexion;knee flexion  -RW    BLE Resistance theraband  -RW theraband  -RW theraband  -RW    Recorded by [RW] Danny Beebe PTA [RW] Danny Beebe PTA [RW] Danny Beebe PTA    Positioning and Restraints    Pre-Treatment Position sitting in chair/recliner  -RW in bed  -RW sitting in chair/recliner  -RW    Post Treatment Position bathroom  -RW wheelchair  -RW bed  -RW    In Bed   call light within reach;encouraged to call for assist  -RW    In Wheelchair  call light within reach;encouraged to call for assist  -RW     Bathroom with nsg;call light within reach;encouraged to call for assist  -RW      Recorded by [RW] Danny Beebe PTA [RW] Danny Beebe PTA [RW] Danny Beebe PTA      06/17/17 0759 06/17/17 0600       Rehab Assessment/Intervention    Discipline speech language pathologist  -EK occupational therapy assistant  -KD     Document Type therapy note (daily note)  -EK therapy note (daily note)  -KD     Subjective Information no complaints;agree to therapy  -EK agree to therapy  -KD     Patient Effort, Rehab Treatment good  -EK      Recorded by [EK] Tamiko Whitten CCC-SLP [KD] ADRIANNA GarciaA/L     Vital Signs    Pre Patient Position  Sitting  -KD     Intra Patient Position  Standing  -KD     Post Patient Position  Sitting  -KD     Recorded by  [KD] Nathalia Irizarry MADRIGAL/L     Pain Assessment    Pain Assessment No/denies pain  -EK No/denies pain  -KD     [place] FACES Pain Rating 0  -EK      Pain Score 0  -EK      Recorded by [EK] Tamiko Whitten CCC-SLP [KD] Nathalia Irizarry MADRIGAL/L     Cognitive Assessment/Intervention    Current Cognitive/Communication Assessment impaired   -EK impaired  -KD     Orientation Status disoriented x 4  -EK oriented to;person     -KD     Follows Commands/Answers Questions 50% of the time  -EK      Personal Safety decreased awareness, need for safety;decreased awareness, need for assist;decreased insight to deficits  -EK      Problem Solving ADL/Func. Task  with min verbal cues   25 pc shape sorter  -KD     Recorded by [EK] Tamiko Whitten CCC-SLP [KD] ROHAN Garcia/POONAM     Improve ability to follow directions    Improve ability to follow directions: two-step commands  -EK      Status: Improve ability to follow directions Progress slower than expected  -EK      Recorded by [EK] Tamiko Whitten CCC-SLP      Improve ability to comprehend questions    Improve ability to comprehend questions: simple yes/no questions;simple general questions;90%  -EK      Status: Improve ability to comprehend questions Progress slower than expected  -EK      Recorded by [EK] Tamiko Whitten CCC-SLP      Improve word retrieval skills    Improve word retrieval skills by: naming an object/pic;answer WH question with one word;completing a divergent task;completing a convergent task;90%  -EK      Status: Improve word retrieval skills Progressing as expected  -EK      Recorded by [EK] Tamiko Whitten CCC-ELISA      Improve orientation    Improve orientation through: demonstrating orientation to day;demonstrating orientation to year;demonstrating orientation to place;80%;use environmental aids to assist with orientation  -EK      Status: Improve orientation through Progress slower than expected  -EK      Recorded by [EK] OSIRIS Foley      Improve memory skills    Improve memory skills through: recall details of the day;50%  -EK      Status: Improve memory skills Progress slower than expected  -EK      Recorded by [EK] Tamiko Whitten CCC-SLP      Transfer Assessment/Treatment    Transfers,  Sit-Stand [employer]  contact guard assist  -KD     Transfers, Stand-Sit [employer]  contact guard assist  -KD     Transfers, Sit-Stand-Sit, Assist Device  rolling walker  -KD     Toilet Transfer, [employer]  minimum assist (75% patient effort)  -KD     Recorded by  [KD] JOSÉ MIGUEL aGrcia     Functional Mobility    Functional Mobility- Ind. Level  contact guard assist  -KD     Functional Mobility- Device  rolling walker  -KD     Functional Mobility-Distance (Feet)  --   100  -KD     Recorded by  [KD] ROHAN Garcia/L     Wheelchair Training/Management    Wheelchair, Distance Propelled  --   100  -KD     Wheelchair Training Comment  sba  -KD     Recorded by  [KD] ROHAN Garcia/L     Lower Body Dressing Assessment/Training    LB Dressing Assess/Train, Clothing Type  donning:;shoes  -KD     LB Dressing Assess/Train, Position  sitting  -KD     LB Dressing Assess/Train, [employer]  set up required  -KD     Recorded by  [KD] ROHAN Garcia/L     Grooming Assessment/Training    Grooming Assess/Train, Assistive Device  --   oral care-comb hair- wash face  -KD     Grooming Assess/Train, Position  sink side;sitting  -KD     Grooming Assess/Train, Indepen Level  set up required  -KD     Recorded by  [KD] ROHAN Garcia/L     Balance Skills Training    Standing-Level of Assistance  Contact guard  -KD     Standing-Balance Activities  --   static  -KD     Standing Balance # of Minutes  8  -KD     Recorded by  [KD] ROHAN Garcia/L     Therapy Exercises    Bilateral Upper Extremity  --   UEE x 10 mins w/ RB's  -KD     Recorded by  [KD] ROHAN Garcia/L     Positioning and Restraints    Pre-Treatment Position sitting in chair/recliner  -EK sitting in chair/recliner  -KD     Post Treatment Position wheelchair  -EK wheelchair  -KD     In Bed call light within reach;notified nsg;encouraged to call for assist   tag alarm  -EK      In Wheelchair  sitting   eating b'fast @ nsg station   -KD     Recorded by [EK] Tamiko Whitten, [facility]-SLP [KD] Nathalia Irizarry, MADRIGAL/L       User Key  (r) = Recorded By, (t) = Taken By, (c) = Cosigned By    Initials Name Effective Dates    EK Tamiko Whitten, [facility]-SLP 04/06/17 -     RW Danny PARRA Abiel, PTA 10/17/16 -     RC Ally Kwong, MADRIGAL/L 10/17/16 -     KD Nathalia Irizarry, MADRIGAL/L 10/17/16 -                 IP PT Goals       06/19/17 1422 06/17/17 1559 06/16/17 1245    Bed Mobility PT LTG    Bed Mobility PT LTG, Date Goal Reviewed 06/19/17  -RW 06/17/17  -RW (P)  06/16/17  -MARLENE    Bed Mobility PT LTG, Outcome goal ongoing  -RW goal ongoing  -RW (P)  goal ongoing  -MARLENE    Transfer Training PT LTG    Transfer Training PT  LTG, Date Goal Reviewed 06/19/17  -RW 06/17/17  -RW (P)  06/16/17  -MARLENE    Transfer Training PT LTG, Outcome goal ongoing  -RW goal ongoing  -RW (P)  goal ongoing  -MARLENE    Gait Training PT LTG    Gait Training Goal PT LTG, Date Goal Reviewed 06/19/17  -RW 06/17/17  -RW (P)  06/16/17  -MARLENE    Gait Training Goal PT LTG, Outcome goal ongoing  -RW goal ongoing  -RW (P)  goal ongoing  -MARLENE      06/16/17 0816 06/15/17 1353 06/14/17 1608    Bed Mobility PT LTG    Bed Mobility PT LTG, Date Goal Reviewed 06/16/17  -MARLENE 06/15/17  -RW 06/14/17  -RW    Bed Mobility PT LTG, Outcome goal ongoing  -MARLENE goal ongoing  -RW goal ongoing  -RW    Transfer Training PT LTG    Transfer Training PT  LTG, Date Goal Reviewed 06/16/17  -MARLENE 06/15/17  -RW 06/14/17  -RW    Transfer Training PT LTG, Outcome goal ongoing  -MARLENE goal ongoing  -RW goal ongoing  -RW    Gait Training PT LTG    Gait Training Goal PT LTG, Date Goal Reviewed 06/16/17  -MARLENE 06/15/17  -RW 06/14/17  -RW    Gait Training Goal PT LTG, Outcome goal ongoing  -MARLENE goal ongoing  -RW goal ongoing  -RW    Stair Training PT LTG    Stair Training Goal PT LTG, Date Goal Reviewed  06/15/17  -RW 06/14/17  -RW    Stair Training Goal PT LTG, Outcome  goal met  -RW goal ongoing  -RW      06/13/17 1036 06/11/17  1403 06/08/17 1110    Bed Mobility PT LTG    Bed Mobility PT LTG, Date Established 06/13/17  -LM      Bed Mobility PT LTG, Time to Achieve by discharge  -LM      Bed Mobility PT LTG, Activity Type supine to sit/sit to supine  -LM      Bed Mobility PT LTG, [place] Level supervision required  -LM      Bed Mobility PT LTG, Additional Goal HOB flat; No BR's  -LM      Bed Mobility PT LTG, Date Goal Reviewed  06/11/17  -TW 06/08/17  -AM    Bed Mobility PT LTG, Outcome goal ongoing  -LM goal ongoing  -TW goal not met  -AM    Transfer Training PT LTG    Transfer Training PT LTG, Date Established 06/13/17  -LM      Transfer Training PT LTG, Time to Achieve by discharge  -LM      Transfer Training PT LTG, Activity Type bed to chair /chair to bed  -LM      Transfer Training PT LTG, [place] Level supervision required  -LM      Transfer Training PT LTG, Assist Device --   AAD  -LM      Transfer Training PT LTG, Outcome goal ongoing  -LM      Gait Training PT STG    Gait Training Goal PT STG, Date Goal Reviewed  06/11/17  -TW 06/08/17  -AM    Gait Training Goal PT STG, Outcome  goal ongoing  -TW goal not met  -AM    Gait Training PT LTG    Gait Training Goal PT LTG, Date Established 06/13/17  -LM      Gait Training Goal PT LTG, Time to Achieve by discharge  -LM      Gait Training Goal PT LTG, [place] Level supervision required  -LM      Gait Training Goal PT LTG, Assist Device --   AAD  -LM      Gait Training Goal PT LTG, Distance to Achieve 150 feet  -LM      Gait Training Goal PT LTG, Outcome goal ongoing  -LM      Stair Training PT LTG    Stair Training Goal PT LTG, Date Established 06/13/17  -LM      Stair Training Goal PT LTG, Time to Achieve by discharge  -LM      Stair Training Goal PT LTG, Number of Steps 4 steps  -LM      Stair Training Goal PT LTG, [place] Level contact guard assist  -LM      Stair Training Goal PT LTG, Assist Device 2 handrails  -LM      Stair Training Goal PT LTG, Date Goal  Reviewed  06/11/17  -TW 06/08/17  -AM    Stair Training Goal PT LTG, Outcome goal ongoing  -LM goal ongoing  -TW goal not met  -AM      06/07/17 1228 06/06/17 1325       Bed Mobility PT LTG    Bed Mobility PT LTG, Date Goal Reviewed 06/07/17  -RW 06/06/17  -AM     Bed Mobility PT LTG, Outcome goal ongoing  -RW goal not met  -AM     Transfer Training PT LTG    Transfer Training PT  LTG, Date Goal Reviewed  06/06/17  -AM     Transfer Training PT LTG, Outcome  goal met  -AM     Gait Training PT STG    Gait Training Goal PT STG, Date Goal Reviewed 06/07/17  -RW 06/06/17  -AM     Gait Training Goal PT STG, Outcome goal ongoing  -RW goal not met  -AM     Stair Training PT LTG    Stair Training Goal PT LTG, Date Goal Reviewed 06/07/17  -RW 06/06/17  -AM     Stair Training Goal PT LTG, Outcome goal ongoing  -RW goal not met  -AM       User Key  (r) = Recorded By, (t) = Taken By, (c) = Cosigned By    Initials Name Provider Type    LM Megan Kruse, PT Physical Therapist    MARLENE Jaspreet Rodas, PTA Physical Therapy Assistant    AM Luciano Sanchez, PTA Physical Therapy Assistant    TW Franco Byers, PTA Physical Therapy Assistant    RW Danny Beebe, PTA Physical Therapy Assistant          Physical Therapy Education     Title: PT OT SLP Therapies (Active)     Topic: Physical Therapy (Active)     Point: Mobility training (Active)    Learning Progress Summary    Learner Readiness Method Response Comment Documented by Status   Patient Nonacceptance E NR pt continues to need consistant safety and direction cues. does not call for assist when asked to.  06/19/17 1111 Active    Acceptance E NR pt needs lots of directions and re-direct for most all activity.  06/17/17 1024 Active    Acceptance E NR   06/16/17 0913 Active    Acceptance E VU,NR reviewed benifits of oob and mobility with assistance.  06/14/17 1252 Done    Acceptance E NR Reviewed safety with transfers - however, pt unable to retain.  Pt's family will require  education.  06/13/17 1436 Active               Point: Precautions (Done)    Learning Progress Summary    Learner Readiness Method Response Comment Documented by Status   Patient Acceptance E VU  KM 06/18/17 1701 Done    Acceptance E VU,NR reviewed benifits of oob and mobility with assistance.  06/14/17 1252 Done    Acceptance E NR Reviewed safety with transfers - however, pt unable to retain.  Pt's family will require education.  06/13/17 1436 Active                      User Key     Initials Effective Dates Name Provider Type Discipline     06/15/16 -  Megan Kruse, PT Physical Therapist PT     10/17/16 -  Sadia Neal, RN Registered Nurse Nurse    MARLENE 10/17/16 -  Jaspreet Rodas, PTA Physical Therapy Assistant PT     10/17/16 -  Danny Beebe, PTA Physical Therapy Assistant PT                    PT Recommendation and Plan  Anticipated Equipment Needs At Discharge: front wheeled walker  Anticipated Discharge Disposition: home with 24/7 care, home with home health  Planned Therapy Interventions: balance training, bed mobility training, gait training, home exercise program, manual therapy techniques, motor coordination training, neuromuscular re-education, patient/family education, postural re-education, ROM (Range of Motion), stair training, strengthening, stretching, transfer training, wheelchair management/propulsion training  PT Frequency: other (see comments) (5-14 times/wk)  Plan of Care Review  Plan Of Care Reviewed With: patient  Progress: progress toward functional goals is gradual  Outcome Summary/Follow up Plan: pt not wanting to participate but does when daughter arrives. improving overall. cont with gt and therex          Outcome Measures       06/19/17 0820 06/17/17 1000 06/17/17 0600    How much help from another person do you currently need...    Turning from your back to your side while in flat bed without using bedrails? 3  -RW 3  -RW     Moving from lying on back to sitting on the  side of a flat bed without bedrails? 3  -RW 3  -RW     Moving to and from a bed to a chair (including a wheelchair)? 3  -RW 3  -RW     Standing up from a chair using your arms (e.g., wheelchair, bedside chair)? 3  -RW 3  -RW     Climbing 3-5 steps with a railing? 3  -RW 3  -RW     To walk in hospital room? 3  -RW 3  -RW     AM-PAC 6 Clicks Score 18  -RW 18  -RW     How much help from another is currently needed...    Putting on and taking off regular lower body clothing?   3  -KD    Bathing (including washing, rinsing, and drying)   3  -KD    Toileting (which includes using toilet bed pan or urinal)   3  -KD    Putting on and taking off regular upper body clothing   4  -KD    Taking care of personal grooming (such as brushing teeth)   3  -KD    Eating meals   4  -KD    Score   20  -KD      User Key  (r) = Recorded By, (t) = Taken By, (c) = Cosigned By    Initials Name Provider Type    RESHMA Beebe PTA Physical Therapy Assistant    ROHAN Sanabria/L Occupational Therapy Assistant           Time Calculation:         PT Charges       06/19/17 1426 06/19/17 1113       Time Calculation    Start Time 1325  -RW 0820  -RW     Stop Time 1410  -RW 0905  -RW     Time Calculation (min) 45 min  -RW 45 min  -RW     Time Calculation- PT    Total Timed Code Minutes- PT 45 minute(s)  -RW 45 minute(s)  -RW       User Key  (r) = Recorded By, (t) = Taken By, (c) = Cosigned By    Initials Name Provider Type    RESHMA Beebe PTA Physical Therapy Assistant          Therapy Charges for Today     Code Description Service Date Service Provider Modifiers Qty    93838285986 HC GAIT TRAINING EA 15 MIN 6/19/2017 Danny Beebe PTA GP 1    96250593759 HC PT THER PROC EA 15 MIN 6/19/2017 Danny Beebe PTA GP 1    11492043256 HC PT THERAPEUTIC ACT EA 15 MIN 6/19/2017 Danny Beebe PTA GP 1    80707627243 HC GAIT TRAINING EA 15 MIN 6/19/2017 Danny Beebe PTA GP 1    73550929522 HC PT THERAPEUTIC ACT EA 15 MIN 6/19/2017  Danny Beebe, PTA GP 2          PT G-Codes  PT Professional Judgement Used?: Yes  Outcome Measure Options: AM-PAC 6 Clicks Basic Mobility (PT)  Score: 18  Functional Limitation: Mobility: Walking and moving around  Mobility: Walking and Moving Around Current Status (): At least 40 percent but less than 60 percent impaired, limited or restricted  Mobility: Walking and Moving Around Goal Status (): At least 20 percent but less than 40 percent impaired, limited or restricted    Danny Beebe, ROYAL  6/19/2017

## 2017-06-19 NOTE — PROGRESS NOTES
GENERAL SURGERY PROGRESS NOTE  Chief Complaint:  Surgery Follow up   LOS: 7 days       Subjective     Interval History:     Feels ok, ate all of breakfast this morning. Her daughter is concerned over possible increased confusion    Continues to work with PT    Objective     Vital Signs  Temp:  [97.2 °F (36.2 °C)-97.6 °F (36.4 °C)] 97.2 °F (36.2 °C)  Heart Rate:  [68-74] 74  Resp:  [18] 18  BP: (126-167)/(58-72) 126/58    Physical Exam:   Abdomen soft  Labs:  Lab Results (last 24 hours)     Procedure Component Value Units Date/Time    POC Glucose Fingerstick [[id number]]  (Abnormal) Collected:  06/18/17 1124    Specimen:  Blood Updated:  06/18/17 1235     Glucose 316 (H) mg/dL       RN NotifiedMeter: [id number]: [id number] SAMMI VIRAMONTESISTA       POC Glucose Fingerstick [[id number]]  (Abnormal) Collected:  06/18/17 1645    Specimen:  Blood Updated:  06/18/17 1714     Glucose 276 (H) mg/dL       RN NotifiedMeter: [id number]: [id number] SAMMI VIRAMONTESISTA       POC Glucose Fingerstick [[id number]]  (Abnormal) Collected:  06/18/17 2012    Specimen:  Blood Updated:  06/18/17 2034     Glucose 267 (H) mg/dL       RN NotifiedMeter: [id number]: [id number] MARISA CRABTREE       POC Glucose Fingerstick [[id number]]  (Normal) Collected:  06/19/17 0602    Specimen:  Blood Updated:  06/19/17 0614     Glucose 102 mg/dL       RN NotifiedMeter: [id number]: [id number] MARISA CRABTREE       CBC & Differential [[id number]] Collected:  06/19/17 0611    Specimen:  Blood Updated:  06/19/17 0638    Narrative:       The following orders were created for panel order CBC & Differential.  Procedure                               Abnormality         Status                     ---------                               -----------         ------                     CBC Auto Differential[[id number]]        Abnormal            Final result                 Please view results for these tests on the individual orders.    CBC Auto Differential  [[id number]]  (Abnormal) Collected:  06/19/17 0611    Specimen:  Blood Updated:  06/19/17 0638     WBC 3.89 10*3/mm3      RBC 3.01 (L) 10*6/mm3      Hemoglobin 9.0 (L) g/dL      Hematocrit 28.1 (L) %      MCV 93.4 fL      MCH 29.9 pg      MCHC 32.0 g/dL      RDW 14.2 %      RDW-SD 48.5 (H) fl      MPV 10.1 fL      Platelets 233 10*3/mm3      Neutrophil % 66.9 %      Lymphocyte % 25.4 %      Monocyte % 6.9 %      Eosinophil % 0.5 %      Basophil % 0.0 %      Immature Grans % 0.3 %      Neutrophils, Absolute 2.60 10*3/mm3      Lymphocytes, Absolute 0.99 10*3/mm3      Monocytes, Absolute 0.27 10*3/mm3      Eosinophils, Absolute 0.02 10*3/mm3      Basophils, Absolute 0.00 10*3/mm3      Immature Grans, Absolute 0.01 10*3/mm3     Renal Function Panel [[id number]]  (Abnormal) Collected:  06/19/17 0611    Specimen:  Blood Updated:  06/19/17 0647     Glucose 109 (H) mg/dL      BUN 10 mg/dL      Creatinine 0.68 mg/dL      Sodium 139 mmol/L      Potassium 4.3 mmol/L      Chloride 104 mmol/L      CO2 27.0 mmol/L      Calcium 9.1 mg/dL      Albumin 3.20 (L) g/dL      Phosphorus 3.2 mg/dL      Anion Gap 8.0 mmol/L      BUN/Creatinine Ratio 14.7     eGFR   Amer 99 (H) mL/min/1.73     Narrative:       The MDRD GFR formula is only valid for adults with stable renal function between ages 18 and 70.             Assessment/Plan     Kierra Scales is a 88 y.o. female who is s/p left colon resection for cancer      Continue care per rehab unit.  Daughter requesting we get UA to check for UTI- will defer to Dr. Lucia.          This document has been electronically signed by Stefano Linn MD on June 19, 2017 8:03 AM        Stefano Linn MD  06/19/17  8:03 AM

## 2017-06-19 NOTE — PLAN OF CARE
Problem: Patient Care Overview (Adult)  Goal: Plan of Care Review  Outcome: Ongoing (interventions implemented as appropriate)    06/19/17 1255 06/19/17 1406   Coping/Psychosocial Response Interventions   Plan Of Care Reviewed With patient --    Patient Care Overview   Progress --  no change   Outcome Evaluation   Outcome Summary/Follow up Plan --  Pt with deficits in memory, orientation, and safety awareness. SLP spoke with two daughters this am regarding decreased memory and orientation.          Problem: Inpatient SLP  Goal: Expressive-Patient will improve expressive language skills to allow optimal participation in care  Outcome: Ongoing (interventions implemented as appropriate)    06/13/17 1506 06/15/17 1103 06/19/17 1406   Expressive- Optimal Participation in Care   Expressive Optimal Participation in Care- SLP, Date Established 06/13/17 --  --    Expressive Optimal Participation in Care- SLP, Time to Achieve by discharge --  --    Expressive Optimal Participation in Care- SLP, Activity Level Patient will improve word retrieval skills --  --    Expressive Optimal Participation in Care- SLP, Date Goal Reviewed --  --  06/19/17   Expressive Optimal Participation in Care- SLP, Outcome --  goal ongoing --    Expressive Optimal Participation in Care- SLP, Reason Goal Not Met --  --  progress slower than expected       Goal: Cognitive-linguistic-Patient will improve Cognitive-linguistic skills to allow optimal participation in care  Outcome: Ongoing (interventions implemented as appropriate)    06/13/17 1506 06/16/17 0940 06/19/17 1406   Cognitive Linguistic- Optimal Participation in Care   Cognitive Linguistic Optimal Participation in Care- SLP, Date Established 06/13/17 --  --    Cognitive Linguistic Optimal Participation in Care- SLP, Time to Achieve by discharge --  --    Cognitive Linguistic Optimal Participation in Care- SLP, Activity Level Patient will improve orientation for increased safety in  environment;Patient will improve memory skills for increased safety in environment --  --    Cognitive Linguistic Optimal Participation in Care- SLP, Date Goal Reviewed --  --  06/19/17   Cognitive Linguistic Optimal Participation in Care- SLP, Outcome --  goal ongoing --        Goal: Receptive Language-Patient will improve receptive language skills to allow optimal participation in care  Outcome: Ongoing (interventions implemented as appropriate)    06/13/17 1506 06/16/17 0940 06/19/17 1406   Receptive Language- Optimal Participation in Care   Receptive Language Optimal Participation in Care- SLP, Date Established 06/13/17 --  --    Receptive Language Optimal Participation in Care- SLP, Time to Achieve by discharge --  --    Receptive Language Optimal Participation in Care- SLP, Activity Level Patient will improve ability to follow directions;Patient will improve ability to comprehend questions --  --    Receptive Language Optimal Participation in Care- SLP, Date Goal Reviewed --  --  06/19/17   Receptive Language Optimal Participation in Care- SLP, Outcome --  goal ongoing --

## 2017-06-19 NOTE — THERAPY TREATMENT NOTE
"Acute Care - Speech Language Pathology Treatment Note  [facility]     Patient Name: Kierra Scales  : 1929  MRN: [id number]  Today's Date: 2017  Referring Physician: Khari      Admit Date: 2017   1. Pt will safely tolerate recommended diet w/no overt s/s of aspiration:GOAL MET previously.   2. Pt will complete one-step directions without objects w/90% acc: GOAL MET previously  3. Pt will complete two-step commands w/90% acc: Pt was 30% acc w/2-step directions using body parts.  4. Pt will complete simple yes/no questions w/90% acc: Pt was 50% acc w/basic yes/no questions with moderate cues.   5. Pt will answer simple \"WH\" questions w/90% acc: Pt was 50% acc w/wh-questions.   6. Pt will complete object/picture naming w/80% acc: Pt was 100% acc w/simple picture naming.GOAL MET  7. Pt will complete convergent naming task w/80% acc: Pt completed at 30% accuracy with minimal cues.   8. Pt will complete simple divergent naming task w/an average of 5 wpm for simple concrete categories: defered this date  9. Pt will complete immediate recall of related words w/80% acc: Discontinued goal previously  10: Pt will recall details of the day w/80% acc: 10% recall with use of aides and max cues and repetition required  11. Pt will complete orientation to JERRY, YR, and Place w/80% acc with use of visual aide: 50% with use of visual aid and verbal cues.     SLP spoke with daughters regarding decreased memory and orientation. Discussed pt in unfamiliar setting and not performing normal daily routine. Pt will need / care.     Visit Dx:      ICD-10-CM ICD-9-CM   1. Oral phase dysphagia R13.11 787.21   2. Impaired mobility and ADLs Z74.09 799.89   3. Muscle weakness (generalized) M62.81 728.87   4. Abnormality of gait and mobility R26.9 781.2   5. 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)   • Hypothyroidism   • Dyspnea   • Hyperthyroidism   • Gastrointestinal hemorrhage with melena   • Type 2 diabetes mellitus with hyperglycemia   • Colon cancer   • Chronic blood loss anemia   • Physical deconditioning   • Senile dementia, uncomplicated   • Encounter for rehabilitation   • Adenocarcinoma of sigmoid colon   • Hypokalemia              Adult Rehabilitation Note       06/19/17 1051 06/19/17 0937 06/19/17 0820    Rehab Assessment/Intervention    Discipline occupational therapy assistant  -RC speech language pathologist  -EK physical therapy assistant  -RW    Document Type therapy note (daily note)  -RC  therapy note (daily note)  -RW    Subjective Information agree to therapy  -RC no complaints;agree to therapy  -EK agree to therapy  -RW    Patient Effort, Rehab Treatment poor  -RC fair  -EK fair  -RW    Recorded by [RC] ADRIANNA GomezA/L [EK] Tamiko Whitten, [facility]-SLP [RW] Danny Beebe, PTA    Vital Signs    Pretreatment Heart Rate (beats/min)   74  -RW    Pre SpO2 (%)   94  -RW    Recorded by   [RW] Danny Beebe, ROYAL    Pain Assessment    Pain Assessment No/denies pain  -RC No/denies pain  -EK     Pain Score   --   gives not rating  -RW    Pain Location   Back  -RW    Recorded by [RC] Ally Kwong, MADRIGAL/L [EK] Tamiko Whitten, [facility]-SLP [RW] Danny Beebe, ROYAL    Vision Assessment/Intervention    Visual Impairment  WFL with corrective lenses  -EK WFL with corrective lenses  -RW    Recorded by  [EK] Tamiko Whitten [facility]-SLP [RW] Danny Beebe, ROYAL    Cognitive Assessment/Intervention    Current Cognitive/Communication Assessment impaired  -RC impaired  -EK impaired  -RW    Orientation Status person;place  -RC oriented to;person  -EK oriented to;person  -RW    Follows  Commands/Answers Questions  50% of the time;needs cueing;needs increased time;needs repetition  -EK 50% of the time;needs cueing  -RW    Short/Long Term Memory  severe impairment, short term memory  -EK moderate impairment, short term memory  -RW    Problem Solving ADL/Func. Task --   pt completed 5 color sorting task w/ extra time and vc's  -RC      Additional Documentation --   unable to recall names of children 2/4 w/o cues  -RC      Recorded by [RC] Ally Kwong, MADRIGAL/POONAM [EK] Tamiko Whitten [facility]-SLP [RW] Danny Beebe, PTA    Improve ability to follow directions    Improve ability to follow directions:  two-step commands  -EK     Status: Improve ability to follow directions  Progress slower than expected  -EK     Ability to Follow Directions Progress  30%;with consistent cues  -EK     Comments: Improve ability to follow directions  pt requires mod to max cues for task completion  -EK     Recorded by  [EK] Tamiko Whitten CCC-SLP     Improve ability to comprehend questions    Improve ability to comprehend questions:  simple yes/no questions;simple general questions;90%  -EK     Status: Improve ability to comprehend questions  Progress slower than expected  -EK     Ability to Comprehend Questions Progress  50%;with consistent cues  -EK     Recorded by  [EK] Tamiko Whitten CCC-SLP     Improve word retrieval skills    Improve word retrieval skills by:  naming an object/pic;answer WH question with one word;completing a divergent task;completing a convergent task;90%  -EK     Status: Improve word retrieval skills  Progress slower than expected  -EK     Word Retrieval Skills Progress  50%  -EK     Comments: Improve word retrieval skills  Pt had difficulty with completing simple sentences and answering questions about a pictured scene.   -EK     Recorded by  [EK] Tamiko Whitten CCC-SLP     Improve orientation    Improve orientation through:  demonstrating  orientation to day;demonstrating orientation to year;demonstrating orientation to place;80%;use environmental aids to assist with orientation  -EK     Status: Improve orientation through  Progress slower than expected  -EK     Orientation Progress  10%;with consistent cues  -EK     Comments: Improve orientation through  mod to max cues; F;2  -EK     Recorded by  [EK] Tamiko Whitten, GURMEET-SLP     Improve memory skills    Improve memory skills through:  recall details of the day;50%  -EK     Status: Improve memory skills  Progress slower than expected  -EK     Memory Skills Progress  10%  -EK     Comments: Improve memory skills  Pt could not recall morning meal and name of grandchild who was present and then stepped away.   -EK     Recorded by  [EK] Tamiko Whitten CCC-SLP     Transfer Assessment/Treatment    Transfers, Sit-Stand [place]   contact guard assist  -RW    Transfers, Stand-Sit [place]   contact guard assist  -RW    Transfers, Sit-Stand-Sit, Assist Device   rolling walker  -RW    Toilet Transfer, [place] contact guard assist  -RC  minimum assist (75% patient effort)  -RW    Toilet Transfer, Assistive Device rolling walker  -RC  rolling walker  -RW    Transfer, Comment --   vc's to use r/w  -RC  pt came walking out of bathroom w/o calling for assist. no walker  -RW    Recorded by [RC] ROHAN Gomez/L  [RW] Danny Beebe PTA    Gait Assessment/Treatment    Gait, [place] Level   contact guard assist  -RW    Gait, Assistive Device   rolling walker  -RW    Gait, Distance (Feet)   150    x 2  -RW    Recorded by   [RW] Danny Beebe PTA    Functional Mobility    Functional Mobility- Ind. Level contact guard assist  -RC      Functional Mobility-Distance (Feet) 40  -RC      Recorded by [RC] ADRIANNA GomezA/L      Lower Body Dressing Assessment/Training    LB Dressing Assess/Train, Clothing Type   shoes;donning:  -RW    Recorded by   [RW] Danny Beebe,  PTA    Therapy Exercises    Bilateral Lower Extremities   AROM:;20 reps;sitting;heel raises;hip flexion;knee flexion;15 reps;standing  -RW    BLE Resistance   theraband  -RW    Bilateral Upper Extremity --   ue bike 15 min  -RC      Recorded by [RC] ROHAN Gomez/L  [RW] Danny Beebe PTA    Positioning and Restraints    Pre-Treatment Position sitting in chair/recliner  -RC sitting in chair/recliner  -EK sitting in chair/recliner  -RW    Post Treatment Position bed  -RC chair  -EK bathroom  -RW    In Bed call light within reach;encouraged to call for assist  -RC      Bathroom  notified nsg  -EK with nsg;call light within reach;encouraged to call for assist  -RW    Recorded by [RC] ROHAN Gomez/POONAM [EK] Tamiko Whitten [facility]-SLP [RW] Danny Beebe PTA      06/17/17 1525 06/17/17 0911 06/17/17 0759    Rehab Assessment/Intervention    Discipline physical therapy assistant  -RW physical therapy assistant  -RW speech language pathologist  -EK    Document Type therapy note (daily note)  -RW therapy note (daily note)  -RW therapy note (daily note)  -EK    Subjective Information agree to therapy  -RW agree to therapy  -RW no complaints;agree to therapy  -EK    Patient Effort, Rehab Treatment fair  -RW good  -RW good  -EK    Recorded by [RW] Danny Beebe PTA [RW] Danny Beebe PTA [EK] Tamiko Whitten, CCC-SLP    Vital Signs    Intra Systolic BP Rehab  133  -RW     Intra Treatment Diastolic BP  59  -RW     Intratreatment Heart Rate (beats/min)  69  -RW     Recorded by  [RW] Danny Beebe PTA     Pain Assessment    Pain Assessment No/denies pain  -RW No/denies pain  -RW No/denies pain  -EK    Jason-[place] FACES Pain Rating   0  -EK    Pain Score   0  -EK    Recorded by [RW] Danny Beebe PTA [RW] Danny Beebe PTA [EK] Tamiko Whitten, CCC-SLP    Vision Assessment/Intervention    Visual Impairment WFL with corrective lenses  -RW WFL with corrective lenses  -RW      Recorded by [RW] Danny Beebe, PTA [RW] Danny Beebe PTA     Cognitive Assessment/Intervention    Current Cognitive/Communication Assessment impaired  -RW impaired  -RW impaired  -EK    Orientation Status oriented to;person  -RW oriented to;person  -RW disoriented x 4  -EK    Follows Commands/Answers Questions 75% of the time  -RW 75% of the time  -RW 50% of the time  -EK    Personal Safety decreased awareness, need for safety;decreased insight to deficits  -RW decreased awareness, need for safety;decreased insight to deficits  -RW decreased awareness, need for safety;decreased awareness, need for assist;decreased insight to deficits  -EK    Recorded by [RW] Danny Beebe, ROYAL [RW] Danny Beebe, ROYAL [EK] Tamiko Whitten CCC-SLP    Improve ability to follow directions    Improve ability to follow directions:   two-step commands  -EK    Status: Improve ability to follow directions   Progress slower than expected  -EK    Recorded by   [EK] Tamiko Whitten CCC-SLP    Improve ability to comprehend questions    Improve ability to comprehend questions:   simple yes/no questions;simple general questions;90%  -EK    Status: Improve ability to comprehend questions   Progress slower than expected  -EK    Recorded by   [EK] Tamiko Whitten CCC-SLP    Improve word retrieval skills    Improve word retrieval skills by:   naming an object/pic;answer WH question with one word;completing a divergent task;completing a convergent task;90%  -EK    Status: Improve word retrieval skills   Progressing as expected  -EK    Recorded by   [EK] Tamiko Whitten CCC-SLP    Improve orientation    Improve orientation through:   demonstrating orientation to day;demonstrating orientation to year;demonstrating orientation to place;80%;use environmental aids to assist with orientation  -EK    Status: Improve orientation through   Progress slower than expected  -EK    Recorded by   [EK]  Tamiko Whitten CCC-SLP    Improve memory skills    Improve memory skills through:   recall details of the day;50%  -EK    Status: Improve memory skills   Progress slower than expected  -EK    Recorded by   [EK] Tamiko Whitten CCC-SLP    Bed Mobility, Assessment/Treatment    Bed Mobility, Assistive Device bed rails;head of bed elevated  -RW bed rails;head of bed elevated  -RW     Bed Mobility, Roll Left, [place]  --  -RW     Bed Mob, Supine to Sit, [place] supervision required  -RW      Bed Mob, Sit to Supine, [place] --  -RW supervision required  -RW     Recorded by [RW] Danny Beebe PTA [RW] Danny Beebe PTA     Transfer Assessment/Treatment    Transfers, Chair-Bed [place] --  -RW contact guard assist  -RW     Transfers, Sit-Stand [place] contact guard assist  -RW contact guard assist  -RW     Transfers, Stand-Sit [place] contact guard assist  -RW contact guard assist  -RW     Transfers, Sit-Stand-Sit, Assist Device rolling walker  -RW rolling walker  -RW     Toilet Transfer, [place] --  -RW minimum assist (75% patient effort)  -RW     Toilet Transfer, Assistive Device --  -RW rolling walker  -RW     Recorded by [RW] Danny Beebe PTA [RW] Danny Beebe PTA     Gait Assessment/Treatment    Gait, [place] Level contact guard assist  -RW contact guard assist  -RW     Gait, Assistive Device rolling walker  -RW rolling walker  -RW     Gait, Distance (Feet) 170   x 2  -    x 2  -RW     Recorded by [RW] Danny Beebe PTA [RW] Danny Beebe PTA     Stairs Assessment/Treatment    Number of Stairs  4  -RW     Stairs, Handrail Location --  -RW both sides  -RW     Stairs, [place] Level --  -RW supervision required  -RW     Recorded by [RW] Danny Beebe PTA [RW] Danny Beebe PTA     Wheelchair Training/Management    Wheelchair, Distance Propelled  150  -RW     Wheelchair Training Comment  sba and lots of v cues  -RW     Recorded  by  [RW] Danny Beebe PTA     Lower Body Dressing Assessment/Training    LB Dressing Assess/Train, Clothing Type shoes;donning:  -RW doffing:;shoes  -RW     Recorded by [RW] Danny Beebe PTA [RW] Danny Beebe PTA     Balance Skills Training    Standing-Level of Assistance --  -RW Minimum assistance  -RW     Standing-Balance Activities --  -RW Compliant surfaces  -RW     Gait Balance-Level of Assistance --  -RW Minimum assistance  -RW     Gait Balance Support --  -RW parallel bars  -RW     Gait Balance Activities --  -RW side-stepping  -RW     Recorded by [RW] Danny Beebe PTA [RW] Danny Beebe PTA     Therapy Exercises    Bilateral Lower Extremities AROM:;20 reps;sitting;heel raises;hip flexion;knee flexion;15 reps;standing  -RW AROM:;20 reps;sitting;heel raises;hip flexion;knee flexion  -RW     BLE Resistance theraband  -RW theraband  -RW     Recorded by [RW] Danny Beebe PTA [RW] Danny Beebe PTA     Positioning and Restraints    Pre-Treatment Position in bed  -RW sitting in chair/recliner  -RW sitting in chair/recliner  -EK    Post Treatment Position wheelchair  -RW bed  -RW wheelchair  -EK    In Bed  call light within reach;encouraged to call for assist  -RW call light within reach;notified nsg;encouraged to call for assist   tag alarm  -EK    In Wheelchair call light within reach;encouraged to call for assist  -RW      Recorded by [RW] Danny Beebe PTA [RW] Danny Beebe PTA [EK] Tamiko Whitten CCC-SLP      06/17/17 0600          Rehab Assessment/Intervention    Discipline occupational therapy assistant  -KD      Document Type therapy note (daily note)  -KD      Subjective Information agree to therapy  -KD      Recorded by [KD] ROHAN Garcia/L      Vital Signs    Pre Patient Position Sitting  -KD      Intra Patient Position Standing  -KD      Post Patient Position Sitting  -KD      Recorded by [KD] ROHAN Garcia/L      Pain Assessment    Pain Assessment  No/denies pain  -KD      Recorded by [KD] ROHAN Garcia/L      Cognitive Assessment/Intervention    Current Cognitive/Communication Assessment impaired  -KD      Orientation Status oriented to;person     -KD      Problem Solving ADL/Func. Task with min verbal cues   25 pc shape sorter  -KD      Recorded by [KD] ROHAN Garcia/L      Transfer Assessment/Treatment    Transfers, Sit-Stand [place] contact guard assist  -KD      Transfers, Stand-Sit [place] contact guard assist  -KD      Transfers, Sit-Stand-Sit, Assist Device rolling walker  -KD      Toilet Transfer, [place] minimum assist (75% patient effort)  -KD      Recorded by [KD] ROHNA Garcia/L      Functional Mobility    Functional Mobility- Ind. Level contact guard assist  -KD      Functional Mobility- Device rolling walker  -KD      Functional Mobility-Distance (Feet) --   100  -KD      Recorded by [KD] ROHAN Garcia/L      Wheelchair Training/Management    Wheelchair, Distance Propelled --   100  -KD      Wheelchair Training Comment sba  -KD      Recorded by [KD] ROHAN Garcia/L      Lower Body Dressing Assessment/Training    LB Dressing Assess/Train, Clothing Type donning:;shoes  -KD      LB Dressing Assess/Train, Position sitting  -KD      LB Dressing Assess/Train, [place] set up required  -KD      Recorded by [KD] ROHAN Garcia/L      Grooming Assessment/Training    Grooming Assess/Train, Assistive Device --   oral care-comb hair- wash face  -KD      Grooming Assess/Train, Position sink side;sitting  -KD      Grooming Assess/Train, Indepen Level set up required  -KD      Recorded by [KD] ROHAN Garcia/L      Balance Skills Training    Standing-Level of Assistance Contact guard  -KD      Standing-Balance Activities --   static  -KD      Standing Balance # of Minutes 8  -KD      Recorded by [KD] ROHAN Garcia/L      Therapy Exercises    Bilateral Upper Extremity --   UEE x 10 mins  w/ RB's  -KD      Recorded by [KD] Nathalia Irizarry, MADRIGAL/L      Positioning and Restraints    Pre-Treatment Position sitting in chair/recliner  -KD      Post Treatment Position wheelchair  -KD      In Wheelchair sitting   eating b'fast @ nsg station  -KD      Recorded by [KD] Nathalia Irizarry, MADRIGAL/L        User Key  (r) = Recorded By, (t) = Taken By, (c) = Cosigned By    Initials Name Effective Dates    EK Tamiko Whitten, [facility]-SLP 04/06/17 -     RW Danny Beebe, PTA 10/17/16 -     RC Ally Kwong, MADRIGAL/L 10/17/16 -     KD Nathalia Irizarry, MADRIGAL/L 10/17/16 -               IP SLP Goals       06/19/17 1406 06/17/17 0835 06/16/17 0940    Receptive Language- Optimal Participation in Care    Receptive Language Optimal Participation in Care- SLP, Date Goal Reviewed 06/19/17  -EK 06/17/17  -EK 06/16/17  -EC    Receptive Language Optimal Participation in Care- SLP, Outcome   goal ongoing  -EC    Expressive- Optimal Participation in Care    Expressive Optimal Participation in Care- SLP, Date Goal Reviewed 06/19/17  -EK 06/17/17  -EK     Expressive Optimal Participation in Care- SLP, Reason Goal Not Met progress slower than expected  -EK      Cognitive Linguistic- Optimal Participation in Care    Cognitive Linguistic Optimal Participation in Care- SLP, Date Goal Reviewed 06/19/17  -EK 06/17/17  -EK 06/16/17  -EC    Cognitive Linguistic Optimal Participation in Care- SLP, Outcome   goal ongoing  -EC      06/15/17 1103 06/14/17 1434 06/13/17 1506    Safely Consume Diet    Safely Consume Diet- SLP, Date Established   06/13/17  -CK    Safely Consume Diet- SLP, Time to Achieve   by discharge  -CK    Safely Consume Diet- SLP, Additional Goal   Pt will safely tolerate recommended diet w/no overt s/s of aspiration.  -CK    Safely Consume Diet- SLP, Date Goal Reviewed 06/15/17  -EC 06/14/17  -CK 06/13/17  -CK    Safely Consume Diet- SLP, Outcome goal met  -EC goal partially met  -CK     Receptive Language- Optimal  Participation in Care    Receptive Language Optimal Participation in Care- SLP, Date Established   06/13/17  -CK    Receptive Language Optimal Participation in Care- SLP, Time to Achieve   by discharge  -CK    Receptive Language Optimal Participation in Care- SLP, Activity Level   Patient will improve ability to follow directions;Patient will improve ability to comprehend questions  -CK    Receptive Language Optimal Participation in Care- SLP, Date Goal Reviewed 06/15/17  -EC 06/14/17  -CK 06/13/17  -CK    Receptive Language Optimal Participation in Care- SLP, Outcome goal ongoing  -EC goal ongoing  -CK     Expressive- Optimal Participation in Care    Expressive Optimal Participation in Care- SLP, Date Established   06/13/17  -CK    Expressive Optimal Participation in Care- SLP, Time to Achieve   by discharge  -CK    Expressive Optimal Participation in Care- SLP, Activity Level   Patient will improve word retrieval skills  -CK    Expressive Optimal Participation in Care- SLP, Date Goal Reviewed 06/15/17  -EC 06/14/17  -CK 06/13/17  -CK    Expressive Optimal Participation in Care- SLP, Outcome goal ongoing  -EC goal ongoing  -CK     Cognitive Linguistic- Optimal Participation in Care    Cognitive Linguistic Optimal Participation in Care- SLP, Date Established   06/13/17  -CK    Cognitive Linguistic Optimal Participation in Care- SLP, Time to Achieve   by discharge  -CK    Cognitive Linguistic Optimal Participation in Care- SLP, Activity Level   Patient will improve orientation for increased safety in environment;Patient will improve memory skills for increased safety in environment  -CK    Cognitive Linguistic Optimal Participation in Care- SLP, Date Goal Reviewed 06/15/17  -EC 06/14/17  -CK 06/13/17  -CK    Cognitive Linguistic Optimal Participation in Care- SLP, Outcome goal ongoing  -EC goal ongoing  -CK       User Key  (r) = Recorded By, (t) = Taken By, (c) = Cosigned By    Initials Name Provider Type    EC  Tamiko Couch CCC-SLP Speech and Language Pathologist    KEVIN Whitten CCC-SLP Speech and Language Pathologist    CK Michelle Whitten, MS CCC-SLP Speech and Language Pathologist          EDUCATION  The patient has been educated in the following areas:   Cognitive Impairment.    SLP Recommendation and Plan                               Plan of Care Review  Plan Of Care Reviewed With: patient  Progress: no change  Outcome Summary/Follow up Plan: Pt with deficits in memory, orientation, and safety awareness. SLP spoke with two daughters this am regarding decreased memory and orientation.           Time Calculation:         Time Calculation- SLP       06/19/17 1414          Time Calculation- SLP    SLP Start Time 0937  -EK      SLP Stop Time 1030  -EK      SLP Time Calculation (min) 53 min  -EK      SLP Received On 06/19/17  -EK        User Key  (r) = Recorded By, (t) = Taken By, (c) = Cosigned By    Initials Name Provider Type    OSIRIS Rodriguez Speech and Language Pathologist          Therapy Charges for Today     Code Description Service Date Service Provider Modifiers Qty    [id number]  ST TREATMENT SPEECH 4 6/19/2017 OSIRIS Foley GN 1               OSIRIS Foley  6/19/2017

## 2017-06-20 LAB
GLUCOSE BLDC GLUCOMTR-MCNC: 196 MG/DL (ref 70–130)
GLUCOSE BLDC GLUCOMTR-MCNC: 224 MG/DL (ref 70–130)
GLUCOSE BLDC GLUCOMTR-MCNC: 232 MG/DL (ref 70–130)
GLUCOSE BLDC GLUCOMTR-MCNC: 233 MG/DL (ref 70–130)

## 2017-06-20 PROCEDURE — 99024 POSTOP FOLLOW-UP VISIT: CPT | Performed by: SURGERY

## 2017-06-20 PROCEDURE — 97110 THERAPEUTIC EXERCISES: CPT

## 2017-06-20 PROCEDURE — 97530 THERAPEUTIC ACTIVITIES: CPT

## 2017-06-20 PROCEDURE — 82962 GLUCOSE BLOOD TEST: CPT

## 2017-06-20 PROCEDURE — 63710000001 DIPHENHYDRAMINE PER 50 MG: Performed by: FAMILY MEDICINE

## 2017-06-20 PROCEDURE — 92507 TX SP LANG VOICE COMM INDIV: CPT | Performed by: SPEECH-LANGUAGE PATHOLOGIST

## 2017-06-20 PROCEDURE — 63710000001 INSULIN DETEMIR PER 5 UNITS: Performed by: FAMILY MEDICINE

## 2017-06-20 PROCEDURE — 63710000001 INSULIN ASPART PER 5 UNITS: Performed by: FAMILY MEDICINE

## 2017-06-20 PROCEDURE — 99232 SBSQ HOSP IP/OBS MODERATE 35: CPT | Performed by: FAMILY MEDICINE

## 2017-06-20 RX ORDER — SENNA AND DOCUSATE SODIUM 50; 8.6 MG/1; MG/1
1 TABLET, FILM COATED ORAL 2 TIMES DAILY
Status: DISCONTINUED | OUTPATIENT
Start: 2017-06-20 | End: 2017-06-23 | Stop reason: HOSPADM

## 2017-06-20 RX ADMIN — DOCUSATE SODIUM 100 MG: 100 CAPSULE, LIQUID FILLED ORAL at 08:46

## 2017-06-20 RX ADMIN — INSULIN ASPART 5 UNITS: 100 INJECTION, SOLUTION INTRAVENOUS; SUBCUTANEOUS at 06:39

## 2017-06-20 RX ADMIN — Medication 40 MG: at 08:46

## 2017-06-20 RX ADMIN — INSULIN ASPART 5 UNITS: 100 INJECTION, SOLUTION INTRAVENOUS; SUBCUTANEOUS at 17:10

## 2017-06-20 RX ADMIN — Medication 40 MG: at 21:29

## 2017-06-20 RX ADMIN — DIPHENHYDRAMINE HYDROCHLORIDE 25 MG: 25 CAPSULE ORAL at 18:26

## 2017-06-20 RX ADMIN — DONEPEZIL HYDROCHLORIDE 10 MG: 10 TABLET, FILM COATED ORAL at 21:29

## 2017-06-20 RX ADMIN — INSULIN ASPART 3 UNITS: 100 INJECTION, SOLUTION INTRAVENOUS; SUBCUTANEOUS at 21:37

## 2017-06-20 RX ADMIN — Medication 2.5 MG: at 08:47

## 2017-06-20 RX ADMIN — DILTIAZEM HYDROCHLORIDE 120 MG: 120 CAPSULE, COATED, EXTENDED RELEASE ORAL at 08:46

## 2017-06-20 RX ADMIN — FERROUS SULFATE TAB EC 324 MG (65 MG FE EQUIVALENT) 324 MG: 324 (65 FE) TABLET DELAYED RESPONSE at 08:46

## 2017-06-20 RX ADMIN — MAGNESIUM OXIDE TAB 400 MG (241.3 MG ELEMENTAL MG) 400 MG: 400 (241.3 MG) TAB at 08:46

## 2017-06-20 RX ADMIN — POTASSIUM CHLORIDE 10 MEQ: 750 CAPSULE, EXTENDED RELEASE ORAL at 17:09

## 2017-06-20 RX ADMIN — MEMANTINE 5 MG: 5 TABLET ORAL at 08:46

## 2017-06-20 RX ADMIN — Medication 1 TABLET: at 08:46

## 2017-06-20 RX ADMIN — SENNOSIDES AND DOCUSATE SODIUM 1 TABLET: 8.6; 5 TABLET ORAL at 17:09

## 2017-06-20 RX ADMIN — ACETAMINOPHEN 650 MG: 325 TABLET ORAL at 03:01

## 2017-06-20 RX ADMIN — POTASSIUM CHLORIDE 10 MEQ: 750 CAPSULE, EXTENDED RELEASE ORAL at 08:45

## 2017-06-20 RX ADMIN — MEMANTINE 5 MG: 5 TABLET ORAL at 17:09

## 2017-06-20 RX ADMIN — INSULIN DETEMIR 20 UNITS: 100 INJECTION, SOLUTION SUBCUTANEOUS at 21:38

## 2017-06-20 RX ADMIN — INSULIN ASPART 5 UNITS: 100 INJECTION, SOLUTION INTRAVENOUS; SUBCUTANEOUS at 11:16

## 2017-06-20 NOTE — PLAN OF CARE
Problem: Patient Care Overview (Adult)  Goal: Plan of Care Review  Outcome: Ongoing (interventions implemented as appropriate)    06/20/17 0309   Coping/Psychosocial Response Interventions   Plan Of Care Reviewed With patient   Patient Care Overview   Progress improving   Outcome Evaluation   Outcome Summary/Follow up Plan pt resting well this shift.         Problem: Functional Deficit (Adult,Obstetrics,Pediatric)  Goal: Signs and Symptoms of Listed Potential Problems Will be Absent or Manageable (Functional Deficit)  Outcome: Ongoing (interventions implemented as appropriate)    06/20/17 0309   Functional Deficit   Problems Assessed (Functional Deficit) all   Problems Present (Functional Deficit) functional activity tolerance impairment;pain         Problem: Fall Risk (Adult)  Goal: Absence of Falls  Outcome: Ongoing (interventions implemented as appropriate)    06/20/17 0309   Fall Risk (Adult)   Absence of Falls achieves outcome         Problem: Infection, Risk/Actual (Adult)  Goal: Infection Prevention/Resolution  Outcome: Ongoing (interventions implemented as appropriate)    06/20/17 0309   Infection, Risk/Actual (Adult)   Infection Prevention/Resolution making progress toward outcome

## 2017-06-20 NOTE — PLAN OF CARE
Problem: Patient Care Overview (Adult)  Goal: Plan of Care Review  Outcome: Ongoing (interventions implemented as appropriate)    06/20/17 1450   Coping/Psychosocial Response Interventions   Plan Of Care Reviewed With patient   Patient Care Overview   Progress no change   Outcome Evaluation   Outcome Summary/Follow up Plan Pt will need 24/7 care for increased safety due to decreased memory, orientation, and expressive/language deficits.          Problem: Inpatient SLP  Goal: Expressive-Patient will improve expressive language skills to allow optimal participation in care  Outcome: Ongoing (interventions implemented as appropriate)    06/13/17 1506 06/15/17 1103 06/19/17 1406   Expressive- Optimal Participation in Care   Expressive Optimal Participation in Care- SLP, Date Established 06/13/17 --  --    Expressive Optimal Participation in Care- SLP, Time to Achieve by discharge --  --    Expressive Optimal Participation in Care- SLP, Activity Level Patient will improve word retrieval skills --  --    Expressive Optimal Participation in Care- SLP, Date Goal Reviewed --  --  --    Expressive Optimal Participation in Care- SLP, Outcome --  goal ongoing --    Expressive Optimal Participation in Care- SLP, Reason Goal Not Met --  --  progress slower than expected     06/20/17 1450   Expressive- Optimal Participation in Care   Expressive Optimal Participation in Care- SLP, Date Established --    Expressive Optimal Participation in Care- SLP, Time to Achieve --    Expressive Optimal Participation in Care- SLP, Activity Level --    Expressive Optimal Participation in Care- SLP, Date Goal Reviewed 06/20/17   Expressive Optimal Participation in Care- SLP, Outcome --    Expressive Optimal Participation in Care- SLP, Reason Goal Not Met --        Goal: Cognitive-linguistic-Patient will improve Cognitive-linguistic skills to allow optimal participation in care  Outcome: Ongoing (interventions implemented as appropriate)     06/13/17 1506 06/16/17 0940 06/20/17 1450   Cognitive Linguistic- Optimal Participation in Care   Cognitive Linguistic Optimal Participation in Care- SLP, Date Established 06/13/17 --  --    Cognitive Linguistic Optimal Participation in Care- SLP, Time to Achieve by discharge --  --    Cognitive Linguistic Optimal Participation in Care- SLP, Activity Level Patient will improve orientation for increased safety in environment;Patient will improve memory skills for increased safety in environment --  --    Cognitive Linguistic Optimal Participation in Care- SLP, Date Goal Reviewed --  --  06/20/17   Cognitive Linguistic Optimal Participation in Care- SLP, Outcome --  goal ongoing --        Goal: Receptive Language-Patient will improve receptive language skills to allow optimal participation in care  Outcome: Ongoing (interventions implemented as appropriate)    06/13/17 1506 06/16/17 0940 06/20/17 1450   Receptive Language- Optimal Participation in Care   Receptive Language Optimal Participation in Care- SLP, Date Established 06/13/17 --  --    Receptive Language Optimal Participation in Care- SLP, Time to Achieve by discharge --  --    Receptive Language Optimal Participation in Care- SLP, Activity Level Patient will improve ability to follow directions;Patient will improve ability to comprehend questions --  --    Receptive Language Optimal Participation in Care- SLP, Date Goal Reviewed --  --  06/20/17   Receptive Language Optimal Participation in Care- SLP, Outcome --  goal ongoing --

## 2017-06-20 NOTE — THERAPY TREATMENT NOTE
"Inpatient Rehabilitation - Speech Language Pathology Treatment Note  [facility]     Patient Name: Kierra Scales  : 1929  MRN: [id number]  Today's Date: 2017  Referring Physician: Khari      Admit Date: 2017   1. Pt will safely tolerate recommended diet w/no overt s/s of aspiration:GOAL MET previously.   2. Pt will complete one-step directions without objects w/90% acc: GOAL MET previously  3. Pt will complete two-step commands w/90% acc: Pt was 30% acc w/2-step directions using body parts.  4. Pt will complete simple yes/no questions w/90% acc: Pt was 50% acc w/basic yes/no questions with moderate cues.   5. Pt will answer simple \"WH\" questions w/90% acc: Pt was 50% acc w/wh-questions.   6. Pt will complete object/picture naming w/80% acc: Pt was 100% acc w/simple picture naming.GOAL MET  7. Pt will complete convergent naming task w/80% acc: defered  8. Pt will complete simple divergent naming task w/an average of 5 wpm for simple concrete categories: average of 6 words per minute  9. Pt will complete immediate recall of related words w/80% acc: Discontinued goal previously  10: Pt will recall details of the day w/80% acc: 10% recall with use of aides and max cues and repetition required  11. Pt will complete orientation to JERRY, YR, and Place w/80% acc with use of visual aide: 50% with use of visual aid and verbal cues.       Visit Dx:      ICD-10-CM ICD-9-CM   1. Oral phase dysphagia R13.11 787.21   2. Impaired mobility and ADLs Z74.09 799.89   3. Muscle weakness (generalized) M62.81 728.87   4. Abnormality of gait and mobility R26.9 781.2   5. 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) "   • Hypothyroidism   • Dyspnea   • Hyperthyroidism   • Gastrointestinal hemorrhage with melena   • Type 2 diabetes mellitus with hyperglycemia   • Colon cancer   • Chronic blood loss anemia   • Physical deconditioning   • Senile dementia, uncomplicated   • Encounter for rehabilitation   • Adenocarcinoma of sigmoid colon   • Hypokalemia              Adult Rehabilitation Note       06/20/17 1416 06/20/17 1330 06/20/17 0915    Rehab Assessment/Intervention    Discipline occupational therapy assistant  -RC speech language pathologist  -EK physical therapy assistant  -JW    Document Type therapy note (daily note)  -RC therapy note (daily note)  -EK therapy note (daily note)  -JW    Subjective Information agree to therapy  -RC no complaints;agree to therapy  -EK agree to therapy  -JW    Patient Effort, Rehab Treatment adequate  -RC good  -EK fair  -JW    Precautions/Limitations fall precautions  -RC      Patient Response to Treatment   pt w/ vomiting episode earlier this morning, pt not agreeable to therapy, but w/ encouragement did bed ther ex  -JW    Recorded by [RC] Ally Kwong, MADRIGAL/L [EK] Tamiko Whitten, [facility]-SLP [JW] Arlene Stephens, PTA    Vital Signs    Pre Patient Position   Supine  -JW    Post Patient Position   Supine  -JW    Recorded by   [JW] Arlene Stephens, PTA    Pain Assessment    Pain Assessment No/denies pain  - 0-10  -EK 0-10  -JW    Pain Score  0  -EK 0  -JW    Post Pain Score  0  -EK 0  -JW    Recorded by [RC] Ally Kwong, MADRIGAL/L [EK] Tamiko Whitten, [facility]-SLP [JW] Arlene Stephens, PTA    Cognitive Assessment/Intervention    Current Cognitive/Communication Assessment  impaired  -EK impaired  -JW    Orientation Status  oriented to;person  -EK oriented to;person  -JW    Follows Commands/Answers Questions   50% of the time;needs cueing  -JW    Short Term Memory Interventions --   sorting task completed w/ min vc's  -RC      Recorded by [RC] Ally ENRIQUEZ  Varghese, MADRIGAL/L [EK] Tamiko Whitten [facility]-SLP [JW] Arlene Stephens, PTA    Improve ability to follow directions    Improve ability to follow directions:  two-step commands  -EK     Status: Improve ability to follow directions  Progress slower than expected  -EK     Ability to Follow Directions Progress  30%;with consistent cues  -EK     Recorded by  [EK] Tamiko Whitten CCC-SLP     Improve ability to comprehend questions    Improve ability to comprehend questions:  simple yes/no questions;simple general questions;90%  -EK     Status: Improve ability to comprehend questions  Progress slower than expected  -EK     Ability to Comprehend Questions Progress  50%;with consistent cues  -EK     Comments: Improve ability to comprehend questions  moderate to max cues  -EK     Recorded by  [EK] Tamiko Whitten CCC-SLP     Improve word retrieval skills    Improve word retrieval skills by:  naming an object/pic;answer WH question with one word;completing a divergent task;completing a convergent task;90%  -EK     Status: Improve word retrieval skills  Progress slower than expected  -EK     Word Retrieval Skills Progress  50%  -EK     Recorded by  [EK] Tamiko Whitten CCC-SLP     Improve orientation    Improve orientation through:  demonstrating orientation to day;demonstrating orientation to year;demonstrating orientation to place;80%;use environmental aids to assist with orientation  -EK     Status: Improve orientation through  Progress slower than expected  -EK     Orientation Progress  10%;without cues  -EK     Comments: Improve orientation through  little to no success with F:2 cues  -EK     Recorded by  [EK] Tamiok Whitten [facility]-SLP     Improve memory skills    Improve memory skills through:  recall details of the day;50%  -EK     Status: Improve memory skills  Progress slower than expected  -EK     Memory Skills Progress  20%;without cues  -EK     Recorded  by  [EK] Tamkio Whitten CCC-SLP     Bed Mobility, Assessment/Treatment    Bed Mobility, Comment   pt defers all mobility  -JW    Recorded by   [JW] Arlene Stephens, PTA    Transfer Assessment/Treatment    Transfer, Comment   pt defers all mobility, family in agreement w/ pt to let pt rest this morning after ther ex  -JW    Recorded by   [JW] Arlene Stephens PTA    Motor Skills/Interventions    Motor Response Observations --   table top act using B ue's w/ reaching, and problem solving  -RC      Recorded by [RC] Ally Kwong, MADRIGAL/L      Therapy Exercises    Bilateral Lower Extremities   AROM:;20 reps;AAROM:;supine;ankle pumps/circles;heel slides;hip abduction/adduction;quad sets;SAQ   2 sets, pt req. AAROM at times, vc's throughout most of tx  -JW    Recorded by   [JW] Arlene Stephens PTA    Positioning and Restraints    Pre-Treatment Position in bed  -RC in bed  -EK in bed  -JW    Post Treatment Position bed  -RC bed  -EK bed  -JW    In Bed with PT  -RC  supine;call light within reach;encouraged to call for assist;exit alarm on;with family/caregiver  -JW    Recorded by [RC] Ally Kwong, MADRIGAL/L [EK] Tamiko Whitten [facility]-SLP [JW] Arlene Stephens, PTA      06/20/17 0730 06/19/17 1410 06/19/17 1315    Rehab Assessment/Intervention    Discipline occupational therapy assistant  -RC occupational therapy assistant  -RC physical therapy assistant  -RW    Document Type therapy note (daily note)  -RC therapy note (daily note)  -RC therapy note (daily note)  -RW    Subjective Information agree to therapy  -RC agree to therapy  -RC agree to therapy  -RW    Patient Effort, Rehab Treatment  adequate  -RC adequate  -RW    Symptoms Noted During/After Treatment --   reports stomach not feeling well, episode of vomiting   -RC      Symptoms Noted Comment --   vomited at end of tx,returned to room to nurse.  -RC      Precautions/Limitations  fall precautions   gait belt placement  (abd incision)  -RC     Recorded by [RC] Ally Kwong, MADRIGAL/L [RC] Ally Kwong, MADRIGAL/L [RW] Danny Beebe, ROYAL    Vital Signs    Intra Systolic BP Rehab  109  -RC     Intra Treatment Diastolic BP  56  -RC     Recorded by  [RC] Ally Kwong, MADRIGAL/L     Pain Assessment    Pain Assessment No/denies pain  -RC  No/denies pain  -RW    Pain Score  --   c/o back pain gave no number  -RC     Pain Intervention(s)  Rest;Medication (See MAR)  -RC     Recorded by [RC] Ally Kwong, MADRIGAL/L [RC] Ally Kwong, MADRIGAL/L [RW] Danny Beebe, ROYAL    Vision Assessment/Intervention    Visual Impairment   WFL with corrective lenses  -RW    Recorded by   [RW] Danny Beebe PTA    Cognitive Assessment/Intervention    Current Cognitive/Communication Assessment impaired  -RC impaired  -RC impaired  -RW    Orientation Status person  -RC person  -RC oriented to;person  -RW    Short/Long Term Memory   moderate impairment, short term memory  -RW    Additional Documentation --   pt was able to place 1/26 abc puzzle, refused clock act  -RC      Recorded by [RC] Ally Kwong, MADRIGAL/L [RC] Ally Kwong, MADRIGAL/L [RW] Danny Beebe PTA    Transfer Assessment/Treatment    Transfers, Sit-Stand [place]   contact guard assist  -RW    Transfers, Stand-Sit [place]  contact guard assist  -RC contact guard assist  -RW    Transfers, Sit-Stand-Sit, Assist Device  rolling walker  -RC rolling walker  -RW    Toilet Transfer, [place]  contact guard assist  -RC minimum assist (75% patient effort)  -RW    Toilet Transfer, Assistive Device   rolling walker  -RW    Recorded by  [RC] Ally Kwong, MADRIGAL/L [RW] Danny Beebe PTA    Gait Assessment/Treatment    Gait, [place] Level   contact guard assist  -RW    Gait, Assistive Device   rolling walker  -RW    Gait, Distance (Feet)   150  -RW    Recorded by   [RW] Danny Beebe PTA    Functional Mobility    Functional Mobility- Ind. Level  contact guard assist  -RC contact  guard assist  -RW    Functional Mobility- Device  rolling walker  -RC rolling walker  -RW    Functional Mobility-Distance (Feet)  150  -RC     Recorded by  [RC] ROHAN Gomez/L [RW] Danny Beebe PTA    Lower Body Dressing Assessment/Training    LB Dressing Assess/Train, Clothing Type  shoes;doffing:  -RC     LB Dressing Assess/Train, Position  sitting  -RC     LB Dressing Assess/Train, [place]  conditional independence  -RC     Recorded by  [RC] ROHAN Gomez/L     Motor Skills/Interventions    Motor Response Observations --   bean bag toss,clothes pin tree  -RC      Recorded by [RC] ROHAN Gomez/L      Balance Skills Training    Sitting-Level of Assistance   Distant supervision  -RW    Sitting-Balance Activities  Reaching for objects;Ball toss;Trunk control activities  -RC Lateral lean;Forward lean;Reaching for objects;Reaching across midline;Trunk control activities  -RW    Sitting # of Minutes  30  -RC 30+  -RW    Recorded by  [RC] ROHAN Gomez/L [RW] Danny Beebe PTA    Therapy Exercises    Bilateral Lower Extremities   --  -RW    BLE Resistance   --  -RW    Recorded by   [RW] Danny Beebe PTA    Positioning and Restraints    Pre-Treatment Position sitting in chair/recliner  -RC --   eom  -RC     Post Treatment Position wheelchair  -RC bed  -RC     In Bed with nsg  -RC exit alarm on;encouraged to call for assist;call light within reach;notified nsg  -RC     Recorded by [RC] ROHAN Gomez/POONAM [RC] JOSÉ MIGUEL Gomez       06/19/17 1051 06/19/17 0937 06/19/17 0820    Rehab Assessment/Intervention    Discipline occupational therapy assistant  -RC speech language pathologist  -EK physical therapy assistant  -RW    Document Type therapy note (daily note)  -RC  therapy note (daily note)  -RW    Subjective Information agree to therapy  -RC no complaints;agree to therapy  -EK agree to therapy  -RW    Patient Effort, Rehab Treatment poor  -RC fair  -EK fair  -RW     Recorded by [RC] Ally Kwong, MADRIGAL/L [EK] Tamiko Whitten, [facility]-SLP [RW] Danny Beebe, PTA    Vital Signs    Pretreatment Heart Rate (beats/min)   74  -RW    Pre SpO2 (%)   94  -RW    Recorded by   [RW] Danny Beebe PTA    Pain Assessment    Pain Assessment No/denies pain  -RC No/denies pain  -EK     Pain Score   --   gives not rating  -RW    Pain Location   Back  -RW    Recorded by [RC] Ally Kwong, MADRIGAL/L [EK] Tamiko Whitten, [employer]UR[employer]-SLP [RW] Danny Beebe, ROYAL    Vision Assessment/Intervention    Visual Impairment  WFL with corrective lenses  -EK WFL with corrective lenses  -RW    Recorded by  [EK] Tamiko Whitten, GURMEET-SLP [RW] Danny Beebe, PTA    Cognitive Assessment/Intervention    Current Cognitive/Communication Assessment impaired  -RC impaired  -EK impaired  -RW    Orientation Status person;place  -RC oriented to;person  -EK oriented to;person  -RW    Follows Commands/Answers Questions  50% of the time;needs cueing;needs increased time;needs repetition  -EK 50% of the time;needs cueing  -RW    Short/Long Term Memory  severe impairment, short term memory  -EK moderate impairment, short term memory  -RW    Problem Solving ADL/Func. Task --   pt completed 5 color sorting task w/ extra time and vc's  -RC      Additional Documentation --   unable to recall names of children 2/4 w/o cues  -RC      Recorded by [RC] Ally Kwong, MADRIGAL/L [EK] Tamiko Whitten, GURMEET-SLP [RW] Danny Beebe, PTA    Improve ability to follow directions    Improve ability to follow directions:  two-step commands  -EK     Status: Improve ability to follow directions  Progress slower than expected  -EK     Ability to Follow Directions Progress  30%;with consistent cues  -EK     Comments: Improve ability to follow directions  pt requires mod to max cues for task completion  -EK     Recorded by  [EK] Tamiko Whitten, CCC-SLP     Improve ability to comprehend  questions    Improve ability to comprehend questions:  simple yes/no questions;simple general questions;90%  -EK     Status: Improve ability to comprehend questions  Progress slower than expected  -EK     Ability to Comprehend Questions Progress  50%;with consistent cues  -EK     Recorded by  [EK] OSIRIS Foley     Improve word retrieval skills    Improve word retrieval skills by:  naming an object/pic;answer WH question with one word;completing a divergent task;completing a convergent task;90%  -EK     Status: Improve word retrieval skills  Progress slower than expected  -EK     Word Retrieval Skills Progress  50%  -EK     Comments: Improve word retrieval skills  Pt had difficulty with completing simple sentences and answering questions about a pictured scene.   -EK     Recorded by  [EK] OSIRIS Foley     Improve orientation    Improve orientation through:  demonstrating orientation to day;demonstrating orientation to year;demonstrating orientation to place;80%;use environmental aids to assist with orientation  -EK     Status: Improve orientation through  Progress slower than expected  -EK     Orientation Progress  10%;with consistent cues  -EK     Comments: Improve orientation through  mod to max cues; F;2  -EK     Recorded by  [EK] OSIRIS Foley     Improve memory skills    Improve memory skills through:  recall details of the day;50%  -EK     Status: Improve memory skills  Progress slower than expected  -EK     Memory Skills Progress  10%  -EK     Comments: Improve memory skills  Pt could not recall morning meal and name of grandchild who was present and then stepped away.   -EK     Recorded by  [EK] OSIRIS Foley     Transfer Assessment/Treatment    Transfers, Sit-Stand [place]   contact guard assist  -RW    Transfers, Stand-Sit [place]   contact guard assist  -RW    Transfers, Sit-Stand-Sit, Assist Device    rolling walker  -RW    Toilet Transfer, [place] contact guard assist  -RC  minimum assist (75% patient effort)  -RW    Toilet Transfer, Assistive Device rolling walker  -RC  rolling walker  -RW    Transfer, Comment --   vc's to use r/w  -RC  pt came walking out of bathroom w/o calling for assist. no walker  -RW    Recorded by [RC] ROHAN Gomez/L  [RW] Danny Beebe PTA    Gait Assessment/Treatment    Gait, [place] Level   contact guard assist  -RW    Gait, Assistive Device   rolling walker  -RW    Gait, Distance (Feet)   150    x 2  -RW    Recorded by   [RW] Danny Beebe PTA    Functional Mobility    Functional Mobility- Ind. Level contact guard assist  -RC      Functional Mobility-Distance (Feet) 40  -RC      Recorded by [RC] ROHAN Gomez/L      Lower Body Dressing Assessment/Training    LB Dressing Assess/Train, Clothing Type   shoes;donning:  -RW    Recorded by   [RW] Danny Beebe PTA    Therapy Exercises    Bilateral Lower Extremities   AROM:;20 reps;sitting;heel raises;hip flexion;knee flexion;15 reps;standing  -RW    BLE Resistance   theraband  -RW    Bilateral Upper Extremity --   ue bike 15 min  -RC      Recorded by [RC] ROHAN Gomez/L  [RW] Danny Beebe PTA    Positioning and Restraints    Pre-Treatment Position sitting in chair/recliner  -RC sitting in chair/recliner  -EK sitting in chair/recliner  -RW    Post Treatment Position bed  -RC chair  -EK bathroom  -RW    In Bed call light within reach;encouraged to call for assist  -RC      Bathroom  notified nsg  -EK with nsg;call light within reach;encouraged to call for assist  -RW    Recorded by [RC] ROHAN Gomez/POONAM [EK] Tamiko Whitten, [facility]-SLP [RW] Danny Beebe, PTA      06/17/17 7771          Rehab Assessment/Intervention    Discipline physical therapy assistant  -RW      Document Type therapy note (daily note)  -RW      Subjective Information agree to therapy  -RW      Patient Effort,  Rehab Treatment fair  -RW      Recorded by [RW] Danny Beebe PTA      Pain Assessment    Pain Assessment No/denies pain  -RW      Recorded by [RW] Danny Beebe PTA      Vision Assessment/Intervention    Visual Impairment WFL with corrective lenses  -RW      Recorded by [RW] Danny Beebe PTA      Cognitive Assessment/Intervention    Current Cognitive/Communication Assessment impaired  -RW      Orientation Status oriented to;person  -RW      Follows Commands/Answers Questions 75% of the time  -RW      Personal Safety decreased awareness, need for safety;decreased insight to deficits  -RW      Recorded by [RW] Danny Beebe PTA      Bed Mobility, Assessment/Treatment    Bed Mobility, Assistive Device bed rails;head of bed elevated  -RW      Bed Mob, Supine to Sit, [place] supervision required  -RW      Bed Mob, Sit to Supine, [place] --  -RW      Recorded by [RW] Danny Beebe PTA      Transfer Assessment/Treatment    Transfers, Chair-Bed [place] --  -RW      Transfers, Sit-Stand [place] contact guard assist  -RW      Transfers, Stand-Sit [place] contact guard assist  -RW      Transfers, Sit-Stand-Sit, Assist Device rolling walker  -RW      Toilet Transfer, [place] --  -RW      Toilet Transfer, Assistive Device --  -RW      Recorded by [RW] Danny Beebe PTA      Gait Assessment/Treatment    Gait, [place] Level contact guard assist  -RW      Gait, Assistive Device rolling walker  -RW      Gait, Distance (Feet) 170   x 2  -RW      Recorded by [RW] Danny Beebe PTA      Stairs Assessment/Treatment    Stairs, Handrail Location --  -RW      Stairs, [place] Level --  -RW      Recorded by [RW] Danny Beebe PTA      Lower Body Dressing Assessment/Training    LB Dressing Assess/Train, Clothing Type shoes;donning:  -RW      Recorded by [RW] Danny Beebe PTA      Balance Skills Training    Standing-Level of Assistance --  -RW      Standing-Balance Activities --  -RW       Gait Balance-Level of Assistance --  -RW      Gait Balance Support --  -RW      Gait Balance Activities --  -RW      Recorded by [RW] Danny Beebe PTA      Therapy Exercises    Bilateral Lower Extremities AROM:;20 reps;sitting;heel raises;hip flexion;knee flexion;15 reps;standing  -RW      BLE Resistance theraband  -RW      Recorded by [RW] Danny Beebe PTA      Positioning and Restraints    Pre-Treatment Position in bed  -RW      Post Treatment Position wheelchair  -RW      In Wheelchair call light within reach;encouraged to call for assist  -RW      Recorded by [RW] Danny Beebe PTA        User Key  (r) = Recorded By, (t) = Taken By, (c) = Cosigned By    Initials Name Effective Dates    JW Arlene Stephens, PTA 08/11/15 -     EK Tamiko Whitten, [facility]-SLP 04/06/17 -     RW Danny Beebe, PTA 10/17/16 -     RC Ally Kwong, MADRIGAL/L 10/17/16 -               IP SLP Goals       06/20/17 1450 06/19/17 1406 06/17/17 0835    Receptive Language- Optimal Participation in Care    Receptive Language Optimal Participation in Care- SLP, Date Goal Reviewed 06/20/17  -EK 06/19/17  -EK 06/17/17  -EK    Expressive- Optimal Participation in Care    Expressive Optimal Participation in Care- SLP, Date Goal Reviewed 06/20/17  -EK 06/19/17  -EK 06/17/17  -EK    Expressive Optimal Participation in Care- SLP, Reason Goal Not Met  progress slower than expected  -EK     Cognitive Linguistic- Optimal Participation in Care    Cognitive Linguistic Optimal Participation in Care- SLP, Date Goal Reviewed 06/20/17  -EK 06/19/17  -EK 06/17/17  -EK      06/16/17 0940 06/15/17 1103 06/14/17 1434    Safely Consume Diet    Safely Consume Diet- SLP, Date Goal Reviewed  06/15/17  -EC 06/14/17  -CK    Safely Consume Diet- SLP, Outcome  goal met  -EC goal partially met  -CK    Receptive Language- Optimal Participation in Care    Receptive Language Optimal Participation in Care- SLP, Date Goal Reviewed 06/16/17  -EC 06/15/17   -EC 06/14/17  -CK    Receptive Language Optimal Participation in Care- SLP, Outcome goal ongoing  -EC goal ongoing  -EC goal ongoing  -CK    Expressive- Optimal Participation in Care    Expressive Optimal Participation in Care- SLP, Date Goal Reviewed  06/15/17  -EC 06/14/17  -CK    Expressive Optimal Participation in Care- SLP, Outcome  goal ongoing  -EC goal ongoing  -CK    Cognitive Linguistic- Optimal Participation in Care    Cognitive Linguistic Optimal Participation in Care- SLP, Date Goal Reviewed 06/16/17  -EC 06/15/17  -EC 06/14/17  -CK    Cognitive Linguistic Optimal Participation in Care- SLP, Outcome goal ongoing  -EC goal ongoing  -EC goal ongoing  -CK      06/13/17 1506          Safely Consume Diet    Safely Consume Diet- SLP, Date Established 06/13/17  -CK      Safely Consume Diet- SLP, Time to Achieve by discharge  -CK      Safely Consume Diet- SLP, Additional Goal Pt will safely tolerate recommended diet w/no overt s/s of aspiration.  -CK      Safely Consume Diet- SLP, Date Goal Reviewed 06/13/17  -CK      Receptive Language- Optimal Participation in Care    Receptive Language Optimal Participation in Care- SLP, Date Established 06/13/17  -CK      Receptive Language Optimal Participation in Care- SLP, Time to Achieve by discharge  -CK      Receptive Language Optimal Participation in Care- SLP, Activity Level Patient will improve ability to follow directions;Patient will improve ability to comprehend questions  -CK      Receptive Language Optimal Participation in Care- SLP, Date Goal Reviewed 06/13/17  -CK      Expressive- Optimal Participation in Care    Expressive Optimal Participation in Care- SLP, Date Established 06/13/17  -CK      Expressive Optimal Participation in Care- SLP, Time to Achieve by discharge  -CK      Expressive Optimal Participation in Care- SLP, Activity Level Patient will improve word retrieval skills  -CK      Expressive Optimal Participation in Care- SLP, Date Goal Reviewed  06/13/17  -CK      Cognitive Linguistic- Optimal Participation in Care    Cognitive Linguistic Optimal Participation in Care- SLP, Date Established 06/13/17  -CK      Cognitive Linguistic Optimal Participation in Care- SLP, Time to Achieve by discharge  -CK      Cognitive Linguistic Optimal Participation in Care- SLP, Activity Level Patient will improve orientation for increased safety in environment;Patient will improve memory skills for increased safety in environment  -CK      Cognitive Linguistic Optimal Participation in Care- SLP, Date Goal Reviewed 06/13/17  -CK        User Key  (r) = Recorded By, (t) = Taken By, (c) = Cosigned By    Initials Name Provider Type    LUCERO Couch CCC-SLP Speech and Language Pathologist    KEVIN Whitten CCC-SLP Speech and Language Pathologist    GLENYS Whitten, MS CCC-SLP Speech and Language Pathologist          EDUCATION  The patient has been educated in the following areas:   Cognitive Impairment Communication Impairment.    SLP Recommendation and Plan                               Plan of Care Review  Plan Of Care Reviewed With: patient  Progress: no change  Outcome Summary/Follow up Plan: Pt will need 24/7 care for increased safety due to decreased memory, orientation, and expressive/language deficits.           Time Calculation:         Time Calculation- SLP       06/20/17 1452          Time Calculation- SLP    SLP Start Time 1330  -EK      SLP Stop Time 1415  -EK      SLP Time Calculation (min) 45 min  -EK      SLP Received On 06/20/17  -EK        User Key  (r) = Recorded By, (t) = Taken By, (c) = Cosigned By    Initials Name Provider Type    KEVIN Whitten CCC-SLP Speech and Language Pathologist          Therapy Charges for Today     Code Description Service Date Service Provider Modifiers Qty    17384746265 HC ST TREATMENT SPEECH 4 6/19/2017 OSIRIS Foley GN 1    [id number] HC ST TREATMENT SPEECH 3  6/20/2017 OSIRIS Foley GN 1               OSIRIS Foley  6/20/2017

## 2017-06-20 NOTE — THERAPY TREATMENT NOTE
Inpatient Rehabilitation - Physical Therapy Treatment Note  [facility]     Patient Name: Kierra Scales  : 1929  MRN: [id number]  Today's Date: 2017  Onset of Illness/Injury or Date of Surgery Date: 17  Date of Referral to PT: 17  Referring Physician: Dr. Lucia    Admit Date: 2017    Visit Dx:    ICD-10-CM ICD-9-CM   1. Oral phase dysphagia R13.11 787.21   2. Impaired mobility and ADLs Z74.09 799.89   3. Muscle weakness (generalized) M62.81 728.87   4. Abnormality of gait and mobility R26.9 781.2   5. 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)   • Hypothyroidism   • Dyspnea   • Hyperthyroidism   • Gastrointestinal hemorrhage with melena   • Type 2 diabetes mellitus with hyperglycemia   • Colon cancer   • Chronic blood loss anemia   • Physical deconditioning   • Senile dementia, uncomplicated   • Encounter for rehabilitation   • Adenocarcinoma of sigmoid colon   • Hypokalemia               Adult Rehabilitation Note       17 1446 17 1416 17 1330    Rehab Assessment/Intervention    Discipline physical therapy assistant  -JW occupational therapy assistant  -RC speech language pathologist  -EK    Document Type therapy note (daily note)  -JW therapy note (daily note)  -RC therapy note (daily note)  -EK    Subjective Information agree to therapy  -JW agree to therapy  -RC no complaints;agree to therapy  -EK    Patient Effort, Rehab Treatment fair  -JW adequate  -RC good  -EK    Precautions/Limitations fall precautions  -JW fall precautions  -RC     Patient Response to Treatment pt vomiting at the end of tx in room, nsg notified  -CLAUDIA      Recorded by [CLAUDIA] Arlene PARRA  ROYAL Stephens [RC] Ally Kwong, MADRIGAL/L [EK] Tamiko Whitten, CCC-SLP    Vital Signs    Pre Patient Position Sitting  -JW      Post Patient Position Supine  -JW      Recorded by [JW] Arlene Stephens PTA      Pain Assessment    Pain Assessment 0-10  -JW No/denies pain  - 0-10  -EK    Pain Score 0  -JW  0  -EK    Post Pain Score 0  -JW  0  -EK    Recorded by [JW] Arlene Stephens PTA [RC] Ally Kwong, MADRIGAL/L [EK] Tamiko Whitten, CCC-SLP    Cognitive Assessment/Intervention    Current Cognitive/Communication Assessment impaired  -JW  impaired  -EK    Orientation Status oriented to;person  -JW  oriented to;person  -EK    Follows Commands/Answers Questions 50% of the time  -JW      Personal Safety Interventions gait belt;nonskid shoes/slippers when out of bed  -      Short Term Memory Interventions  --   sorting task completed w/ min vc's  -RC     Recorded by [JW] Arlene Stephens PTA [RC] Ally Kwong, MADRIGAL/L [EK] Tamiko Whitten, CCC-SLP    Improve ability to follow directions    Improve ability to follow directions:   two-step commands  -EK    Status: Improve ability to follow directions   Progress slower than expected  -EK    Ability to Follow Directions Progress   30%;with consistent cues  -EK    Recorded by   [EK] Tamiko Whitten CCC-SLP    Improve ability to comprehend questions    Improve ability to comprehend questions:   simple yes/no questions;simple general questions;90%  -EK    Status: Improve ability to comprehend questions   Progress slower than expected  -EK    Ability to Comprehend Questions Progress   50%;with consistent cues  -EK    Comments: Improve ability to comprehend questions   moderate to max cues  -EK    Recorded by   [EK] Tamiko Whitten CCC-SLP    Improve word retrieval skills    Improve word retrieval skills by:   naming an object/pic;answer WH question with one word;completing a divergent  task;completing a convergent task;90%  -EK    Status: Improve word retrieval skills   Progress slower than expected  -EK    Word Retrieval Skills Progress   50%  -EK    Recorded by   [EK] OSIRIS Foley    Improve orientation    Improve orientation through:   demonstrating orientation to day;demonstrating orientation to year;demonstrating orientation to place;80%;use environmental aids to assist with orientation  -EK    Status: Improve orientation through   Progress slower than expected  -EK    Orientation Progress   10%;without cues  -EK    Comments: Improve orientation through   little to no success with F:2 cues  -EK    Recorded by   [EK] OSIRIS Foley    Improve memory skills    Improve memory skills through:   recall details of the day;50%  -EK    Status: Improve memory skills   Progress slower than expected  -EK    Memory Skills Progress   20%;without cues  -EK    Recorded by   [EK] OSIRIS Foley    Bed Mobility, Assessment/Treatment    Bed Mobility, Assistive Device --   HOB down, no BRs  -JW      Bed Mob, Sit to Supine, [place] supervision required  -JW      Recorded by [JW] Arlene Stephens PTA      Transfer Assessment/Treatment    Transfers, Chair-Bed [place] contact guard assist  -JW      Transfers, Bed-Chair-Bed, Assist Device rolling walker  -JW      Transfers, Sit-Stand [place] contact guard assist  -JW      Transfers, Stand-Sit [place] contact guard assist  -JW      Transfers, Sit-Stand-Sit, Assist Device rolling walker  -JW      Recorded by [JW] Arlene Stephens PTA      Gait Assessment/Treatment    Gait, [place] Level contact guard assist  -JW      Gait, Assistive Device rolling walker  -JW      Gait, Distance (Feet) 10  -JW      Gait, Comment pt defers gt other than from w/c to bed  -JW      Recorded by [JW] Arlene Stephens PTA      Wheelchair Training/Management    Wheelchair, Distance  Propelled 150  -JW      Wheelchair Training Comment CGA and lots of vc's  -JW      Recorded by [JW] Arlene Stephens PTA      Motor Skills/Interventions    Motor Response Observations  --   table top act using B ue's w/ reaching, and problem solving  -RC     Recorded by  [RC] ROHAN Gomez/L     Therapy Exercises    Bilateral Lower Extremities AROM:;ankle pumps/circles;sitting;hip flexion;LAQ   add ball squeeze, hip abd w/ tband, 3 sets of 10  -JW      BLE Resistance theraband   level 2  -JW      Recorded by [JW] Arlene Stephens PTA      Positioning and Restraints    Pre-Treatment Position other (comment)   w/c  -JW in bed  -RC in bed  -EK    Post Treatment Position bed  -JW bed  -RC bed  -EK    In Bed supine;call light within reach;encouraged to call for assist;exit alarm on;with family/caregiver;notified nsg   HOB elevated  -JW with PT  -RC     Recorded by [JW] Arlene Stephens PTA [RC] ROHAN Gomez/POONAM [EK] Tamiko Whitten CCC-SLP      06/20/17 0915 06/20/17 0730 06/19/17 1410    Rehab Assessment/Intervention    Discipline physical therapy assistant  -CLAUDIA occupational therapy assistant  -RC occupational therapy assistant  -    Document Type therapy note (daily note)  -JW therapy note (daily note)  -RC therapy note (daily note)  -RC    Subjective Information agree to therapy  -JW agree to therapy  -RC agree to therapy  -RC    Patient Effort, Rehab Treatment fair  -JW  adequate  -RC    Symptoms Noted During/After Treatment  --   reports stomach not feeling well, episode of vomiting   -RC     Symptoms Noted Comment  --   vomited at end of tx,returned to room to nurse.  -RC     Precautions/Limitations   fall precautions   gait belt placement (abd incision)  -RC    Patient Response to Treatment pt w/ vomiting episode earlier this morning, pt not agreeable to therapy, but w/ encouragement did bed ther ex  -JW      Recorded by [CLAUDIA] Arlene Stephens PTA [RC] Ally ENRIQUEZ  [place]arg[place], MADRIGAL/L [RC] Ally Kwong, MADRIGAL/L    Vital Signs    Intra Systolic BP Rehab   109  -RC    Intra Treatment Diastolic BP   56  -RC    Pre Patient Position Supine  -JW      Post Patient Position Supine  -JW      Recorded by [JW] Arlene Stephens PTA  [RC] Ally ENRIQUEZ [place], MADRIGAL/L    Pain Assessment    Pain Assessment 0-10  -JW No/denies pain  -RC     Pain Score 0  -JW  --   c/o back pain gave no number  -RC    Post Pain Score 0  -JW      Pain Intervention(s)   Rest;Medication (See MAR)  -RC    Recorded by [JW] Arlene Stephens PTA [RC] Ally Kwong, MADRIGAL/L [RC] Ally Kwong, MADRIGAL/L    Cognitive Assessment/Intervention    Current Cognitive/Communication Assessment impaired  -JW impaired  -RC impaired  -RC    Orientation Status oriented to;person  -JW person  -RC person  -RC    Follows Commands/Answers Questions 50% of the time;needs cueing  -JW      Additional Documentation  --   pt was able to place 1/26 abc puzzle, refused clock act  -RC     Recorded by [JW] Arlene Stephens PTA [RC] Ally Kwong MADRIGAL/L [RC] Ally Kwong, MADRIGAL/L    Bed Mobility, Assessment/Treatment    Bed Mobility, Comment pt defers all mobility  -JW      Recorded by [JW] Arlene Stephens PTA      Transfer Assessment/Treatment    Transfers, Stand-Sit [place]   contact guard assist  -RC    Transfers, Sit-Stand-Sit, Assist Device   rolling walker  -RC    Toilet Transfer, [place]   contact guard assist  -RC    Transfer, Comment pt defers all mobility, family in agreement w/ pt to let pt rest this morning after ther ex  -JW      Recorded by [JW] Arlene Stpehens PTA  [RC] Ally Kwong, MADRIGAL/L    Functional Mobility    Functional Mobility- Ind. Level   contact guard assist  -RC    Functional Mobility- Device   rolling walker  -RC    Functional Mobility-Distance (Feet)   150  -RC    Recorded by   [RC] Ally Kwong, MADRIGAL/L    Lower Body Dressing Assessment/Training    LB Dressing Assess/Train,  Clothing Type   shoes;doffing:  -RC    LB Dressing Assess/Train, Position   sitting  -RC    LB Dressing Assess/Train, [place]   conditional independence  -RC    Recorded by   [RC] ROHAN Gomez/L    Motor Skills/Interventions    Motor Response Observations  --   bean bag toss,clothes pin tree  -RC     Recorded by  [RC] ADRIANNA GomezA/L     Balance Skills Training    Sitting-Balance Activities   Reaching for objects;Ball toss;Trunk control activities  -    Sitting # of Minutes   30  -RC    Recorded by   [RC] ROHAN Gomez/L    Therapy Exercises    Bilateral Lower Extremities AROM:;20 reps;AAROM:;supine;ankle pumps/circles;heel slides;hip abduction/adduction;quad sets;SAQ   2 sets, pt req. AAROM at times, vc's throughout most of tx  -JW      Recorded by [JW] Arlene Stephens PTA      Positioning and Restraints    Pre-Treatment Position in bed  -JW sitting in chair/recliner  - --   eom  -    Post Treatment Position bed  -JW wheelchair  - bed  -RC    In Bed supine;call light within reach;encouraged to call for assist;exit alarm on;with family/caregiver  -JW with nsg  - exit alarm on;encouraged to call for assist;call light within reach;notified ns  -    Recorded by [JW] Arlene Stephens, ROYAL [RC] ROHAN Gomez/POONAM [RC] ADRIANNA GomezA/POONAM      06/19/17 1315 06/19/17 1051 06/19/17 0937    Rehab Assessment/Intervention    Discipline physical therapy assistant  -RW occupational therapy assistant  -RC speech language pathologist  -EK    Document Type therapy note (daily note)  -RW therapy note (daily note)  -RC     Subjective Information agree to therapy  -RW agree to therapy  -RC no complaints;agree to therapy  -EK    Patient Effort, Rehab Treatment adequate  -RW poor  -RC fair  -EK    Recorded by [RW] Danny Beebe PTA [RC] ROHAN Gomez/POONAM [EK] Tamiko Whitten, CCC-SLP    Pain Assessment    Pain Assessment No/denies pain  -RW No/denies pain   -RC No/denies pain  -EK    Recorded by [RW] Danny Beebe PTA [RC] Ally Kwong, MADRIGAL/L [EK] Tamiko Whitten, CCC-SLP    Vision Assessment/Intervention    Visual Impairment WFL with corrective lenses  -RW  WFL with corrective lenses  -EK    Recorded by [RW] Danny Beebe PTA  [EK] Tamiko Whitten, CCC-SLP    Cognitive Assessment/Intervention    Current Cognitive/Communication Assessment impaired  -RW impaired  -RC impaired  -EK    Orientation Status oriented to;person  -RW person;place  -RC oriented to;person  -EK    Follows Commands/Answers Questions   50% of the time;needs cueing;needs increased time;needs repetition  -EK    Short/Long Term Memory moderate impairment, short term memory  -RW  severe impairment, short term memory  -EK    Problem Solving ADL/Func. Task  --   pt completed 5 color sorting task w/ extra time and vc's  -RC     Additional Documentation  --   unable to recall names of children 2/4 w/o cues  -RC     Recorded by [RW] Danny Beebe PTA [RC] Ally Kwong MADRIGAL/L [EK] Tamiko Whitten, CCC-SLP    Improve ability to follow directions    Improve ability to follow directions:   two-step commands  -EK    Status: Improve ability to follow directions   Progress slower than expected  -EK    Ability to Follow Directions Progress   30%;with consistent cues  -EK    Comments: Improve ability to follow directions   pt requires mod to max cues for task completion  -EK    Recorded by   [EK] Tamiko Whitten, CCC-SLP    Improve ability to comprehend questions    Improve ability to comprehend questions:   simple yes/no questions;simple general questions;90%  -EK    Status: Improve ability to comprehend questions   Progress slower than expected  -EK    Ability to Comprehend Questions Progress   50%;with consistent cues  -EK    Recorded by   [EK] Tamiko Whitten CCC-SLP    Improve word retrieval skills    Improve word retrieval skills by:    naming an object/pic;answer WH question with one word;completing a divergent task;completing a convergent task;90%  -EK    Status: Improve word retrieval skills   Progress slower than expected  -EK    Word Retrieval Skills Progress   50%  -EK    Comments: Improve word retrieval skills   Pt had difficulty with completing simple sentences and answering questions about a pictured scene.   -EK    Recorded by   [EK] Tamiko Whitten CCC-SLP    Improve orientation    Improve orientation through:   demonstrating orientation to day;demonstrating orientation to year;demonstrating orientation to place;80%;use environmental aids to assist with orientation  -EK    Status: Improve orientation through   Progress slower than expected  -EK    Orientation Progress   10%;with consistent cues  -EK    Comments: Improve orientation through   mod to max cues; F;2  -EK    Recorded by   [EK] Tamiko Whitten CCC-SLP    Improve memory skills    Improve memory skills through:   recall details of the day;50%  -EK    Status: Improve memory skills   Progress slower than expected  -EK    Memory Skills Progress   10%  -EK    Comments: Improve memory skills   Pt could not recall morning meal and name of grandchild who was present and then stepped away.   -EK    Recorded by   [EK] Tamiko Whitten CCC-SLP    Transfer Assessment/Treatment    Transfers, Sit-Stand [place] contact guard assist  -RW      Transfers, Stand-Sit [place] contact guard assist  -RW      Transfers, Sit-Stand-Sit, Assist Device rolling walker  -RW      Toilet Transfer, [place] minimum assist (75% patient effort)  -RW contact guard assist  -RC     Toilet Transfer, Assistive Device rolling walker  -RW rolling walker  -RC     Transfer, Comment  --   vc's to use r/w  -RC     Recorded by [RW] Danny Beebe, PTA [RC] ROHAN Gomez/L     Gait Assessment/Treatment    Gait, [place] Level contact guard assist  -RW      Gait,  Assistive Device rolling walker  -RW      Gait, Distance (Feet) 150  -RW      Recorded by [RW] Danny Beebe PTA      Functional Mobility    Functional Mobility- Ind. Level contact guard assist  -RW contact guard assist  -RC     Functional Mobility- Device rolling walker  -RW      Functional Mobility-Distance (Feet)  40  -RC     Recorded by [RW] Danny Beebe PTA [RC] Ally Kwong MADRIGAL/L     Balance Skills Training    Sitting-Level of Assistance Distant supervision  -RW      Sitting-Balance Activities Lateral lean;Forward lean;Reaching for objects;Reaching across midline;Trunk control activities  -RW      Sitting # of Minutes 30+  -RW      Recorded by [RW] Danny Beebe PTA      Therapy Exercises    Bilateral Lower Extremities --  -RW      BLE Resistance --  -RW      Bilateral Upper Extremity  --   ue bike 15 min  -RC     Recorded by [RW] Danny Beebe PTA [RC] ADRIANNA GomezA/L     Positioning and Restraints    Pre-Treatment Position  sitting in chair/recliner  -RC sitting in chair/recliner  -EK    Post Treatment Position  bed  -RC chair  -EK    In Bed  call light within reach;encouraged to call for assist  -RC     Bathroom   notified nsg  -EK    Recorded by  [RC] ADRIANNA GomezA/POONAM [EK] Tamiko Whitten CCC-SLP      06/19/17 0820          Rehab Assessment/Intervention    Discipline physical therapy assistant  -RW      Document Type therapy note (daily note)  -RW      Subjective Information agree to therapy  -RW      Patient Effort, Rehab Treatment fair  -RW      Recorded by [RW] Danny Beebe PTA      Vital Signs    Pretreatment Heart Rate (beats/min) 74  -RW      Pre SpO2 (%) 94  -RW      Recorded by [RW] Danny Beebe PTA      Pain Assessment    Pain Score --   gives not rating  -RW      Pain Location Back  -RW      Recorded by [RW] Danny Beebe PTA      Vision Assessment/Intervention    Visual Impairment WFL with corrective lenses  -RW      Recorded by [RESHMA] Danny PARRA  ROYAL Beebe      Cognitive Assessment/Intervention    Current Cognitive/Communication Assessment impaired  -RW      Orientation Status oriented to;person  -RW      Follows Commands/Answers Questions 50% of the time;needs cueing  -RW      Short/Long Term Memory moderate impairment, short term memory  -RW      Recorded by [RW] Danny Beebe PTA      Transfer Assessment/Treatment    Transfers, Sit-Stand [place] contact guard assist  -RW      Transfers, Stand-Sit [place] contact guard assist  -RW      Transfers, Sit-Stand-Sit, Assist Device rolling walker  -RW      Toilet Transfer, [place] minimum assist (75% patient effort)  -RW      Toilet Transfer, Assistive Device rolling walker  -RW      Transfer, Comment pt came walking out of bathroom w/o calling for assist. no walker  -RW      Recorded by [RW] Danny Beebe PTA      Gait Assessment/Treatment    Gait, [place] Level contact guard assist  -RW      Gait, Assistive Device rolling walker  -RW      Gait, Distance (Feet) 150    x 2  -RW      Recorded by [RESHMA] Danny Beebe PTA      Lower Body Dressing Assessment/Training    LB Dressing Assess/Train, Clothing Type shoes;donning:  -RW      Recorded by [RW] Danny Beebe PTA      Therapy Exercises    Bilateral Lower Extremities AROM:;20 reps;sitting;heel raises;hip flexion;knee flexion;15 reps;standing  -RW      BLE Resistance theraband  -RW      Recorded by [RESHMA] Danny Beebe PTA      Positioning and Restraints    Pre-Treatment Position sitting in chair/recliner  -RW      Post Treatment Position bathroom  -RW      Bathroom with nsg;call light within reach;encouraged to call for assist  -RW      Recorded by [RW] Danny Beebe PTA        User Key  (r) = Recorded By, (t) = Taken By, (c) = Cosigned By    Initials Name Effective Dates    JW Arlene Stephens, PTA 08/11/15 -     EK Tamiko Whitten [facility]-SLP 04/06/17 -     RESHMA Beebe PTA 10/17/16 -     TAHIR Kwong  MADRIGAL/L 10/17/16 -                 IP PT Goals       06/20/17 1446 06/19/17 1422 06/17/17 1559    Bed Mobility PT LTG    Bed Mobility PT LTG, Date Goal Reviewed 06/20/17  -JW 06/19/17  -RW 06/17/17  -RW    Bed Mobility PT LTG, Outcome  goal ongoing  -RW goal ongoing  -RW    Transfer Training PT LTG    Transfer Training PT  LTG, Date Goal Reviewed 06/20/17  -JW 06/19/17  -RW 06/17/17  -RW    Transfer Training PT LTG, Outcome  goal ongoing  -RW goal ongoing  -RW    Gait Training PT LTG    Gait Training Goal PT LTG, Date Goal Reviewed 06/20/17  -JW 06/19/17  -RW 06/17/17  -RW    Gait Training Goal PT LTG, Outcome  goal ongoing  -RW goal ongoing  -RW      06/16/17 1245 06/16/17 0816 06/15/17 1353    Bed Mobility PT LTG    Bed Mobility PT LTG, Date Goal Reviewed (P)  06/16/17  -MARLENE 06/16/17  -MARLENE 06/15/17  -RW    Bed Mobility PT LTG, Outcome (P)  goal ongoing  -MARLENE goal ongoing  -MARLENE goal ongoing  -RW    Transfer Training PT LTG    Transfer Training PT  LTG, Date Goal Reviewed (P)  06/16/17  -MARLENE 06/16/17  -MARLENE 06/15/17  -RW    Transfer Training PT LTG, Outcome (P)  goal ongoing  -MARLENE goal ongoing  -MARLENE goal ongoing  -RW    Gait Training PT LTG    Gait Training Goal PT LTG, Date Goal Reviewed (P)  06/16/17  -MARLENE 06/16/17  -MARLENE 06/15/17  -RW    Gait Training Goal PT LTG, Outcome (P)  goal ongoing  -MARLENE goal ongoing  -MARLENE goal ongoing  -RW    Stair Training PT LTG    Stair Training Goal PT LTG, Date Goal Reviewed   06/15/17  -RW    Stair Training Goal PT LTG, Outcome   goal met  -RW      06/14/17 1608 06/13/17 1036 06/11/17 1403    Bed Mobility PT LTG    Bed Mobility PT LTG, Date Established  06/13/17  -LM     Bed Mobility PT LTG, Time to Achieve  by discharge  -LM     Bed Mobility PT LTG, Activity Type  supine to sit/sit to supine  -LM     Bed Mobility PT LTG, [place] Level  supervision required  -LM     Bed Mobility PT LTG, Additional Goal  HOB flat; No BR's  -LM     Bed Mobility PT LTG, Date Goal Reviewed 06/14/17  -RW   06/11/17  -TW    Bed Mobility PT LTG, Outcome goal ongoing  -RW goal ongoing  -LM goal ongoing  -TW    Transfer Training PT LTG    Transfer Training PT LTG, Date Established  06/13/17  -LM     Transfer Training PT LTG, Time to Achieve  by discharge  -LM     Transfer Training PT LTG, Activity Type  bed to chair /chair to bed  -LM     Transfer Training PT LTG, [place] Level  supervision required  -LM     Transfer Training PT LTG, Assist Device  --   AAD  -LM     Transfer Training PT  LTG, Date Goal Reviewed 06/14/17  -RW      Transfer Training PT LTG, Outcome goal ongoing  -RW goal ongoing  -LM     Gait Training PT STG    Gait Training Goal PT STG, Date Goal Reviewed   06/11/17  -TW    Gait Training Goal PT STG, Outcome   goal ongoing  -TW    Gait Training PT LTG    Gait Training Goal PT LTG, Date Established  06/13/17  -LM     Gait Training Goal PT LTG, Time to Achieve  by discharge  -LM     Gait Training Goal PT LTG, [place] Level  supervision required  -LM     Gait Training Goal PT LTG, Assist Device  --   AAD  -LM     Gait Training Goal PT LTG, Distance to Achieve  150 feet  -LM     Gait Training Goal PT LTG, Date Goal Reviewed 06/14/17  -RW      Gait Training Goal PT LTG, Outcome goal ongoing  -RW goal ongoing  -LM     Stair Training PT LTG    Stair Training Goal PT LTG, Date Established  06/13/17  -LM     Stair Training Goal PT LTG, Time to Achieve  by discharge  -LM     Stair Training Goal PT LTG, Number of Steps  4 steps  -LM     Stair Training Goal PT LTG, [place] Level  contact guard assist  -LM     Stair Training Goal PT LTG, Assist Device  2 handrails  -LM     Stair Training Goal PT LTG, Date Goal Reviewed 06/14/17  -RW  06/11/17  -TW    Stair Training Goal PT LTG, Outcome goal ongoing  -RW goal ongoing  -LM goal ongoing  -TW      06/08/17 1110 06/07/17 1228       Bed Mobility PT LTG    Bed Mobility PT LTG, Date Goal Reviewed 06/08/17  -AM 06/07/17  -RW     Bed Mobility PT LTG, Outcome goal  not met  -AM goal ongoing  -RW     Gait Training PT STG    Gait Training Goal PT STG, Date Goal Reviewed 06/08/17  -AM 06/07/17  -RW     Gait Training Goal PT STG, Outcome goal not met  -AM goal ongoing  -RW     Stair Training PT LTG    Stair Training Goal PT LTG, Date Goal Reviewed 06/08/17  -AM 06/07/17  -RW     Stair Training Goal PT LTG, Outcome goal not met  -AM goal ongoing  -RW       User Key  (r) = Recorded By, (t) = Taken By, (c) = Cosigned By    Initials Name Provider Type    JW Arlene Stephens, PTA Physical Therapy Assistant    LM Megan Kruse, PT Physical Therapist    MARLENE Jaspreet Rodas, PTA Physical Therapy Assistant    AM Luciano Sanchez, PTA Physical Therapy Assistant    TW Franco Byers, PTA Physical Therapy Assistant    RW Danny Beebe, PTA Physical Therapy Assistant          Physical Therapy Education     Title: PT OT SLP Therapies (Active)     Topic: Physical Therapy (Active)     Point: Mobility training (Active)    Learning Progress Summary    Learner Readiness Method Response Comment Documented by Status   Patient Nonacceptance E NR pt continues to need consistant safety and direction cues. does not call for assist when asked to.  06/19/17 1111 Active    Acceptance E NR pt needs lots of directions and re-direct for most all activity.  06/17/17 1024 Active    Acceptance E NR  MARLENE 06/16/17 0913 Active    Acceptance E VU,NR reviewed benifits of oob and mobility with assistance.  06/14/17 1252 Done    Acceptance E NR Reviewed safety with transfers - however, pt unable to retain.  Pt's family will require education.  06/13/17 1436 Active               Point: Precautions (Done)    Learning Progress Summary    Learner Readiness Method Response Comment Documented by Status   Patient Acceptance E VU  KM 06/18/17 1701 Done    Acceptance E VU,NR reviewed benifits of oob and mobility with assistance.  06/14/17 1252 Done    Acceptance E NR Reviewed safety with transfers - however, pt  unable to retain.  Pt's family will require education. LM 06/13/17 1436 Active                      User Key     Initials Effective Dates Name Provider Type Discipline    LM 06/15/16 -  Megan Kruse, PT Physical Therapist PT    KM 10/17/16 -  Sadia Neal, RN Registered Nurse Nurse    MARLENE 10/17/16 -  Jaspreet Rodas, PTA Physical Therapy Assistant PT    RW 10/17/16 -  Danny Beebe, PTA Physical Therapy Assistant PT                    PT Recommendation and Plan  Anticipated Equipment Needs At Discharge: front wheeled walker  Anticipated Discharge Disposition: home with 24/7 care, home with home health  Planned Therapy Interventions: balance training, bed mobility training, gait training, home exercise program, manual therapy techniques, motor coordination training, neuromuscular re-education, patient/family education, postural re-education, ROM (Range of Motion), stair training, strengthening, stretching, transfer training, wheelchair management/propulsion training  PT Frequency: other (see comments) (5-14 times/wk)  Plan of Care Review  Plan Of Care Reviewed With: patient  Progress: no change  Outcome Summary/Follow up Plan: pt not feeling well, much encouragement required to participate at all w/ therapy, defers gt and t/fers except w/c to bed, performed 3 sets of 10 of  B LE ther ex          Outcome Measures       06/20/17 1400 06/20/17 0915 06/20/17 0730    How much help from another person do you currently need...    Turning from your back to your side while in flat bed without using bedrails?  3  -JW     Moving from lying on back to sitting on the side of a flat bed without bedrails?  3  -JW     Moving to and from a bed to a chair (including a wheelchair)?  3  -JW     Standing up from a chair using your arms (e.g., wheelchair, bedside chair)?  3  -JW     Climbing 3-5 steps with a railing?  3  -JW     To walk in hospital room?  3  -JW     AM-PAC 6 Clicks Score  18  -JW     How much help from another is  currently needed...    Putting on and taking off regular lower body clothing? 3  -RC  3  -RC    Bathing (including washing, rinsing, and drying) 3  -RC  3  -RC    Toileting (which includes using toilet bed pan or urinal) 3  -RC  3  -RC    Putting on and taking off regular upper body clothing 4  -RC  4  -RC    Taking care of personal grooming (such as brushing teeth) 3  -RC  3  -RC    Eating meals 4  -RC  4  -RC    Score 20  -RC  20  -RC    Functional Assessment    Outcome Measure Options  AM-[facility] 6 Clicks Basic Mobility (PT)  -JW       06/19/17 1410 06/19/17 0820       How much help from another person do you currently need...    Turning from your back to your side while in flat bed without using bedrails?  3  -RW     Moving from lying on back to sitting on the side of a flat bed without bedrails?  3  -RW     Moving to and from a bed to a chair (including a wheelchair)?  3  -RW     Standing up from a chair using your arms (e.g., wheelchair, bedside chair)?  3  -RW     Climbing 3-5 steps with a railing?  3  -RW     To walk in hospital room?  3  -RW     AM-[facility] 6 Clicks Score  18  -RW     How much help from another is currently needed...    Putting on and taking off regular lower body clothing? 3  -RC      Bathing (including washing, rinsing, and drying) 3  -RC      Toileting (which includes using toilet bed pan or urinal) 3  -RC      Putting on and taking off regular upper body clothing 4  -RC      Taking care of personal grooming (such as brushing teeth) 3  -RC      Eating meals 4  -RC      Score 20  -RC        User Key  (r) = Recorded By, (t) = Taken By, (c) = Cosigned By    Initials Name Provider Type    CLAUDIA Stephens, ROYAL Physical Therapy Assistant    RESHMA Beebe PTA Physical Therapy Assistant    ROHAN Rodas/L Occupational Therapy Assistant           Time Calculation:         PT Charges       06/20/17 1446 06/20/17 0915       Time Calculation    Start Time 1446  -JW 0915  -JW     Stop  Time 1530  -JW 0945  -JW     Time Calculation (min) 44 min  -JW 30 min  -JW     PT Received On 06/20/17  -JW 06/20/17  -JW     Time Calculation- PT    Total Timed Code Minutes- PT 44 minute(s)  -JW 30 minute(s)  -JW       User Key  (r) = Recorded By, (t) = Taken By, (c) = Cosigned By    Initials Name Provider Type     Arlene Stephens PTA Physical Therapy Assistant          Therapy Charges for Today     Code Description Service Date Service Provider Modifiers Qty    69974443843 HC PT THER PROC EA 15 MIN 6/20/2017 Arlene Stephens PTA GP 2    18778150820 HC PT THER PROC EA 15 MIN 6/20/2017 Arlene Stephens PTA GP 2    23233656846 HC PT THERAPEUTIC ACT EA 15 MIN 6/20/2017 Arlene Stephens PTA GP 1          PT G-Codes  PT Professional Judgement Used?: Yes  Outcome Measure Options: AM-PAC 6 Clicks Basic Mobility (PT)  Score: 18  Functional Limitation: Mobility: Walking and moving around  Mobility: Walking and Moving Around Current Status (): At least 40 percent but less than 60 percent impaired, limited or restricted  Mobility: Walking and Moving Around Goal Status (): At least 20 percent but less than 40 percent impaired, limited or restricted    Arlene Stephens PTA  6/20/2017

## 2017-06-20 NOTE — PROGRESS NOTES
GENERAL SURGERY PROGRESS NOTE  Chief Complaint:  Surgery Follow up   LOS: 8 days       Subjective     Interval History:     Working with PT in the gym this afternoon during my visit.  Has had some nausea and vomiting this morning per report but says she feels well now and ate lunch without issue.  Continues to have bowel function.      Objective     Vital Signs  Temp:  [97.1 °F (36.2 °C)-97.4 °F (36.3 °C)] 97.4 °F (36.3 °C)  Heart Rate:  [69-84] 84  Resp:  [18-20] 18  BP: (138-173)/(60-74) 159/68    Physical Exam:   Abdomen soft  Labs:  Lab Results (last 24 hours)     Procedure Component Value Units Date/Time    POC Glucose Fingerstick [[id number]]  (Abnormal) Collected:  06/19/17 1526    Specimen:  Blood Updated:  06/19/17 1613     Glucose 280 (H) mg/dL       Meter: [id number]: [id number] JIE ROSS       POC Glucose Fingerstick [[id number]]  (Abnormal) Collected:  06/19/17 2004    Specimen:  Blood Updated:  06/19/17 2043     Glucose 326 (H) mg/dL       Meter: [id number]: [id number] MARISA CRABTREE       POC Glucose Fingerstick [[id number]]  (Abnormal) Collected:  06/20/17 0541    Specimen:  Blood Updated:  06/20/17 0607     Glucose 232 (H) mg/dL       RN NotifiedMeter: [id number]: [id number] MARISA CRABTREE       POC Glucose Fingerstick [[id number]]  (Abnormal) Collected:  06/20/17 1115    Specimen:  Blood Updated:  06/20/17 1129     Glucose 224 (H) mg/dL       Sliding Scale AdminMeter: [id number]: [id number] JAKI ODELL               Assessment/Plan     Kierra Scales is a 88 y.o. female who is s/p left colon resection      Continue rehab care.          This document has been electronically signed by Stefano Linn MD on June 20, 2017 4:05 PM        Stefano Linn MD  06/20/17  4:05 PM

## 2017-06-20 NOTE — THERAPY TREATMENT NOTE
Inpatient Rehabilitation - Occupational Therapy Treatment Note  [facility]     Patient Name: Kierra Scales  : 1929  MRN: [id number]  Today's Date: 2017  Onset of Illness/Injury or Date of Surgery Date: 17  Date of Referral to OT: 17  Referring Physician: Dr. Lucia      Admit Date: 2017    Visit Dx:     ICD-10-CM ICD-9-CM   1. Oral phase dysphagia R13.11 787.21   2. Impaired mobility and ADLs Z74.09 799.89   3. Muscle weakness (generalized) M62.81 728.87   4. Abnormality of gait and mobility R26.9 781.2   5. 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)   • Hypothyroidism   • Dyspnea   • Hyperthyroidism   • Gastrointestinal hemorrhage with melena   • Type 2 diabetes mellitus with hyperglycemia   • Colon cancer   • Chronic blood loss anemia   • Physical deconditioning   • Senile dementia, uncomplicated   • Encounter for rehabilitation   • Adenocarcinoma of sigmoid colon   • Hypokalemia             Adult Rehabilitation Note       17 1416 17 1330 17 0915    Rehab Assessment/Intervention    Discipline occupational therapy assistant  -RC speech language pathologist  -EK physical therapy assistant  -JW    Document Type therapy note (daily note)  -RC therapy note (daily note)  -EK therapy note (daily note)  -JW    Subjective Information agree to therapy  -RC no complaints;agree to therapy  -EK agree to therapy  -JW    Patient Effort, Rehab Treatment adequate  -RC good  -EK fair  -JW    Precautions/Limitations fall precautions  -RC      Patient Response to Treatment   pt w/ vomiting episode earlier this morning, pt not agreeable to therapy, but w/ encouragement did bed  ther ex  -JW    Recorded by [RC] Ally Kwong, MADRIGAL/L [EK] Tamiko Whitten, CCC-SLP [JW] Arlene Stephens, PTA    Vital Signs    Pre Patient Position   Supine  -JW    Post Patient Position   Supine  -JW    Recorded by   [JW] Arlene Stephens, PTA    Pain Assessment    Pain Assessment No/denies pain  -RC 0-10  -EK 0-10  -JW    Pain Score  0  -EK 0  -JW    Post Pain Score  0  -EK 0  -JW    Recorded by [RC] Ally Kwong, MADRIGAL/L [EK] Tamiko Whitten, CCC-SLP [JW] Arlene Stephens, PTA    Cognitive Assessment/Intervention    Current Cognitive/Communication Assessment  impaired  -EK impaired  -JW    Orientation Status  oriented to;person  -EK oriented to;person  -JW    Follows Commands/Answers Questions   50% of the time;needs cueing  -JW    Short Term Memory Interventions --   sorting task completed w/ min vc's  -RC      Recorded by [RC] Ally Kwong, MADRIGAL/L [EK] Tamiko Whitten, CCC-SLP [JW] Arlene Stephens, PTA    Improve ability to follow directions    Improve ability to follow directions:  two-step commands  -EK     Status: Improve ability to follow directions  Progress slower than expected  -EK     Ability to Follow Directions Progress  30%;with consistent cues  -EK     Recorded by  [EK] Tamiko Whitten, CCC-SLP     Improve ability to comprehend questions    Improve ability to comprehend questions:  simple yes/no questions;simple general questions;90%  -EK     Status: Improve ability to comprehend questions  Progress slower than expected  -EK     Ability to Comprehend Questions Progress  50%;with consistent cues  -EK     Comments: Improve ability to comprehend questions  moderate to max cues  -EK     Recorded by  [EK] Tamiko Whitten, CCC-SLP     Improve word retrieval skills    Improve word retrieval skills by:  naming an object/pic;answer WH question with one word;completing a divergent task;completing a convergent task;90%  -EK      Status: Improve word retrieval skills  Progress slower than expected  -EK     Word Retrieval Skills Progress  50%  -EK     Recorded by  [EK] OSIRIS Foley     Improve orientation    Improve orientation through:  demonstrating orientation to day;demonstrating orientation to year;demonstrating orientation to place;80%;use environmental aids to assist with orientation  -EK     Status: Improve orientation through  Progress slower than expected  -EK     Orientation Progress  10%;without cues  -EK     Comments: Improve orientation through  little to no success with F:2 cues  -EK     Recorded by  [EK] OSIRIS Foley     Improve memory skills    Improve memory skills through:  recall details of the day;50%  -EK     Status: Improve memory skills  Progress slower than expected  -EK     Memory Skills Progress  20%;without cues  -EK     Recorded by  [EK] OSIRIS Foley     Bed Mobility, Assessment/Treatment    Bed Mobility, Comment   pt defers all mobility  -JW    Recorded by   [JW] Arlene Stephens PTA    Transfer Assessment/Treatment    Transfer, Comment   pt defers all mobility, family in agreement w/ pt to let pt rest this morning after ther ex  -JW    Recorded by   [JW] Arlene Stephens PTA    Motor Skills/Interventions    Motor Response Observations --   table top act using B ue's w/ reaching, and problem solving  -RC      Recorded by [RC] ROHAN Gomez/L      Therapy Exercises    Bilateral Lower Extremities   AROM:;20 reps;AAROM:;supine;ankle pumps/circles;heel slides;hip abduction/adduction;quad sets;SAQ   2 sets, pt req. AAROM at times, vc's throughout most of tx  -JW    Recorded by   [JW] Arlene Stephens, ROYAL    Positioning and Restraints    Pre-Treatment Position in bed  -RC in bed  -EK in bed  -JW    Post Treatment Position bed  -RC bed  -EK bed  -JW    In Bed with PT  -RC  supine;call light within reach;encouraged to call  for assist;exit alarm on;with family/caregiver  -JW    Recorded by [RC] ADRIANNA GomezA/POONAM [EK] Tamiko Whitten, [facility]-SLP [JW] Arlene Stephens, [facility]      06/20/17 0730 06/19/17 1410 06/19/17 1315    Rehab Assessment/Intervention    Discipline occupational therapy assistant  -RC occupational therapy assistant  -RC physical therapy assistant  -RW    Document Type therapy note (daily note)  -RC therapy note (daily note)  -RC therapy note (daily note)  -RW    Subjective Information agree to therapy  -RC agree to therapy  -RC agree to therapy  -RW    Patient Effort, Rehab Treatment  adequate  -RC adequate  -RW    Symptoms Noted During/After Treatment --   reports stomach not feeling well, episode of vomiting   -RC      Symptoms Noted Comment --   vomited at end of tx,returned to room to nurse.  -RC      Precautions/Limitations  fall precautions   gait belt placement (abd incision)  -RC     Recorded by [RC] ADRIANNA GomezA/L [RC] ADRIANNA GomezA/L [RW] Danny Beebe, ROYAL    Vital Signs    Intra Systolic BP Rehab  109  -RC     Intra Treatment Diastolic BP  56  -RC     Recorded by  [RC] Ally Kwong MADRIGAL/L     Pain Assessment    Pain Assessment No/denies pain  -RC  No/denies pain  -RW    Pain Score  --   c/o back pain gave no number  -RC     Pain Intervention(s)  Rest;Medication (See MAR)  -RC     Recorded by [RC] ADRIANNA GomezA/POONAM [RC] ADRIANNA GomezA/L [RW] Danny Beebe, ROYAL    Vision Assessment/Intervention    Visual Impairment   WFL with corrective lenses  -RW    Recorded by   [RW] Danny Beebe PTA    Cognitive Assessment/Intervention    Current Cognitive/Communication Assessment impaired  -RC impaired  -RC impaired  -RW    Orientation Status person  -RC person  -RC oriented to;person  -RW    Short/Long Term Memory   moderate impairment, short term memory  -RW    Additional Documentation --   pt was able to place 1/26 abc puzzle, refused clock act  -RC      Recorded  by [RC] ADRIANNA GomezA/L [RC] ADRIANNA GomezA/L [RW] Danny Beebe, PTA    Transfer Assessment/Treatment    Transfers, Sit-Stand [place]   contact guard assist  -RW    Transfers, Stand-Sit [place]  contact guard assist  -RC contact guard assist  -RW    Transfers, Sit-Stand-Sit, Assist Device  rolling walker  -RC rolling walker  -RW    Toilet Transfer, [place]  contact guard assist  -RC minimum assist (75% patient effort)  -RW    Toilet Transfer, Assistive Device   rolling walker  -RW    Recorded by  [RC] ADRIANNA GomezA/L [RW] Danny Beebe PTA    Gait Assessment/Treatment    Gait, [place] Level   contact guard assist  -RW    Gait, Assistive Device   rolling walker  -RW    Gait, Distance (Feet)   150  -RW    Recorded by   [RW] Danny Beebe PTA    Functional Mobility    Functional Mobility- Ind. Level  contact guard assist  -RC contact guard assist  -RW    Functional Mobility- Device  rolling walker  -RC rolling walker  -RW    Functional Mobility-Distance (Feet)  150  -RC     Recorded by  [RC] ROHAN Gomez/L [RW] Danny Beebe PTA    Lower Body Dressing Assessment/Training    LB Dressing Assess/Train, Clothing Type  shoes;doffing:  -RC     LB Dressing Assess/Train, Position  sitting  -RC     LB Dressing Assess/Train, [place]  conditional independence  -RC     Recorded by  [RC] ADRAINNA GomezA/L     Motor Skills/Interventions    Motor Response Observations --   bean bag toss,clothes pin tree  -RC      Recorded by [RC] ADRIANNA GomezA/L      Balance Skills Training    Sitting-Level of Assistance   Distant supervision  -RW    Sitting-Balance Activities  Reaching for objects;Ball toss;Trunk control activities  -RC Lateral lean;Forward lean;Reaching for objects;Reaching across midline;Trunk control activities  -RW    Sitting # of Minutes  30  -RC 30+  -RW    Recorded by  [RC] ROHAN Gomez/L [RW] Danny Beebe PTA    Therapy Exercises     Bilateral Lower Extremities   --  -RW    BLE Resistance   --  -RW    Recorded by   [RW] Danny Beebe PTA    Positioning and Restraints    Pre-Treatment Position sitting in chair/recliner  -RC --   eom  -RC     Post Treatment Position wheelchair  -RC bed  -RC     In Bed with nsg  -RC exit alarm on;encouraged to call for assist;call light within reach;notified nsg  -RC     Recorded by [RC] ADRIANNA GomezA/L [RC] ROHAN Gomez/POONAM       06/19/17 1051 06/19/17 0937 06/19/17 0820    Rehab Assessment/Intervention    Discipline occupational therapy assistant  -RC speech language pathologist  -EK physical therapy assistant  -RW    Document Type therapy note (daily note)  -RC  therapy note (daily note)  -RW    Subjective Information agree to therapy  -RC no complaints;agree to therapy  -EK agree to therapy  -RW    Patient Effort, Rehab Treatment poor  -RC fair  -EK fair  -RW    Recorded by [RC] ADRIANNA GomezA/L [EK] Tamiko Whitten, GURMEET-SLP [RW] Danny Beebe PTA    Vital Signs    Pretreatment Heart Rate (beats/min)   74  -RW    Pre SpO2 (%)   94  -RW    Recorded by   [RW] Danny Beebe PTA    Pain Assessment    Pain Assessment No/denies pain  -RC No/denies pain  -EK     Pain Score   --   gives not rating  -RW    Pain Location   Back  -RW    Recorded by [RC] ADRIANNA GomezA/L [EK] Tamiko Whitten, GURMEET-SLP [RW] Danny Beebe PTA    Vision Assessment/Intervention    Visual Impairment  WFL with corrective lenses  -EK WFL with corrective lenses  -RW    Recorded by  [EK] Tamiko Whitten CCC-SLP [RW] Danny Beebe PTA    Cognitive Assessment/Intervention    Current Cognitive/Communication Assessment impaired  -RC impaired  -EK impaired  -RW    Orientation Status person;place  -RC oriented to;person  -EK oriented to;person  -RW    Follows Commands/Answers Questions  50% of the time;needs cueing;needs increased time;needs repetition  -EK 50% of the time;needs  cueing  -RW    Short/Long Term Memory  severe impairment, short term memory  -EK moderate impairment, short term memory  -RW    Problem Solving ADL/Func. Task --   pt completed 5 color sorting task w/ extra time and vc's  -RC      Additional Documentation --   unable to recall names of children 2/4 w/o cues  -RC      Recorded by [RC] Ally Kwong, MADRIGAL/L [EK] Tamiko Whitten CCC-SLP [RW] Danny Beebe, PTA    Improve ability to follow directions    Improve ability to follow directions:  two-step commands  -EK     Status: Improve ability to follow directions  Progress slower than expected  -EK     Ability to Follow Directions Progress  30%;with consistent cues  -EK     Comments: Improve ability to follow directions  pt requires mod to max cues for task completion  -EK     Recorded by  [EK] Tamiko Whitten CCC-SLP     Improve ability to comprehend questions    Improve ability to comprehend questions:  simple yes/no questions;simple general questions;90%  -EK     Status: Improve ability to comprehend questions  Progress slower than expected  -EK     Ability to Comprehend Questions Progress  50%;with consistent cues  -EK     Recorded by  [EK] Tamiko Whitten CCC-SLP     Improve word retrieval skills    Improve word retrieval skills by:  naming an object/pic;answer WH question with one word;completing a divergent task;completing a convergent task;90%  -EK     Status: Improve word retrieval skills  Progress slower than expected  -EK     Word Retrieval Skills Progress  50%  -EK     Comments: Improve word retrieval skills  Pt had difficulty with completing simple sentences and answering questions about a pictured scene.   -EK     Recorded by  [EK] Tamiko Whitten CCC-SLP     Improve orientation    Improve orientation through:  demonstrating orientation to day;demonstrating orientation to year;demonstrating orientation to place;80%;use environmental aids to assist with  orientation  -EK     Status: Improve orientation through  Progress slower than expected  -EK     Orientation Progress  10%;with consistent cues  -EK     Comments: Improve orientation through  mod to max cues; F;2  -EK     Recorded by  [EK] Tamiko Whitten CCC-SLP     Improve memory skills    Improve memory skills through:  recall details of the day;50%  -EK     Status: Improve memory skills  Progress slower than expected  -EK     Memory Skills Progress  10%  -EK     Comments: Improve memory skills  Pt could not recall morning meal and name of grandchild who was present and then stepped away.   -EK     Recorded by  [EK] Tamiko Whitten CCC-SLP     Transfer Assessment/Treatment    Transfers, Sit-Stand [place]   contact guard assist  -RW    Transfers, Stand-Sit [place]   contact guard assist  -RW    Transfers, Sit-Stand-Sit, Assist Device   rolling walker  -RW    Toilet Transfer, [place] contact guard assist  -RC  minimum assist (75% patient effort)  -RW    Toilet Transfer, Assistive Device rolling walker  -RC  rolling walker  -RW    Transfer, Comment --   vc's to use r/w  -RC  pt came walking out of bathroom w/o calling for assist. no walker  -RW    Recorded by [RC] ROHAN Gomez/L  [RW] Danny Beebe PTA    Gait Assessment/Treatment    Gait, [place] Level   contact guard assist  -RW    Gait, Assistive Device   rolling walker  -RW    Gait, Distance (Feet)   150    x 2  -RW    Recorded by   [RW] Danny Beebe PTA    Functional Mobility    Functional Mobility- Ind. Level contact guard assist  -RC      Functional Mobility-Distance (Feet) 40  -RC      Recorded by [RC] ADRIANNA GomezA/L      Lower Body Dressing Assessment/Training    LB Dressing Assess/Train, Clothing Type   shoes;donning:  -RW    Recorded by   [RW] Danny Beebe, PTA    Therapy Exercises    Bilateral Lower Extremities   AROM:;20 reps;sitting;heel raises;hip flexion;knee flexion;15  reps;standing  -RW    BLE Resistance   theraband  -RW    Bilateral Upper Extremity --   ue bike 15 min  -RC      Recorded by [RC] ROHAN Gomez/L  [RW] Danny Beebe PTA    Positioning and Restraints    Pre-Treatment Position sitting in chair/recliner  -RC sitting in chair/recliner  -EK sitting in chair/recliner  -RW    Post Treatment Position bed  -RC chair  -EK bathroom  -RW    In Bed call light within reach;encouraged to call for assist  -RC      Bathroom  notified nsg  -EK with nsg;call light within reach;encouraged to call for assist  -RW    Recorded by [RC] ROHAN Gomez/POONAM [EK] Tamiko Whitten, [facility]-SLP [RW] Danny Beebe PTA      06/17/17 1525          Rehab Assessment/Intervention    Discipline physical therapy assistant  -RW      Document Type therapy note (daily note)  -RW      Subjective Information agree to therapy  -RW      Patient Effort, Rehab Treatment fair  -RW      Recorded by [RW] Danny Beebe PTA      Pain Assessment    Pain Assessment No/denies pain  -RW      Recorded by [RW] Danny Beebe PTA      Vision Assessment/Intervention    Visual Impairment WFL with corrective lenses  -RW      Recorded by [RW] Danny Beebe PTA      Cognitive Assessment/Intervention    Current Cognitive/Communication Assessment impaired  -RW      Orientation Status oriented to;person  -RW      Follows Commands/Answers Questions 75% of the time  -RW      Personal Safety decreased awareness, need for safety;decreased insight to deficits  -RW      Recorded by [RW] Danny Beebe PTA      Bed Mobility, Assessment/Treatment    Bed Mobility, Assistive Device bed rails;head of bed elevated  -RW      Bed Mob, Supine to Sit, [place] supervision required  -RW      Bed Mob, Sit to Supine, [place] --  -RW      Recorded by [RW] Danny Beebe PTA      Transfer Assessment/Treatment    Transfers, Chair-Bed [place] --  -RW      Transfers, Sit-Stand [place] contact guard assist   -RW      Transfers, Stand-Sit [place] contact guard assist  -RW      Transfers, Sit-Stand-Sit, Assist Device rolling walker  -RW      Toilet Transfer, [place] --  -RW      Toilet Transfer, Assistive Device --  -RW      Recorded by [RW] Danny Beebe PTA      Gait Assessment/Treatment    Gait, [place] Level contact guard assist  -RW      Gait, Assistive Device rolling walker  -RW      Gait, Distance (Feet) 170   x 2  -RW      Recorded by [RESHMA] Danny Beebe PTA      Stairs Assessment/Treatment    Stairs, Handrail Location --  -RW      Stairs, [place] Level --  -RW      Recorded by [RW] Danny Beebe PTA      Lower Body Dressing Assessment/Training    LB Dressing Assess/Train, Clothing Type shoes;donning:  -RW      Recorded by [RESHMA] Danny Beebe PTA      Balance Skills Training    Standing-Level of Assistance --  -RW      Standing-Balance Activities --  -RW      Gait Balance-Level of Assistance --  -RW      Gait Balance Support --  -RW      Gait Balance Activities --  -RW      Recorded by [RW] Danny Beebe PTA      Therapy Exercises    Bilateral Lower Extremities AROM:;20 reps;sitting;heel raises;hip flexion;knee flexion;15 reps;standing  -RW      BLE Resistance theraband  -RW      Recorded by [RW] Danny Beebe PTA      Positioning and Restraints    Pre-Treatment Position in bed  -RW      Post Treatment Position wheelchair  -RW      In Wheelchair call light within reach;encouraged to call for assist  -RW      Recorded by [RW] Danny Beebe PTA        User Key  (r) = Recorded By, (t) = Taken By, (c) = Cosigned By    Initials Name Effective Dates    JW Arlene Stephens, PTA 08/11/15 -     KEVIN Whitten CCC-SLP 04/06/17 -     RW Danny Beebe, PTA 10/17/16 -     RC Ally Kwong, MADRIGAL/POONAM 10/17/16 -                 OT Goals       06/20/17 1416 06/20/17 1415 06/19/17 1410    Transfer Training OT STG    Transfer Training OT STG, Date Goal Reviewed 06/20/17  -RC   06/19/17  -RC    Transfer Training OT STG, Outcome goal ongoing  -RC  goal ongoing  -RC    Dynamic Standing Balance OT STG    Dynamic Standing Balance OT STG, Date Goal Reviewed 06/20/17  -RC  06/19/17  -RC    Dynamic Standing Balance OT STG, Outcome goal ongoing  -RC  goal ongoing  -RC    Caregiver Training OT LTG    Caregiver Training OT LTG, Date Goal Reviewed 06/20/17  -RC  06/19/17  -RC    Caregiver Training OT LTG, Outcome goal ongoing  -RC  goal ongoing  -RC    ADL OT LTG    ADL OT LTG, Date Goal Reviewed 06/20/17  -RC  06/19/17  -RC    ADL OT LTG, Outcome goal ongoing  -RC  goal ongoing  -RC    Activity Tolerance OT STG    Activity Tolerance Goal OT STG, Date Goal Reviewed 06/20/17  -RC (P)  06/20/17  -RC 06/19/17  -RC    Activity Tolerance Goal OT STG, Outcome goal ongoing  -RC (P)  goal ongoing  -RC goal ongoing  -RC      06/17/17 0600 06/16/17 0955 06/15/17 1341    Transfer Training OT STG    Transfer Training OT STG, Date Goal Reviewed 06/17/17  -KD 06/16/17  -KD 06/15/17  -RC    Transfer Training OT STG, Outcome   goal ongoing  -RC    Dynamic Standing Balance OT STG    Dynamic Standing Balance OT STG, Date Goal Reviewed 06/17/17  -KD 06/16/17  -KD 06/15/17  -RC    Dynamic Standing Balance OT STG, Outcome   goal ongoing  -RC    Caregiver Training OT LTG    Caregiver Training OT LTG, Date Goal Reviewed 06/17/17  -KD 06/16/17  -KD 06/15/17  -RC    Caregiver Training OT LTG, Outcome   goal ongoing  -RC    ADL OT LTG    ADL OT LTG, Date Goal Reviewed 06/17/17  -KD 06/16/17  -KD 06/15/17  -RC    ADL OT LTG, Outcome   goal ongoing  -RC    Activity Tolerance OT STG    Activity Tolerance Goal OT STG, Date Goal Reviewed 06/17/17  -KD 06/16/17  -KD 06/15/17  -RC    Activity Tolerance Goal OT STG, Outcome   goal ongoing  -RC      06/14/17 1431 06/14/17 0905 06/13/17 0759    Transfer Training OT STG    Transfer Training OT STG, Date Established   06/13/17  -    Transfer Training OT STG, Time to Achieve   2 wks   -    Transfer Training OT STG, Activity Type   sit to stand/stand to sit;bed to chair /chair to bed;toilet  -    Transfer Training OT STG, [place] Level   supervision required;set up required   AE/AD as needed  -    Transfer Training OT STG, Date Goal Reviewed  06/14/17  -     Transfer Training OT STG, Outcome  goal met  -     Dynamic Standing Balance OT STG    Dynamic Standing Balance OT STG, Date Established   06/13/17  -    Dynamic Standing Balance OT STG, Time to Achieve   2 wks  -    Dynamic Standing Balance OT STG, [place] Level   supervision required   5 minute no LOB or increased fatigue  -    Dynamic Standing Balance OT STG, Date Goal Reviewed  06/14/17  -     Dynamic Standing Balance OT STG, Outcome  goal ongoing  -     Caregiver Training OT LTG    Caregiver Training OT LTG, Date Established   06/13/17  -    Caregiver Training OT LTG, Time to Achieve   by discharge  -    Caregiver Training OT LTG, [place] Level   able to assist adequately;able to cue patient adequately   t/f, safety at home, [facility]  -    Caregiver Training OT LTG, Date Goal Reviewed  06/14/17  -     Caregiver Training OT LTG, Outcome  goal ongoing  -     ADL OT LTG    ADL OT LTG, Date Established   06/13/17  -    ADL OT LTG, Time to Achieve   by discharge  -    ADL OT LTG, Activity Type   ADL skills  -    ADL OT LTG, [place] Level   standby assist;modified independent;setup;assistive device  -    ADL OT LTG, Date Goal Reviewed (P)  06/14/17  - 06/14/17  -     ADL OT LTG, Outcome (P)  goal ongoing  - goal ongoing  -     Activity Tolerance OT STG    Activity Tolerance Goal OT STG, Date Established   06/13/17  -    Activity Tolerance Goal OT STG, Time to Achieve   2 wks  -    Activity Tolerance Goal OT STG, Activity Level   20 min activity;O2 sat >/equal to 90%;with 1 rest break  -    Activity Tolerance Goal OT STG, Date Goal Reviewed  06/14/17  -     Activity Tolerance  Goal OT STG, Outcome  goal ongoing  -RC       06/12/17 1414 06/09/17 1725       Transfer Training OT STG    Transfer Training OT STG, Date Established  06/09/17  -RW     Transfer Training OT STG, Time to Achieve  5 - 7 days  -RW     Transfer Training OT STG, Activity Type  toilet  -RW     Transfer Training OT STG, [place] Level  supervision required  -RW     Transfer Training OT STG, Date Goal Reviewed 06/12/17  -RM      Transfer Training OT STG, Outcome goal not met  -RM      Transfer Training OT STG, Reason Goal Not Met discharged from facility  -RM      Dynamic Standing Balance OT STG    Dynamic Standing Balance OT STG, Date Established  06/09/17  -RW     Dynamic Standing Balance OT STG, Time to Achieve  5 - 7 days  -RW     Dynamic Standing Balance OT STG, [place] Level  supervision required  -RW     Dynamic Standing Balance OT STG, Assist Device  assistive Device  -RW     Dynamic Standing Balance OT STG, Additional Goal  5 min  -RW     Dynamic Standing Balance OT STG, Date Goal Reviewed 06/12/17  -RM      Dynamic Standing Balance OT STG, Outcome goal not met  -RM      Dynamic Standing Balance OT STG, Reason Goal Not Met discharged from facility  -RM      ADL OT LTG    ADL OT LTG, Date Established  06/09/17  -RW     ADL OT LTG, Time to Achieve  2 wks  -RW     ADL OT LTG, Activity Type  ADL skills  -RW     ADL OT LTG, [place] Level  standby assist  -RW     ADL OT LTG, Date Goal Reviewed 06/12/17  -RM      ADL OT LTG, Outcome goal not met  -RM      ADL OT LTG, Reason Goal Not Met discharged from facility  -RM      Functional Mobility OT LTG    Functional Mobility Goal OT LTG, Date Established  06/09/17  -RW     Functional Mobility Goal OT LTG, Time to Achieve  2 wks  -RW     Functional Mobility Goal OT LTG, [place] Level  supervision  -RW     Functional Mobility Goal OT LTG, Distance to Achieve  to the bathroom  -RW     Functional Mobility Goal OT LTG, Date Goal Reviewed 06/12/17  -       Functional Mobility Goal OT LTG, Outcome goal not met  -      Functional Mobility Goal OT LTG, Reason Goal Not Met discharged from facility  -        User Key  (r) = Recorded By, (t) = Taken By, (c) = Cosigned By    Initials Name Provider Type     Rebeka Carrillo, OTR/L Occupational Therapist    RW Cami Mon, OTR/L Occupational Therapist    RC Ally Kwong, MADRIGAL/L Occupational Therapy Assistant    KD Nathalia Irizarry, MADRIGAL/L Occupational Therapy Assistant    RM Catie Sheriff, OT Occupational Therapist          Occupational Therapy Education     Title: PT OT SLP Therapies (Active)     Topic: Occupational Therapy (Active)     Point: ADL training (Active)    Description: Instruct learner(s) on proper safety adaptation and remediation techniques during self care or transfers.   Instruct in proper use of assistive devices.    Learning Progress Summary    Learner Readiness Method Response Comment Documented by Status   Patient Acceptance E NR,NL   06/19/17 1515 Active    Acceptance E NR   06/15/17 1425 Active    Acceptance E NR   06/14/17 1430 Active    Acceptance E VU,NR Educated pt about OT and POC. Educated pt not to get up on her own and how to use call button. Educated pt on safety with t/f and ADL.  06/13/17 1342 Done               Point: Precautions (Active)    Description: Instruct learner(s) on prescribed precautions during self-care and functional transfers.    Learning Progress Summary    Learner Readiness Method Response Comment Documented by Status   Patient Acceptance E NR,NL   06/19/17 1515 Active    Acceptance E VU   06/18/17 1701 Done    Acceptance E VU,NR Educated pt about OT and POC. Educated pt not to get up on her own and how to use call button. Educated pt on safety with t/f and ADL.  06/13/17 1342 Done   Family Acceptance E NR recommended to family pt to have 24/7 supervision /@ at d/c  06/20/17 0935 Active               Point: Body mechanics (Active)    Description:  Instruct learner(s) on proper positioning and spine alignment during self-care, functional mobility activities and/or exercises.    Learning Progress Summary    Learner Readiness Method Response Comment Documented by Status   Patient Acceptance E NR,NL   06/19/17 1515 Active    Acceptance E VU   06/18/17 1701 Done    Acceptance E VU,NR Educated pt about OT and POC. Educated pt not to get up on her own and how to use call button. Educated pt on safety with t/f and ADL.  06/13/17 1342 Done                      User Key     Initials Effective Dates Name Provider Type Discipline     10/17/16 -  Rebeka Carrillo OTR/L Occupational Therapist OT     10/17/16 -  Sadia Neal, RN Registered Nurse Nurse     10/17/16 -  ROHAN Gomez/L Occupational Therapy Assistant OT                  OT Recommendation and Plan  Anticipated Discharge Disposition: home with 24/7 care, home with home health, skilled nursing facility (depends on progress)  Planned Therapy Interventions: activity intolerance, adaptive equipment training, ADL retraining, balance training, bed mobility training, energy conservation, fine motor coordination training, home exercise program, motor coordination training, strengthening, transfer training  Therapy Frequency: other (see comments) (3-14x a week)  Plan of Care Review  Plan Of Care Reviewed With: patient  Progress: no change  Outcome Summary/Follow up Plan: pt will need @ and supervision 24/7 at d/c due to  decreased cognition         Outcome Measures       06/20/17 1400 06/20/17 0915 06/20/17 0730    How much help from another person do you currently need...    Turning from your back to your side while in flat bed without using bedrails?  3  -JW     Moving from lying on back to sitting on the side of a flat bed without bedrails?  3  -JW     Moving to and from a bed to a chair (including a wheelchair)?  3  -JW     Standing up from a chair using your arms (e.g., wheelchair, bedside  chair)?  3  -JW     Climbing 3-5 steps with a railing?  3  -JW     To walk in hospital room?  3  -JW     AM-PAC 6 Clicks Score  18  -JW     How much help from another is currently needed...    Putting on and taking off regular lower body clothing? 3  -RC  3  -RC    Bathing (including washing, rinsing, and drying) 3  -RC  3  -RC    Toileting (which includes using toilet bed pan or urinal) 3  -RC  3  -RC    Putting on and taking off regular upper body clothing 4  -RC  4  -RC    Taking care of personal grooming (such as brushing teeth) 3  -RC  3  -RC    Eating meals 4  -RC  4  -RC    Score 20  -RC  20  -RC    Functional Assessment    Outcome Measure Options  AM-PAC 6 Clicks Basic Mobility (PT)  -       06/19/17 1410 06/19/17 0820       How much help from another person do you currently need...    Turning from your back to your side while in flat bed without using bedrails?  3  -RW     Moving from lying on back to sitting on the side of a flat bed without bedrails?  3  -RW     Moving to and from a bed to a chair (including a wheelchair)?  3  -RW     Standing up from a chair using your arms (e.g., wheelchair, bedside chair)?  3  -RW     Climbing 3-5 steps with a railing?  3  -RW     To walk in hospital room?  3  -RW     AM-PAC 6 Clicks Score  18  -RW     How much help from another is currently needed...    Putting on and taking off regular lower body clothing? 3  -RC      Bathing (including washing, rinsing, and drying) 3  -RC      Toileting (which includes using toilet bed pan or urinal) 3  -RC      Putting on and taking off regular upper body clothing 4  -RC      Taking care of personal grooming (such as brushing teeth) 3  -RC      Eating meals 4  -RC      Score 20  -RC        User Key  (r) = Recorded By, (t) = Taken By, (c) = Cosigned By    Initials Name Provider Type    CLAUDIA Stephens, ROYAL Physical Therapy Assistant    RESHMA Beebe PTA Physical Therapy Assistant    ROHAN Rodas/POONAM  Occupational Therapy Assistant           Time Calculation:         Time Calculation- OT       06/20/17 1501 06/20/17 1500       Time Calculation- OT    OT Start Time 0730  -RC 1416  -RC     OT Stop Time 0815  -RC 1446  -RC     OT Time Calculation (min) 45 min  -RC 30 min  -RC     Total Timed Code Minutes- OT 45 minute(s)  -RC 30 minute(s)  -RC       User Key  (r) = Recorded By, (t) = Taken By, (c) = Cosigned By    Initials Name Provider Type    RC Ally G Varghese, MADRIGAL/L Occupational Therapy Assistant           Therapy Charges for Today     Code Description Service Date Service Provider Modifiers Qty    03389654223 HC OT THERAPEUTIC ACT EA 15 MIN 6/19/2017 Ally G Varghese, MADRIGAL/L GO 4    10719636002 HC OT SELF CARE/MGMT/TRAIN EA 15 MIN 6/19/2017 Ally G Varghese, MADRIGAL/L GO 1    87533384752 HC OT THERAPEUTIC ACT EA 15 MIN 6/19/2017 Ally G Varghese, MADRIGAL/L GO 2    75382265339 HC OT THERAPEUTIC ACT EA 15 MIN 6/20/2017 Ally G Varghese, MADRIGAL/L GO 2    94925064322 HC OT THERAPEUTIC ACT EA 15 MIN 6/20/2017 Ally G Varghese, MADRIGAL/L GO 3          OT G-codes  OT Professional Judgement Used?: Yes  OT Functional Scales Options: AM-PAC 6 Clicks Daily Activity (OT)  Score: 16  Functional Limitation: Self care  Self Care Current Status (): At least 40 percent but less than 60 percent impaired, limited or restricted  Self Care Goal Status (): At least 20 percent but less than 40 percent impaired, limited or restricted    Ally G Varghese, MADRIGAL/L  6/20/2017

## 2017-06-20 NOTE — PLAN OF CARE
Problem: Patient Care Overview (Adult)  Goal: Plan of Care Review  Outcome: Ongoing (interventions implemented as appropriate)    06/20/17 1416   Coping/Psychosocial Response Interventions   Plan Of Care Reviewed With patient   Patient Care Overview   Progress no change   Outcome Evaluation   Outcome Summary/Follow up Plan pt will need @ and supervision 24/7 at d/c due to decreased cognition        Goal: Discharge Needs Assessment  Outcome: Ongoing (interventions implemented as appropriate)    06/12/17 1400 06/12/17 1502 06/13/17 1036   Discharge Needs Assessment   Concerns To Be Addressed --  no discharge needs identified --    Equipment Needed After Discharge --  --  walker, rolling   Discharge Facility/Level Of Care Needs --  --  nursing facility, skilled;home with home health  (Or 24/7 care from family -depending on progress while on ARU)   Discharge Planning Comments --  --  --    Current Health   Outpatient/Agency/Support Group Needs skilled nursing facility (specify) --  --    Anticipated Changes Related to Illness inability to care for self --  --    Living Environment   Transportation Available family or friend will provide --  --    Self-Care   Equipment Currently Used at Home --  --  commode     06/19/17 1410   Discharge Needs Assessment   Concerns To Be Addressed --    Equipment Needed After Discharge --    Discharge Facility/Level Of Care Needs --    Discharge Planning Comments 24/7 supervision   Current Health   Outpatient/Agency/Support Group Needs --    Anticipated Changes Related to Illness --    Living Environment   Transportation Available --    Self-Care   Equipment Currently Used at Home --          Problem: Inpatient Occupational Therapy  Goal: Transfer Training Goal 1 STG- OT  Outcome: Ongoing (interventions implemented as appropriate)    06/20/17 1416   Transfer Training OT STG   Transfer Training OT STG, Date Goal Reviewed 06/20/17   Transfer Training OT STG, Outcome goal ongoing       Goal:  Dymanic Standing Balance Goal STG- OT  Outcome: Ongoing (interventions implemented as appropriate)    06/20/17 1416   Dynamic Standing Balance OT STG   Dynamic Standing Balance OT STG, Date Goal Reviewed 06/20/17   Dynamic Standing Balance OT STG, Outcome goal ongoing       Goal: Caregiver Training Goal LTG- OT  Outcome: Ongoing (interventions implemented as appropriate)    06/20/17 1416   Caregiver Training OT LTG   Caregiver Training OT LTG, Date Goal Reviewed 06/20/17   Caregiver Training OT LTG, Outcome goal ongoing       Goal: ADL Goal LTG- OT  Outcome: Ongoing (interventions implemented as appropriate)    06/20/17 1416   ADL OT LTG   ADL OT LTG, Date Goal Reviewed 06/20/17   ADL OT LTG, Outcome goal ongoing       Goal: Activity Tolerance Goal STG- OT  Outcome: Ongoing (interventions implemented as appropriate)    06/20/17 1416   Activity Tolerance OT STG   Activity Tolerance Goal OT STG, Date Goal Reviewed 06/20/17   Activity Tolerance Goal OT STG, Outcome goal ongoing

## 2017-06-20 NOTE — PLAN OF CARE
Problem: Patient Care Overview (Adult)  Goal: Plan of Care Review  Outcome: Ongoing (interventions implemented as appropriate)    06/20/17 1536   Coping/Psychosocial Response Interventions   Plan Of Care Reviewed With patient   Patient Care Overview   Progress no change   Outcome Evaluation   Outcome Summary/Follow up Plan She has not had a good day, has vomited x 3 and felt fatigued throughout day. Is resting in bed at this time.        Goal: Adult Individualization and Mutuality  Outcome: Ongoing (interventions implemented as appropriate)  Goal: Discharge Needs Assessment  Outcome: Ongoing (interventions implemented as appropriate)    Problem: Functional Deficit (Adult,Obstetrics,Pediatric)  Goal: Signs and Symptoms of Listed Potential Problems Will be Absent or Manageable (Functional Deficit)  Outcome: Ongoing (interventions implemented as appropriate)    Problem: Fall Risk (Adult)  Goal: Absence of Falls  Outcome: Ongoing (interventions implemented as appropriate)    Problem: Infection, Risk/Actual (Adult)  Goal: Infection Prevention/Resolution  Outcome: Ongoing (interventions implemented as appropriate)

## 2017-06-20 NOTE — PLAN OF CARE
Problem: Patient Care Overview (Adult)  Goal: Plan of Care Review  Outcome: Ongoing (interventions implemented as appropriate)    06/20/17 1446   Coping/Psychosocial Response Interventions   Plan Of Care Reviewed With patient   Patient Care Overview   Progress no change   Outcome Evaluation   Outcome Summary/Follow up Plan pt not feeling well, much encouragement required to participate at all w/ therapy, defers gt and t/fers except w/c to bed, performed 3 sets of 10 of B LE ther ex         Problem: Inpatient Physical Therapy  Goal: Bed Mobility Goal LTG- PT  Outcome: Ongoing (interventions implemented as appropriate)    06/13/17 1036 06/19/17 1422 06/20/17 1446   Bed Mobility PT LTG   Bed Mobility PT LTG, Date Established 06/13/17 --  --    Bed Mobility PT LTG, Time to Achieve by discharge --  --    Bed Mobility PT LTG, Activity Type supine to sit/sit to supine --  --    Bed Mobility PT LTG, [place] Level supervision required --  --    Bed Mobility PT LTG, Additional Goal HOB flat; No BR's --  --    Bed Mobility PT LTG, Date Goal Reviewed --  --  06/20/17   Bed Mobility PT LTG, Outcome --  goal ongoing --        Goal: Transfer Training Goal 1 LTG- PT  Outcome: Ongoing (interventions implemented as appropriate)    06/13/17 1036 06/19/17 1422 06/20/17 1446   Transfer Training PT LTG   Transfer Training PT LTG, Date Established 06/13/17 --  --    Transfer Training PT LTG, Time to Achieve by discharge --  --    Transfer Training PT LTG, Activity Type bed to chair /chair to bed --  --    Transfer Training PT LTG, [place] Level supervision required --  --    Transfer Training PT LTG, Assist Device (AAD) --  --    Transfer Training PT LTG, Date Goal Reviewed --  --  06/20/17   Transfer Training PT LTG, Outcome --  goal ongoing --        Goal: Gait Training Goal LTG- PT  Outcome: Ongoing (interventions implemented as appropriate)    06/13/17 1036 06/19/17 1422 06/20/17 1446   Gait Training PT LTG   Gait Training  Goal PT LTG, Date Established 06/13/17 --  --    Gait Training Goal PT LTG, Time to Achieve by discharge --  --    Gait Training Goal PT LTG, [place] Level supervision required --  --    Gait Training Goal PT LTG, Assist Device (AAD) --  --    Gait Training Goal PT LTG, Distance to Achieve 150 feet --  --    Gait Training Goal PT LTG, Date Goal Reviewed --  --  06/20/17   Gait Training Goal PT LTG, Outcome --  goal ongoing --

## 2017-06-20 NOTE — PROGRESS NOTES
LOS: 8 days   Patient Care Team:  Melissa Ch MD as PCP - General  Lindsay Ocampo MA as Medical Assistant    Chief Complaint:  Deconditioning secondary to prior surgery        Interval History:     SUBJECTIVE:  Says she slept well she denies nausea or vomiting this morning.  Has been working with therapy  She denies back pain this morning also denies itching,She does tell me she would like for me to leave her alone so she could rest    History taken from: patient    Objective     Vital Signs  Temp:  [97.1 °F (36.2 °C)-97.4 °F (36.3 °C)] 97.4 °F (36.3 °C)  Heart Rate:  [69-84] 84  Resp:  [18-20] 18  BP: (138-173)/(60-74) 159/68  Last 3 weights    06/18/17  0543 06/19/17  0507 06/20/17  0541   Weight: 169 lb 4.8 oz (76.8 kg) 174 lb 9 oz (79.2 kg) 174 lb 1 oz (79 kg)         Physical Exam:     General Appearance:    Alert, cooperative, in no acute distress   Head:    Normocephalic, without obvious abnormality, atraumatic   Eyes:            Lids and lashes normal, conjunctivae and sclerae normal, no   icterus, no pallor, corneas clear, PERRLA   Throat:   No oral lesions, no thrush, oral mucosa moist   Neck:   No adenopathy, supple, trachea midline, no thyromegaly, no   carotid bruit, no JVD       Lungs:     Clear to auscultation,respirations regular, even and                  Unlabored Good bilateral air movement    Heart:    Regular rhythm and normal rate, normal S1 and S2, no            murmur, no gallop, no rub, no click    Chest Wall:    No abnormalities observed   Abdomen:     Normal bowel sounds, no masses, no organomegaly, soft        non-tender, non-distended, no guarding, no rebound                Tenderness    Extremities:   Moves all extremities well, no edema, no cyanosis, no             Redness        Skin:   No bleeding, bruising or rash   Lymph nodes:   No palpable adenopathy   Neurologic:   Cranial nerves 2 - 12 grossly intact, sensation intact, DTR       present and equal  bilaterally        RESULTS REVIEW:     Lab Results (last 24 hours)     Procedure Component Value Units Date/Time    Urinalysis With / Microscopic If Indicated [[id number]]  (Abnormal) Collected:  06/19/17 1329    Specimen:  Urine from Urine, Clean Catch Updated:  06/19/17 1347     Color, UA Yellow     Appearance, UA Clear     pH, UA <=5.0     Specific Gravity, UA 1.022     Glucose,  mg/dL (Trace) (A)     Ketones, UA Negative     Bilirubin, UA Negative     Blood, UA Trace (A)     Protein, UA Trace (A)     Leuk Esterase, UA Small (1+) (A)     Nitrite, UA Negative     Urobilinogen, UA 0.2 E.U./dL    Urinalysis, Microscopic Only [[id number]]  (Abnormal) Collected:  06/19/17 1329    Specimen:  Urine from Urine, Clean Catch Updated:  06/19/17 1347     RBC, UA 0-2 (A) /HPF      WBC, UA 3-5 /HPF      Bacteria, UA None Seen /HPF      Squamous Epithelial Cells, UA 6-12 (A) /HPF      Hyaline Casts, UA 7-12 /LPF      Methodology Automated Microscopy    POC Glucose Fingerstick [[id number]]  (Abnormal) Collected:  06/19/17 1526    Specimen:  Blood Updated:  06/19/17 1613     Glucose 280 (H) mg/dL       Meter: [id number]: [id number] JIE ROSS       POC Glucose Fingerstick [[id number]]  (Abnormal) Collected:  06/19/17 2004    Specimen:  Blood Updated:  06/19/17 2043     Glucose 326 (H) mg/dL       Meter: [id number]: [id number] MARISA CRABTREE       POC Glucose Fingerstick [[id number]]  (Abnormal) Collected:  06/20/17 0541    Specimen:  Blood Updated:  06/20/17 0607     Glucose 232 (H) mg/dL       RN NotifiedMeter: [id number]: [id number] MARISA CRABTREE           Imaging Results (last 72 hours)     ** No results found for the last 72 hours. 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  Will increase her Lantus insulin to try to control her sugar better      Bennie Lucia MD  06/20/17  9:05 AM

## 2017-06-21 LAB
GLUCOSE BLDC GLUCOMTR-MCNC: 200 MG/DL (ref 70–130)
GLUCOSE BLDC GLUCOMTR-MCNC: 215 MG/DL (ref 70–130)
GLUCOSE BLDC GLUCOMTR-MCNC: 256 MG/DL (ref 70–130)
GLUCOSE BLDC GLUCOMTR-MCNC: 276 MG/DL (ref 70–130)

## 2017-06-21 PROCEDURE — 97110 THERAPEUTIC EXERCISES: CPT

## 2017-06-21 PROCEDURE — 97116 GAIT TRAINING THERAPY: CPT

## 2017-06-21 PROCEDURE — 97530 THERAPEUTIC ACTIVITIES: CPT

## 2017-06-21 PROCEDURE — 63710000001 INSULIN ASPART PER 5 UNITS: Performed by: FAMILY MEDICINE

## 2017-06-21 PROCEDURE — 99232 SBSQ HOSP IP/OBS MODERATE 35: CPT | Performed by: FAMILY MEDICINE

## 2017-06-21 PROCEDURE — 82962 GLUCOSE BLOOD TEST: CPT

## 2017-06-21 PROCEDURE — 63710000001 INSULIN DETEMIR PER 5 UNITS: Performed by: FAMILY MEDICINE

## 2017-06-21 PROCEDURE — 97535 SELF CARE MNGMENT TRAINING: CPT

## 2017-06-21 PROCEDURE — 92507 TX SP LANG VOICE COMM INDIV: CPT | Performed by: SPEECH-LANGUAGE PATHOLOGIST

## 2017-06-21 RX ADMIN — Medication 40 MG: at 20:18

## 2017-06-21 RX ADMIN — Medication 2.5 MG: at 08:15

## 2017-06-21 RX ADMIN — MAGNESIUM OXIDE TAB 400 MG (241.3 MG ELEMENTAL MG) 400 MG: 400 (241.3 MG) TAB at 08:15

## 2017-06-21 RX ADMIN — SENNOSIDES AND DOCUSATE SODIUM 1 TABLET: 8.6; 5 TABLET ORAL at 08:15

## 2017-06-21 RX ADMIN — INSULIN ASPART 5 UNITS: 100 INJECTION, SOLUTION INTRAVENOUS; SUBCUTANEOUS at 17:30

## 2017-06-21 RX ADMIN — DILTIAZEM HYDROCHLORIDE 120 MG: 120 CAPSULE, COATED, EXTENDED RELEASE ORAL at 08:15

## 2017-06-21 RX ADMIN — DONEPEZIL HYDROCHLORIDE 10 MG: 10 TABLET, FILM COATED ORAL at 20:18

## 2017-06-21 RX ADMIN — POTASSIUM CHLORIDE 10 MEQ: 750 CAPSULE, EXTENDED RELEASE ORAL at 17:07

## 2017-06-21 RX ADMIN — INSULIN DETEMIR 20 UNITS: 100 INJECTION, SOLUTION SUBCUTANEOUS at 20:18

## 2017-06-21 RX ADMIN — MEMANTINE 5 MG: 5 TABLET ORAL at 17:07

## 2017-06-21 RX ADMIN — INSULIN ASPART 8 UNITS: 100 INJECTION, SOLUTION INTRAVENOUS; SUBCUTANEOUS at 06:55

## 2017-06-21 RX ADMIN — MEMANTINE 5 MG: 5 TABLET ORAL at 08:15

## 2017-06-21 RX ADMIN — Medication 40 MG: at 08:15

## 2017-06-21 RX ADMIN — INSULIN ASPART 5 UNITS: 100 INJECTION, SOLUTION INTRAVENOUS; SUBCUTANEOUS at 11:14

## 2017-06-21 RX ADMIN — Medication 1 TABLET: at 08:15

## 2017-06-21 RX ADMIN — FERROUS SULFATE TAB EC 324 MG (65 MG FE EQUIVALENT) 324 MG: 324 (65 FE) TABLET DELAYED RESPONSE at 08:15

## 2017-06-21 RX ADMIN — FERROUS SULFATE TAB EC 324 MG (65 MG FE EQUIVALENT) 324 MG: 324 (65 FE) TABLET DELAYED RESPONSE at 17:07

## 2017-06-21 RX ADMIN — SENNOSIDES AND DOCUSATE SODIUM 1 TABLET: 8.6; 5 TABLET ORAL at 17:07

## 2017-06-21 RX ADMIN — POTASSIUM CHLORIDE 10 MEQ: 750 CAPSULE, EXTENDED RELEASE ORAL at 08:15

## 2017-06-21 RX ADMIN — INSULIN ASPART 5 UNITS: 100 INJECTION, SOLUTION INTRAVENOUS; SUBCUTANEOUS at 20:19

## 2017-06-21 NOTE — PROGRESS NOTES
LOS: 9 days   Patient Care Team:  Melissa Ch MD as PCP - General  Lindsay Ocampo MA as Medical Assistant    Chief Complaint:  Deconditioning secondary to colon resection for adenocarcinoma of the sigmoid        Interval History:     SUBJECTIVE:  Good spirits today  He denies pain nausea no shortness of breath  Slept well eating well  History taken from: patient chart family RN And therapy staff.  Notes reviewed.  Discussed with her daughter today as well    Objective     Vital Signs  Temp:  [96.8 °F (36 °C)-97.7 °F (36.5 °C)] 96.8 °F (36 °C)  Heart Rate:  [82-85] 85  Resp:  [18] 18  BP: (129-170)/(62-75) 170/75  Last 3 weights    06/18/17  0543 06/19/17  0507 06/20/17  0541   Weight: 169 lb 4.8 oz (76.8 kg) 174 lb 9 oz (79.2 kg) 174 lb 1 oz (79 kg)         Physical Exam:     General Appearance:    Alert, cooperative, in no acute distress   Head:    Normocephalic, without obvious abnormality, atraumatic   Eyes:            Lids and lashes normal, conjunctivae and sclerae normal, no   icterus, no pallor, corneas clear, PERRLA   Throat:   No oral lesions, no thrush, oral mucosa moist   Neck:   No adenopathy, supple, trachea midline, no thyromegaly, no   carotid bruit, no JVD       Lungs:     Clear to auscultation,respirations regular, even and                  Unlabored     Heart:    Regular rhythm and normal rate, normal S1 and S2, no            murmur, no gallop, no rub, no click    Chest Wall:    No abnormalities observed   Abdomen:     Normal bowel sounds, no masses, no organomegaly, soft        non-tender, non-distended, no guarding, no rebound                Tenderness    Extremities:   Moves all extremities well, no edema, no cyanosis, no             Redness        Skin:   No bleeding, bruising or rash   Lymph nodes:   No palpable adenopathy   Neurologic:   Cranial nerves 2 - 12 grossly intact, sensation intact, DTR       present and equal bilaterally        RESULTS REVIEW:     Lab Results  (last 24 hours)     Procedure Component Value Units Date/Time    POC Glucose Fingerstick [[id number]]  (Abnormal) Collected:  06/20/17 1115    Specimen:  Blood Updated:  06/20/17 1129     Glucose 224 (H) mg/dL       Sliding Scale AdminMeter: [id number]: [id number] [facility]       POC Glucose Fingerstick [[id number]]  (Abnormal) Collected:  06/20/17 1610    Specimen:  Blood Updated:  06/20/17 1632     Glucose 233 (H) mg/dL       Sliding Scale AdminMeter: [id number]: [id number] [facility]       POC Glucose Fingerstick [[id number]]  (Abnormal) Collected:  06/20/17 2133    Specimen:  Blood Updated:  06/20/17 2157     Glucose 196 (H) mg/dL       Sliding Scale AdminMeter: [id number]: [id number] ARCOS NIKKIE K       POC Glucose Fingerstick [[id number]]  (Abnormal) Collected:  06/21/17 0518    Specimen:  Blood Updated:  06/21/17 0630     Glucose 256 (H) mg/dL       Sliding Scale AdminMeter: [id number]: [id number] ARCOS NIKKIE K           Imaging Results (last 72 hours)     ** No results found for the last 72 hours. 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  She will be discharged home on Friday, June 23.    Family would like for her to have outpatient physical therapy and occupational therapy  Discussed with speech therapy and outpatient speech therapy probably not necessary    Reviewed with therapy and she will need a 5 inch fixed wheels rolling walker      Bennie Lucia MD  06/21/17  10:00 AM

## 2017-06-21 NOTE — THERAPY TREATMENT NOTE
Inpatient Rehabilitation - Occupational Therapy Treatment Note  [facility]     Patient Name: Kierra Scales  : 1929  MRN: [id number]  Today's Date: 2017  Onset of Illness/Injury or Date of Surgery Date: 17  Date of Referral to OT: 17  Referring Physician: Dr. Lucia      Admit Date: 2017    Visit Dx:     ICD-10-CM ICD-9-CM   1. Oral phase dysphagia R13.11 787.21   2. Impaired mobility and ADLs Z74.09 799.89   3. Muscle weakness (generalized) M62.81 728.87   4. Abnormality of gait and mobility R26.9 781.2   5. Symbolic dysfunction R48.9 784.60   6. Physical deconditioning R53.81 799.3   7. 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)   • Hypothyroidism   • Dyspnea   • Hyperthyroidism   • Gastrointestinal hemorrhage with melena   • Type 2 diabetes mellitus with hyperglycemia   • Colon cancer   • Chronic blood loss anemia   • Physical deconditioning   • Senile dementia, uncomplicated   • Encounter for rehabilitation   • Adenocarcinoma of sigmoid colon   • Hypokalemia             Adult Rehabilitation Note       17 0915 17 0748 17 1446    Rehab Assessment/Intervention    Discipline occupational therapy assistant  -RC speech language pathologist  -EK physical therapy assistant  -JW    Document Type therapy note (daily note)  -RC therapy note (daily note)  -EK therapy note (daily note)  -JW    Subjective Information agree to therapy  -RC no complaints;agree to therapy  -EK agree to therapy  -JW    Patient Effort, Rehab Treatment adequate  -RC good  -EK fair  -JW    Precautions/Limitations fall precautions  -RC fall precautions  -EK fall precautions  -JW     Patient Response to Treatment   pt vomiting at the end of tx in room, nsg notified  -JW    Recorded by [RC] ROHAN Gomez/POONAM [EK] Tamiko Whitten CCC-SLP [JW] Arlene Stephens, PTA    Vital Signs    Pre Patient Position   Sitting  -JW    Post Patient Position   Supine  -JW    Recorded by   [JW] Arlene Stephens, PTA    Pain Assessment    Pain Assessment No/denies pain  -RC  0-10  -JW    Pain Score   0  -JW    Post Pain Score   0  -JW    Recorded by [RC] ROHAN Gomez/L  [JW] Arlene Stephens PTA    Cognitive Assessment/Intervention    Current Cognitive/Communication Assessment impaired  -RC impaired  -EK impaired  -JW    Orientation Status person  -RC oriented to;person;disoriented to;place;time;situation  -EK oriented to;person  -JW    Follows Commands/Answers Questions  25% of the time;50% of the time  -EK 50% of the time  -JW    Personal Safety severe impairment;decreased awareness, need for assist;decreased awareness, need for safety;decreased insight to deficits  -RC severe impairment;decreased insight to deficits;decreased awareness, need for safety;decreased awareness, need for assist  -EK     Personal Safety Interventions   gait belt;nonskid shoes/slippers when out of bed  -JW    Problem Solving ADL/Func. Task --   completed abc puzzle if handed peices would not initate  -RC      Recorded by [RC] ROHAN Gomez/POONAM [EK] Tamiko Whitten CCC-SLP [JW] Arlene Stephens, PTA    Improve ability to follow directions    Improve ability to follow directions:  two-step commands  -EK     Status: Improve ability to follow directions  Progress slower than expected  -EK     Ability to Follow Directions Progress  50%;with consistent cues  -EK     Recorded by  [EK] Tamiko Whitten CCC-SLP     Improve ability to comprehend questions    Improve ability to comprehend questions:  simple yes/no questions;simple general questions;90%;with consistent cues   -EK     Status: Improve ability to comprehend questions  Progress slower than expected  -EK     Ability to Comprehend Questions Progress  50%  -EK     Recorded by  [EK] OSIRIS Foley     Improve word retrieval skills    Improve word retrieval skills by:  naming an object/pic;answer WH question with one word;completing a divergent task;completing a convergent task;90%  -EK     Status: Improve word retrieval skills  Progress slower than expected  -EK     Word Retrieval Skills Progress  50%;with consistent cues  -EK     Recorded by  [EK] LILIAN FoleySLP     Improve orientation    Improve orientation through:  demonstrating orientation to day;demonstrating orientation to year;demonstrating orientation to place;80%;use environmental aids to assist with orientation  -EK     Status: Improve orientation through  Progress slower than expected  -EK     Orientation Progress  10%;with consistent cues  -EK     Comments: Improve orientation through  max cues   -EK     Recorded by  [EK] OSIRIS Foley     Bed Mobility, Assessment/Treatment    Bed Mobility, Assistive Device   --   HOB down, no BRs  -JW    Bed Mob, Sit to Supine, [place]   supervision required  -JW    Recorded by   [JW] Arlene Stephens, PTA    Transfer Assessment/Treatment    Transfers, Chair-Bed [place]   contact guard assist  -JW    Transfers, Bed-Chair-Bed, Assist Device   rolling walker  -JW    Transfers, Sit-Stand [place]   contact guard assist  -JW    Transfers, Stand-Sit [place]   contact guard assist  -JW    Transfers, Sit-Stand-Sit, Assist Device   rolling walker  -JW    Toilet Transfer, [place] supervision required  -RC      Toilet Transfer, Assistive Device rolling walker  -RC      Recorded by [RC] ROHAN Gomez/POONAM  [JW] Arlene Stephens, PTA    Gait Assessment/Treatment    Gait, [place] Level   contact guard assist  -JW    Gait, Assistive  Device   rolling walker  -JW    Gait, Distance (Feet)   10  -JW    Gait, Comment   pt defers gt other than from w/c to bed  -JW    Recorded by   [JW] Arlene Stephens PTA    Functional Mobility    Functional Mobility- Ind. Level contact guard assist  -RC      Functional Mobility- Device rolling walker  -RC      Functional Mobility-Distance (Feet) 30  -RC      Recorded by [RC] ADRIANNA GomezA/L      Wheelchair Training/Management    Wheelchair, Distance Propelled   150  -JW    Wheelchair Training Comment   CGA and lots of vc's  -JW    Recorded by   [JW] Arlene Stephnes PTA    Toileting Assessment/Training    Toileting Assess/Train, Assistive Device raised toilet seat;grab bars  -RC      Toileting Assess/Train, Position sitting;standing  -RC      Toileting Assess/Train, Indepen Level supervision required  -RC      Recorded by [RC] ROHAN Gomez/L      Grooming Assessment/Training    Grooming Assess/Train, Position sink side;sitting  -RC      Grooming Assess/Train, Indepen Level minimum assist (75% patient effort)  -RC      Grooming Assess/Train, Comment --   daughter @ w/ hair care  -RC      Recorded by [RC] ROHAN Gomez/L      Motor Skills/Interventions    Additional Documentation --   clothes pin tree, bean bag toss/catch.  -RC      Recorded by [RC] ROHAN Gomez/L      Therapy Exercises    Bilateral Lower Extremities   AROM:;ankle pumps/circles;sitting;hip flexion;LAQ   add ball squeeze, hip abd w/ tband, 3 sets of 10  -JW    BLE Resistance   theraband   level 2  -JW    Bilateral Upper Extremity --   declined  -RC      Recorded by [RC] ROHAN Gomez/POONAM  [JW] Arlene Stephens PTA    Positioning and Restraints    Pre-Treatment Position sitting in chair/recliner  -RC in bed  -EK other (comment)   w/c  -JW    Post Treatment Position wheelchair  -RC bed  -EK bed  -JW    In Bed notified nsg;exit alarm on  -RC  supine;call light within reach;encouraged to call for  assist;exit alarm on;with family/caregiver;notified nsg   HOB elevated  -JW    Recorded by [RC] ADRIANNA GomezA/L [EK] Tamiko Whitten, [facility]-SLP [JW] Arlene Stephens, ROYAL      06/20/17 1416 06/20/17 1330 06/20/17 0915    Rehab Assessment/Intervention    Discipline occupational therapy assistant  -RC speech language pathologist  -EK physical therapy assistant  -JW    Document Type therapy note (daily note)  -RC therapy note (daily note)  -EK therapy note (daily note)  -JW    Subjective Information agree to therapy  -RC no complaints;agree to therapy  -EK agree to therapy  -JW    Patient Effort, Rehab Treatment adequate  -RC good  -EK fair  -JW    Precautions/Limitations fall precautions  -RC      Patient Response to Treatment   pt w/ vomiting episode earlier this morning, pt not agreeable to therapy, but w/ encouragement did bed ther ex  -JW    Recorded by [RC] ADRIANNA GomezA/L [EK] Tamiko Whitten, [facility]-SLP [JW] Arlene Stephens, ROYAL    Vital Signs    Pre Patient Position   Supine  -JW    Post Patient Position   Supine  -JW    Recorded by   [JW] Arlene Stephens PTA    Pain Assessment    Pain Assessment No/denies pain  - 0-10  -EK 0-10  -JW    Pain Score  0  -EK 0  -JW    Post Pain Score  0  -EK 0  -JW    Recorded by [RC] ADRIANNA GomezA/L [EK] Tamiko Whitten, [facility]-SLP [JW] Arlene Stephens, ROYAL    Cognitive Assessment/Intervention    Current Cognitive/Communication Assessment  impaired  -EK impaired  -JW    Orientation Status  oriented to;person  -EK oriented to;person  -JW    Follows Commands/Answers Questions   50% of the time;needs cueing  -    Short Term Memory Interventions --   sorting task completed w/ min vc's  -RC      Recorded by [RC] ADRIANNA GomezA/L [EK] Tamiko Whitten, [facility]-SLP [JW] Arlene Stephens, PTA    Improve ability to follow directions    Improve ability to follow directions:  two-step commands   -EK     Status: Improve ability to follow directions  Progress slower than expected  -EK     Ability to Follow Directions Progress  30%;with consistent cues  -EK     Recorded by  [EK] Tamiko Whitten CCC-SLP     Improve ability to comprehend questions    Improve ability to comprehend questions:  simple yes/no questions;simple general questions;90%  -EK     Status: Improve ability to comprehend questions  Progress slower than expected  -EK     Ability to Comprehend Questions Progress  50%;with consistent cues  -EK     Comments: Improve ability to comprehend questions  moderate to max cues  -EK     Recorded by  [EK] Tamiko Whitten CCC-SLP     Improve word retrieval skills    Improve word retrieval skills by:  naming an object/pic;answer WH question with one word;completing a divergent task;completing a convergent task;90%  -EK     Status: Improve word retrieval skills  Progress slower than expected  -EK     Word Retrieval Skills Progress  50%  -EK     Recorded by  [EK] OSIRIS Foley     Improve orientation    Improve orientation through:  demonstrating orientation to day;demonstrating orientation to year;demonstrating orientation to place;80%;use environmental aids to assist with orientation  -EK     Status: Improve orientation through  Progress slower than expected  -EK     Orientation Progress  10%;without cues  -EK     Comments: Improve orientation through  little to no success with F:2 cues  -EK     Recorded by  [EK] OSIRIS Foley     Improve memory skills    Improve memory skills through:  recall details of the day;50%  -EK     Status: Improve memory skills  Progress slower than expected  -EK     Memory Skills Progress  20%;without cues  -EK     Recorded by  [EK] LILIAN FoleySLP     Bed Mobility, Assessment/Treatment    Bed Mobility, Comment   pt defers all mobility  -JW    Recorded by   [JW] Arlene Stephens, PTA     Transfer Assessment/Treatment    Transfer, Comment   pt defers all mobility, family in agreement w/ pt to let pt rest this morning after ther ex  -JW    Recorded by   [JW] Arlene Stephens, ROYAL    Motor Skills/Interventions    Motor Response Observations --   table top act using B ue's w/ reaching, and problem solving  -RC      Recorded by [RC] ROHAN Gomez/L      Therapy Exercises    Bilateral Lower Extremities   AROM:;20 reps;AAROM:;supine;ankle pumps/circles;heel slides;hip abduction/adduction;quad sets;SAQ   2 sets, pt req. AAROM at times, vc's throughout most of tx  -JW    Recorded by   [JW] Arlene Stephens PTA    Positioning and Restraints    Pre-Treatment Position in bed  -RC in bed  -EK in bed  -JW    Post Treatment Position bed  -RC bed  -EK bed  -JW    In Bed with PT  -RC  supine;call light within reach;encouraged to call for assist;exit alarm on;with family/caregiver  -JW    Recorded by [RC] ROHAN Gomez/POONAM [EK] Tamiko Whitten, [facility]SLP [JW] Arlene Stephens, PTA      06/20/17 0730 06/19/17 1410 06/19/17 1315    Rehab Assessment/Intervention    Discipline occupational therapy assistant  -RC occupational therapy assistant  -RC physical therapy assistant  -RW    Document Type therapy note (daily note)  -RC therapy note (daily note)  -RC therapy note (daily note)  -RW    Subjective Information agree to therapy  -RC agree to therapy  -RC agree to therapy  -RW    Patient Effort, Rehab Treatment  adequate  -RC adequate  -RW    Symptoms Noted During/After Treatment --   reports stomach not feeling well, episode of vomiting   -RC      Symptoms Noted Comment --   vomited at end of tx,returned to room to nurse.  -RC      Precautions/Limitations  fall precautions   gait belt placement (abd incision)  -RC     Recorded by [RC] JOSÉ MIGUEL Gomez [RC] JOSÉ MIGUEL Gomez [RW] Danny Beebe, PTA    Vital Signs    Intra Systolic BP Rehab  109  -RC     Intra Treatment  Diastolic BP  56  -RC     Recorded by  [RC] Ally Kwong, MADRIGAL/L     Pain Assessment    Pain Assessment No/denies pain  -RC  No/denies pain  -RW    Pain Score  --   c/o back pain gave no number  -RC     Pain Intervention(s)  Rest;Medication (See MAR)  -RC     Recorded by [RC] Ally Kwong, MADRIGAL/L [RC] Ally Kwong, MADRIGAL/L [RW] Danny Beebe, ROYAL    Vision Assessment/Intervention    Visual Impairment   WFL with corrective lenses  -RW    Recorded by   [RW] Danny Beebe PTA    Cognitive Assessment/Intervention    Current Cognitive/Communication Assessment impaired  -RC impaired  -RC impaired  -RW    Orientation Status person  -RC person  -RC oriented to;person  -RW    Short/Long Term Memory   moderate impairment, short term memory  -RW    Additional Documentation --   pt was able to place 1/26 abc puzzle, refused clock act  -RC      Recorded by [RC] Ally Kwong, MADRIGAL/L [RC] Ally Kwong, MADRIGAL/L [RW] Danny Beebe, ROYAL    Transfer Assessment/Treatment    Transfers, Sit-Stand [place]   contact guard assist  -RW    Transfers, Stand-Sit [place]  contact guard assist  -RC contact guard assist  -RW    Transfers, Sit-Stand-Sit, Assist Device  rolling walker  -RC rolling walker  -RW    Toilet Transfer, [place]  contact guard assist  -RC minimum assist (75% patient effort)  -RW    Toilet Transfer, Assistive Device   rolling walker  -RW    Recorded by  [RC] Ally Kwong, MADRIGAL/L [RW] Danny Beebe PTA    Gait Assessment/Treatment    Gait, [place] Level   contact guard assist  -RW    Gait, Assistive Device   rolling walker  -RW    Gait, Distance (Feet)   150  -RW    Recorded by   [RW] Danny Beebe, ROYAL    Functional Mobility    Functional Mobility- Ind. Level  contact guard assist  -RC contact guard assist  -RW    Functional Mobility- Device  rolling walker  -RC rolling walker  -RW    Functional Mobility-Distance (Feet)  150  -RC     Recorded by  [RC] Ally Kwong MADRIGAL/L [RW]  Dnany Beebe PTA    Lower Body Dressing Assessment/Training    LB Dressing Assess/Train, Clothing Type  shoes;doffing:  -RC     LB Dressing Assess/Train, Position  sitting  -RC     LB Dressing Assess/Train, [place]  conditional independence  -RC     Recorded by  [RC] ROHAN Gomez/L     Motor Skills/Interventions    Motor Response Observations --   bean bag toss,clothes pin tree  -RC      Recorded by [RC] ROHAN Gomez/L      Balance Skills Training    Sitting-Level of Assistance   Distant supervision  -RW    Sitting-Balance Activities  Reaching for objects;Ball toss;Trunk control activities  -RC Lateral lean;Forward lean;Reaching for objects;Reaching across midline;Trunk control activities  -RW    Sitting # of Minutes  30  -RC 30+  -RW    Recorded by  [RC] ROHAN Gomez/L [RW] Danny Beebe PTA    Therapy Exercises    Bilateral Lower Extremities   --  -RW    BLE Resistance   --  -RW    Recorded by   [RW] Danny Beebe PTA    Positioning and Restraints    Pre-Treatment Position sitting in chair/recliner  -RC --   eom  -RC     Post Treatment Position wheelchair  -RC bed  -RC     In Bed with nsg  -RC exit alarm on;encouraged to call for assist;call light within reach;notified nsg  -RC     Recorded by [RC] ROHAN Gomez/POONAM [RC] JOSÉ MIGUEL Gomez       06/19/17 1051 06/19/17 0937 06/19/17 0820    Rehab Assessment/Intervention    Discipline occupational therapy assistant  -RC speech language pathologist  -EK physical therapy assistant  -RW    Document Type therapy note (daily note)  -RC  therapy note (daily note)  -RW    Subjective Information agree to therapy  -RC no complaints;agree to therapy  -EK agree to therapy  -RW    Patient Effort, Rehab Treatment poor  -RC fair  -EK fair  -RW    Recorded by [RC] ROHAN Gomez/POONAM [EK] Tamiko Whitten [facility]-SLP [RW] Danny Beebe, PTA    Vital Signs    Pretreatment Heart Rate (beats/min)   74  -RW    Pre SpO2 (%)    94  -RW    Recorded by   [RW] Danny Beebe, PTA    Pain Assessment    Pain Assessment No/denies pain  -RC No/denies pain  -EK     Pain Score   --   gives not rating  -RW    Pain Location   Back  -RW    Recorded by [RC] ADRIANNA GomezA/L [EK] Tamiko Whitten, [facility]-SLP [RW] Danny Beebe, PTA    Vision Assessment/Intervention    Visual Impairment  WFL with corrective lenses  -EK WFL with corrective lenses  -RW    Recorded by  [EK] Tamiko Whitten, [facility]-SLP [RW] Danny Beebe, PTA    Cognitive Assessment/Intervention    Current Cognitive/Communication Assessment impaired  -RC impaired  -EK impaired  -RW    Orientation Status person;place  -RC oriented to;person  -EK oriented to;person  -RW    Follows Commands/Answers Questions  50% of the time;needs cueing;needs increased time;needs repetition  -EK 50% of the time;needs cueing  -RW    Short/Long Term Memory  severe impairment, short term memory  -EK moderate impairment, short term memory  -RW    Problem Solving ADL/Func. Task --   pt completed 5 color sorting task w/ extra time and vc's  -RC      Additional Documentation --   unable to recall names of children 2/4 w/o cues  -RC      Recorded by [RC] ADRIANNA GomezA/L [EK] Tamiko Whitten, [facility]-SLP [RW] Danny Beebe, [facility]    Improve ability to follow directions    Improve ability to follow directions:  two-step commands  -EK     Status: Improve ability to follow directions  Progress slower than expected  -EK     Ability to Follow Directions Progress  30%;with consistent cues  -EK     Comments: Improve ability to follow directions  pt requires mod to max cues for task completion  -EK     Recorded by  [EK] Tamiko Whitten, CCC-SLP     Improve ability to comprehend questions    Improve ability to comprehend questions:  simple yes/no questions;simple general questions;90%  -EK     Status: Improve ability to comprehend questions  Progress slower than expected   -EK     Ability to Comprehend Questions Progress  50%;with consistent cues  -EK     Recorded by  [EK] Tamiko Whitten CCC-SLP     Improve word retrieval skills    Improve word retrieval skills by:  naming an object/pic;answer WH question with one word;completing a divergent task;completing a convergent task;90%  -EK     Status: Improve word retrieval skills  Progress slower than expected  -EK     Word Retrieval Skills Progress  50%  -EK     Comments: Improve word retrieval skills  Pt had difficulty with completing simple sentences and answering questions about a pictured scene.   -EK     Recorded by  [EK] LILIAN FoleySLP     Improve orientation    Improve orientation through:  demonstrating orientation to day;demonstrating orientation to year;demonstrating orientation to place;80%;use environmental aids to assist with orientation  -EK     Status: Improve orientation through  Progress slower than expected  -EK     Orientation Progress  10%;with consistent cues  -EK     Comments: Improve orientation through  mod to max cues; F;2  -EK     Recorded by  [EK] OSIRIS Foley     Improve memory skills    Improve memory skills through:  recall details of the day;50%  -EK     Status: Improve memory skills  Progress slower than expected  -EK     Memory Skills Progress  10%  -EK     Comments: Improve memory skills  Pt could not recall morning meal and name of grandchild who was present and then stepped away.   -EK     Recorded by  [EK] OSIRIS Foley     Transfer Assessment/Treatment    Transfers, Sit-Stand [place]   contact guard assist  -RW    Transfers, Stand-Sit [place]   contact guard assist  -RW    Transfers, Sit-Stand-Sit, Assist Device   rolling walker  -RW    Toilet Transfer, [place] contact guard assist  -RC  minimum assist (75% patient effort)  -RW    Toilet Transfer, Assistive Device rolling walker  -RC  rolling walker  -RW     Transfer, Comment --   vc's to use r/w  -RC  pt came walking out of bathroom w/o calling for assist. no walker  -RW    Recorded by [RC] JOSÉ MIGUEL Gomez  [RW] Danny Beebe PTA    Gait Assessment/Treatment    Gait, [place] Level   contact guard assist  -RW    Gait, Assistive Device   rolling walker  -RW    Gait, Distance (Feet)   150    x 2  -RW    Recorded by   [RW] Danny Beebe PTA    Functional Mobility    Functional Mobility- Ind. Level contact guard assist  -RC      Functional Mobility-Distance (Feet) 40  -RC      Recorded by [RC] ROHAN Gomez/L      Lower Body Dressing Assessment/Training    LB Dressing Assess/Train, Clothing Type   shoes;donning:  -RW    Recorded by   [RW] Danny Beebe PTA    Therapy Exercises    Bilateral Lower Extremities   AROM:;20 reps;sitting;heel raises;hip flexion;knee flexion;15 reps;standing  -RW    BLE Resistance   theraband  -RW    Bilateral Upper Extremity --   ue bike 15 min  -RC      Recorded by [RC] ROHAN Gomez/L  [RW] Danny Beebe PTA    Positioning and Restraints    Pre-Treatment Position sitting in chair/recliner  -RC sitting in chair/recliner  -EK sitting in chair/recliner  -RW    Post Treatment Position bed  -RC chair  -EK bathroom  -RW    In Bed call light within reach;encouraged to call for assist  -RC      Bathroom  notified nsg  -EK with nsg;call light within reach;encouraged to call for assist  -RW    Recorded by [RC] JOSÉ MIGUEL Gomez [EK] Tamiko Whitten CCC-SLP [RW] Danny Beebe PTA      User Key  (r) = Recorded By, (t) = Taken By, (c) = Cosigned By    Initials Name Effective Dates    JW Arlene Stephens, ROYAL 08/11/15 -     EK Tamiko Whitten CCC-SLP 04/06/17 -     RESHMA Beebe PTA 10/17/16 -     RC JOSÉ MIGUEL Gomez 10/17/16 -                 OT Goals       06/21/17 0915 06/20/17 1416 06/20/17 1415    Transfer Training OT STG    Transfer Training OT STG, Date Goal  Reviewed 06/21/17  -RC 06/20/17  -RC     Transfer Training OT STG, Outcome goal ongoing  -RC goal ongoing  -RC     Dynamic Standing Balance OT STG    Dynamic Standing Balance OT STG, Date Goal Reviewed 06/21/17  -RC 06/20/17  -RC     Dynamic Standing Balance OT STG, Outcome goal ongoing  -RC goal ongoing  -RC     Caregiver Training OT LTG    Caregiver Training OT LTG, Date Goal Reviewed 06/21/17  -RC 06/20/17  -RC     Caregiver Training OT LTG, Outcome goal ongoing  -RC goal ongoing  -RC     ADL OT LTG    ADL OT LTG, Date Goal Reviewed 06/21/17  -RC 06/20/17  -RC     ADL OT LTG, Outcome goal ongoing  -RC goal ongoing  -RC     Activity Tolerance OT STG    Activity Tolerance Goal OT STG, Date Goal Reviewed 06/21/17  -RC 06/20/17  -RC (P)  06/20/17  -RC    Activity Tolerance Goal OT STG, Outcome goal ongoing  -RC goal ongoing  -RC (P)  goal ongoing  -RC      06/19/17 1410 06/17/17 0600 06/16/17 0955    Transfer Training OT STG    Transfer Training OT STG, Date Goal Reviewed 06/19/17  -RC 06/17/17  -KD 06/16/17  -KD    Transfer Training OT STG, Outcome goal ongoing  -RC      Dynamic Standing Balance OT STG    Dynamic Standing Balance OT STG, Date Goal Reviewed 06/19/17  -RC 06/17/17  -KD 06/16/17  -KD    Dynamic Standing Balance OT STG, Outcome goal ongoing  -RC      Caregiver Training OT LTG    Caregiver Training OT LTG, Date Goal Reviewed 06/19/17  -RC 06/17/17  -KD 06/16/17  -KD    Caregiver Training OT LTG, Outcome goal ongoing  -RC      ADL OT LTG    ADL OT LTG, Date Goal Reviewed 06/19/17  -RC 06/17/17  -KD 06/16/17  -KD    ADL OT LTG, Outcome goal ongoing  -RC      Activity Tolerance OT STG    Activity Tolerance Goal OT STG, Date Goal Reviewed 06/19/17  -RC 06/17/17  -KD 06/16/17  -KD    Activity Tolerance Goal OT STG, Outcome goal ongoing  -RC        06/15/17 1341 06/14/17 1431 06/14/17 0905    Transfer Training OT STG    Transfer Training OT STG, Date Goal Reviewed 06/15/17  -RC  06/14/17  -RC    Transfer  Training OT STG, Outcome goal ongoing  -RC  goal met  -RC    Dynamic Standing Balance OT STG    Dynamic Standing Balance OT STG, Date Goal Reviewed 06/15/17  -RC  06/14/17  -RC    Dynamic Standing Balance OT STG, Outcome goal ongoing  -RC  goal ongoing  -RC    Caregiver Training OT LTG    Caregiver Training OT LTG, Date Goal Reviewed 06/15/17  -RC  06/14/17  -RC    Caregiver Training OT LTG, Outcome goal ongoing  -RC  goal ongoing  -RC    ADL OT LTG    ADL OT LTG, Date Goal Reviewed 06/15/17  -RC (P)  06/14/17  -RC 06/14/17  -RC    ADL OT LTG, Outcome goal ongoing  -RC (P)  goal ongoing  -RC goal ongoing  -RC    Activity Tolerance OT STG    Activity Tolerance Goal OT STG, Date Goal Reviewed 06/15/17  -RC  06/14/17  -RC    Activity Tolerance Goal OT STG, Outcome goal ongoing  -RC  goal ongoing  -RC      06/13/17 0759 06/12/17 1414 06/09/17 1725    Transfer Training OT STG    Transfer Training OT STG, Date Established 06/13/17  -  06/09/17  -RW    Transfer Training OT STG, Time to Achieve 2 wks  -BH  5 - 7 days  -RW    Transfer Training OT STG, Activity Type sit to stand/stand to sit;bed to chair /chair to bed;toilet  -  toilet  -RW    Transfer Training OT STG, [place] Level supervision required;set up required   AE/AD as needed  -  supervision required  -RW    Transfer Training OT STG, Date Goal Reviewed  06/12/17  -RM     Transfer Training OT STG, Outcome  goal not met  -RM     Transfer Training OT STG, Reason Goal Not Met  discharged from facility  -RM     Dynamic Standing Balance OT STG    Dynamic Standing Balance OT STG, Date Established 06/13/17  -  06/09/17  -RW    Dynamic Standing Balance OT STG, Time to Achieve 2 wks  -BH  5 - 7 days  -RW    Dynamic Standing Balance OT STG, [place] Level supervision required   5 minute no LOB or increased fatigue  -  supervision required  -RW    Dynamic Standing Balance OT STG, Assist Device   assistive Device  -RW    Dynamic Standing Balance OT STG,  Additional Goal   5 min  -RW    Dynamic Standing Balance OT STG, Date Goal Reviewed  06/12/17  -     Dynamic Standing Balance OT STG, Outcome  goal not met  -     Dynamic Standing Balance OT STG, Reason Goal Not Met  discharged from facility  -     Caregiver Training OT LTG    Caregiver Training OT LTG, Date Established 06/13/17  [facility]      Caregiver Training OT LTG, Time to Achieve by discharge  -      Caregiver Training OT LTG, [place] Level able to assist adequately;able to cue patient adequately   t/f, safety at home, [facility]  -      ADL OT LTG    ADL OT LTG, Date Established 06/13/17  -  06/09/17  -    ADL OT LTG, Time to Achieve by discharge  -  2 wks  -RW    ADL OT LTG, Activity Type ADL skills  -  ADL skills  -    ADL OT LTG, [place] Level standby assist;modified independent;setup;assistive device  -  standby assist  -    ADL OT LTG, Date Goal Reviewed  06/12/17  -     ADL OT LTG, Outcome  goal not met  -     ADL OT LTG, Reason Goal Not Met  discharged from facility  -     Functional Mobility OT LTG    Functional Mobility Goal OT LTG, Date Established   06/09/17  -    Functional Mobility Goal OT LTG, Time to Achieve   2 wks  -RW    Functional Mobility Goal OT LTG, [place] Level   supervision  -    Functional Mobility Goal OT LTG, Distance to Achieve   to the bathroom  -    Functional Mobility Goal OT LTG, Date Goal Reviewed  06/12/17  -     Functional Mobility Goal OT LTG, Outcome  goal not met  -     Functional Mobility Goal OT LTG, Reason Goal Not Met  discharged from facility  -     Activity Tolerance OT STG    Activity Tolerance Goal OT STG, Date Established 06/13/17  [facility]      Activity Tolerance Goal OT STG, Time to Achieve 2 wks  -      Activity Tolerance Goal OT STG, Activity Level 20 min activity;O2 sat >/equal to 90%;with 1 rest break  -        User Key  (r) = Recorded By, (t) = Taken By, (c) = Cosigned By    Initials Name Provider Type      Rebeka Carrillo, OTR/L Occupational Therapist    RW Cami Mon, OTR/L Occupational Therapist    RC Ally Kwong, MADRIGAL/L Occupational Therapy Assistant    KD Nathalia Irizarry MADRIGAL/L Occupational Therapy Assistant    RM Catie Sheriff, OT Occupational Therapist          Occupational Therapy Education     Title: PT OT SLP Therapies (Active)     Topic: Occupational Therapy (Active)     Point: ADL training (Active)    Description: Instruct learner(s) on proper safety adaptation and remediation techniques during self care or transfers.   Instruct in proper use of assistive devices.    Learning Progress Summary    Learner Readiness Method Response Comment Documented by Status   Patient Acceptance E NR,NL   06/19/17 1515 Active    Acceptance E NR   06/15/17 1425 Active    Acceptance E NR   06/14/17 1430 Active    Acceptance E VU,NR Educated pt about OT and POC. Educated pt not to get up on her own and how to use call button. Educated pt on safety with t/f and ADL.  06/13/17 1342 Done               Point: Precautions (Active)    Description: Instruct learner(s) on prescribed precautions during self-care and functional transfers.    Learning Progress Summary    Learner Readiness Method Response Comment Documented by Status   Patient Acceptance E NL   06/21/17 1101 Active    Acceptance E NR,NL   06/19/17 1515 Active    Acceptance E VU   06/18/17 1701 Done    Acceptance E VU,NR Educated pt about OT and POC. Educated pt not to get up on her own and how to use call button. Educated pt on safety with t/f and ADL.  06/13/17 1342 Done   Family Acceptance E NR recommended to family pt to have 24/7 supervision /@ at d/c  06/20/17 0935 Active               Point: Body mechanics (Active)    Description: Instruct learner(s) on proper positioning and spine alignment during self-care, functional mobility activities and/or exercises.    Learning Progress Summary    Learner Readiness Method Response Comment  Documented by Status   Patient Acceptance E NR,NL   06/19/17 1515 Active    Acceptance E VU   06/18/17 1701 Done    Acceptance E VU,NR Educated pt about OT and POC. Educated pt not to get up on her own and how to use call button. Educated pt on safety with t/f and ADL.  06/13/17 1342 Done                      User Key     Initials Effective Dates Name Provider Type Discipline     10/17/16 -  Rebeka Carrillo, OTR/L Occupational Therapist OT     10/17/16 -  Sadia Neal, RN Registered Nurse Nurse     10/17/16 -  ROHAN Gomez/L Occupational Therapy Assistant OT                  OT Recommendation and Plan  Anticipated Discharge Disposition: home with 24/7 care, home with home health, skilled nursing facility (depends on progress)  Planned Therapy Interventions: activity intolerance, adaptive equipment training, ADL retraining, balance training, bed mobility training, energy conservation, fine motor coordination training, home exercise program, motor coordination training, strengthening, transfer training  Therapy Frequency: other (see comments) (3-14x a week)  Plan of Care Review  Plan Of Care Reviewed With: patient  Progress: no change  Outcome Summary/Follow up Plan: pt will need 24/7 supervision and @ at d/c, due to decreased congnition        Outcome Measures       06/21/17 0915 06/20/17 1400 06/20/17 0915    How much help from another person do you currently need...    Turning from your back to your side while in flat bed without using bedrails?   3  -JW    Moving from lying on back to sitting on the side of a flat bed without bedrails?   3  -JW    Moving to and from a bed to a chair (including a wheelchair)?   3  -JW    Standing up from a chair using your arms (e.g., wheelchair, bedside chair)?   3  -JW    Climbing 3-5 steps with a railing?   3  -JW    To walk in hospital room?   3  -JW    AM-PAC 6 Clicks Score   18  -JW    How much help from another is currently needed...    Putting on and  taking off regular lower body clothing? 3  -RC 3  -RC     Bathing (including washing, rinsing, and drying) 3  -RC 3  -RC     Toileting (which includes using toilet bed pan or urinal) 3  -RC 3  -RC     Putting on and taking off regular upper body clothing 4  -RC 4  -RC     Taking care of personal grooming (such as brushing teeth) 3  -RC 3  -RC     Eating meals 4  -RC 4  -RC     Score 20  -RC 20  -RC     Functional Assessment    Outcome Measure Options   AM-PAC 6 Clicks Basic Mobility (PT)  -JW      06/20/17 0730 06/19/17 1410 06/19/17 0820    How much help from another person do you currently need...    Turning from your back to your side while in flat bed without using bedrails?   3  -RW    Moving from lying on back to sitting on the side of a flat bed without bedrails?   3  -RW    Moving to and from a bed to a chair (including a wheelchair)?   3  -RW    Standing up from a chair using your arms (e.g., wheelchair, bedside chair)?   3  -RW    Climbing 3-5 steps with a railing?   3  -RW    To walk in hospital room?   3  -RW    AM-PAC 6 Clicks Score   18  -RW    How much help from another is currently needed...    Putting on and taking off regular lower body clothing? 3  -RC 3  -RC     Bathing (including washing, rinsing, and drying) 3  -RC 3  -RC     Toileting (which includes using toilet bed pan or urinal) 3  -RC 3  -RC     Putting on and taking off regular upper body clothing 4  -RC 4  -RC     Taking care of personal grooming (such as brushing teeth) 3  -RC 3  -RC     Eating meals 4  -RC 4  -RC     Score 20  -RC 20  -RC       User Key  (r) = Recorded By, (t) = Taken By, (c) = Cosigned By    Initials Name Provider Type    CLAUDIA Stephens, ROYAL Physical Therapy Assistant    RESHMA Beebe PTA Physical Therapy Assistant    ROHAN Rodas/L Occupational Therapy Assistant           Time Calculation:         Time Calculation- OT       06/21/17 1104          Time Calculation- OT    OT Start Time 0915   -      OT Stop Time 1034  -      OT Time Calculation (min) 79 min  -      Total Timed Code Minutes- OT 79 minute(s)  -        User Key  (r) = Recorded By, (t) = Taken By, (c) = Cosigned By    Initials Name Provider Type     Ally ENRIQUEZ Varghese, MADRIGAL/L Occupational Therapy Assistant           Therapy Charges for Today     Code Description Service Date Service Provider Modifiers Qty    89350314716 HC OT THERAPEUTIC ACT EA 15 MIN 6/20/2017 Ally ENRIQUEZ Varghese, MADRIGAL/L GO 2    03767987209 HC OT THERAPEUTIC ACT EA 15 MIN 6/20/2017 Ally G Varghese, MADRIGAL/L GO 3    98250686404 HC OT THERAPEUTIC ACT EA 15 MIN 6/21/2017 Ally ZOE Varghese, MADRIGAL/L GO 4    43789230551 HC OT SELF CARE/MGMT/TRAIN EA 15 MIN 6/21/2017 Ally G Varghese, MADRIGAL/L GO 1          OT G-codes  OT Professional Judgement Used?: Yes  OT Functional Scales Options: AM-PAC 6 Clicks Daily Activity (OT)  Score: 16  Functional Limitation: Self care  Self Care Current Status (): At least 40 percent but less than 60 percent impaired, limited or restricted  Self Care Goal Status (): At least 20 percent but less than 40 percent impaired, limited or restricted    Ally ENRIQUEZ Varghese, MADRIGAL/L  6/21/2017

## 2017-06-21 NOTE — PLAN OF CARE
Problem: Patient Care Overview (Adult)  Goal: Plan of Care Review  Outcome: Ongoing (interventions implemented as appropriate)    06/21/17 1405   Coping/Psychosocial Response Interventions   Plan Of Care Reviewed With patient   Patient Care Overview   Progress no change   Outcome Evaluation   Outcome Summary/Follow up Plan pt has refused therapy some today, has appeared fatigued, allowed rest periods between therapies. Pt has not eaten much today, has said her stomach hurts a little, no more nausea or vomiting       Goal: Adult Individualization and Mutuality  Outcome: Ongoing (interventions implemented as appropriate)  Goal: Discharge Needs Assessment  Outcome: Ongoing (interventions implemented as appropriate)    Problem: Functional Deficit (Adult,Obstetrics,Pediatric)  Goal: Signs and Symptoms of Listed Potential Problems Will be Absent or Manageable (Functional Deficit)  Outcome: Ongoing (interventions implemented as appropriate)    Problem: Fall Risk (Adult)  Goal: Absence of Falls  Outcome: Ongoing (interventions implemented as appropriate)    Problem: Infection, Risk/Actual (Adult)  Goal: Infection Prevention/Resolution  Outcome: Ongoing (interventions implemented as appropriate)

## 2017-06-21 NOTE — SIGNIFICANT NOTE
06/21/17 1154   Rehab Treatment   Discipline physical therapy assistant   Treatment Not Performed patient/family declined treatment

## 2017-06-21 NOTE — THERAPY TREATMENT NOTE
"Inpatient Rehabilitation - Speech Language Pathology Treatment Note  [facility]     Patient Name: Kierra Scales  : 1929  MRN: [id number]  Today's Date: 2017  Referring Physician: Khari      Admit Date: 2017   1. Pt will safely tolerate recommended diet w/no overt s/s of aspiration:GOAL MET previously.   2. Pt will complete one-step directions without objects w/90% acc: GOAL MET previously  3. Pt will complete two-step commands w/90% acc: Pt was 30% acc w/2-step directions using for word search; max cues required.  4. Pt will complete simple yes/no questions w/90% acc: Pt was 50% acc w/basic yes/no questions with moderate cues.   5. Pt will answer simple \"WH\" questions w/90% acc: Pt was 50% acc w/wh-questions.   6. Pt will complete object/picture naming w/80% acc: Pt was 100% acc w/simple picture naming.GOAL MET  7. Pt will complete convergent naming task w/80% acc: 10% accuracy with max cues  8. Pt will complete simple divergent naming task w/an average of 5 wpm for simple concrete categories: average of 4 words per minute  9. Pt will complete immediate recall of related words w/80% acc: Discontinued goal previously  10: Pt will recall details of the day w/80% acc: 10% recall with use of aides and max cues and repetition required  11. Pt will complete orientation to JERRY, YR, and Place w/80% acc with use of visual aide: 25% with use of visual aid and verbal cues.     Visit Dx:      ICD-10-CM ICD-9-CM   1. Oral phase dysphagia R13.11 787.21   2. Impaired mobility and ADLs Z74.09 799.89   3. Muscle weakness (generalized) M62.81 728.87   4. Abnormality of gait and mobility R26.9 781.2   5. Symbolic dysfunction R48.9 784.60     Patient Active Problem List   Diagnosis   • Vitamin D deficiency   • Hyperlipidemia   • Essential hypertension   • SSS (sick sinus syndrome)   • Palpitations   • Bradycardia   • Shortness of breath   • Paroxysmal tachycardia   • Chest pain   • Tricuspid valve " disorders, 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)   • Hypothyroidism   • Dyspnea   • Hyperthyroidism   • Gastrointestinal hemorrhage with melena   • Type 2 diabetes mellitus with hyperglycemia   • Colon cancer   • Chronic blood loss anemia   • Physical deconditioning   • Senile dementia, uncomplicated   • Encounter for rehabilitation   • Adenocarcinoma of sigmoid colon   • Hypokalemia              Adult Rehabilitation Note       06/21/17 0748 06/20/17 1446 06/20/17 1416    Rehab Assessment/Intervention    Discipline speech language pathologist  -EK physical therapy assistant  -JW occupational therapy assistant  -RC    Document Type therapy note (daily note)  -EK therapy note (daily note)  -JW therapy note (daily note)  -RC    Subjective Information no complaints;agree to therapy  -EK agree to therapy  -JW agree to therapy  -RC    Patient Effort, Rehab Treatment good  -EK fair  -JW adequate  -RC    Precautions/Limitations fall precautions  -EK fall precautions  -JW fall precautions  -RC    Patient Response to Treatment  pt vomiting at the end of tx in room, nsg notified  -JW     Recorded by [EK] Tamiko Whitten CCC-SLP [JW] Arlene Stephens PTA [RC] Ally Kwong, MADRIGAL/L    Vital Signs    Pre Patient Position  Sitting  -JW     Post Patient Position  Supine  -JW     Recorded by  [JW] Arlene Stephens PTA     Pain Assessment    Pain Assessment  0-10  -JW No/denies pain  -RC    Pain Score  0  -JW     Post Pain Score  0  -JW     Recorded by  [JW] Arlene Stephens PTA [RC] Ally Kwong, MADRIGAL/L    Cognitive Assessment/Intervention    Current Cognitive/Communication Assessment impaired  -EK impaired  -JW     Orientation Status oriented to;person;disoriented to;place;time;situation  -EK oriented to;person  -JW     Follows Commands/Answers Questions 25% of the time;50% of the time  -EK 50% of the time  -JW     Personal Safety severe impairment;decreased insight to deficits;decreased awareness,  need for safety;decreased awareness, need for assist  -EK      Personal Safety Interventions  gait belt;nonskid shoes/slippers when out of bed  -JW     Short Term Memory Interventions   --   sorting task completed w/ min vc's  -RC    Recorded by [EK] OSIRIS Foley [JW] Arlene Stephens, PTA [RC] Ally ENRIQUEZ Varghese, MADRIGAL/L    Improve ability to follow directions    Improve ability to follow directions: two-step commands  -EK      Status: Improve ability to follow directions Progress slower than expected  -EK      Ability to Follow Directions Progress 50%;with consistent cues  -EK      Recorded by [EK] OSIRIS Foley      Improve ability to comprehend questions    Improve ability to comprehend questions: simple yes/no questions;simple general questions;90%;with consistent cues  -EK      Status: Improve ability to comprehend questions Progress slower than expected  -EK      Ability to Comprehend Questions Progress 50%  -EK      Recorded by [EK] OSIRIS Foley      Improve word retrieval skills    Improve word retrieval skills by: naming an object/pic;answer WH question with one word;completing a divergent task;completing a convergent task;90%  -EK      Status: Improve word retrieval skills Progress slower than expected  -EK      Word Retrieval Skills Progress 50%;with consistent cues  -EK      Recorded by [EK] OSIRIS Foley      Improve orientation    Improve orientation through: demonstrating orientation to day;demonstrating orientation to year;demonstrating orientation to place;80%;use environmental aids to assist with orientation  -EK      Status: Improve orientation through Progress slower than expected  -EK      Orientation Progress 10%;with consistent cues  -EK      Comments: Improve orientation through max cues   -EK      Recorded by [EK] OSIRIS Foley      Bed Mobility, Assessment/Treatment    Bed  Mobility, Assistive Device  --   HOB down, no BRs  -JW     Bed Mob, Sit to Supine, [place]  supervision required  -JW     Recorded by  [JW] Arlene Stephens PTA     Transfer Assessment/Treatment    Transfers, Chair-Bed [place]  contact guard assist  -JW     Transfers, Bed-Chair-Bed, Assist Device  rolling walker  -JW     Transfers, Sit-Stand [place]  contact guard assist  -JW     Transfers, Stand-Sit [place]  contact guard assist  -JW     Transfers, Sit-Stand-Sit, Assist Device  rolling walker  -JW     Recorded by  [JW] Arlene Stephens PTA     Gait Assessment/Treatment    Gait, [place] Level  contact guard assist  -JW     Gait, Assistive Device  rolling walker  -JW     Gait, Distance (Feet)  10  -JW     Gait, Comment  pt defers gt other than from w/c to bed  -JW     Recorded by  [JW] Arlene Stephens PTA     Wheelchair Training/Management    Wheelchair, Distance Propelled  150  -JW     Wheelchair Training Comment  CGA and lots of vc's  -JW     Recorded by  [JW] Arlene Stephens PTA     Motor Skills/Interventions    Motor Response Observations   --   table top act using B ue's w/ reaching, and problem solving  -RC    Recorded by   [RC] ROHAN Gomez/L    Therapy Exercises    Bilateral Lower Extremities  AROM:;ankle pumps/circles;sitting;hip flexion;LAQ   add ball squeeze, hip abd w/ tband, 3 sets of 10  -JW     BLE Resistance  theraband   level 2  -JW     Recorded by  [JW] Arlene Stephens PTA     Positioning and Restraints    Pre-Treatment Position in bed  -EK other (comment)   w/c  -JW in bed  -RC    Post Treatment Position bed  -EK bed  -JW bed  -RC    In Bed  supine;call light within reach;encouraged to call for assist;exit alarm on;with family/caregiver;notified nsg   HOB elevated  -JW with PT  -RC    Recorded by [EK] Tamiko Whitten, CCC-SLP [JW] Arlene Stephens, ROYAL [RC] ADRIANNA GomezA/L      06/20/17 1330 06/20/17 0915 06/20/17  0730    Rehab Assessment/Intervention    Discipline speech language pathologist  -EK physical therapy assistant  -JW occupational therapy assistant  -RC    Document Type therapy note (daily note)  -EK therapy note (daily note)  -JW therapy note (daily note)  -RC    Subjective Information no complaints;agree to therapy  -EK agree to therapy  -JW agree to therapy  -RC    Patient Effort, Rehab Treatment good  -EK fair  -JW     Symptoms Noted During/After Treatment   --   reports stomach not feeling well, episode of vomiting   -RC    Symptoms Noted Comment   --   vomited at end of tx,returned to room to nurse.  -RC    Patient Response to Treatment  pt w/ vomiting episode earlier this morning, pt not agreeable to therapy, but w/ encouragement did bed ther ex  -JW     Recorded by [EK] Tamiko Whitten, GURMEET-SLP [JW] Arlene Stephens, PTA [RC] Ally Kwong, MADRIGAL/L    Vital Signs    Pre Patient Position  Supine  -JW     Post Patient Position  Supine  -JW     Recorded by  [JW] Arlene Stephens PTA     Pain Assessment    Pain Assessment 0-10  -EK 0-10  -JW No/denies pain  -RC    Pain Score 0  -EK 0  -JW     Post Pain Score 0  -EK 0  -JW     Recorded by [EK] Tamiko Whitten, GURMEET-SLP [JW] Arlene Stephens, PTA [RC] Ally Kwong, MADRIGAL/L    Cognitive Assessment/Intervention    Current Cognitive/Communication Assessment impaired  -EK impaired  -JW impaired  -RC    Orientation Status oriented to;person  -EK oriented to;person  -JW person  -RC    Follows Commands/Answers Questions  50% of the time;needs cueing  -JW     Additional Documentation   --   pt was able to place 1/26 abc puzzle, refused clock act  -RC    Recorded by [EK] Tamiko Whitten, GURMEET-SLP [JW] Arlene Stephens, PTA [RC] Ally Kwong, MADRIGAL/L    Improve ability to follow directions    Improve ability to follow directions: two-step commands  -EK      Status: Improve ability to follow directions Progress slower  than expected  -EK      Ability to Follow Directions Progress 30%;with consistent cues  -EK      Recorded by [EK] OSIRIS Foley      Improve ability to comprehend questions    Improve ability to comprehend questions: simple yes/no questions;simple general questions;90%  -EK      Status: Improve ability to comprehend questions Progress slower than expected  -EK      Ability to Comprehend Questions Progress 50%;with consistent cues  -EK      Comments: Improve ability to comprehend questions moderate to max cues  -EK      Recorded by [EK] OSIRIS Foley      Improve word retrieval skills    Improve word retrieval skills by: naming an object/pic;answer WH question with one word;completing a divergent task;completing a convergent task;90%  -EK      Status: Improve word retrieval skills Progress slower than expected  -EK      Word Retrieval Skills Progress 50%  -EK      Recorded by [EK] OSIRIS Foley      Improve orientation    Improve orientation through: demonstrating orientation to day;demonstrating orientation to year;demonstrating orientation to place;80%;use environmental aids to assist with orientation  -EK      Status: Improve orientation through Progress slower than expected  -EK      Orientation Progress 10%;without cues  -EK      Comments: Improve orientation through little to no success with F:2 cues  -EK      Recorded by [EK] OSIRIS Foley      Improve memory skills    Improve memory skills through: recall details of the day;50%  -EK      Status: Improve memory skills Progress slower than expected  -EK      Memory Skills Progress 20%;without cues  -EK      Recorded by [EK] OSIRIS Foley      Bed Mobility, Assessment/Treatment    Bed Mobility, Comment  pt defers all mobility  -JW     Recorded by  [JW] Arlene Stephens, PTA     Transfer Assessment/Treatment    Transfer, Comment  pt defers all  mobility, family in agreement w/ pt to let pt rest this morning after ther ex  -JW     Recorded by  [JW] Arlene Stephens PTA     Motor Skills/Interventions    Motor Response Observations   --   bean bag toss,clothes pin tree  -RC    Recorded by   [RC] ROHAN Gomez/L    Therapy Exercises    Bilateral Lower Extremities  AROM:;20 reps;AAROM:;supine;ankle pumps/circles;heel slides;hip abduction/adduction;quad sets;SAQ   2 sets, pt req. AAROM at times, vc's throughout most of tx  -JW     Recorded by  [JW] Arlene Stephens PTA     Positioning and Restraints    Pre-Treatment Position in bed  -EK in bed  -JW sitting in chair/recliner  -RC    Post Treatment Position bed  -EK bed  -JW wheelchair  -RC    In Bed  supine;call light within reach;encouraged to call for assist;exit alarm on;with family/caregiver  -JW with nsg  -RC    Recorded by [EK] Tamiko Whitten CCC-SLP [JW] Arlene Stephens PTA [RC] ROHAN Gomez/POONAM      06/19/17 1410 06/19/17 1315 06/19/17 1051    Rehab Assessment/Intervention    Discipline occupational therapy assistant  -RC physical therapy assistant  -RW occupational therapy assistant  -RC    Document Type therapy note (daily note)  -RC therapy note (daily note)  -RW therapy note (daily note)  -RC    Subjective Information agree to therapy  -RC agree to therapy  -RW agree to therapy  -RC    Patient Effort, Rehab Treatment adequate  -RC adequate  -RW poor  -RC    Precautions/Limitations fall precautions   gait belt placement (abd incision)  -RC      Recorded by [RC] ROHAN Gomez/L [RW] Danny Beebe PTA [RC] ADRIANNA GomezA/L    Vital Signs    Intra Systolic BP Rehab 109  -RC      Intra Treatment Diastolic BP 56  -RC      Recorded by [RC] ADRIANNA GomezA/POONAM      Pain Assessment    Pain Assessment  No/denies pain  -RW No/denies pain  -RC    Pain Score --   c/o back pain gave no number  -RC      Pain Intervention(s) Rest;Medication (See MAR)   -RC      Recorded by [RC] ROHAN Gomez/L [RW] Danny Beebe PTA [RC] ADRIANNA GomezA/L    Vision Assessment/Intervention    Visual Impairment  WFL with corrective lenses  -RW     Recorded by  [RW] Danny Beebe PTA     Cognitive Assessment/Intervention    Current Cognitive/Communication Assessment impaired  -RC impaired  -RW impaired  -RC    Orientation Status person  -RC oriented to;person  -RW person;place  -RC    Short/Long Term Memory  moderate impairment, short term memory  -RW     Problem Solving ADL/Func. Task   --   pt completed 5 color sorting task w/ extra time and vc's  -RC    Additional Documentation   --   unable to recall names of children 2/4 w/o cues  -RC    Recorded by [RC] ROHAN Gomez/L [RW] Danny Beebe PTA [RC] ADRIANNA GomezA/L    Transfer Assessment/Treatment    Transfers, Sit-Stand [place]  contact guard assist  -RW     Transfers, Stand-Sit [place] contact guard assist  -RC contact guard assist  -RW     Transfers, Sit-Stand-Sit, Assist Device rolling walker  -RC rolling walker  -RW     Toilet Transfer, [place] contact guard assist  -RC minimum assist (75% patient effort)  -RW contact guard assist  -RC    Toilet Transfer, Assistive Device  rolling walker  -RW rolling walker  -RC    Transfer, Comment   --   vc's to use r/w  -RC    Recorded by [RC] ADRIANNA GomezA/L [RW] Danny Beebe PTA [RC] Ally Kwong, MADRIGAL/L    Gait Assessment/Treatment    Gait, [place] Level  contact guard assist  -RW     Gait, Assistive Device  rolling walker  -RW     Gait, Distance (Feet)  150  -RW     Recorded by  [RW] Danny Beebe PTA     Functional Mobility    Functional Mobility- Ind. Level contact guard assist  -RC contact guard assist  -RW contact guard assist  -RC    Functional Mobility- Device rolling walker  -RC rolling walker  -RW     Functional Mobility-Distance (Feet) 150  -RC  40  -RC    Recorded by [RC] ADRIANNA GomezA/L [RW]  Danny Beebe PTA [RC] ROHAN Gomez/L    Lower Body Dressing Assessment/Training    LB Dressing Assess/Train, Clothing Type shoes;doffing:  -RC      LB Dressing Assess/Train, Position sitting  -RC      LB Dressing Assess/Train, [place] conditional independence  -RC      Recorded by [RC] ROHAN Gomez/L      Balance Skills Training    Sitting-Level of Assistance  Distant supervision  -RW     Sitting-Balance Activities Reaching for objects;Ball toss;Trunk control activities  -RC Lateral lean;Forward lean;Reaching for objects;Reaching across midline;Trunk control activities  -RW     Sitting # of Minutes 30  -RC 30+  -RW     Recorded by [RC] JOSÉ MIGUEL Gomez [RW] Danny Beebe PTA     Therapy Exercises    Bilateral Lower Extremities  --  -RW     BLE Resistance  --  -RW     Bilateral Upper Extremity   --   ue bike 15 min  -RC    Recorded by  [RW] Danny Beebe PTA [RC] ROHAN Gomez/L    Positioning and Restraints    Pre-Treatment Position --   eom  -RC  sitting in chair/recliner  -RC    Post Treatment Position bed  -RC  bed  -RC    In Bed exit alarm on;encouraged to call for assist;call light within reach;notified nsg  -RC  call light within reach;encouraged to call for assist  -RC    Recorded by [RC] JOSÉ MIGUEL Gomez  [RC] JOSÉ MIGUEL Gomez      06/19/17 0937 06/19/17 0820       Rehab Assessment/Intervention    Discipline speech language pathologist  -EK physical therapy assistant  -RW     Document Type  therapy note (daily note)  -RW     Subjective Information no complaints;agree to therapy  -EK agree to therapy  -RW     Patient Effort, Rehab Treatment fair  -EK fair  -RW     Recorded by [EK] Tamiko Whitten [facility]-SLP [RW] Danny Beebe PTA     Vital Signs    Pretreatment Heart Rate (beats/min)  74  -RW     Pre SpO2 (%)  94  -RW     Recorded by  [RW] Danny Beebe PTA     Pain Assessment    Pain Assessment No/denies pain  -EK      Pain Score  --    gives not rating  -RW     Pain Location  Back  -RW     Recorded by [EK] Tamiko Whitten CCC-SLP [RW] Danny Beebe, PTA     Vision Assessment/Intervention    Visual Impairment WFL with corrective lenses  -EK WFL with corrective lenses  -RW     Recorded by [EK] Tamiko Whitten CCC-SLP [RW] Danny Beebe, PTA     Cognitive Assessment/Intervention    Current Cognitive/Communication Assessment impaired  -EK impaired  -RW     Orientation Status oriented to;person  -EK oriented to;person  -RW     Follows Commands/Answers Questions 50% of the time;needs cueing;needs increased time;needs repetition  -EK 50% of the time;needs cueing  -RW     Short/Long Term Memory severe impairment, short term memory  -EK moderate impairment, short term memory  -RW     Recorded by [EK] Tamiko Whitten CCC-SLP [RW] Danny Beebe, PTA     Improve ability to follow directions    Improve ability to follow directions: two-step commands  -EK      Status: Improve ability to follow directions Progress slower than expected  -EK      Ability to Follow Directions Progress 30%;with consistent cues  -EK      Comments: Improve ability to follow directions pt requires mod to max cues for task completion  -EK      Recorded by [EK] Tamiko Whitten CCC-SLP      Improve ability to comprehend questions    Improve ability to comprehend questions: simple yes/no questions;simple general questions;90%  -EK      Status: Improve ability to comprehend questions Progress slower than expected  -EK      Ability to Comprehend Questions Progress 50%;with consistent cues  -EK      Recorded by [EK] Tamiko Whitten CCC-SLP      Improve word retrieval skills    Improve word retrieval skills by: naming an object/pic;answer WH question with one word;completing a divergent task;completing a convergent task;90%  -EK      Status: Improve word retrieval skills Progress slower than expected  -EK      Word Retrieval  Skills Progress 50%  -EK      Comments: Improve word retrieval skills Pt had difficulty with completing simple sentences and answering questions about a pictured scene.   -EK      Recorded by [EK] Tamiko Whitten CCC-SLP      Improve orientation    Improve orientation through: demonstrating orientation to day;demonstrating orientation to year;demonstrating orientation to place;80%;use environmental aids to assist with orientation  -EK      Status: Improve orientation through Progress slower than expected  -EK      Orientation Progress 10%;with consistent cues  -EK      Comments: Improve orientation through mod to max cues; F;2  -EK      Recorded by [EK] Tamiko Whitten CCC-SLP      Improve memory skills    Improve memory skills through: recall details of the day;50%  -EK      Status: Improve memory skills Progress slower than expected  -EK      Memory Skills Progress 10%  -EK      Comments: Improve memory skills Pt could not recall morning meal and name of grandchild who was present and then stepped away.   -EK      Recorded by [EK] Tamiko Whitten CCC-SLP      Transfer Assessment/Treatment    Transfers, Sit-Stand [place]  contact guard assist  -RW     Transfers, Stand-Sit [place]  contact guard assist  -RW     Transfers, Sit-Stand-Sit, Assist Device  rolling walker  -RW     Toilet Transfer, [place]  minimum assist (75% patient effort)  -RW     Toilet Transfer, Assistive Device  rolling walker  -RW     Transfer, Comment  pt came walking out of bathroom w/o calling for assist. no walker  -RW     Recorded by  [RW] Danny Beebe PTA     Gait Assessment/Treatment    Gait, [place] Level  contact guard assist  -RW     Gait, Assistive Device  rolling walker  -RW     Gait, Distance (Feet)  150    x 2  -RW     Recorded by  [RW] aDnny Beebe PTA     Lower Body Dressing Assessment/Training    LB Dressing Assess/Train, Clothing Type  shoes;donning:  -RW      Recorded by  [RW] Danny Beebe PTA     Therapy Exercises    Bilateral Lower Extremities  AROM:;20 reps;sitting;heel raises;hip flexion;knee flexion;15 reps;standing  -RW     BLE Resistance  theraband  -RW     Recorded by  [RW] Danny Beebe PTA     Positioning and Restraints    Pre-Treatment Position sitting in chair/recliner  -EK sitting in chair/recliner  -RW     Post Treatment Position chair  -EK bathroom  -RW     Bathroom notified nsg  -EK with nsg;call light within reach;encouraged to call for assist  -RW     Recorded by [EK] Tamiko Whitten, [facility]-SLP [RW] Danny Beebe PTA       User Key  (r) = Recorded By, (t) = Taken By, (c) = Cosigned By    Initials Name Effective Dates    JW Arlene Stephens, PTA 08/11/15 -     EK Tamiko Whitten, [facility]-SLP 04/06/17 -     RW Danny Beebe, PTA 10/17/16 -     RC Ally Kwong, MADRIGAL/L 10/17/16 -               IP SLP Goals       06/21/17 0858 06/20/17 1450 06/19/17 1406    Receptive Language- Optimal Participation in Care    Receptive Language Optimal Participation in Care- SLP, Date Goal Reviewed 06/21/17  -EK 06/20/17  -EK 06/19/17  -EK    Expressive- Optimal Participation in Care    Expressive Optimal Participation in Care- SLP, Date Goal Reviewed 06/21/17  -EK 06/20/17  -EK 06/19/17  -EK    Expressive Optimal Participation in Care- SLP, Reason Goal Not Met   progress slower than expected  -EK    Cognitive Linguistic- Optimal Participation in Care    Cognitive Linguistic Optimal Participation in Care- SLP, Date Goal Reviewed 06/21/17  -EK 06/20/17  -EK 06/19/17  -EK      06/17/17 0835 06/16/17 0940 06/15/17 1103    Safely Consume Diet    Safely Consume Diet- SLP, Date Goal Reviewed   06/15/17  -EC    Safely Consume Diet- SLP, Outcome   goal met  -EC    Receptive Language- Optimal Participation in Care    Receptive Language Optimal Participation in Care- SLP, Date Goal Reviewed 06/17/17  -EK 06/16/17  -EC 06/15/17  -EC    Receptive  Language Optimal Participation in Care- SLP, Outcome  goal ongoing  -EC goal ongoing  -EC    Expressive- Optimal Participation in Care    Expressive Optimal Participation in Care- SLP, Date Goal Reviewed 06/17/17  -EK  06/15/17  -EC    Expressive Optimal Participation in Care- SLP, Outcome   goal ongoing  -EC    Cognitive Linguistic- Optimal Participation in Care    Cognitive Linguistic Optimal Participation in Care- SLP, Date Goal Reviewed 06/17/17  -EK 06/16/17  -EC 06/15/17  -EC    Cognitive Linguistic Optimal Participation in Care- SLP, Outcome  goal ongoing  -EC goal ongoing  -EC      06/14/17 1434 06/13/17 1506       Safely Consume Diet    Safely Consume Diet- SLP, Date Established  06/13/17  -CK     Safely Consume Diet- SLP, Time to Achieve  by discharge  -CK     Safely Consume Diet- SLP, Additional Goal  Pt will safely tolerate recommended diet w/no overt s/s of aspiration.  -CK     Safely Consume Diet- SLP, Date Goal Reviewed 06/14/17  -CK 06/13/17  -CK     Safely Consume Diet- SLP, Outcome goal partially met  -CK      Receptive Language- Optimal Participation in Care    Receptive Language Optimal Participation in Care- SLP, Date Established  06/13/17  -CK     Receptive Language Optimal Participation in Care- SLP, Time to Achieve  by discharge  -CK     Receptive Language Optimal Participation in Care- SLP, Activity Level  Patient will improve ability to follow directions;Patient will improve ability to comprehend questions  -CK     Receptive Language Optimal Participation in Care- SLP, Date Goal Reviewed 06/14/17  -CK 06/13/17  -CK     Receptive Language Optimal Participation in Care- SLP, Outcome goal ongoing  -CK      Expressive- Optimal Participation in Care    Expressive Optimal Participation in Care- SLP, Date Established  06/13/17  -CK     Expressive Optimal Participation in Care- SLP, Time to Achieve  by discharge  -CK     Expressive Optimal Participation in Care- SLP, Activity Level  Patient will  improve word retrieval skills  -CK     Expressive Optimal Participation in Care- SLP, Date Goal Reviewed 06/14/17  -CK 06/13/17  -CK     Expressive Optimal Participation in Care- SLP, Outcome goal ongoing  -CK      Cognitive Linguistic- Optimal Participation in Care    Cognitive Linguistic Optimal Participation in Care- SLP, Date Established  06/13/17  -CK     Cognitive Linguistic Optimal Participation in Care- SLP, Time to Achieve  by discharge  -CK     Cognitive Linguistic Optimal Participation in Care- SLP, Activity Level  Patient will improve orientation for increased safety in environment;Patient will improve memory skills for increased safety in environment  -CK     Cognitive Linguistic Optimal Participation in Care- SLP, Date Goal Reviewed 06/14/17  -CK 06/13/17  -CK     Cognitive Linguistic Optimal Participation in Care- SLP, Outcome goal ongoing  -CK        User Key  (r) = Recorded By, (t) = Taken By, (c) = Cosigned By    Initials Name Provider Type    LUCERO Couch, CCC-SLP Speech and Language Pathologist    KEVIN Whitten, CCC-SLP Speech and Language Pathologist    GLENYS Whitten, MS CCC-SLP Speech and Language Pathologist          EDUCATION  The patient has been educated in the following areas:   Cognitive Impairment Communication Impairment.    SLP Recommendation and Plan                               Plan of Care Review  Plan Of Care Reviewed With: patient, daughter  Progress: no change  Outcome Summary/Follow up Plan: Pt will need 24/7 care at home due to severe memory and cognitive deficits.           Time Calculation:         Time Calculation- SLP       06/21/17 0900          Time Calculation- SLP    SLP Start Time 0748  -EK      SLP Stop Time 0843  -EK      SLP Time Calculation (min) 55 min  -EK      SLP Received On 06/21/17  -EK        User Key  (r) = Recorded By, (t) = Taken By, (c) = Cosigned By    Initials Name Provider Type    KEVIN Whitten,  CCC-SLP Speech and Language Pathologist          Therapy Charges for Today     Code Description Service Date Service Provider Modifiers Qty    [id number]  ST TREATMENT SPEECH 3 6/20/2017 OSIRIS Foley GN 1    [id number]  ST TREATMENT SPEECH 4 6/21/2017 OSIRIS Foley GN 1               OSIRIS Foley  6/21/2017

## 2017-06-21 NOTE — THERAPY TREATMENT NOTE
Inpatient Rehabilitation - Physical Therapy Treatment Note  [facility]     Patient Name: Kierra Scales  : 1929  MRN: [id number]  Today's Date: 2017  Onset of Illness/Injury or Date of Surgery Date: 17  Date of Referral to PT: 17  Referring Physician: Dr. Lucia    Admit Date: 2017    Visit Dx:    ICD-10-CM ICD-9-CM   1. Oral phase dysphagia R13.11 787.21   2. Impaired mobility and ADLs Z74.09 799.89   3. Muscle weakness (generalized) M62.81 728.87   4. Abnormality of gait and mobility R26.9 781.2   5. Symbolic dysfunction R48.9 784.60   6. Physical deconditioning R53.81 799.3   7. 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)   • Hypothyroidism   • Dyspnea   • Hyperthyroidism   • Gastrointestinal hemorrhage with melena   • Type 2 diabetes mellitus with hyperglycemia   • Colon cancer   • Chronic blood loss anemia   • Physical deconditioning   • Senile dementia, uncomplicated   • Encounter for rehabilitation   • Adenocarcinoma of sigmoid colon   • Hypokalemia               Adult Rehabilitation Note       17 1415 17 1345 17 0915    Rehab Assessment/Intervention    Discipline physical therapy assistant  -RW occupational therapy assistant  -RC occupational therapy assistant  -RC    Document Type therapy note (daily note)  -RW therapy note (daily note)  -RC therapy note (daily note)  -RC    Subjective Information agree to therapy  -RW agree to therapy  -RC agree to therapy  -RC    Patient Effort, Rehab Treatment fair  -RW good  -RC adequate  -RC    Patient Effort, Rehab Treatment Comment came in immediately after grey and took over care  -RW --   much  encouragement to begin ADL  -RC     Precautions/Limitations   fall precautions  -RC    Patient Response to Treatment  --   [facility] pt got started she participated w/ min vc's  -RC     Recorded by [RW] Danny Beebe PTA [RC] ADRIANNA GomezA/POONAM [RC] Ally Kwong, MADRIGAL/L    Vital Signs    Intra Systolic BP Rehab  164  -RC     Intra Treatment Diastolic BP  77  -RC     Recorded by  [RC] Ally Kwong, MADRIGAL/L     Pain Assessment    Pain Assessment --   gives no rating  -RW No/denies pain  -RC No/denies pain  -RC    Recorded by [RW] Danny Beebe PTA [RC] ADRIANNA GomezA/POONAM [RC] Ally Kwong, MADRIGAL/L    Cognitive Assessment/Intervention    Current Cognitive/Communication Assessment functional  -RW  impaired  -RC    Orientation Status oriented to;person  -RW  person  -RC    Follows Commands/Answers Questions needs cueing;needs repetition  -RW      Personal Safety   severe impairment;decreased awareness, need for assist;decreased awareness, need for safety;decreased insight to deficits  -RC    Problem Solving ADL/Func. Task   --   completed abc puzzle if handed peices would not initate  -RC    Recorded by [RW] Danny Beebe PTA  [RC] Ally Kwong MADRIGAL/L    Bed Mobility, Assessment/Treatment    Bed Mob, Supine to Sit, [place]  supervision required  -RC     Recorded by  [RC] Ally Kwong MADRIGAL/L     Transfer Assessment/Treatment    Transfers, Sit-Stand [place] contact guard assist  -RW      Transfers, Stand-Sit [place] contact guard assist  -RW      Transfers, Sit-Stand-Sit, Assist Device rolling walker  -RW      Toilet Transfer, [place]  supervision required  -RC supervision required  -RC    Toilet Transfer, Assistive Device  rolling walker  -RC rolling walker  -RC    Walk-In Shower Transfer, [place]  verbal cues required  -RC     Recorded by [RW] Danny Beebe PTA [RC] ADRIANNA GomezA/POONAM [RC] Ally Kwong, MADRIGAL/L    Gait Assessment/Treatment    Gait, [place]  Level contact guard assist  -RW      Gait, Assistive Device rolling walker  -RW      Gait, Distance (Feet) 160    x 2  -RW      Recorded by [RW] Danny Beebe PTA      Functional Mobility    Functional Mobility- Ind. Level  contact guard assist;verbal cues required  -RC contact guard assist  -RC    Functional Mobility- Device  rolling walker  -RC rolling walker  -RC    Functional Mobility-Distance (Feet)  18  -RC 30  -RC    Recorded by  [RC] ROHAN Gomez/POONAM [RC] ROHAN Gomez/L    Upper Body Bathing Assessment/Training    UB Bathing Assess/Train Assistive Device  --   sponge bath  -RC     UB Bathing Assess/Train, Position  edge of bed  -RC     UB Bathing Assess/Train, [place] Level  verbal cues required;supervision required  -RC     Recorded by  [RC] ROHAN Gomez/L     Lower Body Bathing Assessment/Training    LB Bathing Assess/Train Assistive Device  --   sponge bath  -RC     LB Bathing Assess/Train, Position  sitting;standing;edge of bed  -RC     LB Bathing Assess/Train, [place] Level  verbal cues required;contact guard assist  -RC     Recorded by  [RC] ROHAN Gomez/L     Upper Body Dressing Assessment/Training    UB Dressing Assess/Train, Clothing Type  doffing:;donning:;pull over;button up  -RC     UB Dressing Assess/Train, Position  sitting  -RC     UB Dressing Assess/Train, [place]  set up required  -RC     Recorded by  [RC] ROHAN Gomez/L     Lower Body Dressing Assessment/Training    LB Dressing Assess/Train, Clothing Type  doffing:;donning:;pants;shoes;shorts;socks  -RC     LB Dressing Assess/Train, Position  sitting;standing  -RC     LB Dressing Assess/Train, [place]  contact guard assist  -RC     Recorded by  [RC] ADRIANNA GomezA/L     Toileting Assessment/Training    Toileting Assess/Train, Assistive Device  raised toilet seat;grab bars  -RC raised toilet seat;grab bars  -RC    Toileting Assess/Train, Position  standing;sitting  -RC  sitting;standing  -RC    Toileting Assess/Train, Indepen Level  contact guard assist;supervision required  -RC supervision required  -RC    Recorded by  [RC] ROHAN Gomez/POONAM [RC] ROHAN Gomez/L    Grooming Assessment/Training    Grooming Assess/Train, Position   sink side;sitting  -RC    Grooming Assess/Train, Indepen Level   minimum assist (75% patient effort)  -RC    Grooming Assess/Train, Comment   --   daughter @ w/ hair care  -RC    Recorded by   [RC] ROHAN Gomez/L    Motor Skills/Interventions    Additional Documentation   --   clothes pin tree, bean bag toss/catch.  -RC    Recorded by   [RC] ROHAN Gomez/L    Balance Skills Training    Sitting-Balance Activities Reaching for objects;Forward lean;Lateral lean;Reaching across midline  -RW      Standing-Level of Assistance Distant supervision  -RW      Recorded by [RW] Danny Beebe, ROYAL      Therapy Exercises    Bilateral Upper Extremity   --   declined  -RC    Recorded by   [RC] ROHAN Gomez/L    Positioning and Restraints    Pre-Treatment Position standing in room   with OCTAVIO  -RW in bed  -RC sitting in chair/recliner  -RC    Post Treatment Position chair  -RW wheelchair  -RC wheelchair  -RC    In Bed call light within reach;encouraged to call for assist  -RW with PT  -RC notified nsg;exit alarm on  -RC    Recorded by [RW] Danny Beebe, PTA [RC] ROHAN Gomez/POONAM [RC] ROHAN Gomez/POONAM      06/21/17 0748 06/20/17 1446 06/20/17 1416    Rehab Assessment/Intervention    Discipline speech language pathologist  -EK physical therapy assistant  -JW occupational therapy assistant  -RC    Document Type therapy note (daily note)  -EK therapy note (daily note)  -JW therapy note (daily note)  -RC    Subjective Information no complaints;agree to therapy  -EK agree to therapy  -JW agree to therapy  -RC    Patient Effort, Rehab Treatment good  -EK fair  -JW adequate  -RC    Precautions/Limitations fall precautions   -EK fall precautions  -JW fall precautions  -RC    Patient Response to Treatment  pt vomiting at the end of tx in room, nsg notified  -JW     Recorded by [EK] Tamiko Whitten CCC-SLP [JW] Arlene Stephens, ROYAL [RC] Ally Kwong, MADRIGAL/L    Vital Signs    Pre Patient Position  Sitting  -JW     Post Patient Position  Supine  -JW     Recorded by  [JW] Arlene Stephens PTA     Pain Assessment    Pain Assessment  0-10  -JW No/denies pain  -RC    Pain Score  0  -JW     Post Pain Score  0  -JW     Recorded by  [JW] Arlene Stephens PTA [RC] Ally Kwong, MADRIGAL/L    Cognitive Assessment/Intervention    Current Cognitive/Communication Assessment impaired  -EK impaired  -JW     Orientation Status oriented to;person;disoriented to;place;time;situation  -EK oriented to;person  -JW     Follows Commands/Answers Questions 25% of the time;50% of the time  -EK 50% of the time  -JW     Personal Safety severe impairment;decreased insight to deficits;decreased awareness, need for safety;decreased awareness, need for assist  -EK      Personal Safety Interventions  gait belt;nonskid shoes/slippers when out of bed  -JW     Short Term Memory Interventions   --   sorting task completed w/ min vc's  -RC    Recorded by [EK] Tamiko Whitten CCC-SLP [JW] Arlene Stephens, ROYAL [RC] Ally Kwong, MADRIGAL/L    Improve ability to follow directions    Improve ability to follow directions: two-step commands  -EK      Status: Improve ability to follow directions Progress slower than expected  -EK      Ability to Follow Directions Progress 50%;with consistent cues  -EK      Recorded by [EK] Tamiko Whitten CCC-SLP      Improve ability to comprehend questions    Improve ability to comprehend questions: simple yes/no questions;simple general questions;90%;with consistent cues  -EK      Status: Improve ability to comprehend questions Progress slower than expected  -EK      Ability to Comprehend  Questions Progress 50%  -EK      Recorded by [EK] OSIRIS Foley      Improve word retrieval skills    Improve word retrieval skills by: naming an object/pic;answer WH question with one word;completing a divergent task;completing a convergent task;90%  -EK      Status: Improve word retrieval skills Progress slower than expected  -EK      Word Retrieval Skills Progress 50%;with consistent cues  -EK      Recorded by [EK] OSIRIS Foley      Improve orientation    Improve orientation through: demonstrating orientation to day;demonstrating orientation to year;demonstrating orientation to place;80%;use environmental aids to assist with orientation  -EK      Status: Improve orientation through Progress slower than expected  -EK      Orientation Progress 10%;with consistent cues  -EK      Comments: Improve orientation through max cues   -EK      Recorded by [EK] OSIRIS Foley      Bed Mobility, Assessment/Treatment    Bed Mobility, Assistive Device  --   HOB down, no BRs  -JW     Bed Mob, Sit to Supine, [place]  supervision required  -JW     Recorded by  [JW] Arlene Stephens PTA     Transfer Assessment/Treatment    Transfers, Chair-Bed [place]  contact guard assist  -JW     Transfers, Bed-Chair-Bed, Assist Device  rolling walker  -JW     Transfers, Sit-Stand [place]  contact guard assist  -JW     Transfers, Stand-Sit [place]  contact guard assist  -JW     Transfers, Sit-Stand-Sit, Assist Device  rolling walker  -JW     Recorded by  [JW] Arlene Stephens, ROYAL     Gait Assessment/Treatment    Gait, [place] Level  contact guard assist  -JW     Gait, Assistive Device  rolling walker  -JW     Gait, Distance (Feet)  10  -JW     Gait, Comment  pt defers gt other than from w/c to bed  -JW     Recorded by  [JW] Arlene Stephens, ROYAL     Wheelchair Training/Management    Wheelchair, Distance Propelled  150  -JW     Wheelchair  Training Comment  CGA and lots of vc's  -JW     Recorded by  [JW] Arlene Stephens PTA     Motor Skills/Interventions    Motor Response Observations   --   table top act using B ue's w/ reaching, and problem solving  -RC    Recorded by   [RC] ADRIANNA GomezA/L    Therapy Exercises    Bilateral Lower Extremities  AROM:;ankle pumps/circles;sitting;hip flexion;LAQ   add ball squeeze, hip abd w/ tband, 3 sets of 10  -JW     BLE Resistance  theraband   level 2  -JW     Recorded by  [JW] Arlene Stephens PTA     Positioning and Restraints    Pre-Treatment Position in bed  -EK other (comment)   w/c  -JW in bed  -RC    Post Treatment Position bed  -EK bed  -JW bed  -RC    In Bed  supine;call light within reach;encouraged to call for assist;exit alarm on;with family/caregiver;notified nsg   HOB elevated  -JW with PT  -RC    Recorded by [EK] Tamiko Whitten CCC-SLP [JW] Arlene Stephens PTA [RC] ADRIANNA GomezA/L      06/20/17 1330 06/20/17 0915 06/20/17 0730    Rehab Assessment/Intervention    Discipline speech language pathologist  -EK physical therapy assistant  -JW occupational therapy assistant  -RC    Document Type therapy note (daily note)  -EK therapy note (daily note)  -JW therapy note (daily note)  -RC    Subjective Information no complaints;agree to therapy  -EK agree to therapy  -JW agree to therapy  -RC    Patient Effort, Rehab Treatment good  -EK fair  -JW     Symptoms Noted During/After Treatment   --   reports stomach not feeling well, episode of vomiting   -RC    Symptoms Noted Comment   --   vomited at end of tx,returned to room to nurse.  -RC    Patient Response to Treatment  pt w/ vomiting episode earlier this morning, pt not agreeable to therapy, but w/ encouragement did bed ther ex  -JW     Recorded by [EK] Tamiko Whitten CCC-SLP [JW] Arlene Stephens PTA [RC] ADRIANNA GomezA/L    Vital Signs    Pre Patient Position  Supine  -JW     Post  Patient Position  Supine  -JW     Recorded by  [JW] Arlene Stephens, PTA     Pain Assessment    Pain Assessment 0-10  -EK 0-10  -JW No/denies pain  -RC    Pain Score 0  -EK 0  -JW     Post Pain Score 0  -EK 0  -JW     Recorded by [EK] Tamiko Whitten, CCC-SLP [JW] Arlene Stephens, PTA [RC] Ally G Varghese, MADRIGAL/L    Cognitive Assessment/Intervention    Current Cognitive/Communication Assessment impaired  -EK impaired  -JW impaired  -RC    Orientation Status oriented to;person  -EK oriented to;person  -JW person  -RC    Follows Commands/Answers Questions  50% of the time;needs cueing  -JW     Additional Documentation   --   pt was able to place 1/26 abc puzzle, refused clock act  -RC    Recorded by [EK] Tamiko Whitten, GURMEET-SLP [JW] Arlene Stephens, PTA [RC] Ally G Varghese, MADRIGAL/L    Improve ability to follow directions    Improve ability to follow directions: two-step commands  -EK      Status: Improve ability to follow directions Progress slower than expected  -EK      Ability to Follow Directions Progress 30%;with consistent cues  -EK      Recorded by [EK] Tamiko Whitten CCC-SLP      Improve ability to comprehend questions    Improve ability to comprehend questions: simple yes/no questions;simple general questions;90%  -EK      Status: Improve ability to comprehend questions Progress slower than expected  -EK      Ability to Comprehend Questions Progress 50%;with consistent cues  -EK      Comments: Improve ability to comprehend questions moderate to max cues  -EK      Recorded by [EK] Tamiko Whitten CCC-SLP      Improve word retrieval skills    Improve word retrieval skills by: naming an object/pic;answer WH question with one word;completing a divergent task;completing a convergent task;90%  -EK      Status: Improve word retrieval skills Progress slower than expected  -EK      Word Retrieval Skills Progress 50%  -EK      Recorded by [EK] Tamiko  Esther Whitten CCC-ELISA      Improve orientation    Improve orientation through: demonstrating orientation to day;demonstrating orientation to year;demonstrating orientation to place;80%;use environmental aids to assist with orientation  -EK      Status: Improve orientation through Progress slower than expected  -EK      Orientation Progress 10%;without cues  -EK      Comments: Improve orientation through little to no success with F:2 cues  -EK      Recorded by [EK] OSIRIS Foley      Improve memory skills    Improve memory skills through: recall details of the day;50%  -EK      Status: Improve memory skills Progress slower than expected  -EK      Memory Skills Progress 20%;without cues  -EK      Recorded by [EK] OSIRIS Foley      Bed Mobility, Assessment/Treatment    Bed Mobility, Comment  pt defers all mobility  -JW     Recorded by  [JW] Arlene Stephens, ROYAL     Transfer Assessment/Treatment    Transfer, Comment  pt defers all mobility, family in agreement w/ pt to let pt rest this morning after ther ex  -JW     Recorded by  [JW] Arlene Stephens PTA     Motor Skills/Interventions    Motor Response Observations   --   bean bag toss,clothes pin tree  -RC    Recorded by   [RC] ROHAN Gomez/L    Therapy Exercises    Bilateral Lower Extremities  AROM:;20 reps;AAROM:;supine;ankle pumps/circles;heel slides;hip abduction/adduction;quad sets;SAQ   2 sets, pt req. AAROM at times, vc's throughout most of tx  -JW     Recorded by  [JW] Arlene Stephens PTA     Positioning and Restraints    Pre-Treatment Position in bed  -EK in bed  -JW sitting in chair/recliner  -    Post Treatment Position bed  -EK bed  -JW wheelchair  -RC    In Bed  supine;call light within reach;encouraged to call for assist;exit alarm on;with family/caregiver  -JW with nsg  -    Recorded by [EK] OSIRIS Foley [JW] Arlene Stephens, PTA [RC] Ally ENRIQUEZ  ADRIANNA KwongA/L      06/19/17 1410 06/19/17 1315 06/19/17 1051    Rehab Assessment/Intervention    Discipline occupational therapy assistant  -RC physical therapy assistant  -RW occupational therapy assistant  -RC    Document Type therapy note (daily note)  -RC therapy note (daily note)  -RW therapy note (daily note)  -RC    Subjective Information agree to therapy  -RC agree to therapy  -RW agree to therapy  -RC    Patient Effort, Rehab Treatment adequate  -RC adequate  -RW poor  -RC    Precautions/Limitations fall precautions   gait belt placement (abd incision)  -RC      Recorded by [RC] ADRIANNA GomezA/L [RW] Danny Beebe PTA [RC] ADRIANNA GomezA/L    Vital Signs    Intra Systolic BP Rehab 109  -RC      Intra Treatment Diastolic BP 56  -RC      Recorded by [RC] Ally Kwong MADRIGAL/L      Pain Assessment    Pain Assessment  No/denies pain  -RW No/denies pain  -RC    Pain Score --   c/o back pain gave no number  -RC      Pain Intervention(s) Rest;Medication (See MAR)  -RC      Recorded by [RC] ADRIANNA GomezA/L [RW] Danny Beebe PTA [RC] Ally Kwong MADRIGAL/L    Vision Assessment/Intervention    Visual Impairment  WFL with corrective lenses  -RW     Recorded by  [RW] Danny Beebe PTA     Cognitive Assessment/Intervention    Current Cognitive/Communication Assessment impaired  -RC impaired  -RW impaired  -RC    Orientation Status person  -RC oriented to;person  -RW person;place  -RC    Short/Long Term Memory  moderate impairment, short term memory  -RW     Problem Solving ADL/Func. Task   --   pt completed 5 color sorting task w/ extra time and vc's  -RC    Additional Documentation   --   unable to recall names of children 2/4 w/o cues  -RC    Recorded by [RC] ADRIANNA GomezA/L [RW] Danny Beebe PTA [RC] ADRIANNA GomezA/L    Transfer Assessment/Treatment    Transfers, Sit-Stand [place]  contact guard assist  -RW     Transfers, Stand-Sit [place] contact guard  assist  -RC contact guard assist  -RW     Transfers, Sit-Stand-Sit, Assist Device rolling walker  -RC rolling walker  -RW     Toilet Transfer, [place] contact guard assist  -RC minimum assist (75% patient effort)  -RW contact guard assist  -RC    Toilet Transfer, Assistive Device  rolling walker  -RW rolling walker  -RC    Transfer, Comment   --   vc's to use r/w  -RC    Recorded by [RC] ROHAN Gomez/L [RW] Danny Beebe PTA [RC] ROHAN Gomez/L    Gait Assessment/Treatment    Gait, [place] Level  contact guard assist  -RW     Gait, Assistive Device  rolling walker  -RW     Gait, Distance (Feet)  150  -RW     Recorded by  [RW] Danny Beebe PTA     Functional Mobility    Functional Mobility- Ind. Level contact guard assist  -RC contact guard assist  -RW contact guard assist  -RC    Functional Mobility- Device rolling walker  -RC rolling walker  -RW     Functional Mobility-Distance (Feet) 150  -RC  40  -RC    Recorded by [RC] ROHAN Gomez/L [RW] Danny Beebe PTA [RC] ROHAN Gomez/L    Lower Body Dressing Assessment/Training    LB Dressing Assess/Train, Clothing Type shoes;doffing:  -RC      LB Dressing Assess/Train, Position sitting  -RC      LB Dressing Assess/Train, [place] conditional independence  -RC      Recorded by [RC] ROHAN Gomez/L      Balance Skills Training    Sitting-Level of Assistance  Distant supervision  -RW     Sitting-Balance Activities Reaching for objects;Ball toss;Trunk control activities  -RC Lateral lean;Forward lean;Reaching for objects;Reaching across midline;Trunk control activities  -RW     Sitting # of Minutes 30  -RC 30+  -RW     Recorded by [RC] ROHAN Gomez/L [RW] Danny Beebe PTA     Therapy Exercises    Bilateral Lower Extremities  --  -RW     BLE Resistance  --  -RW     Bilateral Upper Extremity   --   ue bike 15 min  -RC    Recorded by  [RW] Danny Beebe PTA [RC] ROHAN Gomez/POONAM    Positioning  and Restraints    Pre-Treatment Position --   eom  -RC  sitting in chair/recliner  -RC    Post Treatment Position bed  -RC  bed  -RC    In Bed exit alarm on;encouraged to call for assist;call light within reach;notified nsg  -RC  call light within reach;encouraged to call for assist  -RC    Recorded by [RC] ADRIANNA GomezA/POONAM  [RC] ADRIANNA GomezA/POONAM      06/19/17 0937 06/19/17 0820       Rehab Assessment/Intervention    Discipline speech language pathologist  -EK physical therapy assistant  -RW     Document Type  therapy note (daily note)  -RW     Subjective Information no complaints;agree to therapy  -EK agree to therapy  -RW     Patient Effort, Rehab Treatment fair  -EK fair  -RW     Recorded by [EK] Tamiko Whitten CCC-SLP [RW] Danny Beebe, PTA     Vital Signs    Pretreatment Heart Rate (beats/min)  74  -RW     Pre SpO2 (%)  94  -RW     Recorded by  [RW] Danny Beebe PTA     Pain Assessment    Pain Assessment No/denies pain  -EK      Pain Score  --   gives not rating  -RW     Pain Location  Back  -RW     Recorded by [EK] Tamiko Whitten CCC-SLP [RW] Danny Beebe, ROYAL     Vision Assessment/Intervention    Visual Impairment WFL with corrective lenses  -EK WFL with corrective lenses  -RW     Recorded by [EK] Tamiko Whitten, [facility]-SLP [RW] Danny Beebe, ROYAL     Cognitive Assessment/Intervention    Current Cognitive/Communication Assessment impaired  -EK impaired  -RW     Orientation Status oriented to;person  -EK oriented to;person  -RW     Follows Commands/Answers Questions 50% of the time;needs cueing;needs increased time;needs repetition  -EK 50% of the time;needs cueing  -RW     Short/Long Term Memory severe impairment, short term memory  -EK moderate impairment, short term memory  -RW     Recorded by [EK] Tamiko Whitten CCC-SLP [RW] Danny Beebe, PTA     Improve ability to follow directions    Improve ability to follow directions: two-step  commands  -EK      Status: Improve ability to follow directions Progress slower than expected  -EK      Ability to Follow Directions Progress 30%;with consistent cues  -EK      Comments: Improve ability to follow directions pt requires mod to max cues for task completion  -EK      Recorded by [EK] Tamiko Whitten CCC-SLP      Improve ability to comprehend questions    Improve ability to comprehend questions: simple yes/no questions;simple general questions;90%  -EK      Status: Improve ability to comprehend questions Progress slower than expected  -EK      Ability to Comprehend Questions Progress 50%;with consistent cues  -EK      Recorded by [EK] Tamiko Whitten CCC-SLP      Improve word retrieval skills    Improve word retrieval skills by: naming an object/pic;answer WH question with one word;completing a divergent task;completing a convergent task;90%  -EK      Status: Improve word retrieval skills Progress slower than expected  -EK      Word Retrieval Skills Progress 50%  -EK      Comments: Improve word retrieval skills Pt had difficulty with completing simple sentences and answering questions about a pictured scene.   -EK      Recorded by [EK] Tamiko Whitten CCC-SLP      Improve orientation    Improve orientation through: demonstrating orientation to day;demonstrating orientation to year;demonstrating orientation to place;80%;use environmental aids to assist with orientation  -EK      Status: Improve orientation through Progress slower than expected  -EK      Orientation Progress 10%;with consistent cues  -EK      Comments: Improve orientation through mod to max cues; F;2  -EK      Recorded by [EK] LILIAN FoleySLP      Improve memory skills    Improve memory skills through: recall details of the day;50%  -EK      Status: Improve memory skills Progress slower than expected  -EK      Memory Skills Progress 10%  -EK      Comments: Improve memory skills  Pt could not recall morning meal and name of grandchild who was present and then stepped away.   -EK      Recorded by [EK] Tamiko Whitten, [facility]-SLP      Transfer Assessment/Treatment    Transfers, Sit-Stand [place]  contact guard assist  -RW     Transfers, Stand-Sit [place]  contact guard assist  -RW     Transfers, Sit-Stand-Sit, Assist Device  rolling walker  -RW     Toilet Transfer, [place]  minimum assist (75% patient effort)  -RW     Toilet Transfer, Assistive Device  rolling walker  -RW     Transfer, Comment  pt came walking out of bathroom w/o calling for assist. no walker  -RW     Recorded by  [RW] Danny Beebe PTA     Gait Assessment/Treatment    Gait, [place] Level  contact guard assist  -RW     Gait, Assistive Device  rolling walker  -RW     Gait, Distance (Feet)  150    x 2  -RW     Recorded by  [RW] Danny Beebe PTA     Lower Body Dressing Assessment/Training    LB Dressing Assess/Train, Clothing Type  shoes;donning:  -RW     Recorded by  [RW] Danny Beebe PTA     Therapy Exercises    Bilateral Lower Extremities  AROM:;20 reps;sitting;heel raises;hip flexion;knee flexion;15 reps;standing  -RW     BLE Resistance  theraband  -RW     Recorded by  [RW] Danny Beebe PTA     Positioning and Restraints    Pre-Treatment Position sitting in chair/recliner  -EK sitting in chair/recliner  -RW     Post Treatment Position chair  -EK bathroom  -RW     Bathroom notified nsg  -EK with nsg;call light within reach;encouraged to call for assist  -RW     Recorded by [EK] Tamiko Whitten, [facility]-SLP [RW] Danny Beebe PTA       User Key  (r) = Recorded By, (t) = Taken By, (c) = Cosigned By    Initials Name Effective Dates    JW Arlene Stephens, PTA 08/11/15 -     EK Tamiko Whitten, GURMEET-SLP 04/06/17 -     RW Danny Beebe, ROYAL 10/17/16 -     ROHAN Rodas/POONAM 10/17/16 -                 IP PT Goals       06/21/17 1514 06/20/17 1446 06/19/17 1422     Bed Mobility PT LTG    Bed Mobility PT LTG, Date Goal Reviewed 06/21/17  -RW 06/20/17  -JW 06/19/17  -RW    Bed Mobility PT LTG, Outcome goal ongoing  -RW  goal ongoing  -RW    Transfer Training PT LTG    Transfer Training PT  LTG, Date Goal Reviewed 06/21/17  -RW 06/20/17  -JW 06/19/17  -RW    Transfer Training PT LTG, Outcome goal ongoing  -RW  goal ongoing  -RW    Gait Training PT LTG    Gait Training Goal PT LTG, Date Goal Reviewed 06/21/17  -RW 06/20/17  -JW 06/19/17  -RW    Gait Training Goal PT LTG, Outcome goal ongoing  -RW  goal ongoing  -RW      06/17/17 1559 06/16/17 1245 06/16/17 0816    Bed Mobility PT LTG    Bed Mobility PT LTG, Date Goal Reviewed 06/17/17  -RW (P)  06/16/17  -MARLENE 06/16/17  -MARLENE    Bed Mobility PT LTG, Outcome goal ongoing  -RW (P)  goal ongoing  -MARLENE goal ongoing  -MARLENE    Transfer Training PT LTG    Transfer Training PT  LTG, Date Goal Reviewed 06/17/17  -RW (P)  06/16/17  -MARLENE 06/16/17  -MARLENE    Transfer Training PT LTG, Outcome goal ongoing  -RW (P)  goal ongoing  -MARLENE goal ongoing  -MARLENE    Gait Training PT LTG    Gait Training Goal PT LTG, Date Goal Reviewed 06/17/17  -RW (P)  06/16/17  -MARLENE 06/16/17  -MARLENE    Gait Training Goal PT LTG, Outcome goal ongoing  -RW (P)  goal ongoing  -MARLENE goal ongoing  -MARLENE      06/15/17 1353 06/14/17 1608 06/13/17 1036    Bed Mobility PT LTG    Bed Mobility PT LTG, Date Established   06/13/17  -LM    Bed Mobility PT LTG, Time to Achieve   by discharge  -LM    Bed Mobility PT LTG, Activity Type   supine to sit/sit to supine  -LM    Bed Mobility PT LTG, [place] Level   supervision required  -LM    Bed Mobility PT LTG, Additional Goal   HOB flat; No BR's  -LM    Bed Mobility PT LTG, Date Goal Reviewed 06/15/17  -RW 06/14/17  -RW     Bed Mobility PT LTG, Outcome goal ongoing  -RW goal ongoing  -RW goal ongoing  -LM    Transfer Training PT LTG    Transfer Training PT LTG, Date Established   06/13/17  -LM    Transfer Training PT LTG, Time to Achieve   by  discharge  -LM    Transfer Training PT LTG, Activity Type   bed to chair /chair to bed  -LM    Transfer Training PT LTG, [place] Level   supervision required  -LM    Transfer Training PT LTG, Assist Device   --   AAD  -LM    Transfer Training PT  LTG, Date Goal Reviewed 06/15/17  -RW 06/14/17  -RW     Transfer Training PT LTG, Outcome goal ongoing  -RW goal ongoing  -RW goal ongoing  -LM    Gait Training PT LTG    Gait Training Goal PT LTG, Date Established   06/13/17  -LM    Gait Training Goal PT LTG, Time to Achieve   by discharge  -LM    Gait Training Goal PT LTG, [place] Level   supervision required  -LM    Gait Training Goal PT LTG, Assist Device   --   AAD  -LM    Gait Training Goal PT LTG, Distance to Achieve   150 feet  -LM    Gait Training Goal PT LTG, Date Goal Reviewed 06/15/17  -RW 06/14/17  -RW     Gait Training Goal PT LTG, Outcome goal ongoing  -RW goal ongoing  -RW goal ongoing  -LM    Stair Training PT LTG    Stair Training Goal PT LTG, Date Established   06/13/17  -LM    Stair Training Goal PT LTG, Time to Achieve   by discharge  -LM    Stair Training Goal PT LTG, Number of Steps   4 steps  -LM    Stair Training Goal PT LTG, [place] Level   contact guard assist  -LM    Stair Training Goal PT LTG, Assist Device   2 handrails  -LM    Stair Training Goal PT LTG, Date Goal Reviewed 06/15/17  -RW 06/14/17  -RW     Stair Training Goal PT LTG, Outcome goal met  -RW goal ongoing  -RW goal ongoing  -LM      06/11/17 1403 06/08/17 1110       Bed Mobility PT LTG    Bed Mobility PT LTG, Date Goal Reviewed 06/11/17  -TW 06/08/17  -AM     Bed Mobility PT LTG, Outcome goal ongoing  -TW goal not met  -AM     Gait Training PT STG    Gait Training Goal PT STG, Date Goal Reviewed 06/11/17  -TW 06/08/17  -AM     Gait Training Goal PT STG, Outcome goal ongoing  -TW goal not met  -AM     Stair Training PT LTG    Stair Training Goal PT LTG, Date Goal Reviewed 06/11/17  -TW 06/08/17  -AM     Stair Training  Goal PT LTG, Outcome goal ongoing  -TW goal not met  -AM       User Key  (r) = Recorded By, (t) = Taken By, (c) = Cosigned By    Initials Name Provider Type    JW Arlene Stephens, PTA Physical Therapy Assistant     Megan Kruse, PT Physical Therapist    MARLENE Rodas, PTA Physical Therapy Assistant    AM Luciano Sanchez, PTA Physical Therapy Assistant    TW Franco Byers, PTA Physical Therapy Assistant    RW Danny Beebe, PTA Physical Therapy Assistant          Physical Therapy Education     Title: PT OT SLP Therapies (Active)     Topic: Physical Therapy (Active)     Point: Mobility training (Active)    Learning Progress Summary    Learner Readiness Method Response Comment Documented by Status   Patient Nonacceptance E NR pt continues to need consistant safety and direction cues. does not call for assist when asked to.  06/19/17 1111 Active    Acceptance E NR pt needs lots of directions and re-direct for most all activity.  06/17/17 1024 Active    Acceptance E NR   06/16/17 0913 Active    Acceptance E VU,NR reviewed benifits of oob and mobility with assistance.  06/14/17 1252 Done    Acceptance E NR Reviewed safety with transfers - however, pt unable to retain.  Pt's family will require education.  06/13/17 1436 Active               Point: Precautions (Done)    Learning Progress Summary    Learner Readiness Method Response Comment Documented by Status   Patient Acceptance E VU   06/18/17 1701 Done    Acceptance E VU,NR reviewed benifits of oob and mobility with assistance.  06/14/17 1252 Done    Acceptance E NR Reviewed safety with transfers - however, pt unable to retain.  Pt's family will require education.  06/13/17 1436 Active                      User Key     Initials Effective Dates Name Provider Type Discipline     06/15/16 -  Megan Kruse, PT Physical Therapist PT     10/17/16 -  Sadia Neal, RN Registered Nurse Nurse    MARLENE 10/17/16 -  Jaspreet Rodas, PTA Physical  Therapy Assistant PT    RW 10/17/16 -  Danny Beebe PTA Physical Therapy Assistant PT                    PT Recommendation and Plan  Anticipated Equipment Needs At Discharge: front wheeled walker  Anticipated Discharge Disposition: home with 24/7 care, home with home health  Planned Therapy Interventions: balance training, bed mobility training, gait training, home exercise program, manual therapy techniques, motor coordination training, neuromuscular re-education, patient/family education, postural re-education, ROM (Range of Motion), stair training, strengthening, stretching, transfer training, wheelchair management/propulsion training  PT Frequency: other (see comments) (5-14 times/wk)  Plan of Care Review  Plan Of Care Reviewed With: patient  Progress: unable to show any progress toward functional goals  Outcome Summary/Follow up Plan: pt having difficulty participating and needs alot of encouragement. will need 24/7 care at [place]. mobilize as able.          Outcome Measures       06/21/17 0915 06/20/17 1400 06/20/17 0915    How much help from another person do you currently need...    Turning from your back to your side while in flat bed without using bedrails?   3  -JW    Moving from lying on back to sitting on the side of a flat bed without bedrails?   3  -JW    Moving to and from a bed to a chair (including a wheelchair)?   3  -JW    Standing up from a chair using your arms (e.g., wheelchair, bedside chair)?   3  -JW    Climbing 3-5 steps with a railing?   3  -JW    To walk in hospital room?   3  -JW    AM-PAC 6 Clicks Score   18  -JW    How much help from another is currently needed...    Putting on and taking off regular lower body clothing? 3  -RC 3  -RC     Bathing (including washing, rinsing, and drying) 3  -RC 3  -RC     Toileting (which includes using toilet bed pan or urinal) 3  -RC 3  -RC     Putting on and taking off regular upper body clothing 4  -RC 4  -RC     Taking care of personal grooming  (such as brushing teeth) 3  -RC 3  -RC     Eating meals 4  -RC 4  -RC     Score 20  -RC 20  -RC     Functional Assessment    Outcome Measure Options   AM-PAC 6 Clicks Basic Mobility (PT)  -      06/20/17 0730 06/19/17 1410 06/19/17 0820    How much help from another person do you currently need...    Turning from your back to your side while in flat bed without using bedrails?   3  -RW    Moving from lying on back to sitting on the side of a flat bed without bedrails?   3  -RW    Moving to and from a bed to a chair (including a wheelchair)?   3  -RW    Standing up from a chair using your arms (e.g., wheelchair, bedside chair)?   3  -RW    Climbing 3-5 steps with a railing?   3  -RW    To walk in hospital room?   3  -RW    AM-PAC 6 Clicks Score   18  -RW    How much help from another is currently needed...    Putting on and taking off regular lower body clothing? 3  -RC 3  -RC     Bathing (including washing, rinsing, and drying) 3  -RC 3  -RC     Toileting (which includes using toilet bed pan or urinal) 3  -RC 3  -RC     Putting on and taking off regular upper body clothing 4  -RC 4  -RC     Taking care of personal grooming (such as brushing teeth) 3  -RC 3  -RC     Eating meals 4  -RC 4  -RC     Score 20  -RC 20  -RC       User Key  (r) = Recorded By, (t) = Taken By, (c) = Cosigned By    Initials Name Provider Type     Arlene Stephens, ROYAL Physical Therapy Assistant    RESHMA Beebe PTA Physical Therapy Assistant    ROHAN Rodas/L Occupational Therapy Assistant           Time Calculation:         PT Charges       06/21/17 1524          Time Calculation    Start Time 1415  -RW      Stop Time 1455  -RW      Time Calculation (min) 40 min  -RW      Time Calculation- PT    Total Timed Code Minutes- PT 40 minute(s)  -RW        User Key  (r) = Recorded By, (t) = Taken By, (c) = Cosigned By    Initials Name Provider Type    RESHMA Beebe PTA Physical Therapy Assistant          Therapy Charges  for Today     Code Description Service Date Service Provider Modifiers Qty    10819719839 HC GAIT TRAINING EA 15 MIN 6/21/2017 Danny Beebe, PTA GP 1    96147849850 HC PT THERAPEUTIC ACT EA 15 MIN 6/21/2017 Danny Beebe PTA GP 1    07295194638 HC PT THER PROC EA 15 MIN 6/21/2017 Danny Beebe PTA GP 1          PT G-Codes  PT Professional Judgement Used?: Yes  Outcome Measure Options: AM-PAC 6 Clicks Basic Mobility (PT)  Score: 18  Functional Limitation: Mobility: Walking and moving around  Mobility: Walking and Moving Around Current Status (): At least 40 percent but less than 60 percent impaired, limited or restricted  Mobility: Walking and Moving Around Goal Status (): At least 20 percent but less than 40 percent impaired, limited or restricted    Danny Beebe PTA  6/21/2017

## 2017-06-21 NOTE — PLAN OF CARE
Problem: Patient Care Overview (Adult)  Goal: Plan of Care Review  Outcome: Ongoing (interventions implemented as appropriate)    06/21/17 0247 06/21/17 0858   Coping/Psychosocial Response Interventions   Plan Of Care Reviewed With --  patient;daughter   Patient Care Overview   Progress no change --    Outcome Evaluation   Outcome Summary/Follow up Plan --  Pt will need 24/7 care at home due to severe memory and cognitive deficits.          Problem: Inpatient SLP  Goal: Expressive-Patient will improve expressive language skills to allow optimal participation in care  Outcome: Ongoing (interventions implemented as appropriate)    06/13/17 1506 06/15/17 1103 06/19/17 1406   Expressive- Optimal Participation in Care   Expressive Optimal Participation in Care- SLP, Date Established 06/13/17 --  --    Expressive Optimal Participation in Care- SLP, Time to Achieve by discharge --  --    Expressive Optimal Participation in Care- SLP, Activity Level Patient will improve word retrieval skills --  --    Expressive Optimal Participation in Care- SLP, Date Goal Reviewed --  --  --    Expressive Optimal Participation in Care- SLP, Outcome --  goal ongoing --    Expressive Optimal Participation in Care- SLP, Reason Goal Not Met --  --  progress slower than expected     06/21/17 0858   Expressive- Optimal Participation in Care   Expressive Optimal Participation in Care- SLP, Date Established --    Expressive Optimal Participation in Care- SLP, Time to Achieve --    Expressive Optimal Participation in Care- SLP, Activity Level --    Expressive Optimal Participation in Care- SLP, Date Goal Reviewed 06/21/17   Expressive Optimal Participation in Care- SLP, Outcome --    Expressive Optimal Participation in Care- SLP, Reason Goal Not Met --        Goal: Cognitive-linguistic-Patient will improve Cognitive-linguistic skills to allow optimal participation in care  Outcome: Ongoing (interventions implemented as appropriate)    06/13/17  1506 06/16/17 0940 06/21/17 0858   Cognitive Linguistic- Optimal Participation in Care   Cognitive Linguistic Optimal Participation in Care- SLP, Date Established 06/13/17 --  --    Cognitive Linguistic Optimal Participation in Care- SLP, Time to Achieve by discharge --  --    Cognitive Linguistic Optimal Participation in Care- SLP, Activity Level Patient will improve orientation for increased safety in environment;Patient will improve memory skills for increased safety in environment --  --    Cognitive Linguistic Optimal Participation in Care- SLP, Date Goal Reviewed --  --  06/21/17   Cognitive Linguistic Optimal Participation in Care- SLP, Outcome --  goal ongoing --        Goal: Receptive Language-Patient will improve receptive language skills to allow optimal participation in care  Outcome: Ongoing (interventions implemented as appropriate)    06/13/17 1506 06/16/17 0940 06/21/17 0858   Receptive Language- Optimal Participation in Care   Receptive Language Optimal Participation in Care- SLP, Date Established 06/13/17 --  --    Receptive Language Optimal Participation in Care- SLP, Time to Achieve by discharge --  --    Receptive Language Optimal Participation in Care- SLP, Activity Level Patient will improve ability to follow directions;Patient will improve ability to comprehend questions --  --    Receptive Language Optimal Participation in Care- SLP, Date Goal Reviewed --  --  06/21/17   Receptive Language Optimal Participation in Care- SLP, Outcome --  goal ongoing --

## 2017-06-21 NOTE — THERAPY TREATMENT NOTE
Inpatient Rehabilitation - Occupational Therapy Treatment Note  [facility]     Patient Name: Kierra Scales  : 1929  MRN: [id number]  Today's Date: 2017  Onset of Illness/Injury or Date of Surgery Date: 17  Date of Referral to OT: 17  Referring Physician: Dr. Lucia      Admit Date: 2017    Visit Dx:     ICD-10-CM ICD-9-CM   1. Oral phase dysphagia R13.11 787.21   2. Impaired mobility and ADLs Z74.09 799.89   3. Muscle weakness (generalized) M62.81 728.87   4. Abnormality of gait and mobility R26.9 781.2   5. Symbolic dysfunction R48.9 784.60   6. Physical deconditioning R53.81 799.3   7. 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)   • Hypothyroidism   • Dyspnea   • Hyperthyroidism   • Gastrointestinal hemorrhage with melena   • Type 2 diabetes mellitus with hyperglycemia   • Colon cancer   • Chronic blood loss anemia   • Physical deconditioning   • Senile dementia, uncomplicated   • Encounter for rehabilitation   • Adenocarcinoma of sigmoid colon   • Hypokalemia             Adult Rehabilitation Note       17 1345 17 0915 17 0748    Rehab Assessment/Intervention    Discipline occupational therapy assistant  -RC occupational therapy assistant  -RC speech language pathologist  -EK    Document Type therapy note (daily note)  -RC therapy note (daily note)  -RC therapy note (daily note)  -EK    Subjective Information agree to therapy  -RC agree to therapy  -RC no complaints;agree to therapy  -EK    Patient Effort, Rehab Treatment good  -RC adequate  -RC good  -EK    Patient Effort, Rehab Treatment Comment --   much encouragement to begin ADL  -RC       Precautions/Limitations  fall precautions  -RC fall precautions  -EK    Patient Response to Treatment --   [facility]c pt got started she participated w/ min vc's  -RC      Recorded by [RC] JOSÉ MIGUEL Gomez [RC] ROHAN Gomez/POONAM [EK] Tamiko Whitten CCC-SLP    Vital Signs    Intra Systolic BP Rehab 164  -RC      Intra Treatment Diastolic BP 77  -RC      Recorded by [RC] ADRIANNA GomezA/POONAM      Pain Assessment    Pain Assessment No/denies pain  -RC No/denies pain  -RC     Recorded by [RC] ROHAN Gomez/POONAM [RC] ADRIANNA GomezA/L     Cognitive Assessment/Intervention    Current Cognitive/Communication Assessment  impaired  -RC impaired  -EK    Orientation Status  person  -RC oriented to;person;disoriented to;place;time;situation  -EK    Follows Commands/Answers Questions   25% of the time;50% of the time  -EK    Personal Safety  severe impairment;decreased awareness, need for assist;decreased awareness, need for safety;decreased insight to deficits  -RC severe impairment;decreased insight to deficits;decreased awareness, need for safety;decreased awareness, need for assist  -EK    Problem Solving ADL/Func. Task  --   completed abc puzzle if handed peices would not initate  -RC     Recorded by  [RC] ROHAN Gomez/POONAM [EK] Tamiko Whitten CCC-SLP    Improve ability to follow directions    Improve ability to follow directions:   two-step commands  -EK    Status: Improve ability to follow directions   Progress slower than expected  -EK    Ability to Follow Directions Progress   50%;with consistent cues  -EK    Recorded by   [EK] Tamiko Whitten CCC-SLP    Improve ability to comprehend questions    Improve ability to comprehend questions:   simple yes/no questions;simple general questions;90%;with consistent cues  -EK    Status: Improve ability to comprehend questions   Progress slower than expected  -EK    Ability to Comprehend Questions Progress   50%   -EK    Recorded by   [EK] LILIAN FoleySLP    Improve word retrieval skills    Improve word retrieval skills by:   naming an object/pic;answer WH question with one word;completing a divergent task;completing a convergent task;90%  -EK    Status: Improve word retrieval skills   Progress slower than expected  -EK    Word Retrieval Skills Progress   50%;with consistent cues  -EK    Recorded by   [EK] LILIAN FoleySLP    Improve orientation    Improve orientation through:   demonstrating orientation to day;demonstrating orientation to year;demonstrating orientation to place;80%;use environmental aids to assist with orientation  -EK    Status: Improve orientation through   Progress slower than expected  -EK    Orientation Progress   10%;with consistent cues  -EK    Comments: Improve orientation through   max cues   -EK    Recorded by   [EK] LILIAN FoleySLP    Bed Mobility, Assessment/Treatment    Bed Mob, Supine to Sit, [place] supervision required  -RC      Recorded by [RC] ROHAN Gomez/L      Transfer Assessment/Treatment    Toilet Transfer, [place] supervision required  -RC supervision required  -RC     Toilet Transfer, Assistive Device rolling walker  -RC rolling walker  -RC     Walk-In Shower Transfer, [place] verbal cues required  -RC      Recorded by [RC] ROHAN Gomez/POONAM [RC] ROHAN Gomez/POONAM     Functional Mobility    Functional Mobility- Ind. Level contact guard assist;verbal cues required  -RC contact guard assist  -RC     Functional Mobility- Device rolling walker  -RC rolling walker  -RC     Functional Mobility-Distance (Feet) 18  -RC 30  -RC     Recorded by [RC] ROHAN Gomez/POONAM [RC] ROHAN Gomez/L     Upper Body Bathing Assessment/Training    UB Bathing Assess/Train Assistive Device --   sponge bath  -RC      UB Bathing Assess/Train, Position edge of bed  -RC      UB Bathing Assess/Train,  [place] Level verbal cues required;supervision required  -RC      Recorded by [RC] ROHAN Gomez/L      Lower Body Bathing Assessment/Training    LB Bathing Assess/Train Assistive Device --   sponge bath  -RC      LB Bathing Assess/Train, Position sitting;standing;edge of bed  -RC      LB Bathing Assess/Train, [place] Level verbal cues required;contact guard assist  -RC      Recorded by [RC] ADRIANNA GomezA/L      Upper Body Dressing Assessment/Training    UB Dressing Assess/Train, Clothing Type doffing:;donning:;pull over;button up  -RC      UB Dressing Assess/Train, Position sitting  -RC      UB Dressing Assess/Train, [place] set up required  -RC      Recorded by [RC] ROHAN Gomez/L      Lower Body Dressing Assessment/Training    LB Dressing Assess/Train, Clothing Type doffing:;donning:;pants;shoes;shorts;socks  -RC      LB Dressing Assess/Train, Position sitting;standing  -RC      LB Dressing Assess/Train, [place] contact guard assist  -RC      Recorded by [RC] ROHAN Gomez/L      Toileting Assessment/Training    Toileting Assess/Train, Assistive Device raised toilet seat;grab bars  -RC raised toilet seat;grab bars  -RC     Toileting Assess/Train, Position standing;sitting  -RC sitting;standing  -RC     Toileting Assess/Train, Indepen Level contact guard assist;supervision required  -RC supervision required  -RC     Recorded by [RC] ROHAN Gomez/POONAM [RC] ROHAN Gomez/L     Grooming Assessment/Training    Grooming Assess/Train, Position  sink side;sitting  -RC     Grooming Assess/Train, Indepen Level  minimum assist (75% patient effort)  -RC     Grooming Assess/Train, Comment  --   daughter @ w/ hair care  -RC     Recorded by  [RC] ROHAN Gomez/L     Motor Skills/Interventions    Additional Documentation  --   clothes pin tree, bean bag toss/catch.  -RC     Recorded by  [RC] ROHAN Gomez/L     Therapy Exercises    Bilateral Upper  Extremity  --   declined  -RC     Recorded by  [RC] Ally Kwong, MADRIGAL/L     Positioning and Restraints    Pre-Treatment Position in bed  -RC sitting in chair/recliner  -RC in bed  -EK    Post Treatment Position wheelchair  -RC wheelchair  -RC bed  -EK    In Bed with PT  -RC notified nsg;exit alarm on  -RC     Recorded by [RC] Ally Kwong MADRIGAL/L [RC] Ally Kwong MADRIGAL/POONAM [EK] OSIRIS Foley      06/20/17 1446 06/20/17 1416 06/20/17 1330    Rehab Assessment/Intervention    Discipline physical therapy assistant  -JW occupational therapy assistant  -RC speech language pathologist  -EK    Document Type therapy note (daily note)  -JW therapy note (daily note)  - therapy note (daily note)  -EK    Subjective Information agree to therapy  -JW agree to therapy  -RC no complaints;agree to therapy  -EK    Patient Effort, Rehab Treatment fair  -JW adequate  -RC good  -EK    Precautions/Limitations fall precautions  -JW fall precautions  -RC     Patient Response to Treatment pt vomiting at the end of tx in room, nsg notified  -JW      Recorded by [JW] Arlene Stephens PTA [RC] Ally Kwong, MADRIGAL/L [EK] OSIRIS Foley    Vital Signs    Pre Patient Position Sitting  -JW      Post Patient Position Supine  -JW      Recorded by [JW] Arlene Stephens PTA      Pain Assessment    Pain Assessment 0-10  -JW No/denies pain  -RC 0-10  -EK    Pain Score 0  -JW  0  -EK    Post Pain Score 0  -JW  0  -EK    Recorded by [JW] Arlene Stephens PTA [RC] Ally Kwong, MADRIGAL/L [EK] OSIRIS Foley    Cognitive Assessment/Intervention    Current Cognitive/Communication Assessment impaired  -JW  impaired  -EK    Orientation Status oriented to;person  -JW  oriented to;person  -EK    Follows Commands/Answers Questions 50% of the time  -JW      Personal Safety Interventions gait belt;nonskid shoes/slippers when out of bed  -JW      Short Term Memory  Interventions  --   sorting task completed w/ min vc's  -RC     Recorded by [JW] Arlene Stephens, ROYAL [RC] ADRIANNA GomezA/POONAM [EK] Tamiko Whitten CCC-SLP    Improve ability to follow directions    Improve ability to follow directions:   two-step commands  -EK    Status: Improve ability to follow directions   Progress slower than expected  -EK    Ability to Follow Directions Progress   30%;with consistent cues  -EK    Recorded by   [EK] Tamiko Whitten CCC-SLP    Improve ability to comprehend questions    Improve ability to comprehend questions:   simple yes/no questions;simple general questions;90%  -EK    Status: Improve ability to comprehend questions   Progress slower than expected  -EK    Ability to Comprehend Questions Progress   50%;with consistent cues  -EK    Comments: Improve ability to comprehend questions   moderate to max cues  -EK    Recorded by   [EK] Tamiko Whitten CCC-SLP    Improve word retrieval skills    Improve word retrieval skills by:   naming an object/pic;answer WH question with one word;completing a divergent task;completing a convergent task;90%  -EK    Status: Improve word retrieval skills   Progress slower than expected  -EK    Word Retrieval Skills Progress   50%  -EK    Recorded by   [EK] Tamiko Whitten CCC-SLP    Improve orientation    Improve orientation through:   demonstrating orientation to day;demonstrating orientation to year;demonstrating orientation to place;80%;use environmental aids to assist with orientation  -EK    Status: Improve orientation through   Progress slower than expected  -EK    Orientation Progress   10%;without cues  -EK    Comments: Improve orientation through   little to no success with F:2 cues  -EK    Recorded by   [EK] Tamiko Whitten [facility]-SLP    Improve memory skills    Improve memory skills through:   recall details of the day;50%  -EK    Status: Improve memory skills   Progress  slower than expected  -EK    Memory Skills Progress   20%;without cues  -EK    Recorded by   [EK] Tamiko Whitten, CCC-SLP    Bed Mobility, Assessment/Treatment    Bed Mobility, Assistive Device --   HOB down, no BRs  -JW      Bed Mob, Sit to Supine, [place] supervision required  -JW      Recorded by [JW] Arlene Stephens, PTA      Transfer Assessment/Treatment    Transfers, Chair-Bed [place] contact guard assist  -JW      Transfers, Bed-Chair-Bed, Assist Device rolling walker  -JW      Transfers, Sit-Stand [place] contact guard assist  -JW      Transfers, Stand-Sit [place] contact guard assist  -JW      Transfers, Sit-Stand-Sit, Assist Device rolling walker  -JW      Recorded by [JW] Arlene Stephens, PTA      Gait Assessment/Treatment    Gait, [place] Level contact guard assist  -JW      Gait, Assistive Device rolling walker  -JW      Gait, Distance (Feet) 10  -JW      Gait, Comment pt defers gt other than from w/c to bed  -JW      Recorded by [JW] Arlene Stephens, ROYAL      Wheelchair Training/Management    Wheelchair, Distance Propelled 150  -JW      Wheelchair Training Comment CGA and lots of vc's  -JW      Recorded by [JW] Arlene Stephens, PTA      Motor Skills/Interventions    Motor Response Observations  --   table top act using B ue's w/ reaching, and problem solving  -RC     Recorded by  [RC] ROHAN Gomez/L     Therapy Exercises    Bilateral Lower Extremities AROM:;ankle pumps/circles;sitting;hip flexion;LAQ   add ball squeeze, hip abd w/ tband, 3 sets of 10  -JW      BLE Resistance theraband   level 2  -JW      Recorded by [JW] Arlene Stephens, ROYAL      Positioning and Restraints    Pre-Treatment Position other (comment)   w/c  -JW in bed  -RC in bed  -EK    Post Treatment Position bed  -JW bed  -RC bed  -EK    In Bed supine;call light within reach;encouraged to call for assist;exit alarm on;with family/caregiver;notified nsg   HOB  elevated  -JW with PT  -RC     Recorded by [JW] Arlene Stephens PTA [RC] Ally ZOE Varghese, MADRIGAL/L [EK] Tamiko Whitten, CCC-SLP      06/20/17 0915 06/20/17 0730 06/19/17 1410    Rehab Assessment/Intervention    Discipline physical therapy assistant  -JW occupational therapy assistant  -RC occupational therapy assistant  -RC    Document Type therapy note (daily note)  -JW therapy note (daily note)  -RC therapy note (daily note)  -RC    Subjective Information agree to therapy  -JW agree to therapy  -RC agree to therapy  -RC    Patient Effort, Rehab Treatment fair  -JW  adequate  -RC    Symptoms Noted During/After Treatment  --   reports stomach not feeling well, episode of vomiting   -RC     Symptoms Noted Comment  --   vomited at end of tx,returned to room to nurse.  -RC     Precautions/Limitations   fall precautions   gait belt placement (abd incision)  -RC    Patient Response to Treatment pt w/ vomiting episode earlier this morning, pt not agreeable to therapy, but w/ encouragement did bed ther ex  -JW      Recorded by [JW] Arlene Stephens PTA [RC] Ally ZOE Varghese, MADRIGAL/L [RC] Ally G Varghese, MADRIGAL/L    Vital Signs    Intra Systolic BP Rehab   109  -RC    Intra Treatment Diastolic BP   56  -RC    Pre Patient Position Supine  -JW      Post Patient Position Supine  -JW      Recorded by [JW] Arlene Stephens PTA  [RC] Ally G Varghese, MADRIGAL/L    Pain Assessment    Pain Assessment 0-10  -JW No/denies pain  -RC     Pain Score 0  -JW  --   c/o back pain gave no number  -RC    Post Pain Score 0  -JW      Pain Intervention(s)   Rest;Medication (See MAR)  -RC    Recorded by [JW] Arlene Stephens PTA [RC] Ally G Varghese, MADRIGAL/L [RC] Ally G Varghese, MADRIGAL/L    Cognitive Assessment/Intervention    Current Cognitive/Communication Assessment impaired  -JW impaired  -RC impaired  -RC    Orientation Status oriented to;person  -JW person  -RC person  -RC    Follows Commands/Answers Questions 50% of  the time;needs cueing  -JW      Additional Documentation  --   pt was able to place 1/26 abc puzzle, refused clock act  -RC     Recorded by [JW] Arlene Stephens PTA [RC] ADRIANNA GomezA/POONAM [RC] Ally Kwong MADRIGAL/L    Bed Mobility, Assessment/Treatment    Bed Mobility, Comment pt defers all mobility  -JW      Recorded by [JW] Arlene Stephens PTA      Transfer Assessment/Treatment    Transfers, Stand-Sit [place]   contact guard assist  -RC    Transfers, Sit-Stand-Sit, Assist Device   rolling walker  -RC    Toilet Transfer, [place]   contact guard assist  -RC    Transfer, Comment pt defers all mobility, family in agreement w/ pt to let pt rest this morning after ther ex  -JW      Recorded by [JW] Arlene Stephens PTA  [RC] ADRIANNA GomezA/POONAM    Functional Mobility    Functional Mobility- Ind. Level   contact guard assist  -RC    Functional Mobility- Device   rolling walker  -RC    Functional Mobility-Distance (Feet)   150  -RC    Recorded by   [RC] ADRIANNA GomezA/L    Lower Body Dressing Assessment/Training    LB Dressing Assess/Train, Clothing Type   shoes;doffing:  -RC    LB Dressing Assess/Train, Position   sitting  -RC    LB Dressing Assess/Train, [place]   conditional independence  -RC    Recorded by   [RC] ADRIANNA GomezA/L    Motor Skills/Interventions    Motor Response Observations  --   bean bag toss,clothes pin tree  -RC     Recorded by  [RC] Ally Kwong MADRIGAL/L     Balance Skills Training    Sitting-Balance Activities   Reaching for objects;Ball toss;Trunk control activities  -RC    Sitting # of Minutes   30  -RC    Recorded by   [RC] Ally Kwong MADRIGAL/L    Therapy Exercises    Bilateral Lower Extremities AROM:;20 reps;AAROM:;supine;ankle pumps/circles;heel slides;hip abduction/adduction;quad sets;SAQ   2 sets, pt req. AAROM at times, vc's throughout most of tx  -JW      Recorded by [JW] Arlene Stephens PTA      Positioning and  Restraints    Pre-Treatment Position in bed  -JW sitting in chair/recliner  -RC --   eom  -RC    Post Treatment Position bed  -JW wheelchair  -RC bed  -RC    In Bed supine;call light within reach;encouraged to call for assist;exit alarm on;with family/caregiver  -JW with nsg  -RC exit alarm on;encouraged to call for assist;call light within reach;notified nsg  -RC    Recorded by [JW] Arlene Stephens PTA [RC] Ally Kwong MADRIGAL/L [RC] Ally Kwong MADRIGAL/L      06/19/17 1315 06/19/17 1051 06/19/17 0937    Rehab Assessment/Intervention    Discipline physical therapy assistant  -RW occupational therapy assistant  -RC speech language pathologist  -EK    Document Type therapy note (daily note)  -RW therapy note (daily note)  -RC     Subjective Information agree to therapy  -RW agree to therapy  -RC no complaints;agree to therapy  -EK    Patient Effort, Rehab Treatment adequate  -RW poor  -RC fair  -EK    Recorded by [RW] Danny Beebe PTA [RC] Ally Kwong MADRIGAL/L [EK] Tamiko Whitten CCC-SLP    Pain Assessment    Pain Assessment No/denies pain  -RW No/denies pain  -RC No/denies pain  -EK    Recorded by [RW] Danny Beebe PTA [RC] Ally Kwong MADRIGAL/L [EK] Tamiko Whitten, CCC-SLP    Vision Assessment/Intervention    Visual Impairment WFL with corrective lenses  -RW  WFL with corrective lenses  -EK    Recorded by [RW] Danny Beebe PTA  [EK] Tamiko Whitten, CCC-SLP    Cognitive Assessment/Intervention    Current Cognitive/Communication Assessment impaired  -RW impaired  -RC impaired  -EK    Orientation Status oriented to;person  -RW person;place  -RC oriented to;person  -EK    Follows Commands/Answers Questions   50% of the time;needs cueing;needs increased time;needs repetition  -EK    Short/Long Term Memory moderate impairment, short term memory  -RW  severe impairment, short term memory  -EK    Problem Solving ADL/Func. Task  --   pt completed 5 color  sorting task w/ extra time and vc's  -RC     Additional Documentation  --   unable to recall names of children 2/4 w/o cues  -RC     Recorded by [RW] Danny Beebe PTA [RC] ROHAN Gomez/POONAM [EK] Tamiko Whitten, CCC-SLP    Improve ability to follow directions    Improve ability to follow directions:   two-step commands  -EK    Status: Improve ability to follow directions   Progress slower than expected  -EK    Ability to Follow Directions Progress   30%;with consistent cues  -EK    Comments: Improve ability to follow directions   pt requires mod to max cues for task completion  -EK    Recorded by   [EK] Tamiko Whitten CCC-SLP    Improve ability to comprehend questions    Improve ability to comprehend questions:   simple yes/no questions;simple general questions;90%  -EK    Status: Improve ability to comprehend questions   Progress slower than expected  -EK    Ability to Comprehend Questions Progress   50%;with consistent cues  -EK    Recorded by   [EK] Tamiko Whitten CCC-SLP    Improve word retrieval skills    Improve word retrieval skills by:   naming an object/pic;answer WH question with one word;completing a divergent task;completing a convergent task;90%  -EK    Status: Improve word retrieval skills   Progress slower than expected  -EK    Word Retrieval Skills Progress   50%  -EK    Comments: Improve word retrieval skills   Pt had difficulty with completing simple sentences and answering questions about a pictured scene.   -EK    Recorded by   [EK] Tamiko Whitten CCC-SLP    Improve orientation    Improve orientation through:   demonstrating orientation to day;demonstrating orientation to year;demonstrating orientation to place;80%;use environmental aids to assist with orientation  -EK    Status: Improve orientation through   Progress slower than expected  -EK    Orientation Progress   10%;with consistent cues  -EK    Comments: Improve orientation  through   mod to max cues; F;2  -EK    Recorded by   [EK] Tamiko Whitten CCC-SLP    Improve memory skills    Improve memory skills through:   recall details of the day;50%  -EK    Status: Improve memory skills   Progress slower than expected  -EK    Memory Skills Progress   10%  -EK    Comments: Improve memory skills   Pt could not recall morning meal and name of grandchild who was present and then stepped away.   -EK    Recorded by   [EK] Tamiko Whitten CCC-SLP    Transfer Assessment/Treatment    Transfers, Sit-Stand [place] contact guard assist  -RW      Transfers, Stand-Sit [place] contact guard assist  -RW      Transfers, Sit-Stand-Sit, Assist Device rolling walker  -RW      Toilet Transfer, [place] minimum assist (75% patient effort)  -RW contact guard assist  -RC     Toilet Transfer, Assistive Device rolling walker  -RW rolling walker  -RC     Transfer, Comment  --   vc's to use r/w  -RC     Recorded by [RW] Danny Beebe PTA [RC] ADRIANNA GomezA/L     Gait Assessment/Treatment    Gait, [place] Level contact guard assist  -RW      Gait, Assistive Device rolling walker  -RW      Gait, Distance (Feet) 150  -RW      Recorded by [RW] Danny Beebe PTA      Functional Mobility    Functional Mobility- Ind. Level contact guard assist  -RW contact guard assist  -RC     Functional Mobility- Device rolling walker  -RW      Functional Mobility-Distance (Feet)  40  -RC     Recorded by [RW] Danny Beebe PTA [RC] Ally Kwong, MADRIGAL/L     Balance Skills Training    Sitting-Level of Assistance Distant supervision  -RW      Sitting-Balance Activities Lateral lean;Forward lean;Reaching for objects;Reaching across midline;Trunk control activities  -RW      Sitting # of Minutes 30+  -RW      Recorded by [RW] Danny Beebe PTA      Therapy Exercises    Bilateral Lower Extremities --  -RW      BLE Resistance --  -RW      Bilateral Upper Extremity  --   ue bike 15 min   -RC     Recorded by [RW] Danny Beebe PTA [RC] Ally Kwong, MADRIGAL/L     Positioning and Restraints    Pre-Treatment Position  sitting in chair/recliner  -RC sitting in chair/recliner  -EK    Post Treatment Position  bed  -RC chair  -EK    In Bed  call light within reach;encouraged to call for assist  -RC     Bathroom   notified nsg  -EK    Recorded by  [RC] Ally Kwong MADRIGAL/L [EK] Tamiko Whitten, CCC-SLP      06/19/17 0820          Rehab Assessment/Intervention    Discipline physical therapy assistant  -RW      Document Type therapy note (daily note)  -RW      Subjective Information agree to therapy  -RW      Patient Effort, Rehab Treatment fair  -RW      Recorded by [RW] Danny Beebe PTA      Vital Signs    Pretreatment Heart Rate (beats/min) 74  -RW      Pre SpO2 (%) 94  -RW      Recorded by [RW] Danny Beebe PTA      Pain Assessment    Pain Score --   gives not rating  -RW      Pain Location Back  -RW      Recorded by [RW] Dnany Beebe PTA      Vision Assessment/Intervention    Visual Impairment WFL with corrective lenses  -RW      Recorded by [RW] Danny Beebe PTA      Cognitive Assessment/Intervention    Current Cognitive/Communication Assessment impaired  -RW      Orientation Status oriented to;person  -RW      Follows Commands/Answers Questions 50% of the time;needs cueing  -RW      Short/Long Term Memory moderate impairment, short term memory  -RW      Recorded by [RW] Danny Beebe PTA      Transfer Assessment/Treatment    Transfers, Sit-Stand [place] contact guard assist  -RW      Transfers, Stand-Sit [place] contact guard assist  -RW      Transfers, Sit-Stand-Sit, Assist Device rolling walker  -RW      Toilet Transfer, [place] minimum assist (75% patient effort)  -RW      Toilet Transfer, Assistive Device rolling walker  -RW      Transfer, Comment pt came walking out of bathroom w/o calling for assist. no walker  -RW      Recorded by [RW] Danny PARRA  ROYAL Beebe      Gait Assessment/Treatment    Gait, [place] Level contact guard assist  -RW      Gait, Assistive Device rolling walker  -RW      Gait, Distance (Feet) 150    x 2  -RW      Recorded by [RW] Danny Beebe PTA      Lower Body Dressing Assessment/Training    LB Dressing Assess/Train, Clothing Type shoes;donning:  -RW      Recorded by [RW] Danny Beebe PTA      Therapy Exercises    Bilateral Lower Extremities AROM:;20 reps;sitting;heel raises;hip flexion;knee flexion;15 reps;standing  -RW      BLE Resistance theraband  -RW      Recorded by [RW] Danny Beebe PTA      Positioning and Restraints    Pre-Treatment Position sitting in chair/recliner  -RW      Post Treatment Position bathroom  -RW      Bathroom with nsg;call light within reach;encouraged to call for assist  -RW      Recorded by [RW] Danny Beebe PTA        User Key  (r) = Recorded By, (t) = Taken By, (c) = Cosigned By    Initials Name Effective Dates    JW Arlene Stephens, PTA 08/11/15 -     EK Tamiko Whitten, CCC-SLP 04/06/17 -     RW Danny Beebe, PTA 10/17/16 -     RC Allysaurabh Kwong, MADRIGAL/L 10/17/16 -                 OT Goals       06/21/17 0915 06/20/17 1416 06/20/17 1415    Transfer Training OT STG    Transfer Training OT STG, Date Goal Reviewed 06/21/17  -RC 06/20/17  -RC     Transfer Training OT STG, Outcome goal ongoing  -RC goal ongoing  -RC     Dynamic Standing Balance OT STG    Dynamic Standing Balance OT STG, Date Goal Reviewed 06/21/17  -RC 06/20/17  -RC     Dynamic Standing Balance OT STG, Outcome goal ongoing  -RC goal ongoing  -RC     Caregiver Training OT LTG    Caregiver Training OT LTG, Date Goal Reviewed 06/21/17  -RC 06/20/17  -RC     Caregiver Training OT LTG, Outcome goal ongoing  -RC goal ongoing  -RC     ADL OT LTG    ADL OT LTG, Date Goal Reviewed 06/21/17  -RC 06/20/17  -RC     ADL OT LTG, Outcome goal ongoing  -RC goal ongoing  -RC     Activity Tolerance OT STG    Activity  Tolerance Goal OT STG, Date Goal Reviewed 06/21/17  -RC 06/20/17  -RC (P)  06/20/17  -RC    Activity Tolerance Goal OT STG, Outcome goal ongoing  -RC goal ongoing  -RC (P)  goal ongoing  -RC      06/19/17 1410 06/17/17 0600 06/16/17 0955    Transfer Training OT STG    Transfer Training OT STG, Date Goal Reviewed 06/19/17  -RC 06/17/17  -KD 06/16/17  -KD    Transfer Training OT STG, Outcome goal ongoing  -RC      Dynamic Standing Balance OT STG    Dynamic Standing Balance OT STG, Date Goal Reviewed 06/19/17  -RC 06/17/17  -KD 06/16/17  -KD    Dynamic Standing Balance OT STG, Outcome goal ongoing  -RC      Caregiver Training OT LTG    Caregiver Training OT LTG, Date Goal Reviewed 06/19/17  -RC 06/17/17  -KD 06/16/17  -KD    Caregiver Training OT LTG, Outcome goal ongoing  -RC      ADL OT LTG    ADL OT LTG, Date Goal Reviewed 06/19/17  -RC 06/17/17  -KD 06/16/17  -KD    ADL OT LTG, Outcome goal ongoing  -RC      Activity Tolerance OT STG    Activity Tolerance Goal OT STG, Date Goal Reviewed 06/19/17  -RC 06/17/17  -KD 06/16/17  -KD    Activity Tolerance Goal OT STG, Outcome goal ongoing  -RC        06/15/17 1341 06/14/17 1431 06/14/17 0905    Transfer Training OT STG    Transfer Training OT STG, Date Goal Reviewed 06/15/17  -RC  06/14/17  -RC    Transfer Training OT STG, Outcome goal ongoing  -RC  goal met  -RC    Dynamic Standing Balance OT STG    Dynamic Standing Balance OT STG, Date Goal Reviewed 06/15/17  -RC  06/14/17  -RC    Dynamic Standing Balance OT STG, Outcome goal ongoing  -RC  goal ongoing  -RC    Caregiver Training OT LTG    Caregiver Training OT LTG, Date Goal Reviewed 06/15/17  -RC  06/14/17  -RC    Caregiver Training OT LTG, Outcome goal ongoing  -RC  goal ongoing  -RC    ADL OT LTG    ADL OT LTG, Date Goal Reviewed 06/15/17  -RC (P)  06/14/17  -RC 06/14/17  -RC    ADL OT LTG, Outcome goal ongoing  -RC (P)  goal ongoing  -RC goal ongoing  -RC    Activity Tolerance OT STG    Activity Tolerance Goal OT  STG, Date Goal Reviewed 06/15/17  -  06/14/17  -    Activity Tolerance Goal OT STG, Outcome goal ongoing  -  goal ongoing  -      06/13/17 0759 06/12/17 1414 06/09/17 1725    Transfer Training OT STG    Transfer Training OT STG, Date Established 06/13/17  -  06/09/17  -RW    Transfer Training OT STG, Time to Achieve 2 wks  -BH  5 - 7 days  -RW    Transfer Training OT STG, Activity Type sit to stand/stand to sit;bed to chair /chair to bed;toilet  -  toilet  -RW    Transfer Training OT STG, [place] Level supervision required;set up required   AE/AD as needed  -  supervision required  -RW    Transfer Training OT STG, Date Goal Reviewed  06/12/17  -     Transfer Training OT STG, Outcome  goal not met  -     Transfer Training OT STG, Reason Goal Not Met  discharged from facility  -     Dynamic Standing Balance OT STG    Dynamic Standing Balance OT STG, Date Established 06/13/17  -  06/09/17  -RW    Dynamic Standing Balance OT STG, Time to Achieve 2 wks  -  5 - 7 days  -    Dynamic Standing Balance OT STG, [place] Level supervision required   5 minute no LOB or increased fatigue  -  supervision required  -    Dynamic Standing Balance OT STG, Assist Device   assistive Device  -    Dynamic Standing Balance OT STG, Additional Goal   5 min  -RW    Dynamic Standing Balance OT STG, Date Goal Reviewed  06/12/17  -     Dynamic Standing Balance OT STG, Outcome  goal not met  -     Dynamic Standing Balance OT STG, Reason Goal Not Met  discharged from facility  -     Caregiver Training OT LTG    Caregiver Training OT LTG, Date Established 06/13/17  -      Caregiver Training OT LTG, Time to Achieve by discharge  -      Caregiver Training OT LTG, [place] Level able to assist adequately;able to cue patient adequately   t/f, safety at home, [facility]  -      ADL OT LTG    ADL OT LTG, Date Established 06/13/17  -  06/09/17  -    ADL OT LTG, Time to Achieve by discharge  -  2 wks   -    ADL OT LTG, Activity Type ADL skills  -  ADL skills  -    ADL OT LTG, [place] Level standby assist;modified independent;setup;assistive device  -  standby assist  -    ADL OT LTG, Date Goal Reviewed  06/12/17  -     ADL OT LTG, Outcome  goal not met  -     ADL OT LTG, Reason Goal Not Met  discharged from facility  -     Functional Mobility OT LTG    Functional Mobility Goal OT LTG, Date Established   06/09/17  -    Functional Mobility Goal OT LTG, Time to Achieve   2 wks  -    Functional Mobility Goal OT LTG, [place] Level   supervision  -    Functional Mobility Goal OT LTG, Distance to Achieve   to the bathroom  -    Functional Mobility Goal OT LTG, Date Goal Reviewed  06/12/17  -     Functional Mobility Goal OT LTG, Outcome  goal not met  -     Functional Mobility Goal OT LTG, Reason Goal Not Met  discharged from facility  -     Activity Tolerance OT STG    Activity Tolerance Goal OT STG, Date Established 06/13/17  -      Activity Tolerance Goal OT STG, Time to Achieve 2 wks  -      Activity Tolerance Goal OT STG, Activity Level 20 min activity;O2 sat >/equal to 90%;with 1 rest break  -        User Key  (r) = Recorded By, (t) = Taken By, (c) = Cosigned By    Initials Name Provider Type     Rebeka Carrillo, OTR/L Occupational Therapist    RW Cami Mon, OTR/L Occupational Therapist    RC Ally Kwong, MADRIGAL/L Occupational Therapy Assistant    EVE Irizarry MADRIGAL/L Occupational Therapy Assistant    RM Catie Sheriff, OT Occupational Therapist          Occupational Therapy Education     Title: PT OT SLP Therapies (Active)     Topic: Occupational Therapy (Active)     Point: ADL training (Active)    Description: Instruct learner(s) on proper safety adaptation and remediation techniques during self care or transfers.   Instruct in proper use of assistive devices.    Learning Progress Summary    Learner Readiness Method Response Comment Documented by Status    Patient Acceptance E NR,NL   06/19/17 1515 Active    Acceptance E NR   06/15/17 1425 Active    Acceptance E NR   06/14/17 1430 Active    Acceptance E VU,NR Educated pt about OT and POC. Educated pt not to get up on her own and how to use call button. Educated pt on safety with t/f and ADL.  06/13/17 1342 Done               Point: Precautions (Active)    Description: Instruct learner(s) on prescribed precautions during self-care and functional transfers.    Learning Progress Summary    Learner Readiness Method Response Comment Documented by Status   Patient Acceptance E NL   06/21/17 1101 Active    Acceptance E NR,NL   06/19/17 1515 Active    Acceptance E VU   06/18/17 1701 Done    Acceptance E VU,NR Educated pt about OT and POC. Educated pt not to get up on her own and how to use call button. Educated pt on safety with t/f and ADL.  06/13/17 1342 Done   Family Acceptance E NR recommended to family pt to have 24/7 supervision /@ at d/c  06/20/17 0935 Active               Point: Body mechanics (Active)    Description: Instruct learner(s) on proper positioning and spine alignment during self-care, functional mobility activities and/or exercises.    Learning Progress Summary    Learner Readiness Method Response Comment Documented by Status   Patient Acceptance E NR,NL   06/19/17 1515 Active    Acceptance E VU   06/18/17 1701 Done    Acceptance E VU,NR Educated pt about OT and POC. Educated pt not to get up on her own and how to use call button. Educated pt on safety with t/f and ADL.  06/13/17 1342 Done                      User Key     Initials Effective Dates Name Provider Type Discipline     10/17/16 -  EDIS Tolbert/L Occupational Therapist OT     10/17/16 -  Sadia Neal, RN Registered Nurse Nurse     10/17/16 -  ROHAN Gomez/L Occupational Therapy Assistant OT                  OT Recommendation and Plan  Anticipated Discharge Disposition: home with 24/7 care, home  with home health, skilled nursing facility (depends on progress)  Planned Therapy Interventions: activity intolerance, adaptive equipment training, ADL retraining, balance training, bed mobility training, energy conservation, fine motor coordination training, home exercise program, motor coordination training, strengthening, transfer training  Therapy Frequency: other (see comments) (3-14x a week)  Plan of Care Review  Plan Of Care Reviewed With: patient  Progress: no change  Outcome Summary/Follow up Plan: pt will need 24/7 supervision and @ at d/c, due to decreased congnition        Outcome Measures       06/21/17 0915 06/20/17 1400 06/20/17 0915    How much help from another person do you currently need...    Turning from your back to your side while in flat bed without using bedrails?   3  -JW    Moving from lying on back to sitting on the side of a flat bed without bedrails?   3  -JW    Moving to and from a bed to a chair (including a wheelchair)?   3  -JW    Standing up from a chair using your arms (e.g., wheelchair, bedside chair)?   3  -JW    Climbing 3-5 steps with a railing?   3  -JW    To walk in hospital room?   3  -JW    AM-PAC 6 Clicks Score   18  -JW    How much help from another is currently needed...    Putting on and taking off regular lower body clothing? 3  -RC 3  -RC     Bathing (including washing, rinsing, and drying) 3  -RC 3  -RC     Toileting (which includes using toilet bed pan or urinal) 3  -RC 3  -RC     Putting on and taking off regular upper body clothing 4  -RC 4  -RC     Taking care of personal grooming (such as brushing teeth) 3  -RC 3  -RC     Eating meals 4  -RC 4  -RC     Score 20  -RC 20  -RC     Functional Assessment    Outcome Measure Options   AM-PAC 6 Clicks Basic Mobility (PT)  -JW      06/20/17 0730 06/19/17 1410 06/19/17 0820    How much help from another person do you currently need...    Turning from your back to your side while in flat bed without using bedrails?   3   -RW    Moving from lying on back to sitting on the side of a flat bed without bedrails?   3  -RW    Moving to and from a bed to a chair (including a wheelchair)?   3  -RW    Standing up from a chair using your arms (e.g., wheelchair, bedside chair)?   3  -RW    Climbing 3-5 steps with a railing?   3  -RW    To walk in hospital room?   3  -RW    AM-PAC 6 Clicks Score   18  -RW    How much help from another is currently needed...    Putting on and taking off regular lower body clothing? 3  -RC 3  -RC     Bathing (including washing, rinsing, and drying) 3  -RC 3  -RC     Toileting (which includes using toilet bed pan or urinal) 3  -RC 3  -RC     Putting on and taking off regular upper body clothing 4  -RC 4  -RC     Taking care of personal grooming (such as brushing teeth) 3  -RC 3  -RC     Eating meals 4  -RC 4  -RC     Score 20  -RC 20  -RC       User Key  (r) = Recorded By, (t) = Taken By, (c) = Cosigned By    Initials Name Provider Type    CLAUDIA Stephens, PTA Physical Therapy Assistant    RESHMA Beebe PTA Physical Therapy Assistant    RC Ally Kwong MADRIGAL/L Occupational Therapy Assistant           Time Calculation:         Time Calculation- OT       06/21/17 1435 06/21/17 1104       Time Calculation- OT    OT Start Time 1345  -RC 0915  -RC     OT Stop Time 1415  -RC 1034  -RC     OT Time Calculation (min) 30 min  -RC 79 min  -RC     Total Timed Code Minutes- OT 30 minute(s)  -RC 79 minute(s)  -RC       User Key  (r) = Recorded By, (t) = Taken By, (c) = Cosigned By    Initials Name Provider Type    RC Ally Kwong, MADRIGAL/L Occupational Therapy Assistant           Therapy Charges for Today     Code Description Service Date Service Provider Modifiers Qty    65496597732 HC OT THERAPEUTIC ACT EA 15 MIN 6/20/2017 Allysaurabh Kwong, MADRIGAL/L GO 2    94018294292 HC OT THERAPEUTIC ACT EA 15 MIN 6/20/2017 Ally ZOE Varghese, MADRIGAL/L GO 3    60903228327 HC OT THERAPEUTIC ACT EA 15 MIN 6/21/2017 Ally Kwong,  MADRIGAL/L GO 4    86568072829 HC OT SELF CARE/MGMT/TRAIN EA 15 MIN 6/21/2017 Ally Kwong MADRIGAL/L GO 1    97124184052 HC OT SELF CARE/MGMT/TRAIN EA 15 MIN 6/21/2017 Ally Kwong MADRIGAL/L GO 2          OT G-codes  OT Professional Judgement Used?: Yes  OT Functional Scales Options: AM-PAC 6 Clicks Daily Activity (OT)  Score: 16  Functional Limitation: Self care  Self Care Current Status (): At least 40 percent but less than 60 percent impaired, limited or restricted  Self Care Goal Status (): At least 20 percent but less than 40 percent impaired, limited or restricted    Ally Kwong, MADRIGAL/L  6/21/2017

## 2017-06-21 NOTE — PLAN OF CARE
Problem: Patient Care Overview (Adult)  Goal: Plan of Care Review  Outcome: Ongoing (interventions implemented as appropriate)  Goal: Adult Individualization and Mutuality  Outcome: Ongoing (interventions implemented as appropriate)  Goal: Discharge Needs Assessment  Outcome: Ongoing (interventions implemented as appropriate)    Problem: Functional Deficit (Adult,Obstetrics,Pediatric)  Goal: Signs and Symptoms of Listed Potential Problems Will be Absent or Manageable (Functional Deficit)  Outcome: Ongoing (interventions implemented as appropriate)    Problem: Fall Risk (Adult)  Goal: Absence of Falls  Outcome: Ongoing (interventions implemented as appropriate)    Problem: Infection, Risk/Actual (Adult)  Goal: Infection Prevention/Resolution  Outcome: Ongoing (interventions implemented as appropriate)    06/21/17 0247   Infection, Risk/Actual (Adult)   Infection Prevention/Resolution making progress toward outcome

## 2017-06-21 NOTE — PLAN OF CARE
Problem: Patient Care Overview (Adult)  Goal: Plan of Care Review  Outcome: Ongoing (interventions implemented as appropriate)    06/21/17 0915   Coping/Psychosocial Response Interventions   Plan Of Care Reviewed With patient   Patient Care Overview   Progress no change   Outcome Evaluation   Outcome Summary/Follow up Plan pt will need 24/7 supervision and @ at d/c, due to decreased congnition       Goal: Discharge Needs Assessment  Outcome: Ongoing (interventions implemented as appropriate)    06/12/17 1400 06/13/17 1036 06/19/17 1410   Discharge Needs Assessment   Concerns To Be Addressed --  --  --    Equipment Needed After Discharge --  walker, rolling --    Discharge Facility/Level Of Care Needs --  nursing facility, skilled;home with home health  (Or 24/7 care from family -depending on progress while on ARU) --    Discharge Planning Comments --  --  24/7 supervision   Current Health   Outpatient/Agency/Support Group Needs skilled nursing facility (specify) --  --    Anticipated Changes Related to Illness inability to care for self --  --    Living Environment   Transportation Available family or friend will provide --  --    Self-Care   Equipment Currently Used at Home --  commode --      06/21/17 0247   Discharge Needs Assessment   Concerns To Be Addressed no discharge needs identified   Equipment Needed After Discharge --    Discharge Facility/Level Of Care Needs --    Discharge Planning Comments --    Current Health   Outpatient/Agency/Support Group Needs --    Anticipated Changes Related to Illness --    Living Environment   Transportation Available --    Self-Care   Equipment Currently Used at Home --          Problem: Inpatient Occupational Therapy  Goal: Transfer Training Goal 1 STG- OT  Outcome: Ongoing (interventions implemented as appropriate)    06/21/17 0915   Transfer Training OT STG   Transfer Training OT STG, Date Goal Reviewed 06/21/17   Transfer Training OT STG, Outcome goal ongoing        Goal: Dymanic Standing Balance Goal STG- OT  Outcome: Ongoing (interventions implemented as appropriate)    06/21/17 0915   Dynamic Standing Balance OT STG   Dynamic Standing Balance OT STG, Date Goal Reviewed 06/21/17   Dynamic Standing Balance OT STG, Outcome goal ongoing       Goal: Caregiver Training Goal LTG- OT  Outcome: Ongoing (interventions implemented as appropriate)    06/21/17 0915   Caregiver Training OT LTG   Caregiver Training OT LTG, Date Goal Reviewed 06/21/17   Caregiver Training OT LTG, Outcome goal ongoing       Goal: ADL Goal LTG- OT  Outcome: Ongoing (interventions implemented as appropriate)    06/21/17 0915   ADL OT LTG   ADL OT LTG, Date Goal Reviewed 06/21/17   ADL OT LTG, Outcome goal ongoing       Goal: Activity Tolerance Goal STG- OT  Outcome: Ongoing (interventions implemented as appropriate)    06/21/17 0915   Activity Tolerance OT STG   Activity Tolerance Goal OT STG, Date Goal Reviewed 06/21/17   Activity Tolerance Goal OT STG, Outcome goal ongoing

## 2017-06-21 NOTE — PLAN OF CARE
Problem: Patient Care Overview (Adult)  Goal: Plan of Care Review  Outcome: Ongoing (interventions implemented as appropriate)    06/21/17 1514   Coping/Psychosocial Response Interventions   Plan Of Care Reviewed With patient   Patient Care Overview   Progress unable to show any progress toward functional goals   Outcome Evaluation   Outcome Summary/Follow up Plan pt having difficulty participating and needs alot of encouragement. will need 24/7 care at [place]. mobilize as able.         Problem: Inpatient Physical Therapy  Goal: Bed Mobility Goal LTG- PT  Outcome: Ongoing (interventions implemented as appropriate)    06/13/17 1036 06/21/17 1514   Bed Mobility PT LTG   Bed Mobility PT LTG, Date Established 06/13/17 --    Bed Mobility PT LTG, Time to Achieve by discharge --    Bed Mobility PT LTG, Activity Type supine to sit/sit to supine --    Bed Mobility PT LTG, [place] Level supervision required --    Bed Mobility PT LTG, Additional Goal HOB flat; No BR's --    Bed Mobility PT LTG, Date Goal Reviewed --  06/21/17   Bed Mobility PT LTG, Outcome --  goal ongoing       Goal: Transfer Training Goal 1 LTG- PT  Outcome: Ongoing (interventions implemented as appropriate)    06/13/17 1036 06/21/17 1514   Transfer Training PT LTG   Transfer Training PT LTG, Date Established 06/13/17 --    Transfer Training PT LTG, Time to Achieve by discharge --    Transfer Training PT LTG, Activity Type bed to chair /chair to bed --    Transfer Training PT LTG, [place] Level supervision required --    Transfer Training PT LTG, Assist Device (AAD) --    Transfer Training PT LTG, Date Goal Reviewed --  06/21/17   Transfer Training PT LTG, Outcome --  goal ongoing       Goal: Gait Training Goal LTG- PT  Outcome: Ongoing (interventions implemented as appropriate)    06/13/17 1036 06/21/17 1514   Gait Training PT LTG   Gait Training Goal PT LTG, Date Established 06/13/17 --    Gait Training Goal PT LTG, Time to Achieve by discharge --     Gait Training Goal PT LTG, [place] Level supervision required --    Gait Training Goal PT LTG, Assist Device (AAD) --    Gait Training Goal PT LTG, Distance to Achieve 150 feet --    Gait Training Goal PT LTG, Date Goal Reviewed --  06/21/17   Gait Training Goal PT LTG, Outcome --  goal ongoing

## 2017-06-22 LAB
GLUCOSE BLDC GLUCOMTR-MCNC: 158 MG/DL (ref 70–130)
GLUCOSE BLDC GLUCOMTR-MCNC: 221 MG/DL (ref 70–130)
GLUCOSE BLDC GLUCOMTR-MCNC: 232 MG/DL (ref 70–130)

## 2017-06-22 PROCEDURE — 63710000001 INSULIN ASPART PER 5 UNITS: Performed by: FAMILY MEDICINE

## 2017-06-22 PROCEDURE — 97110 THERAPEUTIC EXERCISES: CPT

## 2017-06-22 PROCEDURE — 97530 THERAPEUTIC ACTIVITIES: CPT

## 2017-06-22 PROCEDURE — 92507 TX SP LANG VOICE COMM INDIV: CPT | Performed by: SPEECH-LANGUAGE PATHOLOGIST

## 2017-06-22 PROCEDURE — 97542 WHEELCHAIR MNGMENT TRAINING: CPT

## 2017-06-22 PROCEDURE — 99232 SBSQ HOSP IP/OBS MODERATE 35: CPT | Performed by: FAMILY MEDICINE

## 2017-06-22 PROCEDURE — 63710000001 INSULIN DETEMIR PER 5 UNITS: Performed by: FAMILY MEDICINE

## 2017-06-22 PROCEDURE — 97116 GAIT TRAINING THERAPY: CPT

## 2017-06-22 PROCEDURE — 97535 SELF CARE MNGMENT TRAINING: CPT

## 2017-06-22 PROCEDURE — 82962 GLUCOSE BLOOD TEST: CPT

## 2017-06-22 RX ADMIN — INSULIN ASPART 3 UNITS: 100 INJECTION, SOLUTION INTRAVENOUS; SUBCUTANEOUS at 06:30

## 2017-06-22 RX ADMIN — FERROUS SULFATE TAB EC 324 MG (65 MG FE EQUIVALENT) 324 MG: 324 (65 FE) TABLET DELAYED RESPONSE at 08:14

## 2017-06-22 RX ADMIN — SENNOSIDES AND DOCUSATE SODIUM 1 TABLET: 8.6; 5 TABLET ORAL at 17:02

## 2017-06-22 RX ADMIN — FERROUS SULFATE TAB EC 324 MG (65 MG FE EQUIVALENT) 324 MG: 324 (65 FE) TABLET DELAYED RESPONSE at 17:03

## 2017-06-22 RX ADMIN — DONEPEZIL HYDROCHLORIDE 10 MG: 10 TABLET, FILM COATED ORAL at 20:06

## 2017-06-22 RX ADMIN — INSULIN ASPART 5 UNITS: 100 INJECTION, SOLUTION INTRAVENOUS; SUBCUTANEOUS at 20:06

## 2017-06-22 RX ADMIN — Medication 1 TABLET: at 08:14

## 2017-06-22 RX ADMIN — POTASSIUM CHLORIDE 10 MEQ: 750 CAPSULE, EXTENDED RELEASE ORAL at 17:02

## 2017-06-22 RX ADMIN — MEMANTINE 5 MG: 5 TABLET ORAL at 17:02

## 2017-06-22 RX ADMIN — DILTIAZEM HYDROCHLORIDE 120 MG: 120 CAPSULE, COATED, EXTENDED RELEASE ORAL at 08:14

## 2017-06-22 RX ADMIN — Medication 40 MG: at 08:14

## 2017-06-22 RX ADMIN — INSULIN ASPART 5 UNITS: 100 INJECTION, SOLUTION INTRAVENOUS; SUBCUTANEOUS at 17:03

## 2017-06-22 RX ADMIN — INSULIN ASPART 5 UNITS: 100 INJECTION, SOLUTION INTRAVENOUS; SUBCUTANEOUS at 12:00

## 2017-06-22 RX ADMIN — Medication 2.5 MG: at 08:15

## 2017-06-22 RX ADMIN — POTASSIUM CHLORIDE 10 MEQ: 750 CAPSULE, EXTENDED RELEASE ORAL at 08:14

## 2017-06-22 RX ADMIN — MEMANTINE 5 MG: 5 TABLET ORAL at 08:14

## 2017-06-22 RX ADMIN — SENNOSIDES AND DOCUSATE SODIUM 1 TABLET: 8.6; 5 TABLET ORAL at 08:14

## 2017-06-22 RX ADMIN — INSULIN DETEMIR 20 UNITS: 100 INJECTION, SOLUTION SUBCUTANEOUS at 20:06

## 2017-06-22 RX ADMIN — MAGNESIUM OXIDE TAB 400 MG (241.3 MG ELEMENTAL MG) 400 MG: 400 (241.3 MG) TAB at 08:14

## 2017-06-22 RX ADMIN — Medication 40 MG: at 20:06

## 2017-06-22 NOTE — PROGRESS NOTES
LOS: 10 days   Patient Care Team:  Melissa Ch MD as PCP - General  Lindsay Ocampo MA as Medical Assistant    Chief Complaint:  Deconditioning secondary to surgery        Interval History:     SUBJECTIVE:  Good spirits today wants to go home tomorrow  History taken from: patient chart RN Therapy staff    Objective     Vital Signs  Temp:  [97.7 °F (36.5 °C)-98.5 °F (36.9 °C)] 97.7 °F (36.5 °C)  Heart Rate:  [] 88  Resp:  [18] 18  BP: (103-167)/(59-70) 167/70  Last 3 weights    06/19/17  0507 06/20/17  0541 06/22/17  0536   Weight: 174 lb 9 oz (79.2 kg) 174 lb 1 oz (79 kg) 169 lb 14.4 oz (77.1 kg)         Physical Exam:     General Appearance:    Alert, cooperative, in no acute distress   Head:    Normocephalic, without obvious abnormality, atraumatic   Eyes:            Lids and lashes normal, conjunctivae and sclerae normal, no   icterus, no pallor, corneas clear, PERRLA   Throat:   No oral lesions, no thrush, oral mucosa moist   Neck:   No adenopathy, supple, trachea midline, no thyromegaly, no   carotid bruit, no JVD       Lungs:     Clear to auscultation,respirations regular, even and                  Unlabored     Heart:    Regular rhythm and normal rate, normal S1 and S2, no            murmur, no gallop, no rub, no click    Chest Wall:    No abnormalities observed   Abdomen:     Normal bowel sounds, no masses, no organomegaly, soft        non-tender, non-distended, no guarding, no rebound                Tenderness Incision is well-healed ,    Extremities:   Moves all extremities well, no edema, no cyanosis, no             Redness        Skin:   No bleeding, bruising or rash   Lymph nodes:   No palpable adenopathy   Neurologic:   Cranial nerves 2 - 12 grossly intact, sensation intact, DTR       present and equal bilaterally        RESULTS REVIEW:     Lab Results (last 24 hours)     Procedure Component Value Units Date/Time    POC Glucose Fingerstick [[id number]]  (Abnormal) Collected:   06/21/17 1112    Specimen:  Blood Updated:  06/21/17 1128     Glucose 215 (H) mg/dL       Sliding Scale AdminMeter: [id number]: [id number] BRIANNA CRABTREE       POC Glucose Fingerstick [[id number]]  (Abnormal) Collected:  06/21/17 1602    Specimen:  Blood Updated:  06/21/17 1613     Glucose 276 (H) mg/dL       Sliding Scale AdminMeter: [id number]: [id number] BRIANNA CRABTREE       POC Glucose Fingerstick [[id number]]  (Abnormal) Collected:  06/21/17 2014    Specimen:  Blood Updated:  06/21/17 2039     Glucose 200 (H) mg/dL       Sliding Scale AdminMeter: [id number]: [id number] [facility]        POC Glucose Fingerstick [[id number]]  (Abnormal) Collected:  06/22/17 0533    Specimen:  Blood Updated:  06/22/17 0610     Glucose 158 (H) mg/dL       Sliding Scale AdminMeter: [id number]: [id number] [facility]            Imaging Results (last 72 hours)     ** No results found for the last 72 hours. 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

## 2017-06-22 NOTE — PLAN OF CARE
Problem: Patient Care Overview (Adult)  Goal: Plan of Care Review  Outcome: Ongoing (interventions implemented as appropriate)    06/22/17 0826 06/22/17 1105 06/22/17 1341   Coping/Psychosocial Response Interventions   Plan Of Care Reviewed With patient --  --    Patient Care Overview   Progress --  --  no change   Outcome Evaluation   Outcome Summary/Follow up Plan --  No new goals met this tx. --        Goal: Discharge Needs Assessment  Outcome: Ongoing (interventions implemented as appropriate)    06/12/17 1400 06/13/17 1036 06/19/17 1410   Discharge Needs Assessment   Concerns To Be Addressed --  --  --    Equipment Needed After Discharge --  walker, rolling --    Discharge Facility/Level Of Care Needs --  nursing facility, skilled;home with home health  (Or 24/7 care from family -depending on progress while on ARU) --    Discharge Planning Comments --  --  24/7 supervision   Current Health   Outpatient/Agency/Support Group Needs skilled nursing facility (specify) --  --    Anticipated Changes Related to Illness inability to care for self --  --    Living Environment   Transportation Available family or friend will provide --  --    Self-Care   Equipment Currently Used at Home --  commode --      06/21/17 0247   Discharge Needs Assessment   Concerns To Be Addressed no discharge needs identified   Equipment Needed After Discharge --    Discharge Facility/Level Of Care Needs --    Discharge Planning Comments --    Current Health   Outpatient/Agency/Support Group Needs --    Anticipated Changes Related to Illness --    Living Environment   Transportation Available --    Self-Care   Equipment Currently Used at Home --

## 2017-06-22 NOTE — THERAPY TREATMENT NOTE
Inpatient Rehabilitation - Occupational Therapy Treatment Note  [facility]     Patient Name: Kierra Scales  : 1929  MRN: [id number]  Today's Date: 2017  Onset of Illness/Injury or Date of Surgery Date: 17  Date of Referral to OT: 17  Referring Physician: Dr. Lucia      Admit Date: 2017    Visit Dx:     ICD-10-CM ICD-9-CM   1. Oral phase dysphagia R13.11 787.21   2. Impaired mobility and ADLs Z74.09 799.89   3. Muscle weakness (generalized) M62.81 728.87   4. Abnormality of gait and mobility R26.9 781.2   5. Symbolic dysfunction R48.9 784.60   6. Physical deconditioning R53.81 799.3   7. 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)   • Hypothyroidism   • Dyspnea   • Hyperthyroidism   • Gastrointestinal hemorrhage with melena   • Type 2 diabetes mellitus with hyperglycemia   • Colon cancer   • Chronic blood loss anemia   • Physical deconditioning   • Senile dementia, uncomplicated   • Encounter for rehabilitation   • Adenocarcinoma of sigmoid colon   • Hypokalemia             Adult Rehabilitation Note       17 0915 17 0826 17 1415    Rehab Assessment/Intervention    Discipline physical therapy assistant  -RW occupational therapy assistant  -RC physical therapy assistant  -RW    Document Type therapy note (daily note)  -RW therapy note (daily note)  -RC therapy note (daily note)  -RW    Subjective Information agree to therapy  -RW agree to therapy  -RC agree to therapy  -RW    Patient Effort, Rehab Treatment good  -RW good  -RC fair  -RW    Patient Effort, Rehab Treatment Comment   came in immediately after grey and took over care  -RW    Patient Response  to Treatment did well with activity and was very cooperative  -RW      Recorded by [RW] Danny Beebe PTA [RC] ADRIANNA GomezA/L [RW] Danny Beebe PTA    Pain Assessment    Pain Assessment --   c/o some knee pain but gives no number rating  -RW No/denies pain  -RC --   gives no rating  -RW    Recorded by [RW] Danny Beebe PTA [RC] ADRIANNA GomezA/L [RW] Danny Beebe PTA    Cognitive Assessment/Intervention    Current Cognitive/Communication Assessment functional  -RW impaired  -RC functional  -RW    Orientation Status oriented to;person  -RW person  -RC oriented to;person  -RW    Follows Commands/Answers Questions 100% of the time   this visit  -RW  needs cueing;needs repetition  -RW    Personal Safety moderate impairment  -RW      Personal Safety Interventions gait belt;nonskid shoes/slippers when out of bed  -RW      Problem Solving ADL/Func. Task  --   simple puzzle w/ min @  -RC     Recorded by [RW] Danny Beebe PTA [RC] ADRIANNA GomezA/L [RW] Danny Beebe PTA    Bed Mobility, Assessment/Treatment    Bed Mob, Supine to Sit, [place] independent  -RW      Bed Mob, Sit to Supine, [place] independent  -RW      Recorded by [RW] Danny Beebe PTA      Transfer Assessment/Treatment    Transfers, Bed-Chair [place] supervision required  -RW      Transfers, Chair-Bed [place] supervision required  -RW      Transfers, Bed-Chair-Bed, Assist Device rolling walker  -RW      Transfers, Sit-Stand [place] contact guard assist;stand by assist  -RW  contact guard assist  -RW    Transfers, Stand-Sit [place] contact guard assist;stand by assist  -RW  contact guard assist  -RW    Transfers, Sit-Stand-Sit, Assist Device rolling walker  -RW  rolling walker  -RW    Recorded by [RW] Danny Beebe PTA  [RW] Danny Beebe PTA    Gait Assessment/Treatment    Gait, [place] Level contact guard assist  -RW  contact guard assist  -RW    Gait, Assistive Device rolling  walker  -RW  rolling walker  -RW    Gait, Distance (Feet) 160   150 x 1  -RW  160    x 2  -RW    Recorded by [RW] Danny Beebe PTA  [RW] Danny Beebe PTA    Motor Skills/Interventions    Motor Response Observations  --   bean bag toss and catch, table top act using B ue's  -RC     Recorded by  [RC] ADRIANNA GomezA/L     Balance Skills Training    Sitting-Balance Activities Reaching for objects;Forward lean;Lateral lean;Reaching across midline  -RW  Reaching for objects;Forward lean;Lateral lean;Reaching across midline  -RW    Standing-Level of Assistance Distant supervision  -RW  Distant supervision  -RW    Recorded by [RW] Danny Beebe PTA  [RW] Danny Beebe PTA    Therapy Exercises    Bilateral Lower Extremities AROM:;15 reps;20 reps;25 reps;sitting;knee flexion;LAQ;hip flexion   2 sets  -RW      Bilateral Upper Extremity AROM:;15 reps   2 sets of tb rows  -RW      Trunk Exercises trunk rotation;15 reps  -RW      Recorded by [RW] Danny Beebe PTA      Positioning and Restraints    Pre-Treatment Position sitting in chair/recliner  -RW sitting in chair/recliner  -RC standing in room   with OCTAVIO  -RW    Post Treatment Position wheelchair  -RW chair  -RC chair  -RW    In Bed  with PT  -RC call light within reach;encouraged to call for assist  -RW    In Wheelchair with SLP  -RW      Recorded by [RW] Danny Beebe PTA [RC] ROHAN Gomez/L [RW] Danny Beebe PTA      06/21/17 1345 06/21/17 0915 06/21/17 0748    Rehab Assessment/Intervention    Discipline occupational therapy assistant  -RC occupational therapy assistant  -RC speech language pathologist  -EK    Document Type therapy note (daily note)  -RC therapy note (daily note)  - therapy note (daily note)  -EK    Subjective Information agree to therapy  -RC agree to therapy  -RC no complaints;agree to therapy  -EK    Patient Effort, Rehab Treatment good  -RC adequate  -RC good  -EK    Patient Effort, Rehab Treatment Comment --   much  encouragement to begin ADL  -RC      Precautions/Limitations  fall precautions  -RC fall precautions  -EK    Patient Response to Treatment --   onec pt got started she participated w/ min vc's  -RC      Recorded by [RC] ROHAN Gomez/POONAM [RC] ROHAN Gomez/POONAM [EK] Tamiko Whitten CCC-SLP    Vital Signs    Intra Systolic BP Rehab 164  -RC      Intra Treatment Diastolic BP 77  -RC      Recorded by [RC] ADRIANNA GomezA/POONAM      Pain Assessment    Pain Assessment No/denies pain  -RC No/denies pain  -RC     Recorded by [RC] ROHAN Gomez/POONAM [RC] ADRIANNA GomezA/L     Cognitive Assessment/Intervention    Current Cognitive/Communication Assessment  impaired  -RC impaired  -EK    Orientation Status  person  -RC oriented to;person;disoriented to;place;time;situation  -EK    Follows Commands/Answers Questions   25% of the time;50% of the time  -EK    Personal Safety  severe impairment;decreased awareness, need for assist;decreased awareness, need for safety;decreased insight to deficits  -RC severe impairment;decreased insight to deficits;decreased awareness, need for safety;decreased awareness, need for assist  -EK    Problem Solving ADL/Func. Task  --   completed abc puzzle if handed peices would not initate  -RC     Recorded by  [RC] ROHAN Gomez/POONAM [EK] Tamiko Whitten CCC-SLP    Improve ability to follow directions    Improve ability to follow directions:   two-step commands  -EK    Status: Improve ability to follow directions   Progress slower than expected  -EK    Ability to Follow Directions Progress   50%;with consistent cues  -EK    Recorded by   [EK] OSIRIS Foley    Improve ability to comprehend questions    Improve ability to comprehend questions:   simple yes/no questions;simple general questions;90%;with consistent cues  -EK    Status: Improve ability to comprehend questions   Progress slower than expected  -EK    Ability to  Comprehend Questions Progress   50%  -EK    Recorded by   [EK] LILIAN FoleySLP    Improve word retrieval skills    Improve word retrieval skills by:   naming an object/pic;answer WH question with one word;completing a divergent task;completing a convergent task;90%  -EK    Status: Improve word retrieval skills   Progress slower than expected  -EK    Word Retrieval Skills Progress   50%;with consistent cues  -EK    Recorded by   [EK] LILIAN FoleySLP    Improve orientation    Improve orientation through:   demonstrating orientation to day;demonstrating orientation to year;demonstrating orientation to place;80%;use environmental aids to assist with orientation  -EK    Status: Improve orientation through   Progress slower than expected  -EK    Orientation Progress   10%;with consistent cues  -EK    Comments: Improve orientation through   max cues   -EK    Recorded by   [EK] LILIAN FoleySLP    Bed Mobility, Assessment/Treatment    Bed Mob, Supine to Sit, [place] supervision required  -RC      Recorded by [RC] ROHAN Gomez/L      Transfer Assessment/Treatment    Toilet Transfer, [place] supervision required  -RC supervision required  -RC     Toilet Transfer, Assistive Device rolling walker  -RC rolling walker  -RC     Walk-In Shower Transfer, [place] verbal cues required  -RC      Recorded by [RC] ROHAN Gomez/POONAM [RC] ROHAN Gomez/POONAM     Functional Mobility    Functional Mobility- Ind. Level contact guard assist;verbal cues required  -RC contact guard assist  -RC     Functional Mobility- Device rolling walker  -RC rolling walker  -RC     Functional Mobility-Distance (Feet) 18  -RC 30  -RC     Recorded by [RC] ROHAN Gomez/POONAM [RC] ROHAN Gomez/L     Upper Body Bathing Assessment/Training    UB Bathing Assess/Train Assistive Device --   sponge bath  -RC      UB Bathing Assess/Train, Position edge of bed   -RC      UB Bathing Assess/Train, [place] Level verbal cues required;supervision required  -RC      Recorded by [RC] ROHAN Gomez/L      Lower Body Bathing Assessment/Training    LB Bathing Assess/Train Assistive Device --   sponge bath  -RC      LB Bathing Assess/Train, Position sitting;standing;edge of bed  -RC      LB Bathing Assess/Train, [place] Level verbal cues required;contact guard assist  -RC      Recorded by [RC] ROHAN Gomez/L      Upper Body Dressing Assessment/Training    UB Dressing Assess/Train, Clothing Type doffing:;donning:;pull over;button up  -RC      UB Dressing Assess/Train, Position sitting  -RC      UB Dressing Assess/Train, [place] set up required  -RC      Recorded by [RC] ROHAN Gomez/L      Lower Body Dressing Assessment/Training    LB Dressing Assess/Train, Clothing Type doffing:;donning:;pants;shoes;shorts;socks  -RC      LB Dressing Assess/Train, Position sitting;standing  -RC      LB Dressing Assess/Train, [place] contact guard assist  -RC      Recorded by [RC] ROHAN Gomez/L      Toileting Assessment/Training    Toileting Assess/Train, Assistive Device raised toilet seat;grab bars  -RC raised toilet seat;grab bars  -RC     Toileting Assess/Train, Position standing;sitting  -RC sitting;standing  -RC     Toileting Assess/Train, [place] Level contact guard assist;supervision required  -RC supervision required  -RC     Recorded by [RC] ROHAN Gomez/POONAM [RC] ADRIANNA GomezA/L     Grooming Assessment/Training    Grooming Assess/Train, Position  sink side;sitting  -RC     Grooming Assess/Train, [place]ep[place] Level  minimum assist (75% patient effort)  -RC     Grooming Assess/Train, Comment  --   daughter @ w/ hair care  -RC     Recorded by  [RC] ROHAN Gomez/L     Motor Skills/Interventions    Additional Documentation  --   clothes pin tree, bean bag toss/catch.  -RC     Recorded by  [RC] ROHAN Gomez/POONAM      Therapy Exercises    Bilateral Upper Extremity  --   declined  -RC     Recorded by  [RC] Ally Kwong MADRIGAL/L     Positioning and Restraints    Pre-Treatment Position in bed  -RC sitting in chair/recliner  -RC in bed  -EK    Post Treatment Position wheelchair  -RC wheelchair  -RC bed  -EK    In Bed with PT  -RC notified nsg;exit alarm on  -RC     Recorded by [RC] Ally Kwong MADRIGAL/L [RC] ADRIANNA GomezA/POONAM [EK] Tamiko Whitten CCC-SLP      06/20/17 1446 06/20/17 1416 06/20/17 1330    Rehab Assessment/Intervention    Discipline physical therapy assistant  -JW occupational therapy assistant  -RC speech language pathologist  -EK    Document Type therapy note (daily note)  -JW therapy note (daily note)  -RC therapy note (daily note)  -EK    Subjective Information agree to therapy  -JW agree to therapy  -RC no complaints;agree to therapy  -EK    Patient Effort, Rehab Treatment fair  -JW adequate  -RC good  -EK    Precautions/Limitations fall precautions  -JW fall precautions  -RC     Patient Response to Treatment pt vomiting at the end of tx in room, nsg notified  -JW      Recorded by [JW] Arlene Stephens PTA [RC] Ally Kwong, MADRIGAL/L [EK] Tamiko Whitten CCC-SLP    Vital Signs    Pre Patient Position Sitting  -JW      Post Patient Position Supine  -JW      Recorded by [JW] Arlene Stephens PTA      Pain Assessment    Pain Assessment 0-10  -JW No/denies pain  -RC 0-10  -EK    Pain Score 0  -JW  0  -EK    Post Pain Score 0  -JW  0  -EK    Recorded by [JW] Arlene Stephens PTA [RC] Ally Kwong, MADRIGAL/L [EK] Tamiko Whitten CCC-SLP    Cognitive Assessment/Intervention    Current Cognitive/Communication Assessment impaired  -JW  impaired  -EK    Orientation Status oriented to;person  -JW  oriented to;person  -EK    Follows Commands/Answers Questions 50% of the time  -JW      Personal Safety Interventions gait belt;nonskid shoes/slippers when out of bed   -JW      Short Term Memory Interventions  --   sorting task completed w/ min vc's  -RC     Recorded by [JW] Arlene Stephens, ROYAL [RC] ADRIANNA GomezA/POONAM [EK] Tamiko Whitten CCC-SLP    Improve ability to follow directions    Improve ability to follow directions:   two-step commands  -EK    Status: Improve ability to follow directions   Progress slower than expected  -EK    Ability to Follow Directions Progress   30%;with consistent cues  -EK    Recorded by   [EK] Tamiko Whitten CCC-SLP    Improve ability to comprehend questions    Improve ability to comprehend questions:   simple yes/no questions;simple general questions;90%  -EK    Status: Improve ability to comprehend questions   Progress slower than expected  -EK    Ability to Comprehend Questions Progress   50%;with consistent cues  -EK    Comments: Improve ability to comprehend questions   moderate to max cues  -EK    Recorded by   [EK] Tamiko Whitten CCC-SLP    Improve word retrieval skills    Improve word retrieval skills by:   naming an object/pic;answer WH question with one word;completing a divergent task;completing a convergent task;90%  -EK    Status: Improve word retrieval skills   Progress slower than expected  -EK    Word Retrieval Skills Progress   50%  -EK    Recorded by   [EK] Tamiko Whitten CCC-SLP    Improve orientation    Improve orientation through:   demonstrating orientation to day;demonstrating orientation to year;demonstrating orientation to place;80%;use environmental aids to assist with orientation  -EK    Status: Improve orientation through   Progress slower than expected  -EK    Orientation Progress   10%;without cues  -EK    Comments: Improve orientation through   little to no success with F:2 cues  -EK    Recorded by   [EK] Tamiko Whitten [facility]-SLP    Improve memory skills    Improve memory skills through:   recall details of the day;50%  -EK    Status:  Improve memory skills   Progress slower than expected  -EK    Memory Skills Progress   20%;without cues  -EK    Recorded by   [EK] Tamiko Wihtten, CCC-SLP    Bed Mobility, Assessment/Treatment    Bed Mobility, Assistive Device --   HOB down, no BRs  -JW      Bed Mob, Sit to Supine, [place] supervision required  -JW      Recorded by [JW] Arlene Stephens, PTA      Transfer Assessment/Treatment    Transfers, Chair-Bed [place] contact guard assist  -JW      Transfers, Bed-Chair-Bed, Assist Device rolling walker  -JW      Transfers, Sit-Stand [place] contact guard assist  -JW      Transfers, Stand-Sit [place] contact guard assist  -JW      Transfers, Sit-Stand-Sit, Assist Device rolling walker  -JW      Recorded by [JW] Arlene Stephens, PTA      Gait Assessment/Treatment    Gait, [place] Level contact guard assist  -JW      Gait, Assistive Device rolling walker  -JW      Gait, Distance (Feet) 10  -JW      Gait, Comment pt defers gt other than from w/c to bed  -JW      Recorded by [JW] Arlene Stephens, PTA      Wheelchair Training/Management    Wheelchair, Distance Propelled 150  -JW      Wheelchair Training Comment CGA and lots of vc's  -JW      Recorded by [JW] Arlene Stephens, PTA      Motor Skills/Interventions    Motor Response Observations  --   table top act using B ue's w/ reaching, and problem solving  -RC     Recorded by  [RC] Ally Kwong, ROHAN/L     Therapy Exercises    Bilateral Lower Extremities AROM:;ankle pumps/circles;sitting;hip flexion;LAQ   add ball squeeze, hip abd w/ tband, 3 sets of 10  -JW      BLE Resistance theraband   level 2  -JW      Recorded by [JW] Arlene Stephens, PTA      Positioning and Restraints    Pre-Treatment Position other (comment)   w/c  -JW in bed  -RC in bed  -EK    Post Treatment Position bed  -JW bed  -RC bed  -EK    In Bed supine;call light within reach;encouraged to call for assist;exit alarm on;with  family/caregiver;notified nsg   HOB elevated  -JW with PT  -RC     Recorded by [JW] Arlene Stephens PTA [RC] Ally Kwong, MADRIGAL/L [EK] Tamiko Whitten, CCC-SLP      06/20/17 0915 06/20/17 0730 06/19/17 1410    Rehab Assessment/Intervention    Discipline physical therapy assistant  -JW occupational therapy assistant  -RC occupational therapy assistant  -RC    Document Type therapy note (daily note)  -JW therapy note (daily note)  -RC therapy note (daily note)  -RC    Subjective Information agree to therapy  -JW agree to therapy  -RC agree to therapy  -RC    Patient Effort, Rehab Treatment fair  -JW  adequate  -RC    Symptoms Noted During/After Treatment  --   reports stomach not feeling well, episode of vomiting   -RC     Symptoms Noted Comment  --   vomited at end of tx,returned to room to nurse.  -RC     Precautions/Limitations   fall precautions   gait belt placement (abd incision)  -RC    Patient Response to Treatment pt w/ vomiting episode earlier this morning, pt not agreeable to therapy, but w/ encouragement did bed ther ex  -JW      Recorded by [JW] Arlene Stephens, ROYAL [RC] Ally ENRIQUEZ Varghese, MADRIGAL/L [RC] Ally ZOE Varghese, MADRIGAL/L    Vital Signs    Intra Systolic BP Rehab   109  -RC    Intra Treatment Diastolic BP   56  -RC    Pre Patient Position Supine  -JW      Post Patient Position Supine  -JW      Recorded by [JW] Arlene Stephens PTA  [RC] Ally G Varghese, MADRIGAL/L    Pain Assessment    Pain Assessment 0-10  -JW No/denies pain  -RC     Pain Score 0  -JW  --   c/o back pain gave no number  -RC    Post Pain Score 0  -JW      Pain Intervention(s)   Rest;Medication (See MAR)  -RC    Recorded by [JW] Arlene Stephens PTA [RC] Ally G Varghese, MADRIGAL/L [RC] Ally G Varghese, MADRIGAL/L    Cognitive Assessment/Intervention    Current Cognitive/Communication Assessment impaired  -JW impaired  -RC impaired  -RC    Orientation Status oriented to;person  -JW person  -RC person  -RC     Follows Commands/Answers Questions 50% of the time;needs cueing  -JW      Additional Documentation  --   pt was able to place 1/26 abc puzzle, refused clock act  -RC     Recorded by [JW] Arlene Stephens, ROYAL [RC] ADRIANNA GomezA/POONAM [RC] Ally Kwong MADRIGAL/L    Bed Mobility, Assessment/Treatment    Bed Mobility, Comment pt defers all mobility  -JW      Recorded by [JW] Arlene Stephens PTA      Transfer Assessment/Treatment    Transfers, Stand-Sit [place]   contact guard assist  -RC    Transfers, Sit-Stand-Sit, Assist Device   rolling walker  -RC    Toilet Transfer, [place]   contact guard assist  -RC    Transfer, Comment pt defers all mobility, family in agreement w/ pt to let pt rest this morning after ther ex  -JW      Recorded by [JW] Arlene Stephens PTA  [RC] Ally Kwong MADRIGAL/POONAM    Functional Mobility    Functional Mobility- Ind. Level   contact guard assist  -RC    Functional Mobility- Device   rolling walker  -RC    Functional Mobility-Distance (Feet)   150  -RC    Recorded by   [RC] Ally Kwong MADRIGAL/L    Lower Body Dressing Assessment/Training    LB Dressing Assess/Train, Clothing Type   shoes;doffing:  -RC    LB Dressing Assess/Train, Position   sitting  -RC    LB Dressing Assess/Train, [place]   conditional independence  -RC    Recorded by   [RC] ADRIANNA GomezA/L    Motor Skills/Interventions    Motor Response Observations  --   bean bag toss,clothes pin tree  -RC     Recorded by  [RC] Ally Kwong MADRIGAL/L     Balance Skills Training    Sitting-Balance Activities   Reaching for objects;Ball toss;Trunk control activities  -RC    Sitting # of Minutes   30  -RC    Recorded by   [RC] Ally Kwong MADRIGAL/L    Therapy Exercises    Bilateral Lower Extremities AROM:;20 reps;AAROM:;supine;ankle pumps/circles;heel slides;hip abduction/adduction;quad sets;SAQ   2 sets, pt req. AAROM at times, vc's throughout most of tx  -JW      Recorded by [CLAUDIA] Arlene PARRA  Kat, PTA      Positioning and Restraints    Pre-Treatment Position in bed  -JW sitting in chair/recliner  -RC --   eom  -RC    Post Treatment Position bed  -JW wheelchair  -RC bed  -RC    In Bed supine;call light within reach;encouraged to call for assist;exit alarm on;with family/caregiver  -JW with nsg  -RC exit alarm on;encouraged to call for assist;call light within reach;notified nsg  -RC    Recorded by [JW] Arlene Stephens, PTA [RC] Ally G Varghese, MADRIGAL/L [RC] Ally G Varghese, MADRIGAL/L      User Key  (r) = Recorded By, (t) = Taken By, (c) = Cosigned By    Initials Name Effective Dates    JW Arlene Stephens, PTA 08/11/15 -     EK Tamiko Whitten, [facility]-SLP 04/06/17 -     RW Danny Beebe, PTA 10/17/16 -     RC Ally G Varghese, MADRIGAL/L 10/17/16 -                 OT Goals       06/22/17 0826 06/21/17 0915 06/20/17 1416    Transfer Training OT STG    Transfer Training OT STG, Date Goal Reviewed 06/22/17  -RC 06/21/17  -RC 06/20/17  -RC    Transfer Training OT STG, Outcome goal ongoing  -RC goal ongoing  -RC goal ongoing  -RC    Dynamic Standing Balance OT STG    Dynamic Standing Balance OT STG, Date Goal Reviewed 06/22/17  -RC 06/21/17  -RC 06/20/17  -RC    Dynamic Standing Balance OT STG, Outcome goal ongoing  -RC goal ongoing  -RC goal ongoing  -RC    Caregiver Training OT LTG    Caregiver Training OT LTG, Date Goal Reviewed 06/22/17  -RC 06/21/17  -RC 06/20/17  -RC    Caregiver Training OT LTG, Outcome goal ongoing  -RC goal ongoing  -RC goal ongoing  -RC    ADL OT LTG    ADL OT LTG, Date Goal Reviewed 06/22/17  -RC 06/21/17  -RC 06/20/17  -RC    ADL OT LTG, Outcome goal met  -RC goal ongoing  -RC goal ongoing  -RC    Activity Tolerance OT STG    Activity Tolerance Goal OT STG, Date Goal Reviewed 06/22/17  -RC 06/21/17  -RC 06/20/17  -RC    Activity Tolerance Goal OT STG, Outcome goal met  -RC goal ongoing  -RC goal ongoing  -RC      06/20/17 1415 06/19/17 1410 06/17/17 0600    Transfer  Training OT STG    Transfer Training OT STG, Date Goal Reviewed  06/19/17  -RC 06/17/17  -KD    Transfer Training OT STG, Outcome  goal ongoing  -RC     Dynamic Standing Balance OT STG    Dynamic Standing Balance OT STG, Date Goal Reviewed  06/19/17  -RC 06/17/17  -KD    Dynamic Standing Balance OT STG, Outcome  goal ongoing  -RC     Caregiver Training OT LTG    Caregiver Training OT LTG, Date Goal Reviewed  06/19/17  -RC 06/17/17  -KD    Caregiver Training OT LTG, Outcome  goal ongoing  -RC     ADL OT LTG    ADL OT LTG, Date Goal Reviewed  06/19/17  -RC 06/17/17  -KD    ADL OT LTG, Outcome  goal ongoing  -RC     Activity Tolerance OT STG    Activity Tolerance Goal OT STG, Date Goal Reviewed (P)  06/20/17  -RC 06/19/17  -RC 06/17/17  -KD    Activity Tolerance Goal OT STG, Outcome (P)  goal ongoing  -RC goal ongoing  -RC       06/16/17 0955 06/15/17 1341 06/14/17 1431    Transfer Training OT STG    Transfer Training OT STG, Date Goal Reviewed 06/16/17  -KD 06/15/17  -RC     Transfer Training OT STG, Outcome  goal ongoing  -RC     Dynamic Standing Balance OT STG    Dynamic Standing Balance OT STG, Date Goal Reviewed 06/16/17  -KD 06/15/17  -RC     Dynamic Standing Balance OT STG, Outcome  goal ongoing  -RC     Caregiver Training OT LTG    Caregiver Training OT LTG, Date Goal Reviewed 06/16/17  -KD 06/15/17  -RC     Caregiver Training OT LTG, Outcome  goal ongoing  -RC     ADL OT LTG    ADL OT LTG, Date Goal Reviewed 06/16/17  -KD 06/15/17  -RC (P)  06/14/17  -RC    ADL OT LTG, Outcome  goal ongoing  -RC (P)  goal ongoing  -RC    Activity Tolerance OT STG    Activity Tolerance Goal OT STG, Date Goal Reviewed 06/16/17  -KD 06/15/17  -RC     Activity Tolerance Goal OT STG, Outcome  goal ongoing  -RC       06/14/17 0905 06/13/17 0759 06/12/17 1414    Transfer Training OT STG    Transfer Training OT STG, Date Established  06/13/17  -BH     Transfer Training OT STG, Time to Achieve  2 wks  -BH     Transfer Training OT STG,  Activity Type  sit to stand/stand to sit;bed to chair /chair to bed;toilet  -     Transfer Training OT STG, [place] Level  supervision required;set up required   AE/AD as needed  -     Transfer Training OT STG, Date Goal Reviewed 06/14/17  -RC  06/12/17  -    Transfer Training OT STG, Outcome goal met  -  goal not met  -    Transfer Training OT STG, Reason Goal Not Met   discharged from [facility]  -    Dynamic Standing Balance OT STG    Dynamic Standing Balance OT STG, Date Established  06/13/17  -     Dynamic Standing Balance OT STG, Time to Achieve  2 wks  -     Dynamic Standing Balance OT STG, [place] Level  supervision required   5 minute no LOB or increased fatigue  -     Dynamic Standing Balance OT STG, Date Goal Reviewed 06/14/17  -RC  06/12/17  -    Dynamic Standing Balance OT STG, Outcome goal ongoing  -  goal not met  -    Dynamic Standing Balance OT STG, Reason Goal Not Met   discharged from [facility]  -    Caregiver Training OT LTG    Caregiver Training OT LTG, Date Established  06/13/17  -     Caregiver Training OT LTG, Time to Achieve  by discharge  -     Caregiver Training OT LTG, [place] Level  able to assist adequately;able to cue patient adequately   t/f, safety at home, [facility]  -     Caregiver Training OT LTG, Date Goal Reviewed 06/14/17  [facility]      Caregiver Training OT LTG, Outcome goal ongoing  -      ADL OT LTG    ADL OT LTG, Date Established  06/13/17  -     ADL OT LTG, Time to Achieve  by discharge  -     ADL OT LTG, Activity Type  ADL skills  -     ADL OT LTG, [place] Level  standby assist;modified independent;setup;assistive device  -     ADL OT LTG, Date Goal Reviewed 06/14/17  -RC  06/12/17  -    ADL OT LTG, Outcome goal ongoing  -  goal not met  -    ADL OT LTG, Reason Goal Not Met   discharged from [facility]  -    Functional Mobility OT LTG    Functional Mobility Goal OT LTG, Date Goal Reviewed   06/12/17  -    Functional  Mobility Goal OT LTG, Outcome   goal not met  -RM    Functional Mobility Goal OT LTG, Reason Goal Not Met   discharged from facility  -RM    Activity Tolerance OT STG    Activity Tolerance Goal OT STG, Date Established  06/13/17  -     Activity Tolerance Goal OT STG, Time to Achieve  2 wks  -     Activity Tolerance Goal OT STG, Activity Level  20 min activity;O2 sat >/equal to 90%;with 1 rest break  -     Activity Tolerance Goal OT STG, Date Goal Reviewed 06/14/17  -      Activity Tolerance Goal OT STG, Outcome goal ongoing  -RC        06/09/17 1725          Transfer Training OT STG    Transfer Training OT STG, Date Established 06/09/17  -RW      Transfer Training OT STG, Time to Achieve 5 - 7 days  -RW      Transfer Training OT STG, Activity Type toilet  -RW      Transfer Training OT STG, [place] Level supervision required  -RW      Dynamic Standing Balance OT STG    Dynamic Standing Balance OT STG, Date Established 06/09/17  -RW      Dynamic Standing Balance OT STG, Time to Achieve 5 - 7 days  -RW      Dynamic Standing Balance OT STG, [place] Level supervision required  -RW      Dynamic Standing Balance OT STG, Assist Device assistive Device  -RW      Dynamic Standing Balance OT STG, Additional Goal 5 min  -RW      ADL OT LTG    ADL OT LTG, Date Established 06/09/17  -RW      ADL OT LTG, Time to Achieve 2 wks  -RW      ADL OT LTG, Activity Type ADL skills  -RW      ADL OT LTG, [place] Level standby assist  -RW      Functional Mobility OT LTG    Functional Mobility Goal OT LTG, Date Established 06/09/17  -RW      Functional Mobility Goal OT LTG, Time to Achieve 2 wks  -RW      Functional Mobility Goal OT LTG, [place] Level supervision  -RW      Functional Mobility Goal OT LTG, Distance to Achieve to the bathroom  -RW        User Key  (r) = Recorded By, (t) = Taken By, (c) = Cosigned By    Initials Name Provider Type     Rebeka Carrillo, OTR/L Occupational Therapist    RESHMA LUEVANO  Elieser OTR/L Occupational Therapist     Ally Kwong MADRIGAL/L Occupational Therapy Assistant    ROHAN Sanabria/L Occupational Therapy Assistant    RM Catie Sheriff, OT Occupational Therapist          Occupational Therapy Education     Title: PT OT SLP Therapies (Active)     Topic: Occupational Therapy (Active)     Point: ADL training (Active)    Description: Instruct learner(s) on proper safety adaptation and remediation techniques during self care or transfers.   Instruct in proper use of assistive devices.    Learning Progress Summary    Learner Readiness Method Response Comment Documented by Status   Patient Acceptance E NR,NL   06/19/17 1515 Active    Acceptance E NR   06/15/17 1425 Active    Acceptance E NR   06/14/17 1430 Active    Acceptance E VU,NR Educated pt about OT and POC. Educated pt not to get up on her own and how to use call button. Educated pt on safety with t/f and ADL.  06/13/17 1342 Done               Point: Precautions (Active)    Description: Instruct learner(s) on prescribed precautions during self-care and functional transfers.    Learning Progress Summary    Learner Readiness Method Response Comment Documented by Status   Patient Acceptance E NL   06/21/17 1101 Active    Acceptance E NR,NL   06/19/17 1515 Active    Acceptance E VU   06/18/17 1701 Done    Acceptance E VU,NR Educated pt about OT and POC. Educated pt not to get up on her own and how to use call button. Educated pt on safety with t/f and ADL.  06/13/17 1342 Done   Family Acceptance E NR recommended to family pt to have 24/7 supervision /@ at d/c  06/20/17 0935 Active               Point: Body mechanics (Active)    Description: Instruct learner(s) on proper positioning and spine alignment during self-care, functional mobility activities and/or exercises.    Learning Progress Summary    Learner Readiness Method Response Comment Documented by Status   Patient Acceptance E NR,Pending sale to [facility] 06/19/17 1515  Active    Acceptance E VU   06/18/17 1701 Done    Acceptance E VU,NR Educated pt about OT and POC. Educated pt not to get up on her own and how to use call button. Educated pt on safety with t/f and ADL.  06/13/17 1342 Done                      User Key     Initials Effective Dates Name Provider Type Discipline     10/17/16 -  Rebeka Carrillo, OTR/L Occupational Therapist OT     10/17/16 -  Sadia Neal, RN Registered Nurse Nurse     10/17/16 -  Ally Kwong, MADRIGAL/L Occupational Therapy Assistant OT                  OT Recommendation and Plan  Anticipated Discharge Disposition: home with 24/7 care, home with home health, skilled nursing facility (depends on progress)  Planned Therapy Interventions: activity intolerance, adaptive equipment training, ADL retraining, balance training, bed mobility training, energy conservation, fine motor coordination training, home exercise program, motor coordination training, strengthening, transfer training  Therapy Frequency: other (see comments) (3-14x a week)  Plan of Care Review  Plan Of Care Reviewed With: patient  Progress: progress toward functional goals is gradual  Outcome Summary/Follow up Plan: worked well this AM cont poc        Outcome Measures       06/22/17 1000 06/22/17 0915 06/22/17 0826    How much help from another person do you currently need...    Turning from your back to your side while in flat bed without using bedrails?  4  -RW     Moving from lying on back to sitting on the side of a flat bed without bedrails?  4  -RW     Moving to and from a bed to a chair (including a wheelchair)?  4  -RW     Standing up from a chair using your arms (e.g., wheelchair, bedside chair)?  3  -RW     Climbing 3-5 steps with a railing?  3  -RW     To walk in hospital room?  3  -RW     AM-PAC 6 Clicks Score  21  -RW     How much help from another is currently needed...    Putting on and taking off regular lower body clothing? 3  -RC  3  -RC    Bathing (including  washing, rinsing, and drying) 3  -RC  3  -RC    Toileting (which includes using toilet bed pan or urinal) 3  -RC  3  -RC    Putting on and taking off regular upper body clothing 4  -RC  4  -RC    Taking care of personal grooming (such as brushing teeth) 3  -RC  3  -RC    Eating meals 4  -RC  4  -RC    Score 20  -RC  20  -RC      06/21/17 0915 06/20/17 1400 06/20/17 0915    How much help from another person do you currently need...    Turning from your back to your side while in flat bed without using bedrails?   3  -JW    Moving from lying on back to sitting on the side of a flat bed without bedrails?   3  -JW    Moving to and from a bed to a chair (including a wheelchair)?   3  -JW    Standing up from a chair using your arms (e.g., wheelchair, bedside chair)?   3  -JW    Climbing 3-5 steps with a railing?   3  -JW    To walk in hospital room?   3  -JW    AM-PAC 6 Clicks Score   18  -JW    How much help from another is currently needed...    Putting on and taking off regular lower body clothing? 3  -RC 3  -RC     Bathing (including washing, rinsing, and drying) 3  -RC 3  -RC     Toileting (which includes using toilet bed pan or urinal) 3  -RC 3  -RC     Putting on and taking off regular upper body clothing 4  -RC 4  -RC     Taking care of personal grooming (such as brushing teeth) 3  -RC 3  -RC     Eating meals 4  -RC 4  -RC     Score 20  -RC 20  -RC     Functional Assessment    Outcome Measure Options   AM-PAC 6 Clicks Basic Mobility (PT)  -JW      06/20/17 0730 06/19/17 1410       How much help from another is currently needed...    Putting on and taking off regular lower body clothing? 3  -RC 3  -RC     Bathing (including washing, rinsing, and drying) 3  -RC 3  -RC     Toileting (which includes using toilet bed pan or urinal) 3  -RC 3  -RC     Putting on and taking off regular upper body clothing 4  -RC 4  -RC     Taking care of personal grooming (such as brushing teeth) 3  -RC 3  -RC     Eating meals 4  -RC 4   -RC     Score 20  -RC 20  -RC       User Key  (r) = Recorded By, (t) = Taken By, (c) = Cosigned By    Initials Name Provider Type    JW Arlene Stephens, PTA Physical Therapy Assistant    RW Danny Beebe, PTA Physical Therapy Assistant    RC Ally G Varghese, MADRIGAL/L Occupational Therapy Assistant           Time Calculation:         Time Calculation- OT       06/22/17 1038          Time Calculation- OT    OT Start Time 0826  -RC      OT Stop Time 0920  -RC      OT Time Calculation (min) 54 min  -RC      Total Timed Code Minutes- OT 54 minute(s)  -RC        User Key  (r) = Recorded By, (t) = Taken By, (c) = Cosigned By    Initials Name Provider Type    RC Ally G Varghese, MADRIGAL/L Occupational Therapy Assistant           Therapy Charges for Today     Code Description Service Date Service Provider Modifiers Qty    98115338828 HC OT THERAPEUTIC ACT EA 15 MIN 6/21/2017 Ally G Varghese, MADRIGAL/L GO 4    67426063995 HC OT SELF CARE/MGMT/TRAIN EA 15 MIN 6/21/2017 Ally G Varghese, MADRIGAL/L GO 1    51963861438 HC OT SELF CARE/MGMT/TRAIN EA 15 MIN 6/21/2017 Ally G Varghese, MADRIGAL/L GO 2    03446332052 HC OT THERAPEUTIC ACT EA 15 MIN 6/22/2017 Ally G Varghese, MADRIGAL/L GO 4          OT G-codes  OT Professional Judgement Used?: Yes  OT Functional Scales Options: AM-PAC 6 Clicks Daily Activity (OT)  Score: 16  Functional Limitation: Self care  Self Care Current Status (): At least 40 percent but less than 60 percent impaired, limited or restricted  Self Care Goal Status (): At least 20 percent but less than 40 percent impaired, limited or restricted    Ally G Varghese, MADRIGAL/L  6/22/2017

## 2017-06-22 NOTE — THERAPY TREATMENT NOTE
Inpatient Rehabilitation - Physical Therapy Treatment Note  [facility]     Patient Name: Kierra Scales  : 1929  MRN: [id number]  Today's Date: 2017  Onset of Illness/Injury or Date of Surgery Date: 17  Date of Referral to PT: 17  Referring Physician: Dr. Lucia    Admit Date: 2017    Visit Dx:    ICD-10-CM ICD-9-CM   1. Oral phase dysphagia R13.11 787.21   2. Impaired mobility and ADLs Z74.09 799.89   3. Muscle weakness (generalized) M62.81 728.87   4. Abnormality of gait and mobility R26.9 781.2   5. Symbolic dysfunction R48.9 784.60   6. Physical deconditioning R53.81 799.3   7. 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)   • Hypothyroidism   • Dyspnea   • Hyperthyroidism   • Gastrointestinal hemorrhage with melena   • Type 2 diabetes mellitus with hyperglycemia   • Colon cancer   • Chronic blood loss anemia   • Physical deconditioning   • Senile dementia, uncomplicated   • Encounter for rehabilitation   • Adenocarcinoma of sigmoid colon   • Hypokalemia               Adult Rehabilitation Note       17 1330 17 1105 17 1017    Rehab Assessment/Intervention    Discipline physical therapy assistant  -RW occupational therapy assistant  -KD speech language pathologist  -EK    Document Type therapy note (daily note)  -RW therapy note (daily note)  -KD therapy note (daily note)  -EK    Subjective Information agree to therapy  -RW agree to therapy  -KD no complaints;agree to therapy  -EK    Patient Effort, Rehab Treatment good  -RW  good  -EK    Recorded by [RW] Danny Beebe PTA [KD] JOSÉ MIGUEL Garcia [EK] Tamiko Whitten, GURMEET-SLP    Vital  Signs    Pre Patient Position  Sitting  -KD     Intra Patient Position  Sitting  -KD     Post Patient Position  Sitting  -KD     Recorded by  [KD] Nathalia Irizarry, MADRIGAL/L     Pain Assessment    Pain Assessment No/denies pain  -RW No/denies pain  -KD 0-10  -EK    Jason-Batista FACES Pain Rating   0  -EK    Pain Score   0  -EK    Recorded by [RW] Danny Beebe PTA [KD] Nathalia Irizarry MADRIGAL/L [EK] Tamiko Whitten CCC-SLP    Cognitive Assessment/Intervention    Current Cognitive/Communication Assessment functional  -RW functional  -KD functional  -EK    Orientation Status oriented to;person  -RW oriented to;person  -KD oriented to;person  -EK    Follows Commands/Answers Questions 75% of the time  -RW 75% of the time  -KD 75% of the time  -EK    Personal Safety moderate impairment  -RW  moderate impairment  -EK    Personal Safety Interventions gait belt;nonskid shoes/slippers when out of bed  -RW      Recorded by [RW] Danny Beebe PTA [KD] ADRIANNA GarciaA/L [EK] Tamiko Whitten CCC-SLP    Improve ability to follow directions    Improve ability to follow directions:   two-step commands  -EK    Status: Improve ability to follow directions   Progress slower than expected  -EK    Ability to Follow Directions Progress   50%  -EK    Recorded by   [EK] Tamiko Whitten CCC-SLP    Improve ability to comprehend questions    Improve ability to comprehend questions:   simple yes/no questions;simple general questions;90%;with consistent cues  -EK    Status: Improve ability to comprehend questions   Progress slower than expected  -EK    Ability to Comprehend Questions Progress   50%  -EK    Recorded by   [EK] Tamiko Whitten CCC-SLP    Improve word retrieval skills    Improve word retrieval skills by:   naming an object/pic;answer WH question with one word;completing a divergent task;completing a convergent task;90%  -EK    Status: Improve word retrieval skills   Progressing as  expected  -EK    Word Retrieval Skills Progress   80%  -EK    Recorded by   [EK] Tamiko Whitten CCC-SLP    Improve orientation    Improve orientation through:   demonstrating orientation to day;demonstrating orientation to year;demonstrating orientation to place;80%;use environmental aids to assist with orientation  -EK    Status: Improve orientation through   Progress slower than expected  -EK    Orientation Progress   10%  -EK    Recorded by   [EK] Tamiko Whitten CCC-SLP    Improve memory skills    Improve memory skills through:   recall details of the day;50%  -EK    Status: Improve memory skills   Progressing as expected  -EK    Memory Skills Progress   50%  -EK    Recorded by   [EK] Tamiko Whitten CCC-SLP    Transfer Assessment/Treatment    Transfers, Sit-Stand [place] contact guard assist;stand by assist  -RW      Transfers, Stand-Sit [place] contact guard assist;stand by assist  -RW      Transfers, Sit-Stand-Sit, Assist Device rolling walker  -RW      Transfer, Comment  Pt declined any t/f's  -KD     Recorded by [RW] Danny Beebe PTA [KD] ROHAN Garcia/L     Gait Assessment/Treatment    Gait, [place] Level contact guard assist  -RW      Gait, Assistive Device rolling walker  -RW      Gait, Distance (Feet) 150   x 2  -RW      Recorded by [RW] Danny Beebe PTA      Wheelchair Training/Management    Wheelchair, Distance Propelled  150' with encouragement  -KD     Recorded by  [KD] ROHAN Garcia/L     Lower Body Dressing Assessment/Training    LB Dressing Assess/Train, Clothing Type  donning:;shoes  -KD     LB Dressing Assess/Train, [place]  set up required;verbal cues required  -KD     Recorded by  [KD] ROHAN Garcia/L     Balance Skills Training    Sitting-Balance Activities Reaching for objects;Forward lean;Lateral lean;Reaching across midline  -RW      Standing-Level of Assistance Distant supervision  -RW      Recorded by  [RW] Danny Beebe PTA      Therapy Exercises    Bilateral Lower Extremities AROM:;15 reps;20 reps;25 reps;sitting;knee flexion;LAQ;hip flexion   2 sets  -RW      Bilateral Upper Extremity AROM:;15 reps   2 sets of tb rows  -RW --   Balloon volleyball  -KD     Recorded by [RW] Danny Beebe PTA [KD] ADRIANNA GarciaA/L     Positioning and Restraints    Pre-Treatment Position sitting in chair/recliner  -RW sitting in chair/recliner  -KD sitting in chair/recliner  -EK    Post Treatment Position bathroom  -RW wheelchair  -KD wheelchair  -EK    In Bed  sitting EOB;call light within reach;encouraged to call for assist  -KD     Bathroom with nsg  -RW      Recorded by [RW] Danny Beebe PTA [KD] ROHAN Garcia/POONAM [EK] Tamiko Whitten, CCC-SLP      06/22/17 0915 06/22/17 0826 06/21/17 1415    Rehab Assessment/Intervention    Discipline physical therapy assistant  -RW occupational therapy assistant  -RC physical therapy assistant  -RW    Document Type therapy note (daily note)  -RW therapy note (daily note)  -RC therapy note (daily note)  -RW    Subjective Information agree to therapy  -RW agree to therapy  -RC agree to therapy  -RW    Patient Effort, Rehab Treatment good  -RW good  -RC fair  -RW    Patient Effort, Rehab Treatment Comment   came in immediately after grey and took over care  -RW    Patient Response to Treatment did well with activity and was very cooperative  -RW      Recorded by [RW] Danny Beebe PTA [RC] ROHAN Gomez/L [RW] Danny Beebe PTA    Pain Assessment    Pain Assessment --   c/o some knee pain but gives no number rating  -RW No/denies pain  -RC --   gives no rating  -RW    Recorded by [RW] Danny Beebe PTA [RC] ROHAN Gomez/L [RW] Danny Beebe PTA    Cognitive Assessment/Intervention    Current Cognitive/Communication Assessment functional  -RW impaired  -RC functional  -RW    Orientation Status oriented to;person  -RW person  -RC oriented  to;person  -RW    Follows Commands/Answers Questions 100% of the time   this visit  -RW  needs cueing;needs repetition  -RW    Personal Safety moderate impairment  -RW      Personal Safety Interventions gait belt;nonskid shoes/slippers when out of bed  -RW      Problem Solving ADL/Func. Task  --   simple puzzle w/ min @  -RC     Recorded by [RW] Danny Beebe PTA [RC] ADRIANNA GomezA/L [RW] Danny Beebe PTA    Bed Mobility, Assessment/Treatment    Bed Mob, Supine to Sit, [place] independent  -RW      Bed Mob, Sit to Supine, [place] independent  -RW      Recorded by [RW] Danny Beebe PTA      Transfer Assessment/Treatment    Transfers, Bed-Chair [place] supervision required  -RW      Transfers, Chair-Bed [place] supervision required  -RW      Transfers, Bed-Chair-Bed, Assist Device rolling walker  -RW      Transfers, Sit-Stand [place] contact guard assist;stand by assist  -RW  contact guard assist  -RW    Transfers, Stand-Sit [place] contact guard assist;stand by assist  -RW  contact guard assist  -RW    Transfers, Sit-Stand-Sit, Assist Device rolling walker  -RW  rolling walker  -RW    Recorded by [RW] Danny Beebe PTA  [RW] Danny Beebe PTA    Gait Assessment/Treatment    Gait, [place] Level contact guard assist  -RW  contact guard assist  -RW    Gait, Assistive Device rolling walker  -RW  rolling walker  -RW    Gait, Distance (Feet) 160   150 x 1  -RW  160    x 2  -RW    Recorded by [RW] Danny Beebe PTA  [RW] Danny Beebe PTA    Motor Skills/Interventions    Motor Response Observations  --   bean bag toss and catch, table top act using B ue's  -RC     Recorded by  [RC] Ally Kwong, MADRIGAL/L     Balance Skills Training    Sitting-Balance Activities Reaching for objects;Forward lean;Lateral lean;Reaching across midline  -RW  Reaching for objects;Forward lean;Lateral lean;Reaching across midline  -RW    Standing-Level of Assistance Distant supervision   -RW  Distant supervision  -RW    Recorded by [RW] Danny Beebe PTA  [RW] Danny Beebe PTA    Therapy Exercises    Bilateral Lower Extremities AROM:;15 reps;20 reps;25 reps;sitting;knee flexion;LAQ;hip flexion   2 sets  -RW      Bilateral Upper Extremity AROM:;15 reps   2 sets of tb rows  -RW      Trunk Exercises trunk rotation;15 reps  -RW      Recorded by [RW] Danny Beebe PTA      Positioning and Restraints    Pre-Treatment Position sitting in chair/recliner  -RW sitting in chair/recliner  -RC standing in room   with OCTAVIO  -RW    Post Treatment Position wheelchair  -RW chair  -RC chair  -RW    In Bed  with PT  -RC call light within reach;encouraged to call for assist  -RW    In Wheelchair with SLP  -RW      Recorded by [RW] Danny Beebe PTA [RC] ROHAN Gomez/L [RW] Danny Beebe PTA      06/21/17 1345 06/21/17 0915 06/21/17 0748    Rehab Assessment/Intervention    Discipline occupational therapy assistant  -RC occupational therapy assistant  -RC speech language pathologist  -EK    Document Type therapy note (daily note)  -RC therapy note (daily note)  -RC therapy note (daily note)  -EK    Subjective Information agree to therapy  -RC agree to therapy  -RC no complaints;agree to therapy  -EK    Patient Effort, Rehab Treatment good  -RC adequate  -RC good  -EK    Patient Effort, Rehab Treatment Comment --   much encouragement to begin ADL  -RC      Precautions/Limitations  fall precautions  -RC fall precautions  -EK    Patient Response to Treatment --   onec pt got started she participated w/ min vc's  -RC      Recorded by [RC] ROHAN Gomez/POONAM [RC] ROHAN Gomez/POONAM [EK] Tamiko Whitten, CCC-SLP    Vital Signs    Intra Systolic BP Rehab 164  -RC      Intra Treatment Diastolic BP 77  -RC      Recorded by [RC] ROHAN Gomez/POONAM      Pain Assessment    Pain Assessment No/denies pain  -RC No/denies pain  -RC     Recorded by [RC] ROHAN Gomez/POONAM [RC] Ally ENRIQUEZ  ROHAN Kwong/L     Cognitive Assessment/Intervention    Current Cognitive/Communication Assessment  impaired  -RC impaired  -EK    Orientation Status  person  -RC oriented to;person;disoriented to;place;time;situation  -EK    Follows Commands/Answers Questions   25% of the time;50% of the time  -EK    Personal Safety  severe impairment;decreased awareness, need for assist;decreased awareness, need for safety;decreased insight to deficits  -RC severe impairment;decreased insight to deficits;decreased awareness, need for safety;decreased awareness, need for assist  -EK    Problem Solving ADL/Func. Task  --   completed abc puzzle if handed peices would not initate  -RC     Recorded by  [RC] ROHAN Gomez/L [EK] Tamiko Whitten CCC-SLP    Improve ability to follow directions    Improve ability to follow directions:   two-step commands  -EK    Status: Improve ability to follow directions   Progress slower than expected  -EK    Ability to Follow Directions Progress   50%;with consistent cues  -EK    Recorded by   [EK] Tamiko Whitten CCC-SLP    Improve ability to comprehend questions    Improve ability to comprehend questions:   simple yes/no questions;simple general questions;90%;with consistent cues  -EK    Status: Improve ability to comprehend questions   Progress slower than expected  -EK    Ability to Comprehend Questions Progress   50%  -EK    Recorded by   [EK] Tamiko Whitten CCC-SLP    Improve word retrieval skills    Improve word retrieval skills by:   naming an object/pic;answer WH question with one word;completing a divergent task;completing a convergent task;90%  -EK    Status: Improve word retrieval skills   Progress slower than expected  -EK    Word Retrieval Skills Progress   50%;with consistent cues  -EK    Recorded by   [EK] Tamiko Whitten CCC-SLP    Improve orientation    Improve orientation through:   demonstrating orientation to  day;demonstrating orientation to year;demonstrating orientation to place;80%;use environmental aids to assist with orientation  -EK    Status: Improve orientation through   Progress slower than expected  -EK    Orientation Progress   10%;with consistent cues  -EK    Comments: Improve orientation through   max cues   -EK    Recorded by   [EK] Tamiko Whitten, CCC-SLP    Bed Mobility, Assessment/Treatment    Bed Mob, Supine to Sit, [place] supervision required  -RC      Recorded by [RC] ROHAN Gomez/L      Transfer Assessment/Treatment    Toilet Transfer, [place] supervision required  -RC supervision required  -RC     Toilet Transfer, Assistive Device rolling walker  -RC rolling walker  -RC     Walk-In Shower Transfer, [place] verbal cues required  -RC      Recorded by [RC] JOSÉ MIGUEL Gomez [RC] ROHAN Gomez/POONAM     Functional Mobility    Functional Mobility- Ind. Level contact guard assist;verbal cues required  -RC contact guard assist  -RC     Functional Mobility- Device rolling walker  -RC rolling walker  -RC     Functional Mobility-Distance (Feet) 18  -RC 30  -RC     Recorded by [RC] ROHAN Gomez/POONAM [RC] ADRIANNA GomezA/L     Upper Body Bathing Assessment/Training    UB Bathing Assess/Train Assistive Device --   sponge bath  -RC      UB Bathing Assess/Train, Position edge of bed  -RC      UB Bathing Assess/Train, [place] Level verbal cues required;supervision required  -RC      Recorded by [RC] ADRIANNA GomezA/L      Lower Body Bathing Assessment/Training    LB Bathing Assess/Train Assistive Device --   sponge bath  -RC      LB Bathing Assess/Train, Position sitting;standing;edge of bed  -RC      LB Bathing Assess/Train, [place] Level verbal cues required;contact guard assist  -RC      Recorded by [RC] ADRIANNA GomezA/L      Upper Body Dressing Assessment/Training    UB Dressing Assess/Train, Clothing Type doffing:;donning:;pull  over;button up  -RC      UB Dressing Assess/Train, Position sitting  -RC      UB Dressing Assess/Train, [place] set up required  -RC      Recorded by [RC] JOSÉ MIGUEL Gomez      Lower Body Dressing Assessment/Training    LB Dressing Assess/Train, Clothing Type doffing:;donning:;pants;shoes;shorts;socks  -RC      LB Dressing Assess/Train, Position sitting;standing  -RC      LB Dressing Assess/Train, [place] contact guard assist  -RC      Recorded by [RC] JOSÉ MIGUEL Gomez      Toileting Assessment/Training    Toileting Assess/Train, Assistive Device raised toilet seat;grab bars  -RC raised toilet seat;grab bars  -RC     Toileting Assess/Train, Position standing;sitting  -RC sitting;standing  -RC     Toileting Assess/Train, [place] Level contact guard assist;supervision required  -RC supervision required  -RC     Recorded by [RC] JOSÉ MIGUEL Gomez [RC] JOSÉ MIGUEL Gomez     Grooming Assessment/Training    Grooming Assess/Train, Position  sink side;sitting  -RC     Grooming Assess/Train, [place] Level  minimum assist (75% patient effort)  -RC     Grooming Assess/Train, Comment  --   daughter @ w/ hair care  -RC     Recorded by  [RC] JOSÉ MIGUEL Gomez     Motor Skills/Interventions    Additional Documentation  --   clothes pin tree, bean bag toss/catch.  -RC     Recorded by  [RC] JOSÉ MIGUEL Gomez     Therapy Exercises    Bilateral Upper Extremity  --   declined  -RC     Recorded by  [RC] JOSÉ MIGUEL Gomez     Positioning and Restraints    Pre-Treatment Position in bed  -RC sitting in chair/recliner  -RC in bed  -EK    Post Treatment Position wheelchair  -RC wheelchair  -RC bed  -EK    In Bed with PT  -RC notified nsg;exit alarm on  -RC     Recorded by [RC] ROHAN Gomez/POONAM [RC] JOSÉ MIGUEL Gomez [EK] Tamiko Whitten, CCC-SLP      06/20/17 1446 06/20/17 1416 06/20/17 1330    Rehab Assessment/Intervention    Discipline physical therapy assistant   -JW occupational therapy assistant  -RC speech language pathologist  -EK    Document Type therapy note (daily note)  -JW therapy note (daily note)  -RC therapy note (daily note)  -EK    Subjective Information agree to therapy  -JW agree to therapy  -RC no complaints;agree to therapy  -EK    Patient Effort, Rehab Treatment fair  -JW adequate  -RC good  -EK    Precautions/Limitations fall precautions  -JW fall precautions  -RC     Patient Response to Treatment pt vomiting at the end of tx in room, nsg notified  -JW      Recorded by [JW] Arlene Stephens PTA [RC] ADRIANNA GomezA/L [EK] Tamiko Whitten, CCC-SLP    Vital Signs    Pre Patient Position Sitting  -JW      Post Patient Position Supine  -JW      Recorded by [JW] Arlene Stephens PTA      Pain Assessment    Pain Assessment 0-10  -JW No/denies pain  - 0-10  -EK    Pain Score 0  -JW  0  -EK    Post Pain Score 0  -JW  0  -EK    Recorded by [JW] Arlene Stephens PTA [RC] Ally Kwong, MADRIGAL/L [EK] Tamiko Whitten, CCC-SLP    Cognitive Assessment/Intervention    Current Cognitive/Communication Assessment impaired  -JW  impaired  -EK    Orientation Status oriented to;person  -JW  oriented to;person  -EK    Follows Commands/Answers Questions 50% of the time  -JW      Personal Safety Interventions gait belt;nonskid shoes/slippers when out of bed  -JW      Short Term Memory Interventions  --   sorting task completed w/ min vc's  -RC     Recorded by [JW] Arlene Stephens PTA [RC] ADRIANNA GomezA/L [EK] Tamiko Whitten CCC-SLP    Improve ability to follow directions    Improve ability to follow directions:   two-step commands  -EK    Status: Improve ability to follow directions   Progress slower than expected  -EK    Ability to Follow Directions Progress   30%;with consistent cues  -EK    Recorded by   [EK] OSIRIS Foley    Improve ability to comprehend questions    Improve  ability to comprehend questions:   simple yes/no questions;simple general questions;90%  -EK    Status: Improve ability to comprehend questions   Progress slower than expected  -EK    Ability to Comprehend Questions Progress   50%;with consistent cues  -EK    Comments: Improve ability to comprehend questions   moderate to max cues  -EK    Recorded by   [EK] OSIRIS Foley    Improve word retrieval skills    Improve word retrieval skills by:   naming an object/pic;answer WH question with one word;completing a divergent task;completing a convergent task;90%  -EK    Status: Improve word retrieval skills   Progress slower than expected  -EK    Word Retrieval Skills Progress   50%  -EK    Recorded by   [EK] OSIRIS Foley    Improve orientation    Improve orientation through:   demonstrating orientation to day;demonstrating orientation to year;demonstrating orientation to place;80%;use environmental aids to assist with orientation  -EK    Status: Improve orientation through   Progress slower than expected  -EK    Orientation Progress   10%;without cues  -EK    Comments: Improve orientation through   little to no success with F:2 cues  -EK    Recorded by   [EK] OSIRIS Foley    Improve memory skills    Improve memory skills through:   recall details of the day;50%  -EK    Status: Improve memory skills   Progress slower than expected  -EK    Memory Skills Progress   20%;without cues  -EK    Recorded by   [EK] OSIRIS Foley    Bed Mobility, Assessment/Treatment    Bed Mobility, Assistive Device --   HOB down, no BRs  -      Bed Mob, Sit to Supine, [place] supervision required  -JW      Recorded by [JW] Arlene Stephens, PTA      Transfer Assessment/Treatment    Transfers, Chair-Bed [place] contact guard assist  -      Transfers, Bed-Chair-Bed, Assist Device rolling walker  -JW      Transfers, Sit-Stand [place]  contact guard assist  -JW      Transfers, Stand-Sit [place] contact guard assist  -JW      Transfers, Sit-Stand-Sit, Assist Device rolling walker  -JW      Recorded by [JW] Arlene Stephens PTA      Gait Assessment/Treatment    Gait, [place] Level contact guard assist  -JW      Gait, Assistive Device rolling walker  -JW      Gait, Distance (Feet) 10  -JW      Gait, Comment pt defers gt other than from w/c to bed  -JW      Recorded by [JW] Arlene Stephens PTA      Wheelchair Training/Management    Wheelchair, Distance Propelled 150  -JW      Wheelchair Training Comment CGA and lots of vc's  -JW      Recorded by [JW] Arlene Stephens PTA      Motor Skills/Interventions    Motor Response Observations  --   table top act using B ue's w/ reaching, and problem solving  -RC     Recorded by  [RC] ADRIANNA GomezA/L     Therapy Exercises    Bilateral Lower Extremities AROM:;ankle pumps/circles;sitting;hip flexion;LAQ   add ball squeeze, hip abd w/ tband, 3 sets of 10  -JW      BLE Resistance theraband   level 2  -JW      Recorded by [JW] Arlene Stephens PTA      Positioning and Restraints    Pre-Treatment Position other (comment)   w/c  -JW in bed  -RC in bed  -EK    Post Treatment Position bed  -JW bed  -RC bed  -EK    In Bed supine;call light within reach;encouraged to call for assist;exit alarm on;with family/caregiver;notified nsg   HOB elevated  -JW with PT  -RC     Recorded by [JW] Arlene Stephens PTA [RC] ROHAN Gomez/POONAM [EK] Tamiko Whitten CCC-SLP      06/20/17 0915 06/20/17 0730       Rehab Assessment/Intervention    Discipline physical therapy assistant  -CLAUDIA occupational therapy assistant  -RC     Document Type therapy note (daily note)  -CLAUDIA therapy note (daily note)  -RC     Subjective Information agree to therapy  -JW agree to therapy  -RC     Patient Effort, Rehab Treatment fair  -      Symptoms Noted During/After Treatment  --   reports stomach  not feeling well, episode of vomiting   -RC     Symptoms Noted Comment  --   vomited at end of tx,returned to room to nurse.  -RC     Patient Response to Treatment pt w/ vomiting episode earlier this morning, pt not agreeable to therapy, but w/ encouragement did bed ther ex  -JW      Recorded by [JW] Arlene Stephens PTA [RC] Ally Kwong, MADRIGAL/L     Vital Signs    Pre Patient Position Supine  -JW      Post Patient Position Supine  -JW      Recorded by [JW] Arlene Stephens PTA      Pain Assessment    Pain Assessment 0-10  -JW No/denies pain  -RC     Pain Score 0  -JW      Post Pain Score 0  -JW      Recorded by [JW] Arlene Stephens PTA [RC] Ally Kwong, MADRIGAL/L     Cognitive Assessment/Intervention    Current Cognitive/Communication Assessment impaired  -JW impaired  -RC     Orientation Status oriented to;person  -JW person  -RC     Follows Commands/Answers Questions 50% of the time;needs cueing  -JW      Additional Documentation  --   pt was able to place 1/26 abc puzzle, refused clock act  -RC     Recorded by [JW] Arlene Stephens PTA [RC] Ally Kwong, MADRIGAL/L     Bed Mobility, Assessment/Treatment    Bed Mobility, Comment pt defers all mobility  -JW      Recorded by [JW] Arlene Stephens PTA      Transfer Assessment/Treatment    Transfer, Comment pt defers all mobility, family in agreement w/ pt to let pt rest this morning after ther ex  -JW      Recorded by [JW] Arlene Stephens PTA      Motor Skills/Interventions    Motor Response Observations  --   bean bag toss,clothes pin tree  -RC     Recorded by  [RC] Ally Kwong, MADRIGAL/L     Therapy Exercises    Bilateral Lower Extremities AROM:;20 reps;AAROM:;supine;ankle pumps/circles;heel slides;hip abduction/adduction;quad sets;SAQ   2 sets, pt req. AAROM at times, vc's throughout most of tx  -JW      Recorded by [JW] Arlene Stephens PTA      Positioning and Restraints    Pre-Treatment Position in bed  -JW sitting in  chair/recliner  -RC     Post Treatment Position bed  -JW wheelchair  -RC     In Bed supine;call light within reach;encouraged to call for assist;exit alarm on;with family/caregiver  -JW with nsg  -RC     Recorded by [JW] Arlene Stephens, PTA [RC] Ally Kwong, MADRIGAL/L       User Key  (r) = Recorded By, (t) = Taken By, (c) = Cosigned By    Initials Name Effective Dates    JW Arlene Stephens, PTA 08/11/15 -     EK Tamiko Whitten, [facility]-SLP 04/06/17 -     RW Danny Beebe, PTA 10/17/16 -     RC Allysaurabh Kwong, MADRIGAL/L 10/17/16 -     KD Nathalia Irizarry, MADRIGAL/L 10/17/16 -                 IP PT Goals       06/22/17 1537 06/21/17 1514 06/20/17 1446    Bed Mobility PT LTG    Bed Mobility PT LTG, Date Goal Reviewed 06/22/17  -RW 06/21/17  -RW 06/20/17  -JW    Bed Mobility PT LTG, Outcome goal met  -RW goal ongoing  -RW     Transfer Training PT LTG    Transfer Training PT  LTG, Date Goal Reviewed 06/22/17  -RW 06/21/17  -RW 06/20/17  -JW    Transfer Training PT LTG, Outcome goal ongoing  -RW goal ongoing  -RW     Gait Training PT LTG    Gait Training Goal PT LTG, Date Goal Reviewed 06/22/17  -RW 06/21/17  -RW 06/20/17  -JW    Gait Training Goal PT LTG, Outcome goal ongoing  -RW goal ongoing  -RW       06/19/17 1422 06/17/17 1559 06/16/17 1245    Bed Mobility PT LTG    Bed Mobility PT LTG, Date Goal Reviewed 06/19/17  -RW 06/17/17  -RW (P)  06/16/17  -MARLENE    Bed Mobility PT LTG, Outcome goal ongoing  -RW goal ongoing  -RW (P)  goal ongoing  -MARLENE    Transfer Training PT LTG    Transfer Training PT  LTG, Date Goal Reviewed 06/19/17  -RW 06/17/17  -RW (P)  06/16/17  -MARLENE    Transfer Training PT LTG, Outcome goal ongoing  -RW goal ongoing  -RW (P)  goal ongoing  -MARLENE    Gait Training PT LTG    Gait Training Goal PT LTG, Date Goal Reviewed 06/19/17  -RW 06/17/17  -RW (P)  06/16/17  -MARLENE    Gait Training Goal PT LTG, Outcome goal ongoing  -RW goal ongoing  -RW (P)  goal ongoing  -MARLENE      06/16/17 0816 06/15/17 1352  06/14/17 1608    Bed Mobility PT LTG    Bed Mobility PT LTG, Date Goal Reviewed 06/16/17  -MARLENE 06/15/17  -RW 06/14/17  -RW    Bed Mobility PT LTG, Outcome goal ongoing  -MARLENE goal ongoing  -RW goal ongoing  -RW    Transfer Training PT LTG    Transfer Training PT  LTG, Date Goal Reviewed 06/16/17  -AMRLENE 06/15/17  -RW 06/14/17  -RW    Transfer Training PT LTG, Outcome goal ongoing  -MARLENE goal ongoing  -RW goal ongoing  -RW    Gait Training PT LTG    Gait Training Goal PT LTG, Date Goal Reviewed 06/16/17  -MARLENE 06/15/17  -RW 06/14/17  -RW    Gait Training Goal PT LTG, Outcome goal ongoing  -MARLENE goal ongoing  -RW goal ongoing  -RW    Stair Training PT LTG    Stair Training Goal PT LTG, Date Goal Reviewed  06/15/17  -RW 06/14/17  -RW    Stair Training Goal PT LTG, Outcome  goal met  -RW goal ongoing  -RW      06/13/17 1036 06/11/17 1403       Bed Mobility PT LTG    Bed Mobility PT LTG, Date Established 06/13/17  -LM      Bed Mobility PT LTG, Time to Achieve by discharge  -LM      Bed Mobility PT LTG, Activity Type supine to sit/sit to supine  -LM      Bed Mobility PT LTG, [place] Level supervision required  -LM      Bed Mobility PT LTG, Additional Goal HOB flat; No BR's  -LM      Bed Mobility PT LTG, Date Goal Reviewed  06/11/17  -TW     Bed Mobility PT LTG, Outcome goal ongoing  -LM goal ongoing  -TW     Transfer Training PT LTG    Transfer Training PT LTG, Date Established 06/13/17  -LM      Transfer Training PT LTG, Time to Achieve by discharge  -LM      Transfer Training PT LTG, Activity Type bed to chair /chair to bed  -LM      Transfer Training PT LTG, [place] Level supervision required  -LM      Transfer Training PT LTG, Assist Device --   AAD  -LM      Transfer Training PT LTG, Outcome goal ongoing  -LM      Gait Training PT STG    Gait Training Goal PT STG, Date Goal Reviewed  06/11/17  -TW     Gait Training Goal PT STG, Outcome  goal ongoing  -TW     Gait Training PT LTG    Gait Training Goal PT LTG, Date  Established 06/13/17  -LM      Gait Training Goal PT LTG, Time to Achieve by discharge  -LM      Gait Training Goal PT LTG, [place] Level supervision required  -LM      Gait Training Goal PT LTG, Assist Device --   AAD  -LM      Gait Training Goal PT LTG, Distance to Achieve 150 feet  -LM      Gait Training Goal PT LTG, Outcome goal ongoing  -LM      Stair Training PT LTG    Stair Training Goal PT LTG, Date Established 06/13/17  -LM      Stair Training Goal PT LTG, Time to Achieve by discharge  -LM      Stair Training Goal PT LTG, Number of Steps 4 steps  -LM      Stair Training Goal PT LTG, [place] Level contact guard assist  -LM      Stair Training Goal PT LTG, Assist Device 2 handrails  -LM      Stair Training Goal PT LTG, Date Goal Reviewed  06/11/17  -TW     Stair Training Goal PT LTG, Outcome goal ongoing  -LM goal ongoing  -TW       User Key  (r) = Recorded By, (t) = Taken By, (c) = Cosigned By    Initials Name Provider Type    JW Arlene Stephens, PTA Physical Therapy Assistant    LM Megan Kruse, PT Physical Therapist    MARLENE Jaspreet Rodas, PTA Physical Therapy Assistant    TW Franco Byers, PTA Physical Therapy Assistant    RW Danny Beebe, PTA Physical Therapy Assistant          Physical Therapy Education     Title: PT OT SLP Therapies (Active)     Topic: Physical Therapy (Active)     Point: Mobility training (Active)    Learning Progress Summary    Learner Readiness Method Response Comment Documented by Status   Patient Nonacceptance E NR pt continues to need consistant safety and direction cues. does not call for assist when asked to.  06/19/17 1111 Active    Acceptance E NR pt needs lots of directions and re-direct for most all activity.  06/17/17 1024 Active    Acceptance E NR   06/16/17 0913 Active    Acceptance E VU,NR reviewed benifits of oob and mobility with assistance.  06/14/17 1252 Done    Acceptance E NR Reviewed safety with transfers - however, pt unable to  retain.  Pt's family will require education.  06/13/17 1436 Active               Point: Precautions (Done)    Learning Progress Summary    Learner Readiness Method Response Comment Documented by Status   Patient Acceptance E VU   06/18/17 1701 Done    Acceptance E VU,NR reviewed benifits of oob and mobility with assistance.  06/14/17 1252 Done    Acceptance E NR Reviewed safety with transfers - however, pt unable to retain.  Pt's family will require education.  06/13/17 1436 Active                      User Key     Initials Effective Dates Name Provider Type Discipline     06/15/16 -  Megan Kruse, PT Physical Therapist PT     10/17/16 -  Sadia Neal, RN Registered Nurse Nurse    MARLENE 10/17/16 -  Jaspreet Rodas PTA Physical Therapy Assistant PT     10/17/16 -  Danny Beebe, ROYAL Physical Therapy Assistant PT                    PT Recommendation and Plan  Anticipated Equipment Needs At Discharge: front wheeled walker  Anticipated Discharge Disposition: home with 24/7 care, home with home health  Planned Therapy Interventions: balance training, bed mobility training, gait training, home exercise program, manual therapy techniques, motor coordination training, neuromuscular re-education, patient/family education, postural re-education, ROM (Range of Motion), stair training, strengthening, stretching, transfer training, wheelchair management/propulsion training  PT Frequency: other (see comments) (5-14 times/wk)  Plan of Care Review  Plan Of Care Reviewed With: patient  Progress: unable to show any progress toward functional goals  Outcome Summary/Follow up Plan: pt having a good day and  is cooperative and met her bed mobility goal. pt to dc home tomorrow with 24/7 care.          Outcome Measures       06/22/17 1105 06/22/17 1000 06/22/17 0915    How much help from another person do you currently need...    Turning from your back to your side while in flat bed without using bedrails?   4  -RW     Moving from lying on back to sitting on the side of a flat bed without bedrails?   4  -RW    Moving to and from a bed to a chair (including a wheelchair)?   4  -RW    Standing up from a chair using your arms (e.g., wheelchair, bedside chair)?   3  -RW    Climbing 3-5 steps with a railing?   3  -RW    To walk in hospital room?   3  -RW    AM-PAC 6 Clicks Score   21  -RW    How much help from another is currently needed...    Putting on and taking off regular lower body clothing? 3  -KD 3  -RC     Bathing (including washing, rinsing, and drying) 3  -KD 3  -RC     Toileting (which includes using toilet bed pan or urinal) 3  -KD 3  -RC     Putting on and taking off regular upper body clothing 4  -KD 4  -RC     Taking care of personal grooming (such as brushing teeth) 3  -KD 3  -RC     Eating meals 4  -KD 4  -RC     Score 20  -KD 20  -RC       06/22/17 0826 06/21/17 0915 06/20/17 1400    How much help from another is currently needed...    Putting on and taking off regular lower body clothing? 3  -RC 3  -RC 3  -RC    Bathing (including washing, rinsing, and drying) 3  -RC 3  -RC 3  -RC    Toileting (which includes using toilet bed pan or urinal) 3  -RC 3  -RC 3  -RC    Putting on and taking off regular upper body clothing 4  -RC 4  -RC 4  -RC    Taking care of personal grooming (such as brushing teeth) 3  -RC 3  -RC 3  -RC    Eating meals 4  -RC 4  -RC 4  -RC    Score 20  -RC 20  -RC 20  -RC      06/20/17 0915 06/20/17 0730       How much help from another person do you currently need...    Turning from your back to your side while in flat bed without using bedrails? 3  -JW      Moving from lying on back to sitting on the side of a flat bed without bedrails? 3  -JW      Moving to and from a bed to a chair (including a wheelchair)? 3  -JW      Standing up from a chair using your arms (e.g., wheelchair, bedside chair)? 3  -JW      Climbing 3-5 steps with a railing? 3  -JW      To walk in hospital room? 3  -JW      AM-PAC 6  Clicks Score 18  -JW      How much help from another is currently needed...    Putting on and taking off regular lower body clothing?  3  -RC     Bathing (including washing, rinsing, and drying)  3  -RC     Toileting (which includes using toilet bed pan or urinal)  3  -RC     Putting on and taking off regular upper body clothing  4  -RC     Taking care of personal grooming (such as brushing teeth)  3  -RC     Eating meals  4  -RC     Score  20  -RC     Functional Assessment    Outcome Measure Options AM-PAC 6 Clicks Basic Mobility (PT)  -JW        User Key  (r) = Recorded By, (t) = Taken By, (c) = Cosigned By    Initials Name Provider Type    JW Arlene Stephens, ROYAL Physical Therapy Assistant    RESHMA Beebe PTA Physical Therapy Assistant    TAHIR Kwong, MADRIGAL/L Occupational Therapy Assistant    EVE Irizarry MADRIGAL/L Occupational Therapy Assistant           Time Calculation:         PT Charges       06/22/17 1540 06/22/17 1113       Time Calculation    Start Time 1330  -RW 0915  -RW     Stop Time 1409  -RW 1000  -RW     Time Calculation (min) 39 min  -RW 45 min  -RW     Time Calculation- PT    Total Timed Code Minutes- PT 39 minute(s)  -RW 45 minute(s)  -RW       User Key  (r) = Recorded By, (t) = Taken By, (c) = Cosigned By    Initials Name Provider Type    RESHMA Beebe PTA Physical Therapy Assistant          Therapy Charges for Today     Code Description Service Date Service Provider Modifiers Qty    53315926499 HC GAIT TRAINING EA 15 MIN 6/21/2017 Danny Beebe PTA GP 1    55357846110 HC PT THERAPEUTIC ACT EA 15 MIN 6/21/2017 Danny Beebe PTA GP 1    93587679101 HC PT THER PROC EA 15 MIN 6/21/2017 Danny Beebe PTA GP 1    91160529863 HC GAIT TRAINING EA 15 MIN 6/22/2017 Danny Beebe PTA GP 1    33532180988 HC PT THER PROC EA 15 MIN 6/22/2017 Danny Beebe PTA GP 1    65125929950 HC PT THERAPEUTIC ACT EA 15 MIN 6/22/2017 Danny Beebe PTA GP 1    62893406357 HC GAIT  TRAINING EA 15 MIN 6/22/2017 Danny Beebe PTA GP 1    79998546647 HC PT THER PROC EA 15 MIN 6/22/2017 Danny Beebe, PTA GP 1    47636541950 HC PT THERAPEUTIC ACT EA 15 MIN 6/22/2017 Danny Beebe PTA GP 1          PT G-Codes  PT Professional Judgement Used?: Yes  Outcome Measure Options: AM-PAC 6 Clicks Basic Mobility (PT)  Score: 18  Functional Limitation: Mobility: Walking and moving around  Mobility: Walking and Moving Around Current Status (): At least 40 percent but less than 60 percent impaired, limited or restricted  Mobility: Walking and Moving Around Goal Status (): At least 20 percent but less than 40 percent impaired, limited or restricted    Danny Beebe PTA  6/22/2017

## 2017-06-22 NOTE — DISCHARGE INSTR - OTHER ORDERS
[facility] #[phone]  Rolling walker ordered on 6/22 and will be delivered to  patient's hospital room.

## 2017-06-22 NOTE — PLAN OF CARE
Problem: Inpatient Occupational Therapy  Goal: Transfer Training Goal 1 STG- OT  Outcome: Ongoing (interventions implemented as appropriate)    06/13/17 0759 06/22/17 0826 06/22/17 1105   Transfer Training OT STG   Transfer Training OT STG, Date Established 06/13/17 --  --    Transfer Training OT STG, Time to Achieve 2 wks --  --    Transfer Training OT STG, Activity Type sit to stand/stand to sit;bed to chair /chair to bed;toilet --  --    Transfer Training OT STG, [place] Level supervision required;set up required  (AE/AD as needed) --  --    Transfer Training OT STG, Date Goal Reviewed --  --  06/22/17   Transfer Training OT STG       06/13/17 0759 06/22/17 0826 06/22/17 1105   Transfer Training OT STG   Transfer Training OT STG, Date Established 06/13/17 --  --    Transfer Training OT STG, Time to Achieve 2 wks --  --    Transfer Training OT STG, Activity Type sit to stand/stand to sit;bed to chair /chair to bed;toilet --  --    Transfer Training OT STG, [place] Level supervision required;set up required  (AE/AD as needed) --  --    Transfer Training OT STG, Date Goal Reviewed --  --  06/22/17   Transfer Training OT STG, Outcome --  goal ongoing --    , Outcome --  goal ongoing --        Goal: Dymanic Standing Balance Goal STG- OT  Outcome: Ongoing (interventions implemented as appropriate)    06/13/17 0759 06/22/17 0826 06/22/17 1105   Dynamic Standing Balance OT STG   Dynamic Standing Balance OT STG, Date Established 06/13/17 --  --    Dynamic Standing Balance OT STG, Time to Achieve 2 wks --  --    Dynamic Standing Balance OT STG, [place] Level supervision required  (5 minute no LOB or increased fatigue) --  --    Dynamic Standing Balance OT STG, Date Goal Reviewed --  --  06/22/17   Dynamic Standing Balance OT STG, Outcome --  goal ongoing --        Goal: Caregiver Training Goal LTG- OT  Outcome: Ongoing (interventions implemented as appropriate)    06/13/17 0759 06/22/17 0826   Caregiver  Training OT LTG   Caregiver Training OT LTG, Date Established 06/13/17 --    Caregiver Training OT LTG, Time to Achieve by discharge --    Caregiver Training OT LTG, [place] Level able to assist adequately;able to cue patient adequately  (t/f, safety at home, HEP) --    Caregiver Training OT LTG, Date Goal Reviewed --  06/22/17   Caregiver Training OT LTG, Outcome --  goal ongoing

## 2017-06-22 NOTE — PLAN OF CARE
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          Problem: Inpatient SLP  Goal: Expressive-Patient will improve expressive language skills to allow optimal participation in care  Outcome: Ongoing (interventions implemented as appropriate)    06/13/17 1506 06/15/17 1103 06/19/17 1406   Expressive- Optimal Participation in Care   Expressive Optimal Participation in Care- SLP, Date Established 06/13/17 --  --    Expressive Optimal Participation in Care- SLP, Time to Achieve by discharge --  --    Expressive Optimal Participation in Care- SLP, Activity Level Patient will improve word retrieval skills --  --    Expressive Optimal Participation in Care- SLP, Date Goal Reviewed --  --  --    Expressive Optimal Participation in Care- SLP, Outcome --  goal ongoing --    Expressive Optimal Participation in Care- SLP, Reason Goal Not Met --  --  progress slower than expected     06/22/17 1341   Expressive- Optimal Participation in Care   Expressive Optimal Participation in Care- SLP, Date Established --    Expressive Optimal Participation in Care- SLP, Time to Achieve --    Expressive Optimal Participation in Care- SLP, Activity Level --    Expressive Optimal Participation in Care- SLP, Date Goal Reviewed 06/22/17   Expressive Optimal Participation in Care- SLP, Outcome --    Expressive Optimal Participation in Care- SLP, Reason Goal Not Met --        Goal: Cognitive-linguistic-Patient will improve Cognitive-linguistic skills to allow optimal participation in care  Outcome: Ongoing (interventions implemented as appropriate)    06/13/17 1506  06/16/17 0940 06/22/17 1341   Cognitive Linguistic- Optimal Participation in Care   Cognitive Linguistic Optimal Participation in Care- SLP, Date Established 06/13/17 --  --    Cognitive Linguistic Optimal Participation in Care- SLP, Time to Achieve by discharge --  --    Cognitive Linguistic Optimal Participation in Care- SLP, Activity Level Patient will improve orientation for increased safety in environment;Patient will improve memory skills for increased safety in environment --  --    Cognitive Linguistic Optimal Participation in Care- SLP, Date Goal Reviewed --  --  06/22/17   Cognitive Linguistic Optimal Participation in Care- SLP, Outcome --  goal ongoing --        Goal: Receptive Language-Patient will improve receptive language skills to allow optimal participation in care  Outcome: Ongoing (interventions implemented as appropriate)    06/13/17 1506 06/16/17 0940 06/22/17 1341   Receptive Language- Optimal Participation in Care   Receptive Language Optimal Participation in Care- SLP, Date Established 06/13/17 --  --    Receptive Language Optimal Participation in Care- SLP, Time to Achieve by discharge --  --    Receptive Language Optimal Participation in Care- SLP, Activity Level Patient will improve ability to follow directions;Patient will improve ability to comprehend questions --  --    Receptive Language Optimal Participation in Care- SLP, Date Goal Reviewed --  --  06/22/17   Receptive Language Optimal Participation in Care- SLP, Outcome --  goal ongoing --

## 2017-06-22 NOTE — THERAPY EVALUATION
"Acute Care - Speech Language Pathology Treatment Note  [facility]     Patient Name: Kierra Scales  : 1929  MRN: [id number]  Today's Date: 2017  Referring Physician: Khari      Admit Date: 2017     1. Pt will safely tolerate recommended diet w/no overt s/s of aspiration:GOAL MET previously.   2. Pt will complete one-step directions without objects w/90% acc: GOAL MET previously  3. Pt will complete two-step commands w/90% acc: Pt was 30% acc w/2-step directions using for word search; max cues required.  4. Pt will complete simple yes/no questions w/90% acc: Pt was 50% acc w/basic yes/no questions with moderate cues.   5. Pt will answer simple \"WH\" questions w/90% acc: Pt was 50% acc w/wh-questions.   6. Pt will complete object/picture naming w/80% acc: Pt was 100% acc w/simple picture naming.GOAL MET  7. Pt will complete convergent naming task w/80% acc: 80% accuracy with max cues;increaeed performance noted on this task this date  8. Pt will complete simple divergent naming task w/an average of 5 wpm for simple concrete categories: average of 4 words per minute  9. Pt will complete immediate recall of related words w/80% acc: Discontinued goal previously  10: Pt will recall details of the day w/80% acc: 20% recall with use of aides and max cues and repetition required  11. Pt will complete orientation to JERRY, YR, and Place w/80% acc with use of visual aide: 25% with use of visual aid and verbal cues.     Pt was able to maintain attention for 10 minutes on a sorting/organizaing task with only occasional reminders for task directions.     Visit Dx:      ICD-10-CM ICD-9-CM   1. Oral phase dysphagia R13.11 787.21   2. Impaired mobility and ADLs Z74.09 799.89   3. Muscle weakness (generalized) M62.81 728.87   4. Abnormality of gait and mobility R26.9 781.2   5. Symbolic dysfunction R48.9 784.60   6. Physical deconditioning R53.81 799.3   7. 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)   • Hypothyroidism   • Dyspnea   • Hyperthyroidism   • Gastrointestinal hemorrhage with melena   • Type 2 diabetes mellitus with hyperglycemia   • Colon cancer   • Chronic blood loss anemia   • Physical deconditioning   • Senile dementia, uncomplicated   • Encounter for rehabilitation   • Adenocarcinoma of sigmoid colon   • Hypokalemia              Adult Rehabilitation Note       06/22/17 1017 06/22/17 0915 06/22/17 0826    Rehab Assessment/Intervention    Discipline speech language pathologist  -EK physical therapy assistant  -RW occupational therapy assistant  -RC    Document Type therapy note (daily note)  -EK therapy note (daily note)  -RW therapy note (daily note)  -RC    Subjective Information no complaints;agree to therapy  -EK agree to therapy  -RW agree to therapy  -RC    Patient Effort, Rehab Treatment good  -EK good  -RW good  -RC    Patient Response to Treatment  did well with activity and was very cooperative  -RW     Recorded by [EK] Tamiko Whitten, GURMEET-SLP [RW] Danny Beebe, PTA [RC] Ally Kwong, MADRIGAL/L    Pain Assessment    Pain Assessment 0-10  -EK --   c/o some knee pain but gives no number rating  -RW No/denies pain  -RC    Jason-Batista FACES Pain Rating 0  -EK      Pain Score 0  -EK      Recorded by [EK] Tamiko Whitten CCC-SLP [RW] Danny Beebe PTA [RC] Ally Kwong, MADRIGAL/L    Cognitive Assessment/Intervention    Current Cognitive/Communication Assessment functional  -EK functional  -RW impaired  -RC    Orientation Status oriented to;person  -EK oriented to;person  -RW person  -RC    Follows Commands/Answers Questions 75% of the time  -% of the  time   this visit  -RW     Personal Safety moderate impairment  -EK moderate impairment  -RW     Personal Safety Interventions  gait belt;nonskid shoes/slippers when out of bed  -RW     Problem Solving ADL/Func. Task   --   simple puzzle w/ min @  -RC    Recorded by [EK] Tamiko Whitten CCC-ELISA [RW] Danny Beebe, PTA [RC] Ally G Varghese, MADRIGAL/L    Improve ability to follow directions    Improve ability to follow directions: two-step commands  -EK      Status: Improve ability to follow directions Progress slower than expected  -EK      Ability to Follow Directions Progress 50%  -EK      Recorded by [EK] Tamiko Whitten CCC-SLP      Improve ability to comprehend questions    Improve ability to comprehend questions: simple yes/no questions;simple general questions;90%;with consistent cues  -EK      Status: Improve ability to comprehend questions Progress slower than expected  -EK      Ability to Comprehend Questions Progress 50%  -EK      Recorded by [EK] Tamiko Whitten CCC-SLP      Improve word retrieval skills    Improve word retrieval skills by: naming an object/pic;answer WH question with one word;completing a divergent task;completing a convergent task;90%  -EK      Status: Improve word retrieval skills Progressing as expected  -EK      Word Retrieval Skills Progress 80%  -EK      Recorded by [EK] Tamiko Whitten CCC-SLP      Improve orientation    Improve orientation through: demonstrating orientation to day;demonstrating orientation to year;demonstrating orientation to place;80%;use environmental aids to assist with orientation  -EK      Status: Improve orientation through Progress slower than expected  -EK      Orientation Progress 10%  -EK      Recorded by [EK] LILIAN FoleySLP      Improve memory skills    Improve memory skills through: recall details of the day;50%  -EK      Status: Improve memory skills Progressing as expected  -EK       Memory Skills Progress 50%  -EK      Recorded by [EK] Tamiko Whitten CCC-SLP      Bed Mobility, Assessment/Treatment    Bed Mob, Supine to Sit, [place]  independent  -RW     Bed Mob, Sit to Supine, [place]  independent  -RW     Recorded by  [RW] Danny Beebe PTA     Transfer Assessment/Treatment    Transfers, Bed-Chair [place]  supervision required  -RW     Transfers, Chair-Bed [place]  supervision required  -RW     Transfers, Bed-Chair-Bed, Assist Device  rolling walker  -RW     Transfers, Sit-Stand [place]  contact guard assist;stand by assist  -RW     Transfers, Stand-Sit [place]  contact guard assist;stand by assist  -RW     Transfers, Sit-Stand-Sit, Assist Device  rolling walker  -RW     Recorded by  [RW] Danny Beebe PTA     Gait Assessment/Treatment    Gait, [place] Level  contact guard assist  -RW     Gait, Assistive Device  rolling walker  -RW     Gait, Distance (Feet)  160   150 x 1  -RW     Recorded by  [RW] Danny Beebe PTA     Motor Skills/Interventions    Motor Response Observations   --   bean bag toss and catch, table top act using B ue's  -RC    Recorded by   [RC] ROHAN Gomez/L    Balance Skills Training    Sitting-Balance Activities  Reaching for objects;Forward lean;Lateral lean;Reaching across midline  -RW     Standing-Level of Assistance  Distant supervision  -RW     Recorded by  [RW] Danny Beebe PTA     Therapy Exercises    Bilateral Lower Extremities  AROM:;15 reps;20 reps;25 reps;sitting;knee flexion;LAQ;hip flexion   2 sets  -RW     Bilateral Upper Extremity  AROM:;15 reps   2 sets of tb rows  -RW     Trunk Exercises  trunk rotation;15 reps  -RW     Recorded by  [RW] Danny Beebe PTA     Positioning and Restraints    Pre-Treatment Position sitting in chair/recliner  -EK sitting in chair/recliner  -RW sitting in chair/recliner  -RC    Post Treatment Position wheelchair  -EK wheelchair  -RW chair  -RC    In Bed    with PT  -RC    In Wheelchair  with SLP  -RW     Recorded by [EK] Tamiko Whitten, [facility]-SLP [RW] Danny Beebe PTA [RC] ADRIANNA GomezA/POONAM      06/21/17 1415 06/21/17 1345 06/21/17 0915    Rehab Assessment/Intervention    Discipline physical therapy assistant  -RW occupational therapy assistant  -RC occupational therapy assistant  -RC    Document Type therapy note (daily note)  -RW therapy note (daily note)  -RC therapy note (daily note)  -RC    Subjective Information agree to therapy  -RW agree to therapy  -RC agree to therapy  -RC    Patient Effort, Rehab Treatment fair  -RW good  -RC adequate  -RC    Patient Effort, Rehab Treatment Comment came in immediately after grey and took over care  -RW --   much encouragement to begin ADL  -RC     Precautions/Limitations   fall precautions  -RC    Patient Response to Treatment  --   [facility] pt got started she participated w/ min vc's  -RC     Recorded by [RW] Danny Beebe PTA [RC] Ally Kwong MADRIGAL/L [RC] Ally Kwong, MADRIGAL/L    Vital Signs    Intra Systolic BP Rehab  164  -RC     Intra Treatment Diastolic BP  77  -RC     Recorded by  [RC] Ally Kwong, MADRIGAL/L     Pain Assessment    Pain Assessment --   gives no rating  -RW No/denies pain  -RC No/denies pain  -RC    Recorded by [RW] Danny Beebe PTA [RC] Ally Kwong MADRIGAL/POONAM [RC] Ally Kwong, MADRIGAL/L    Cognitive Assessment/Intervention    Current Cognitive/Communication Assessment functional  -RW  impaired  -RC    Orientation Status oriented to;person  -RW  person  -RC    Follows Commands/Answers Questions needs cueing;needs repetition  -RW      Personal Safety   severe impairment;decreased awareness, need for assist;decreased awareness, need for safety;decreased insight to deficits  -RC    Problem Solving ADL/Func. Task   --   completed abc puzzle if handed peices would not initate  -RC    Recorded by [RW] Danny Beebe PTA  [RC] Ally Kwong, MADRIGAL/L    Bed Mobility,  Assessment/Treatment    Bed Mob, Supine to Sit, [place]  supervision required  -RC     Recorded by  [RC] Ally Kwong MADRIGAL/L     Transfer Assessment/Treatment    Transfers, Sit-Stand [place] contact guard assist  -RW      Transfers, Stand-Sit [place] contact guard assist  -RW      Transfers, Sit-Stand-Sit, Assist Device rolling walker  -RW      Toilet Transfer, [place]  supervision required  -RC supervision required  -RC    Toilet Transfer, Assistive Device  rolling walker  -RC rolling walker  -RC    Walk-In Shower Transfer, [place]  verbal cues required  -RC     Recorded by [RW] Danny Beebe PTA [RC] ADRIANNA GomezA/L [RC] Ally Kwong MADRIGAL/L    Gait Assessment/Treatment    Gait, [place] Level contact guard assist  -RW      Gait, Assistive Device rolling walker  -RW      Gait, Distance (Feet) 160    x 2  -RW      Recorded by [RW] Danny Beebe PTA      Functional Mobility    Functional Mobility- Ind. Level  contact guard assist;verbal cues required  -RC contact guard assist  -RC    Functional Mobility- Device  rolling walker  -RC rolling walker  -RC    Functional Mobility-Distance (Feet)  18  -RC 30  -RC    Recorded by  [RC] ADRIANNA GomezA/L [RC] Ally Kwong MADRIGAL/L    Upper Body Bathing Assessment/Training    UB Bathing Assess/Train Assistive Device  --   sponge bath  -RC     UB Bathing Assess/Train, Position  edge of bed  -RC     UB Bathing Assess/Train, [place] Level  verbal cues required;supervision required  -RC     Recorded by  [RC] Ally Kwong MADRIGAL/L     Lower Body Bathing Assessment/Training    LB Bathing Assess/Train Assistive Device  --   sponge bath  -RC     LB Bathing Assess/Train, Position  sitting;standing;edge of bed  -RC     LB Bathing Assess/Train, [place] Level  verbal cues required;contact guard assist  -RC     Recorded by  [RC] Ally Kwong MADRIGAL/L     Upper Body Dressing Assessment/Training    UB Dressing  Assess/Train, Clothing Type  doffing:;donning:;pull over;button up  -RC     UB Dressing Assess/Train, Position  sitting  -RC     UB Dressing Assess/Train, [place]  set up required  -RC     Recorded by  [RC] JOSÉ MIGUEL Gomez     Lower Body Dressing Assessment/Training    LB Dressing Assess/Train, Clothing Type  doffing:;donning:;pants;shoes;shorts;socks  -RC     LB Dressing Assess/Train, Position  sitting;standing  -RC     LB Dressing Assess/Train, [place]  contact guard assist  -RC     Recorded by  [RC] JOSÉ MIGUEL Gomez     Toileting Assessment/Training    Toileting Assess/Train, Assistive Device  raised toilet seat;grab bars  -RC raised toilet seat;grab bars  -RC    Toileting Assess/Train, Position  standing;sitting  -RC sitting;standing  -RC    Toileting Assess/Train, Indepen Level  contact guard assist;supervision required  -RC supervision required  -RC    Recorded by  [RC] JOSÉ MIGUEL Gomez [RC] JOSÉ MIGUEL Gomez    Grooming Assessment/Training    Grooming Assess/Train, Position   sink side;sitting  -RC    Grooming Assess/Train, Indepen Level   minimum assist (75% patient effort)  -RC    Grooming Assess/Train, Comment   --   daughter @ w/ hair care  -RC    Recorded by   [RC] ROHAN Gomez/L    Motor Skills/Interventions    Additional Documentation   --   clothes pin tree, bean bag toss/catch.  -RC    Recorded by   [RC] ROHAN Gomez/L    Balance Skills Training    Sitting-Balance Activities Reaching for objects;Forward lean;Lateral lean;Reaching across midline  -RW      Standing-Level of Assistance Distant supervision  -RW      Recorded by [RW] Danny Beebe, PTA      Therapy Exercises    Bilateral Upper Extremity   --   declined  -RC    Recorded by   [RC] ROHAN Gomez/L    Positioning and Restraints    Pre-Treatment Position standing in room   with OCTAVIO  -RW in bed  -RC sitting in chair/recliner  -RC    Post Treatment Position chair  -RW wheelchair   -RC wheelchair  -RC    In Bed call light within reach;encouraged to call for assist  -RW with PT  -RC notified nsg;exit alarm on  -RC    Recorded by [RW] Danny Beebe PTA [RC] ADRIANNA GmoezA/POONAM [RC] Ally Kwong MADRIGAL/L      06/21/17 0748 06/20/17 1446 06/20/17 1416    Rehab Assessment/Intervention    Discipline speech language pathologist  -EK physical therapy assistant  -JW occupational therapy assistant  -RC    Document Type therapy note (daily note)  -EK therapy note (daily note)  -JW therapy note (daily note)  -RC    Subjective Information no complaints;agree to therapy  -EK agree to therapy  -JW agree to therapy  -RC    Patient Effort, Rehab Treatment good  -EK fair  -JW adequate  -RC    Precautions/Limitations fall precautions  -EK fall precautions  -JW fall precautions  -RC    Patient Response to Treatment  pt vomiting at the end of tx in room, nsg notified  -JW     Recorded by [EK] Tamiko Whitten CCC-SLP [JW] Arlene Stephens PTA [RC] Ally Kwong, MADRIGAL/L    Vital Signs    Pre Patient Position  Sitting  -JW     Post Patient Position  Supine  -JW     Recorded by  [JW] Arlene Stephens PTA     Pain Assessment    Pain Assessment  0-10  -JW No/denies pain  -RC    Pain Score  0  -JW     Post Pain Score  0  -JW     Recorded by  [CLAUDIA] Arlene Stephens PTA [RC] Ally Kwong, MADRIGAL/L    Cognitive Assessment/Intervention    Current Cognitive/Communication Assessment impaired  -EK impaired  -JW     Orientation Status oriented to;person;disoriented to;place;time;situation  -EK oriented to;person  -JW     Follows Commands/Answers Questions 25% of the time;50% of the time  -EK 50% of the time  -JW     Personal Safety severe impairment;decreased insight to deficits;decreased awareness, need for safety;decreased awareness, need for assist  -EK      Personal Safety Interventions  gait belt;nonskid shoes/slippers when out of bed  -JW     Short Term Memory Interventions   --    sorting task completed w/ min vc's  -RC    Recorded by [EK] Tamiko Whitten CCC-SLP [JW] Arlene Stephens, PTA [RC] Ally Kwong, MADRIGAL/L    Improve ability to follow directions    Improve ability to follow directions: two-step commands  -EK      Status: Improve ability to follow directions Progress slower than expected  -EK      Ability to Follow Directions Progress 50%;with consistent cues  -EK      Recorded by [EK] Tamiko Whitten CCC-SLP      Improve ability to comprehend questions    Improve ability to comprehend questions: simple yes/no questions;simple general questions;90%;with consistent cues  -EK      Status: Improve ability to comprehend questions Progress slower than expected  -EK      Ability to Comprehend Questions Progress 50%  -EK      Recorded by [EK] OSIRIS Foley      Improve word retrieval skills    Improve word retrieval skills by: naming an object/pic;answer WH question with one word;completing a divergent task;completing a convergent task;90%  -EK      Status: Improve word retrieval skills Progress slower than expected  -EK      Word Retrieval Skills Progress 50%;with consistent cues  -EK      Recorded by [EK] Tamiko Whitten CCC-ELISA      Improve orientation    Improve orientation through: demonstrating orientation to day;demonstrating orientation to year;demonstrating orientation to place;80%;use environmental aids to assist with orientation  -EK      Status: Improve orientation through Progress slower than expected  -EK      Orientation Progress 10%;with consistent cues  -EK      Comments: Improve orientation through max cues   -EK      Recorded by [EK] OSIRIS Foley      Bed Mobility, Assessment/Treatment    Bed Mobility, Assistive Device  --   HOB down, no BRs  -JW     Bed Mob, Sit to Supine, [place]  supervision required  -JW     Recorded by  [JW] Arlene Stephens, PTA     Transfer  Assessment/Treatment    Transfers, Chair-Bed [place]  contact guard assist  -JW     Transfers, Bed-Chair-Bed, Assist Device  rolling walker  -JW     Transfers, Sit-Stand [place]  contact guard assist  -JW     Transfers, Stand-Sit [place]  contact guard assist  -JW     Transfers, Sit-Stand-Sit, Assist Device  rolling walker  -JW     Recorded by  [JW] Arlene Stephens, ROYAL     Gait Assessment/Treatment    Gait, [place] Level  contact guard assist  -JW     Gait, Assistive Device  rolling walker  -JW     Gait, Distance (Feet)  10  -JW     Gait, Comment  pt defers gt other than from w/c to bed  -JW     Recorded by  [JW] Arlene Stephens PTA     Wheelchair Training/Management    Wheelchair, Distance Propelled  150  -JW     Wheelchair Training Comment  CGA and lots of vc's  -JW     Recorded by  [JW] Arlene Stephens PTA     Motor Skills/Interventions    Motor Response Observations   --   table top act using B ue's w/ reaching, and problem solving  -RC    Recorded by   [RC] Ally Kwong, MADRIGAL/L    Therapy Exercises    Bilateral Lower Extremities  AROM:;ankle pumps/circles;sitting;hip flexion;LAQ   add ball squeeze, hip abd w/ tband, 3 sets of 10  -JW     BLE Resistance  theraband   level 2  -JW     Recorded by  [JW] Arlene Stephens PTA     Positioning and Restraints    Pre-Treatment Position in bed  -EK other (comment)   w/c  -JW in bed  -RC    Post Treatment Position bed  -EK bed  -JW bed  -RC    In Bed  supine;call light within reach;encouraged to call for assist;exit alarm on;with family/caregiver;notified nsg   HOB elevated  -JW with PT  -RC    Recorded by [EK] Tamiko Whitten, CCC-SLP [JW] Arlene Stephens, PTA [RC] Ally Kwong, MADRIGAL/L      06/20/17 1330 06/20/17 0915 06/20/17 0730    Rehab Assessment/Intervention    Discipline speech language pathologist  -EK physical therapy assistant  -JW occupational therapy assistant  -RC    Document Type therapy note  (daily note)  -EK therapy note (daily note)  -JW therapy note (daily note)  -RC    Subjective Information no complaints;agree to therapy  -EK agree to therapy  -JW agree to therapy  -RC    Patient Effort, Rehab Treatment good  -EK fair  -JW     Symptoms Noted During/After Treatment   --   reports stomach not feeling well, episode of vomiting   -RC    Symptoms Noted Comment   --   vomited at end of tx,returned to room to nurse.  -RC    Patient Response to Treatment  pt w/ vomiting episode earlier this morning, pt not agreeable to therapy, but w/ encouragement did bed ther ex  -JW     Recorded by [EK] Tamiko Whitten, GURMEET-SLP [JW] Arlene Stephens, PTA [RC] Ally Kwong, MADRIGAL/L    Vital Signs    Pre Patient Position  Supine  -JW     Post Patient Position  Supine  -JW     Recorded by  [JW] Arlene Stephens, ROYAL     Pain Assessment    Pain Assessment 0-10  -EK 0-10  -JW No/denies pain  -RC    Pain Score 0  -EK 0  -JW     Post Pain Score 0  -EK 0  -JW     Recorded by [EK] Tamiko Whitten, GURMEET-SLP [JW] Arlene Stephens, PTA [RC] Ally ENRIQUEZ Varghese, MADRIGAL/L    Cognitive Assessment/Intervention    Current Cognitive/Communication Assessment impaired  -EK impaired  -JW impaired  -RC    Orientation Status oriented to;person  -EK oriented to;person  -JW person  -RC    Follows Commands/Answers Questions  50% of the time;needs cueing  -JW     Additional Documentation   --   pt was able to place 1/26 abc puzzle, refused clock act  -RC    Recorded by [EK] Tamiko Whitten, GURMEET-SLP [JW] Arlene Stephens, PTA [RC] Ally Kwong, MADRIGAL/L    Improve ability to follow directions    Improve ability to follow directions: two-step commands  -EK      Status: Improve ability to follow directions Progress slower than expected  -EK      Ability to Follow Directions Progress 30%;with consistent cues  -EK      Recorded by [EK] Tamiko Whitten CCC-SLP      Improve ability to  comprehend questions    Improve ability to comprehend questions: simple yes/no questions;simple general questions;90%  -EK      Status: Improve ability to comprehend questions Progress slower than expected  -EK      Ability to Comprehend Questions Progress 50%;with consistent cues  -EK      Comments: Improve ability to comprehend questions moderate to max cues  -EK      Recorded by [EK] OSIRIS Foley      Improve word retrieval skills    Improve word retrieval skills by: naming an object/pic;answer WH question with one word;completing a divergent task;completing a convergent task;90%  -EK      Status: Improve word retrieval skills Progress slower than expected  -EK      Word Retrieval Skills Progress 50%  -EK      Recorded by [EK] OSIRIS Foley      Improve orientation    Improve orientation through: demonstrating orientation to day;demonstrating orientation to year;demonstrating orientation to place;80%;use environmental aids to assist with orientation  -EK      Status: Improve orientation through Progress slower than expected  -EK      Orientation Progress 10%;without cues  -EK      Comments: Improve orientation through little to no success with F:2 cues  -EK      Recorded by [EK] OSIRIS Foley      Improve memory skills    Improve memory skills through: recall details of the day;50%  -EK      Status: Improve memory skills Progress slower than expected  -EK      Memory Skills Progress 20%;without cues  -EK      Recorded by [EK] OSIRIS Foley      Bed Mobility, Assessment/Treatment    Bed Mobility, Comment  pt defers all mobility  -JW     Recorded by  [JW] Arlene Stephens, PTA     Transfer Assessment/Treatment    Transfer, Comment  pt defers all mobility, family in agreement w/ pt to let pt rest this morning after ther ex  -JW     Recorded by  [JW] Arlene Stephens, PTA     Motor Skills/Interventions    Motor  Response Observations   --   bean bag toss,clothes pin tree  -RC    Recorded by   [RC] ADRIANNA GomezA/L    Therapy Exercises    Bilateral Lower Extremities  AROM:;20 reps;AAROM:;supine;ankle pumps/circles;heel slides;hip abduction/adduction;quad sets;SAQ   2 sets, pt req. AAROM at times, vc's throughout most of tx  -JW     Recorded by  [JW] Arlene Stephens, PTA     Positioning and Restraints    Pre-Treatment Position in bed  -EK in bed  -JW sitting in chair/recliner  -RC    Post Treatment Position bed  -EK bed  -JW wheelchair  -RC    In Bed  supine;call light within reach;encouraged to call for assist;exit alarm on;with family/caregiver  -JW with nsg  -RC    Recorded by [EK] Tamiko Whitten, CCC-SLP [JW] Arlene Stephens, PTA [RC] ADRIANNA GomezA/POONAM      06/19/17 1410          Rehab Assessment/Intervention    Discipline occupational therapy assistant  -RC      Document Type therapy note (daily note)  -RC      Subjective Information agree to therapy  -RC      Patient Effort, Rehab Treatment adequate  -RC      Precautions/Limitations fall precautions   gait belt placement (abd incision)  -RC      Recorded by [RC] ADRIANNA GomezA/POONAM      Vital Signs    Intra Systolic BP Rehab 109  -RC      Intra Treatment Diastolic BP 56  -RC      Recorded by [RC] ADRIANNA GomezA/POONAM      Pain Assessment    Pain Score --   c/o back pain gave no number  -RC      Pain Intervention(s) Rest;Medication (See MAR)  -RC      Recorded by [RC] ADRIANNA GomezA/L      Cognitive Assessment/Intervention    Current Cognitive/Communication Assessment impaired  -RC      Orientation Status person  -RC      Recorded by [RC] ADRIANNA GomezA/L      Transfer Assessment/Treatment    Transfers, Stand-Sit [place] contact guard assist  -RC      Transfers, Sit-Stand-Sit, Assist Device rolling walker  -RC      Toilet Transfer, [employer] [place] contact guard assist  -RC      Recorded by [RC] Ally Kwong,  MADRIGAL/L      Functional Mobility    Functional Mobility- Ind. Level contact guard assist  -RC      Functional Mobility- Device rolling walker  -RC      Functional Mobility-Distance (Feet) 150  -RC      Recorded by [RC] ROHAN Gomez/L      Lower Body Dressing Assessment/Training    LB Dressing Assess/Train, Clothing Type shoes;doffing:  -RC      LB Dressing Assess/Train, Position sitting  -RC      LB Dressing Assess/Train, [place] conditional independence  -RC      Recorded by [RC] ROHAN Gomez/L      Balance Skills Training    Sitting-Balance Activities Reaching for objects;Ball toss;Trunk control activities  -      Sitting # of Minutes 30  -RC      Recorded by [RC] ROHAN Gomez/L      Positioning and Restraints    Pre-Treatment Position --   eom  -RC      Post Treatment Position bed  -RC      In Bed exit alarm on;encouraged to call for assist;call light within reach;notified nsg  -RC      Recorded by [RC] ROHAN Gomez/L        User Key  (r) = Recorded By, (t) = Taken By, (c) = Cosigned By    Initials Name Effective Dates    JW Arlene Stephens, PTA 08/11/15 -     EK Tamiko Whitten, [facility]-SLP 04/06/17 -     RW Danny Beebe, PTA 10/17/16 -     RC JOSÉ MIGUEL Gomez 10/17/16 -                SLP Goals       06/22/17 1341 06/21/17 0858 06/20/17 1450    Receptive Language- Optimal Participation in Care    Receptive Language Optimal Participation in Care- SLP, Date Goal Reviewed 06/22/17  -EK 06/21/17  -EK 06/20/17  -EK    Expressive- Optimal Participation in Care    Expressive Optimal Participation in Care- SLP, Date Goal Reviewed 06/22/17  -EK 06/21/17  -EK 06/20/17  -EK    Cognitive Linguistic- Optimal Participation in Care    Cognitive Linguistic Optimal Participation in Care- SLP, Date Goal Reviewed 06/22/17  -EK 06/21/17  -EK 06/20/17  -EK      06/19/17 1406 06/17/17 0835 06/16/17 0940    Receptive Language- Optimal Participation in Care    Receptive  Language Optimal Participation in Care- SLP, Date Goal Reviewed 06/19/17  -EK 06/17/17  -EK 06/16/17  -EC    Receptive Language Optimal Participation in Care- SLP, Outcome   goal ongoing  -EC    Expressive- Optimal Participation in Care    Expressive Optimal Participation in Care- SLP, Date Goal Reviewed 06/19/17  -EK 06/17/17  -EK     Expressive Optimal Participation in Care- SLP, Reason Goal Not Met progress slower than expected  -EK      Cognitive Linguistic- Optimal Participation in Care    Cognitive Linguistic Optimal Participation in Care- SLP, Date Goal Reviewed 06/19/17  -EK 06/17/17  -EK 06/16/17  -EC    Cognitive Linguistic Optimal Participation in Care- SLP, Outcome   goal ongoing  -EC      06/15/17 1103 06/14/17 1434 06/13/17 1506    Safely Consume Diet    Safely Consume Diet- SLP, Date Established   06/13/17  -CK    Safely Consume Diet- SLP, Time to Achieve   by discharge  -CK    Safely Consume Diet- SLP, Additional Goal   Pt will safely tolerate recommended diet w/no overt s/s of aspiration.  -CK    Safely Consume Diet- SLP, Date Goal Reviewed 06/15/17  -EC 06/14/17  -CK 06/13/17  -CK    Safely Consume Diet- SLP, Outcome goal met  -EC goal partially met  -CK     Receptive Language- Optimal Participation in Care    Receptive Language Optimal Participation in Care- SLP, Date Established   06/13/17  -CK    Receptive Language Optimal Participation in Care- SLP, Time to Achieve   by discharge  -CK    Receptive Language Optimal Participation in Care- SLP, Activity Level   Patient will improve ability to follow directions;Patient will improve ability to comprehend questions  -CK    Receptive Language Optimal Participation in Care- SLP, Date Goal Reviewed 06/15/17  -EC 06/14/17  -CK 06/13/17  -CK    Receptive Language Optimal Participation in Care- SLP, Outcome goal ongoing  -EC goal ongoing  -CK     Expressive- Optimal Participation in Care    Expressive Optimal Participation in Care- SLP, Date Established    06/13/17  -CK    Expressive Optimal Participation in Care- SLP, Time to Achieve   by discharge  -CK    Expressive Optimal Participation in Care- SLP, Activity Level   Patient will improve word retrieval skills  -CK    Expressive Optimal Participation in Care- SLP, Date Goal Reviewed 06/15/17  -EC 06/14/17  -CK 06/13/17  -CK    Expressive Optimal Participation in Care- SLP, Outcome goal ongoing  -EC goal ongoing  -CK     Cognitive Linguistic- Optimal Participation in Care    Cognitive Linguistic Optimal Participation in Care- SLP, Date Established   06/13/17  -CK    Cognitive Linguistic Optimal Participation in Care- SLP, Time to Achieve   by discharge  -CK    Cognitive Linguistic Optimal Participation in Care- SLP, Activity Level   Patient will improve orientation for increased safety in environment;Patient will improve memory skills for increased safety in environment  -CK    Cognitive Linguistic Optimal Participation in Care- SLP, Date Goal Reviewed 06/15/17  -EC 06/14/17  -CK 06/13/17  -CK    Cognitive Linguistic Optimal Participation in Care- SLP, Outcome goal ongoing  -EC goal ongoing  -CK       User Key  (r) = Recorded By, (t) = Taken By, (c) = Cosigned By    Initials Name Provider Type    LUCERO Couch, CCC-SLP Speech and Language Pathologist    KEVIN Whitten, CCC-SLP Speech and Language Pathologist    CK Michelle Whitten, MS CCC-SLP Speech and Language Pathologist          EDUCATION  The patient has been educated in the following areas:   Cognitive Impairment.    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           Time Calculation:         Time Calculation- SLP       06/22/17 1344          Time Calculation- SLP    SLP Start Time 1017  -EK      SLP Stop Time 1058  -EK       SLP Time Calculation (min) 41 min  -EK      SLP Received On 06/22/17  -EK        User Key  (r) = Recorded By, (t) = Taken By, (c) = Cosigned By    Initials Name Provider Type    OSIRIS Rodriguez Speech and Language Pathologist          Therapy Charges for Today     Code Description Service Date Service Provider Modifiers Qty    82984259445 HC ST TREATMENT SPEECH 4 6/21/2017 OSIRIS Foley GN 1    [id number]  ST TREATMENT SPEECH 3 6/22/2017 OSIRIS Foley GN 1               OSIRIS Foley  6/22/2017

## 2017-06-22 NOTE — PLAN OF CARE
Problem: Patient Care Overview (Adult)  Goal: Plan of Care Review  Outcome: Ongoing (interventions implemented as appropriate)    06/22/17 1423   Coping/Psychosocial Response Interventions   Plan Of Care Reviewed With patient   Patient Care Overview   Progress improving   Outcome Evaluation   Outcome Summary/Follow up Plan pt ia confused at times and makes unsafe decisions,d/c 6/23         Problem: Functional Deficit (Adult,Obstetrics,Pediatric)  Goal: Signs and Symptoms of Listed Potential Problems Will be Absent or Manageable (Functional Deficit)  Outcome: Ongoing (interventions implemented as appropriate)    Problem: Fall Risk (Adult)  Goal: Absence of Falls  Outcome: Ongoing (interventions implemented as appropriate)    Problem: Infection, Risk/Actual (Adult)  Goal: Infection Prevention/Resolution  Outcome: Ongoing (interventions implemented as appropriate)

## 2017-06-22 NOTE — DISCHARGE PLACEMENT REQUEST
"Kierra Mishra (88 y.o. Female)     Date of Birth Social Security Number Address Home Phone MRN    04/05/1929  [address] [facility] [id number] [phone] [id number]    Protestant Marital Status          Latter-day        Admission Date Admission Type Admitting Provider Attending Provider Department, Room/Bed    6/12/17 Elective Bennie Lucia MD Hargrove, Kenneth R, MD [facility] ACUTE REHAB, 526/2    Discharge Date Discharge Disposition Discharge Destination                      Attending Provider: Bennie Lucia MD     Allergies:  Penicillins    Isolation:  None   Infection:  None   Code Status:  FULL    Ht:  72\" (182.9 cm)   Wt:  169 lb 14.4 oz (77.1 kg)    Admission Cmt:  None   Principal Problem:  Physical deconditioning [R53.81]                 Active Insurance as of 6/12/2017     Primary Coverage     Payor Plan Insurance Group Employer/Plan Group    MEDICARE MEDICARE A & B      Payor Plan Address Payor Plan Phone Number Effective From Effective To    [address] [phone] 4/1/[address]     [place] [address]       Subscriber Name Subscriber Birth Date Member ID       KIERRA MISHRA 4/5/1929 [id number]           Secondary Coverage     Payor Plan Insurance Group Employer/Plan Group    [facility]     Payor Plan Address Payor Plan Phone Number Effective From Effective To    [address] [address]  1/1/1996     [place] [address]       Subscriber Name Subscriber Birth Date Member ID       KIERRA MISHRA 4/5/1929 [id number]                 Emergency Contacts      (Rel.) Home Phone Work Phone Mobile Phone    Suma Rincon (Daughter) [phone] -- --    Elsi Rincon (Daughter) -- -- [phone]            Emergency Contact Information     Name Relation Home Work Mobile    Suma Rincon Daughter [phone]      Elsi Rincon Daughter   [phone]          Insurance Information                MEDICARE/MEDICARE A & B Phone: [phone]    " Subscriber: Kierra Scales Subscriber#: [id number]    Group#:  Precert#:         [facility] SUP Phone:     Subscriber: Kierra Scales Subscriber#: [id number]    Group#: [id number] Precert#:              History & Physical      Bennie Lucia MD at 6/12/2017  1:04 PM           [address], [place]. [address]  T - [phone]     H/P NOTE         SUBJECTIVE:   Patient Care Team:  Melissa Ch MD as PCP - General  Lindsay Ocampo MA as Medical Assistant    Chief Complaint:     Chief Complaint   Patient presents with   • Rectal Bleeding       Subjective     Patient is 88 y.o. female presents with Admitted with rectal bleeding and anemia.  Was found to have a sigmoid colon cancer and underwent a left hemicolectomy by Dr. Linn on June 2, 2017.  Postoperatively she's done fairly well she's participated with therapy and we have been asked to admit her to the acute rehabilitation unit.      ROS/HISTORY/ CURRENT MEDICATIONS/OBJECTIVE/VS/PE:       History:     Past Medical History:   Diagnosis Date   • Anxiety    • Arthritis    • Bradycardia    • Dyslipidemia    • Dyspnea    • Fibrocystic disease of breast    • Hashimoto's thyroiditis    • Hypertensive disorder    • Hypothyroidism    • Pain    • Palpitations    • Paroxysmal tachycardia    • Shortness of breath    • Tricuspid valve disorders, specified as nonrheumatic    • Vitamin D deficiency      Past Surgical History:   Procedure Laterality Date   • COLON RESECTION N/A 6/2/2017    Procedure: COLON RESECTION;  Surgeon: Stefano Linn MD;  Location: [facility] OR;  Service:    • COLONOSCOPY N/A 5/30/2017    Procedure: Flexible Sigmoidoscopy;  Surgeon: Stefano Linn MD;  Location: [facility] ENDOSCOPY;  Service:    • EXTERNAL EAR SURGERY     • EYE SURGERY     • LIPOMA EXCISION     • PACEMAKER IMPLANTATION     • SALIVARY GLAND SURGERY     • TUBAL ABDOMINAL LIGATION       Family History   Problem  Relation Age of Onset   • Diabetes Other    • Heart disease Other    • Hypertension Other    • Thyroid disease Other      Social History   Substance Use Topics   • Smoking status: Never Smoker   • Smokeless tobacco: Never Used   • Alcohol use No       Allergies:  Penicillins    Current Medications:     Current Facility-Administered Medications:   •  bupivacaine-EPINEPHrine PF (MARCAINE w/EPI) 0.5% -1:959121 injection, , , PRN, Stefano Linn MD, 30 mL at 06/02/17 1130  •  dextrose (D50W) solution 25 g, 25 g, Intravenous, Q15 Min PRN, Vicki G Levill, APRN, 25 g at 05/30/17 0828  •  dextrose (GLUTOSE) oral gel 15 g, 15 g, Oral, Q15 Min PRN, Vicki G Levill, APRN  •  diltiaZEM CD (CARDIZEM CD) 24 hr capsule 180 mg, 180 mg, Oral, Daily, Joan Rojas MD, 180 mg at 06/12/17 0836  •  donepezil (ARICEPT) tablet 10 mg, 10 mg, Oral, Nightly, Stefano Linn MD, 10 mg at 06/11/17 2034  •  enalapril (VASOTEC) tablet 20 mg, 20 mg, Oral, BID, Joan Rojas MD, 20 mg at 06/12/17 0836  •  enoxaparin (LOVENOX) syringe 40 mg, 40 mg, Subcutaneous, Q24H, Jimmy Landry MD, 40 mg at 06/12/17 0837  •  glucagon (human recombinant) (GLUCAGEN DIAGNOSTIC) injection 1 mg, 1 mg, Subcutaneous, Q15 Min PRN, Vicki G Levill, APRN  •  hydrALAZINE (APRESOLINE) injection 10 mg, 10 mg, Intravenous, Q6H PRN, Raymond Rivera MD, 10 mg at 06/11/17 0131  •  HYDROcodone-acetaminophen (NORCO) 5-325 MG per tablet 1 tablet, 1 tablet, Oral, Q6H PRN, Stefano Linn MD, 1 tablet at 06/12/17 0009  •  insulin aspart (novoLOG) injection 0-14 Units, 0-14 Units, Subcutaneous, 4x Daily AC & at Bedtime, Vicki G Levill, APRN, 3 Units at 06/12/17 1126  •  memantine (NAMENDA) tablet 5 mg, 5 mg, Oral, BID, Samer MD Bob, 5 mg at 06/12/17 0837  •  methIMAzole (TAPAZOLE) half tablet 2.5 mg, 2.5 mg, Oral, Daily, Samer MD Bob, 2.5 mg at 06/12/17 0837  •  Morphine sulfate (PF) injection 2 mg, 2 mg, Intravenous, Q4H PRN, Stefano Edward  "MD Kaveh  •  naloxone (NARCAN) injection 0.1 mg, 0.1 mg, Intravenous, Q5 Min PRN, Stefano Linn MD  •  ondansetron (ZOFRAN) injection 4 mg, 4 mg, Intravenous, Q4H PRN, Guido Motley MD, 4 mg at 06/10/17 2106  •  pantoprazole (PROTONIX) injection 40 mg, 40 mg, Intravenous, Q12H, Joan Rojas MD, 40 mg at 06/12/17 0838  •  potassium chloride 10 mEq in 100 mL IVPB, 10 mEq, Intravenous, Q1H PRN, Guido Motley MD, Last Rate: 100 mL/hr at 06/09/17 0651, 10 mEq at 06/09/17 0651  •  sodium chloride 0.9 % flush 1-10 mL, 1-10 mL, Intravenous, PRN, Vicki G Levill, APRN, 10 mL at 05/28/17 0021  •  sotalol (BETAPACE) tablet 80 mg, 80 mg, Oral, Q12H, Joan Rojas MD, 80 mg at 06/12/17 0836    Current Outpatient Prescriptions:   •  diltiaZEM CD (CARDIZEM CD) 180 MG 24 hr capsule, Take 180 mg by mouth Daily., Disp: , Rfl:   •  Alcohol Swabs (B-D SINGLE USE SWABS REGULAR) pads, USE AS DIRECTED, Disp: 200 each, Rfl: 3  •  donepezil (ARICEPT) 10 MG tablet, Take 1 tablet by mouth Daily., Disp: , Rfl: 3  •  enalapril (VASOTEC) 20 MG tablet, Take 20 mg by mouth 2 (two) times a day., Disp: , Rfl:   •  ezetimibe (ZETIA) 10 MG tablet, Take 1 tablet by mouth Daily., Disp: 30 tablet, Rfl: 11  •  Insulin Glargine (LANTUS SOLOSTAR) 100 UNIT/ML injection pen, Inject 20 Units under the skin every night., Disp: , Rfl:   •  Insulin Pen Needle (COMFORT EZ PEN NEEDLES) 31G X 6 MM misc, 2 (two) times a day. Inject two times per day., Disp: , Rfl:   •  Insulin Syringe-Needle U-100 31G X 5/16\" 1 ML misc, 2 (two) times a day. Use as directed TWICE DAILY FOR Insulin Injections, Disp: , Rfl:   •  Lancets (UNILET COMFORTOUCH LANCET) misc, USE AS DIRECTED FOR FINGERSTICKS, Disp: 100 each, Rfl: 3  •  memantine (NAMENDA) 5 MG tablet, Take 1 tablet by mouth 2 (Two) Times a Day., Disp: , Rfl: 3  •  methIMAzole (TAPAZOLE) 5 MG tablet, Take half tablet q day (Patient taking differently: Take 2.5 mg by mouth Daily. Take half tablet q day), " Disp: 30 tablet, Rfl: 5  •  nabumetone (RELAFEN) 500 MG tablet, Take 500 mg by mouth daily., Disp: , Rfl:   •  NOVOLOG FLEXPEN 100 UNIT/ML solution pen-injector sc pen, INJECT 4 UNITS SUBCUTANEOUSLY WITH SUPPER. MAY INCREASE UP TO 8 UNITS AS DIRECTED, Disp: 15 mL, Rfl: 3  •  pravastatin (PRAVACHOL) 40 MG tablet, Take 1 tablet by mouth Every Night., Disp: 30 tablet, Rfl: 11  •  SITagliptin (JANUVIA) 100 MG tablet, Take 1 tablet by mouth Daily., Disp: 30 tablet, Rfl: 11  •  sotalol (BETAPACE) 80 MG tablet, Take 80 mg by mouth 2 (Two) Times a Day., Disp: , Rfl:   •  vitamin D (ERGOCALCIFEROL) 82491 UNITS capsule capsule, Take 1 capsule by mouth Every 14 (Fourteen) Days., Disp: 30 capsule, Rfl: 11      REVIEW OF SYSTEMS:  Review of Systems   Constitutional: Positive for activity change and fatigue. Negative for unexpected weight change.   HENT: Negative for congestion, hearing loss, mouth sores, nosebleeds and postnasal drip.    Eyes: Negative.    Respiratory: Negative.    Cardiovascular: Negative.    Endocrine: Negative.    Genitourinary: Negative.    Musculoskeletal: Positive for arthralgias and gait problem.   Skin: Negative.         She has a surgical incision is healing   Allergic/Immunologic: Negative.    Neurological: Negative for dizziness, seizures, facial asymmetry, light-headedness, numbness and headaches.   Hematological: Negative.    Psychiatric/Behavioral: Positive for confusion. Negative for agitation, behavioral problems, decreased concentration, dysphoric mood, hallucinations, self-injury, sleep disturbance and suicidal ideas. The patient is not nervous/anxious and is not hyperactive.         Family that she's had some memory loss that seems to be a little worse in the hospital after surgery       Objective     Physical Exam:   Temp:  [97 °F (36.1 °C)-99.5 °F (37.5 °C)] 97 °F (36.1 °C)  Heart Rate:  [68-79] 70  Resp:  [18] 18  BP: ()/(44-70) 110/53    Physical Exam:    Physical Exam    Constitutional: She is oriented to person, place, and time. She appears well-developed and well-nourished. No distress.   HENT:   Head: Normocephalic and atraumatic.   Eyes: Conjunctivae and EOM are normal. Pupils are equal, round, and reactive to light. Right eye exhibits no discharge. Left eye exhibits no discharge. No scleral icterus.   Neck: Normal range of motion. Neck supple. No tracheal deviation present. No thyromegaly present.   Cardiovascular: Normal rate, regular rhythm and intact distal pulses.  Exam reveals no gallop and no friction rub.    Murmur heard.  Chest wall shows pacemaker to be present without holosystolic murmur is present as well across the chest     Pulmonary/Chest: Effort normal and breath sounds normal. No respiratory distress. She has no wheezes. She has no rales. She exhibits no tenderness.   Abdominal: Soft. Bowel sounds are normal. She exhibits no distension and no mass. There is no tenderness. There is guarding.   Lower midline abdominal incision is present and healing well   Musculoskeletal: Normal range of motion. She exhibits no edema, tenderness or deformity.   Neurological: She is alert and oriented to person, place, and time. She has normal reflexes. No cranial nerve deficit. Coordination normal.   Skin: Skin is warm and dry. She is not diaphoretic. No erythema.   Psychiatric: She has a normal mood and affect. Her behavior is normal. Judgment normal.   She does have some memory issues and they are mild     Nursing note and vitals reviewed.           Results Review:      Lab Results (last 24 hours)     Procedure Component Value Units Date/Time    POC Glucose Fingerstick [[id number]]  (Abnormal) Collected:  06/11/17 1142    Specimen:  Blood Updated:  06/11/17 1350     Glucose 187 (H) mg/dL       Sliding Scale AdminMeter: [id number]: [id number] GOMEZ LAGOS       POC Glucose Fingerstick [[id number]]  (Abnormal) Collected:  06/11/17 1635    Specimen:  Blood Updated:   06/11/17 1825     Glucose 219 (H) mg/dL       Sliding Scale AdminMeter: [id number]: [id number] GOMEZ LAGOS       POC Glucose Fingerstick [[id number]]  (Abnormal) Collected:  06/11/17 2033    Specimen:  Blood Updated:  06/11/17 2130     Glucose 239 (H) mg/dL       Meter: [id number]: [id number] DRAKE NAZARIO       POC Glucose Fingerstick [[id number]]  (Abnormal) Collected:  06/12/17 0641    Specimen:  Blood Updated:  06/12/17 0701     Glucose 232 (H) mg/dL       RN NotifiedMeter: [id number]: [id number] PAWEL STARKS       POC Glucose Fingerstick [[id number]]  (Abnormal) Collected:  06/12/17 1112    Specimen:  Blood Updated:  06/12/17 1127     Glucose 195 (H) mg/dL       Meter: [id number]: [id number] JEREL SORENSON                             Imaging Results (last 24 hours)     ** No results found for the last 24 hours. **          I reviewed the patient's  clinical results.  I reviewed the patient's  imaging results and agree with the interpretation.   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  We will check iron levels and start replacing her iron.  Folic acid was checked in addition we'll check a B12 level also.  We expect that she'll participate 3 hours a day make measurable improvement and be able to return home at discharge  We'll control her diabetes and her blood pressure continue routine meds  Discussed with Dr. Linn prior to admission and discussed with her daughters in the room  also  I discussed the patients findings and my recommendations with patient, family, nursing staff and consulting provider.      Bennie Lucia MD  06/12/17  1:04 PM        Electronically signed by Bennie Lucia MD at 6/12/2017  1:14 PM      Bennie Lucia MD at 6/12/2017  1:43 PM            H&P reviewed. The patient was examined and there are no changes to the H&P.         Electronically signed by Bennie Lucia MD at 6/12/2017  1:43 PM    Source Note              [address], [place]. [address]  T - [phone]     H/P NOTE         SUBJECTIVE:   Patient Care Team:  Melissa Ch MD as PCP - General  Lindsay Ocampo MA as Medical Assistant    Chief Complaint:     Chief Complaint   Patient presents with   • Rectal Bleeding       Subjective     Patient is 88 y.o. female presents with Admitted with rectal bleeding and anemia.  Was found to have a sigmoid colon cancer and underwent a left hemicolectomy by Dr. Linn on June 2, 2017.  Postoperatively she's done fairly well she's participated with therapy and we have been asked to admit her to the acute rehabilitation unit.      ROS/HISTORY/ CURRENT MEDICATIONS/OBJECTIVE/VS/PE:       History:     Past Medical History:   Diagnosis Date   • Anxiety    • Arthritis    • Bradycardia    • Dyslipidemia    • Dyspnea    • Fibrocystic disease of breast    • Hashimoto's thyroiditis    • Hypertensive disorder    • Hypothyroidism    • Pain    • Palpitations    • Paroxysmal tachycardia    • Shortness of breath    • Tricuspid valve disorders, specified as nonrheumatic    • Vitamin D deficiency      Past Surgical History:   Procedure Laterality Date   • COLON RESECTION N/A 6/2/2017    Procedure: COLON RESECTION;  Surgeon: Stefano Linn MD;  Location: [facility] OR;  Service:    • COLONOSCOPY N/A 5/30/2017    Procedure: Flexible Sigmoidoscopy;  Surgeon: Stefano Linn MD;  Location: [facility]  ENDOSCOPY;  Service:    • EXTERNAL EAR SURGERY     • EYE SURGERY     • LIPOMA EXCISION     • PACEMAKER IMPLANTATION     • SALIVARY GLAND SURGERY     • TUBAL ABDOMINAL LIGATION       Family History   Problem Relation Age of Onset   • Diabetes Other    • Heart disease Other    • Hypertension Other    • Thyroid disease Other      Social History   Substance Use Topics   • Smoking status: Never Smoker   • Smokeless tobacco: Never Used   • Alcohol use No       Allergies:  Penicillins    Current Medications:     Current Facility-Administered Medications:   •  bupivacaine-EPINEPHrine PF (MARCAINE w/EPI) 0.5% -1:251203 injection, , , PRN, Stefano Linn MD, 30 mL at 06/02/17 1130  •  dextrose (D50W) solution 25 g, 25 g, Intravenous, Q15 Min PRN, Vicki G Levill, APRN, 25 g at 05/30/17 0828  •  dextrose (GLUTOSE) oral gel 15 g, 15 g, Oral, Q15 Min PRN, Vicki G Levill, APRN  •  diltiaZEM CD (CARDIZEM CD) 24 hr capsule 180 mg, 180 mg, Oral, Daily, Joan Rojas MD, 180 mg at 06/12/17 0836  •  donepezil (ARICEPT) tablet 10 mg, 10 mg, Oral, Nightly, Stefano Linn MD, 10 mg at 06/11/17 2034  •  enalapril (VASOTEC) tablet 20 mg, 20 mg, Oral, BID, Joan Rojas MD, 20 mg at 06/12/17 0836  •  enoxaparin (LOVENOX) syringe 40 mg, 40 mg, Subcutaneous, Q24H, Jimmy Landry MD, 40 mg at 06/12/17 0837  •  glucagon (human recombinant) (GLUCAGEN DIAGNOSTIC) injection 1 mg, 1 mg, Subcutaneous, Q15 Min PRN, Vicki G Levill, APRN  •  hydrALAZINE (APRESOLINE) injection 10 mg, 10 mg, Intravenous, Q6H PRN, Raymond Rivera MD, 10 mg at 06/11/17 0131  •  HYDROcodone-acetaminophen (NORCO) 5-325 MG per tablet 1 tablet, 1 tablet, Oral, Q6H PRN, Stefano Linn MD, 1 tablet at 06/12/17 0009  •  insulin aspart (novoLOG) injection 0-14 Units, 0-14 Units, Subcutaneous, 4x Daily AC & at Bedtime, DEBBY Syed, 3 Units at 06/12/17 1126  •  memantine (NAMENDA) tablet 5 mg, 5 mg, Oral, BID, Joan Rojas MD, 5 mg at 06/12/17  "0837  •  methIMAzole (TAPAZOLE) half tablet 2.5 mg, 2.5 mg, Oral, Daily, Joan Rojas MD, 2.5 mg at 06/12/17 0837  •  Morphine sulfate (PF) injection 2 mg, 2 mg, Intravenous, Q4H PRN, Stefano Linn MD  •  naloxone (NARCAN) injection 0.1 mg, 0.1 mg, Intravenous, Q5 Min PRN, Stefano Linn MD  •  ondansetron (ZOFRAN) injection 4 mg, 4 mg, Intravenous, Q4H PRN, Guido Motley MD, 4 mg at 06/10/17 2106  •  pantoprazole (PROTONIX) injection 40 mg, 40 mg, Intravenous, Q12H, Joan Rojas MD, 40 mg at 06/12/17 0838  •  potassium chloride 10 mEq in 100 mL IVPB, 10 mEq, Intravenous, Q1H PRN, Guido Motley MD, Last Rate: 100 mL/hr at 06/09/17 0651, 10 mEq at 06/09/17 0651  •  sodium chloride 0.9 % flush 1-10 mL, 1-10 mL, Intravenous, PRN, Vicki G Levill, APRN, 10 mL at 05/28/17 0021  •  sotalol (BETAPACE) tablet 80 mg, 80 mg, Oral, Q12H, Joan Rojas MD, 80 mg at 06/12/17 0836    Current Outpatient Prescriptions:   •  diltiaZEM CD (CARDIZEM CD) 180 MG 24 hr capsule, Take 180 mg by mouth Daily., Disp: , Rfl:   •  Alcohol Swabs (B-D SINGLE USE SWABS REGULAR) pads, USE AS DIRECTED, Disp: 200 each, Rfl: 3  •  donepezil (ARICEPT) 10 MG tablet, Take 1 tablet by mouth Daily., Disp: , Rfl: 3  •  enalapril (VASOTEC) 20 MG tablet, Take 20 mg by mouth 2 (two) times a day., Disp: , Rfl:   •  ezetimibe (ZETIA) 10 MG tablet, Take 1 tablet by mouth Daily., Disp: 30 tablet, Rfl: 11  •  Insulin Glargine (LANTUS SOLOSTAR) 100 UNIT/ML injection pen, Inject 20 Units under the skin every night., Disp: , Rfl:   •  Insulin Pen Needle (COMFORT EZ PEN NEEDLES) 31G X 6 MM misc, 2 (two) times a day. Inject two times per day., Disp: , Rfl:   •  Insulin Syringe-Needle U-100 31G X 5/16\" 1 ML misc, 2 (two) times a day. Use as directed TWICE DAILY FOR Insulin Injections, Disp: , Rfl:   •  Lancets (UNILET COMFORTOUCH LANCET) misc, USE AS DIRECTED FOR FINGERSTICKS, Disp: 100 each, Rfl: 3  •  memantine (NAMENDA) 5 MG tablet, Take 1 " tablet by mouth 2 (Two) Times a Day., Disp: , Rfl: 3  •  methIMAzole (TAPAZOLE) 5 MG tablet, Take half tablet q day (Patient taking differently: Take 2.5 mg by mouth Daily. Take half tablet q day), Disp: 30 tablet, Rfl: 5  •  nabumetone (RELAFEN) 500 MG tablet, Take 500 mg by mouth daily., Disp: , Rfl:   •  NOVOLOG FLEXPEN 100 UNIT/ML solution pen-injector sc pen, INJECT 4 UNITS SUBCUTANEOUSLY WITH SUPPER. MAY INCREASE UP TO 8 UNITS AS DIRECTED, Disp: 15 mL, Rfl: 3  •  pravastatin (PRAVACHOL) 40 MG tablet, Take 1 tablet by mouth Every Night., Disp: 30 tablet, Rfl: 11  •  SITagliptin (JANUVIA) 100 MG tablet, Take 1 tablet by mouth Daily., Disp: 30 tablet, Rfl: 11  •  sotalol (BETAPACE) 80 MG tablet, Take 80 mg by mouth 2 (Two) Times a Day., Disp: , Rfl:   •  vitamin D (ERGOCALCIFEROL) 12020 UNITS capsule capsule, Take 1 capsule by mouth Every 14 (Fourteen) Days., Disp: 30 capsule, Rfl: 11      REVIEW OF SYSTEMS:  Review of Systems   Constitutional: Positive for activity change and fatigue. Negative for unexpected weight change.   HENT: Negative for congestion, hearing loss, mouth sores, nosebleeds and postnasal drip.    Eyes: Negative.    Respiratory: Negative.    Cardiovascular: Negative.    Endocrine: Negative.    Genitourinary: Negative.    Musculoskeletal: Positive for arthralgias and gait problem.   Skin: Negative.         She has a surgical incision is healing   Allergic/Immunologic: Negative.    Neurological: Negative for dizziness, seizures, facial asymmetry, light-headedness, numbness and headaches.   Hematological: Negative.    Psychiatric/Behavioral: Positive for confusion. Negative for agitation, behavioral problems, decreased concentration, dysphoric mood, hallucinations, self-injury, sleep disturbance and suicidal ideas. The patient is not nervous/anxious and is not hyperactive.         Family that she's had some memory loss that seems to be a little worse in the hospital after surgery       Objective      Physical Exam:   Temp:  [97 °F (36.1 °C)-99.5 °F (37.5 °C)] 97 °F (36.1 °C)  Heart Rate:  [68-79] 70  Resp:  [18] 18  BP: ()/(44-70) 110/53    Physical Exam:    Physical Exam   Constitutional: She is oriented to person, place, and time. She appears well-developed and well-nourished. No distress.   HENT:   Head: Normocephalic and atraumatic.   Eyes: Conjunctivae and EOM are normal. Pupils are equal, round, and reactive to light. Right eye exhibits no discharge. Left eye exhibits no discharge. No scleral icterus.   Neck: Normal range of motion. Neck supple. No tracheal deviation present. No thyromegaly present.   Cardiovascular: Normal rate, regular rhythm and intact distal pulses.  Exam reveals no gallop and no friction rub.    Murmur heard.  Chest wall shows pacemaker to be present without holosystolic murmur is present as well across the chest     Pulmonary/Chest: Effort normal and breath sounds normal. No respiratory distress. She has no wheezes. She has no rales. She exhibits no tenderness.   Abdominal: Soft. Bowel sounds are normal. She exhibits no distension and no mass. There is no tenderness. There is guarding.   Lower midline abdominal incision is present and healing well   Musculoskeletal: Normal range of motion. She exhibits no edema, tenderness or deformity.   Neurological: She is alert and oriented to person, place, and time. She has normal reflexes. No cranial nerve deficit. Coordination normal.   Skin: Skin is warm and dry. She is not diaphoretic. No erythema.   Psychiatric: She has a normal mood and affect. Her behavior is normal. Judgment normal.   She does have some memory issues and they are mild     Nursing note and vitals reviewed.           Results Review:      Lab Results (last 24 hours)     Procedure Component Value Units Date/Time    POC Glucose Fingerstick [[id number]]  (Abnormal) Collected:  06/11/17 1142    Specimen:  Blood Updated:  06/11/17 1350     Glucose 187 (H) mg/dL        Sliding Scale AdminMeter: [id number]: [id number] GOMEZ LAGOS       POC Glucose Fingerstick [[id number]]  (Abnormal) Collected:  06/11/17 1635    Specimen:  Blood Updated:  06/11/17 1825     Glucose 219 (H) mg/dL       Sliding Scale AdminMeter: [id number]: [id number] GOMEZ LAGOS       POC Glucose Fingerstick [[id number]]  (Abnormal) Collected:  06/11/17 2033    Specimen:  Blood Updated:  06/11/17 2130     Glucose 239 (H) mg/dL       Meter: [id number]: [id number] DRAKE NAZARIO       POC Glucose Fingerstick [[id number]]  (Abnormal) Collected:  06/12/17 0641    Specimen:  Blood Updated:  06/12/17 0701     Glucose 232 (H) mg/dL       RN NotifiedMeter: [id number]: [id number] PAWEL STARKS       POC Glucose Fingerstick [[id number]]  (Abnormal) Collected:  06/12/17 1112    Specimen:  Blood Updated:  06/12/17 1127     Glucose 195 (H) mg/dL       Meter: [id number]: [id number] JEREL [facility]                             Imaging Results (last 24 hours)     ** No results found for the last 24 hours. **          I reviewed the patient's  clinical results.  I reviewed the patient's  imaging results and agree with the interpretation.   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  We will check iron levels and start replacing her iron.  Folic acid was checked in addition we'll check a B12 level also.  We expect that she'll participate 3 hours a day make measurable improvement and be able to return  home at discharge  We'll control her diabetes and her blood pressure continue routine meds  Discussed with Dr. Linn prior to admission and discussed with her daughters in the room also  I discussed the patients findings and my recommendations with patient, family, nursing staff and consulting provider.      Bennie Lucia MD  06/12/17  1:04 PM         Electronically signed by Bennie Lucia MD at 6/12/2017  1:14 PM                 Physician Progress Notes (last 24 hours) (Notes from 6/21/2017  9:16 AM through 6/22/2017  9:16 AM)      Bennie Lucia MD at 6/21/2017 10:00 AM  Version 1 of 1              LOS: 9 days   Patient Care Team:  Melissa Ch MD as PCP - General  Lindsay Ocampo MA as Medical Assistant    Chief Complaint:  Deconditioning secondary to colon resection for adenocarcinoma of the sigmoid        Interval History:     SUBJECTIVE:  Good spirits today  He denies pain nausea no shortness of breath  Slept well eating well  History taken from: patient chart family RN And therapy staff.  Notes reviewed.  Discussed with her daughter today as well    Objective     Vital Signs  Temp:  [96.8 °F (36 °C)-97.7 °F (36.5 °C)] 96.8 °F (36 °C)  Heart Rate:  [82-85] 85  Resp:  [18] 18  BP: (129-170)/(62-75) 170/75  Last 3 weights    06/18/17  0543 06/19/17  0507 06/20/17  0541   Weight: 169 lb 4.8 oz (76.8 kg) 174 lb 9 oz (79.2 kg) 174 lb 1 oz (79 kg)         Physical Exam:     General Appearance:    Alert, cooperative, in no acute distress   Head:    Normocephalic, without obvious abnormality, atraumatic   Eyes:            Lids and lashes normal, conjunctivae and sclerae normal, no   icterus, no pallor, corneas clear, PERRLA   Throat:   No oral lesions, no thrush, oral mucosa moist   Neck:   No adenopathy, supple, trachea midline, no thyromegaly, no   carotid bruit, no JVD       Lungs:     Clear to auscultation,respirations regular, even and                  Unlabored     Heart:     Regular rhythm and normal rate, normal S1 and S2, no            murmur, no gallop, no rub, no click    Chest Wall:    No abnormalities observed   Abdomen:     Normal bowel sounds, no masses, no organomegaly, soft        non-tender, non-distended, no guarding, no rebound                Tenderness    Extremities:   Moves all extremities well, no edema, no cyanosis, no             Redness        Skin:   No bleeding, bruising or rash   Lymph nodes:   No palpable adenopathy   Neurologic:   Cranial nerves 2 - 12 grossly intact, sensation intact, DTR       present and equal bilaterally        RESULTS REVIEW:     Lab Results (last 24 hours)     Procedure Component Value Units Date/Time    POC Glucose Fingerstick [[id number]]  (Abnormal) Collected:  06/20/17 1115    Specimen:  Blood Updated:  06/20/17 1129     Glucose 224 (H) mg/dL       Sliding Scale AdminMeter: [id number]: [id number] [employer]oroastrian CASS       POC Glucose Fingerstick [[id number]]  (Abnormal) Collected:  06/20/17 1610    Specimen:  Blood Updated:  06/20/17 1632     Glucose 233 (H) mg/dL       Sliding Scale AdminMeter: [id number]: [id number] [facility]astrian CASS       POC Glucose Fingerstick [[id number]]  (Abnormal) Collected:  06/20/17 2133    Specimen:  Blood Updated:  06/20/17 2157     Glucose 196 (H) mg/dL       Sliding Scale AdminMeter: [id number]: [id number] ARCOS NIKKIE K       POC Glucose Fingerstick [[id number]]  (Abnormal) Collected:  06/21/17 0518    Specimen:  Blood Updated:  06/21/17 0630     Glucose 256 (H) mg/dL       Sliding Scale AdminMeter: [id number]: [id number] ARCOS NIKKIE K           Imaging Results (last 72 hours)     ** No results found for the last 72 hours. 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  She will be discharged home on .    Family would like for her to have outpatient physical therapy and occupational therapy  Discussed with speech therapy and outpatient speech therapy probably not necessary    Reviewed with therapy and she will need a 5 inch fixed wheels rolling walker      Bennie Lucia MD  17  10:00 AM               Electronically signed by Bennie Lucia MD at 2017 10:02 AM           Consult Notes (all)      Kamron Strong MD at 2017 12:57 PM  Version 1 of 1     Consult Orders:    1. Inpatient Consult to Nephrology [[id number]] ordered by Bennie Lucia MD at 17 0902                [facility]  [address] [place] [address]. [address]  T - [phone]  F - [phone]     Consultation         PATIENT  DEMOGRAPHICS   PATIENT NAME: Kierra Scales                      PHYSICIAN: Kamron Strong MD  : 1929  MRN: [id number]    Subjective   SUBJECTIVE   Referring Provider: Dr Lucia  Reason for Consultation: KOLE  History of present illness:      Ms. Scales is a 88-year-old female who has history of dementia diabetes mellitus type 2 came in with the GI bleed.  Patient underwent colonoscopy and found to have a large mass in the sigmoid colon.  She therefore underwent colonic resection.  pathology turned out to be colonic adenocarcinoma moderately differentiated with free margins. Patient is now transferred to acute rehabilitation for physical therapy.    We have been asked to evaluate for worsening creatinine.  Her baseline creatinine is less than 1 and today it is 1.45.  Her Vasotec is already been discontinued.  She was started on Tradjenta yesterday and that has also been stopped.  " She is not on any potential nephrotoxins.  Her blood pressures though borderline low and yesterday it was 88/52.  History is fairly Limited due to her dementia.  She denies any problem with urination.  She denies any shortness of air.    Past Medical History:   Diagnosis Date   • Anxiety    • Arthritis    • Bradycardia    • Dyslipidemia    • Dyspnea    • Fibrocystic disease of breast    • Hashimoto's thyroiditis    • Hypertensive disorder    • Hypothyroidism    • Pain    • Palpitations    • Paroxysmal tachycardia    • Shortness of breath    • Tricuspid valve disorders, specified as nonrheumatic    • Vitamin D deficiency      Past Surgical History:   Procedure Laterality Date   • COLON RESECTION N/A 6/2/2017    Procedure: COLON RESECTION;  Surgeon: Stefano Linn MD;  Location: [facility] OR;  Service:    • COLONOSCOPY N/A 5/30/2017    Procedure: Flexible Sigmoidoscopy;  Surgeon: Stefano Linn MD;  Location: [facility] ENDOSCOPY;  Service:    • EXTERNAL EAR SURGERY     • EYE SURGERY     • LIPOMA EXCISION     • PACEMAKER IMPLANTATION     • SALIVARY GLAND SURGERY     • TUBAL ABDOMINAL LIGATION       Family History   Problem Relation Age of Onset   • Diabetes Other    • Heart disease Other    • Hypertension Other    • Thyroid disease Other      Social History   Substance Use Topics   • Smoking status: Never Smoker   • Smokeless tobacco: Never Used   • Alcohol use No     Allergies:  Penicillins     REVIEW OF SYSTEMS    Review of Systems   Unable to perform ROS: Dementia       Objective   OBJECTIVE   Vital Signs  Temp:  [95.5 °F (35.3 °C)-97.3 °F (36.3 °C)] 97.3 °F (36.3 °C)  Heart Rate:  [62-63] 63  Resp:  [18-20] 20  BP: ()/(52-58) 106/58    Flowsheet Rows         First Filed Value    Admission Height  72\" (182.9 cm) Documented at 06/12/2017 1322    Admission Weight  171 lb 6.4 oz (77.7 kg) [please use standing scale fo next weigh in.] Documented at 06/12/2017 1322           I/O last 3 completed " shifts:  In: 680 [P.O.:680]  Out: -     PHYSICAL EXAM    Physical Exam   Constitutional: She appears well-developed.   HENT:   Head: Normocephalic.   Eyes: Pupils are equal, round, and reactive to light.   Cardiovascular: Normal rate, regular rhythm and normal heart sounds.    Pulmonary/Chest: Effort normal and breath sounds normal.   Abdominal: Soft. Bowel sounds are normal.   Musculoskeletal: She exhibits no edema.   Neurological: She is alert.       RESULTS   Results Review:      Results from last 7 days  Lab Units 06/14/17  0501 06/13/17  0533 06/09/17  1132 06/08/17  0610   SODIUM mmol/L 135* 137 133*  --  132*   POTASSIUM mmol/L 3.5 3.1* 3.8  3.8  < > 3.2*   CHLORIDE mmol/L 97 97 94*  --  96   TOTAL CO2 mmol/L 28.0 28.0 25.0  --  26.0   BUN mg/dL 27* 18 6*  --  5*   CREATININE mg/dL 1.40* 0.97 0.54  --  0.61   CALCIUM mg/dL 8.9 8.7 8.9  --  8.3*   BILIRUBIN mg/dL  --  0.3 0.5  --  0.4   ALK PHOS U/L  --  50 63  --  49   ALT (SGPT) U/L  --  33 29  --  26   AST (SGOT) U/L  --  16 30  --  19   GLUCOSE mg/dL 88 134* 168*  --  159*   < > = values in this interval not displayed.    Estimated Creatinine Clearance: 32.1 mL/min (by C-G formula based on Cr of 1.4).      Results from last 7 days  Lab Units 06/14/17  0501 06/13/17  0533   MAGNESIUM mg/dL 2.0 1.9   PHOSPHORUS mg/dL 4.1  --                Results from last 7 days  Lab Units 06/14/17  0501 06/13/17  0533 06/11/17  0435 06/09/17  1132 06/08/17  0610   WBC 10*3/mm3 6.47 5.46 5.60 6.26 4.85   HEMOGLOBIN g/dL 8.8* 8.5* 9.6* 9.9* 8.0*   PLATELETS 10*3/mm3 319 299 300 323 238              MEDICATIONS      diltiaZEM  mg Oral Daily   docusate sodium 100 mg Oral BID   donepezil 10 mg Oral Nightly   ferrous sulfate 324 mg Oral BID With Meals   folic acid-vit B6-vit B12 1 tablet Oral Daily   insulin aspart 0-14 Units Subcutaneous 4x Daily AC & at Bedtime   insulin detemir 15 Units Subcutaneous Nightly   magnesium oxide 400 mg Oral Daily   memantine 5 mg Oral  "BID   methIMAzole 2.5 mg Oral Daily   potassium chloride 10 mEq Oral BID With Meals   sotalol 80 mg Oral Q12H        Prescriptions Prior to Admission   Medication Sig Dispense Refill Last Dose   • Alcohol Swabs (B-D SINGLE USE SWABS REGULAR) pads USE AS DIRECTED 200 each 3    • diltiaZEM CD (CARDIZEM CD) 180 MG 24 hr capsule Take 180 mg by mouth Daily.   5/26/2017 at 0900   • donepezil (ARICEPT) 10 MG tablet Take 1 tablet by mouth Daily.  3 5/26/2017 at 0900   • NOVOLOG FLEXPEN 100 UNIT/ML solution pen-injector sc pen INJECT 4 UNITS SUBCUTANEOUSLY WITH SUPPER. MAY INCREASE UP TO 8 UNITS AS DIRECTED 15 mL 3 5/25/2017 at 1900   • pravastatin (PRAVACHOL) 40 MG tablet Take 1 tablet by mouth Every Night. 30 tablet 11 5/25/2017 at 2100   • SITagliptin (JANUVIA) 100 MG tablet Take 1 tablet by mouth Daily. 30 tablet 11 5/26/2017 at 0900   • sotalol (BETAPACE) 80 MG tablet Take 80 mg by mouth 2 (Two) Times a Day.   5/26/2017 at 0900   • vitamin D (ERGOCALCIFEROL) 72647 UNITS capsule capsule Take 1 capsule by mouth Every 14 (Fourteen) Days. 30 capsule 11    • enalapril (VASOTEC) 20 MG tablet Take 20 mg by mouth 2 (two) times a day.   5/26/2017 at 0900   • ezetimibe (ZETIA) 10 MG tablet Take 1 tablet by mouth Daily. 30 tablet 11 5/26/2017 at 0900   • Insulin Glargine (LANTUS SOLOSTAR) 100 UNIT/ML injection pen Inject 20 Units under the skin every night.   5/25/2017 at 2100   • Insulin Pen Needle (COMFORT EZ PEN NEEDLES) 31G X 6 MM misc 2 (two) times a day. Inject two times per day.   Taking   • Insulin Syringe-Needle U-100 31G X 5/16\" 1 ML misc 2 (two) times a day. Use as directed TWICE DAILY FOR Insulin Injections   Taking   • Lancets (UNILET COMFORTOUCH LANCET) misc USE AS DIRECTED FOR FINGERSTICKS 100 each 3    • memantine (NAMENDA) 5 MG tablet Take 1 tablet by mouth 2 (Two) Times a Day.  3 5/26/2017 at 0900   • methIMAzole (TAPAZOLE) 5 MG tablet Take half tablet q day (Patient taking differently: Take 2.5 mg by mouth " Daily. Take half tablet q day) 30 tablet 5 2017 at 0900   • nabumetone (RELAFEN) 500 MG tablet Take 500 mg by mouth daily.   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  Patient baseline creatinine is less than 1.  This could be related to hypotension leading to hypoperfusion.  Patient Vasotec has already been stopped.  I will give gentle IV fluid to raised her blood pressure.  I'll also lower the Cardizem and Betapace, due to borderline low blood pressure.  We'll check urine sodium and urine creatinine.  If cr is not improving I will get ultrasound of the kidneys.    2.diabetes mellitus type 2 currently on insulin.  tradjenta on hold    3.history of hypothyroidism    4.hypokalemia currently stable on potassium supplement    5. Colon cancer s/p resection    Thank you Dr. Lucia for the referral will continue follow the patient in the hospital stay.         I discussed the patients findings and my recommendations with patient and nursing staff         This document has been electronically signed by Kamron Strong MD on 2017 12:57 PM              Electronically signed by Kamron Strong MD at 6/15/2017 12:42 PM          [facility]  [address] [place] [address]  Phone: [phone]  Fax:  Date Ordered: 2017         Patient: Kierra Scales MRN: [id number]   [address] [id number] : 1929  SSN:    Phone: [phone] Sex: F     Weight: 169 lb 14.4 oz (77.1 kg)         Ht Readings from Last 1  "Encounters:   06/12/17 72\" (182.9 cm)         Walker (Order ID: [id number])   Diagnosis: Physical deconditioning (R53.81 [ICD-10-CM] 799.3 [ICD-9-CM])  Adenocarcinoma of sigmoid colon (C18.7 [ICD-10-CM] 153.3 [ICD-9-CM])   Quantity: 1  Comments: 5 inch fixed wheel rolling walker     Equipment:  Walker Folding with Wheels  Length of Need (99 Months = Lifetime): 99 Months = Lifetime            Authorizing Provider:Bennie Lucai MD  Authorizing Provider's NPI: [id number]  Order Entered By: Bennie Lucia MD 6/21/2017 10:18 AM     Electronically signed by: Bennie Lucia MD 6/21/2017 10:18 AM           "

## 2017-06-22 NOTE — DISCHARGE INSTR - APPOINTMENTS
1) Dr Ch                                [facility]       June 28th,2017 @1045                    [facility] follow up

## 2017-06-22 NOTE — PLAN OF CARE
Problem: Patient Care Overview (Adult)  Goal: Plan of Care Review  Outcome: Ongoing (interventions implemented as appropriate)    06/22/17 0826   Coping/Psychosocial Response Interventions   Plan Of Care Reviewed With patient   Patient Care Overview   Progress progress toward functional goals is gradual   Outcome Evaluation   Outcome Summary/Follow up Plan worked well this AM cont poc       Goal: Discharge Needs Assessment  Outcome: Ongoing (interventions implemented as appropriate)    06/12/17 1400 06/13/17 1036 06/19/17 1410   Discharge Needs Assessment   Concerns To Be Addressed --  --  --    Equipment Needed After Discharge --  walker, rolling --    Discharge Facility/Level Of Care Needs --  nursing facility, skilled;home with home health  (Or 24/7 care from family -depending on progress while on ARU) --    Discharge Planning Comments --  --  24/7 supervision   Current Health   Outpatient/Agency/Support Group Needs skilled nursing facility (specify) --  --    Anticipated Changes Related to Illness inability to care for self --  --    Living Environment   Transportation Available family or friend will provide --  --    Self-Care   Equipment Currently Used at Home --  commode --      06/21/17 0247   Discharge Needs Assessment   Concerns To Be Addressed no discharge needs identified   Equipment Needed After Discharge --    Discharge Facility/Level Of Care Needs --    Discharge Planning Comments --    Current Health   Outpatient/Agency/Support Group Needs --    Anticipated Changes Related to Illness --    Living Environment   Transportation Available --    Self-Care   Equipment Currently Used at Home --          Problem: Inpatient Occupational Therapy  Goal: Transfer Training Goal 1 STG- OT  Outcome: Ongoing (interventions implemented as appropriate)    06/22/17 0826   Transfer Training OT STG   Transfer Training OT STG, Date Goal Reviewed 06/22/17   Transfer Training OT STG, Outcome goal ongoing       Goal: [place]  Standing Balance Goal STG- OT  Outcome: Ongoing (interventions implemented as appropriate)    06/22/17 0826   Dynamic Standing Balance OT STG   Dynamic Standing Balance OT STG, Date Goal Reviewed 06/22/17   Dynamic Standing Balance OT STG, Outcome goal ongoing       Goal: Caregiver Training Goal LTG- OT  Outcome: Ongoing (interventions implemented as appropriate)    06/22/17 0826   Caregiver Training OT LTG   Caregiver Training OT LTG, Date Goal Reviewed 06/22/17   Caregiver Training OT LTG, Outcome goal ongoing       Goal: ADL Goal LTG- OT  Outcome: Outcome(s) achieved Date Met:  06/22/17 06/22/17 0826   ADL OT LTG   ADL OT LTG, Date Goal Reviewed 06/22/17   ADL OT LTG, Outcome goal met       Goal: Activity Tolerance Goal STG- OT  Outcome: Outcome(s) achieved Date Met:  06/22/17 06/22/17 0826   Activity Tolerance OT STG   Activity Tolerance Goal OT STG, Date Goal Reviewed 06/22/17   Activity Tolerance Goal OT STG, Outcome goal met

## 2017-06-22 NOTE — THERAPY TREATMENT NOTE
Acute Care - Occupational Therapy Treatment Note  [facility]     Patient Name: Kierra Scales  : 1929  MRN: [id number]  Today's Date: 2017  Onset of Illness/Injury or Date of Surgery Date: 17  Date of Referral to OT: 17  Referring Physician: Dr. Lucia      Admit Date: 2017    Visit Dx:     ICD-10-CM ICD-9-CM   1. Oral phase dysphagia R13.11 787.21   2. Impaired mobility and ADLs Z74.09 799.89   3. Muscle weakness (generalized) M62.81 728.87   4. Abnormality of gait and mobility R26.9 781.2   5. Symbolic dysfunction R48.9 784.60   6. Physical deconditioning R53.81 799.3   7. 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)   • Hypothyroidism   • Dyspnea   • Hyperthyroidism   • Gastrointestinal hemorrhage with melena   • Type 2 diabetes mellitus with hyperglycemia   • Colon cancer   • Chronic blood loss anemia   • Physical deconditioning   • Senile dementia, uncomplicated   • Encounter for rehabilitation   • Adenocarcinoma of sigmoid colon   • Hypokalemia             Adult Rehabilitation Note       17 1105 17 1017 17 0915    Rehab Assessment/Intervention    Discipline occupational therapy assistant  -KD speech language pathologist  -EK physical therapy assistant  -RW    Document Type therapy note (daily note)  -KD therapy note (daily note)  -EK therapy note (daily note)  -RW    Subjective Information agree to therapy  -KD no complaints;agree to therapy  -EK agree to therapy  -RW    Patient Effort, Rehab Treatment  good  -EK good  -RW    Patient Response to Treatment   did well with activity and was very cooperative  -RW    Recorded by [KD] Nathalia Irizarry,  MADRIGAL/L [EK] Tamiko Whitten CCC-SLP [RW] Danny Beebe, PTA    Vital Signs    Pre Patient Position Sitting  -KD      Intra Patient Position Sitting  -KD      Post Patient Position Sitting  -KD      Recorded by [KD] ADRIANNA GarciaA/L      Pain Assessment    Pain Assessment No/denies pain  -KD 0-10  -EK --   c/o some knee pain but gives no number rating  -RW    Jason-Batista FACES Pain Rating  0  -EK     Pain Score  0  -EK     Recorded by [KD] ADRIANNA GarciaA/L [EK] Tamiko Whitten, CCC-SLP [RW] Danny Beebe, PTA    Cognitive Assessment/Intervention    Current Cognitive/Communication Assessment functional  -KD functional  -EK functional  -RW    Orientation Status oriented to;person  -KD oriented to;person  -EK oriented to;person  -RW    Follows Commands/Answers Questions 75% of the time  -KD 75% of the time  -% of the time   this visit  -RW    Personal Safety  moderate impairment  -EK moderate impairment  -RW    Personal Safety Interventions   gait belt;nonskid shoes/slippers when out of bed  -RW    Recorded by [KD] ADRIANNA GarciaA/L [EK] Tamiko Whitten, GURMEET-SLP [RW] Danny Beebe, PTA    Improve ability to follow directions    Improve ability to follow directions:  two-step commands  -EK     Status: Improve ability to follow directions  Progress slower than expected  -EK     Ability to Follow Directions Progress  50%  -EK     Recorded by  [EK] Tamiko Whitten CCC-SLP     Improve ability to comprehend questions    Improve ability to comprehend questions:  simple yes/no questions;simple general questions;90%;with consistent cues  -EK     Status: Improve ability to comprehend questions  Progress slower than expected  -EK     Ability to Comprehend Questions Progress  50%  -EK     Recorded by  [EK] Tamiko Whitten CCC-SLP     Improve word retrieval skills    Improve word retrieval skills by:  naming an object/pic;answer WH question with  one word;completing a divergent task;completing a convergent task;90%  -EK     Status: Improve word retrieval skills  Progressing as expected  -EK     Word Retrieval Skills Progress  80%  -EK     Recorded by  [EK] Tamiko Whitten CCC-SLP     Improve orientation    Improve orientation through:  demonstrating orientation to day;demonstrating orientation to year;demonstrating orientation to place;80%;use environmental aids to assist with orientation  -EK     Status: Improve orientation through  Progress slower than expected  -EK     Orientation Progress  10%  -EK     Recorded by  [EK] OSIRIS Foley     Improve memory skills    Improve memory skills through:  recall details of the day;50%  -EK     Status: Improve memory skills  Progressing as expected  -EK     Memory Skills Progress  50%  -EK     Recorded by  [EK] LILIAN FoleySLP     Bed Mobility, Assessment/Treatment    Bed Mob, Supine to Sit, [place]   independent  -RW    Bed Mob, Sit to Supine, [place]   independent  -RW    Recorded by   [RW] Danny Beebe PTA    Transfer Assessment/Treatment    Transfers, Bed-Chair [place]   supervision required  -RW    Transfers, Chair-Bed [place]   supervision required  -RW    Transfers, Bed-Chair-Bed, Assist Device   rolling walker  -RW    Transfers, Sit-Stand [place]   contact guard assist;stand by assist  -RW    Transfers, Stand-Sit [place]   contact guard assist;stand by assist  -RW    Transfers, Sit-Stand-Sit, Assist Device   rolling walker  -RW    Transfer, Comment Pt declined any t/f's  -KD      Recorded by [KD] ROHAN Garcia/POONAM  [RW] Danny Beebe PTA    Gait Assessment/Treatment    Gait, [place] Level   contact guard assist  -RW    Gait, Assistive Device   rolling walker  -RW    Gait, Distance (Feet)   160   150 x 1  -RW    Recorded by   [RW] Danny Beebe PTA    Wheelchair Training/Management    Wheelchair, Distance  Propelled 150' with encouragement  -KD      Recorded by [KD] ROHAN Garcia/L      Lower Body Dressing Assessment/Training    LB Dressing Assess/Train, Clothing Type donning:;shoes  -KD      LB Dressing Assess/Train, [place] set up required;verbal cues required  -KD      Recorded by [KD] ROHAN Garcia/L      Balance Skills Training    Sitting-Balance Activities   Reaching for objects;Forward lean;Lateral lean;Reaching across midline  -RW    Standing-Level of Assistance   Distant supervision  -RW    Recorded by   [RW] Danny Beebe PTA    Therapy Exercises    Bilateral Lower Extremities   AROM:;15 reps;20 reps;25 reps;sitting;knee flexion;LAQ;hip flexion   2 sets  -RW    Bilateral Upper Extremity --   Balloon volleyball  -KD  AROM:;15 reps   2 sets of tb rows  -RW    Trunk Exercises   trunk rotation;15 reps  -RW    Recorded by [KD] ROHAN Garcia/POONAM  [RW] Danny Beebe, PTA    Positioning and Restraints    Pre-Treatment Position sitting in chair/recliner  -KD sitting in chair/recliner  -EK sitting in chair/recliner  -RW    Post Treatment Position wheelchair  -KD wheelchair  -EK wheelchair  -RW    In Bed sitting EOB;call light within reach;encouraged to call for assist  -KD      In Wheelchair   with SLP  -RW    Recorded by [KD] ROHAN Garcia/POONAM [EK] Tamiko Whitten [facility]-SLP [RW] Danny Beebe, PTA      06/22/17 0826 06/21/17 1415 06/21/17 1345    Rehab Assessment/Intervention    Discipline occupational therapy assistant  -RC physical therapy assistant  -RW occupational therapy assistant  -RC    Document Type therapy note (daily note)  -RC therapy note (daily note)  -RW therapy note (daily note)  -RC    Subjective Information agree to therapy  -RC agree to therapy  -RW agree to therapy  -RC    Patient Effort, Rehab Treatment good  -RC fair  -RW good  -RC    Patient Effort, Rehab Treatment Comment  came in immediately after grey and took over care  -RW --   much encouragement  to begin ADL  -RC    Patient Response to Treatment   --   [facility] pt got started she participated w/ min vc's  -RC    Recorded by [RC] ADRIANNA GomezA/L [RW] Danny Beebe PTA [RC] Ally Kwong, MADRIGAL/L    Vital Signs    Intra Systolic BP Rehab   164  -RC    Intra Treatment Diastolic BP   77  -RC    Recorded by   [RC] Ally Kwong, MADRIGAL/L    Pain Assessment    Pain Assessment No/denies pain  -RC --   gives no rating  -RW No/denies pain  -RC    Recorded by [RC] Ally Kwong MADRIGAL/L [RW] Danny Beebe PTA [RC] Ally Kwong, MADRIGAL/L    Cognitive Assessment/Intervention    Current Cognitive/Communication Assessment impaired  -RC functional  -RW     Orientation Status person  -RC oriented to;person  -RW     Follows Commands/Answers Questions  needs cueing;needs repetition  -RW     Problem Solving ADL/Func. Task --   simple puzzle w/ min @  -RC      Recorded by [RC] ADRIANNA GomezA/L [RW] Danny Beebe PTA     Bed Mobility, Assessment/Treatment    Bed Mob, Supine to Sit, [place]   supervision required  -RC    Recorded by   [RC] Ally Kwong, MADRIGAL/L    Transfer Assessment/Treatment    Transfers, Sit-Stand [place]  contact guard assist  -RW     Transfers, Stand-Sit [place]  contact guard assist  -RW     Transfers, Sit-Stand-Sit, Assist Device  rolling walker  -RW     Toilet Transfer, [place]   supervision required  -RC    Toilet Transfer, Assistive Device   rolling walker  -RC    Walk-In Shower Transfer, [place]   verbal cues required  -RC    Recorded by  [RW] Danny Beebe PTA [RC] Ally Kwong, MADRIGAL/L    Gait Assessment/Treatment    Gait, [place] Level  contact guard assist  -RW     Gait, Assistive Device  rolling walker  -RW     Gait, Distance (Feet)  160    x 2  -RW     Recorded by  [RW] Danny Beebe PTA     Functional Mobility    Functional Mobility- Ind. Level   contact guard assist;verbal cues required  -RC    Functional Mobility- Device   rolling  walker  -RC    Functional Mobility-Distance (Feet)   18  -RC    Recorded by   [RC] ROHAN Gomez/L    Upper Body Bathing Assessment/Training    UB Bathing Assess/Train Assistive Device   --   sponge bath  -RC    UB Bathing Assess/Train, Position   edge of bed  -RC    UB Bathing Assess/Train, [place] Level   verbal cues required;supervision required  -RC    Recorded by   [RC] ROHAN Gomez/L    Lower Body Bathing Assessment/Training    LB Bathing Assess/Train Assistive Device   --   sponge bath  -RC    LB Bathing Assess/Train, Position   sitting;standing;edge of bed  -RC    LB Bathing Assess/Train, [place] Level   verbal cues required;contact guard assist  -RC    Recorded by   [RC] ROHAN Gomez/L    Upper Body Dressing Assessment/Training    UB Dressing Assess/Train, Clothing Type   doffing:;donning:;pull over;button up  -RC    UB Dressing Assess/Train, Position   sitting  -RC    UB Dressing Assess/Train, [place]   set up required  -RC    Recorded by   [RC] ROHAN Gomez/L    Lower Body Dressing Assessment/Training    LB Dressing Assess/Train, Clothing Type   doffing:;donning:;pants;shoes;shorts;socks  -    LB Dressing Assess/Train, Position   sitting;standing  -RC    LB Dressing Assess/Train, [place]   contact guard assist  -RC    Recorded by   [RC] ROHAN Gomez/L    Toileting Assessment/Training    Toileting Assess/Train, Assistive Device   raised toilet seat;grab bars  -    Toileting Assess/Train, Position   standing;sitting  -    Toileting Assess/Train, [place]ep[place] Level   contact guard assist;supervision required  -RC    Recorded by   [RC] ROHAN Gomez/L    Motor Skills/Interventions    Motor Response Observations --   bean bag toss and catch, table top act using B ue's  -RC      Recorded by [RC] ROHAN Gomez/L      Balance Skills Training    Sitting-Balance Activities  Reaching for objects;Forward lean;Lateral lean;Reaching across  midline  -RW     Standing-Level of Assistance  Distant supervision  -RW     Recorded by  [RW] Danny Beebe PTA     Positioning and Restraints    Pre-Treatment Position sitting in chair/recliner  -RC standing in room   with OCTAVIO  -RW in bed  -RC    Post Treatment Position chair  -RC chair  -RW wheelchair  -RC    In Bed with PT  -RC call light within reach;encouraged to call for assist  -RW with PT  -RC    Recorded by [RC] ADRIANNA GomezA/L [RW] Danny Beebe PTA [RC] ADRIANNA GomezA/POONAM      06/21/17 0915 06/21/17 0748 06/20/17 1446    Rehab Assessment/Intervention    Discipline occupational therapy assistant  -RC speech language pathologist  -EK physical therapy assistant  -JW    Document Type therapy note (daily note)  -RC therapy note (daily note)  -EK therapy note (daily note)  -JW    Subjective Information agree to therapy  -RC no complaints;agree to therapy  -EK agree to therapy  -JW    Patient Effort, Rehab Treatment adequate  -RC good  -EK fair  -JW    Precautions/Limitations fall precautions  -RC fall precautions  -EK fall precautions  -JW    Patient Response to Treatment   pt vomiting at the end of tx in room, nsg notified  -JW    Recorded by [RC] ADRIANNA GomezA/POONAM [EK] Tamiko Whitten, [facility]-SLP [JW] Arlene Stephens PTA    Vital Signs    Pre Patient Position   Sitting  -JW    Post Patient Position   Supine  -JW    Recorded by   [JW] Arlene Stephens PTA    Pain Assessment    Pain Assessment No/denies pain  -RC  0-10  -JW    Pain Score   0  -JW    Post Pain Score   0  -JW    Recorded by [RC] ADRIANNA GomezA/L  [JW] Arlene Stephens PTA    Cognitive Assessment/Intervention    Current Cognitive/Communication Assessment impaired  -RC impaired  -EK impaired  -JW    Orientation Status person  -RC oriented to;person;disoriented to;place;time;situation  -EK oriented to;person  -JW    Follows Commands/Answers Questions  25% of the time;50% of the time  -EK 50% of  the time  -JW    Personal Safety severe impairment;decreased awareness, need for assist;decreased awareness, need for safety;decreased insight to deficits  - severe impairment;decreased insight to deficits;decreased awareness, need for safety;decreased awareness, need for assist  -EK     Personal Safety Interventions   gait belt;nonskid shoes/slippers when out of bed  -    Problem Solving ADL/Func. Task --   completed abc puzzle if handed peices would not initate  -RC      Recorded by [RC] ADRIANNA GomezA/POONAM [EK] Tamiko Whitten [facility]-SLP [JW] Arlene Stehpens, PTA    Improve ability to follow directions    Improve ability to follow directions:  two-step commands  -EK     Status: Improve ability to follow directions  Progress slower than expected  -EK     Ability to Follow Directions Progress  50%;with consistent cues  -EK     Recorded by  [EK] Tamiko Whitten CCC-SLP     Improve ability to comprehend questions    Improve ability to comprehend questions:  simple yes/no questions;simple general questions;90%;with consistent cues  -EK     Status: Improve ability to comprehend questions  Progress slower than expected  -EK     Ability to Comprehend Questions Progress  50%  -EK     Recorded by  [EK] Tamiko Whitten CCC-SLP     Improve word retrieval skills    Improve word retrieval skills by:  naming an object/pic;answer WH question with one word;completing a divergent task;completing a convergent task;90%  -EK     Status: Improve word retrieval skills  Progress slower than expected  -EK     Word Retrieval Skills Progress  50%;with consistent cues  -EK     Recorded by  [EK] Tamiko Whitten CCC-SLP     Improve orientation    Improve orientation through:  demonstrating orientation to day;demonstrating orientation to year;demonstrating orientation to place;80%;use environmental aids to assist with orientation  -EK     Status: Improve orientation through   Progress slower than expected  -EK     Orientation Progress  10%;with consistent cues  -EK     Comments: Improve orientation through  max cues   -EK     Recorded by  [EK] Tamiko Whitten, CCC-SLP     Bed Mobility, Assessment/Treatment    Bed Mobility, Assistive Device   --   HOB down, no BRs  -JW    Bed Mob, Sit to Supine, [place]   supervision required  -JW    Recorded by   [JW] Arlene Stephens, PTA    Transfer Assessment/Treatment    Transfers, Chair-Bed [place]   contact guard assist  -JW    Transfers, Bed-Chair-Bed, Assist Device   rolling walker  -JW    Transfers, Sit-Stand [place]   contact guard assist  -JW    Transfers, Stand-Sit [place]   contact guard assist  -JW    Transfers, Sit-Stand-Sit, Assist Device   rolling walker  -JW    Toilet Transfer, [place] supervision required  -RC      Toilet Transfer, Assistive Device rolling walker  -RC      Recorded by [RC] ROHAN Gomez/POONAM  [JW] Arlene Stephens, PTA    Gait Assessment/Treatment    Gait, [place] Level   contact guard assist  -JW    Gait, Assistive Device   rolling walker  -JW    Gait, Distance (Feet)   10  -JW    Gait, Comment   pt defers gt other than from w/c to bed  -JW    Recorded by   [JW] Arlene Stephens, PTA    Functional Mobility    Functional Mobility- Ind. Level contact guard assist  -RC      Functional Mobility- Device rolling walker  -RC      Functional Mobility-Distance (Feet) 30  -RC      Recorded by [RC] ROHAN Gomez/L      Wheelchair Training/Management    Wheelchair, Distance Propelled   150  -JW    Wheelchair Training Comment   CGA and lots of vc's  -JW    Recorded by   [JW] Arlene Stephens, PTA    Toileting Assessment/Training    Toileting Assess/Train, Assistive Device raised toilet seat;grab bars  -RC      Toileting Assess/Train, Position sitting;standing  -RC      Toileting Assess/Train, [place]ep[place] Level supervision required  -RC      Recorded by [RC] Ally ENRIQUEZ  ROHAN Kwong/L      Grooming Assessment/Training    Grooming Assess/Train, Position sink side;sitting  -RC      Grooming Assess/Train, [place] Level minimum assist (75% patient effort)  -RC      Grooming Assess/Train, Comment --   daughter @ w/ hair care  -RC      Recorded by [RC] JOSÉ MIGUEL Gomez      Motor Skills/Interventions    Additional Documentation --   clothes pin tree, bean bag toss/catch.  -RC      Recorded by [RC] ROHAN Gomez/L      Therapy Exercises    Bilateral Lower Extremities   AROM:;ankle pumps/circles;sitting;hip flexion;LAQ   add ball squeeze, hip abd w/ tband, 3 sets of 10  -JW    BLE Resistance   theraband   level 2  -JW    Bilateral Upper Extremity --   declined  -RC      Recorded by [RC] ROHAN Gomez/POONAM  [JW] Arlene Stephens, PTA    Positioning and Restraints    Pre-Treatment Position sitting in chair/recliner  -RC in bed  -EK other (comment)   w/c  -JW    Post Treatment Position wheelchair  -RC bed  -EK bed  -JW    In Bed notified nsg;exit alarm on  -RC  supine;call light within reach;encouraged to call for assist;exit alarm on;with family/caregiver;notified nsg   HOB elevated  -JW    Recorded by [RC] ROHAN Gomez/POONAM [EK] Tamiko Whitten, [facility]-SLP [JW] Arlene Stephens, PTA      06/20/17 1416 06/20/17 1330 06/20/17 0915    Rehab Assessment/Intervention    Discipline occupational therapy assistant  -RC speech language pathologist  -EK physical therapy assistant  -JW    Document Type therapy note (daily note)  -RC therapy note (daily note)  -EK therapy note (daily note)  -JW    Subjective Information agree to therapy  -RC no complaints;agree to therapy  -EK agree to therapy  -JW    Patient Effort, Rehab Treatment adequate  -RC good  -EK fair  -JW    Precautions/Limitations fall precautions  -RC      Patient Response to Treatment   pt w/ vomiting episode earlier this morning, pt not agreeable to therapy, but w/ encouragement did bed ther ex   -JW    Recorded by [RC] Ally Kwong, MADRIGAL/L [EK] Tamiko Whitten, CCC-SLP [JW] Arlene Stephens, PTA    Vital Signs    Pre Patient Position   Supine  -JW    Post Patient Position   Supine  -JW    Recorded by   [JW] Arlene Stephens, PTA    Pain Assessment    Pain Assessment No/denies pain  -RC 0-10  -EK 0-10  -JW    Pain Score  0  -EK 0  -JW    Post Pain Score  0  -EK 0  -JW    Recorded by [RC] Ally Kwong, MADRIGAL/L [EK] Tamiko Whitten, CCC-SLP [JW] Arlene Stephens, PTA    Cognitive Assessment/Intervention    Current Cognitive/Communication Assessment  impaired  -EK impaired  -JW    Orientation Status  oriented to;person  -EK oriented to;person  -JW    Follows Commands/Answers Questions   50% of the time;needs cueing  -JW    Short Term Memory Interventions --   sorting task completed w/ min vc's  -RC      Recorded by [RC] Ally Kwong, MADRIGAL/L [EK] Tamiko Whitten, [facility]-SLP [JW] Arlene Stephens, PTA    Improve ability to follow directions    Improve ability to follow directions:  two-step commands  -EK     Status: Improve ability to follow directions  Progress slower than expected  -EK     Ability to Follow Directions Progress  30%;with consistent cues  -EK     Recorded by  [EK] Tamiko Whitten, CCC-SLP     Improve ability to comprehend questions    Improve ability to comprehend questions:  simple yes/no questions;simple general questions;90%  -EK     Status: Improve ability to comprehend questions  Progress slower than expected  -EK     Ability to Comprehend Questions Progress  50%;with consistent cues  -EK     Comments: Improve ability to comprehend questions  moderate to max cues  -EK     Recorded by  [EK] Tamiko Whitten, CCC-SLP     Improve word retrieval skills    Improve word retrieval skills by:  naming an object/pic;answer WH question with one word;completing a divergent task;completing a convergent task;90%  -EK      Status: Improve word retrieval skills  Progress slower than expected  -EK     Word Retrieval Skills Progress  50%  -EK     Recorded by  [EK] OSIRIS Foley     Improve orientation    Improve orientation through:  demonstrating orientation to day;demonstrating orientation to year;demonstrating orientation to place;80%;use environmental aids to assist with orientation  -EK     Status: Improve orientation through  Progress slower than expected  -EK     Orientation Progress  10%;without cues  -EK     Comments: Improve orientation through  little to no success with F:2 cues  -EK     Recorded by  [EK] OSIRIS Foley     Improve memory skills    Improve memory skills through:  recall details of the day;50%  -EK     Status: Improve memory skills  Progress slower than expected  -EK     Memory Skills Progress  20%;without cues  -EK     Recorded by  [EK] OSIRIS Foley     Bed Mobility, Assessment/Treatment    Bed Mobility, Comment   pt defers all mobility  -JW    Recorded by   [JW] Arlene Stephens PTA    Transfer Assessment/Treatment    Transfer, Comment   pt defers all mobility, family in agreement w/ pt to let pt rest this morning after ther ex  -JW    Recorded by   [JW] Arlene Stephens PTA    Motor Skills/Interventions    Motor Response Observations --   table top act using B ue's w/ reaching, and problem solving  -RC      Recorded by [RC] ROHAN Gomez/L      Therapy Exercises    Bilateral Lower Extremities   AROM:;20 reps;AAROM:;supine;ankle pumps/circles;heel slides;hip abduction/adduction;quad sets;SAQ   2 sets, pt req. AAROM at times, vc's throughout most of tx  -JW    Recorded by   [JW] Arlene Stephens, PTA    Positioning and Restraints    Pre-Treatment Position in bed  -RC in bed  -EK in bed  -JW    Post Treatment Position bed  -RC bed  -EK bed  -JW    In Bed with PT  -RC  supine;call light within reach;encouraged to call for  assist;exit alarm on;with family/caregiver  -JW    Recorded by [RC] Ally Kwong MADRIGAL/L [EK] Tamiko Whitten, [facility]-SLP [JW] Arlene Stephens, PTA      06/20/17 0730          Rehab Assessment/Intervention    Discipline occupational therapy assistant  -RC      Document Type therapy note (daily note)  -RC      Subjective Information agree to therapy  -RC      Symptoms Noted During/After Treatment --   reports stomach not feeling well, episode of vomiting   -RC      Symptoms Noted Comment --   vomited at end of tx,returned to room to nurse.  -RC      Recorded by [RC] Ally Kwong MADRIGAL/L      Pain Assessment    Pain Assessment No/denies pain  -RC      Recorded by [RC] Ally Kwong MADRIGAL/L      Cognitive Assessment/Intervention    Current Cognitive/Communication Assessment impaired  -RC      Orientation Status person  -RC      Additional Documentation --   pt was able to place 1/26 abc puzzle, refused clock act  -RC      Recorded by [RC] Ally Kwong MADRIGAL/L      Motor Skills/Interventions    Motor Response Observations --   bean bag toss,clothes pin tree  -RC      Recorded by [RC] Ally Kwong MADRIGAL/L      Positioning and Restraints    Pre-Treatment Position sitting in chair/recliner  -RC      Post Treatment Position wheelchair  -RC      In Bed with nsg  -RC      Recorded by [RC] Ally Kwong MADRIGAL/L        User Key  (r) = Recorded By, (t) = Taken By, (c) = Cosigned By    Initials Name Effective Dates     Arlene Stephens, PTA 08/11/15 -     EK Tamiko Whitten, GURMEET-SLP 04/06/17 -     RW Danny Beebe, PTA 10/17/16 -     RC Ally Kwong, MADRIGAL/L 10/17/16 -     KD Nathalia Irizarry MADRIGAL/L 10/17/16 -                 OT Goals       06/22/17 1105 06/22/17 0826 06/21/17 0915    Transfer Training OT STG    Transfer Training OT STG, Date Goal Reviewed 06/22/17  -KD 06/22/17  -RC 06/21/17  -RC    Transfer Training OT STG, Outcome  goal ongoing  -RC goal ongoing  -RC    Dynamic  Standing Balance OT STG    Dynamic Standing Balance OT STG, Date Goal Reviewed 06/22/17  -KD 06/22/17  -RC 06/21/17  -RC    Dynamic Standing Balance OT STG, Outcome  goal ongoing  -RC goal ongoing  -RC    Caregiver Training OT LTG    Caregiver Training OT LTG, Date Goal Reviewed  06/22/17  -RC 06/21/17  -RC    Caregiver Training OT LTG, Outcome  goal ongoing  -RC goal ongoing  -RC    ADL OT LTG    ADL OT LTG, Date Goal Reviewed  06/22/17  -RC 06/21/17  -RC    ADL OT LTG, Outcome  goal met  -RC goal ongoing  -RC    Activity Tolerance OT STG    Activity Tolerance Goal OT STG, Date Goal Reviewed  06/22/17  -RC 06/21/17  -RC    Activity Tolerance Goal OT STG, Outcome  goal met  -RC goal ongoing  -RC      06/20/17 1416 06/20/17 1415 06/19/17 1410    Transfer Training OT STG    Transfer Training OT STG, Date Goal Reviewed 06/20/17  -RC  06/19/17  -RC    Transfer Training OT STG, Outcome goal ongoing  -RC  goal ongoing  -RC    Dynamic Standing Balance OT STG    Dynamic Standing Balance OT STG, Date Goal Reviewed 06/20/17  -RC  06/19/17  -RC    Dynamic Standing Balance OT STG, Outcome goal ongoing  -RC  goal ongoing  -RC    Caregiver Training OT LTG    Caregiver Training OT LTG, Date Goal Reviewed 06/20/17  -RC  06/19/17  -RC    Caregiver Training OT LTG, Outcome goal ongoing  -RC  goal ongoing  -RC    ADL OT LTG    ADL OT LTG, Date Goal Reviewed 06/20/17  -RC  06/19/17  -RC    ADL OT LTG, Outcome goal ongoing  -RC  goal ongoing  -RC    Activity Tolerance OT STG    Activity Tolerance Goal OT STG, Date Goal Reviewed 06/20/17  -RC (P)  06/20/17  -RC 06/19/17  -RC    Activity Tolerance Goal OT STG, Outcome goal ongoing  -RC (P)  goal ongoing  -RC goal ongoing  -RC      06/17/17 0600 06/16/17 0955 06/15/17 1341    Transfer Training OT STG    Transfer Training OT STG, Date Goal Reviewed 06/17/17  -KD 06/16/17  -KD 06/15/17  -RC    Transfer Training OT STG, Outcome   goal ongoing  -RC    Dynamic Standing Balance OT STG    Dynamic  Standing Balance OT STG, Date Goal Reviewed 06/17/17  -KD 06/16/17  -KD 06/15/17  -    Dynamic Standing Balance OT STG, Outcome   goal ongoing  -    Caregiver Training OT LTG    Caregiver Training OT LTG, Date Goal Reviewed 06/17/17  -KD 06/16/17  -KD 06/15/17  -    Caregiver Training OT LTG, Outcome   goal ongoing  -    ADL OT LTG    ADL OT LTG, Date Goal Reviewed 06/17/17  -KD 06/16/17  -KD 06/15/17  -    ADL OT LTG, Outcome   goal ongoing  -    Activity Tolerance OT STG    Activity Tolerance Goal OT STG, Date Goal Reviewed 06/17/17  -KD 06/16/17  -KD 06/15/17  -    Activity Tolerance Goal OT STG, Outcome   goal ongoing  -      06/14/17 1431 06/14/17 0905 06/13/17 0759    Transfer Training OT STG    Transfer Training OT STG, Date Established   06/13/17  -    Transfer Training OT STG, Time to Achieve   2 wks  -    Transfer Training OT STG, Activity Type   sit to stand/stand to sit;bed to chair /chair to bed;toilet  -    Transfer Training OT STG, [place] Level   supervision required;set up required   AE/AD as needed  -    Transfer Training OT STG, Date Goal Reviewed  06/14/17  -     Transfer Training OT STG, Outcome  goal met  -     Dynamic Standing Balance OT STG    Dynamic Standing Balance OT STG, Date Established   06/13/17  -    Dynamic Standing Balance OT STG, Time to Achieve   2 wks  -    Dynamic Standing Balance OT STG, [place] Level   supervision required   5 minute no LOB or increased fatigue  -    Dynamic Standing Balance OT STG, Date Goal Reviewed  06/14/17  -     Dynamic Standing Balance OT STG, Outcome  goal ongoing  -     Caregiver Training OT LTG    Caregiver Training OT LTG, Date Established   06/13/17  -    Caregiver Training OT LTG, Time to Achieve   by discharge  -    Caregiver Training OT LTG, [place] Level   able to assist adequately;able to cue patient adequately   t/f, safety at home, [facility]  -    Caregiver Training OT LTG, Date Goal  Reviewed  06/14/17  -     Caregiver Training OT LTG, Outcome  goal ongoing  -     ADL OT LTG    ADL OT LTG, Date Established   06/13/17  -    ADL OT LTG, Time to Achieve   by discharge  -    ADL OT LTG, Activity Type   ADL skills  -    ADL OT LTG, [place] Level   standby assist;modified independent;setup;assistive device  -    ADL OT LTG, Date Goal Reviewed (P)  06/14/17  - 06/14/17  -     ADL OT LTG, Outcome (P)  goal ongoing  - goal ongoing  -     Activity Tolerance OT STG    Activity Tolerance Goal OT STG, Date Established   06/13/17  -    Activity Tolerance Goal OT STG, Time to Achieve   2 wks  -    Activity Tolerance Goal OT STG, Activity Level   20 min activity;O2 sat >/equal to 90%;with 1 rest break  -    Activity Tolerance Goal OT STG, Date Goal Reviewed  06/14/17  -     Activity Tolerance Goal OT STG, Outcome  goal ongoing  -       06/12/17 1414 06/09/17 1725       Transfer Training OT STG    Transfer Training OT STG, Date Established  06/09/17  -RW     Transfer Training OT STG, Time to Achieve  5 - 7 days  -RW     Transfer Training OT STG, Activity Type  toilet  -RW     Transfer Training OT STG, [place] Level  supervision required  -RW     Transfer Training OT STG, Date Goal Reviewed 06/12/17  -RM      Transfer Training OT STG, Outcome goal not met  -RM      Transfer Training OT STG, Reason Goal Not Met discharged from facility  -RM      Dynamic Standing Balance OT STG    Dynamic Standing Balance OT STG, Date Established  06/09/17  -RW     Dynamic Standing Balance OT STG, Time to Achieve  5 - 7 days  -RW     Dynamic Standing Balance OT STG, [place] Level  supervision required  -RW     Dynamic Standing Balance OT STG, Assist Device  assistive Device  -RW     Dynamic Standing Balance OT STG, Additional Goal  5 min  -RW     Dynamic Standing Balance OT STG, Date Goal Reviewed 06/12/17  -RM      Dynamic Standing Balance OT STG, Outcome goal not met  -RM      Dynamic  Standing Balance OT STG, Reason Goal Not Met discharged from facility  -RM      ADL OT LTG    ADL OT LTG, Date Established  06/09/17  -RW     ADL OT LTG, Time to Achieve  2 wks  -RW     ADL OT LTG, Activity Type  ADL skills  -RW     ADL OT LTG, [place] Level  standby assist  -RW     ADL OT LTG, Date Goal Reviewed 06/12/17  -RM      ADL OT LTG, Outcome goal not met  -RM      ADL OT LTG, Reason Goal Not Met discharged from facility  -RM      Functional Mobility OT LTG    Functional Mobility Goal OT LTG, Date Established  06/09/17  -RW     Functional Mobility Goal OT LTG, Time to Achieve  2 wks  -RW     Functional Mobility Goal OT LTG, [place] Level  supervision  -RW     Functional Mobility Goal OT LTG, Distance to Achieve  to the bathroom  -RW     Functional Mobility Goal OT LTG, Date Goal Reviewed 06/12/17  -      Functional Mobility Goal OT LTG, Outcome goal not met  -RM      Functional Mobility Goal OT LTG, Reason Goal Not Met discharged from facility  -RM        User Key  (r) = Recorded By, (t) = Taken By, (c) = Cosigned By    Initials Name Provider Type     Rebeka Carrillo, OTR/L Occupational Therapist    RW Cami Mon OTR/L Occupational Therapist    RC Ally Kwong MADRIGAL/L Occupational Therapy Assistant    KD Nathalia Irizarry MADRIGAL/L Occupational Therapy Assistant    RM Catie Sheriff, OT Occupational Therapist          Occupational Therapy Education     Title: PT OT SLP Therapies (Active)     Topic: Occupational Therapy (Active)     Point: ADL training (Active)    Description: Instruct learner(s) on proper safety adaptation and remediation techniques during self care or transfers.   Instruct in proper use of assistive devices.    Learning Progress Summary    Learner Readiness Method Response Comment Documented by Status   Patient Acceptance E NR,NL   06/19/17 1515 Active    Acceptance E NR  RC 06/15/17 1425 Active    Acceptance E NR  RC 06/14/17 1430 Active    Acceptance E VU,NR Educated  pt about OT and POC. Educated pt not to get up on her own and how to use call button. Educated pt on safety with t/f and ADL.  06/13/17 1342 Done               Point: Precautions (Active)    Description: Instruct learner(s) on prescribed precautions during self-care and functional transfers.    Learning Progress Summary    Learner Readiness Method Response Comment Documented by Status   Patient Acceptance E NL   06/21/17 1101 Active    Acceptance E NR,NL   06/19/17 1515 Active    Acceptance E VU   06/18/17 1701 Done    Acceptance E VU,NR Educated pt about OT and POC. Educated pt not to get up on her own and how to use call button. Educated pt on safety with t/f and ADL.  06/13/17 1342 Done   Family Acceptance E NR recommended to family pt to have 24/7 supervision /@ at d/c  06/20/17 0935 Active               Point: Body mechanics (Active)    Description: Instruct learner(s) on proper positioning and spine alignment during self-care, functional mobility activities and/or exercises.    Learning Progress Summary    Learner Readiness Method Response Comment Documented by Status   Patient Acceptance E NR,NL   06/19/17 1515 Active    Acceptance E VU   06/18/17 1701 Done    Acceptance E VU,NR Educated pt about OT and POC. Educated pt not to get up on her own and how to use call button. Educated pt on safety with t/f and ADL.  06/13/17 1342 Done                      User Key     Initials Effective Dates Name Provider Type Discipline     10/17/16 -  Rebeka Carrillo OTR/L Occupational Therapist OT     10/17/16 -  Sadia Neal, RN Registered Nurse Nurse     10/17/16 -  ADRIANNA GomezA/L Occupational Therapy Assistant OT                  OT Recommendation and Plan  Anticipated Discharge Disposition: home with 24/7 care, home with home health, skilled nursing facility (depends on progress)  Planned Therapy Interventions: activity intolerance, adaptive equipment training, ADL retraining, balance  training, bed mobility training, energy conservation, fine motor coordination training, home exercise program, motor coordination training, strengthening, transfer training  Therapy Frequency: other (see comments) (3-14x a week)  Plan of Care Review  Outcome Summary/Follow up Plan: No new goals met this tx.        Outcome Measures       06/22/17 1105 06/22/17 1000 06/22/17 0915    How much help from another person do you currently need...    Turning from your back to your side while in flat bed without using bedrails?   4  -RW    Moving from lying on back to sitting on the side of a flat bed without bedrails?   4  -RW    Moving to and from a bed to a chair (including a wheelchair)?   4  -RW    Standing up from a chair using your arms (e.g., wheelchair, bedside chair)?   3  -RW    Climbing 3-5 steps with a railing?   3  -RW    To walk in hospital room?   3  -RW    AM-PAC 6 Clicks Score   21  -RW    How much help from another is currently needed...    Putting on and taking off regular lower body clothing? 3  -KD 3  -RC     Bathing (including washing, rinsing, and drying) 3  -KD 3  -RC     Toileting (which includes using toilet bed pan or urinal) 3  -KD 3  -RC     Putting on and taking off regular upper body clothing 4  -KD 4  -RC     Taking care of personal grooming (such as brushing teeth) 3  -KD 3  -RC     Eating meals 4  -KD 4  -RC     Score 20  -KD 20  -RC       06/22/17 0826 06/21/17 0915 06/20/17 1400    How much help from another is currently needed...    Putting on and taking off regular lower body clothing? 3  -RC 3  -RC 3  -RC    Bathing (including washing, rinsing, and drying) 3  -RC 3  -RC 3  -RC    Toileting (which includes using toilet bed pan or urinal) 3  -RC 3  -RC 3  -RC    Putting on and taking off regular upper body clothing 4  -RC 4  -RC 4  -RC    Taking care of personal grooming (such as brushing teeth) 3  -RC 3  -RC 3  -RC    Eating meals 4  -RC 4  -RC 4  -RC    Score 20  -RC 20  -RC 20  -RC       06/20/17 0915 06/20/17 0730       How much help from another person do you currently need...    Turning from your back to your side while in flat bed without using bedrails? 3  -JW      Moving from lying on back to sitting on the side of a flat bed without bedrails? 3  -JW      Moving to and from a bed to a chair (including a wheelchair)? 3  -JW      Standing up from a chair using your arms (e.g., wheelchair, bedside chair)? 3  -JW      Climbing 3-5 steps with a railing? 3  -JW      To walk in hospital room? 3  -JW      AM-PAC 6 Clicks Score 18  -JW      How much help from another is currently needed...    Putting on and taking off regular lower body clothing?  3  -RC     Bathing (including washing, rinsing, and drying)  3  -RC     Toileting (which includes using toilet bed pan or urinal)  3  -RC     Putting on and taking off regular upper body clothing  4  -RC     Taking care of personal grooming (such as brushing teeth)  3  -RC     Eating meals  4  -RC     Score  20  -     Functional Assessment    Outcome Measure Options AM-PAC 6 Clicks Basic Mobility (PT)  -        User Key  (r) = Recorded By, (t) = Taken By, (c) = Cosigned By    Initials Name Provider Type     Arlene Stephens, ROYAL Physical Therapy Assistant    RESHMA Beebe, ROYAL Physical Therapy Assistant    ROHAN Rodas/L Occupational Therapy Assistant    ROHAN Sanabria/L Occupational Therapy Assistant           Time Calculation:         Time Calculation- OT       06/22/17 1411 06/22/17 1038       Time Calculation- OT    OT Start Time 1105  -KD 0826  -RC     OT Stop Time 1145  -KD 0920  -     OT Time Calculation (min) 40 min  -KD 54 min  -RC     Total Timed Code Minutes- OT 40 minute(s)  -KD 54 minute(s)  -RC     OT Received On 06/22/17  -KD        User Key  (r) = Recorded By, (t) = Taken By, (c) = Cosigned By    Initials Name Provider Type    ROHAN Rodas/L Occupational Therapy Assistant    EVE Irizarry,  MADRIGAL/L Occupational Therapy Assistant           Therapy Charges for Today     Code Description Service Date Service Provider Modifiers Qty    36538678435 HC OT WHEELCHAIR MGMT EA 15 MIN 6/22/2017 JOSÉ MIGUEL Garcia GO 1    48161182112 HC OT SELF CARE/MGMT/TRAIN EA 15 MIN 6/22/2017 ROHAN Garcia/L GO 1    03012807537 HC OT THERAPEUTIC ACT EA 15 MIN 6/22/2017 ROHAN Garcia/L GO 1          OT G-codes  OT Professional Judgement Used?: Yes  OT Functional Scales Options: AM-PAC 6 Clicks Daily Activity (OT)  Score: 16  Functional Limitation: Self care  Self Care Current Status (): At least 40 percent but less than 60 percent impaired, limited or restricted  Self Care Goal Status (): At least 20 percent but less than 40 percent impaired, limited or restricted    JOSÉ MIGUEL Garcia  6/22/2017

## 2017-06-22 NOTE — PLAN OF CARE
Problem: Patient Care Overview (Adult)  Goal: Plan of Care Review  Outcome: Ongoing (interventions implemented as appropriate)    06/22/17 1537   Coping/Psychosocial Response Interventions   Plan Of Care Reviewed With patient   Outcome Evaluation   Outcome Summary/Follow up Plan pt having a good day and is cooperative and met her bed mobility goal. pt to dc home tomorrow with 24/7 care.         Problem: Inpatient Physical Therapy  Goal: Bed Mobility Goal LTG- PT  Outcome: Outcome(s) achieved Date Met:  06/22/17 06/13/17 1036 06/22/17 1537   Bed Mobility PT LTG   Bed Mobility PT LTG, Date Established 06/13/17 --    Bed Mobility PT LTG, Time to Achieve by discharge --    Bed Mobility PT LTG, Activity Type supine to sit/sit to supine --    Bed Mobility PT LTG, [place] Level supervision required --    Bed Mobility PT LTG, Additional Goal HOB flat; No BR's --    Bed Mobility PT LTG, Date Goal Reviewed --  06/22/17   Bed Mobility PT LTG, Outcome --  goal met       Goal: Transfer Training Goal 1 LTG- PT  Outcome: Ongoing (interventions implemented as appropriate)    06/13/17 1036 06/21/17 1514 06/22/17 1537   Transfer Training PT LTG   Transfer Training PT LTG, Date Established 06/13/17 --  --    Transfer Training PT LTG, Time to Achieve by discharge --  --    Transfer Training PT LTG, Activity Type bed to chair /chair to bed --  --    Transfer Training PT LTG, [place] Level supervision required --  --    Transfer Training PT LTG, Assist Device (AAD) --  --    Transfer Training PT LTG, Date Goal Reviewed --  --  06/22/17   Transfer Training PT LTG, Outcome --  goal ongoing --          06/13/17 1036 06/22/17 1537   Transfer Training PT LTG   Transfer Training PT LTG, Date Established 06/13/17 --    Transfer Training PT LTG, Time to Achieve by discharge --    Transfer Training PT LTG, Activity Type bed to chair /chair to bed --    Transfer Training PT LTG, [place] Level supervision required --    Transfer  Training PT LTG, Assist Device (AAD) --    Transfer Training PT LTG, Date Goal Reviewed --  06/22/17   Transfer Training PT LTG, Outcome --  goal ongoing       Goal: Gait Training Goal LTG- PT  Outcome: Ongoing (interventions implemented as appropriate)    06/13/17 1036 06/22/17 1537   Gait Training PT LTG   Gait Training Goal PT LTG, Date Established 06/13/17 --    Gait Training Goal PT LTG, Time to Achieve by discharge --    Gait Training Goal PT LTG, [place] Level supervision required --    Gait Training Goal PT LTG, Assist Device (AAD) --    Gait Training Goal PT LTG, Distance to Achieve 150 feet --    Gait Training Goal PT LTG, Date Goal Reviewed --  06/22/17   Gait Training Goal PT LTG, Outcome --  goal ongoing

## 2017-06-22 NOTE — PLAN OF CARE
Problem: Patient Care Overview (Adult)  Goal: Plan of Care Review  Outcome: Ongoing (interventions implemented as appropriate)    06/22/17 0329   Coping/Psychosocial Response Interventions   Plan Of Care Reviewed With patient   Patient Care Overview   Progress no change   Outcome Evaluation   Outcome Summary/Follow up Plan pt had one episode of emesis at the beginning of the shift, has rested well tonight         Problem: Functional Deficit (Adult,Obstetrics,Pediatric)  Goal: Signs and Symptoms of Listed Potential Problems Will be Absent or Manageable (Functional Deficit)  Outcome: Ongoing (interventions implemented as appropriate)    Problem: Fall Risk (Adult)  Goal: Absence of Falls  Outcome: Ongoing (interventions implemented as appropriate)    Problem: Infection, Risk/Actual (Adult)  Goal: Infection Prevention/Resolution  Outcome: Ongoing (interventions implemented as appropriate)

## 2017-06-23 VITALS
OXYGEN SATURATION: 98 % | SYSTOLIC BLOOD PRESSURE: 112 MMHG | DIASTOLIC BLOOD PRESSURE: 56 MMHG | TEMPERATURE: 96.5 F | RESPIRATION RATE: 18 BRPM | WEIGHT: 170 LBS | HEIGHT: 72 IN | HEART RATE: 62 BPM | BODY MASS INDEX: 23.03 KG/M2

## 2017-06-23 LAB
GLUCOSE BLDC GLUCOMTR-MCNC: 188 MG/DL (ref 70–130)
GLUCOSE BLDC GLUCOMTR-MCNC: 221 MG/DL (ref 70–130)
GLUCOSE BLDC GLUCOMTR-MCNC: 96 MG/DL (ref 70–130)

## 2017-06-23 PROCEDURE — 97116 GAIT TRAINING THERAPY: CPT

## 2017-06-23 PROCEDURE — 82962 GLUCOSE BLOOD TEST: CPT

## 2017-06-23 PROCEDURE — 99238 HOSP IP/OBS DSCHRG MGMT 30/<: CPT | Performed by: FAMILY MEDICINE

## 2017-06-23 PROCEDURE — 63710000001 INSULIN ASPART PER 5 UNITS: Performed by: FAMILY MEDICINE

## 2017-06-23 PROCEDURE — 97530 THERAPEUTIC ACTIVITIES: CPT

## 2017-06-23 PROCEDURE — 97110 THERAPEUTIC EXERCISES: CPT

## 2017-06-23 RX ORDER — POTASSIUM CHLORIDE 750 MG/1
10 CAPSULE, EXTENDED RELEASE ORAL DAILY
Qty: 30 CAPSULE | Refills: 0 | Status: SHIPPED | OUTPATIENT
Start: 2017-06-23 | End: 2017-08-02

## 2017-06-23 RX ORDER — FERROUS SULFATE TAB EC 324 MG (65 MG FE EQUIVALENT) 324 (65 FE) MG
324 TABLET DELAYED RESPONSE ORAL
Qty: 30 TABLET | Refills: 0 | Status: SHIPPED | OUTPATIENT
Start: 2017-06-23 | End: 2017-08-02

## 2017-06-23 RX ORDER — SOTALOL HYDROCHLORIDE 80 MG/1
40 TABLET ORAL 2 TIMES DAILY
Qty: 30 TABLET | Refills: 1 | Status: SHIPPED | OUTPATIENT
Start: 2017-06-23 | End: 2018-04-13 | Stop reason: SDUPTHER

## 2017-06-23 RX ORDER — DILTIAZEM HYDROCHLORIDE 120 MG/1
120 CAPSULE, COATED, EXTENDED RELEASE ORAL DAILY
Qty: 30 CAPSULE | Refills: 1 | Status: SHIPPED | OUTPATIENT
Start: 2017-06-23

## 2017-06-23 RX ADMIN — POTASSIUM CHLORIDE 10 MEQ: 750 CAPSULE, EXTENDED RELEASE ORAL at 08:42

## 2017-06-23 RX ADMIN — DILTIAZEM HYDROCHLORIDE 120 MG: 120 CAPSULE, COATED, EXTENDED RELEASE ORAL at 08:42

## 2017-06-23 RX ADMIN — INSULIN ASPART 5 UNITS: 100 INJECTION, SOLUTION INTRAVENOUS; SUBCUTANEOUS at 16:36

## 2017-06-23 RX ADMIN — Medication 40 MG: at 08:43

## 2017-06-23 RX ADMIN — MAGNESIUM OXIDE TAB 400 MG (241.3 MG ELEMENTAL MG) 400 MG: 400 (241.3 MG) TAB at 08:42

## 2017-06-23 RX ADMIN — POTASSIUM CHLORIDE 10 MEQ: 750 CAPSULE, EXTENDED RELEASE ORAL at 17:07

## 2017-06-23 RX ADMIN — MEMANTINE 5 MG: 5 TABLET ORAL at 17:07

## 2017-06-23 RX ADMIN — INSULIN ASPART 3 UNITS: 100 INJECTION, SOLUTION INTRAVENOUS; SUBCUTANEOUS at 10:36

## 2017-06-23 RX ADMIN — MEMANTINE 5 MG: 5 TABLET ORAL at 08:43

## 2017-06-23 RX ADMIN — Medication 1 TABLET: at 08:43

## 2017-06-23 RX ADMIN — Medication 2.5 MG: at 08:42

## 2017-06-23 RX ADMIN — FERROUS SULFATE TAB EC 324 MG (65 MG FE EQUIVALENT) 324 MG: 324 (65 FE) TABLET DELAYED RESPONSE at 17:07

## 2017-06-23 RX ADMIN — SENNOSIDES AND DOCUSATE SODIUM 1 TABLET: 8.6; 5 TABLET ORAL at 08:42

## 2017-06-23 RX ADMIN — FERROUS SULFATE TAB EC 324 MG (65 MG FE EQUIVALENT) 324 MG: 324 (65 FE) TABLET DELAYED RESPONSE at 08:42

## 2017-06-23 NOTE — THERAPY DISCHARGE NOTE
Inpatient Rehabilitation - Speech Language Pathology Discharge Summary  [facility]       Patient Name: Kierra Scales  : 1929  MRN: [id number]    Today's Date: 2017  Onset of Illness/Injury or Date of Surgery Date: 17    Date of Referral to SLP: 17  Referring Physician: Khari        Admit Date: 2017    Pt d/c home.  Maximum functional potential met at this time.  No f/u Speech therapy services recommended at this time as pt returning home with family .  Tamiko Couch, CCC-SLP 2017 1:51 PM    SLP Recommendation and Plan    Visit Dx:    ICD-10-CM ICD-9-CM   1. Oral phase dysphagia R13.11 787.21   2. Impaired mobility and ADLs Z74.09 799.89   3. Muscle weakness (generalized) M62.81 728.87   4. Abnormality of gait and mobility R26.9 781.2   5. Symbolic dysfunction R48.9 784.60   6. Physical deconditioning R53.81 799.3   7. Adenocarcinoma of sigmoid colon C18.7 153.3                     IP SLP Goals       17 1341 17 0858 17 1450    Receptive Language- Optimal Participation in Care    Receptive Language Optimal Participation in Care- SLP, Date Goal Reviewed 17  -EK 17  -EK 17  -EK    Expressive- Optimal Participation in Care    Expressive Optimal Participation in Care- SLP, Date Goal Reviewed 17  -EK 17  -EK 17  -EK    Cognitive Linguistic- Optimal Participation in Care    Cognitive Linguistic Optimal Participation in Care- SLP, Date Goal Reviewed 17  -EK 17  -EK 17  -EK      17 1406 17 0835 17 0940    Receptive Language- Optimal Participation in Care    Receptive Language Optimal Participation in Care- SLP, Date Goal Reviewed 17  -EK 17  -EK 17  -EC    Receptive Language Optimal Participation in Care- SLP, Outcome   goal ongoing  -EC    Expressive- Optimal Participation in Care    Expressive Optimal Participation in Care- SLP, Date Goal Reviewed 17  -EK  06/17/17  -EK     Expressive Optimal Participation in Care- SLP, Reason Goal Not Met progress slower than expected  -EK      Cognitive Linguistic- Optimal Participation in Care    Cognitive Linguistic Optimal Participation in Care- SLP, Date Goal Reviewed 06/19/17  -EK 06/17/17  -EK 06/16/17  -EC    Cognitive Linguistic Optimal Participation in Care- SLP, Outcome   goal ongoing  -EC      06/15/17 1103 06/14/17 1434 06/13/17 1506    Safely Consume Diet    Safely Consume Diet- SLP, Date Established   06/13/17  -CK    Safely Consume Diet- SLP, Time to Achieve   by discharge  -CK    Safely Consume Diet- SLP, Additional Goal   Pt will safely tolerate recommended diet w/no overt s/s of aspiration.  -CK    Safely Consume Diet- SLP, Date Goal Reviewed 06/15/17  -EC 06/14/17  -CK 06/13/17  -CK    Safely Consume Diet- SLP, Outcome goal met  -EC goal partially met  -CK     Receptive Language- Optimal Participation in Care    Receptive Language Optimal Participation in Care- SLP, Date Established   06/13/17  -CK    Receptive Language Optimal Participation in Care- SLP, Time to Achieve   by discharge  -CK    Receptive Language Optimal Participation in Care- SLP, Activity Level   Patient will improve ability to follow directions;Patient will improve ability to comprehend questions  -CK    Receptive Language Optimal Participation in Care- SLP, Date Goal Reviewed 06/15/17  -EC 06/14/17  -CK 06/13/17  -CK    Receptive Language Optimal Participation in Care- SLP, Outcome goal ongoing  -EC goal ongoing  -CK     Expressive- Optimal Participation in Care    Expressive Optimal Participation in Care- SLP, Date Established   06/13/17  -CK    Expressive Optimal Participation in Care- SLP, Time to Achieve   by discharge  -CK    Expressive Optimal Participation in Care- SLP, Activity Level   Patient will improve word retrieval skills  -CK    Expressive Optimal Participation in Care- SLP, Date Goal Reviewed 06/15/17  -EC 06/14/17  -CK 06/13/17   -CK    Expressive Optimal Participation in Care- SLP, Outcome goal ongoing  -EC goal ongoing  -CK     Cognitive Linguistic- Optimal Participation in Care    Cognitive Linguistic Optimal Participation in Care- SLP, Date Established   06/13/17  -CK    Cognitive Linguistic Optimal Participation in Care- SLP, Time to Achieve   by discharge  -CK    Cognitive Linguistic Optimal Participation in Care- SLP, Activity Level   Patient will improve orientation for increased safety in environment;Patient will improve memory skills for increased safety in environment  -CK    Cognitive Linguistic Optimal Participation in Care- SLP, Date Goal Reviewed 06/15/17  -EC 06/14/17  -CK 06/13/17  -CK    Cognitive Linguistic Optimal Participation in Care- SLP, Outcome goal ongoing  -EC goal ongoing  -CK       User Key  (r) = Recorded By, (t) = Taken By, (c) = Cosigned By    Initials Name Provider Type    EC Tamiko Couch, CCC-SLP Speech and Language Pathologist    EK Tamiko Whitten, CCC-SLP Speech and Language Pathologist    CK Michelle Whitten, MS CCC-SLP Speech and Language Pathologist                  SLP Discharge Summary  Anticipated Discharge Disposition: home with 24/7 care  Reason for Discharge: Maximum functional potential achieved, Discharge from facility  Discharge Destination: other (comment)      Tamiko Couch CCC-SLP  6/23/2017

## 2017-06-23 NOTE — PROGRESS NOTES
Nutrition Services    Patient Name:  Kierra Scales  YOB: 1929  MRN: [id number]  Admit Date:  6/12/2017        Nursing said pt is going home today. She ate 75% of her meals today per nursing. Spoke with patient at noon who said she was ready to eat lunch. Feel family will take care of patient and her meals at home. They have been attentive during her stay.      Electronically signed by:  Megan Pompa RD  06/23/17 3:12 PM

## 2017-06-23 NOTE — DISCHARGE SUMMARY
[address] [address]  T - [phone]     Rehabilitation Discharge Summary       BRIEF OVERVIEW   PATIENT NAME: Kierra Scales                      PHYSICIAN: Bennie Lucia MD  : 1929  MRN: [id number]    ADMISSION/DISCHARGE INFO   Admitting Provider: Bennie Lucia MD  Discharge Provider: Bennie Lucia MD  ADMISSION DATE: 2017   DISCHARGE DATE:  2017    ADMISSION DIAGNOSES: Physical deconditioning [R53.81]  DISCHARGE DIAGNOSES: Principal Problem:    Physical deconditioning  Active Problems:    Gastrointestinal hemorrhage with melena    Adenocarcinoma of sigmoid colon    Essential hypertension    Type 2 diabetes mellitus with hyperglycemia    Chronic blood loss anemia    Senile dementia, uncomplicated    Hypokalemia    Tricuspid valve disorders, 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)    Encounter for rehabilitation      Primary Care Physician at Discharge: Melissa Ch [phone]      Secondary Discharge Diagnosis  Patient Active Problem List   Diagnosis Code   • Vitamin D deficiency E55.9   • Hyperlipidemia E78.5   • Essential hypertension I10   • SSS (sick sinus syndrome) I49.5   • Palpitations R00.2   • Bradycardia R00.1   • Shortness of breath R06.02   • Paroxysmal tachycardia I47.9   • Chest pain R07.9   • Tricuspid valve disorders, 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) I36.9   • Hypothyroidism E03.9   • Dyspnea R06.00   • Hyperthyroidism E05.90   • Gastrointestinal hemorrhage with melena K92.1   • Type 2 diabetes mellitus with hyperglycemia E11.65   • Colon cancer C18.9   • Chronic blood loss anemia D50.0   • Physical deconditioning R53.81   • Senile dementia, uncomplicated F03.90   • Encounter for rehabilitation Z51.89   • Adenocarcinoma of sigmoid colon C18.7   • Hypokalemia E87.6       Discharge Disposition  Home with family         CONSULTS   Consult Orders (all)     Start     Ordered    17  1025  Inpatient Consult to Case Management   Once     Provider:  (Not yet assigned)    06/21/17 1027    06/14/17 0900  Inpatient Consult to Nephrology  Once     Comments:  I have not been able to connect to an  this morning to discuss the case with Dr. Strong    I will continue to try   Specialty:  Nephrology  Provider:  Kamron Strong MD    06/14/17 0902    06/12/17 1435  Inpatient Consult to Case Management   Once     Provider:  (Not yet assigned)    06/12/17 1443    06/12/17 1339  Inpatient Consult to General Surgery  Once     Specialty:  General Surgery  Provider:  Stefano Linn MD    06/12/17 1339    06/12/17 1336  Inpatient consult to Nutrition  Once     Provider:  (Not yet assigned)    06/12/17 1335    06/12/17 1336  Inpatient Consult to Rehab Admission  Once     Provider:  (Not yet assigned)    06/12/17 1335          DETAILS OF HOSPITAL STAY    Functional Status:                ADMIT             Discharge   Eating                                          7                        4   Grooming                                    4                        5  Bathing                                         3                        5  Dressing upper body                    4                        5  Dressing lower body                     2                        5  Toileting                                        5                        5  Bladder Management                   5                        5  Bowel Management                     5                        5  Transfers:bed chair wheelchair    4                        4  Transfers: toilet                            3                        5  Transfers: tub/shower                  0                        4  Locomotion: walking                    2                        4  Locomotion: Wheelchair              2                        4  Locomotion: stairs                       0                          2  Comprehension                           2                         2  Expression                                   2                         2  Social interaction                         2                        2  Problem solving                           1                        1  Memory                                        1                        1                                          Rehabilitation Course  the patient was admitted to the acute rehabilitation unit after she has her period.  She participated with therapy well and made some progress.  Because of her dementia progress was limited she was able to ambulate well transfer well but memory was a problem throughout her stay.  She recovered from surgery and is being discharged home with her family she will need 24-hour day care and supervision and this is been discussed with her family      Heart Rate: 66  Resp: 18  BP: 106/58  Temp: 96.4 °F (35.8 °C)   Weight: 170 lb (77.1 kg)    Pertinent Test Results:    Lab Results (last 72 hours)     Procedure Component Value Units Date/Time    POC Glucose Fingerstick [[id number]]  (Abnormal) Collected:  06/20/17 1610    Specimen:  Blood Updated:  06/20/17 1632     Glucose 233 (H) mg/dL       Sliding Scale AdminMeter: [id number]: [id number] [facility]       POC Glucose Fingerstick [[id number]]  (Abnormal) Collected:  06/20/17 2133    Specimen:  Blood Updated:  06/20/17 2157     Glucose 196 (H) mg/dL       Sliding Scale AdminMeter: [id number]: [id number] ISRRAEL ALLEN       POC Glucose Fingerstick [[id number]]  (Abnormal) Collected:  06/21/17 0518    Specimen:  Blood Updated:  06/21/17 0630     Glucose 256 (H) mg/dL       Sliding Scale AdminMeter: [id number]: [id number] ISRRAEL ALLEN       POC Glucose Fingerstick [[id number]]  (Abnormal) Collected:  06/21/17 1112    Specimen:  Blood Updated:  06/21/17 1128     Glucose 215 (H) mg/dL       Sliding Scale AdminMeter:  [id number]: [id number] BRIANNA CRABTREE       POC Glucose Fingerstick [[id number]]  (Abnormal) Collected:  06/21/17 1602    Specimen:  Blood Updated:  06/21/17 1613     Glucose 276 (H) mg/dL       Sliding Scale AdminMeter: [id number]: [id number] BRIANNA CRABTREE       POC Glucose Fingerstick [[id number]]  (Abnormal) Collected:  06/21/17 2014    Specimen:  Blood Updated:  06/21/17 2039     Glucose 200 (H) mg/dL       Sliding Scale AdminMeter: [id number]: [id number] [facility]        POC Glucose Fingerstick [[id number]]  (Abnormal) Collected:  06/22/17 0533    Specimen:  Blood Updated:  06/22/17 0610     Glucose 158 (H) mg/dL       Sliding Scale AdminMeter: [id number]: [id number] [facility]        POC Glucose Fingerstick [[id number]]  (Abnormal) Collected:  06/22/17 1659    Specimen:  Blood Updated:  06/22/17 1710     Glucose 221 (H) mg/dL       Meter: [id number]: [id number] JIEARIANNA ROSS       POC Glucose Fingerstick [[id number]]  (Abnormal) Collected:  06/22/17 2003    Specimen:  Blood Updated:  06/22/17 2033     Glucose 232 (H) mg/dL       Sliding Scale AdminMeter: [id number]: [id number] [facility] BAL[facility]        POC Glucose Fingerstick [[id number]]  (Normal) Collected:  06/23/17 0520    Specimen:  Blood Updated:  06/23/17 0600     Glucose 96 mg/dL       Meter: [id number]: [id number] [facility] BALA        POC Glucose Fingerstick [[id number]]  (Abnormal) Collected:  06/23/17 1017    Specimen:  Blood Updated:  06/23/17 1104     Glucose 188 (H) mg/dL       RN NotifiedMeter: [id number]: [id number] DARON COLBY           Imaging Results (all)     Procedure Component Value Units Date/Time    US Renal Complete [[id number]] Collected:  06/14/17 1350     Updated:  06/14/17 1523    Narrative:         EXAMINATION: Ultrasound, retroperitoneal / renal     CLINICAL INDICATION / HISTORY: юлия rule out hydro, R13.11  Dysphagia, oral phase Z74.09 Other reduced  mobility M62.81 Muscle  weakness (generalized) R26.9 Unspecified abnormalities of gait  and mobility R48.9 Unspecified symbolic dysfunctions  COMPARISON:  CT abdomen and pelvis dated 5/30/2017  TECHNIQUE:  Grayscale and color Doppler ultrasound.    FINDINGS:    Right kidney: The right kidney is normal in size and  echogenicity. It measures 11.3 cm in length. There are two cysts  in the right kidney, one measures 1.4 cm in the midpole which  correlates to the hyperdense cyst seen on CT. The other measures  3.2 cm located more inferiorly. Their is mild right  hydronephrosis. There is no evidence of suspicious mass or  calculus.     Left kidney: The left kidney is normal in size and echogenicity.  It measures 11.8 cm in length. There is a 3.8 cm cyst in the  upper pole of the left kidney. There is no evidence of  hydronephrosis, suspicious mass or calculus.     Urinary bladder: Unremarkable         Impression:       CONCLUSION: Bilateral renal cysts including that which was noted  on the CT in the right mid kidney. Mild right hydronephrosis.    Electronically signed by:  Ugo Washington MD  6/14/2017 3:22 PM CDT  Workstation: [id number]          Presenting Problem/History of Present Illness   Kierra Scales is a 88 y.o. female who was admitted to the hospital because of lower GI bleed.  Workup showed adenocarcinoma of the sigmoid colon and she underwent resection by Dr. Linn.  Postoperatively she did improve she participated with therapy we were asked to admit her to the acute rehabilitation unit.  Since her surgery her incision has healed well she's been followed by Dr. Linn during hospitalization and she is being discharged home from acute rehabilitation      Physical Exam at Discharge      She is alert and talkative and in good spirits she is in no distress  ENT is unremarkable neck is supple no prior bruits no JVD lungs are clear  Heart rate regular without murmurs gallops or rubs  Abdomen has healed  from her surgery  No peripheral edema            DISPOSITION   She is returning home with her family  She will have outpatient physical therapy and occupational therapy     DISCHARGE MEDICATIONS        Your medication list       As of 6/23/2017 10:42 AM      START taking these medications       Instructions Last Dose Given Next Dose Due    ferrous sulfate 324 (65 FE) MG tablet delayed-release EC tablet        Take 1 tablet by mouth Daily With Breakfast. For 30 days         folic acid-vit B6-vit B12 2.5-25-1 MG tablet tablet   Commonly known as:  FOLBEE        Take 1 tablet by mouth Daily. For 30 days         magnesium oxide 400 (241.3 MG) MG tablet tablet   Commonly known as:  MAGOX        Take 1 tablet by mouth Daily.         potassium chloride 10 MEQ CR capsule   Commonly known as:  MICRO-K        Take 1 capsule by mouth Daily.           CHANGE how you take these medications       Instructions Last Dose Given Next Dose Due    diltiaZEM  MG 24 hr capsule   Commonly known as:  CARDIZEM CD   What changed:    - medication strength  - how much to take        Take 1 capsule by mouth Daily.         methIMAzole 5 MG tablet   Commonly known as:  TAPAZOLE   What changed:    - how much to take  - how to take this  - when to take this  - additional instructions        Take half tablet q day         sotalol 80 MG tablet   Commonly known as:  BETAPACE   What changed:  how much to take        Take 0.5 tablets by mouth 2 (Two) Times a Day.           CONTINUE taking these medications       Instructions Last Dose Given Next Dose Due    B-D SINGLE USE SWABS REGULAR pads        USE AS DIRECTED         COMFORT EZ PEN NEEDLES 31G X 6 MM misc   Generic drug:  Insulin Pen Needle              donepezil 10 MG tablet   Commonly known as:  ARICEPT              ezetimibe 10 MG tablet   Commonly known as:  ZETIA        Take 1 tablet by mouth Daily.         Insulin Glargine 100 UNIT/ML injection pen   Commonly known as:  LANTUS SOLOSTAR  "             Insulin Syringe-Needle U-100 31G X 5/16\" 1 ML misc              memantine 5 MG tablet   Commonly known as:  NAMENDA              nabumetone 500 MG tablet   Commonly known as:  RELAFEN              NOVOLOG FLEXPEN 100 UNIT/ML solution pen-injector sc pen   Generic drug:  insulin aspart        INJECT 4 UNITS SUBCUTANEOUSLY WITH SUPPER. MAY INCREASE UP TO 8 UNITS AS DIRECTED         pravastatin 40 MG tablet   Commonly known as:  PRAVACHOL        Take 1 tablet by mouth Every Night.         SITagliptin 100 MG tablet   Commonly known as:  JANUVIA        Take 1 tablet by mouth Daily.         UNILET COMFORTOUCH LANCET misc        USE AS DIRECTED FOR FINGERSTICKS         vitamin D 23834 UNITS capsule capsule   Commonly known as:  ERGOCALCIFEROL        Take 1 capsule by mouth Every 14 (Fourteen) Days.           STOP taking these medications          enalapril 20 MG tablet   Commonly known as:  VASOTEC                Where to Get Your Medications      These medications were sent to [facility] - [place], [place] - [address] - [phone] [facility] [phone]   [address], [place] [address]     Phone:  [phone]    • diltiaZEM  MG 24 hr capsule   • ferrous sulfate 324 (65 FE) MG tablet delayed-release EC tablet   • folic acid-vit B6-vit B12 2.5-25-1 MG tablet tablet   • magnesium oxide 400 (241.3 MG) MG tablet tablet   • potassium chloride 10 MEQ CR capsule   • sotalol 80 MG tablet             INSTRUCTIONS   Activity: Activity will be as tolerated    Diet: Diabetic diet    Special Instructions:   Rolling walker    FOLLOW UP   Additional Instructions for the Follow-ups that You Need to Schedule     Ambulatory Referral to Occupational Therapy    As directed        Ambulatory Referral to Physical Therapy    As directed              Follow-up Information     Follow up with Melissa Ch MD Follow up in 1 week(s).    Specialty:  Family Medicine    Contact " information:    [address] [facility] [address]  [phone]          Follow up with Stefano Linn MD Follow up in 2 week(s).    Specialty:  General Surgery    Contact information:    [address] [place] [address] DR  [facility] [address] [place] [address] [phone]          Future Appointments  Date Time Provider Department Center   7/5/2017 11:00 AM PT FLOAT MAD BH MAD [id number] None   7/10/2017 9:30 AM Stefano Linn MD [facility] GS MAD None   7/17/2017 1:15 PM Karol Valenzuela MD [facility] HRT MAD None   11/8/2017 12:45 PM PACER [facility] MAD [facility] HRT MAD None   12/13/2017 1:40 PM Ugo Gale MD [facility] CORTNEY MAD None                  Bennie Lucia MD  06/23/17  1:04 PM

## 2017-06-23 NOTE — PLAN OF CARE
Problem: Patient Care Overview (Adult)  Goal: Plan of Care Review  Outcome: Outcome(s) achieved Date Met:  06/23/17 06/23/17 1025   Coping/Psychosocial Response Interventions   Plan Of Care Reviewed With patient   Patient Care Overview   Progress progress toward functional goals as expected       Goal: Adult Individualization and Mutuality  Outcome: Outcome(s) achieved Date Met:  06/23/17  Goal: Discharge Needs Assessment  Outcome: Outcome(s) achieved Date Met:  06/23/17    Problem: Functional Deficit (Adult,Obstetrics,Pediatric)  Goal: Signs and Symptoms of Listed Potential Problems Will be Absent or Manageable (Functional Deficit)  Outcome: Outcome(s) achieved Date Met:  06/23/17    Problem: Fall Risk (Adult)  Goal: Absence of Falls  Outcome: Outcome(s) achieved Date Met:  06/23/17    Problem: Infection, Risk/Actual (Adult)  Goal: Infection Prevention/Resolution  Outcome: Outcome(s) achieved Date Met:  06/23/17

## 2017-06-23 NOTE — THERAPY DISCHARGE NOTE
Inpatient Rehabilitation - Physical Therapy Treatment Note/Discharge  [facility]     Patient Name: Kierra Scales  : 1929  MRN: [id number]  Today's Date: 2017  Onset of Illness/Injury or Date of Surgery Date: 17  Date of Referral to PT: 17  Referring Physician: Dr. Lucia    Admit Date: 2017    Visit Dx:    ICD-10-CM ICD-9-CM   1. Oral phase dysphagia R13.11 787.21   2. Impaired mobility and ADLs Z74.09 799.89   3. Muscle weakness (generalized) M62.81 728.87   4. Abnormality of gait and mobility R26.9 781.2   5. Symbolic dysfunction R48.9 784.60   6. Physical deconditioning R53.81 799.3   7. 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)   • Hypothyroidism   • Dyspnea   • Hyperthyroidism   • Gastrointestinal hemorrhage with melena   • Type 2 diabetes mellitus with hyperglycemia   • Colon cancer   • Chronic blood loss anemia   • Physical deconditioning   • Senile dementia, uncomplicated   • Encounter for rehabilitation   • Adenocarcinoma of sigmoid colon   • Hypokalemia       Physical Therapy Education     Title: PT OT SLP Therapies (Active)     Topic: Physical Therapy (Done)     Point: Mobility training (Done)    Learning Progress Summary    Learner Readiness Method Response Comment Documented by Status   Patient Acceptance E VU pt will need consistant supervision and encouragement to be safe in her home enviornment. no HEP issued due to lack of consistant interest or participation.  17 1308 Done    Nonacceptance E NR pt continues to need consistant safety and direction cues. does not call for assist when asked to.  17 1111 Active    Acceptance E NR pt needs  lots of directions and re-direct for most all activity.  06/17/17 1024 Active    Acceptance E NR  MARLENE 06/16/17 0913 Active    Acceptance E VU,NR reviewed benifits of oob and mobility with assistance.  06/14/17 1252 Done    Acceptance E NR Reviewed safety with transfers - however, pt unable to retain.  Pt's family will require education.  06/13/17 1436 Active   Family Acceptance E VU pt will need consistant supervision and encouragement to be safe in her home enviornment. no HEP issued due to lack of consistant interest or participation.  06/23/17 1308 Done               Point: Home exercise program (Done)    Learning Progress Summary    Learner Readiness Method Response Comment Documented by Status   Patient Acceptance E VU pt will need consistant supervision and encouragement to be safe in her home enviornment. no HEP issued due to lack of consistant interest or participation.  06/23/17 1308 Done   Family Acceptance E VU pt will need consistant supervision and encouragement to be safe in her home enviornment. no HEP issued due to lack of consistant interest or participation.  06/23/17 1308 Done               Point: Precautions (Resolved)    Learning Progress Summary    Learner Readiness Method Response Comment Documented by Status   Patient Acceptance E VU   06/18/17 1701 Done    Acceptance E VU,NR reviewed benifits of oob and mobility with assistance.  06/14/17 1252 Done    Acceptance E NR Reviewed safety with transfers - however, pt unable to retain.  Pt's family will require education.  06/13/17 1436 Active                      User Key     Initials Effective Dates Name Provider Type Discipline     06/15/16 -  Megan Kruse, PT Physical Therapist PT     10/17/16 -  Sadia Neal, RN Registered Nurse Nurse     10/17/16 -  Jaspreet Rodas, PTA Physical Therapy Assistant PT     10/17/16 -  Danny Beebe, ROYAL Physical Therapy Assistant PT                    IP PT Goals       06/23/17 1310  06/22/17 1537 06/21/17 1514    Bed Mobility PT LTG    Bed Mobility PT LTG, Date Goal Reviewed  06/22/17  -RW 06/21/17  -RW    Bed Mobility PT LTG, Outcome  goal met  -RW goal ongoing  -RW    Transfer Training PT LTG    Transfer Training PT  LTG, Date Goal Reviewed 06/23/17  -RW 06/22/17  -RW 06/21/17  -RW    Transfer Training PT LTG, Outcome goal not met  -RW goal ongoing  -RW goal ongoing  -RW    Gait Training PT LTG    Gait Training Goal PT LTG, Date Goal Reviewed 06/23/17  -RW 06/22/17  -RW 06/21/17  -RW    Gait Training Goal PT LTG, Outcome goal partially met  -RW goal ongoing  -RW goal ongoing  -RW    Gait Training Goal PT LTG, Reason Goal Not Met --   needs cga for safety and direction  -RW        06/20/17 1446 06/19/17 1422 06/17/17 1559    Bed Mobility PT LTG    Bed Mobility PT LTG, Date Goal Reviewed 06/20/17  -JW 06/19/17  -RW 06/17/17  -RW    Bed Mobility PT LTG, Outcome  goal ongoing  -RW goal ongoing  -RW    Transfer Training PT LTG    Transfer Training PT  LTG, Date Goal Reviewed 06/20/17  -JW 06/19/17  -RW 06/17/17  -RW    Transfer Training PT LTG, Outcome  goal ongoing  -RW goal ongoing  -RW    Gait Training PT LTG    Gait Training Goal PT LTG, Date Goal Reviewed 06/20/17  -JW 06/19/17  -RW 06/17/17  -RW    Gait Training Goal PT LTG, Outcome  goal ongoing  -RW goal ongoing  -RW      06/16/17 1245 06/16/17 0816 06/15/17 1353    Bed Mobility PT LTG    Bed Mobility PT LTG, Date Goal Reviewed (P)  06/16/17  -MARLENE 06/16/17  -MARLENE 06/15/17  -RW    Bed Mobility PT LTG, Outcome (P)  goal ongoing  -MARLENE goal ongoing  -MARLENE goal ongoing  -RW    Transfer Training PT LTG    Transfer Training PT  LTG, Date Goal Reviewed (P)  06/16/17  -MARLENE 06/16/17  -MARLENE 06/15/17  -RW    Transfer Training PT LTG, Outcome (P)  goal ongoing  -MARLENE goal ongoing  -MARLENE goal ongoing  -RW    Gait Training PT LTG    Gait Training Goal PT LTG, Date Goal Reviewed (P)  06/16/17  -MARLENE 06/16/17  -MARLENE 06/15/17  -RW    Gait Training Goal PT LTG, Outcome (P)   goal ongoing  -MARLENE goal ongoing  -MARLENE goal ongoing  -RW    Stair Training PT LTG    Stair Training Goal PT LTG, Date Goal Reviewed   06/15/17  -RW    Stair Training Goal PT LTG, Outcome   goal met  -RW      06/14/17 1608 06/13/17 1036 06/11/17 1403    Bed Mobility PT LTG    Bed Mobility PT LTG, Date Established  06/13/17  -LM     Bed Mobility PT LTG, Time to Achieve  by discharge  -LM     Bed Mobility PT LTG, Activity Type  supine to sit/sit to supine  -LM     Bed Mobility PT LTG, [place] Level  supervision required  -LM     Bed Mobility PT LTG, Additional Goal  HOB flat; No BR's  -LM     Bed Mobility PT LTG, Date Goal Reviewed 06/14/17  -RW  06/11/17  -TW    Bed Mobility PT LTG, Outcome goal ongoing  -RW goal ongoing  -LM goal ongoing  -TW    Transfer Training PT LTG    Transfer Training PT LTG, Date Established  06/13/17  -LM     Transfer Training PT LTG, Time to Achieve  by discharge  -LM     Transfer Training PT LTG, Activity Type  bed to chair /chair to bed  -LM     Transfer Training PT LTG, [place] Level  supervision required  -LM     Transfer Training PT LTG, Assist Device  --   AAD  -LM     Transfer Training PT  LTG, Date Goal Reviewed 06/14/17  -RW      Transfer Training PT LTG, Outcome goal ongoing  -RW goal ongoing  -LM     Gait Training PT STG    Gait Training Goal PT STG, Date Goal Reviewed   06/11/17  -TW    Gait Training Goal PT STG, Outcome   goal ongoing  -TW    Gait Training PT LTG    Gait Training Goal PT LTG, Date Established  06/13/17  -LM     Gait Training Goal PT LTG, Time to Achieve  by discharge  -LM     Gait Training Goal PT LTG, [place] Level  supervision required  -LM     Gait Training Goal PT LTG, Assist Device  --   AAD  -LM     Gait Training Goal PT LTG, Distance to Achieve  150 feet  -LM     Gait Training Goal PT LTG, Date Goal Reviewed 06/14/17  -RW      Gait Training Goal PT LTG, Outcome goal ongoing  -RW goal ongoing  -LM     Stair Training PT LTG    Stair Training  Goal PT LTG, Date Established  06/13/17  -LM     Stair Training Goal PT LTG, Time to Achieve  by discharge  -LM     Stair Training Goal PT LTG, Number of Steps  4 steps  -LM     Stair Training Goal PT LTG, [place] Level  contact guard assist  -LM     Stair Training Goal PT LTG, Assist Device  2 handrails  -LM     Stair Training Goal PT LTG, Date Goal Reviewed 06/14/17  -RW  06/11/17  -TW    Stair Training Goal PT LTG, Outcome goal ongoing  -RW goal ongoing  -LM goal ongoing  -TW      User Key  (r) = Recorded By, (t) = Taken By, (c) = Cosigned By    Initials Name Provider Type    JW Arlene Stephens, PTA Physical Therapy Assistant    LM Megan Kruse, PT Physical Therapist    MARLENE Jaspreet Rodas, PTA Physical Therapy Assistant    TW Franco Byers, PTA Physical Therapy Assistant    RESHMA Beebe, ROYAL Physical Therapy Assistant              Adult Rehabilitation Note       06/23/17 1102 06/23/17 0710 06/22/17 1330    Rehab Assessment/Intervention    Discipline physical therapy assistant  -RW occupational therapy assistant  -RC physical therapy assistant  -RW    Document Type therapy note (daily note);discharge summary  -RW therapy note (daily note)  -RC therapy note (daily note)  -RW    Subjective Information agree to therapy  -RW agree to therapy  -RC agree to therapy  -RW    Patient Effort, Rehab Treatment adequate  -RW good  -RC good  -RW    Precautions/Limitations  fall precautions  -RC     Recorded by [RW] Danny Beebe PTA [RC] Ally Kwong, MADRIGAL/L [RW] Danny Beebe PTA    Pain Assessment    Pain Assessment No/denies pain  -RW  No/denies pain  -RW    Recorded by [RW] Danny Beebe PTA  [RW] Danny Beebe PTA    Cognitive Assessment/Intervention    Current Cognitive/Communication Assessment functional  -RW  functional  -RW    Orientation Status oriented to;person  -RW  oriented to;person  -RW    Follows Commands/Answers Questions 100% of the time  -RW  75% of the time  -RW    Personal  Safety moderate impairment  -RW  moderate impairment  -RW    Personal Safety Interventions gait belt;nonskid shoes/slippers when out of bed  -RW  gait belt;nonskid shoes/slippers when out of bed  -RW    Recorded by [RW] Danny Beebe PTA  [RW] Danny Beebe PTA    Transfer Assessment/Treatment    Transfers, Sit-Stand [place] contact guard assist;stand by assist  -RW  contact guard assist;stand by assist  -RW    Transfers, Stand-Sit [place] contact guard assist;stand by assist  -RW  contact guard assist;stand by assist  -RW    Transfers, Sit-Stand-Sit, Assist Device rolling walker  -RW  rolling walker  -RW    Recorded by [RW] Danny Beebe PTA  [RW] Danny Beebe PTA    Gait Assessment/Treatment    Gait, [place] Level contact guard assist  -RW  contact guard assist  -RW    Gait, Assistive Device rolling walker  -RW  rolling walker  -RW    Gait, Distance (Feet) 220  -RW  150   x 2  -RW    Recorded by [RW] Danny Beebe PTA  [RW] Danny Beebe PTA    Balance Skills Training    Sitting-Balance Activities Reaching for objects;Forward lean;Lateral lean;Reaching across midline  -RW  Reaching for objects;Forward lean;Lateral lean;Reaching across midline  -RW    Standing-Level of Assistance Distant supervision  -RW  Distant supervision  -RW    Recorded by [RW] Danny Beebe PTA  [RW] Danny Beebe PTA    Therapy Exercises    Bilateral Lower Extremities AROM:;15 reps;20 reps;sitting;knee flexion;LAQ;hip flexion   2 sets  -RW  AROM:;15 reps;20 reps;25 reps;sitting;knee flexion;LAQ;hip flexion   2 sets  -RW    Bilateral Upper Extremity   AROM:;15 reps   2 sets of tb rows  -RW    Recorded by [RW] Danny Beebe PTA  [RW] Danny Beebe PTA    Positioning and Restraints    Pre-Treatment Position sitting in chair/recliner  -RW  sitting in chair/recliner  -RW    Post Treatment Position wheelchair  -RW  bathroom  -RW    In Wheelchair LUE elevated;patient within staff view  -RW      Bathroom   with  nsg  -RW    Recorded by [RW] Danny Beebe PTA  [RW] Danny Beebe, ROYAL      06/22/17 1105 06/22/17 1017 06/22/17 0915    Rehab Assessment/Intervention    Discipline occupational therapy assistant  -KD speech language pathologist  -EK physical therapy assistant  -RW    Document Type therapy note (daily note)  -KD therapy note (daily note)  -EK therapy note (daily note)  -RW    Subjective Information agree to therapy  -KD no complaints;agree to therapy  -EK agree to therapy  -RW    Patient Effort, Rehab Treatment  good  -EK good  -RW    Patient Response to Treatment   did well with activity and was very cooperative  -RW    Recorded by [KD] ADRIANNA GarciaA/L [EK] Tamiko Whitten CCC-SLP [RW] Danny Beebe PTA    Vital Signs    Pre Patient Position Sitting  -KD      Intra Patient Position Sitting  -KD      Post Patient Position Sitting  -KD      Recorded by [KD] Nathalia Irizarry, MADRIGAL/L      Pain Assessment    Pain Assessment No/denies pain  -KD 0-10  -EK --   c/o some knee pain but gives no number rating  -RW    [place] FACES Pain Rating  0  -EK     Pain Score  0  -EK     Recorded by [KD] Nathalia Irizarry, MADRIGAL/L [EK] Tamiko Whitten CCC-SLP [RW] Danny Beebe PTA    Cognitive Assessment/Intervention    Current Cognitive/Communication Assessment functional  -KD functional  -EK functional  -RW    Orientation Status oriented to;person  -KD oriented to;person  -EK oriented to;person  -RW    Follows Commands/Answers Questions 75% of the time  -KD 75% of the time  -% of the time   this visit  -RW    Personal Safety  moderate impairment  -EK moderate impairment  -RW    Personal Safety Interventions   gait belt;nonskid shoes/slippers when out of bed  -RW    Recorded by [KD] ADRIANNA GarciaA/L [EK] Tamiko Whitten CCC-SLP [RW] Danny Beebe PTA    Improve ability to follow directions    Improve ability to follow directions:  two-step commands  -EK     Status:  Improve ability to follow directions  Progress slower than expected  -EK     Ability to Follow Directions Progress  50%  -EK     Recorded by  [EK] OSIRIS Foley     Improve ability to comprehend questions    Improve ability to comprehend questions:  simple yes/no questions;simple general questions;90%;with consistent cues  -EK     Status: Improve ability to comprehend questions  Progress slower than expected  -EK     Ability to Comprehend Questions Progress  50%  -EK     Recorded by  [EK] OSIRIS Foley     Improve word retrieval skills    Improve word retrieval skills by:  naming an object/pic;answer WH question with one word;completing a divergent task;completing a convergent task;90%  -EK     Status: Improve word retrieval skills  Progressing as expected  -EK     Word Retrieval Skills Progress  80%  -EK     Recorded by  [EK] OSIRIS Foley     Improve orientation    Improve orientation through:  demonstrating orientation to day;demonstrating orientation to year;demonstrating orientation to place;80%;use environmental aids to assist with orientation  -EK     Status: Improve orientation through  Progress slower than expected  -EK     Orientation Progress  10%  -EK     Recorded by  [EK] OSIRIS Foley     Improve memory skills    Improve memory skills through:  recall details of the day;50%  -EK     Status: Improve memory skills  Progressing as expected  -EK     Memory Skills Progress  50%  -EK     Recorded by  [EK] OSIRIS Foley     Bed Mobility, Assessment/Treatment    Bed Mob, Supine to Sit, [place]   independent  -RW    Bed Mob, Sit to Supine, [place]   independent  -RW    Recorded by   [RW] Danny Beebe, PTA    Transfer Assessment/Treatment    Transfers, Bed-Chair [place]   supervision required  -RW    Transfers, Chair-Bed [place]   supervision required  -RW    Transfers,  Bed-Chair-Bed, Assist Device   rolling walker  -RW    Transfers, Sit-Stand [place]   contact guard assist;stand by assist  -RW    Transfers, Stand-Sit [place]   contact guard assist;stand by assist  -RW    Transfers, Sit-Stand-Sit, Assist Device   rolling walker  -RW    Transfer, Comment Pt declined any t/f's  -KD      Recorded by [KD] ROHAN Garcia/L  [RW] Danny Beebe PTA    Gait Assessment/Treatment    Gait, [place] Level   contact guard assist  -RW    Gait, Assistive Device   rolling walker  -RW    Gait, Distance (Feet)   160   150 x 1  -RW    Recorded by   [RW] Danny Beebe PTA    Wheelchair Training/Management    Wheelchair, Distance Propelled 150' with encouragement  -KD      Recorded by [KD] ROHAN Garcia/L      Lower Body Dressing Assessment/Training    LB Dressing Assess/Train, Clothing Type donning:;shoes  -KD      LB Dressing Assess/Train, [place] set up required;verbal cues required  -KD      Recorded by [KD] ROHAN Garcia/L      Balance Skills Training    Sitting-Balance Activities   Reaching for objects;Forward lean;Lateral lean;Reaching across midline  -RW    Standing-Level of Assistance   Distant supervision  -RW    Recorded by   [RW] Danny Beebe PTA    Therapy Exercises    Bilateral Lower Extremities   AROM:;15 reps;20 reps;25 reps;sitting;knee flexion;LAQ;hip flexion   2 sets  -RW    Bilateral Upper Extremity --   Balloon volleyball  -KD  AROM:;15 reps   2 sets of tb rows  -RW    Trunk Exercises   trunk rotation;15 reps  -RW    Recorded by [KD] ROHAN Garcia/POONAM  [RW] Danny Beebe PTA    Positioning and Restraints    Pre-Treatment Position sitting in chair/recliner  -KD sitting in chair/recliner  -EK sitting in chair/recliner  -RW    Post Treatment Position wheelchair  -KD wheelchair  -EK wheelchair  -RW    In Bed sitting EOB;call light within reach;encouraged to call for assist  -KD      In Wheelchair   with SLP  -RW    Recorded by [KD]  ROHAN Garcia/POONAM [EK] Tamiko Whitten, CCC-SLP [RW] Danny Beebe, PTA      06/22/17 0826 06/21/17 1415 06/21/17 1345    Rehab Assessment/Intervention    Discipline occupational therapy assistant  -RC physical therapy assistant  -RW occupational therapy assistant  -RC    Document Type therapy note (daily note)  -RC therapy note (daily note)  -RW therapy note (daily note)  -RC    Subjective Information agree to therapy  -RC agree to therapy  -RW agree to therapy  -RC    Patient Effort, Rehab Treatment good  -RC fair  -RW good  -RC    Patient Effort, Rehab Treatment Comment  came in immediately after grey and took over care  -RW --   much encouragement to begin ADL  -RC    Patient Response to Treatment   --   [facility] pt got started she participated w/ min vc's  -RC    Recorded by [RC] ROHAN Gomez/L [RW] Danny Beebe PTA [RC] ADRIANNA GomezA/L    Vital Signs    Intra Systolic BP Rehab   164  -RC    Intra Treatment Diastolic BP   77  -RC    Recorded by   [RC] ADRIANNA GomezA/POONAM    Pain Assessment    Pain Assessment No/denies pain  -RC --   gives no rating  -RW No/denies pain  -RC    Recorded by [RC] ROHAN Gomez/L [RW] Danny Beebe, ROYAL [RC] ADRIANNA GomezA/L    Cognitive Assessment/Intervention    Current Cognitive/Communication Assessment impaired  -RC functional  -RW     Orientation Status person  -RC oriented to;person  -RW     Follows Commands/Answers Questions  needs cueing;needs repetition  -RW     Problem Solving ADL/Func. Task --   simple puzzle w/ min @  -RC      Recorded by [RC] ROHAN Gomez/L [RW] Danny Beebe, PTA     Bed Mobility, Assessment/Treatment    Bed Mob, Supine to Sit, [place]   supervision required  -RC    Recorded by   [RC] ADRIANNA GomezA/L    Transfer Assessment/Treatment    Transfers, Sit-Stand [place]  contact guard assist  -RW     Transfers, Stand-Sit [place]  contact guard assist  -RW     Transfers,  Sit-Stand-Sit, Assist Device  rolling walker  -RW     Toilet Transfer, [place]   supervision required  -RC    Toilet Transfer, Assistive Device   rolling walker  -RC    Walk-In Shower Transfer, [place]   verbal cues required  -RC    Recorded by  [RW] Danny Beebe PTA [RC] ADRIANNA GomezA/L    Gait Assessment/Treatment    Gait, [place] Level  contact guard assist  -RW     Gait, Assistive Device  rolling walker  -RW     Gait, Distance (Feet)  160    x 2  -RW     Recorded by  [RW] Danny Beebe PTA     Functional Mobility    Functional Mobility- Ind. Level   contact guard assist;verbal cues required  -RC    Functional Mobility- Device   rolling walker  -RC    Functional Mobility-Distance (Feet)   18  -RC    Recorded by   [RC] ROHAN Gomez/L    Upper Body Bathing Assessment/Training    UB Bathing Assess/Train Assistive Device   --   sponge bath  -RC    UB Bathing Assess/Train, Position   edge of bed  -RC    UB Bathing Assess/Train, [place] Level   verbal cues required;supervision required  -RC    Recorded by   [RC] ROHAN Gomez/L    Lower Body Bathing Assessment/Training    LB Bathing Assess/Train Assistive Device   --   sponge bath  -RC    LB Bathing Assess/Train, Position   sitting;standing;edge of bed  -RC    LB Bathing Assess/Train, [place] Level   verbal cues required;contact guard assist  -RC    Recorded by   [RC] ROHAN Gomez/L    Upper Body Dressing Assessment/Training    UB Dressing Assess/Train, Clothing Type   doffing:;donning:;pull over;button up  -RC    UB Dressing Assess/Train, Position   sitting  -RC    UB Dressing Assess/Train, [place]   set up required  -RC    Recorded by   [RC] ADRIANNA GomezA/L    Lower Body Dressing Assessment/Training    LB Dressing Assess/Train, Clothing Type   doffing:;donning:;pants;shoes;shorts;socks  -RC    LB Dressing Assess/Train, Position   sitting;standing  -RC    LB Dressing Assess/Train,  [place]   contact guard assist  -RC    Recorded by   [RC] ROHAN Gomez/L    Toileting Assessment/Training    Toileting Assess/Train, Assistive Device   raised toilet seat;grab bars  -RC    Toileting Assess/Train, Position   standing;sitting  -RC    Toileting Assess/Train, [place] Level   contact guard assist;supervision required  -RC    Recorded by   [RC] ROHAN Gomez/L    Motor Skills/Interventions    Motor Response Observations --   bean bag toss and catch, table top act using B ue's  -RC      Recorded by [RC] ROHAN Gomez/L      Balance Skills Training    Sitting-Balance Activities  Reaching for objects;Forward lean;Lateral lean;Reaching across midline  -RW     Standing-Level of Assistance  Distant supervision  -RW     Recorded by  [RW] Danny Beebe PTA     Positioning and Restraints    Pre-Treatment Position sitting in chair/recliner  -RC standing in room   with [place]AV[place]  -RW in bed  -RC    Post Treatment Position chair  -RC chair  -RW wheelchair  -RC    In Bed with PT  -RC call light within reach;encouraged to call for assist  -RW with PT  -RC    Recorded by [RC] ROHAN Gomez/L [RW] Danny Beebe, PTA [RC] ROHAN Gomez/POONAM      06/21/17 0915 06/21/17 0748 06/20/17 1446    Rehab Assessment/Intervention    Discipline occupational therapy assistant  -RC speech language pathologist  -EK physical therapy assistant  -JW    Document Type therapy note (daily note)  -RC therapy note (daily note)  -EK therapy note (daily note)  -JW    Subjective Information agree to therapy  -RC no complaints;agree to therapy  -EK agree to therapy  -JW    Patient Effort, Rehab Treatment adequate  -RC good  -EK fair  -JW    Precautions/Limitations fall precautions  -RC fall precautions  -EK fall precautions  -JW    Patient Response to Treatment   pt vomiting at the end of tx in room, nsg notified  -JW    Recorded by [RC] ROHAN Gomez/POONAM [EK] Tamiko Whitten CCC-SLP [JW]  Arlene Stephens PTA    Vital Signs    Pre Patient Position   Sitting  -JW    Post Patient Position   Supine  -JW    Recorded by   [JW] Arlene Stephens PTA    Pain Assessment    Pain Assessment No/denies pain  -  0-10  -JW    Pain Score   0  -JW    Post Pain Score   0  -JW    Recorded by [RC] ROHAN Gomez/L  [JW] Arlene Stephens PTA    Cognitive Assessment/Intervention    Current Cognitive/Communication Assessment impaired  -RC impaired  -EK impaired  -JW    Orientation Status person  -RC oriented to;person;disoriented to;place;time;situation  -EK oriented to;person  -JW    Follows Commands/Answers Questions  25% of the time;50% of the time  -EK 50% of the time  -JW    Personal Safety severe impairment;decreased awareness, need for assist;decreased awareness, need for safety;decreased insight to deficits  -RC severe impairment;decreased insight to deficits;decreased awareness, need for safety;decreased awareness, need for assist  -EK     Personal Safety Interventions   gait belt;nonskid shoes/slippers when out of bed  -JW    Problem Solving ADL/Func. Task --   completed abc puzzle if handed peices would not initate  -RC      Recorded by [RC] ROHAN Gomez/L [EK] Tamiko Whitten, CCC-SLP [JW] Arlene Stephens PTA    Improve ability to follow directions    Improve ability to follow directions:  two-step commands  -EK     Status: Improve ability to follow directions  Progress slower than expected  -EK     Ability to Follow Directions Progress  50%;with consistent cues  -EK     Recorded by  [EK] Tamiko Whitten CCC-SLP     Improve ability to comprehend questions    Improve ability to comprehend questions:  simple yes/no questions;simple general questions;90%;with consistent cues  -EK     Status: Improve ability to comprehend questions  Progress slower than expected  -EK     Ability to Comprehend Questions Progress  50%  -EK     Recorded by  [EK] Tamiko  Esther Whitten CCC-SLP     Improve word retrieval skills    Improve word retrieval skills by:  naming an object/pic;answer WH question with one word;completing a divergent task;completing a convergent task;90%  -EK     Status: Improve word retrieval skills  Progress slower than expected  -EK     Word Retrieval Skills Progress  50%;with consistent cues  -EK     Recorded by  [EK] LILIAN FoleySLP     Improve orientation    Improve orientation through:  demonstrating orientation to day;demonstrating orientation to year;demonstrating orientation to place;80%;use environmental aids to assist with orientation  -EK     Status: Improve orientation through  Progress slower than expected  -EK     Orientation Progress  10%;with consistent cues  -EK     Comments: Improve orientation through  max cues   -EK     Recorded by  [EK] OSIRIS Foley     Bed Mobility, Assessment/Treatment    Bed Mobility, Assistive Device   --   HOB down, no BRs  -JW    Bed Mob, Sit to Supine, Glenn   supervision required  -JW    Recorded by   [JW] Arlene Stephens, PTA    Transfer Assessment/Treatment    Transfers, Chair-Bed Glenn   contact guard assist  -JW    Transfers, Bed-Chair-Bed, Assist Device   rolling walker  -JW    Transfers, Sit-Stand Glenn   contact guard assist  -JW    Transfers, Stand-Sit Glenn   contact guard assist  -JW    Transfers, Sit-Stand-Sit, Assist Device   rolling walker  -JW    Toilet Transfer, Glenn supervision required  -RC      Toilet Transfer, Assistive Device rolling walker  -RC      Recorded by [RC] ADRIANNA GomezA/POONAM  [JW] Arlene Stephens, PTA    Gait Assessment/Treatment    Gait, Glenn Level   contact guard assist  -JW    Gait, Assistive Device   rolling walker  -JW    Gait, Distance (Feet)   10  -JW    Gait, Comment   pt defers gt other than from w/c to bed  -JW    Recorded by   [JW] Arlene Stephens, PTA     Functional Mobility    Functional Mobility- Ind. Level contact guard assist  -RC      Functional Mobility- Device rolling walker  -RC      Functional Mobility-Distance (Feet) 30  -RC      Recorded by [RC] ROHAN Gomez/L      Wheelchair Training/Management    Wheelchair, Distance Propelled   150  -JW    Wheelchair Training Comment   CGA and lots of vc's  -JW    Recorded by   [JW] Arlene Stephens, PTA    Toileting Assessment/Training    Toileting Assess/Train, Assistive Device raised toilet seat;grab bars  -RC      Toileting Assess/Train, Position sitting;standing  -RC      Toileting Assess/Train, Indepen Level supervision required  -RC      Recorded by [RC] ROHAN Gomez/L      Grooming Assessment/Training    Grooming Assess/Train, Position sink side;sitting  -RC      Grooming Assess/Train, Indepen Level minimum assist (75% patient effort)  -RC      Grooming Assess/Train, Comment --   daughter @ w/ hair care  -RC      Recorded by [RC] ROHAN Gomez/L      Motor Skills/Interventions    Additional Documentation --   clothes pin tree, bean bag toss/catch.  -RC      Recorded by [RC] ROHAN Gomez/L      Therapy Exercises    Bilateral Lower Extremities   AROM:;ankle pumps/circles;sitting;hip flexion;LAQ   add ball squeeze, hip abd w/ tband, 3 sets of 10  -JW    BLE Resistance   theraband   level 2  -JW    Bilateral Upper Extremity --   declined  -RC      Recorded by [RC] ROHAN Gomez/POONAM  [JW] Arlene Stephens, PTA    Positioning and Restraints    Pre-Treatment Position sitting in chair/recliner  -RC in bed  -EK other (comment)   w/c  -JW    Post Treatment Position wheelchair  -RC bed  -EK bed  -JW    In Bed notified nsg;exit alarm on  -RC  supine;call light within reach;encouraged to call for assist;exit alarm on;with family/caregiver;notified nsg   HOB elevated  -JW    Recorded by [RC] ROHAN Gomez/POONAM [EK] Tamiko Whitten, [facility]-SLP [JW] Arlene PARRA  Kat, PTA      06/20/17 1416 06/20/17 1330       Rehab Assessment/Intervention    Discipline occupational therapy assistant  -RC speech language pathologist  -EK     Document Type therapy note (daily note)  - therapy note (daily note)  -EK     Subjective Information agree to therapy  -RC no complaints;agree to therapy  -EK     Patient Effort, Rehab Treatment adequate  -RC good  -EK     Precautions/Limitations fall precautions  -RC      Recorded by [RC] ROHAN Gomez/POONAM [EK] OSIRIS Foley     Pain Assessment    Pain Assessment No/denies pain  - 0-10  -EK     Pain Score  0  -EK     Post Pain Score  0  -EK     Recorded by [RC] ROHAN Gomez/POONAM [EK] OSIRIS Foley     Cognitive Assessment/Intervention    Current Cognitive/Communication Assessment  impaired  -EK     Orientation Status  oriented to;person  -EK     Short Term Memory Interventions --   sorting task completed w/ min vc's  -RC      Recorded by [RC] ROHAN Gomez/POONAM [EK] OSIRIS Foley     Improve ability to follow directions    Improve ability to follow directions:  two-step commands  -EK     Status: Improve ability to follow directions  Progress slower than expected  -EK     Ability to Follow Directions Progress  30%;with consistent cues  -EK     Recorded by  [EK] OSIRIS Foley     Improve ability to comprehend questions    Improve ability to comprehend questions:  simple yes/no questions;simple general questions;90%  -EK     Status: Improve ability to comprehend questions  Progress slower than expected  -EK     Ability to Comprehend Questions Progress  50%;with consistent cues  -EK     Comments: Improve ability to comprehend questions  moderate to max cues  -EK     Recorded by  [EK] OSIRIS Foley     Improve word retrieval skills    Improve word retrieval skills by:  naming an object/pic;answer WH question with one  word;completing a divergent task;completing a convergent task;90%  -EK     Status: Improve word retrieval skills  Progress slower than expected  -EK     Word Retrieval Skills Progress  50%  -EK     Recorded by  [EK] OSIRIS Foley     Improve orientation    Improve orientation through:  demonstrating orientation to day;demonstrating orientation to year;demonstrating orientation to place;80%;use environmental aids to assist with orientation  -EK     Status: Improve orientation through  Progress slower than expected  -EK     Orientation Progress  10%;without cues  -EK     Comments: Improve orientation through  little to no success with F:2 cues  -EK     Recorded by  [EK] OSIRIS Foley     Improve memory skills    Improve memory skills through:  recall details of the day;50%  -EK     Status: Improve memory skills  Progress slower than expected  -EK     Memory Skills Progress  20%;without cues  -EK     Recorded by  [EK] OSIRIS Foley     Motor Skills/Interventions    Motor Response Observations --   table top act using B ue's w/ reaching, and problem solving  -RC      Recorded by [RC] Ally Kwong MADRIGAL/L      Positioning and Restraints    Pre-Treatment Position in bed  -RC in bed  -EK     Post Treatment Position bed  -RC bed  -EK     In Bed with PT  -RC      Recorded by [RC] Ally Kwong MADRIGAL/L [EK] OSIRIS Foley       User Key  (r) = Recorded By, (t) = Taken By, (c) = Cosigned By    Initials Name Effective Dates    JW Arlene Stephens, PTA 08/11/15 -     EK OSIRIS Foley 04/06/17 -     RESHMA Beebe, PTA 10/17/16 -     RC Ally Kwong MADRIGAL/L 10/17/16 -     EVE Irizarry, MADRIGAL/L 10/17/16 -           PT Recommendation and Plan  Anticipated Equipment Needs At Discharge: front wheeled walker  Anticipated Discharge Disposition: home with 24/7 care, home with outpatient services  Planned  Therapy Interventions: balance training, bed mobility training, gait training, home exercise program, manual therapy techniques, motor coordination training, neuromuscular re-education, patient/family education, postural re-education, ROM (Range of Motion), stair training, strengthening, stretching, transfer training, wheelchair management/propulsion training  PT Frequency: other (see comments) (5-14 times/wk)  Plan of Care Review  Plan Of Care Reviewed With: patient  Progress: unable to show any progress toward functional goals  Outcome Summary/Follow up Plan: pt has met 2/4 goals and will 24/7care at home. outpt PT to follow.          Outcome Measures       06/22/17 1105 06/22/17 1000 06/22/17 0915    How much help from another person do you currently need...    Turning from your back to your side while in flat bed without using bedrails?   4  -RW    Moving from lying on back to sitting on the side of a flat bed without bedrails?   4  -RW    Moving to and from a bed to a chair (including a wheelchair)?   4  -RW    Standing up from a chair using your arms (e.g., wheelchair, bedside chair)?   3  -RW    Climbing 3-5 steps with a railing?   3  -RW    To walk in hospital room?   3  -RW    AM-PAC 6 Clicks Score   21  -RW    How much help from another is currently needed...    Putting on and taking off regular lower body clothing? 3  -KD 3  -RC     Bathing (including washing, rinsing, and drying) 3  -KD 3  -RC     Toileting (which includes using toilet bed pan or urinal) 3  -KD 3  -RC     Putting on and taking off regular upper body clothing 4  -KD 4  -RC     Taking care of personal grooming (such as brushing teeth) 3  -KD 3  -RC     Eating meals 4  -KD 4  -RC     Score 20  -KD 20  -RC       06/22/17 0826 06/21/17 0915 06/20/17 1400    How much help from another is currently needed...    Putting on and taking off regular lower body clothing? 3  -RC 3  -RC 3  -RC    Bathing (including washing, rinsing, and drying) 3  -RC  3  -RC 3  -RC    Toileting (which includes using toilet bed pan or urinal) 3  -RC 3  -RC 3  -RC    Putting on and taking off regular upper body clothing 4  -RC 4  -RC 4  -RC    Taking care of personal grooming (such as brushing teeth) 3  -RC 3  -RC 3  -RC    Eating meals 4  -RC 4  -RC 4  -RC    Score 20  -RC 20  -RC 20  -RC      User Key  (r) = Recorded By, (t) = Taken By, (c) = Cosigned By    Initials Name Provider Type    RESHMA Beebe PTA Physical Therapy Assistant    TAHIR Kwong MADRIGAL/L Occupational Therapy Assistant    EVE Irizarry MADRIGAL/L Occupational Therapy Assistant           Time Calculation:         PT Charges       06/23/17 1313          Time Calculation    Start Time 1102  -RW      Stop Time 1145  -RW      Time Calculation (min) 43 min  -RW      Time Calculation- PT    Total Timed Code Minutes- PT 43 minute(s)  -RW        User Key  (r) = Recorded By, (t) = Taken By, (c) = Cosigned By    Initials Name Provider Type    RESHMA Beebe PTA Physical Therapy Assistant          Therapy Charges for Today     Code Description Service Date Service Provider Modifiers Qty    57249294805 HC GAIT TRAINING EA 15 MIN 6/22/2017 Danny Beebe PTA GP 1    24047800138 HC PT THER PROC EA 15 MIN 6/22/2017 Danny Beebe PTA GP 1    46453583301 HC PT THERAPEUTIC ACT EA 15 MIN 6/22/2017 Danny Beebe PTA GP 1    76347638441 HC GAIT TRAINING EA 15 MIN 6/22/2017 Danny Beebe PTA GP 1    97585978708 HC PT THER PROC EA 15 MIN 6/22/2017 Danny Beebe PTA GP 1    33549404841 HC PT THERAPEUTIC ACT EA 15 MIN 6/22/2017 Danny Beebe PTA GP 1    85181996110 HC GAIT TRAINING EA 15 MIN 6/23/2017 Danny Beebe PTA GP 1    52409510715 HC PT THER PROC EA 15 MIN 6/23/2017 Danny Beebe PTA GP 1    98518721964 HC PT THERAPEUTIC ACT EA 15 MIN 6/23/2017 Danny Beebe PTA GP 1          PT G-Codes  PT Professional Judgement Used?: Yes  Outcome Measure Options: AM-PAC 6 Clicks Basic Mobility (PT)  Score:  18  Functional Limitation: Mobility: Walking and moving around  Mobility: Walking and Moving Around Current Status (): At least 40 percent but less than 60 percent impaired, limited or restricted  Mobility: Walking and Moving Around Goal Status (): At least 20 percent but less than 40 percent impaired, limited or restricted    PT Discharge Summary  Anticipated Discharge Disposition: home with 24/7 care, home with outpatient services  Reason for Discharge: Maximum functional potential achieved, Discharge from facility, Per MD order  Discharge Destination: Home with outpatient services    Danny Beebe, PTA  6/23/2017

## 2017-06-23 NOTE — PLAN OF CARE
Problem: Patient Care Overview (Adult)  Goal: Plan of Care Review  Outcome: Ongoing (interventions implemented as appropriate)    06/23/17 0326   Coping/Psychosocial Response Interventions   Plan Of Care Reviewed With patient   Patient Care Overview   Progress no change   Outcome Evaluation   Outcome Summary/Follow up Plan rested well tonight          Problem: Functional Deficit (Adult,Obstetrics,Pediatric)  Goal: Signs and Symptoms of Listed Potential Problems Will be Absent or Manageable (Functional Deficit)  Outcome: Ongoing (interventions implemented as appropriate)    Problem: Fall Risk (Adult)  Goal: Absence of Falls  Outcome: Ongoing (interventions implemented as appropriate)    Problem: Infection, Risk/Actual (Adult)  Goal: Infection Prevention/Resolution  Outcome: Ongoing (interventions implemented as appropriate)

## 2017-06-23 NOTE — PLAN OF CARE
Problem: Patient Care Overview (Adult)  Goal: Plan of Care Review  Outcome: Ongoing (interventions implemented as appropriate)    06/23/17 1310   Coping/Psychosocial Response Interventions   Plan Of Care Reviewed With patient   Outcome Evaluation   Outcome Summary/Follow up Plan pt has met 2/4 goals and will 24/7care at home. outpt PT to follow.         Problem: Inpatient Physical Therapy  Goal: Transfer Training Goal 1 LTG- PT  Outcome: Ongoing (interventions implemented as appropriate)    06/13/17 1036 06/23/17 1310   Transfer Training PT LTG   Transfer Training PT LTG, Date Established 06/13/17 --    Transfer Training PT LTG, Time to Achieve by discharge --    Transfer Training PT LTG, Activity Type bed to chair /chair to bed --    Transfer Training PT LTG, [place] Level supervision required --    Transfer Training PT LTG, Assist Device (AAD) --    Transfer Training PT LTG, Date Goal Reviewed --  06/23/17   Transfer Training PT LTG, Outcome --  goal not met       Goal: Gait Training Goal LTG- PT  Outcome: Ongoing (interventions implemented as appropriate)    06/13/17 1036 06/23/17 1310   Gait Training PT LTG   Gait Training Goal PT LTG, Date Established 06/13/17 --    Gait Training Goal PT LTG, Time to Achieve by discharge --    Gait Training Goal PT LTG, [place] Level supervision required --    Gait Training Goal PT LTG, Assist Device (AAD) --    Gait Training Goal PT LTG, Distance to Achieve 150 feet --    Gait Training Goal PT LTG, Date Goal Reviewed --  06/23/17   Gait Training Goal PT LTG, Outcome --  goal partially met   Gait Training Goal PT LTG, Reason Goal Not Met --  (needs cga for safety and direction)

## 2017-06-24 NOTE — THERAPY DISCHARGE NOTE
Acute Care - Occupational Therapy Discharge Summary  [facility]     Patient Name: Kierra Scales  : 1929  MRN: [id number]    Today's Date: 2017  Onset of Illness/Injury or Date of Surgery Date: 17    Date of Referral to OT: 17  Referring Physician: Dr. Lucia      Admit Date: 2017        OT Recommendation and Plan    Visit Dx:    ICD-10-CM ICD-9-CM   1. Oral phase dysphagia R13.11 787.21   2. Impaired mobility and ADLs Z74.09 799.89   3. Muscle weakness (generalized) M62.81 728.87   4. Abnormality of gait and mobility R26.9 781.2   5. Symbolic dysfunction R48.9 784.60   6. Physical deconditioning R53.81 799.3   7. Adenocarcinoma of sigmoid colon C18.7 153.3                     OT Goals       17 0824 17 1105 17 0826    Transfer Training OT STG    Transfer Training OT STG, Date Goal Reviewed 17  -RB 17  -KD 17  -RC    Transfer Training OT STG, Outcome goal not met  -RB  goal ongoing  -RC    Transfer Training OT STG, Reason Goal Not Met discharged from facility  -RB      Dynamic Standing Balance OT STG    Dynamic Standing Balance OT STG, Date Goal Reviewed 17  -RB 17  -KD 17  -RC    Dynamic Standing Balance OT STG, Outcome goal not met  -RB  goal ongoing  -RC    Dynamic Standing Balance OT STG, Reason Goal Not Met discharged from facility  -RB      Caregiver Training OT LTG    Caregiver Training OT LTG, Date Goal Reviewed 17  -RB  17  -RC    Caregiver Training OT LTG, Outcome goal not met  -RB  goal ongoing  -RC    Caregiver Training OT LTG, Reason Goal Not Met discharged from facility  -RB      ADL OT LTG    ADL OT LTG, Date Goal Reviewed 17  -RB  17  -RC    ADL OT LTG, Outcome goal met  -RB  goal met  -RC    Activity Tolerance OT STG    Activity Tolerance Goal OT STG, Date Goal Reviewed 17  -RB  17  -RC    Activity Tolerance Goal OT STG, Outcome goal met  -RB  goal met  -RC      17  0915 06/20/17 1416 06/20/17 1415    Transfer Training OT STG    Transfer Training OT STG, Date Goal Reviewed 06/21/17  -RC 06/20/17  -RC     Transfer Training OT STG, Outcome goal ongoing  -RC goal ongoing  -RC     Dynamic Standing Balance OT STG    Dynamic Standing Balance OT STG, Date Goal Reviewed 06/21/17  -RC 06/20/17  -RC     Dynamic Standing Balance OT STG, Outcome goal ongoing  -RC goal ongoing  -RC     Caregiver Training OT LTG    Caregiver Training OT LTG, Date Goal Reviewed 06/21/17  -RC 06/20/17  -RC     Caregiver Training OT LTG, Outcome goal ongoing  -RC goal ongoing  -RC     ADL OT LTG    ADL OT LTG, Date Goal Reviewed 06/21/17  -RC 06/20/17  -RC     ADL OT LTG, Outcome goal ongoing  -RC goal ongoing  -RC     Activity Tolerance OT STG    Activity Tolerance Goal OT STG, Date Goal Reviewed 06/21/17  -RC 06/20/17  -RC (P)  06/20/17  -RC    Activity Tolerance Goal OT STG, Outcome goal ongoing  -RC goal ongoing  -RC (P)  goal ongoing  -RC      06/19/17 1410 06/17/17 0600 06/16/17 0955    Transfer Training OT STG    Transfer Training OT STG, Date Goal Reviewed 06/19/17  -RC 06/17/17  -KD 06/16/17  -KD    Transfer Training OT STG, Outcome goal ongoing  -RC      Dynamic Standing Balance OT STG    Dynamic Standing Balance OT STG, Date Goal Reviewed 06/19/17  -RC 06/17/17  -KD 06/16/17  -KD    Dynamic Standing Balance OT STG, Outcome goal ongoing  -RC      Caregiver Training OT LTG    Caregiver Training OT LTG, Date Goal Reviewed 06/19/17  -RC 06/17/17  -KD 06/16/17  -KD    Caregiver Training OT LTG, Outcome goal ongoing  -RC      ADL OT LTG    ADL OT LTG, Date Goal Reviewed 06/19/17  -RC 06/17/17  -KD 06/16/17  -KD    ADL OT LTG, Outcome goal ongoing  -RC      Activity Tolerance OT STG    Activity Tolerance Goal OT STG, Date Goal Reviewed 06/19/17  -RC 06/17/17  -KD 06/16/17  -KD    Activity Tolerance Goal OT STG, Outcome goal ongoing  -RC        06/15/17 1341 06/14/17 1431 06/14/17 0905    Transfer Training  OT STG    Transfer Training OT STG, Date Goal Reviewed 06/15/17  -RC  06/14/17  -RC    Transfer Training OT STG, Outcome goal ongoing  -RC  goal met  -RC    Dynamic Standing Balance OT STG    Dynamic Standing Balance OT STG, Date Goal Reviewed 06/15/17  -RC  06/14/17  -RC    Dynamic Standing Balance OT STG, Outcome goal ongoing  -RC  goal ongoing  -RC    Caregiver Training OT LTG    Caregiver Training OT LTG, Date Goal Reviewed 06/15/17  -RC  06/14/17  -RC    Caregiver Training OT LTG, Outcome goal ongoing  -RC  goal ongoing  -RC    ADL OT LTG    ADL OT LTG, Date Goal Reviewed 06/15/17  -RC (P)  06/14/17  -RC 06/14/17  -RC    ADL OT LTG, Outcome goal ongoing  -RC (P)  goal ongoing  -RC goal ongoing  -RC    Activity Tolerance OT STG    Activity Tolerance Goal OT STG, Date Goal Reviewed 06/15/17  -RC  06/14/17  -RC    Activity Tolerance Goal OT STG, Outcome goal ongoing  -RC  goal ongoing  -RC      06/13/17 0759 06/12/17 1414       Transfer Training OT STG    Transfer Training OT STG, Date Established 06/13/17  -      Transfer Training OT STG, Time to Achieve 2 wks  -BH      Transfer Training OT STG, Activity Type sit to stand/stand to sit;bed to chair /chair to bed;toilet  -      Transfer Training OT STG, [place] Level supervision required;set up required   AE/AD as needed  -      Transfer Training OT STG, Date Goal Reviewed  06/12/17  -     Transfer Training OT STG, Outcome  goal not met  -RM     Transfer Training OT STG, Reason Goal Not Met  discharged from facility  -RM     Dynamic Standing Balance OT STG    Dynamic Standing Balance OT STG, Date Established 06/13/17  -      Dynamic Standing Balance OT STG, Time to Achieve 2 wks  -BH      Dynamic Standing Balance OT STG, [place] Level supervision required   5 minute no LOB or increased fatigue  -      Dynamic Standing Balance OT STG, Date Goal Reviewed  06/12/17  -RM     Dynamic Standing Balance OT STG, Outcome  goal not met  -RM     Dynamic  Standing Balance OT STG, Reason Goal Not Met  discharged from [facility]  -     Caregiver Training OT LTG    Caregiver Training OT LTG, Date Established 06/13/17  [facility]      Caregiver Training OT LTG, Time to Achieve by discharge  -      Caregiver Training OT LTG, [place] Level able to assist adequately;able to cue patient adequately   t/f, safety at home, [facility]  -      ADL OT LTG    ADL OT LTG, Date Established 06/13/17  -      ADL OT LTG, Time to Achieve by discharge  -      ADL OT LTG, Activity Type ADL skills  -      ADL OT LTG, [place] Level standby assist;modified independent;setup;assistive device  -      ADL OT LTG, Date Goal Reviewed  06/12/17  -     ADL OT LTG, Outcome  goal not met  -     ADL OT LTG, Reason Goal Not Met  discharged from [facility]  -     Functional Mobility OT LTG    Functional Mobility Goal OT LTG, Date Goal Reviewed  06/12/17  -     Functional Mobility Goal OT LTG, Outcome  goal not met  -     Functional Mobility Goal OT LTG, Reason Goal Not Met  discharged from [facility]  -     Activity Tolerance OT STG    Activity Tolerance Goal OT STG, Date Established 06/13/17  -      Activity Tolerance Goal OT STG, Time to Achieve 2 wks  -      Activity Tolerance Goal OT STG, Activity Level 20 min activity;O2 sat >/equal to 90%;with 1 rest break  -        User Key  (r) = Recorded By, (t) = Taken By, (c) = Cosigned By    Initials Name Provider Type    KENAN Garrison, OT Occupational Therapist     Rebeka Carrillo OTJOSÉ/L Occupational Therapist    TAHIR Kwong MADRIGAL/L Occupational Therapy Assistant    KD Nathalia Irizarry MADRIGAL/L Occupational Therapy Assistant     Catie Sheriff OT Occupational Therapist                Outcome Measures       06/22/17 1105 06/22/17 1000 06/22/17 0915    How much help from another person do you currently need...    Turning from your back to your side while in flat bed without using bedrails?   4  -RW    Moving from lying on back to  sitting on the side of a flat bed without bedrails?   4  -RW    Moving to and from a bed to a chair (including a wheelchair)?   4  -RW    Standing up from a chair using your arms (e.g., wheelchair, bedside chair)?   3  -RW    Climbing 3-5 steps with a railing?   3  -RW    To walk in hospital room?   3  -RW    AM-PAC 6 Clicks Score   21  -RW    How much help from another is currently needed...    Putting on and taking off regular lower body clothing? 3  -KD 3  -RC     Bathing (including washing, rinsing, and drying) 3  -KD 3  -RC     Toileting (which includes using toilet bed pan or urinal) 3  -KD 3  -RC     Putting on and taking off regular upper body clothing 4  -KD 4  -RC     Taking care of personal grooming (such as brushing teeth) 3  -KD 3  -RC     Eating meals 4  -KD 4  -RC     Score 20  -KD 20  -RC       06/22/17 0826 06/21/17 0915       How much help from another is currently needed...    Putting on and taking off regular lower body clothing? 3  -RC 3  -RC     Bathing (including washing, rinsing, and drying) 3  -RC 3  -RC     Toileting (which includes using toilet bed pan or urinal) 3  -RC 3  -RC     Putting on and taking off regular upper body clothing 4  -RC 4  -RC     Taking care of personal grooming (such as brushing teeth) 3  -RC 3  -RC     Eating meals 4  -RC 4  -RC     Score 20  -RC 20  -RC       User Key  (r) = Recorded By, (t) = Taken By, (c) = Cosigned By    Initials Name Provider Type    RW Danny Beebe, ROYAL Physical Therapy Assistant    RC Ally Kwong MADRIGAL/L Occupational Therapy Assistant    EVE Irizarry MADRIGAL/L Occupational Therapy Assistant              OT Discharge Summary  Anticipated Discharge Disposition: home with 24/7 care  Reason for Discharge: Per MD order, Discharge from facility  Outcomes Achieved: Patient able to partially acheive established goals  Discharge Destination: Home with outpatient services, Home with assist      Jamar Garrison OT  6/24/2017

## 2017-06-24 NOTE — PLAN OF CARE
Problem: Patient Care Overview (Adult)  Goal: Plan of Care Review    06/24/17 0824   Outcome Evaluation   Outcome Summary/Follow up Plan Pt met 2/5 goals and D/C with 24/7 care and out patient PT.         Problem: Inpatient Occupational Therapy  Goal: Transfer Training Goal 1 STG- OT  Outcome: Unable to achieve outcome(s) by discharge Date Met:  06/24/17 06/13/17 0759 06/24/17 0824   Transfer Training OT STG   Transfer Training OT STG, Date Established 06/13/17 --    Transfer Training OT STG, Time to Achieve 2 wks --    Transfer Training OT STG, Activity Type sit to stand/stand to sit;bed to chair /chair to bed;toilet --    Transfer Training OT STG, [place] Level supervision required;set up required  (AE/AD as needed) --    Transfer Training OT STG, Date Goal Reviewed --  06/24/17   Transfer Training OT STG, Outcome --  goal not met   Transfer Training OT STG, Reason Goal Not Met --  discharged from facility       Goal: Dymanic Standing Balance Goal STG- OT  Outcome: Unable to achieve outcome(s) by discharge Date Met:  06/24/17 06/13/17 0759 06/24/17 0824   Dynamic Standing Balance OT STG   Dynamic Standing Balance OT STG, Date Established 06/13/17 --    Dynamic Standing Balance OT STG, Time to Achieve 2 wks --    Dynamic Standing Balance OT STG, [place] Level supervision required  (5 minute no LOB or increased fatigue) --    Dynamic Standing Balance OT STG, Date Goal Reviewed --  06/24/17   Dynamic Standing Balance OT STG, Outcome --  goal not met   Dynamic Standing Balance OT STG, Reason Goal Not Met --  discharged from facility       Goal: Caregiver Training Goal LTG- OT  Outcome: Unable to achieve outcome(s) by discharge Date Met:  06/24/17 06/13/17 0759 06/24/17 0824   Caregiver Training OT LTG   Caregiver Training OT LTG, Date Established 06/13/17 --    Caregiver Training OT LTG, Time to Achieve by discharge --    Caregiver Training OT LTG, [place] Level able to assist adequately;able  to cue patient adequately  (t/f, safety at home, HEP) --    Caregiver Training OT LTG, Date Goal Reviewed --  06/24/17   Caregiver Training OT LTG, Outcome --  goal not met   Caregiver Training OT LTG, Reason Goal Not Met --  discharged from facility       Goal: ADL Goal LTG- OT  Outcome: Outcome(s) achieved Date Met:  06/24/17 06/13/17 0759 06/24/17 0824   ADL OT LTG   ADL OT LTG, Date Established 06/13/17 --    ADL OT LTG, Time to Achieve by discharge --    ADL OT LTG, Activity Type ADL skills --    ADL OT LTG, [place] Level standby assist;modified independent;setup;assistive device --    ADL OT LTG, Date Goal Reviewed --  06/24/17   ADL OT LTG, Outcome --  goal met       Goal: Activity Tolerance Goal STG- OT  Outcome: Outcome(s) achieved Date Met:  06/24/17 06/13/17 0759 06/24/17 0824   Activity Tolerance OT STG   Activity Tolerance Goal OT STG, Date Established 06/13/17 --    Activity Tolerance Goal OT STG, Time to Achieve 2 wks --    Activity Tolerance Goal OT STG, Activity Level 20 min activity;O2 sat >/equal to 90%;with 1 rest break --    Activity Tolerance Goal OT STG, Date Goal Reviewed --  06/24/17   Activity Tolerance Goal OT STG, Outcome --  goal met

## 2017-06-24 NOTE — SIGNIFICANT NOTE
06/24/17 0830   OT Discharge Summary   Anticipated Discharge Disposition home with 24/7 care   Reason for Discharge Per MD order;Discharge from facility   Outcomes Achieved Patient able to partially acheive established goals   Discharge Destination Home with outpatient services;Home with assist

## 2017-07-05 ENCOUNTER — HOSPITAL ENCOUNTER (OUTPATIENT)
Dept: PHYSICAL THERAPY | Facility: HOSPITAL | Age: 82
Setting detail: THERAPIES SERIES
Discharge: HOME | End: 2017-07-05

## 2017-07-05 DIAGNOSIS — R53.81 PHYSICAL DECONDITIONING: Primary | ICD-10-CM

## 2017-07-05 PROCEDURE — G8978 MOBILITY CURRENT STATUS: HCPCS | Performed by: PHYSICAL THERAPIST

## 2017-07-05 PROCEDURE — G8979 MOBILITY GOAL STATUS: HCPCS | Performed by: PHYSICAL THERAPIST

## 2017-07-05 PROCEDURE — 97162 PT EVAL MOD COMPLEX 30 MIN: CPT | Performed by: PHYSICAL THERAPIST

## 2017-07-05 NOTE — THERAPY EVALUATION
Outpatient Physical Therapy Ortho Initial Evaluation  [facility]     Patient Name: Kierra Scales  : 1929  MRN: [id number]  Today's Date: 2017      Visit Date: 2017     Pt attended  scheduled visits.   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)   • Hypothyroidism   • Dyspnea   • Hyperthyroidism   • Gastrointestinal hemorrhage with melena   • Type 2 diabetes mellitus with hyperglycemia   • Colon cancer   • Chronic blood loss anemia   • Physical deconditioning   • Senile dementia, uncomplicated   • Encounter for rehabilitation   • Adenocarcinoma of sigmoid colon   • Hypokalemia        Past Medical History:   Diagnosis Date   • Anxiety    • Arthritis    • Bradycardia    • Dyslipidemia    • Dyspnea    • Fibrocystic disease of breast    • Hashimoto's thyroiditis    • Hypertensive disorder    • Hypothyroidism    • Pain    • Palpitations    • Paroxysmal tachycardia    • Shortness of breath    • Tricuspid valve disorders, specified as nonrheumatic    • Vitamin D deficiency         Past Surgical History:   Procedure Laterality Date   • COLON RESECTION N/A 2017    Procedure: COLON RESECTION;  Surgeon: Stefano Linn MD;  Location: [facility] OR;  Service:    • COLONOSCOPY N/A 2017    Procedure: Flexible Sigmoidoscopy;  Surgeon: Stefano Linn MD;  Location: [facility] ENDOSCOPY;  Service:    • EXTERNAL EAR SURGERY     • EYE SURGERY     • LIPOMA EXCISION     • PACEMAKER IMPLANTATION     • SALIVARY GLAND SURGERY     • TUBAL ABDOMINAL LIGATION         Visit Dx:     ICD-10-CM ICD-9-CM   1. Physical deconditioning R53.81 799.3                 PT Ortho       17 1800     Subjective Comments    Subjective Comments 87 y/o female presents S/P recent hospitalization and colon surgery, with c/o weakenss, decreased endurance, (chronic LBP). Pt lives at home with her adult daughter (caretaker). Pt adn daughter relate she has difficulty wiht ADLs due to low energy. Daughter relates pts walking duration, speed has decreased. Pt uses assistance with ADLs. Pt is a fair historian, daughter answers most questions  -LF    Precautions and Contraindications    Precautions mild confusion  -LF    Contraindications FALL RISK  -LF    Subjective Pain    Able to rate subjective pain? yes  -LF    Pre-Treatment Pain Level 3  -LF    Post-Treatment Pain Level --   LB  -LF    Posture/Observations    Posture/Observations Comments unsteady gait - holds therapist of daughters hand  -LF    ROM (Range of Motion)    General ROM Detail Pt demonstrates limited B shoulder AROM to 90 with c/o pain. Functional elbow, wrist, hand, knee, AROM.   -LF    MMT (Manual Muscle Testing)    General MMT Assessment Detail fair endurance. Pt walked aprox 250 ft and c/o fatigue.   -LF    Transfers    Transfers, Sit-Stand [place] independent  -LF    Transfers, Stand-Sit [place] independent  -LF    Gait Assessment/Treatment    Gait, [place] Level hand held assist;stand by assist  -LF    Gait, Assistive Device --   daughter relates pt refuses to use one  -LF    Gait, Distance (Feet) 250  -LF    Gait, Impairments strength decreased  -LF    Gait, Comment unsteady balance, hand hold assist  -LF      User Key  (r) = Recorded By, (t) = Taken By, (c) = Cosigned By    Initials Name Provider Type    LF Missy Stanley, PT Physical Therapist                            Therapy Education       07/05/17 1800          Therapy Education    Given HEP  -LF      Program New  -LF      How Provided Verbal;Demonstration  -LF      Provided to Patient;Caregiver  -LF      Level of Understanding Teach back education  performed;Verbalized;Demonstrated  -LF        User Key  (r) = Recorded By, (t) = Taken By, (c) = Cosigned By    Initials Name Provider Type    LF Missy Stanley PT Physical Therapist                PT OP Goals       07/05/17 1800       PT Short Term Goals    STG Date to Achieve 07/19/17  -LF     STG 1 Pt will perform 5x sit-to- 25 or less sec  -LF     STG 2 Pt will walk unassisted by hand hold  -LF     STG 3 Pt will complete 15 min aerobic exercise on bike : arms/ legs  -LF     STG 4 I HEP each session  -LF     Long Term Goals    LTG Date to Achieve 08/02/17  -LF     LTG 1 Pt will walk 2 laps in gym with min c/o fatigue  -LF     LTG 2 Pt will be I in final HEP   -LF     LTG 3 Pt will report min fatigue with ADLs.   -LF     Time Calculation    PT Goal Re-Cert Due Date 07/26/17  -LF       User Key  (r) = Recorded By, (t) = Taken By, (c) = Cosigned By    Initials Name Provider Type    LF Missy Stanley, PT Physical Therapist                PT Assessment/Plan       07/05/17 1800       PT Assessment    Functional Limitations Decreased safety during functional activities;Impaired gait;Impaired locomotion;Limitation in home management;Limitations in community activities;Limitations in functional capacity and performance;Performance in leisure activities;Performance in self-care ADL  -LF     Impairments Balance;Coordination;Gait;Endurance;Pain;Muscle strength  -LF     Assessment Comments 87 y/o female, recently discharged from hospital , colon surgery, presents with c/o deconditioning, fatigue. Pt demonstrates unsteady gait, c/o fatigue after walking, lack of HEP . Pt may benefit from skilled intervention o address the above mentioned deficits  -LF     Rehab Potential Good  -LF     Patient/caregiver participated in establishment of treatment plan and goals Yes  -LF     Patient would benefit from skilled therapy intervention Yes  -LF     PT Plan    PT Frequency 2x/week  -LF     Predicted Duration of Therapy  Intervention (days/wks) 4 weeks  -LF     Planned CPT's? PT RE-EVAL: 88624;PT THER PROC EA 15 MIN: 32560;PT THER ACT EA 15 MIN: 44226;PT MANUAL THERAPY EA 15 MIN: 09171;PT NEUROMUSC RE-EDUCATION EA 15 MIN: 48916;PT GAIT TRAINING EA 15 MIN: 10671  -LF     Physical Therapy Interventions (Optional Details) gait training;home exercise program;manual therapy techniques;neuromuscular re-education;patient/family education;ROM (Range of Motion);strengthening  -LF     PT Plan Comments Continue therapy involving strengthening, balance trainnig, coordination training, safety training, fall prevention training/ education, general conditioning, education, progresive HEP instruction.   -LF       User Key  (r) = Recorded By, (t) = Taken By, (c) = Cosigned By    Initials Name Provider Type    LEX Stanley PT Physical Therapist                  Exercises       07/05/17 1800          Subjective Comments    Subjective Comments 89 y/o female presents S/P recent hospitalization and colon surgery, with c/o weakenss, decreased endurance, (chronic LBP). Pt lives at home with her adult daughter (caretaker). Pt adn daughter relate she has difficulty wiht ADLs due to low energy. Daughter relates pts walking duration, speed has decreased. Pt uses assistance with ADLs. Pt is a fair historian, daughter answers most questions  -LF      Subjective Pain    Able to rate subjective pain? yes  -LF      Pre-Treatment Pain Level 3  -LF      Post-Treatment Pain Level --   LB  -LF      Exercise 1    Exercise Name 1 seated SKC  -LF      Cueing 1 Verbal;Demo  -LF      Reps 1 10   reviewed for HEP   -LF      Exercise 2    Exercise Name 2 seated LAQ  -LF      Cueing 2 Verbal;Demo  -LF      Reps 2 10   reviwwed for HEP  -LF        User Key  (r) = Recorded By, (t) = Taken By, (c) = Cosigned By    Initials Name Provider Type    LEX Stanley PT Physical Therapist                              Outcome Measures       07/05/17 1800          5 Times Sit to  Stand    5 Times Sit to Stand (seconds) 29 seconds  -LF      Functional Assessment    Outcome Measure Options 5x Sit to Stand  -LF        User Key  (r) = Recorded By, (t) = Taken By, (c) = Cosigned By    Initials Name Provider Type    LF Missy Stanley, PT Physical Therapist            Time Calculation:   Start Time: 1120  Stop Time: 1150  Time Calculation (min): 30 min  Total Timed Code Minutes- PT: 30 minute(s)     Therapy Charges for Today     Code Description Service Date Service Provider Modifiers Qty    99075172115 HC PT MOBILITY CURRENT 7/5/2017 Missy Stanley, PT GP, CL 1    [id number] HC PT MOBILITY PROJECTED 7/5/2017 Missy Stanley, PT GP, CI 1    [id number] HC PT EVAL MOD COMPLEXITY 2 7/5/2017 Missy Stanley, PT GP 1          PT G-Codes  PT Professional Judgement Used?: Yes  Outcome Measure Options: 5x Sit to Stand  Score: 29 sec  Functional Limitation: Mobility: Walking and moving around  Mobility: Walking and Moving Around Current Status (): At least 60 percent but less than 80 percent impaired, limited or restricted  Mobility: Walking and Moving Around Goal Status (): At least 1 percent but less than 20 percent impaired, limited or restricted         Missy Stanley, PT  7/5/2017

## 2017-07-10 ENCOUNTER — APPOINTMENT (OUTPATIENT)
Dept: PHYSICAL THERAPY | Facility: HOSPITAL | Age: 82
End: 2017-07-10

## 2017-07-10 ENCOUNTER — OFFICE VISIT (OUTPATIENT)
Dept: SURGERY | Facility: CLINIC | Age: 82
End: 2017-07-10

## 2017-07-10 VITALS
HEIGHT: 72 IN | BODY MASS INDEX: 23.3 KG/M2 | WEIGHT: 172 LBS | SYSTOLIC BLOOD PRESSURE: 130 MMHG | DIASTOLIC BLOOD PRESSURE: 70 MMHG

## 2017-07-10 DIAGNOSIS — Z09 FOLLOW UP: Primary | ICD-10-CM

## 2017-07-10 PROCEDURE — 99024 POSTOP FOLLOW-UP VISIT: CPT | Performed by: SURGERY

## 2017-07-10 NOTE — PROGRESS NOTES
CHIEF COMPLAINT:    Chief Complaint   Patient presents with   • Post-op     Lap. converted to open left colon resection on 6/2/17.       HISTORY OF PRESENT ILLNESS:    Kierra Scales is a 88 y.o. female who underwent left colon resection for T3N0 cancer on 6/2/17.  She returns today for follow up.  She is home from rehab and has been doing fairly well per her daughter.  She has been eating and drinking well at home.  She has 2-3 loose BMs per day.  She has been ambulating regularly.     EXAM:  Vitals:    07/10/17 0935   BP: 130/70         Abdomen soft, incisions well healed    ASSESSMENT:    S/p left colon resection.    PLAN:    Does not desire consultation with heme/onc, which I would agree is not needed at this time-though it was offered to her.  Will plan to see back in one month.      I suspect her loose stools are likely from her colectomy and should improve with time.  I do not think further testing is needed for this currently as I do not suspect CDiff.    Follow up with PCP for management of her Iron supplements she has been taking for anemia for chronic and acute blood loss from her tumor.          This document has been electronically signed by Stefano Linn MD on July 10, 2017 10:28 AM

## 2017-07-12 ENCOUNTER — HOSPITAL ENCOUNTER (OUTPATIENT)
Dept: PHYSICAL THERAPY | Facility: HOSPITAL | Age: 82
Setting detail: THERAPIES SERIES
End: 2017-07-12

## 2017-07-14 ENCOUNTER — HOSPITAL ENCOUNTER (OUTPATIENT)
Dept: PHYSICAL THERAPY | Facility: HOSPITAL | Age: 82
Setting detail: THERAPIES SERIES
Discharge: HOME | End: 2017-07-14

## 2017-07-14 DIAGNOSIS — R53.81 PHYSICAL DECONDITIONING: Primary | ICD-10-CM

## 2017-07-14 PROBLEM — H26.9 CATARACT: Status: ACTIVE | Noted: 2017-07-14

## 2017-07-14 PROBLEM — E11.9 DIABETES MELLITUS (HCC): Status: ACTIVE | Noted: 2017-06-05

## 2017-07-14 PROBLEM — E07.9 DISORDER OF THYROID: Status: ACTIVE | Noted: 2017-03-15

## 2017-07-14 PROBLEM — G30.9 ALZHEIMER'S DISEASE (HCC): Status: ACTIVE | Noted: 2017-07-14

## 2017-07-14 PROBLEM — F02.80 ALZHEIMER'S DISEASE (HCC): Status: ACTIVE | Noted: 2017-07-14

## 2017-07-14 PROBLEM — M19.90 ARTHRITIS: Status: ACTIVE | Noted: 2017-07-14

## 2017-07-14 PROCEDURE — 97110 THERAPEUTIC EXERCISES: CPT

## 2017-07-14 NOTE — THERAPY TREATMENT NOTE
Outpatient Physical Therapy Ortho Treatment Note  [facility]     Patient Name: Kierra Scales  : 1929  MRN: [id number]  Today's Date: 2017      Visit Date: 2017     Subjective Improvement: 0%  Attendance:  2/ (medicare-must be precerted after 18 visits)  Next MD Visit : 3 weeks  Recert Date:  17      Therapy Diagnosis:  Deconditioning;knee and LB pain        Visit Dx:    ICD-10-CM ICD-9-CM   1. 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)   • Hypothyroidism   • Dyspnea   • Hyperthyroidism   • Gastrointestinal hemorrhage with melena   • Type 2 diabetes mellitus with hyperglycemia   • Colon cancer   • Chronic blood loss anemia   • Physical deconditioning   • Senile dementia, uncomplicated   • Encounter for rehabilitation   • Adenocarcinoma of sigmoid colon   • Hypokalemia        Past Medical History:   Diagnosis Date   • Anxiety    • Arthritis    • Bradycardia    • Dyslipidemia    • Dyspnea    • Fibrocystic disease of breast    • Hashimoto's thyroiditis    • Hypertensive disorder    • Hypothyroidism    • Pain    • Palpitations    • Paroxysmal tachycardia    • Shortness of breath    • Tricuspid valve disorders, specified as nonrheumatic    • Vitamin D deficiency         Past Surgical History:   Procedure Laterality Date   • COLON RESECTION N/A 2017    Procedure: COLON RESECTION;  Surgeon: Stefano Linn MD;  Location: [facility] OR;  Service:    • COLONOSCOPY N/A 2017    Procedure: Flexible Sigmoidoscopy;  Surgeon: Stefano Linn MD;  Location: [facility] ENDOSCOPY;  Service:    • EXTERNAL EAR SURGERY     • EYE SURGERY     • LIPOMA EXCISION     • PACEMAKER  IMPLANTATION     • SALIVARY GLAND SURGERY     • TUBAL ABDOMINAL LIGATION               PT Ortho       07/14/17 1103    Precautions and Contraindications    Precautions/Limitations fall precautions  -    Precautions mild confusion  -    Posture/Observations    Posture/Observations Comments ussteady gait; no AD; daughter present who does give some information for patient.   -      User Key  (r) = Recorded By, (t) = Taken By, (c) = Cosigned By    Initials Name Provider Type    BLANQUITA Washington PTA Physical Therapy Assistant                            PT Assessment/Plan       07/14/17 1100       PT Assessment    Assessment Comments good tolerance to treatment. Verbal and tactiling cues needed for all exercises. No change in HEP this date.    -     PT Plan    PT Frequency 2x/week  -     Predicted Duration of Therapy Intervention (days/wks) 4 weeks  -     PT Plan Comments Continue PT to increase strength and endurance as able. Increase pro ll time also.   -       User Key  (r) = Recorded By, (t) = Taken By, (c) = Cosigned By    Initials Name Provider Type    BLANQUITA Washington PTA Physical Therapy Assistant                    Exercises       07/14/17 1103          Subjective Comments    Subjective Comments Pt reports mostly of pain in her left hip/LB; daughter present who states that she always c/o's of that pain and they doctor it up at home at night time but doesn't seem to help.   -      Subjective Pain    Able to rate subjective pain? no  -      Subjective Pain Comment complains of pain in her back but unable to rate or give a number;   -      Exercise 1    Exercise Name 1 LTR w/ manual stretch  -      Sets 1 1  -KH      Reps 1 10  -KH      Exercise 2    Exercise Name 2 supine alt LE lifts hip flex  -KH      Sets 2 2  -KH      Reps 2 10  -KH      Exercise 3    Exercise Name 3 supine add squeeze  -KH      Sets 3 2  -KH      Reps 3 10  -KH      Exercise 4    Exercise Name 4 LAQ B  -KH      Sets 4 2  -KH       Reps 4 10  -KH      Exercise 5    Exercise Name 5 ham curls w/ Tband B  -KH      Equipment 5 Theraband  -      Resistance 5 Red  -KH      Sets 5 2  -KH      Reps 5 10  -KH      Exercise 6    Exercise Name 6 pro ll level 1  -      Time (Minutes) 6 10'  -        User Key  (r) = Recorded By, (t) = Taken By, (c) = Cosigned By    Initials Name Provider Type    BLANQUITA Washington PTA Physical Therapy Assistant                               PT OP Goals       07/14/17 1103       PT Short Term Goals    STG Date to Achieve 07/19/17  -     STG 1 Pt will perform 5x sit-to- 25 or less sec  -     STG 2 Pt will walk unassisted by hand hold  -     STG 3 Pt will complete 15 min aerobic exercise on bike : arms/ legs  -     STG 4 I HEP each session  -     Long Term Goals    LTG Date to Achieve 08/02/17  -     LTG 1 Pt will walk 2 laps in gym with min c/o fatigue  -     LTG 2 Pt will be I in final HEP   -     LTG 3 Pt will report min fatigue with ADLs.   -     Time Calculation    PT Goal Re-Cert Due Date 07/26/17  -       User Key  (r) = Recorded By, (t) = Taken By, (c) = Cosigned By    Initials Name Provider Type    BLANQUITA Washington PTA Physical Therapy Assistant                    Time Calculation:   Start Time: 1103  Stop Time: 1142  Time Calculation (min): 39 min  Total Timed Code Minutes- PT: 39 minute(s)    Therapy Charges for Today     Code Description Service Date Service Provider Modifiers Qty    51027353832 HC PT THER PROC EA 15 MIN 7/14/2017 Janel Washington PTA GP 3                    Janel Washington PTA  7/14/2017

## 2017-07-19 ENCOUNTER — HOSPITAL ENCOUNTER (OUTPATIENT)
Dept: PHYSICAL THERAPY | Facility: HOSPITAL | Age: 82
Setting detail: THERAPIES SERIES
Discharge: HOME | End: 2017-07-19

## 2017-07-19 DIAGNOSIS — R53.81 PHYSICAL DECONDITIONING: Primary | ICD-10-CM

## 2017-07-19 PROCEDURE — 97110 THERAPEUTIC EXERCISES: CPT | Performed by: PHYSICAL THERAPIST

## 2017-07-19 NOTE — THERAPY TREATMENT NOTE
Outpatient Physical Therapy Ortho Treatment Note  [facility]     Patient Name: Kierra Scales  : 1929  MRN: [id number]  Today's Date: 2017      Visit Date: 2017     Pt attended 3/3 scheduled visits. .  Re-cert date 17  Pt will RTD 2 weeks  .     Visit Dx:    ICD-10-CM ICD-9-CM   1. 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)   • Hypothyroidism   • Dyspnea   • Disorder of thyroid   • Gastrointestinal hemorrhage with melena   • Diabetes mellitus   • Colon cancer   • Chronic blood loss anemia   • Physical deconditioning   • Senile dementia, uncomplicated   • Encounter for rehabilitation   • Adenocarcinoma of sigmoid colon   • Hypokalemia   • Alzheimer's disease   • Arthritis   • Cataract        Past Medical History:   Diagnosis Date   • Anxiety    • Arthritis    • Bradycardia    • Dyslipidemia    • Dyspnea    • Fibrocystic disease of breast    • Hashimoto's thyroiditis    • Hypertensive disorder    • Hypothyroidism    • Pain    • Palpitations    • Paroxysmal tachycardia    • Shortness of breath    • Tricuspid valve disorders, specified as nonrheumatic    • Vitamin D deficiency         Past Surgical History:   Procedure Laterality Date   • COLON RESECTION N/A 2017    Procedure: COLON RESECTION;  Surgeon: Stefano Linn MD;  Location: [facility] OR;  Service:    • COLONOSCOPY N/A 2017    Procedure: Flexible Sigmoidoscopy;  Surgeon: Stefano Linn MD;  Location: [facility] ENDOSCOPY;  Service:    • EXTERNAL EAR SURGERY     • EYE SURGERY     • LIPOMA EXCISION     • PACEMAKER IMPLANTATION     • SALIVARY GLAND SURGERY     • TUBAL ABDOMINAL LIGATION               PT Ortho        07/19/17 1600    Subjective Comments    Subjective Comments Pt relates feels good today. States has sorenes sin lower back. State having less L hip pain.   -LF    Precautions and Contraindications    Precautions mild confusion  -LF      User Key  (r) = Recorded By, (t) = Taken By, (c) = Cosigned By    Initials Name Provider Type    LF Missy Stanley, PT Physical Therapist                            PT Assessment/Plan       07/19/17 1600       PT Assessment    Assessment Comments Nice stamina today, improved gait , improved coordination, balance. Understands (caregiveer understands). Nice effort today, no c/o increased pain  -LF     PT Plan    PT Plan Comments Progress stamina, endurance  involving UE/ LE, progress HEP  -LF       User Key  (r) = Recorded By, (t) = Taken By, (c) = Cosigned By    Initials Name Provider Type    LF Missy Stanley, PT Physical Therapist                    Exercises       07/19/17 1600 07/19/17 1300       Subjective Comments    Subjective Comments Pt relates feels good today. States has sorenes sin lower back. State having less L hip pain.   -LF      Exercise 1    Exercise Name 1  Pro II  - LE  -LF     Time (Minutes) 1  5  -LF     Exercise 2    Exercise Name 2  walk lap in gym - 1 lap at a time, bwtween exercise - hold ing pts hand 50% of the time, pt walking independently  -LF     Cueing 2  Tactile  -LF     Time (Minutes) 2  10  -LF     Exercise 3    Exercise Name 3  Pro II  UE   -LF     Time (Minutes) 3  5  -LF     Exercise 4    Exercise Name 4  stairs - up/ down 3 steps- using arms on rails  -LF     Cueing 4  Tactile;Verbal  -LF     Sets 4  4   4x up/down   -LF     Exercise 5    Exercise Name 5  standing rolling ball on table, red swiss ball: 1) forward back  2) circles   CW/CCW  3) lateral rolls  -LF     Cueing 5  Verbal  -LF     Equipment 5  --   red swiss ball  -LF     Time (Minutes) 5  6  -LF     Exercise 6    Exercise Name 6  standing bicep curls  2#  -LF     Cueing 6  Demo  -LF      Sets 6  3  -LF     Reps 6  20  -LF     Exercise 7    Exercise Name 7  red band:  1) row  2) bicep curls  3) tricep press down  4)  alternate arm swing  -LF     Cueing 7  Verbal  -LF     Equipment 7  Theraband  -LF     Resistance 7  Red  -LF     Sets 7  3  -LF     Reps 7  10  -LF     Exercise 8    Exercise Name 8  foot slides   -LF     Cueing 8  Demo  -LF     Time (Minutes) 8  5  -LF       User Key  (r) = Recorded By, (t) = Taken By, (c) = Cosigned By    Initials Name Provider Type    LF Missy Stanley, PT Physical Therapist                               PT OP Goals       07/19/17 1600       PT Short Term Goals    STG Date to Achieve 07/19/17  -LF     STG 1 Pt will perform 5x sit-to- 25 or less sec  -LF     STG 1 Progress Progressing  -LF     STG 2 Pt will walk unassisted by hand hold  -LF     STG 2 Progress Progressing  -LF     STG 3 Pt will complete 15 min aerobic exercise on bike : arms/ legs  -LF     STG 3 Progress Progressing  -LF     STG 4 I HEP each session  -LF     STG 4 Progress Ongoing  -LF     Long Term Goals    LTG Date to Achieve 08/02/17  -LF     LTG 1 Pt will walk 2 laps in gym with min c/o fatigue  -LF     LTG 1 Progress Progressing  -LF     LTG 2 Pt will be I in final HEP   -LF     LTG 2 Progress Progressing  -LF     LTG 3 Pt will report min fatigue with ADLs.   -LF     LTG 3 Progress Progressing  -LF     Time Calculation    PT Goal Re-Cert Due Date 07/26/17  -LF       User Key  (r) = Recorded By, (t) = Taken By, (c) = Cosigned By    Initials Name Provider Type    LEX Stanley, PT Physical Therapist                Therapy Education       07/19/17 1600          Therapy Education    Given HEP;Symptoms/condition management;Posture/body mechanics  -LF      Program New  -LF      How Provided Verbal;Demonstration;Written  -LF      Provided to Patient;Caregiver  -LF      Level of Understanding Verbalized;Demonstrated  -LF        User Key  (r) = Recorded By, (t) = Taken By, (c) = Cosigned By     Initials Name Provider Type     Missy Stanley, PT Physical Therapist                Time Calculation:   Start Time: 1302  Stop Time: 1349  Time Calculation (min): 47 min  Total Timed Code Minutes- PT: 47 minute(s)    Therapy Charges for Today     Code Description Service Date Service Provider Modifiers Qty    65649511894 HC PT THER PROC EA 15 MIN 7/19/2017 Missy Stanley, PT GP 3                    Missy Stanley, PT  7/19/2017

## 2017-07-20 ENCOUNTER — APPOINTMENT (OUTPATIENT)
Dept: PHYSICAL THERAPY | Facility: HOSPITAL | Age: 82
End: 2017-07-20

## 2017-07-21 ENCOUNTER — HOSPITAL ENCOUNTER (OUTPATIENT)
Dept: PHYSICAL THERAPY | Facility: HOSPITAL | Age: 82
Setting detail: THERAPIES SERIES
Discharge: HOME | End: 2017-07-21

## 2017-07-21 DIAGNOSIS — R53.81 PHYSICAL DECONDITIONING: Primary | ICD-10-CM

## 2017-07-21 PROCEDURE — 97110 THERAPEUTIC EXERCISES: CPT

## 2017-07-21 NOTE — THERAPY TREATMENT NOTE
Outpatient Physical Therapy Ortho Treatment Note  [facility]     Patient Name: Kierra Scales  : 1929  MRN: [id number]  Today's Date: 2017      Visit Date: 2017     Subjective Improvement: 0%  Attendance:   (medicare precert after 18)  Next MD Visit : 2 weeks  Recert Date:  17      Therapy Diagnosis:  Generalized Weakness        Visit Dx:    ICD-10-CM ICD-9-CM   1. 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)   • Hypothyroidism   • Dyspnea   • Disorder of thyroid   • Gastrointestinal hemorrhage with melena   • Diabetes mellitus   • Colon cancer   • Chronic blood loss anemia   • Physical deconditioning   • Senile dementia, uncomplicated   • Encounter for rehabilitation   • Adenocarcinoma of sigmoid colon   • Hypokalemia   • Alzheimer's disease   • Arthritis   • Cataract        Past Medical History:   Diagnosis Date   • Anxiety    • Arthritis    • Bradycardia    • Dyslipidemia    • Dyspnea    • Fibrocystic disease of breast    • Hashimoto's thyroiditis    • Hypertensive disorder    • Hypothyroidism    • Pain    • Palpitations    • Paroxysmal tachycardia    • Shortness of breath    • Tricuspid valve disorders, specified as nonrheumatic    • Vitamin D deficiency         Past Surgical History:   Procedure Laterality Date   • COLON RESECTION N/A 2017    Procedure: COLON RESECTION;  Surgeon: Stefano Linn MD;  Location: [facility] OR;  Service:    • COLONOSCOPY N/A 2017    Procedure: Flexible Sigmoidoscopy;  Surgeon: Stefano Linn MD;  Location: [facility] ENDOSCOPY;  Service:    • EXTERNAL EAR SURGERY     • EYE SURGERY     • LIPOMA EXCISION     • PACEMAKER  IMPLANTATION     • SALIVARY GLAND SURGERY     • TUBAL ABDOMINAL LIGATION               PT Ortho       07/21/17 1256    Precautions and Contraindications    Precautions/Limitations fall precautions  -    Precautions mild confusion  -    Posture/Observations    Posture/Observations Comments gati unsteady at times; SBA w/ t'fs just for fall precautions.   -      07/19/17 1600    Subjective Comments    Subjective Comments Pt relates feels good today. States has sorenes sin lower back. State having less L hip pain.   -LF    Precautions and Contraindications    Precautions mild confusion  -      User Key  (r) = Recorded By, (t) = Taken By, (c) = Cosigned By    Initials Name Provider Type    BLANQUITA Washington PTA Physical Therapy Assistant    LF Missy Stanley, PT Physical Therapist                            PT Assessment/Plan       07/21/17 1256       PT Assessment    Assessment Comments good tolerance to treatment; VC needed to perform all exercises correctly.  But good tolerance to pro ll   -     PT Plan    PT Frequency 2x/week  -     Predicted Duration of Therapy Intervention (days/wks) 4 weeks  -     PT Plan Comments Progress endurance and strength as able. B UE/UE strengthening.   -       User Key  (r) = Recorded By, (t) = Taken By, (c) = Cosigned By    Initials Name Provider Type    BLANQUITA Washington PTA Physical Therapy Assistant                    Exercises       07/21/17 1256          Subjective Comments    Subjective Comments Pt reports that she is feeling well today but having some pain in her LB.  Daughter present with patient this date.  -      Subjective Pain    Able to rate subjective pain? yes  -      Pre-Treatment Pain Level 3  -KH      Post-Treatment Pain Level 3  -KH      Exercise 1    Exercise Name 1 pro ll level 2 LE only  -KH      Time (Minutes) 1 10'  -KH      Exercise 2    Exercise Name 2 stairs up 3 down 2 w/ 2 HRs  -KH      Time (Minutes) 2 5'  -KH      Exercise 3    Exercise Name 3  "airex stance w/ bicep curls  -      Equipment 3 Dumbell  -KH      Weights/Plates 3 2  -KH      Sets 3 2  -KH      Reps 3 10  -KH      Exercise 4    Exercise Name 4 airex stance overhead lift  -KH      Weights/Plates 4 2  -KH      Sets 4 2  -KH      Reps 4 10  -KH      Time (Minutes) 4 plyoball  -KH      Exercise 5    Exercise Name 5 cece disc seated march  -KH      Sets 5 2  -KH      Reps 5 10  -KH      Exercise 6    Exercise Name 6 cece disc LAQ B  -KH      Sets 6 2  -KH      Reps 6 10  -KH      Exercise 7    Exercise Name 7 gait around gym  -KH      Sets 7 2  -KH      Time (Minutes) 7 270 ft  -KH      Exercise 8    Exercise Name 8 seated hamstring stretch B  -KH      Sets 8 3  -KH      Time (Seconds) 8 30\"  -        User Key  (r) = Recorded By, (t) = Taken By, (c) = Cosigned By    Initials Name Provider Type    BLANQUITA Washington PTA Physical Therapy Assistant                               PT OP Goals       07/21/17 1256       PT Short Term Goals    STG Date to Achieve 07/19/17  -     STG 1 Pt will perform 5x sit-to- 25 or less sec  -     STG 1 Progress Progressing  -KH     STG 2 Pt will walk unassisted by hand hold  -     STG 2 Progress Progressing  -KH     STG 3 Pt will complete 15 min aerobic exercise on bike : arms/ legs  -     STG 3 Progress Progressing  -KH     STG 4 I HEP each session  -     STG 4 Progress Ongoing  -     Long Term Goals    LTG Date to Achieve 08/02/17  -KH     LTG 1 Pt will walk 2 laps in gym with min c/o fatigue  -     LTG 1 Progress Progressing  -KH     LTG 2 Pt will be I in final HEP   -     LTG 2 Progress Progressing  -KH     LTG 3 Pt will report min fatigue with ADLs.   -     LTG 3 Progress Progressing  -KH     Time Calculation    PT Goal Re-Cert Due Date 07/26/17  -       User Key  (r) = Recorded By, (t) = Taken By, (c) = Cosigned By    Initials Name Provider Type    BLANQUITA Washington PTA Physical Therapy Assistant                    Time Calculation:   Start " Time: 1256  Stop Time: 1342  Time Calculation (min): 46 min  Total Timed Code Minutes- PT: 46 minute(s)    Therapy Charges for Today     Code Description Service Date Service Provider Modifiers Qty    48579987601 HC PT THER PROC EA 15 MIN 7/21/2017 Janel Washington PTA GP 3                    Janel Washington PTA  7/21/2017

## 2017-07-24 ENCOUNTER — HOSPITAL ENCOUNTER (OUTPATIENT)
Dept: PHYSICAL THERAPY | Facility: HOSPITAL | Age: 82
Setting detail: THERAPIES SERIES
Discharge: HOME | End: 2017-07-24

## 2017-07-24 DIAGNOSIS — R53.81 PHYSICAL DECONDITIONING: Primary | ICD-10-CM

## 2017-07-24 PROCEDURE — G8978 MOBILITY CURRENT STATUS: HCPCS | Performed by: PHYSICAL THERAPIST

## 2017-07-24 PROCEDURE — 97110 THERAPEUTIC EXERCISES: CPT | Performed by: PHYSICAL THERAPIST

## 2017-07-24 PROCEDURE — G8979 MOBILITY GOAL STATUS: HCPCS | Performed by: PHYSICAL THERAPIST

## 2017-07-24 NOTE — THERAPY PROGRESS REPORT/RE-CERT
Outpatient Physical Therapy Ortho Re-Assessment  [facility]     Patient Name: Kierra Scales  : 1929  MRN: [id number]  Today's Date: 2017      Visit Date: 2017     Pt attended 5/5 scheduled visits. .  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)   • Hypothyroidism   • Dyspnea   • Disorder of thyroid   • Gastrointestinal hemorrhage with melena   • Diabetes mellitus   • Colon cancer   • Chronic blood loss anemia   • Physical deconditioning   • Senile dementia, uncomplicated   • Encounter for rehabilitation   • Adenocarcinoma of sigmoid colon   • Hypokalemia   • Alzheimer's disease   • Arthritis   • Cataract        Past Medical History:   Diagnosis Date   • Anxiety    • Arthritis    • Bradycardia    • Dyslipidemia    • Dyspnea    • Fibrocystic disease of breast    • Hashimoto's thyroiditis    • Hypertensive disorder    • Hypothyroidism    • Pain    • Palpitations    • Paroxysmal tachycardia    • Shortness of breath    • Tricuspid valve disorders, specified as nonrheumatic    • Vitamin D deficiency         Past Surgical History:   Procedure Laterality Date   • COLON RESECTION N/A 2017    Procedure: COLON RESECTION;  Surgeon: Stefano Linn MD;  Location: [facility] OR;  Service:    • COLONOSCOPY N/A 2017    Procedure: Flexible Sigmoidoscopy;  Surgeon: Stefano Linn MD;  Location: [facility] ENDOSCOPY;  Service:    • EXTERNAL EAR SURGERY     • EYE SURGERY     • LIPOMA EXCISION     • PACEMAKER IMPLANTATION     • SALIVARY GLAND SURGERY     • TUBAL ABDOMINAL LIGATION         Visit Dx:     ICD-10-CM ICD-9-CM   1. Physical deconditioning R53.81 799.3                 PT Ortho        07/24/17 1400    Subjective Comments    Subjective Comments Pt states feels stronger. States has soreness in L lateral back. States sleepign well. Daughter states pt is dressing and bathing self. Daughter says pt is using therabands at home.   -LF    Precautions and Contraindications    Precautions/Limitations fall precautions  -LF    Precautions mild confusion  -LF    Subjective Pain    Able to rate subjective pain? yes  -LF    Pre-Treatment Pain Level --   c/o pain in L LB  -LF    Posture/Observations    Posture/Observations Comments gait improving, less unsteady   -LF    ROM (Range of Motion)    General ROM Detail Pt displays functional ROM.  -LF    MMT (Manual Muscle Testing)    General MMT Assessment Detail improved stamina, B UE/ LE  -LF    Transfers    Transfers, Sit-Stand [place] independent  -LF    Transfers, Stand-Sit [place] independent  -LF    Gait Assessment/Treatment    Gait, Comment balance good-, improved gait stamina  -LF      User Key  (r) = Recorded By, (t) = Taken By, (c) = Cosigned By    Initials Name Provider Type    LEX Stanley PT Physical Therapist                            Therapy Education       07/24/17 1500          Therapy Education    Given HEP;Symptoms/condition management  -LF      Program Reinforced  -LF      How Provided Verbal;Demonstration;Written  -LF      Provided to Patient;Caregiver  -LF      Level of Understanding Verbalized  -LF        User Key  (r) = Recorded By, (t) = Taken By, (c) = Cosigned By    Initials Name Provider Type    LF Missy Stanley PT Physical Therapist                PT OP Goals       07/24/17 1500       PT Short Term Goals    STG Date to Achieve 08/07/17  -LF     STG 1 Pt will perform 5x sit-to- 25 or less sec  -LF     STG 1 Progress Progressing  -LF     STG 2 Pt will walk unassisted by hand hold  -LF     STG 2 Progress Progressing  -LF     STG 3 Pt will complete 15 min aerobic exercise on bike : arms/ legs  -LF     STG 3  Progress Progressing  -LF     STG 4 I HEP each session  -LF     STG 4 Progress Ongoing  -LF     Long Term Goals    LTG Date to Achieve 08/21/17  -LF     LTG 1 Pt will walk 2 laps in gym with min c/o fatigue  -LF     LTG 1 Progress Progressing  -LF     LTG 2 Pt will be I in final HEP   -LF     LTG 2 Progress Progressing  -LF     LTG 3 Pt will report min fatigue with ADLs.   -LF     LTG 3 Progress Progressing  -LF     Time Calculation    PT Goal Re-Cert Due Date 08/14/17  -LF       User Key  (r) = Recorded By, (t) = Taken By, (c) = Cosigned By    Initials Name Provider Type    LF Missy Stanley, PT Physical Therapist                PT Assessment/Plan       07/24/17 1500       PT Assessment    Functional Limitations Impaired locomotion  -LF     Impairments Endurance;Gait;Muscle strength  -LF     Assessment Comments Pt continues to improve stamina, improved gait, improved transfers, walking faster, improved stabilization. Pt would benefit from continued skilled intervention to progress conditioning, vel, progress HEP.   -LF     Rehab Potential Good  -LF     Patient would benefit from skilled therapy intervention Yes  -LF     PT Plan    PT Frequency 2x/week  -LF     Predicted Duration of Therapy Intervention (days/wks) 2 weeks  -LF     Planned CPT's? PT RE-EVAL: 79737;PT THER PROC EA 15 MIN: 83972;PT THER ACT EA 15 MIN: 27985;PT MANUAL THERAPY EA 15 MIN: 77906;PT NEUROMUSC RE-EDUCATION EA 15 MIN: 95887;PT GAIT TRAINING EA 15 MIN: 37945  -LF     Physical Therapy Interventions (Optional Details) gait training;gross motor skills;home exercise program;patient/family education;strengthening;stair training  -LF     PT Plan Comments Continue therapy 3-4 more visits, progress gait, balance, endurance, UE/ LE strength , progress HEP .   -LF       User Key  (r) = Recorded By, (t) = Taken By, (c) = Cosigned By    Initials Name Provider Type    LEX Stanley, PT Physical Therapist                  Exercises       07/24/17 1400           Subjective Comments    Subjective Comments Pt states feels stronger. States has soreness in L lateral back. States sleepign well. Daughter states pt is dressing and bathing self. Daughter says pt is using therabands at home.   -LF      Subjective Pain    Able to rate subjective pain? yes  -LF      Pre-Treatment Pain Level --   c/o pain in L LB  -LF      Exercise 1    Exercise Name 1 walking in gym: walking drills: forward/ backward/ side stepping/ marching forward/ marching backward  -LF      Time (Minutes) 1 10  -LF      Exercise 2    Exercise Name 2 stairs 3 up/ down  -LF      Time (Minutes) 2 5  -LF      Exercise 3    Exercise Name 3 standing on BAPS with B hand hold, therapist holds waist : balance, lateral , pf/df  -LF      Time (Minutes) 3 6  -LF      Exercise 4    Exercise Name 4 standign kicking ball, single hand hold :  kick with L LE/ R LE  -LF      Time (Minutes) 4 6  -LF      Exercise 5    Exercise Name 5 seated on swiss ball, (therapist holding waist for balance) : bouncing  -LF      Reps 5 5  -LF        User Key  (r) = Recorded By, (t) = Taken By, (c) = Cosigned By    Initials Name Provider Type    LF Missy Stanley, PT Physical Therapist                              Time Calculation:   Start Time: 1350  Stop Time: 1430  Time Calculation (min): 40 min  Total Timed Code Minutes- PT: 40 minute(s)     Therapy Charges for Today     Code Description Service Date Service Provider Modifiers Qty    13777588218 HC PT MOBILITY CURRENT 7/24/2017 Missy Stanley, PT GP, CK 1    05531472651 HC PT MOBILITY PROJECTED 7/24/2017 Missy Stanley, PT GP, CI 1    99181869189 HC PT THER PROC EA 15 MIN 7/24/2017 Missy Stanley, PT GP 3          PT G-Codes  PT Professional Judgement Used?: Yes  Functional Limitation: Mobility: Walking and moving around  Mobility: Walking and Moving Around Current Status (): At least 40 percent but less than 60 percent impaired, limited or restricted  Mobility: Walking and Moving  Around Goal Status (): At least 1 percent but less than 20 percent impaired, limited or restricted         Missy Stanley, PT  7/24/2017

## 2017-07-26 ENCOUNTER — HOSPITAL ENCOUNTER (OUTPATIENT)
Dept: PHYSICAL THERAPY | Facility: HOSPITAL | Age: 82
Setting detail: THERAPIES SERIES
Discharge: HOME | End: 2017-07-26

## 2017-07-26 DIAGNOSIS — R53.81 PHYSICAL DECONDITIONING: Primary | ICD-10-CM

## 2017-07-26 PROCEDURE — 97110 THERAPEUTIC EXERCISES: CPT | Performed by: PHYSICAL THERAPIST

## 2017-07-26 NOTE — THERAPY TREATMENT NOTE
Outpatient Physical Therapy Ortho Treatment Note  [facility]     Patient Name: Kierra Scales  : 1929  MRN: [id number]  Today's Date: 2017      Visit Date: 2017     Pt attended 5/5 scheduled visits. .  Re-cert date 17  Pt will RTD 2 weeks    Visit Dx:    ICD-10-CM ICD-9-CM   1. 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)   • Hypothyroidism   • Dyspnea   • Disorder of thyroid   • Gastrointestinal hemorrhage with melena   • Diabetes mellitus   • Colon cancer   • Chronic blood loss anemia   • Physical deconditioning   • Senile dementia, uncomplicated   • Encounter for rehabilitation   • Adenocarcinoma of sigmoid colon   • Hypokalemia   • Alzheimer's disease   • Arthritis   • Cataract        Past Medical History:   Diagnosis Date   • Anxiety    • Arthritis    • Bradycardia    • Dyslipidemia    • Dyspnea    • Fibrocystic disease of breast    • Hashimoto's thyroiditis    • Hypertensive disorder    • Hypothyroidism    • Pain    • Palpitations    • Paroxysmal tachycardia    • Shortness of breath    • Tricuspid valve disorders, specified as nonrheumatic    • Vitamin D deficiency         Past Surgical History:   Procedure Laterality Date   • COLON RESECTION N/A 2017    Procedure: COLON RESECTION;  Surgeon: Stefano Linn MD;  Location: [facility] OR;  Service:    • COLONOSCOPY N/A 2017    Procedure: Flexible Sigmoidoscopy;  Surgeon: Stefano Linn MD;  Location: [facility] ENDOSCOPY;  Service:    • EXTERNAL EAR SURGERY     • EYE SURGERY     • LIPOMA EXCISION     • PACEMAKER IMPLANTATION     • SALIVARY GLAND SURGERY     • TUBAL ABDOMINAL LIGATION               PT Ortho        07/26/17 1700    Subjective Comments    Subjective Comments Pt states has soreness in lower back. No c/o leg pain or soreness. State feels stamina is improving, denies fatigue.   -LF    Precautions and Contraindications    Precautions/Limitations fall precautions  -LF    Precautions confusion  -LF      07/24/17 1400    Subjective Comments    Subjective Comments Pt states feels stronger. States has soreness in L lateral back. States sleepign well. Daughter states pt is dressing and bathing self. Daughter says pt is using therabands at home.   -LF    Precautions and Contraindications    Precautions/Limitations fall precautions  -LF    Precautions mild confusion  -LF    Subjective Pain    Able to rate subjective pain? yes  -LF    Pre-Treatment Pain Level --   c/o pain in L LB  -LF    Posture/Observations    Posture/Observations Comments gait improving, less unsteady   -LF    ROM (Range of Motion)    General ROM Detail Pt displays functional ROM.  -LF    MMT (Manual Muscle Testing)    General MMT Assessment Detail improved stamina, B UE/ LE  -LF    Transfers    Transfers, Sit-Stand [place] independent  -LF    Transfers, Stand-Sit [place] independent  -LF    Gait Assessment/Treatment    Gait, Comment balance good-, improved gait stamina  -LF      User Key  (r) = Recorded By, (t) = Taken By, (c) = Cosigned By    Initials Name Provider Type    LEX Stanley, PT Physical Therapist                            PT Assessment/Plan       07/26/17 1700       PT Assessment    Assessment Comments Nice progression, increasesd stamina, improved balance, gait, coordination. Good effort, cooperative.   -LF     PT Plan    PT Plan Comments Continue therapy 2-3 visits  to progress stamina, strength, balance, gait safety.   -LF       User Key  (r) = Recorded By, (t) = Taken By, (c) = Cosigned By    Initials Name Provider Type    LEX Stanley, PT Physical Therapist                    Exercises       07/26/17 1700           Subjective Comments    Subjective Comments Pt states has soreness in lower back. No c/o leg pain or soreness. State feels stamina is improving, denies fatigue.   -LF      Exercise 1    Exercise Name 1 Pro II - legs only  -LF      Time (Minutes) 1 8  -LF      Exercise 2    Exercise Name 2 walking up/ down stairs, up / down ramp : forward, backward, side stepping to L/R  -LF      Cueing 2 Verbal;Tactile;Demo  -LF      Time (Minutes) 2 8  -LF      Exercise 3    Exercise Name 3 side stepping on blue bolster  -LF      Time (Minutes) 3 5  -LF      Exercise 4    Exercise Name 4 standing - hand hold on counter - kickign ball , L LE, R LE  -LF      Time (Minutes) 4 6  -LF      Exercise 5    Exercise Name 5 waking in gym - 2 laps continuous : 2x during treatment sesison  -LF      Time (Minutes) 5 10  -LF      Exercise 6    Exercise Name 6 sitting on swiss ball (large grey ball)  - bouncing, bouncing side-to-side, circles  -LF      Time (Minutes) 6 6  -LF        User Key  (r) = Recorded By, (t) = Taken By, (c) = Cosigned By    Initials Name Provider Type    LF Missy Stanley, PT Physical Therapist                               PT OP Goals       07/26/17 1700       PT Short Term Goals    STG Date to Achieve 08/07/17  -LF     STG 1 Pt will perform 5x sit-to- 25 or less sec  -LF     STG 1 Progress Progressing  -LF     STG 2 Pt will walk unassisted by hand hold  -LF     STG 2 Progress Progressing  -LF     STG 3 Pt will complete 15 min aerobic exercise on bike : arms/ legs  -LF     STG 3 Progress Progressing  -LF     STG 4 I HEP each session  -LF     STG 4 Progress Ongoing  -LF     Long Term Goals    LTG Date to Achieve 08/21/17  -LF     LTG 1 Pt will walk 2 laps in gym with min c/o fatigue  -LF     LTG 1 Progress Progressing  -LF     LTG 2 Pt will be I in final HEP   -LF     LTG 2 Progress Progressing  -LF     LTG 3 Pt will report min fatigue with ADLs.   -LF     LTG 3 Progress Progressing  -LF     Time Calculation    PT  Goal Re-Cert Due Date 08/14/17  -LF       User Key  (r) = Recorded By, (t) = Taken By, (c) = Cosigned By    Initials Name Provider Type    LEX Stanley PT Physical Therapist                Therapy Education       07/26/17 1700          Therapy Education    Given HEP  -LF      Program Reinforced  -LF      How Provided Verbal  -LF      Provided to Patient;Caregiver  -LF      Level of Understanding Verbalized  -LF        User Key  (r) = Recorded By, (t) = Taken By, (c) = Cosigned By    Initials Name Provider Type    LEX Stanley PT Physical Therapist                Time Calculation:   Start Time: 1520  Stop Time: 1600  Time Calculation (min): 40 min  Total Timed Code Minutes- PT: 40 minute(s)    Therapy Charges for Today     Code Description Service Date Service Provider Modifiers Qty    82893092321 HC PT THER PROC EA 15 MIN 7/26/2017 Missy Stanley, PT GP 3                    Missy Stanley, PT  7/26/2017

## 2017-08-01 ENCOUNTER — HOSPITAL ENCOUNTER (OUTPATIENT)
Dept: PHYSICAL THERAPY | Facility: HOSPITAL | Age: 82
Setting detail: THERAPIES SERIES
Discharge: HOME | End: 2017-08-01

## 2017-08-01 DIAGNOSIS — R53.81 PHYSICAL DECONDITIONING: Primary | ICD-10-CM

## 2017-08-01 PROCEDURE — 97110 THERAPEUTIC EXERCISES: CPT | Performed by: PHYSICAL THERAPIST

## 2017-08-01 NOTE — THERAPY TREATMENT NOTE
Outpatient Physical Therapy Ortho Treatment Note  [facility]     Patient Name: Kierra Scales  : 1929  MRN: [id number]  Today's Date: 2017      Visit Date: 2017        Visit   Recert date 17  MD visit 8/15/17      Physical Deconditioning        Visit Dx:    ICD-10-CM ICD-9-CM   1. 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)   • Hypothyroidism   • Dyspnea   • Disorder of thyroid   • Gastrointestinal hemorrhage with melena   • Diabetes mellitus   • Colon cancer   • Chronic blood loss anemia   • Physical deconditioning   • Senile dementia, uncomplicated   • Encounter for rehabilitation   • Adenocarcinoma of sigmoid colon   • Hypokalemia   • Alzheimer's disease   • Arthritis   • Cataract        Past Medical History:   Diagnosis Date   • Anxiety    • Arthritis    • Bradycardia    • Dyslipidemia    • Dyspnea    • Fibrocystic disease of breast    • Hashimoto's thyroiditis    • Hypertensive disorder    • Hypothyroidism    • Pain    • Palpitations    • Paroxysmal tachycardia    • Shortness of breath    • Tricuspid valve disorders, specified as nonrheumatic    • Vitamin D deficiency         Past Surgical History:   Procedure Laterality Date   • COLON RESECTION N/A 2017    Procedure: COLON RESECTION;  Surgeon: Stefano Linn MD;  Location: [facility] OR;  Service:    • COLONOSCOPY N/A 2017    Procedure: Flexible Sigmoidoscopy;  Surgeon: Stefano Linn MD;  Location: [facility] ENDOSCOPY;  Service:    • EXTERNAL EAR SURGERY     • EYE SURGERY     • LIPOMA EXCISION     • PACEMAKER IMPLANTATION     • SALIVARY GLAND SURGERY     • TUBAL ABDOMINAL LIGATION                                PT Assessment/Plan       08/01/17 1325       PT Assessment    Assessment Comments tolerated treatment well with all ther ex  -KW     PT Plan    PT Plan Comments Cont PT for endurance, stength, and balance  -KW       User Key  (r) = Recorded By, (t) = Taken By, (c) = Cosigned By    Initials Name Provider Type    RAINA Rodriguez, IRMA Physical Therapist                Modalities       08/01/17 1300          Moist Heat    MH Applied Yes  -KW      Location Lower back  -KW      Rx Minutes 10 mins  -KW      MH S/P Rx Yes  -KW        User Key  (r) = Recorded By, (t) = Taken By, (c) = Cosigned By    Initials Name Provider Type    RAINA Rodriguez, PT Physical Therapist                Exercises       08/01/17 1300          Subjective Comments    Subjective Comments reports not feeling well today. States her back is hurting  -KW      Subjective Pain    Able to rate subjective pain? yes  -KW      Pre-Treatment Pain Level 7  -KW      Post-Treatment Pain Level 6  -KW      Exercise 1    Exercise Name 1 seated ball sqeezes  -KW      Sets 1 3  -KW      Reps 1 10  -KW      Exercise 2    Exercise Name 2 seated hip flex w/ 2#  -KW      Sets 2 3  -KW      Reps 2 10  -KW      Exercise 3    Exercise Name 3 seated knee ext w/ 2#  -KW      Sets 3 3  -KW      Reps 3 10  -KW      Exercise 4    Exercise Name 4 seated hip abd w/ Red TB  -KW      Sets 4 3  -KW      Reps 4 10  -KW      Exercise 5    Exercise Name 5 walking around the gym  -KW      Time (Minutes) 5 6  -KW        User Key  (r) = Recorded By, (t) = Taken By, (c) = Cosigned By    Initials Name Provider Type    RAINA Rodriguez, PT Physical Therapist                               PT OP Goals       08/01/17 1300       PT Short Term Goals    STG Date to Achieve 08/07/17  -KW     STG 1 Pt will perform 5x sit-to- 25 or less sec  -KW     STG 1 Progress Progressing  -KW     STG 2 Pt will walk unassisted by hand hold  -KW     STG 2 Progress Progressing  -KW     STG 3 Pt  will complete 15 min aerobic exercise on bike : arms/ legs  -KW     STG 3 Progress Progressing  -KW     STG 4 I HEP each session  -KW     STG 4 Progress Ongoing  -KW     Long Term Goals    LTG Date to Achieve 08/21/17  -KW     LTG 1 Pt will walk 2 laps in gym with min c/o fatigue  -KW     LTG 1 Progress Progressing  -KW     LTG 2 Pt will be I in final HEP   -KW     LTG 2 Progress Progressing  -KW     LTG 3 Pt will report min fatigue with ADLs.   -KW     LTG 3 Progress Progressing  -KW     Time Calculation    PT Goal Re-Cert Due Date 08/14/17  -KW       User Key  (r) = Recorded By, (t) = Taken By, (c) = Cosigned By    Initials Name Provider Type    RAINA Rodriguez, IRMA Physical Therapist                Therapy Education       08/01/17 1324          Therapy Education    Given HEP  -KW      Program Reinforced  -KW      How Provided Verbal  -KW      Provided to Patient  -KW      Level of Understanding Verbalized  -KW        User Key  (r) = Recorded By, (t) = Taken By, (c) = Cosigned By    Initials Name Provider Type    RAINA Rodriguez, PT Physical Therapist                Time Calculation:   Start Time: 1310  Stop Time: 1350  Time Calculation (min): 40 min  Total Timed Code Minutes- PT: 40 minute(s)    Therapy Charges for Today     Code Description Service Date Service Provider Modifiers Qty    51613508085 HC PT THER PROC EA 15 MIN 8/1/2017 Rylie Rodriguez, PT GP 3                    Rylei Rodriguez, PT  8/1/2017

## 2017-08-02 ENCOUNTER — OFFICE VISIT (OUTPATIENT)
Dept: ENDOCRINOLOGY | Facility: CLINIC | Age: 82
End: 2017-08-02

## 2017-08-02 VITALS
SYSTOLIC BLOOD PRESSURE: 138 MMHG | WEIGHT: 184 LBS | BODY MASS INDEX: 27.89 KG/M2 | HEART RATE: 78 BPM | HEIGHT: 68 IN | DIASTOLIC BLOOD PRESSURE: 68 MMHG

## 2017-08-02 DIAGNOSIS — I10 ESSENTIAL HYPERTENSION: ICD-10-CM

## 2017-08-02 DIAGNOSIS — E78.00 HYPERCHOLESTEROLEMIA: ICD-10-CM

## 2017-08-02 DIAGNOSIS — E55.9 VITAMIN D DEFICIENCY: ICD-10-CM

## 2017-08-02 DIAGNOSIS — Z79.4 TYPE 2 DIABETES MELLITUS WITHOUT COMPLICATION, WITH LONG-TERM CURRENT USE OF INSULIN (HCC): Primary | ICD-10-CM

## 2017-08-02 DIAGNOSIS — E11.9 TYPE 2 DIABETES MELLITUS WITHOUT COMPLICATION, WITH LONG-TERM CURRENT USE OF INSULIN (HCC): Primary | ICD-10-CM

## 2017-08-02 PROCEDURE — 99214 OFFICE O/P EST MOD 30 MIN: CPT | Performed by: NURSE PRACTITIONER

## 2017-08-02 RX ORDER — ERGOCALCIFEROL 1.25 MG/1
50000 CAPSULE ORAL
COMMUNITY
Start: 2016-07-26 | End: 2017-08-07

## 2017-08-02 RX ORDER — FLURBIPROFEN SODIUM 0.3 MG/ML
SOLUTION/ DROPS OPHTHALMIC
Refills: 11 | COMMUNITY
Start: 2017-07-03 | End: 2017-11-27 | Stop reason: SDUPTHER

## 2017-08-02 RX ORDER — MEMANTINE HYDROCHLORIDE 10 MG/1
TABLET ORAL
Refills: 5 | COMMUNITY
Start: 2017-07-03 | End: 2018-04-13

## 2017-08-02 RX ORDER — NABUMETONE 500 MG/1
500 TABLET, FILM COATED ORAL
COMMUNITY
Start: 2017-05-10 | End: 2017-08-07

## 2017-08-02 RX ORDER — METHIMAZOLE 5 MG/1
2.5 TABLET ORAL
COMMUNITY
End: 2017-08-07

## 2017-08-02 RX ORDER — EZETIMIBE 10 MG/1
10 TABLET ORAL
COMMUNITY
Start: 2016-07-26 | End: 2017-08-07

## 2017-08-02 RX ORDER — DILTIAZEM HYDROCHLORIDE 120 MG/1
120 CAPSULE, COATED, EXTENDED RELEASE ORAL
COMMUNITY
End: 2017-08-07

## 2017-08-02 RX ORDER — PRAVASTATIN SODIUM 40 MG
40 TABLET ORAL
COMMUNITY
Start: 2016-07-26 | End: 2017-08-07

## 2017-08-02 RX ORDER — DONEPEZIL HYDROCHLORIDE 10 MG/1
10 TABLET, FILM COATED ORAL
COMMUNITY
End: 2017-08-07

## 2017-08-02 RX ORDER — MEMANTINE HYDROCHLORIDE 5 MG/1
5 TABLET ORAL
COMMUNITY
End: 2017-08-07

## 2017-08-02 RX ORDER — SOTALOL HYDROCHLORIDE 80 MG/1
40 TABLET ORAL
COMMUNITY
End: 2017-08-07

## 2017-08-02 NOTE — PROGRESS NOTES
Subjective    Kierra Scales is a 88 y.o. female. she is here today for follow-up.    History of Present Illness       Duration/Timing:Diabetes mellitus type 2, age at onset of diabetes: 50 years, onset of symptoms sudden         Timing constant      Quality Controlled      Severity High            Severity (Complications/Hospitalizations)  Secondary Macrovascular Complications:CAD  Secondary Microvascular Complications:Diabetic Retinopathy      Context  Diabetes Regimen: Insulin, last HgbA1c 6.7% from Dec. 2016            Blood Glucose Readings     Was running high   Was snacking all day      Increased the insulin and now at goal         Diet      High carb      Exercise:does not exercise      Associated Signs/Symptoms  Hyperglycemic Symptoms: No polyuria,polydipsia, No polyphagia, No weight loss, No weight gain  Hypoglycemic Episodes:No documented hypoglycemia   Modifying Factors/Comorbidities:Hypertension, hyperlipidemia               The following portions of the patient's history were reviewed and updated as appropriate:   Past Medical History:   Diagnosis Date   • Anxiety    • Arthritis    • Bradycardia    • Dyslipidemia    • Dyspnea    • Fibrocystic disease of breast    • Hashimoto's thyroiditis    • Hypertensive disorder    • Hypothyroidism    • Pain    • Palpitations    • Paroxysmal tachycardia    • Shortness of breath    • Tricuspid valve disorders, specified as nonrheumatic    • Vitamin D deficiency      Past Surgical History:   Procedure Laterality Date   • COLON RESECTION N/A 6/2/2017    Procedure: COLON RESECTION;  Surgeon: Stefano Linn MD;  Location: [facility] OR;  Service:    • COLONOSCOPY N/A 5/30/2017    Procedure: Flexible Sigmoidoscopy;  Surgeon: Stefano Linn MD;  Location: [facility] ENDOSCOPY;  Service:    • EXTERNAL EAR SURGERY     • EYE SURGERY     • LIPOMA EXCISION     • PACEMAKER IMPLANTATION     • SALIVARY GLAND SURGERY     • TUBAL ABDOMINAL LIGATION       Family  "History   Problem Relation Age of Onset   • Diabetes Other    • Heart disease Other    • Hypertension Other    • Thyroid disease Other      OB History     No data available        Current Outpatient Prescriptions   Medication Sig Dispense Refill   • Alcohol Swabs (B-D SINGLE USE SWABS REGULAR) pads USE AS DIRECTED 200 each 3   • LUISA CONTOUR TEST test strip USE AS DIRECTED 200 each 11   • Canagliflozin (INVOKANA) 300 MG tablet TAKE ONE TABLET BY MOUTH DAILY     • diltiaZEM CD (CARDIZEM CD) 120 MG 24 hr capsule Take 1 capsule by mouth Daily. 30 capsule 1   • donepezil (ARICEPT) 10 MG tablet Take 1 tablet by mouth Daily.  3   • ezetimibe (ZETIA) 10 MG tablet Take 1 tablet by mouth Daily. 30 tablet 11   • ezetimibe (ZETIA) 10 MG tablet Take 10 mg by mouth.     • insulin aspart (NOVOLOG FLEXPEN) 100 UNIT/ML solution pen-injector sc pen Inject 8 Units under the skin 3 (Three) Times a Day With Meals. 5 pen 11   • Insulin Glargine (LANTUS SOLOSTAR) 100 UNIT/ML injection pen Inject 20 Units under the skin.     • Insulin Glargine (LANTUS SOLOSTAR) 100 UNIT/ML injection pen Inject 20 Units under the skin Every Night. 5 pen 11   • Insulin Pen Needle (COMFORT EZ PEN NEEDLES) 31G X 6 MM misc 2 (two) times a day. Inject two times per day.     • Insulin Syringe-Needle U-100 31G X 5/16\" 1 ML misc 2 (two) times a day. Use as directed TWICE DAILY FOR Insulin Injections     • Lancets (UNILET COMFORTOUCH LANCET) misc USE AS DIRECTED FOR FINGERSTICKS 100 each 3   • memantine (NAMENDA) 5 MG tablet Take 1 tablet by mouth 2 (Two) Times a Day.  3   • methIMAzole (TAPAZOLE) 5 MG tablet Take half tablet q day (Patient taking differently: Take 2.5 mg by mouth Daily. Take half tablet q day) 30 tablet 5   • nabumetone (RELAFEN) 500 MG tablet Take 500 mg by mouth daily.     • nabumetone (RELAFEN) 500 MG tablet Take 500 mg by mouth.     • pravastatin (PRAVACHOL) 40 MG tablet Take 1 tablet by mouth Every Night. 30 tablet 11   • pravastatin " (PRAVACHOL) 40 MG tablet Take 40 mg by mouth.     • SITagliptin (JANUVIA) 100 MG tablet Take 1 tablet by mouth Daily. 30 tablet 11   • SITagliptin (JANUVIA) 100 MG tablet Take 100 mg by mouth.     • sotalol (BETAPACE) 80 MG tablet Take 0.5 tablets by mouth 2 (Two) Times a Day. 30 tablet 1   • vitamin D (ERGOCALCIFEROL) 99889 UNITS capsule capsule Take 1 capsule by mouth Every 14 (Fourteen) Days. 30 capsule 11   • vitamin D (ERGOCALCIFEROL) 87821 UNITS capsule capsule Take 50,000 Units by mouth.     • B-D UF III MINI PEN NEEDLES 31G X 5 MM misc USE WITH INSULIN TWO TIMES A DAY  11   • diltiaZEM CD (DILTIAZEM CD) 120 MG 24 hr capsule Take 120 mg by mouth.     • donepezil (ARICEPT) 10 MG tablet Take 10 mg by mouth.     • Empagliflozin (JARDIANCE) 10 MG tablet Take 10 mg by mouth Daily. One tablet daily before breakfast 30 tablet 11   • Ezetimibe (ZETIA PO) TAKE ONE TABLET BY MOUTH DAILY  5   • glucose blood test strip by Misc.(Non-Drug; Combo Route) route. ascensia contour strips  Dr Thompson     • memantine (NAMENDA) 10 MG tablet TAKE ONE TABLET BY MOUTH TWICE A DAY  5   • memantine (NAMENDA) 5 MG tablet Take 5 mg by mouth.     • methIMAzole (TAPAZOLE) 5 MG tablet Take 2.5 mg by mouth.     • sotalol (BETAPACE) 80 MG tablet Take 40 mg by mouth.       No current facility-administered medications for this visit.      Allergies   Allergen Reactions   • Penicillins      Social History     Social History   • Marital status:      Spouse name: N/A   • Number of children: N/A   • Years of education: N/A     Social History Main Topics   • Smoking status: Never Smoker   • Smokeless tobacco: Never Used   • Alcohol use No   • Drug use: None   • Sexual activity: Defer     Other Topics Concern   • None     Social History Narrative    Lives alone but daughters expect to stay with her at discharge       Review of Systems  Review of Systems   Constitutional: Positive for fatigue. Negative for activity change, appetite change and  "diaphoresis.   HENT: Negative for congestion, dental problem, drooling, facial swelling, sneezing, sore throat, tinnitus, trouble swallowing and voice change.    Eyes: Negative for photophobia, pain, discharge, redness, itching and visual disturbance.   Respiratory: Negative for apnea, cough, choking, chest tightness and shortness of breath.    Cardiovascular: Negative for chest pain, palpitations and leg swelling.   Gastrointestinal: Negative for abdominal distention, abdominal pain, constipation, diarrhea, nausea and vomiting.   Endocrine: Negative for cold intolerance, heat intolerance, polydipsia, polyphagia and polyuria.   Genitourinary: Negative for difficulty urinating, dysuria, frequency, hematuria and urgency.   Musculoskeletal: Negative for arthralgias, back pain, gait problem, joint swelling, myalgias, neck pain and neck stiffness.   Skin: Negative for color change, pallor, rash and wound.   Allergic/Immunologic: Negative for environmental allergies, food allergies and immunocompromised state.   Neurological: Negative for dizziness, tremors, facial asymmetry, weakness, light-headedness, numbness and headaches.   Hematological: Negative for adenopathy. Does not bruise/bleed easily.   Psychiatric/Behavioral: Negative for agitation, behavioral problems, confusion and sleep disturbance.        Objective    /68 (BP Location: Left arm, Patient Position: Sitting, Cuff Size: Adult)  Pulse 78  Ht 68\" (172.7 cm)  Wt 184 lb (83.5 kg)  BMI 27.98 kg/m2  Physical Exam   Constitutional: She is oriented to person, place, and time. She appears well-developed and well-nourished. No distress.   HENT:   Head: Normocephalic and atraumatic.   Right Ear: External ear normal.   Left Ear: External ear normal.   Nose: Nose normal.   Eyes: Conjunctivae and EOM are normal. Pupils are equal, round, and reactive to light.   Neck: Normal range of motion. Neck supple. No tracheal deviation present. No thyromegaly present. "   Cardiovascular: Normal rate, regular rhythm and normal heart sounds.    No murmur heard.  Pulmonary/Chest: Effort normal and breath sounds normal. No respiratory distress. She has no wheezes.   Abdominal: Soft. Bowel sounds are normal. There is no tenderness. There is no rebound and no guarding.   Musculoskeletal: Normal range of motion. She exhibits no edema, tenderness or deformity.   Neurological: She is alert and oriented to person, place, and time. No cranial nerve deficit.   Skin: Skin is warm and dry. No rash noted.   Psychiatric: She has a normal mood and affect. Her behavior is normal. Judgment and thought content normal.       Lab Review  Glucose (mg/dL)   Date Value   06/19/2017 109 (H)   06/18/2017 118 (H)   06/17/2017 104 (H)     Glucose, Arterial (mmol/L)   Date Value   06/02/2017 93     Sodium (mmol/L)   Date Value   06/19/2017 139   06/18/2017 140   06/17/2017 137     Potassium (mmol/L)   Date Value   06/19/2017 4.3   06/18/2017 4.4   06/17/2017 4.4     Chloride (mmol/L)   Date Value   06/19/2017 104   06/18/2017 106   06/17/2017 103     CO2 (mmol/L)   Date Value   06/19/2017 27.0   06/18/2017 24.0   06/17/2017 28.0     BUN (mg/dL)   Date Value   06/19/2017 10   06/18/2017 15   06/17/2017 19     Creatinine (mg/dL)   Date Value   06/19/2017 0.68   06/18/2017 0.67   06/17/2017 0.77     Hemoglobin A1C   Date Value   06/13/2017 6.30 % (H)   03/15/2017 6.36 % (H)   12/09/2016 6.7 %TotHgb (H)   09/09/2016 6.8 %TotHgb (H)   06/09/2016 6.7 %TotHgb (H)     Triglycerides (mg/dl)   Date Value   08/05/2015 54   02/08/2014 67       Assessment/Plan      1. Type 2 diabetes mellitus without complication, with long-term current use of insulin    2. Vitamin D deficiency    3. Essential hypertension    4. Hypercholesterolemia    .    Medications prescribed:  Outpatient Encounter Prescriptions as of 8/2/2017   Medication Sig Dispense Refill   • Alcohol Swabs (B-D SINGLE USE SWABS REGULAR) pads USE AS DIRECTED 200 each  "3   • LUISA CONTOUR TEST test strip USE AS DIRECTED 200 each 11   • Canagliflozin (INVOKANA) 300 MG tablet TAKE ONE TABLET BY MOUTH DAILY     • diltiaZEM CD (CARDIZEM CD) 120 MG 24 hr capsule Take 1 capsule by mouth Daily. 30 capsule 1   • donepezil (ARICEPT) 10 MG tablet Take 1 tablet by mouth Daily.  3   • ezetimibe (ZETIA) 10 MG tablet Take 1 tablet by mouth Daily. 30 tablet 11   • ezetimibe (ZETIA) 10 MG tablet Take 10 mg by mouth.     • insulin aspart (NOVOLOG FLEXPEN) 100 UNIT/ML solution pen-injector sc pen Inject 8 Units under the skin 3 (Three) Times a Day With Meals. 5 pen 11   • Insulin Glargine (LANTUS SOLOSTAR) 100 UNIT/ML injection pen Inject 20 Units under the skin.     • Insulin Glargine (LANTUS SOLOSTAR) 100 UNIT/ML injection pen Inject 20 Units under the skin Every Night. 5 pen 11   • Insulin Pen Needle (COMFORT EZ PEN NEEDLES) 31G X 6 MM misc 2 (two) times a day. Inject two times per day.     • Insulin Syringe-Needle U-100 31G X 5/16\" 1 ML misc 2 (two) times a day. Use as directed TWICE DAILY FOR Insulin Injections     • Lancets (UNILET COMFORTOUCH LANCET) misc USE AS DIRECTED FOR FINGERSTICKS 100 each 3   • memantine (NAMENDA) 5 MG tablet Take 1 tablet by mouth 2 (Two) Times a Day.  3   • methIMAzole (TAPAZOLE) 5 MG tablet Take half tablet q day (Patient taking differently: Take 2.5 mg by mouth Daily. Take half tablet q day) 30 tablet 5   • nabumetone (RELAFEN) 500 MG tablet Take 500 mg by mouth daily.     • nabumetone (RELAFEN) 500 MG tablet Take 500 mg by mouth.     • pravastatin (PRAVACHOL) 40 MG tablet Take 1 tablet by mouth Every Night. 30 tablet 11   • pravastatin (PRAVACHOL) 40 MG tablet Take 40 mg by mouth.     • SITagliptin (JANUVIA) 100 MG tablet Take 1 tablet by mouth Daily. 30 tablet 11   • SITagliptin (JANUVIA) 100 MG tablet Take 100 mg by mouth.     • sotalol (BETAPACE) 80 MG tablet Take 0.5 tablets by mouth 2 (Two) Times a Day. 30 tablet 1   • vitamin D (ERGOCALCIFEROL) 15285 UNITS " capsule capsule Take 1 capsule by mouth Every 14 (Fourteen) Days. 30 capsule 11   • vitamin D (ERGOCALCIFEROL) 90506 UNITS capsule capsule Take 50,000 Units by mouth.     • [DISCONTINUED] Insulin Glargine (LANTUS SOLOSTAR) 100 UNIT/ML injection pen Inject 20 Units under the skin every night.     • [DISCONTINUED] NOVOLOG FLEXPEN 100 UNIT/ML solution pen-injector sc pen INJECT 4 UNITS SUBCUTANEOUSLY WITH SUPPER. MAY INCREASE UP TO 8 UNITS AS DIRECTED 15 mL 3   • B-D UF III MINI PEN NEEDLES 31G X 5 MM misc USE WITH INSULIN TWO TIMES A DAY  11   • diltiaZEM CD (DILTIAZEM CD) 120 MG 24 hr capsule Take 120 mg by mouth.     • donepezil (ARICEPT) 10 MG tablet Take 10 mg by mouth.     • Empagliflozin (JARDIANCE) 10 MG tablet Take 10 mg by mouth Daily. One tablet daily before breakfast 30 tablet 11   • Ezetimibe (ZETIA PO) TAKE ONE TABLET BY MOUTH DAILY  5   • glucose blood test strip by Misc.(Non-Drug; Combo Route) route. ascensia contour strips  Dr Thompson     • memantine (NAMENDA) 10 MG tablet TAKE ONE TABLET BY MOUTH TWICE A DAY  5   • memantine (NAMENDA) 5 MG tablet Take 5 mg by mouth.     • methIMAzole (TAPAZOLE) 5 MG tablet Take 2.5 mg by mouth.     • sotalol (BETAPACE) 80 MG tablet Take 40 mg by mouth.     • [DISCONTINUED] ferrous sulfate 324 (65 FE) MG tablet delayed-release EC tablet Take 1 tablet by mouth Daily With Breakfast. For 30 days 30 tablet 0   • [DISCONTINUED] folic acid-vit B6-vit B12 (FOLBEE) 2.5-25-1 MG tablet tablet Take 1 tablet by mouth Daily. For 30 days 30 tablet 0   • [DISCONTINUED] magnesium oxide (MAGOX) 400 (241.3 MG) MG tablet tablet Take 1 tablet by mouth Daily. 30 each 0   • [DISCONTINUED] potassium chloride (MICRO-K) 10 MEQ CR capsule Take 1 capsule by mouth Daily. 30 capsule 0     No facility-administered encounter medications on file as of 8/2/2017.        Orders placed during this encounter include:  No orders of the defined types were placed in this encounter.    Glycemic Management      Lab Results   Component Value Date    HGBA1C 6.30 (H) 06/13/2017             Januvia 100 mg daily      Invokana 100 mg daily--- stop due to warnings    Start jardiance 10 mg once daily      Lantus 20 units -     Novolog 8 units with meals -                  Lipid Management:      Pravastatin 40 mg qhs     Zetia 10 mg qhs     10-15      Lipids at goal         Blood Pressure Management:      On enalapril 20 mg daily      Not taking lasix      Sotalol TID      Preventive Care:patient is not smoking      Bone Health      August 2016      Vitamin d- 45.5      50,000 units every 2 weeks      Other Diabetes Related Aspects      Off medication      Nov. 2016      TSH - 0.53              Lab Results   Component Value Date     TSH 0.39 (L) 12/09/2016      Repeat thyroid function test       July 2017     TSH - 0.27    Go back to one tablet daily     Thyroid nodule --right thyroid nodule     FNA by      Final Diagnosis   [facility] Category: BENIGN  Negative for Malignant Cells  Benign thyroid nodule with cystic component (mixed solid/cytstic          4. Follow-up: Return in about 3 months (around 11/2/2017) for Recheck.

## 2017-08-03 ENCOUNTER — HOSPITAL ENCOUNTER (OUTPATIENT)
Dept: PHYSICAL THERAPY | Facility: HOSPITAL | Age: 82
Setting detail: THERAPIES SERIES
Discharge: HOME | End: 2017-08-03

## 2017-08-03 DIAGNOSIS — R53.81 PHYSICAL DECONDITIONING: Primary | ICD-10-CM

## 2017-08-03 PROCEDURE — 97110 THERAPEUTIC EXERCISES: CPT | Performed by: PHYSICAL THERAPIST

## 2017-08-03 NOTE — THERAPY TREATMENT NOTE
Outpatient Physical Therapy Ortho Treatment Note  [facility]     Patient Name: Kierra Scales  : 1929  MRN: [id number]  Today's Date: 8/3/2017      Visit Date: 2017      Visit  Medicare  Recert date 17  MD visit 8/15/17        Visit Dx:    ICD-10-CM ICD-9-CM   1. 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)   • Hypothyroidism   • Dyspnea   • Disorder of thyroid   • Gastrointestinal hemorrhage with melena   • Diabetes mellitus   • Colon cancer   • Chronic blood loss anemia   • Physical deconditioning   • Senile dementia, uncomplicated   • Encounter for rehabilitation   • Adenocarcinoma of sigmoid colon   • Hypokalemia   • Alzheimer's disease   • Arthritis   • Cataract        Past Medical History:   Diagnosis Date   • Anxiety    • Arthritis    • Bradycardia    • Dyslipidemia    • Dyspnea    • Fibrocystic disease of breast    • Hashimoto's thyroiditis    • Hypertensive disorder    • Hypothyroidism    • Pain    • Palpitations    • Paroxysmal tachycardia    • Shortness of breath    • Tricuspid valve disorders, specified as nonrheumatic    • Vitamin D deficiency         Past Surgical History:   Procedure Laterality Date   • COLON RESECTION N/A 2017    Procedure: COLON RESECTION;  Surgeon: Stefano Linn MD;  Location: [facility] OR;  Service:    • COLONOSCOPY N/A 2017    Procedure: Flexible Sigmoidoscopy;  Surgeon: Stefano Linn MD;  Location: [facility] ENDOSCOPY;  Service:    • EXTERNAL EAR SURGERY     • EYE SURGERY     • LIPOMA EXCISION     • PACEMAKER IMPLANTATION     • SALIVARY GLAND SURGERY     • TUBAL ABDOMINAL LIGATION                               PT  Assessment/Plan       08/03/17 1306       PT Assessment    Assessment Comments tolerated treatment well with all ther ex  -KW     PT Plan    PT Plan Comments Cont PT for endurance, balance, and strength  -KW       User Key  (r) = Recorded By, (t) = Taken By, (c) = Cosigned By    Initials Name Provider Type    RAINA Rodriguez, PT Physical Therapist                Modalities       08/03/17 1300          Moist Heat    MH Applied Yes  -KW      Location Lower back  -KW      Rx Minutes 10 mins  -KW        User Key  (r) = Recorded By, (t) = Taken By, (c) = Cosigned By    Initials Name Provider Type    RAINA Rodriguez, PT Physical Therapist                Exercises       08/03/17 1300          Subjective Comments    Subjective Comments Reports back pain today  -KW      Subjective Pain    Able to rate subjective pain? yes  -KW      Pre-Treatment Pain Level 5  -KW      Post-Treatment Pain Level 5  -KW      Exercise 1    Exercise Name 1 seated ball sqeezes  -KW      Sets 1 3  -KW      Reps 1 10  -KW      Exercise 2    Exercise Name 2 seated hip flex w/ 2#  -KW      Sets 2 3  -KW      Reps 2 10  -KW      Exercise 3    Exercise Name 3 seated knee ext w/ 2#  -KW      Sets 3 3  -KW      Reps 3 10  -KW      Equipment 3 Dumbell  -KW      Weights/Plates 3 2  -KW      Exercise 4    Exercise Name 4 seated hip abd w/ Red TB  -KW      Sets 4 3  -KW      Reps 4 10  -KW      Time (Minutes) 4 6  -KW      Weights/Plates 4 2  -KW      Exercise 5    Exercise Name 5 walking around the gym  -KW      Sets 5 2  -KW      Reps 5 5  -KW      Equipment 5 --   red swiss ball  -KW      Exercise 6    Exercise Name 6 sitting on swiss ball (large grey ball)  - bouncing, bouncing side-to-side, circles  -KW      Sets 6 2  -KW      Reps 6 10  -KW      Exercise 7    Exercise Name 7 gait around upstairs tract  -KW      Sets 7 2  -KW      Reps 7 10  -KW      Time (Minutes) 7 --   1 lap in 1 min 50 sec  -KW      Equipment 7 Theraband  -KW      Exercise 8     Exercise Name 8 seated hamstring stretch B  -KW      Sets 8 3  -KW      Time (Minutes) 8 5  -KW      Exercise 9    Exercise Name 9 pro II level 1 seat 12  -KW      Time (Minutes) 9 10  -KW        User Key  (r) = Recorded By, (t) = Taken By, (c) = Cosigned By    Initials Name Provider Type    RAINA Rodriguez, PT Physical Therapist                               PT OP Goals       08/03/17 1300       PT Short Term Goals    STG Date to Achieve 08/07/17  -KW     STG 1 Pt will perform 5x sit-to- 25 or less sec  -KW     STG 1 Progress Progressing  -KW     STG 2 Pt will walk unassisted by hand hold  -KW     STG 2 Progress Progressing  -KW     STG 3 Pt will complete 15 min aerobic exercise on bike : arms/ legs  -KW     STG 3 Progress Progressing  -KW     STG 4 I HEP each session  -KW     STG 4 Progress Ongoing  -KW     Long Term Goals    LTG Date to Achieve 08/21/17  -KW     LTG 1 Pt will walk 2 laps in gym with min c/o fatigue  -KW     LTG 1 Progress Progressing  -KW     LTG 2 Pt will be I in final HEP   -KW     LTG 2 Progress Progressing  -KW     LTG 3 Pt will report min fatigue with ADLs.   -KW     LTG 3 Progress Progressing  -KW     Time Calculation    PT Goal Re-Cert Due Date 08/14/17  -KW       User Key  (r) = Recorded By, (t) = Taken By, (c) = Cosigned By    Initials Name Provider Type    RAINA Rodriguez, PT Physical Therapist                Therapy Education       08/03/17 1305          Therapy Education    Given HEP  -KW      Program Reinforced  -KW      How Provided Verbal  -KW      Provided to Patient  -KW      Level of Understanding Verbalized  -KW        User Key  (r) = Recorded By, (t) = Taken By, (c) = Cosigned By    Initials Name Provider Type    RAINA Rodriguez, PT Physical Therapist                Time Calculation:   Start Time: 1300  Stop Time: 1340  Time Calculation (min): 40 min  Total Timed Code Minutes- PT: 40 minute(s)    Therapy Charges for Today     Code Description Service  Date Service Provider Modifiers Qty    [id number]  PT THER PROC EA 15 MIN 8/3/2017 Rylie Rodriguez, PT GP 3                    Rylie Rodriguez, PT  8/3/2017

## 2017-08-07 ENCOUNTER — OFFICE VISIT (OUTPATIENT)
Dept: CARDIOLOGY | Facility: CLINIC | Age: 82
End: 2017-08-07

## 2017-08-07 VITALS
HEIGHT: 68 IN | WEIGHT: 189 LBS | SYSTOLIC BLOOD PRESSURE: 198 MMHG | DIASTOLIC BLOOD PRESSURE: 100 MMHG | BODY MASS INDEX: 28.64 KG/M2

## 2017-08-07 DIAGNOSIS — I47.9 PAROXYSMAL TACHYCARDIA (HCC): ICD-10-CM

## 2017-08-07 DIAGNOSIS — E07.9 DISORDER OF THYROID: ICD-10-CM

## 2017-08-07 DIAGNOSIS — E78.00 HYPERCHOLESTEROLEMIA: ICD-10-CM

## 2017-08-07 DIAGNOSIS — I36.9 TRICUSPID VALVE DISORDERS, SPECIFIED AS NONRHEUMATIC: ICD-10-CM

## 2017-08-07 DIAGNOSIS — R00.2 PALPITATIONS: ICD-10-CM

## 2017-08-07 DIAGNOSIS — R00.1 BRADYCARDIA: ICD-10-CM

## 2017-08-07 DIAGNOSIS — I49.5 SSS (SICK SINUS SYNDROME) (HCC): Primary | ICD-10-CM

## 2017-08-07 PROCEDURE — 99213 OFFICE O/P EST LOW 20 MIN: CPT | Performed by: INTERNAL MEDICINE

## 2017-08-07 PROCEDURE — 93000 ELECTROCARDIOGRAM COMPLETE: CPT | Performed by: INTERNAL MEDICINE

## 2017-08-07 NOTE — PROGRESS NOTES
Kierra Scales  88 y.o. female    08/07/2017  1. SSS (sick sinus syndrome)    2. Palpitations    3. Bradycardia    4. Paroxysmal tachycardia    5. 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 Disorder of thyroid    7. Hypercholesterolemia        History of Present Illness:Routine follow-up    88 years old patient with medical history significant for hypertension, hypertensive heart disease with good heart function, sick sinus syndrome status post pacemaker implantation, palpitation document atrial tachycardia maintain sinus rhythm with the help of Betapace.  The patient admitted with GI bleed noted to have mass subsequent surgery performed recovered very well.  Had anemia induced renal insufficiency recovered well from that.  EKG in the office sinus rhythm.  Patient denies orthopnea, PND, nauseous, vomiting.  Denies any polydipsia polyuria.  Denies any fever cough or chills.            SUBJECTIVE    Allergies   Allergen Reactions   • Penicillins          Past Medical History:   Diagnosis Date   • Anxiety    • Arthritis    • Bradycardia    • Dyslipidemia    • Dyspnea    • Fibrocystic disease of breast    • Hashimoto's thyroiditis    • Hypertensive disorder    • Hypothyroidism    • Pain    • Palpitations    • Paroxysmal tachycardia    • Shortness of breath    • Tricuspid valve disorders, specified as nonrheumatic    • Vitamin D deficiency          Past Surgical History:   Procedure Laterality Date   • COLON RESECTION N/A 6/2/2017    Procedure: COLON RESECTION;  Surgeon: Stefano Linn MD;  Location: [facility] OR;  Service:    • COLONOSCOPY N/A 5/30/2017    Procedure: Flexible Sigmoidoscopy;  Surgeon: Stefano Linn MD;  Location: [facility] ENDOSCOPY;  Service:    • EXTERNAL EAR SURGERY     • EYE SURGERY     • LIPOMA EXCISION     • PACEMAKER IMPLANTATION     • SALIVARY GLAND SURGERY     • TUBAL ABDOMINAL LIGATION           Family History   Problem Relation Age of Onset   •  "Diabetes Other    • Heart disease Other    • Hypertension Other    • Thyroid disease Other          Social History     Social History   • Marital status:      Spouse name: N/A   • Number of children: N/A   • Years of education: N/A     Occupational History   • Not on file.     Social History Main Topics   • Smoking status: Never Smoker   • Smokeless tobacco: Never Used   • Alcohol use No   • Drug use: Not on file   • Sexual activity: Defer     Other Topics Concern   • Not on file     Social History Narrative    Lives alone but daughters expect to stay with her at discharge         Current Outpatient Prescriptions   Medication Sig Dispense Refill   • Alcohol Swabs (B-D SINGLE USE SWABS REGULAR) pads USE AS DIRECTED 200 each 3   • B-D UF III MINI PEN NEEDLES 31G X 5 MM misc USE WITH INSULIN TWO TIMES A DAY  11   • LUISA CONTOUR TEST test strip USE AS DIRECTED 200 each 11   • diltiaZEM CD (CARDIZEM CD) 120 MG 24 hr capsule Take 1 capsule by mouth Daily. 30 capsule 1   • donepezil (ARICEPT) 10 MG tablet Take 1 tablet by mouth Daily.  3   • Empagliflozin (JARDIANCE) 10 MG tablet Take 10 mg by mouth Daily. One tablet daily before breakfast 30 tablet 11   • ezetimibe (ZETIA) 10 MG tablet Take 1 tablet by mouth Daily. 30 tablet 11   • glucose blood test strip by Misc.(Non-Drug; Combo Route) route. ascensia contour strips  Dr Thompson     • insulin aspart (NOVOLOG FLEXPEN) 100 UNIT/ML solution pen-injector sc pen Inject 8 Units under the skin 3 (Three) Times a Day With Meals. 5 pen 11   • Insulin Glargine (LANTUS SOLOSTAR) 100 UNIT/ML injection pen Inject 20 Units under the skin Every Night. 5 pen 11   • Insulin Pen Needle (COMFORT EZ PEN NEEDLES) 31G X 6 MM misc 2 (two) times a day. Inject two times per day.     • Insulin Syringe-Needle U-100 31G X 5/16\" 1 ML misc 2 (two) times a day. Use as directed TWICE DAILY FOR Insulin Injections     • Lancets (UNILET COMFORTOUCH LANCET) misc USE AS DIRECTED FOR FINGERSTICKS 100 " "each 3   • memantine (NAMENDA) 10 MG tablet TAKE ONE TABLET BY MOUTH TWICE A DAY  5   • methIMAzole (TAPAZOLE) 5 MG tablet Take half tablet q day (Patient taking differently: Take 2.5 mg by mouth Daily. Take half tablet q day) 30 tablet 5   • nabumetone (RELAFEN) 500 MG tablet Take 500 mg by mouth 2 (Two) Times a Day.     • pravastatin (PRAVACHOL) 40 MG tablet Take 1 tablet by mouth Every Night. 30 tablet 11   • SITagliptin (JANUVIA) 100 MG tablet Take 1 tablet by mouth Daily. 30 tablet 11   • sotalol (BETAPACE) 80 MG tablet Take 0.5 tablets by mouth 2 (Two) Times a Day. 30 tablet 1   • vitamin D (ERGOCALCIFEROL) 26094 UNITS capsule capsule Take 1 capsule by mouth Every 14 (Fourteen) Days. 30 capsule 11     No current facility-administered medications for this visit.          OBJECTIVE    BP (!) 198/100  Ht 68\" (172.7 cm)  Wt 189 lb (85.7 kg)  BMI 28.74 kg/m2        Review of Systems     Constitutional:  Denies recent weight loss, weight gain, fever or chills, no change in exercise tolerance     HENT:  Denies any hearing loss, epistaxis, hoarseness, or difficulty speaking.     Eyes: Wears eyeglasses or contact lenses     Respiratory:  Denies dyspnea with exertion,no cough, wheezing, or hemoptysis.     Cardiovascular: Negative for palpations, chest pain, orthopnea, PND, peripheral edema, syncope, or claudication.     Gastrointestinal:  Denies change in bowel habits, dyspepsia, ulcer disease, hematochezia, or melena.     Endocrine: Negative for cold intolerance, heat intolerance, polydipsia, polyphagia and polyuria. Denies any history of weight change, heat/cold intolerance, polydipsia, polyuria     Genitourinary: Negative.      Musculoskeletal: Denies any history of arthritic symptoms or back problems     Skin:  Denies any change in hair or nails, rashes, or skin lesions.     Allergic/Immunologic: Negative.  Negative for environmental allergies, food allergies and immunocompromised state.     Neurological:  Denies " any history of recurrent headaches, strokes, TIA, or seizure disorder.     Hematological: Denies any food allergies, seasonal allergies, bleeding disorders, or lymphadenopathy.     Psychiatric/Behavioral: Denies any history of depression, substance abuse, or change in cognitive function.         Physical Exam     Constitutional: Cooperative, alert and oriented, well-developed, well-nourished, in no acute distress.     HENT:   Head: Normocephalic, normal hair patterns, no masses or tenderness.  Ears, Nose, and Throat: No gross abnormalities. No pallor or cyanosis. Dentition good.   Eyes: EOMS intact, PERRL, conjunctivae and lids unremarkable. Fundoscopic exam and visual fields not performed.   Neck: No palpable masses or adenopathy, no thyromegaly, no JVD, carotid pulses are full and equal bilaterally and without  Bruits.     Cardiovascular: Regular rhythm, S1 and S2 normal, no S3 or S4. Apical impulse not displaced. No murmurs, gallops, or rubs detected.     Pulmonary/Chest: Chest: normal symmetry, no tenderness to palpation, normal respiratory excursion, no intercostal retraction, no use of accessory muscles.            Pulmonary: Normal breath sounds. No rales or ronchi.    Abdominal: Abdomen soft, bowel sounds normoactive, no masses, no hepatosplenomegaly, non-tender, no bruits.     Musculoskeletal: No deformities, clubbing, cyanosis, erythema, or edema observed. There are no spinal abnormalities noted. Normal muscle strength and tone. Pulses full and equal in all extremities, no bruits auscultated.     Neurological: No gross motor or sensory deficits noted, affect appropriate, oriented to time, person, place.     Skin: Warm and dry to the touch, no apparent skin lesions or masses noted.     Psychiatric: She has a normal mood and affect. Her behavior is normal. Judgment and thought content normal.           ECG 12 Lead  Date/Time: 8/7/2017 12:18 PM  Performed by: ELYSIA PICKARD  Authorized by: ELYSIA PICKARD    Comparison: not compared with previous ECG   Rhythm: sinus rhythm              Lab Results   Component Value Date    WBC 3.89 06/19/2017    HGB 9.0 (L) 06/19/2017    HCT 28.1 (L) 06/19/2017    MCV 93.4 06/19/2017     06/19/2017     Lab Results   Component Value Date    GLUCOSE 109 (H) 06/19/2017    BUN 10 06/19/2017    CREATININE 0.68 06/19/2017    EGFRIFAFRI 99 (H) 06/19/2017    BCR 14.7 06/19/2017    CO2 27.0 06/19/2017    CALCIUM 9.1 06/19/2017    ALBUMIN 3.20 (L) 06/19/2017    LABIL2 1.2 06/13/2017    AST 16 06/13/2017    ALT 33 06/13/2017     No results found for: CHOL  Lab Results   Component Value Date    TRIG 54 08/05/2015    TRIG 67 02/08/2014     Lab Results   Component Value Date    HDL 60 08/05/2015    HDL 75 02/08/2014     Lab Results   Component Value Date    LDLCALC 52 08/05/2015    LDLCALC 48 02/08/2014     No results found for: LDL  No results found for: HDLLDLRATIO  No components found for: CHOLHDL  Lab Results   Component Value Date    HGBA1C 6.30 (H) 06/13/2017     Lab Results   Component Value Date    TSH 0.440 (L) 06/13/2017    N9UAZDL 135.0 03/15/2017           ASSESSMENT AND PLAN  #1 atrial tachycardia #2 sick sinus syndrome with bradycardia and pacemaker implantation #3 hypertension with hypertensive heart disease #4 diabetes on combined insulin and oral hypoglycemic agent #5 thyroid abnormality on    Topically, no evidence of congestive heart failure or active ischemia at the time of evaluations.  EKG done and finding discussed with the family and patient's.  Patient history mild dementia by doing remarkably well.  Good blood pressure control.  The patient will continue Betapace with history of symptomatic atrial tachycardia currently sinus rhythm, diabetes being managed with combined insulin and oral hypoglycemic agent and thyroid abnormality being treated with methimazole.  We will see her back in 6 month R depends on patient clinical conditions. hyperlipidemia being treated with  pravastatin.  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

## 2017-08-08 ENCOUNTER — HOSPITAL ENCOUNTER (OUTPATIENT)
Dept: PHYSICAL THERAPY | Facility: HOSPITAL | Age: 82
Setting detail: THERAPIES SERIES
Discharge: HOME | End: 2017-08-08

## 2017-08-08 DIAGNOSIS — R53.81 PHYSICAL DECONDITIONING: Primary | ICD-10-CM

## 2017-08-08 PROCEDURE — 97110 THERAPEUTIC EXERCISES: CPT | Performed by: PHYSICAL THERAPIST

## 2017-08-08 NOTE — THERAPY TREATMENT NOTE
Outpatient Physical Therapy Ortho Treatment Note  [facility]     Patient Name: Kierra Scales  : 1929  MRN: [id number]  Today's Date: 2017      Visit Date: 2017      Visit  Medicare  Recert date 17  MD visit 8/15/17  10-20% Improvement        Visit Dx:    ICD-10-CM ICD-9-CM   1. 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)   • Hypothyroidism   • Dyspnea   • Disorder of thyroid   • Gastrointestinal hemorrhage with melena   • Diabetes mellitus   • Colon cancer   • Chronic blood loss anemia   • Physical deconditioning   • Senile dementia, uncomplicated   • Encounter for rehabilitation   • Adenocarcinoma of sigmoid colon   • Hypokalemia   • Alzheimer's disease   • Arthritis   • Cataract        Past Medical History:   Diagnosis Date   • Anxiety    • Arthritis    • Bradycardia    • Dyslipidemia    • Dyspnea    • Fibrocystic disease of breast    • Hashimoto's thyroiditis    • Hypertensive disorder    • Hypothyroidism    • Pain    • Palpitations    • Paroxysmal tachycardia    • Shortness of breath    • Tricuspid valve disorders, specified as nonrheumatic    • Vitamin D deficiency         Past Surgical History:   Procedure Laterality Date   • COLON RESECTION N/A 2017    Procedure: COLON RESECTION;  Surgeon: Stefano Linn MD;  Location: [facility] OR;  Service:    • COLONOSCOPY N/A 2017    Procedure: Flexible Sigmoidoscopy;  Surgeon: Stefano Linn MD;  Location: [facility] ENDOSCOPY;  Service:    • EXTERNAL EAR SURGERY     • EYE SURGERY     • LIPOMA EXCISION     • PACEMAKER IMPLANTATION     • SALIVARY GLAND SURGERY     • TUBAL ABDOMINAL LIGATION                                PT Assessment/Plan       08/08/17 1309       PT Assessment    Assessment Comments tolerated treatment well. more verbal and tactile cueing today  -KW     PT Plan    PT Plan Comments Cont POC for strength, balance, and endurance  -KW       User Key  (r) = Recorded By, (t) = Taken By, (c) = Cosigned By    Initials Name Provider Type    KW Rylie Rodriguez, PT Physical Therapist                Modalities       08/08/17 1300          Moist Heat    MH Applied Yes  -KW      Location Lower back  -KW      Rx Minutes 10 mins  -KW        User Key  (r) = Recorded By, (t) = Taken By, (c) = Cosigned By    Initials Name Provider Type    KW Rylie Rodriguez, PT Physical Therapist                Exercises       08/08/17 1300          Subjective Comments    Subjective Comments Reports some back pain  -KW      Subjective Pain    Pre-Treatment Pain Level 3  -KW      Post-Treatment Pain Level 3  -KW      Exercise 1    Exercise Name 1 pro II level 1  -KW      Time (Minutes) 1 10  -KW      Exercise 2    Exercise Name 2 seated hip add w/ ball  -KW      Sets 2 3  -KW      Reps 2 10  -KW      Exercise 3    Exercise Name 3 SLR  -KW      Sets 3 3  -KW      Reps 3 10  -KW      Exercise 4    Exercise Name 4 supine hip abd   -KW      Sets 4 3  -KW      Reps 4 10  -KW      Additional Comments Red TB  -KW      Exercise 5    Exercise Name 5 standing marches on foam  -KW      Sets 5 3  -KW      Reps 5 10  -KW      Exercise 6    Exercise Name 6 // bars walking   -KW      Time (Minutes) 6 6  -KW      Additional Comments forward / back / sideways  -KW      Exercise 7    Exercise Name 7 ambulation aroun gthe gym  -KW      Time (Minutes) 7 3  -KW        User Key  (r) = Recorded By, (t) = Taken By, (c) = Cosigned By    Initials Name Provider Type    KW Rylie Rodriguez, PT Physical Therapist                               PT OP Goals       08/08/17 1300       PT Short Term Goals    STG Date to Achieve 08/07/17  -KW     STG 1 Pt will perform 5x  sit-to- 25 or less sec  -KW     STG 1 Progress Progressing  -KW     STG 2 Pt will walk unassisted by hand hold  -KW     STG 2 Progress Progressing  -KW     STG 3 Pt will complete 15 min aerobic exercise on bike : arms/ legs  -KW     STG 3 Progress Progressing  -KW     STG 4 I HEP each session  -KW     STG 4 Progress Ongoing  -KW     Long Term Goals    LTG Date to Achieve 08/21/17  -KW     LTG 1 Pt will walk 2 laps in gym with min c/o fatigue  -KW     LTG 1 Progress Progressing  -KW     LTG 2 Pt will be I in final HEP   -KW     LTG 2 Progress Progressing  -KW     LTG 3 Pt will report min fatigue with ADLs.   -KW     LTG 3 Progress Progressing  -KW     Time Calculation    PT Goal Re-Cert Due Date 08/14/17  -KW       User Key  (r) = Recorded By, (t) = Taken By, (c) = Cosigned By    Initials Name Provider Type    RAINA Rodriguez, PT Physical Therapist                Therapy Education       08/08/17 1308          Therapy Education    Given HEP  -KW      Program Reinforced  -KW      How Provided Verbal  -KW      Provided to Patient  -KW      Level of Understanding Verbalized  -KW        User Key  (r) = Recorded By, (t) = Taken By, (c) = Cosigned By    Initials Name Provider Type    RAINA Rodriguez, PT Physical Therapist                Time Calculation:   Start Time: 1300  Stop Time: 1341  Time Calculation (min): 41 min  Total Timed Code Minutes- PT: 41 minute(s)    Therapy Charges for Today     Code Description Service Date Service Provider Modifiers Qty    64521471854 HC PT THER PROC EA 15 MIN 8/8/2017 Rylie Rodriguez, PT GP 3                    Rylie Rodriguez, PT  8/8/2017

## 2017-08-10 ENCOUNTER — HOSPITAL ENCOUNTER (OUTPATIENT)
Dept: PHYSICAL THERAPY | Facility: HOSPITAL | Age: 82
Setting detail: THERAPIES SERIES
Discharge: HOME | End: 2017-08-10

## 2017-08-10 DIAGNOSIS — R53.81 PHYSICAL DECONDITIONING: Primary | ICD-10-CM

## 2017-08-10 PROCEDURE — 97110 THERAPEUTIC EXERCISES: CPT | Performed by: PHYSICAL THERAPIST

## 2017-08-10 NOTE — THERAPY TREATMENT NOTE
Outpatient Physical Therapy Ortho Treatment Note  [facility]     Patient Name: Kierra Scales  : 1929  MRN: [id number]  Today's Date: 8/10/2017      Visit Date: 08/10/2017      Visit  Medicare  Recert date 17  MD visit 8/15/17  10-20% better      Visit Dx:    ICD-10-CM ICD-9-CM   1. 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)   • Hypothyroidism   • Dyspnea   • Disorder of thyroid   • Gastrointestinal hemorrhage with melena   • Diabetes mellitus   • Colon cancer   • Chronic blood loss anemia   • Physical deconditioning   • Senile dementia, uncomplicated   • Encounter for rehabilitation   • Adenocarcinoma of sigmoid colon   • Hypokalemia   • Alzheimer's disease   • Arthritis   • Cataract        Past Medical History:   Diagnosis Date   • Anxiety    • Arthritis    • Bradycardia    • Dyslipidemia    • Dyspnea    • Fibrocystic disease of breast    • Hashimoto's thyroiditis    • Hypertensive disorder    • Hypothyroidism    • Pain    • Palpitations    • Paroxysmal tachycardia    • Shortness of breath    • Tricuspid valve disorders, specified as nonrheumatic    • Vitamin D deficiency         Past Surgical History:   Procedure Laterality Date   • COLON RESECTION N/A 2017    Procedure: COLON RESECTION;  Surgeon: Stefano Linn MD;  Location: [facility] OR;  Service:    • COLONOSCOPY N/A 2017    Procedure: Flexible Sigmoidoscopy;  Surgeon: Stefano Linn MD;  Location: [facility] ENDOSCOPY;  Service:    • EXTERNAL EAR SURGERY     • EYE SURGERY     • LIPOMA EXCISION     • PACEMAKER IMPLANTATION     • SALIVARY GLAND SURGERY     • TUBAL ABDOMINAL LIGATION                               PT  Assessment/Plan       08/10/17 1114       PT Assessment    Assessment Comments pt not feeling well today. held on all standing exercises today  -KW     PT Plan    PT Plan Comments Cont POC for strength balance and endurance as tolerated  -KW       User Key  (r) = Recorded By, (t) = Taken By, (c) = Cosigned By    Initials Name Provider Type    RAINA Rodriguez, PT Physical Therapist                    Exercises       08/10/17 1100          Subjective Comments    Subjective Comments Reports not feeling well today  -KW      Subjective Pain    Able to rate subjective pain? yes  -KW      Pre-Treatment Pain Level 3  -KW      Post-Treatment Pain Level 3  -KW      Exercise 1    Exercise Name 1 pro II level 1  -KW      Time (Minutes) 1 10  -KW      Exercise 2    Exercise Name 2 seated hip add  -KW      Sets 2 3  -KW      Reps 2 10  -KW      Exercise 3    Exercise Name 3 seated hip flex  -KW      Sets 3 3  -KW      Reps 3 10  -KW      Exercise 4    Exercise Name 4 seated knee ext  -KW      Sets 4 3  -KW      Reps 4 10  -KW      Exercise 5    Exercise Name 5 seated hip abd  -KW      Sets 5 3  -KW      Reps 5 10  -KW        User Key  (r) = Recorded By, (t) = Taken By, (c) = Cosigned By    Initials Name Provider Type    RAINA Rodriguez, PT Physical Therapist                               PT OP Goals       08/10/17 1100       PT Short Term Goals    STG Date to Achieve 08/07/17  -KW     STG 1 Pt will perform 5x sit-to- 25 or less sec  -KW     STG 1 Progress Progressing  -KW     STG 2 Pt will walk unassisted by hand hold  -KW     STG 2 Progress Progressing  -KW     STG 3 Pt will complete 15 min aerobic exercise on bike : arms/ legs  -KW     STG 3 Progress Progressing  -KW     STG 4 I HEP each session  -KW     STG 4 Progress Ongoing  -KW     Long Term Goals    LTG Date to Achieve 08/21/17  -KW     LTG 1 Pt will walk 2 laps in gym with min c/o fatigue  -KW     LTG 1 Progress Progressing  -KW     LTG 2 Pt will be I in  final HEP   -KW     LTG 2 Progress Progressing  -KW     LTG 3 Pt will report min fatigue with ADLs.   -KW     LTG 3 Progress Progressing  -KW     Time Calculation    PT Goal Re-Cert Due Date 08/14/17  -KW       User Key  (r) = Recorded By, (t) = Taken By, (c) = Cosigned By    Initials Name Provider Type    RAINA Rodriguez, PT Physical Therapist                Therapy Education       08/10/17 1113          Therapy Education    Given HEP  -KW      Program Reinforced  -KW      How Provided Verbal  -KW      Provided to Patient  -KW      Level of Understanding Verbalized  -KW        User Key  (r) = Recorded By, (t) = Taken By, (c) = Cosigned By    Initials Name Provider Type    RAINA Rodriguez, PT Physical Therapist                Time Calculation:   Start Time: 1100  Stop Time: 1146  Time Calculation (min): 46 min  Total Timed Code Minutes- PT: 46 minute(s)    Therapy Charges for Today     Code Description Service Date Service Provider Modifiers Qty    85790074552 HC PT THER PROC EA 15 MIN 8/10/2017 Rylie Rodriguez, PT GP 3                    Rylie Rodriguez, PT  8/10/2017

## 2017-08-14 ENCOUNTER — OFFICE VISIT (OUTPATIENT)
Dept: SURGERY | Facility: CLINIC | Age: 82
End: 2017-08-14

## 2017-08-14 VITALS
HEIGHT: 68 IN | SYSTOLIC BLOOD PRESSURE: 122 MMHG | WEIGHT: 184 LBS | BODY MASS INDEX: 27.89 KG/M2 | DIASTOLIC BLOOD PRESSURE: 82 MMHG

## 2017-08-14 DIAGNOSIS — Z09 FOLLOW UP: Primary | ICD-10-CM

## 2017-08-14 PROCEDURE — 99024 POSTOP FOLLOW-UP VISIT: CPT | Performed by: SURGERY

## 2017-08-14 RX ORDER — SOTALOL HYDROCHLORIDE 80 MG/1
40 TABLET ORAL
COMMUNITY
End: 2018-04-13 | Stop reason: DRUGHIGH

## 2017-08-14 NOTE — PROGRESS NOTES
CHIEF COMPLAINT:    Chief Complaint   Patient presents with   • Follow-up     S/P left colon resection fro T3NO on 6/2/17.       HISTORY OF PRESENT ILLNESS:    Kierra Scales is a 88 y.o. female who underwent left colon resection for cancer.  She has done fairly well since that time though she did sustain a fall from standing height recently causing bruising around her right eye.    Is eating and drinking well at home, having regular bowel function. Reports no fevers or chills.      EXAM:  Vitals:    08/14/17 0923   BP: 122/82         Abdomen soft, incision well healed    ASSESSMENT:    S/p colon resection    PLAN:    Overall doing well.  Will see in December to get follow up labs and imaging.          This document has been electronically signed by Stefano Linn MD on August 14, 2017 10:00 AM

## 2017-08-16 ENCOUNTER — HOSPITAL ENCOUNTER (OUTPATIENT)
Dept: PHYSICAL THERAPY | Facility: HOSPITAL | Age: 82
Setting detail: THERAPIES SERIES
Discharge: HOME | End: 2017-08-16

## 2017-08-16 DIAGNOSIS — R53.81 PHYSICAL DECONDITIONING: Primary | ICD-10-CM

## 2017-08-16 PROCEDURE — 97110 THERAPEUTIC EXERCISES: CPT | Performed by: PHYSICAL THERAPIST

## 2017-08-16 PROCEDURE — G8979 MOBILITY GOAL STATUS: HCPCS | Performed by: PHYSICAL THERAPIST

## 2017-08-16 PROCEDURE — G8978 MOBILITY CURRENT STATUS: HCPCS | Performed by: PHYSICAL THERAPIST

## 2017-08-16 NOTE — THERAPY PROGRESS REPORT/RE-CERT
Outpatient Physical Therapy Ortho Progress Note  [facility]     Patient Name: Kierra Scales  : 1929  MRN: [id number]  Today's Date: 2017      Visit Date: 2017        Visit 10/10 Medicare  Recert date 17  MD visit TBD  10-20% improvement        Visit Dx:    ICD-10-CM ICD-9-CM   1. 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)   • Hypothyroidism   • Dyspnea   • Disorder of thyroid   • Gastrointestinal hemorrhage with melena   • Diabetes mellitus   • Colon cancer   • Chronic blood loss anemia   • Physical deconditioning   • Senile dementia, uncomplicated   • Encounter for rehabilitation   • Adenocarcinoma of sigmoid colon   • Hypokalemia   • Alzheimer's disease   • Arthritis   • Cataract        Past Medical History:   Diagnosis Date   • Anxiety    • Arthritis    • Bradycardia    • Dyslipidemia    • Dyspnea    • Fibrocystic disease of breast    • Hashimoto's thyroiditis    • Hypertensive disorder    • Hypothyroidism    • Pain    • Palpitations    • Paroxysmal tachycardia    • Shortness of breath    • Tricuspid valve disorders, specified as nonrheumatic    • Vitamin D deficiency         Past Surgical History:   Procedure Laterality Date   • COLON RESECTION N/A 2017    Procedure: COLON RESECTION;  Surgeon: Stefano Linn MD;  Location: [facility] OR;  Service:    • COLONOSCOPY N/A 2017    Procedure: Flexible Sigmoidoscopy;  Surgeon: Stefano Linn MD;  Location: [facility] ENDOSCOPY;  Service:    • EXTERNAL EAR SURGERY     • EYE SURGERY     • LIPOMA EXCISION     • PACEMAKER IMPLANTATION     • SALIVARY GLAND SURGERY     • TUBAL ABDOMINAL LIGATION               PT Ortho        08/16/17 1500    Posture/Observations    Posture/Observations Comments gait improving, less unsteady   -KW    ROM (Range of Motion)    General ROM Detail LE ROM WNL  -KW    MMT (Manual Muscle Testing)    General MMT Assessment Detail Improved endurance in bilat UE and LE  -KW    Transfers    Transfers, Sit-Stand [place] independent  -KW    Transfers, Stand-Sit [place] independent  -KW    Gait Assessment/Treatment    Gait, Treasure Level hand held assist;stand by assist  -KW      User Key  (r) = Recorded By, (t) = Taken By, (c) = Cosigned By    Initials Name Provider Type    KW Rylie Rodriguez, PT Physical Therapist                            PT Assessment/Plan       08/16/17 5544       PT Assessment    Functional Limitations Impaired gait;Impaired locomotion;Limitation in home management;Limitations in community activities;Performance in leisure activities  -KW     Impairments Balance;Endurance;Muscle strength;Gait  -KW     Assessment Comments pt still needs more improvement in endruance, strength, and gait  -KW     Please refer to paper survey for additional self-reported information No  -KW     Rehab Potential Good  -KW     Patient/caregiver participated in establishment of treatment plan and goals Yes  -KW     Patient would benefit from skilled therapy intervention Yes  -KW     PT Plan    PT Frequency 2x/week  -KW     Predicted Duration of Therapy Intervention (days/wks) 4 weeks  -KW     Planned CPT's? PT EVAL MOD COMPLELITY: 04344;PT RE-EVAL: 37909;PT THER PROC EA 15 MIN: 80720;PT THER ACT EA 15 MIN: 00794;PT NEUROMUSC RE-EDUCATION EA 15 MIN: 02312;PT MANUAL THERAPY EA 15 MIN: 14337;PT THER SUPP EA 15 MIN  -KW     Physical Therapy Interventions (Optional Details) lumbar stabilization;balance training;strengthening;stretching;modalities;manual therapy techniques;swiss ball techniques;gait training;gross motor skills;home exercise program;patient/family education;neuromuscular re-education  -KW   "   PT Plan Comments Cont POC for gait strength and endurance  -KW       User Key  (r) = Recorded By, (t) = Taken By, (c) = Cosigned By    Initials Name Provider Type    RAINA Rodriguez PT Physical Therapist                    Exercises       08/16/17 1500          Subjective Comments    Subjective Comments As per daughter she fell a few days ago after coming back from the eye doctor. States her mom had a bump and bruise on her R eye sockett  -KW      Subjective Pain    Able to rate subjective pain? yes  -KW      Pre-Treatment Pain Level 3  -KW      Post-Treatment Pain Level 3  -KW      Exercise 1    Exercise Name 1 pro II level 1  -KW      Time (Minutes) 1 10  -KW      Exercise 2    Exercise Name 2 seated hip add  -KW      Sets 2 3  -KW      Reps 2 10  -KW      Exercise 3    Exercise Name 3 seated hip flex  -KW      Sets 3 3  -KW      Reps 3 10  -KW      Equipment 3 Dumbell  -KW      Weights/Plates 3 2  -KW      Exercise 4    Exercise Name 4 seated knee ext  -KW      Sets 4 3  -KW      Reps 4 10  -KW      Weights/Plates 4 2  -KW      Exercise 5    Exercise Name 5 seated hip abd  -KW      Cueing 5 Verbal  -KW      Sets 5 3  -KW      Reps 5 10  -KW      Time (Minutes) 5 6  -KW      Equipment 5 --   red swiss ball  -KW      Resistance 5 Red  -KW      Exercise 6    Exercise Name 6 // bars walking   -KW      Sets 6 2  -KW      Reps 6 10  -KW      Exercise 7    Exercise Name 7 ambulation aroun gthe gym  -KW      Sets 7 2  -KW      Reps 7 10  -KW      Equipment 7 Theraband  -KW      Exercise 8    Exercise Name 8 seated hamstring stretch B  -KW      Sets 8 3  -KW      Time (Seconds) 8 30\"  -KW        User Key  (r) = Recorded By, (t) = Taken By, (c) = Cosigned By    Initials Name Provider Type    RAINA Rodriguez, PT Physical Therapist                               PT OP Goals       08/16/17 1800       PT Short Term Goals    STG Date to Achieve 09/06/17  -KW     STG 1 Pt will perform 5x sit-to- 25 or less sec "  -KW     STG 1 Progress Progressing  -KW     STG 2 Pt will walk unassisted by hand hold  -KW     STG 2 Progress Progressing  -KW     STG 3 Pt will complete 15 min aerobic exercise on bike : arms/ legs  -KW     STG 3 Progress Progressing  -KW     STG 4 I HEP each session  -KW     STG 4 Progress Ongoing  -KW     Long Term Goals    LTG Date to Achieve 09/13/17  -KW     LTG 1 Pt will walk 2 laps in gym with min c/o fatigue  -KW     LTG 1 Progress Progressing  -KW     LTG 2 Pt will be I in final HEP   -KW     LTG 2 Progress Progressing  -KW     LTG 3 Pt will report min fatigue with ADLs.   -KW     LTG 3 Progress Progressing  -KW     Time Calculation    PT Goal Re-Cert Due Date 09/13/17  -KW       User Key  (r) = Recorded By, (t) = Taken By, (c) = Cosigned By    Initials Name Provider Type    RAINA Rodriguez, PT Physical Therapist                Therapy Education       08/16/17 1820          Therapy Education    Given HEP  -KW      Program Reinforced  -KW      How Provided Verbal  -KW      Provided to Patient  -KW      Level of Understanding Verbalized  -KW        User Key  (r) = Recorded By, (t) = Taken By, (c) = Cosigned By    Initials Name Provider Type    RAINA Rodriguez, PT Physical Therapist                Time Calculation:   Start Time: 1530  Stop Time: 1615  Time Calculation (min): 45 min  Total Timed Code Minutes- PT: 45 minute(s)    Therapy Charges for Today     Code Description Service Date Service Provider Modifiers Qty    85712959760 HC PT THER PROC EA 15 MIN 8/16/2017 Rylie Rodriguez, PT GP 3    66320619314 HC PT MOBILITY CURRENT 8/16/2017 Rylie Rodriguez, PT GP, CK 1    27011076710 HC PT MOBILITY PROJECTED 8/16/2017 Rylie Rodriguez, PT GP, CJ 1          PT G-Codes  PT Professional Judgement Used?: Yes  Functional Limitation: Mobility: Walking and moving around  Mobility: Walking and Moving Around Current Status (): At least 40 percent but less than 60 percent impaired, limited or  restricted  Mobility: Walking and Moving Around Goal Status (): At least 20 percent but less than 40 percent impaired, limited or restricted         Rylie Rodriguez, PT  8/16/2017

## 2017-08-18 ENCOUNTER — HOSPITAL ENCOUNTER (OUTPATIENT)
Dept: PHYSICAL THERAPY | Facility: HOSPITAL | Age: 82
Setting detail: THERAPIES SERIES
Discharge: HOME | End: 2017-08-18

## 2017-08-18 DIAGNOSIS — R53.81 PHYSICAL DECONDITIONING: Primary | ICD-10-CM

## 2017-08-18 PROCEDURE — 97110 THERAPEUTIC EXERCISES: CPT

## 2017-08-18 NOTE — THERAPY TREATMENT NOTE
Outpatient Physical Therapy Ortho Treatment Note  [facility]     Patient Name: Kierra Scales  : 1929  MRN: [id number]  Today's Date: 2017      Visit Date: 2017     Subjective Improvement: 10-20%  Attendance:  12/15 (medicare)  Next MD Visit : PRN  Recert Date:  17      Therapy Diagnosis:  General Deconditioning        Visit Dx:    ICD-10-CM ICD-9-CM   1. 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)   • Hypothyroidism   • Dyspnea   • Disorder of thyroid   • Gastrointestinal hemorrhage with melena   • Diabetes mellitus   • Colon cancer   • Chronic blood loss anemia   • Physical deconditioning   • Senile dementia, uncomplicated   • Encounter for rehabilitation   • Adenocarcinoma of sigmoid colon   • Hypokalemia   • Alzheimer's disease   • Arthritis   • Cataract        Past Medical History:   Diagnosis Date   • Anxiety    • Arthritis    • Bradycardia    • Dyslipidemia    • Dyspnea    • Fibrocystic disease of breast    • Hashimoto's thyroiditis    • Hypertensive disorder    • Hypothyroidism    • Pain    • Palpitations    • Paroxysmal tachycardia    • Shortness of breath    • Tricuspid valve disorders, specified as nonrheumatic    • Vitamin D deficiency         Past Surgical History:   Procedure Laterality Date   • COLON RESECTION N/A 2017    Procedure: COLON RESECTION;  Surgeon: Stefano Linn MD;  Location: [facility] OR;  Service:    • COLONOSCOPY N/A 2017    Procedure: Flexible Sigmoidoscopy;  Surgeon: Stefano Linn MD;  Location: [facility] ENDOSCOPY;  Service:    • EXTERNAL EAR SURGERY     • EYE SURGERY     • LIPOMA EXCISION     • PACEMAKER IMPLANTATION     •  SALIVARY GLAND SURGERY     • TUBAL ABDOMINAL LIGATION               PT Ortho       08/18/17 1315    Posture/Observations    Posture/Observations Comments unsteady gait; used PTA arm for balance throughout treatment.   -      08/16/17 1500    Posture/Observations    Posture/Observations Comments gait improving, less unsteady   -KW    ROM (Range of Motion)    General ROM Detail LE ROM WNL  -KW    MMT (Manual Muscle Testing)    General MMT Assessment Detail Improved endurance in bilat UE and LE  -KW    Transfers    Transfers, Sit-Stand [place] independent  -KW    Transfers, Stand-Sit [place] independent  -KW    Gait Assessment/Treatment    Gait, [place] Level hand held assist;stand by assist  -      User Key  (r) = Recorded By, (t) = Taken By, (c) = Cosigned By    Initials Name Provider Type    BLANQUITA Washington PTA Physical Therapy Assistant    RAINA Rodriguez, PT Physical Therapist                            PT Assessment/Plan       08/18/17 1315       PT Assessment    Assessment Comments pt very unsteady on her feet this date. Did well with strengthening exercises.  Pt needed VC to help   her feet with gait activities this date;   -     PT Plan    PT Frequency 2x/week  -BLANQUITA     Predicted Duration of Therapy Intervention (days/wks) 4 weeks  -     PT Plan Comments Continue to increase as pt tolerates. Possible use of an assistive device if continues to be unsteady. Increase LE strength and balance as able.   -       User Key  (r) = Recorded By, (t) = Taken By, (c) = Cosigned By    Initials Name Provider Type    BLANQUITA Washington PTA Physical Therapy Assistant                    Exercises       08/18/17 1315          Subjective Comments    Subjective Comments Pt and daughter present who reports that her mom is just a little off balance this date.  Been having trouble ever since she fell the other day.  Pt reports that she feels she is not as steady since her back started actin gup on her  again .   -KH      Subjective Pain    Able to rate subjective pain? yes  -KH      Pre-Treatment Pain Level 3  -KH      Exercise 1    Exercise Name 1 pro ll LE strength  -KH      Time (Minutes) 1 10'  -KH      Exercise 2    Exercise Name 2 seated add squeeze  -KH      Sets 2 3  -KH      Reps 2 10  -KH      Exercise 3    Exercise Name 3 seated hip flex  -KH      Sets 3 3  -KH      Reps 3 10  -KH      Additional Comments 2# ankle weights  -KH      Exercise 4    Exercise Name 4 ham surls seated  -KH      Sets 4 3  -KH      Reps 4 10  -KH      Additional Comments green Tband  -KH      Exercise 5    Exercise Name 5 LAQ B  -KH      Sets 5 3  -KH      Reps 5 10  -KH      Additional Comments 2# ankle weights  -KH      Exercise 6    Exercise Name 6 seated abd w/ tband  -KH      Sets 6 3  -KH      Reps 6 10  -KH      Additional Comments green  -KH      Exercise 7    Exercise Name 7 gait around gym  -KH      Additional Comments 270ft x2  -KH        User Key  (r) = Recorded By, (t) = Taken By, (c) = Cosigned By    Initials Name Provider Type    BLANQUITA Washington, PTA Physical Therapy Assistant                               PT OP Goals       08/18/17 1315       PT Short Term Goals    STG Date to Achieve 09/06/17  -KH     STG 1 Pt will perform 5x sit-to- 25 or less sec  -     STG 1 Progress Progressing  -KH     STG 2 Pt will walk unassisted by hand hold  -     STG 2 Progress Progressing  -KH     STG 3 Pt will complete 15 min aerobic exercise on bike : arms/ legs  -     STG 3 Progress Progressing  -KH     STG 4 I HEP each session  -     STG 4 Progress Ongoing  -     Long Term Goals    LTG Date to Achieve 09/13/17  -KH     LTG 1 Pt will walk 2 laps in gym with min c/o fatigue  -     LTG 1 Progress Progressing  -KH     LTG 2 Pt will be I in final HEP   -     LTG 2 Progress Progressing  -KH     LTG 3 Pt will report min fatigue with ADLs.   -     LTG 3 Progress Progressing  -     Time Calculation    PT Goal  Re-Cert Due Date 09/13/17  -BLANQUITA       User Key  (r) = Recorded By, (t) = Taken By, (c) = Cosigned By    Initials Name Provider Type    BLANQUITA Washington PTA Physical Therapy Assistant                    Time Calculation:   Start Time: 1315  Stop Time: 1359  Time Calculation (min): 44 min  Total Timed Code Minutes- PT: 44 minute(s)    Therapy Charges for Today     Code Description Service Date Service Provider Modifiers Qty    87692999478 HC PT THER PROC EA 15 MIN 8/18/2017 Janel Washington PTA GP 3                    Janel Washington PTA  8/18/2017

## 2017-08-22 ENCOUNTER — HOSPITAL ENCOUNTER (OUTPATIENT)
Dept: PHYSICAL THERAPY | Facility: HOSPITAL | Age: 82
Setting detail: THERAPIES SERIES
Discharge: HOME | End: 2017-08-22

## 2017-08-22 DIAGNOSIS — R53.81 PHYSICAL DECONDITIONING: Primary | ICD-10-CM

## 2017-08-22 PROCEDURE — 97110 THERAPEUTIC EXERCISES: CPT | Performed by: PHYSICAL THERAPIST

## 2017-08-22 PROCEDURE — 97530 THERAPEUTIC ACTIVITIES: CPT | Performed by: PHYSICAL THERAPIST

## 2017-08-22 NOTE — THERAPY TREATMENT NOTE
Outpatient Physical Therapy Ortho Treatment Note  [facility]     Patient Name: Kierra Scales  : 1929  MRN: [id number]  Today's Date: 2017      Visit Date: 2017      Visit  Medicare  Md visit PRN  10-20% improvement  Recert date 17      Visit Dx:    ICD-10-CM ICD-9-CM   1. 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)   • Hypothyroidism   • Dyspnea   • Disorder of thyroid   • Gastrointestinal hemorrhage with melena   • Diabetes mellitus   • Colon cancer   • Chronic blood loss anemia   • Physical deconditioning   • Senile dementia, uncomplicated   • Encounter for rehabilitation   • Adenocarcinoma of sigmoid colon   • Hypokalemia   • Alzheimer's disease   • Arthritis   • Cataract        Past Medical History:   Diagnosis Date   • Anxiety    • Arthritis    • Bradycardia    • Dyslipidemia    • Dyspnea    • Fibrocystic disease of breast    • Hashimoto's thyroiditis    • Hypertensive disorder    • Hypothyroidism    • Pain    • Palpitations    • Paroxysmal tachycardia    • Shortness of breath    • Tricuspid valve disorders, specified as nonrheumatic    • Vitamin D deficiency         Past Surgical History:   Procedure Laterality Date   • COLON RESECTION N/A 2017    Procedure: COLON RESECTION;  Surgeon: Stefano Linn MD;  Location: [facility] OR;  Service:    • COLONOSCOPY N/A 2017    Procedure: Flexible Sigmoidoscopy;  Surgeon: Stefano Linn MD;  Location: [facility] ENDOSCOPY;  Service:    • EXTERNAL EAR SURGERY     • EYE SURGERY     • LIPOMA EXCISION     • PACEMAKER IMPLANTATION     • SALIVARY GLAND SURGERY     • TUBAL ABDOMINAL LIGATION                                PT Assessment/Plan       08/22/17 1437 08/22/17 1320    PT Assessment    Assessment Comments  does well with strengthther ex, very unsteady with gait and balance  -KW    PT Plan    PT Plan Comments Will do after today 5 more PT vists (total 18) then D/C from PT and back to MD  -KW Cont POC gait, balance, and endurance as tolerated.  -KW      User Key  (r) = Recorded By, (t) = Taken By, (c) = Cosigned By    Initials Name Provider Type    KW Rylie Rodriguez, IRMA Physical Therapist                    Exercises       08/22/17 1300          Subjective Comments    Subjective Comments Reports havingmore back pain, and unsteady balance/gait  -KW      Subjective Pain    Able to rate subjective pain? yes  -KW      Pre-Treatment Pain Level 7  -KW      Post-Treatment Pain Level 5  -KW      Exercise 1    Exercise Name 1 pro ll LE strength  -KW      Sets 1 3  -KW      Reps 1 10  -KW      Exercise 2    Exercise Name 2 seated add squeeze  -KW      Sets 2 3  -KW      Reps 2 10  -KW      Exercise 3    Exercise Name 3 seated hip flex  -KW      Sets 3 3  -KW      Reps 3 10  -KW      Equipment 3 Dumbell  -KW      Weights/Plates 3 2  -KW      Exercise 4    Exercise Name 4 ham surls seated  -KW      Sets 4 3  -KW      Reps 4 10  -KW      Weights/Plates 4 2  -KW      Exercise 5    Exercise Name 5 LAQ  -KW      Sets 5 3  -KW      Reps 5 10  -KW      Equipment 5 --   red swiss ball  -KW      Exercise 6    Exercise Name 6 seated abd w/ tband  -KW      Sets 6 3  -KW      Reps 6 10  -KW      Exercise 7    Exercise Name 7 gait around gym  -KW      Sets 7 2  -KW      Equipment 7 Theraband  -KW        User Key  (r) = Recorded By, (t) = Taken By, (c) = Cosigned By    Initials Name Provider Type    KW Rylie Rodriguez, PT Physical Therapist                               PT OP Goals       08/22/17 1300       PT Short Term Goals    STG Date to Achieve 09/06/17  -KW     STG 1 Pt will perform 5x sit-to- 25 or less sec  -KW     STG 1 Progress  Progressing  -KW     STG 2 Pt will walk unassisted by hand hold  -KW     STG 2 Progress Progressing  -KW     STG 3 Pt will complete 15 min aerobic exercise on bike : arms/ legs  -KW     STG 3 Progress Progressing  -KW     STG 4 I HEP each session  -KW     STG 4 Progress Ongoing  -KW     Long Term Goals    LTG Date to Achieve 09/13/17  -KW     LTG 1 Pt will walk 2 laps in gym with min c/o fatigue  -KW     LTG 1 Progress Progressing  -KW     LTG 2 Pt will be I in final HEP   -KW     LTG 2 Progress Progressing  -KW     LTG 3 Pt will report min fatigue with ADLs.   -KW     LTG 3 Progress Progressing  -KW     Time Calculation    PT Goal Re-Cert Due Date 09/13/17  -KW       User Key  (r) = Recorded By, (t) = Taken By, (c) = Cosigned By    Initials Name Provider Type    RAINA Rodriguez, PT Physical Therapist                Therapy Education       08/22/17 1319          Therapy Education    Given HEP  -KW      Program Reinforced  -KW      How Provided Verbal  -KW      Provided to Patient  -KW      Level of Understanding Verbalized  -KW        User Key  (r) = Recorded By, (t) = Taken By, (c) = Cosigned By    Initials Name Provider Type    RAINA Rodriguez, PT Physical Therapist                Time Calculation:   Start Time: 1300  Stop Time: 1400  Time Calculation (min): 60 min  Total Timed Code Minutes- PT: 60 minute(s)    Therapy Charges for Today     Code Description Service Date Service Provider Modifiers Qty    48291410023  PT THER PROC EA 15 MIN 8/22/2017 Rylie Rodriguez, PT GP 3    71019068101 HC PT THERAPEUTIC ACT EA 15 MIN 8/22/2017 Rylie Rodriguez, PT GP 1                    Rylie Rodriguez, PT  8/22/2017

## 2017-08-24 ENCOUNTER — HOSPITAL ENCOUNTER (OUTPATIENT)
Dept: PHYSICAL THERAPY | Facility: HOSPITAL | Age: 82
Setting detail: THERAPIES SERIES
Discharge: HOME | End: 2017-08-24

## 2017-08-24 DIAGNOSIS — R53.81 PHYSICAL DECONDITIONING: Primary | ICD-10-CM

## 2017-08-24 PROCEDURE — 97110 THERAPEUTIC EXERCISES: CPT

## 2017-08-24 NOTE — THERAPY TREATMENT NOTE
Outpatient Physical Therapy Ortho Treatment Note  [facility]     Patient Name: Kierra Scales  : 1929  MRN: [id number]  Today's Date: 2017      Visit Date: 2017     Subjective Improvement: 10-20%  Attendance:   (Medicare)  Next MD Visit : PRN  Recert Date:  17      Therapy Diagnosis:  General Deconditioning        Visit Dx:    ICD-10-CM ICD-9-CM   1. 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)   • Hypothyroidism   • Dyspnea   • Disorder of thyroid   • Gastrointestinal hemorrhage with melena   • Diabetes mellitus   • Colon cancer   • Chronic blood loss anemia   • Physical deconditioning   • Senile dementia, uncomplicated   • Encounter for rehabilitation   • Adenocarcinoma of sigmoid colon   • Hypokalemia   • Alzheimer's disease   • Arthritis   • Cataract        Past Medical History:   Diagnosis Date   • Anxiety    • Arthritis    • Bradycardia    • Dyslipidemia    • Dyspnea    • Fibrocystic disease of breast    • Hashimoto's thyroiditis    • Hypertensive disorder    • Hypothyroidism    • Pain    • Palpitations    • Paroxysmal tachycardia    • Shortness of breath    • Tricuspid valve disorders, specified as nonrheumatic    • Vitamin D deficiency         Past Surgical History:   Procedure Laterality Date   • COLON RESECTION N/A 2017    Procedure: COLON RESECTION;  Surgeon: Stefano Linn MD;  Location: [facility] OR;  Service:    • COLONOSCOPY N/A 2017    Procedure: Flexible Sigmoidoscopy;  Surgeon: Stefano Linn MD;  Location: [facility] ENDOSCOPY;  Service:    • EXTERNAL EAR SURGERY     • EYE SURGERY     • LIPOMA EXCISION     • PACEMAKER IMPLANTATION     •  SALIVARY GLAND SURGERY     • TUBAL ABDOMINAL LIGATION               PT Ortho       08/24/17 1300    Precautions and Contraindications    Precautions/Limitations fall precautions  -    Precautions confusion  -    Posture/Observations    Posture/Observations Comments unsteady gait needs HHA throughout treatment.   -      User Key  (r) = Recorded By, (t) = Taken By, (c) = Cosigned By    Initials Name Provider Type    BLANQUITA Washington PTA Physical Therapy Assistant                            PT Assessment/Plan       08/24/17 1300       PT Assessment    Assessment Comments increased gait distance without increased fatigue this date. Still complains about back pain throughout treatment but tolerates all exercises.   -     PT Plan    PT Frequency 2x/week  -BLANQUITA     Predicted Duration of Therapy Intervention (days/wks) 2 weeks  -     PT Plan Comments 4 more PT visits after today then d/c to independent program and referral back to MD per last treatment note with PT.   -       User Key  (r) = Recorded By, (t) = Taken By, (c) = Cosigned By    Initials Name Provider Type    BLANQUITA Washington PTA Physical Therapy Assistant                Modalities       08/24/17 1300          Subjective Pain    Post-Treatment Pain Level 3  -      Moist Heat    MH Applied Yes  -      Location LB seated  -      Rx Minutes 10 mins  -      MH S/P Rx Yes  -        User Key  (r) = Recorded By, (t) = Taken By, (c) = Cosigned By    Initials Name Provider Type    BLANQUITA Washington PTA Physical Therapy Assistant                Exercises       08/24/17 1300          Subjective Comments    Subjective Comments Daughter present with patient this date.  States that Ms. Lozada is still weak.  Ms. Lozada reports that her back is hurting her again today.  States that she feels weak too.   -      Subjective Pain    Able to rate subjective pain? yes  -      Pre-Treatment Pain Level 5  -      Post-Treatment Pain Level 3  -KH      Exercise 1     Exercise Name 1 pro ll LE strength  -      Time (Minutes) 1 12'  -KH      Exercise 2    Exercise Name 2 up & down stairs   -      Sets 2 1  -KH      Reps 2 10  -KH      Additional Comments 3 small 2 large  -KH      Exercise 3    Exercise Name 3 cybex hip abd  -KH      Sets 3 2  -KH      Reps 3 10  -KH      Additional Comments 30#  -KH      Exercise 4    Exercise Name 4 cybex leg press  -KH      Sets 4 2  -KH      Reps 4 10  -KH      Additional Comments 50#  -KH      Exercise 5    Exercise Name 5 standing hip flexion B  -KH      Sets 5 2  -KH      Reps 5 10  -KH      Exercise 6    Exercise Name 6 gait w/ HHA  -KH      Additional Comments 270ft x4  -KH        User Key  (r) = Recorded By, (t) = Taken By, (c) = Cosigned By    Initials Name Provider Type    BLANQUITA Washington PTA Physical Therapy Assistant                               PT OP Goals       08/24/17 1300       PT Short Term Goals    STG Date to Achieve 09/06/17  -     STG 1 Pt will perform 5x sit-to- 25 or less sec  -     STG 1 Progress Progressing  -     STG 2 Pt will walk unassisted by hand hold  -     STG 2 Progress Progressing  -     STG 3 Pt will complete 15 min aerobic exercise on bike : arms/ legs  -     STG 3 Progress Progressing  -     STG 4 I HEP each session  -     STG 4 Progress Ongoing  -     Long Term Goals    LTG Date to Achieve 09/13/17  -     LTG 1 Pt will walk 2 laps in gym with min c/o fatigue  -     LTG 1 Progress Progressing  -     LTG 2 Pt will be I in final HEP   -     LTG 2 Progress Progressing  -     LTG 3 Pt will report min fatigue with ADLs.   -     LTG 3 Progress Progressing  -     Time Calculation    PT Goal Re-Cert Due Date 09/13/17  -       User Key  (r) = Recorded By, (t) = Taken By, (c) = Cosigned By    Initials Name Provider Type    BLANQUITA Washington PTA Physical Therapy Assistant                    Time Calculation:   Start Time: 1300  Stop Time: 1354  Time Calculation (min): 54  min  Total Timed Code Minutes- PT: 44 minute(s)    Therapy Charges for Today     Code Description Service Date Service Provider Modifiers Qty    67173036701 HC PT THER SUPP EA 15 MIN 8/24/2017 Janel Washington PTA GP 1    25005340198 HC PT THER PROC EA 15 MIN 8/24/2017 Janel Washington PTA GP 3                    Janel Washington PTA  8/24/2017

## 2017-08-28 ENCOUNTER — APPOINTMENT (OUTPATIENT)
Dept: PHYSICAL THERAPY | Facility: HOSPITAL | Age: 82
End: 2017-08-28

## 2017-08-29 ENCOUNTER — HOSPITAL ENCOUNTER (OUTPATIENT)
Dept: PHYSICAL THERAPY | Facility: HOSPITAL | Age: 82
Setting detail: THERAPIES SERIES
Discharge: HOME | End: 2017-08-29

## 2017-08-29 DIAGNOSIS — R53.81 PHYSICAL DECONDITIONING: Primary | ICD-10-CM

## 2017-08-29 PROCEDURE — 97110 THERAPEUTIC EXERCISES: CPT | Performed by: PHYSICAL THERAPIST

## 2017-08-29 NOTE — THERAPY TREATMENT NOTE
Outpatient Physical Therapy Ortho Treatment Note  [facility]     Patient Name: Kierra Scales  : 1929  MRN: [id number]  Today's Date: 2017      Visit Date: 2017      Visit 15/18 Medicare  10-20% improvement  Md visit PRN  Recert date 17    Visit Dx:    ICD-10-CM ICD-9-CM   1. 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)   • Hypothyroidism   • Dyspnea   • Disorder of thyroid   • Gastrointestinal hemorrhage with melena   • Diabetes mellitus   • Colon cancer   • Chronic blood loss anemia   • Physical deconditioning   • Senile dementia, uncomplicated   • Encounter for rehabilitation   • Adenocarcinoma of sigmoid colon   • Hypokalemia   • Alzheimer's disease   • Arthritis   • Cataract        Past Medical History:   Diagnosis Date   • Anxiety    • Arthritis    • Bradycardia    • Dyslipidemia    • Dyspnea    • Fibrocystic disease of breast    • Hashimoto's thyroiditis    • Hypertensive disorder    • Hypothyroidism    • Pain    • Palpitations    • Paroxysmal tachycardia    • Shortness of breath    • Tricuspid valve disorders, specified as nonrheumatic    • Vitamin D deficiency         Past Surgical History:   Procedure Laterality Date   • COLON RESECTION N/A 2017    Procedure: COLON RESECTION;  Surgeon: Stefano Linn MD;  Location: [facility] OR;  Service:    • COLONOSCOPY N/A 2017    Procedure: Flexible Sigmoidoscopy;  Surgeon: Stefano Linn MD;  Location: [facility] ENDOSCOPY;  Service:    • EXTERNAL EAR SURGERY     • EYE SURGERY     • LIPOMA EXCISION     • PACEMAKER IMPLANTATION     • SALIVARY GLAND SURGERY     • TUBAL ABDOMINAL LIGATION                                PT Assessment/Plan       08/29/17 1321       PT Assessment    Assessment Comments complained of L sided LBP, added hot pack while doing seated therex  -KW     PT Plan    PT Plan Comments 3 more PT vists then D/C from PT to ind program and back to MD.  -KW       User Key  (r) = Recorded By, (t) = Taken By, (c) = Cosigned By    Initials Name Provider Type    RAINA Rodriguez, PT Physical Therapist                    Exercises       08/29/17 1300          Subjective Comments    Subjective Comments reports left sided LBP   -KW      Subjective Pain    Able to rate subjective pain? yes  -KW      Pre-Treatment Pain Level 5  -KW      Post-Treatment Pain Level 3  -KW      Exercise 1    Exercise Name 1 pro ll LE strength  -KW      Time (Minutes) 1 12'  -KW      Exercise 2    Exercise Name 2 seated hip abd w/ green TB  -KW      Sets 2 3  -KW      Reps 2 10  -KW      Exercise 3    Exercise Name 3 seated hip add  -KW      Sets 3 3  -KW      Reps 3 10  -KW      Exercise 4    Exercise Name 4 hip flex w/ 3#  -KW      Sets 4 3  -KW      Reps 4 10  -KW      Exercise 5    Exercise Name 5 knee ext w/ 3#  -KW      Sets 5 3  -KW      Reps 5 10  -KW      Exercise 6    Exercise Name 6 standing hip abd  -KW      Sets 6 3  -KW      Reps 6 10  -KW      Additional Comments w/ 3# weights  -KW      Exercise 7    Exercise Name 7 standing hip ext  -KW      Sets 7 3  -KW      Reps 7 10  -KW      Additional Comments w/ 3# weights  -KW      Exercise 8    Exercise Name 8 gait around gym w/ 3# ankle weights  -KW      Additional Comments 270ft x 2  -KW        User Key  (r) = Recorded By, (t) = Taken By, (c) = Cosigned By    Initials Name Provider Type    RAINA Rodriguez, PT Physical Therapist                               PT OP Goals       08/29/17 1300       PT Short Term Goals    STG Date to Achieve 09/06/17  -KW     STG 1 Pt will perform 5x sit-to- 25 or less sec  -KW     STG 1 Progress Progressing  -KW     STG 2 Pt will walk  unassisted by hand hold  -KW     STG 2 Progress Progressing  -KW     STG 3 Pt will complete 15 min aerobic exercise on bike : arms/ legs  -KW     STG 3 Progress Progressing  -KW     STG 4 I HEP each session  -KW     STG 4 Progress Ongoing  -KW     Long Term Goals    LTG Date to Achieve 09/13/17  -KW     LTG 1 Pt will walk 2 laps in gym with min c/o fatigue  -KW     LTG 1 Progress Progressing  -KW     LTG 2 Pt will be I in final HEP   -KW     LTG 2 Progress Progressing  -KW     LTG 3 Pt will report min fatigue with ADLs.   -KW     LTG 3 Progress Progressing  -KW     Time Calculation    PT Goal Re-Cert Due Date 09/13/17  -KW       User Key  (r) = Recorded By, (t) = Taken By, (c) = Cosigned By    Initials Name Provider Type    RAINA Rodriguez, PT Physical Therapist                Therapy Education       08/29/17 1320          Therapy Education    Given HEP  -KW      Program Reinforced  -KW      How Provided Verbal  -KW      Provided to Patient  -KW      Level of Understanding Verbalized  -KW        User Key  (r) = Recorded By, (t) = Taken By, (c) = Cosigned By    Initials Name Provider Type    RAINA Rodriguez, PT Physical Therapist                Time Calculation:   Start Time: 1300  Stop Time: 1340  Time Calculation (min): 40 min  Total Timed Code Minutes- PT: 40 minute(s)    Therapy Charges for Today     Code Description Service Date Service Provider Modifiers Qty    94608906367 HC PT THER PROC EA 15 MIN 8/29/2017 Rylie Rodriguez, PT GP 3                    Rylie Rodriguez, PT  8/29/2017

## 2017-08-30 ENCOUNTER — APPOINTMENT (OUTPATIENT)
Dept: PHYSICAL THERAPY | Facility: HOSPITAL | Age: 82
End: 2017-08-30

## 2017-08-31 ENCOUNTER — HOSPITAL ENCOUNTER (OUTPATIENT)
Dept: PHYSICAL THERAPY | Facility: HOSPITAL | Age: 82
Setting detail: THERAPIES SERIES
Discharge: HOME | End: 2017-08-31

## 2017-08-31 DIAGNOSIS — R53.81 PHYSICAL DECONDITIONING: Primary | ICD-10-CM

## 2017-08-31 PROCEDURE — 97110 THERAPEUTIC EXERCISES: CPT | Performed by: PHYSICAL THERAPIST

## 2017-09-05 ENCOUNTER — HOSPITAL ENCOUNTER (OUTPATIENT)
Dept: PHYSICAL THERAPY | Facility: HOSPITAL | Age: 82
Setting detail: THERAPIES SERIES
Discharge: HOME | End: 2017-09-05

## 2017-09-05 DIAGNOSIS — R53.81 PHYSICAL DECONDITIONING: Primary | ICD-10-CM

## 2017-09-05 PROCEDURE — 97110 THERAPEUTIC EXERCISES: CPT | Performed by: PHYSICAL THERAPIST

## 2017-09-05 NOTE — THERAPY TREATMENT NOTE
Outpatient Physical Therapy Ortho Treatment Note  [facility]     Patient Name: Kierra Scales  : 1929  MRN: [id number]  Today's Date: 2017      Visit Date: 2017      Visit   10-20% improvement  Recert date 17   MD PRN        Visit Dx:    ICD-10-CM ICD-9-CM   1. 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)   • Hypothyroidism   • Dyspnea   • Disorder of thyroid   • Gastrointestinal hemorrhage with melena   • Diabetes mellitus   • Colon cancer   • Chronic blood loss anemia   • Physical deconditioning   • Senile dementia, uncomplicated   • Encounter for rehabilitation   • Adenocarcinoma of sigmoid colon   • Hypokalemia   • Alzheimer's disease   • Arthritis   • Cataract        Past Medical History:   Diagnosis Date   • Anxiety    • Arthritis    • Bradycardia    • Dyslipidemia    • Dyspnea    • Fibrocystic disease of breast    • Hashimoto's thyroiditis    • Hypertensive disorder    • Hypothyroidism    • Pain    • Palpitations    • Paroxysmal tachycardia    • Shortness of breath    • Tricuspid valve disorders, specified as nonrheumatic    • Vitamin D deficiency         Past Surgical History:   Procedure Laterality Date   • COLON RESECTION N/A 2017    Procedure: COLON RESECTION;  Surgeon: Stefano Linn MD;  Location: [facility] OR;  Service:    • COLONOSCOPY N/A 2017    Procedure: Flexible Sigmoidoscopy;  Surgeon: Stefano Linn MD;  Location: [facility] ENDOSCOPY;  Service:    • EXTERNAL EAR SURGERY     • EYE SURGERY     • LIPOMA EXCISION     • PACEMAKER IMPLANTATION     • SALIVARY GLAND SURGERY     • TUBAL ABDOMINAL LIGATION                               PT  Assessment/Plan       09/05/17 1311       PT Assessment    Assessment Comments complained of LBP, some relief with LBP ther ex  -KW     PT Plan    PT Plan Comments 1 more PT visit then D/C from PT to MD  -KW       User Key  (r) = Recorded By, (t) = Taken By, (c) = Cosigned By    Initials Name Provider Type    RAINA Rodriguez, PT Physical Therapist                    Exercises       09/05/17 1300          Subjective Comments    Subjective Comments Reports low back pain today  -KW      Subjective Pain    Able to rate subjective pain? yes  -KW      Pre-Treatment Pain Level 5  -KW      Post-Treatment Pain Level 4  -KW      Exercise 1    Exercise Name 1 pro ll LE strength  -KW      Time (Minutes) 1 12'  -KW      Exercise 2    Exercise Name 2 seated hip abd w/ green TB  -KW      Sets 2 3  -KW      Reps 2 10  -KW      Exercise 3    Exercise Name 3 seated hip add  -KW      Sets 3 3  -KW      Reps 3 10  -KW      Exercise 4    Exercise Name 4 hip flex w/ 3#  -KW      Sets 4 3  -KW      Reps 4 10  -KW      Exercise 5    Exercise Name 5 bridges  -KW      Sets 5 3  -KW      Reps 5 10  -KW      Exercise 6    Exercise Name 6 SLR  -KW      Sets 6 3  -KW      Reps 6 10  -KW      Exercise 7    Exercise Name 7 LTR  -KW      Sets 7 3  -KW      Reps 7 10  -KW      Exercise 8    Exercise Name 8 foam march  -KW      Sets 8 3  -KW      Reps 8 10  -KW      Exercise 9    Exercise Name 9 up and down the ramp  -KW      Additional Comments 3 times  -KW        User Key  (r) = Recorded By, (t) = Taken By, (c) = Cosigned By    Initials Name Provider Type    RAINA Rodriguez, PT Physical Therapist                               PT OP Goals       09/05/17 1300       PT Short Term Goals    STG Date to Achieve 09/06/17  -KW     STG 1 Pt will perform 5x sit-to- 25 or less sec  -KW     STG 1 Progress Progressing  -KW     STG 2 Pt will walk unassisted by hand hold  -KW     STG 2 Progress Progressing  -KW     STG 3 Pt will complete 15 min  aerobic exercise on bike : arms/ legs  -KW     STG 3 Progress Progressing  -KW     STG 4 I HEP each session  -KW     STG 4 Progress Ongoing  -KW     Long Term Goals    LTG Date to Achieve 09/13/17  -KW     LTG 1 Pt will walk 2 laps in gym with min c/o fatigue  -KW     LTG 1 Progress Progressing  -KW     LTG 2 Pt will be I in final HEP   -KW     LTG 2 Progress Progressing  -KW     LTG 3 Pt will report min fatigue with ADLs.   -KW     LTG 3 Progress Progressing  -KW     Time Calculation    PT Goal Re-Cert Due Date 09/13/17  -KW       User Key  (r) = Recorded By, (t) = Taken By, (c) = Cosigned By    Initials Name Provider Type    RAINA Rodriguez, IRMA Physical Therapist                Therapy Education       09/05/17 1310          Therapy Education    Given HEP  -KW      Program Reinforced  -KW      How Provided Verbal  -KW      Provided to Patient  -KW      Level of Understanding Verbalized  -KW        User Key  (r) = Recorded By, (t) = Taken By, (c) = Cosigned By    Initials Name Provider Type    RAINA Rodriguez, IRMA Physical Therapist                Time Calculation:   Start Time: 1300  Stop Time: 1356  Time Calculation (min): 56 min  Total Timed Code Minutes- PT: 56 minute(s)    Therapy Charges for Today     Code Description Service Date Service Provider Modifiers Qty    09967248607 HC PT THER SUPP EA 15 MIN 9/5/2017 Rylie Rodriguez, PT GP 1    01571341071 HC PT THER PROC EA 15 MIN 9/5/2017 Rylie Rodriguez, PT GP 3                    Rylie Rodriguez, PT  9/5/2017

## 2017-09-07 ENCOUNTER — HOSPITAL ENCOUNTER (OUTPATIENT)
Dept: PHYSICAL THERAPY | Facility: HOSPITAL | Age: 82
Setting detail: THERAPIES SERIES
Discharge: HOME | End: 2017-09-07

## 2017-09-07 DIAGNOSIS — R53.81 PHYSICAL DECONDITIONING: Primary | ICD-10-CM

## 2017-09-07 PROCEDURE — 97110 THERAPEUTIC EXERCISES: CPT | Performed by: PHYSICAL THERAPIST

## 2017-09-07 PROCEDURE — G8980 MOBILITY D/C STATUS: HCPCS | Performed by: PHYSICAL THERAPIST

## 2017-09-07 PROCEDURE — 97530 THERAPEUTIC ACTIVITIES: CPT | Performed by: PHYSICAL THERAPIST

## 2017-09-07 PROCEDURE — G8979 MOBILITY GOAL STATUS: HCPCS | Performed by: PHYSICAL THERAPIST

## 2017-09-07 NOTE — THERAPY DISCHARGE NOTE
Outpatient Physical Therapy Ortho Treatment Note/Discharge Summary  [facility]     Patient Name: Kierra Scales  : 1929  MRN: [id number]  Today's Date: 2017      Visit Date: 2017      Visit  Medicare  D/C from PT today 17  20% improvement        Visit Dx:    ICD-10-CM ICD-9-CM   1. 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)   • Hypothyroidism   • Dyspnea   • Disorder of thyroid   • Gastrointestinal hemorrhage with melena   • Diabetes mellitus   • Colon cancer   • Chronic blood loss anemia   • Physical deconditioning   • Senile dementia, uncomplicated   • Encounter for rehabilitation   • Adenocarcinoma of sigmoid colon   • Hypokalemia   • Alzheimer's disease   • Arthritis   • Cataract        Past Medical History:   Diagnosis Date   • Anxiety    • Arthritis    • Bradycardia    • Dyslipidemia    • Dyspnea    • Fibrocystic disease of breast    • Hashimoto's thyroiditis    • Hypertensive disorder    • Hypothyroidism    • Pain    • Palpitations    • Paroxysmal tachycardia    • Shortness of breath    • Tricuspid valve disorders, specified as nonrheumatic    • Vitamin D deficiency         Past Surgical History:   Procedure Laterality Date   • COLON RESECTION N/A 2017    Procedure: COLON RESECTION;  Surgeon: Stefano Linn MD;  Location: [facility] OR;  Service:    • COLONOSCOPY N/A 2017    Procedure: Flexible Sigmoidoscopy;  Surgeon: Stefano Linn MD;  Location: [facility] ENDOSCOPY;  Service:    • EXTERNAL EAR SURGERY     • EYE SURGERY     • LIPOMA EXCISION     • PACEMAKER IMPLANTATION     • SALIVARY GLAND SURGERY     • TUBAL ABDOMINAL LIGATION                                PT Assessment/Plan       09/07/17 1311       PT Assessment    Assessment Comments still complains of LBP some relief with LBP ther ex  -KW     PT Plan    PT Plan Comments D/C from PT today to try in community. MD visit end of Sept  -KW       User Key  (r) = Recorded By, (t) = Taken By, (c) = Cosigned By    Initials Name Provider Type    KW Rylie Rodriguez, PT Physical Therapist                    Exercises       09/07/17 1300          Subjective Comments    Subjective Comments Reports low back pain.  -KW      Subjective Pain    Able to rate subjective pain? yes  -KW      Pre-Treatment Pain Level 5  -KW      Post-Treatment Pain Level 3  -KW      Exercise 1    Exercise Name 1 pro ll LE strength  -KW      Cueing 1 Verbal;Demo  -KW      Sets 1 3  -KW      Reps 1 10  -KW      Time (Minutes) 1 12'  -KW      Exercise 2    Exercise Name 2 seated hip abd w/ green TB  -KW      Cueing 2 Verbal;Tactile;Demo  -KW      Sets 2 3  -KW      Reps 2 10  -KW      Time (Minutes) 2 8  -KW      Exercise 3    Exercise Name 3 seated hip add  -KW      Sets 3 3  -KW      Reps 3 10  -KW      Time (Minutes) 3 5  -KW      Equipment 3 Dumbell  -KW      Weights/Plates 3 2  -KW      Exercise 4    Exercise Name 4 hip flex w/ 3#  -KW      Cueing 4 Tactile;Verbal  -KW      Sets 4 3  -KW      Reps 4 10  -KW      Time (Minutes) 4 6  -KW      Weights/Plates 4 2  -KW      Exercise 5    Exercise Name 5 bridges  -KW      Cueing 5 Verbal  -KW      Sets 5 3  -KW      Reps 5 10  -KW      Time (Minutes) 5 6  -KW      Equipment 5 --   red swiss ball  -KW      Resistance 5 Red  -KW      Exercise 6    Exercise Name 6 SLR  -KW      Cueing 6 Demo  -KW      Sets 6 3  -KW      Reps 6 10  -KW      Time (Minutes) 6 6  -KW      Exercise 7    Exercise Name 7 LTR  -KW      Cueing 7 Verbal  -KW      Sets 7 3  -KW      Reps 7 10  -KW      Time (Minutes) 7 3  -KW      Equipment 7 Theraband  -KW      Resistance 7 Red  -KW      Exercise 8     "Exercise Name 8 foam march  -KW      Cueing 8 Demo  -KW      Sets 8 3  -KW      Reps 8 10  -KW      Time (Minutes) 8 5  -KW      Time (Seconds) 8 30\"  -KW      Exercise 9    Exercise Name 9 up and down the ramp  -KW      Time (Minutes) 9 10  -KW        User Key  (r) = Recorded By, (t) = Taken By, (c) = Cosigned By    Initials Name Provider Type    RAINA Rodriguez, PT Physical Therapist                               PT OP Goals       09/07/17 1300       PT Short Term Goals    STG Date to Achieve 09/06/17  -KW     STG 1 Pt will perform 5x sit-to- 25 or less sec  -KW     STG 1 Progress Progressing  -KW     STG 2 Pt will walk unassisted by hand hold  -KW     STG 2 Progress Progressing  -KW     STG 3 Pt will complete 15 min aerobic exercise on bike : arms/ legs  -KW     STG 3 Progress Progressing  -KW     STG 4 I HEP each session  -KW     STG 4 Progress Ongoing  -KW     Long Term Goals    LTG Date to Achieve 09/13/17  -KW     LTG 1 Pt will walk 2 laps in gym with min c/o fatigue  -KW     LTG 1 Progress Progressing  -KW     LTG 2 Pt will be I in final HEP   -KW     LTG 2 Progress Progressing  -KW     LTG 3 Pt will report min fatigue with ADLs.   -KW     LTG 3 Progress Progressing  -KW     Time Calculation    PT Goal Re-Cert Due Date 09/13/17  -KW       User Key  (r) = Recorded By, (t) = Taken By, (c) = Cosigned By    Initials Name Provider Type    RAINA Rodriguez, PT Physical Therapist                Therapy Education       09/07/17 1311          Therapy Education    Given HEP  -KW      Program Reinforced  -KW      How Provided Verbal  -KW      Provided to Patient  -KW      Level of Understanding Verbalized  -KW        User Key  (r) = Recorded By, (t) = Taken By, (c) = Cosigned By    Initials Name Provider Type    RAINA Rodriguez, PT Physical Therapist                Time Calculation:   Start Time: 1300  Stop Time: 1355  Time Calculation (min): 55 min  Total Timed Code Minutes- PT: 55 " minute(s)    Therapy Charges for Today     Code Description Service Date Service Provider Modifiers Qty    70793914590 HC PT THER PROC EA 15 MIN 9/7/2017 Rylie Rodriguez, PT GP 3    70380156926 HC PT THERAPEUTIC ACT EA 15 MIN 9/7/2017 Rylie Rodriguez, PT GP 1    59758372234 HC PT MOBILITY PROJECTED 9/7/2017 Rylie Rodriguez, PT GP, CJ 1    61571036707 HC PT MOBILITY DISCHARGE 9/7/2017 Rylie Rodriguez, PT GP, CJ 1          PT G-Codes  PT Professional Judgement Used?: Yes  Functional Limitation: Mobility: Walking and moving around  Mobility: Walking and Moving Around Goal Status (): At least 20 percent but less than 40 percent impaired, limited or restricted  Mobility: Walking and Moving Around Discharge Status (): At least 20 percent but less than 40 percent impaired, limited or restricted     OP PT Discharge Summary  Date of Discharge: 09/07/17  Reason for Discharge: Maximum functional potential achieved  Outcomes Achieved: Patient able to partially acheive established goals  Discharge Destination: Home with home program  Discharge Instructions: D/C from PT 9/7/17      Rylie Rodriguez, PT  9/7/2017

## 2017-10-12 RX ORDER — LANCETS
EACH MISCELLANEOUS
Qty: 150 EACH | Refills: 11 | Status: SHIPPED | OUTPATIENT
Start: 2017-10-12 | End: 2018-10-29 | Stop reason: SDUPTHER

## 2017-11-02 ENCOUNTER — TRANSCRIBE ORDERS (OUTPATIENT)
Dept: PHYSICAL THERAPY | Facility: HOSPITAL | Age: 82
End: 2017-11-02

## 2017-11-02 DIAGNOSIS — G89.29 CHRONIC MIDLINE LOW BACK PAIN, WITH SCIATICA PRESENCE UNSPECIFIED: Primary | ICD-10-CM

## 2017-11-02 DIAGNOSIS — M54.5 CHRONIC MIDLINE LOW BACK PAIN, WITH SCIATICA PRESENCE UNSPECIFIED: Primary | ICD-10-CM

## 2017-11-08 ENCOUNTER — CLINICAL SUPPORT (OUTPATIENT)
Dept: CARDIOLOGY | Facility: CLINIC | Age: 82
End: 2017-11-08

## 2017-11-08 DIAGNOSIS — I49.5 SSS (SICK SINUS SYNDROME) (HCC): Primary | ICD-10-CM

## 2017-11-08 PROCEDURE — 93288 INTERROG EVL PM/LDLS PM IP: CPT | Performed by: INTERNAL MEDICINE

## 2017-11-08 NOTE — PROGRESS NOTES
88 years old patient with history of for sick sinus syndrome status post pacemaker implantation.  Manufacture [employer] model #2210 and serial number [id number].  Implantation date 6/23/2011.  Interrogation 11/8/2017.  Battery voltage is good for 6-7 year.  Pacing the atrium about 80% ventricle less than 5% of time.  Pacing programmed to DDD at 60 and 1 10 bpm.  AV delay 250 and PV delay 225 ms.  Sensing P wave 2.7 with a lead impedance 380 and threshold 0.5 at 0.5.  Sensing R-wave 8.7 with a lead impedance 380 and threshold 1 at 0.5.  Clinical impression normal function pacemaker recommend to follow up in 6 month

## 2017-11-10 ENCOUNTER — OFFICE VISIT (OUTPATIENT)
Dept: ENDOCRINOLOGY | Facility: CLINIC | Age: 82
End: 2017-11-10

## 2017-11-10 VITALS
WEIGHT: 190.8 LBS | SYSTOLIC BLOOD PRESSURE: 140 MMHG | BODY MASS INDEX: 28.92 KG/M2 | DIASTOLIC BLOOD PRESSURE: 84 MMHG | HEART RATE: 71 BPM | HEIGHT: 68 IN | OXYGEN SATURATION: 99 %

## 2017-11-10 DIAGNOSIS — E11.9 CONTROLLED TYPE 2 DIABETES MELLITUS WITHOUT COMPLICATION, WITH LONG-TERM CURRENT USE OF INSULIN (HCC): Primary | ICD-10-CM

## 2017-11-10 DIAGNOSIS — Z79.4 CONTROLLED TYPE 2 DIABETES MELLITUS WITHOUT COMPLICATION, WITH LONG-TERM CURRENT USE OF INSULIN (HCC): Primary | ICD-10-CM

## 2017-11-10 LAB — GLUCOSE BLDC GLUCOMTR-MCNC: 156 MG/DL (ref 70–130)

## 2017-11-10 PROCEDURE — 99214 OFFICE O/P EST MOD 30 MIN: CPT | Performed by: NURSE PRACTITIONER

## 2017-11-10 PROCEDURE — 82962 GLUCOSE BLOOD TEST: CPT | Performed by: NURSE PRACTITIONER

## 2017-11-10 RX ORDER — DILTIAZEM HYDROCHLORIDE 240 MG/1
120 CAPSULE, COATED, EXTENDED RELEASE ORAL
COMMUNITY
Start: 2017-08-28 | End: 2018-04-13

## 2017-11-10 RX ORDER — SOTALOL HYDROCHLORIDE 80 MG/1
40 TABLET ORAL 2 TIMES DAILY
COMMUNITY
Start: 2017-08-28

## 2017-11-10 RX ORDER — METHIMAZOLE 5 MG/1
5 TABLET ORAL
COMMUNITY
End: 2018-04-13 | Stop reason: SDUPTHER

## 2017-11-14 ENCOUNTER — HOSPITAL ENCOUNTER (OUTPATIENT)
Dept: PHYSICAL THERAPY | Facility: HOSPITAL | Age: 82
Setting detail: THERAPIES SERIES
Discharge: HOME | End: 2017-11-14

## 2017-11-14 DIAGNOSIS — M54.40 CHRONIC MIDLINE LOW BACK PAIN WITH SCIATICA, SCIATICA LATERALITY UNSPECIFIED: Primary | ICD-10-CM

## 2017-11-14 DIAGNOSIS — G89.29 CHRONIC MIDLINE LOW BACK PAIN WITH SCIATICA, SCIATICA LATERALITY UNSPECIFIED: Primary | ICD-10-CM

## 2017-11-14 PROCEDURE — G8978 MOBILITY CURRENT STATUS: HCPCS | Performed by: PHYSICAL THERAPIST

## 2017-11-14 PROCEDURE — 97162 PT EVAL MOD COMPLEX 30 MIN: CPT | Performed by: PHYSICAL THERAPIST

## 2017-11-14 PROCEDURE — G8979 MOBILITY GOAL STATUS: HCPCS | Performed by: PHYSICAL THERAPIST

## 2017-11-14 NOTE — THERAPY EVALUATION
Outpatient Physical Therapy Ortho Initial Evaluation  [facility]     Patient Name: Kierra Scales  : 1929  MRN: [id number]  Today's Date: 2017      Visit Date: 2017  Attendance:   Subjective % Improvement: N/A  Recert Date: 2017  MD appointment: TBD    Therapy Diagnosis: Low back pain, core instability and LE weakness    Patient Active Problem List   Diagnosis   • Vitamin D deficiency   • Hypercholesterolemia   • Hypertension   • SSS (sick sinus syndrome)   • Palpitations   • Bradycardia   • Shortness of breath   • Paroxysmal tachycardia   • Chest pain   • Tricuspid valve disorders, 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)   • Hypothyroidism   • Dyspnea   • Disorder of thyroid   • Gastrointestinal hemorrhage with melena   • Diabetes mellitus   • Colon cancer   • Chronic blood loss anemia   • Physical deconditioning   • Senile dementia, uncomplicated   • Encounter for rehabilitation   • Adenocarcinoma of sigmoid colon   • Hypokalemia   • Alzheimer's disease   • Arthritis   • Cataract        Past Medical History:   Diagnosis Date   • Anxiety    • Arthritis    • Bradycardia    • Dyslipidemia    • Dyspnea    • Fibrocystic disease of breast    • Hashimoto's thyroiditis    • Hypertensive disorder    • Hypothyroidism    • Pain    • Palpitations    • Paroxysmal tachycardia    • Shortness of breath    • Tricuspid valve disorders, specified as nonrheumatic    • Vitamin D deficiency         Past Surgical History:   Procedure Laterality Date   • COLON RESECTION N/A 2017    Procedure: COLON RESECTION;  Surgeon: Stefano Linn MD;  Location: [facility] OR;  Service:    • COLONOSCOPY N/A 2017    Procedure: Flexible Sigmoidoscopy;  Surgeon: Stefano Linn MD;  Location: [facility] ENDOSCOPY;  Service:    • EXTERNAL EAR SURGERY     • EYE SURGERY     • LIPOMA EXCISION     • PACEMAKER IMPLANTATION     • SALIVARY GLAND SURGERY     • TUBAL ABDOMINAL LIGATION         Visit Dx:      ICD-10-CM ICD-9-CM   1. Chronic midline low back pain with sciatica, sciatica laterality unspecified M54.40 724.2    G89.29 724.3     338.29             Patient History       11/14/17 1310          History    Chief Complaint Pain;Difficulty Walking;Muscle weakness  -BB      Type of Pain Back pain  -BB      Date Current Problem(s) Began --   couple weeks  -BB      Brief Description of Current Complaint Reports recent onset on low back pain. Reports not having pain when in PT earlier this year. Denies any numbness or tingling. Denies any falls. Reports pain goes up into her shoulders. Unknown cause of low back pain. Per chart review patient has chronic pain in low back.    -BB      Onset Date- PT 11/14/2017  -BB      Patient/Caregiver Goals Relieve pain  -BB      Patient's Rating of General Health Good  -BB      Hand Dominance right-handed  -BB      Occupation/sports/leisure activities retired   -BB      Pain     Pain Location Back  -BB      Pain at Present 0  -BB      Pain at Best 0  -BB      Pain at Worst 10  -BB      Pain Frequency Intermittent  -BB      Pain Description Patient unable to describe  -BB      What Performance Factors Make the Current Problem(s) WORSE? movement, activity  -BB      What Performance Factors Make the Current Problem(s) BETTER? rest   -BB      Tolerance Time- Standing short periods of time   -BB      Tolerance Time- Walking short distance   -BB      Is your sleep disturbed? Yes  -BB      Is medication used to assist with sleep? Yes  -BB      Fall Risk Assessment    Any falls in the past year: No  -BB      Number of falls reported in the last 12 months 0  -BB        User Key  (r) = Recorded By, (t) = Taken By, (c) = Cosigned By    Initials Name Provider Type    KRISTY Fatima, PT Physical Therapist                PT Ortho       11/14/17 1310    Subjective Comments    Subjective Comments See patient history   -BB    Precautions and Contraindications    Precautions/Limitations fall  precautions  -BB    Precautions confusion  -BB    Contraindications FALL RISK  -BB    Subjective Pain    Able to rate subjective pain? yes  -BB    Pre-Treatment Pain Level 0  -BB    Posture/Observations    Posture/Observations Comments Unsteady gait, used HHA to go back to exam room. Limited exam of spine due to patient unable to get into prone position today. No AD. Denies using cane.   -BB    Special Tests/Palpation    Special Tests/Palpation Lumbar/SI  -BB    Lumbosacral Palpation    SI Bilateral:;Tender  -BB    Spinous Process Tender  -BB    Piriformis Bilateral:;Tender  -BB    Quadratus Lumborum Bilateral:;Tender  -BB    Erector Spinae (Paraspinals) Bilateral:;Tender   throughout lumbar spine  -BB    Hamstring Bilateral:;Guarded/taut  -BB    Lumbosacral Palpation? Yes  -BB    Lumbar/SI Special Tests    SLR (Neural Tension) Bilateral:;Negative  -BB    SI Compression Test (SI Dysfunction) Bilateral:;Negative  -BB    ROM (Range of Motion)    General ROM Detail Trunk flexion in sitting: tips to toes. Ext: limited with pain. Standing SB limited 10 degrees with pain. Bilateral rotation WNL.   -BB    MMT (Manual Muscle Testing)    General MMT Assessment Detail Bilateral LE grossly 4-/5 of hip and knee.   -BB    Pathomechanics    Lower Extremity Pathomechanics Asymmetrical steps;Limited knees flexion with swing through;Antalgic with midstance  -BB    Balance Skills Training    Rhomberg Sway in all planes without UE assist  -BB    Transfers    Transfer, Comment Independent with UE and SBA  -BB    Gait Assessment/Treatment    Gait, Comment HHA required.   -BB      User Key  (r) = Recorded By, (t) = Taken By, (c) = Cosigned By    Initials Name Provider Type    KRISTY Fatima PT Physical Therapist                            Therapy Education       11/14/17 1310          Therapy Education    Education Details Fall risk concerns, POC   -BB      Given Fall prevention and home safety  -BB      Program New  -BB      How  Provided Verbal  -BB      Provided to Patient  -BB      Level of Understanding Verbalized  -BB        User Key  (r) = Recorded By, (t) = Taken By, (c) = Cosigned By    Initials Name Provider Type    KRISTY Fatima PT Physical Therapist                PT OP Goals       11/14/17 1310       PT Short Term Goals    STG Date to Achieve --   defer STG  -BB     Long Term Goals    LTG Date to Achieve 12/12/17  -BB     LTG 1 Independent in HEP   -BB     LTG 1 Progress New  -BB     LTG 2 hip flexion MMT 4+/5 or better   -BB     LTG 2 Progress New  -BB     LTG 3 Static stand without UE assist narrow TRE 1' safely  -BB     LTG 3 Progress New  -BB     LTG 4 Sit to stand independent with 1 hand assist or less  -BB     LTG 4 Progress New  -BB     Time Calculation    PT Goal Re-Cert Due Date 12/05/17  -BB       User Key  (r) = Recorded By, (t) = Taken By, (c) = Cosigned By    Initials Name Provider Type    KRISTY Fatima PT Physical Therapist                PT Assessment/Plan       11/14/17 1310       PT Assessment    Functional Limitations Impaired gait;Decreased safety during functional activities;Limitations in functional capacity and performance;Limitations in community activities  -BB     Impairments Balance;Endurance;Muscle strength;Gait;Impaired muscle endurance;Coordination;Pain  -BB     Assessment Comments Patient present today with confusion limiting exam. Patient denies any radicular symptoms but reports her knees and shoulders hurt. Patient reports the back pain comes and goes and has been hurting for a few weeks. Patient presents with overall deconditioning, BLE weakness, poor balance with leads to increased stress through the back with gait and dynamic activites and poses as a fall risk. Patients daugther encouraged to ambulate with patient as trying to use an AD may increase confusion and lead to increased chances of falling.  -BB     Please refer to paper survey for additional self-reported information --   [facility]  "from last MD appointment in chart  -BB     Rehab Potential Fair   confusion, chronicity  -BB     Patient/caregiver participated in establishment of treatment plan and goals Yes  -BB     Patient would benefit from skilled therapy intervention Yes  -BB     PT Plan    PT Frequency 2x/week  -BB     Predicted Duration of Therapy Intervention (days/wks) 4 weeks  -BB     Planned CPT's? PT EVAL MOD COMPLELITY: 12474;PT RE-EVAL: 08188;PT THER PROC EA 15 MIN: 15657;PT MANUAL THERAPY EA 15 MIN: 29883;PT THER ACT EA 15 MIN: 73379;PT GAIT TRAINING EA 15 MIN: 75942;PT THER SUPP EA 15 MIN  -BB     Physical Therapy Interventions (Optional Details) balance training;gait training;gross motor skills;home exercise program;lumbar stabilization;manual therapy techniques;ROM (Range of Motion);strengthening;stretching;transfer training   ice/MHP  -BB     PT Plan Comments Progress core stability, BLE strength, stretching, manual therapy for improved mobility, balance, gait,endurance  -BB       User Key  (r) = Recorded By, (t) = Taken By, (c) = Cosigned By    Initials Name Provider Type    BB Sena Fatima, PT Physical Therapist                  Exercises       11/14/17 1310          Subjective Comments    Subjective Comments See patient history   -BB      Subjective Pain    Able to rate subjective pain? yes  -BB      Pre-Treatment Pain Level 0  -BB      Post-Treatment Pain Level 0  -BB      Exercise 1    Exercise Name 1 Bridge   -BB      Sets 1 1  -BB      Reps 1 10  -BB      Exercise 2    Exercise Name 2 Manual HS stretch   -BB      Sets 2 2  -BB      Time (Seconds) 2 30\"  -BB      Exercise 3    Exercise Name 3 Education on log roll  -BB      Time (Minutes) 3 2'  -BB        User Key  (r) = Recorded By, (t) = Taken By, (c) = Cosigned By    Initials Name Provider Type    BB Sena Fatima, PT Physical Therapist                              Outcome Measures       11/14/17 1310          Modified Oswestry    Modified Oswestry Score/Comments 48%  " -BB      Functional Assessment    Outcome Measure Options Jumana Hilly  -BB        User Key  (r) = Recorded By, (t) = Taken By, (c) = Cosigned By    Initials Name Provider Type    KRISTY Fatima PT Physical Therapist            Time Calculation:   Start Time: 1310  Stop Time: 1345  Time Calculation (min): 35 min  Total Timed Code Minutes- PT: 4 minute(s)     Therapy Charges for Today     Code Description Service Date Service Provider Modifiers Qty    96854517898 HC PT MOBILITY CURRENT 11/14/2017 Sena Fatima, PT GP, CJ 1    44636746115 HC PT MOBILITY PROJECTED 11/14/2017 Sena Fatima, PT GP, CI 1    21659363531 HC PT EVAL MOD COMPLEXITY 2 11/14/2017 Sena Fatima, PT GP 1          PT G-Codes  PT Professional Judgement Used?: Yes  Outcome Measure Options: Jumana Ferraro  Score: 48%  Functional Limitation: Mobility: Walking and moving around  Mobility: Walking and Moving Around Current Status (): At least 20 percent but less than 40 percent impaired, limited or restricted  Mobility: Walking and Moving Around Goal Status (): At least 1 percent but less than 20 percent impaired, limited or restricted         Sena Fatima, PT  11/14/2017

## 2017-11-15 ENCOUNTER — HOSPITAL ENCOUNTER (OUTPATIENT)
Dept: PHYSICAL THERAPY | Facility: HOSPITAL | Age: 82
Setting detail: THERAPIES SERIES
Discharge: HOME | End: 2017-11-15

## 2017-11-15 DIAGNOSIS — M54.40 CHRONIC MIDLINE LOW BACK PAIN WITH SCIATICA, SCIATICA LATERALITY UNSPECIFIED: Primary | ICD-10-CM

## 2017-11-15 DIAGNOSIS — R53.81 PHYSICAL DECONDITIONING: ICD-10-CM

## 2017-11-15 DIAGNOSIS — G89.29 CHRONIC MIDLINE LOW BACK PAIN WITH SCIATICA, SCIATICA LATERALITY UNSPECIFIED: Primary | ICD-10-CM

## 2017-11-15 PROCEDURE — 97110 THERAPEUTIC EXERCISES: CPT | Performed by: PHYSICAL THERAPY ASSISTANT

## 2017-11-15 NOTE — THERAPY TREATMENT NOTE
Outpatient Physical Therapy Ortho Treatment Note  [facility]     Patient Name: Kierra Scales  : 1929  MRN: [id number]  Today's Date: 11/15/2017      Visit Date: 11/15/2017  Attendance: 2/2  Subjective % Improvement: N/A  Recert Date: 2017  MD appointment: TBD  Visit Dx:    ICD-10-CM ICD-9-CM   1. Chronic midline low back pain with sciatica, sciatica laterality unspecified M54.40 724.2    G89.29 724.3     338.29   2. 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)   • Hypothyroidism   • Dyspnea   • Disorder of thyroid   • Gastrointestinal hemorrhage with melena   • Diabetes mellitus   • Colon cancer   • Chronic blood loss anemia   • Physical deconditioning   • Senile dementia, uncomplicated   • Encounter for rehabilitation   • Adenocarcinoma of sigmoid colon   • Hypokalemia   • Alzheimer's disease   • Arthritis   • Cataract        Past Medical History:   Diagnosis Date   • Anxiety    • Arthritis    • Bradycardia    • Dyslipidemia    • Dyspnea    • Fibrocystic disease of breast    • Hashimoto's thyroiditis    • Hypertensive disorder    • Hypothyroidism    • Pain    • Palpitations    • Paroxysmal tachycardia    • Shortness of breath    • Tricuspid valve disorders, specified as nonrheumatic    • Vitamin D deficiency         Past Surgical History:   Procedure Laterality Date   • COLON RESECTION N/A 2017    Procedure: COLON RESECTION;  Surgeon: Stefano Linn MD;  Location: [facility] OR;  Service:    • COLONOSCOPY N/A 2017    Procedure: Flexible Sigmoidoscopy;  Surgeon: Stefano Linn MD;  Location: [facility] ENDOSCOPY;  Service:    • EXTERNAL EAR SURGERY     • EYE SURGERY     •  LIPOMA EXCISION     • PACEMAKER IMPLANTATION     • SALIVARY GLAND SURGERY     • TUBAL ABDOMINAL LIGATION               PT Ortho       11/14/17 1310    Subjective Comments    Subjective Comments See patient history   -BB    Precautions and Contraindications    Precautions/Limitations fall precautions  -BB    Precautions confusion  -BB    Contraindications FALL RISK  -BB    Subjective Pain    Able to rate subjective pain? yes  -BB    Pre-Treatment Pain Level 0  -BB    Posture/Observations    Posture/Observations Comments Unsteady gait, used HHA to go back to exam room. Limited exam of spine due to patient unable to get into prone position today. No AD. Denies using cane.   -BB    Special Tests/Palpation    Special Tests/Palpation Lumbar/SI  -BB    Lumbosacral Palpation    SI Bilateral:;Tender  -BB    Spinous Process Tender  -BB    Piriformis Bilateral:;Tender  -BB    Quadratus Lumborum Bilateral:;Tender  -BB    Erector Spinae (Paraspinals) Bilateral:;Tender   throughout lumbar spine  -BB    Hamstring Bilateral:;Guarded/taut  -BB    Lumbosacral Palpation? Yes  -BB    Lumbar/SI Special Tests    SLR (Neural Tension) Bilateral:;Negative  -BB    SI Compression Test (SI Dysfunction) Bilateral:;Negative  -BB    ROM (Range of Motion)    General ROM Detail Trunk flexion in sitting: tips to toes. Ext: limited with pain. Standing SB limited 10 degrees with pain. Bilateral rotation WNL.   -BB    MMT (Manual Muscle Testing)    General MMT Assessment Detail Bilateral LE grossly 4-/5 of hip and knee.   -BB    Pathomechanics    Lower Extremity Pathomechanics Asymmetrical steps;Limited knees flexion with swing through;Antalgic with midstance  -BB    Balance Skills Training    Rhomberg Sway in all planes without UE assist  -BB    Transfers    Transfer, Comment Independent with UE and SBA  -BB    Gait Assessment/Treatment    Gait, Comment HHA required.   -BB      User Key  (r) = Recorded By, (t) = Taken By, (c) = Cosigned By    Initials  "Name Provider Type    KRISTY Fatima, PT Physical Therapist                            PT Assessment/Plan       11/15/17 1400       PT Assessment    Assessment Comments fair tolerance with tx, Pt gets confused with intructions and needs vc's fpr correct technique but able to tolerate fairly  -JH     PT Plan    PT Frequency 2x/week  -JH     PT Plan Comments progress core strength  -JH       User Key  (r) = Recorded By, (t) = Taken By, (c) = Cosigned By    Initials Name Provider Type    JV Cartwright PTA Physical Therapy Assistant                    Exercises       11/15/17 1300          Subjective Comments    Subjective Comments Pt reports that the pain just comes and goes  -      Subjective Pain    Able to rate subjective pain? yes  -JH      Pre-Treatment Pain Level 0  -JH      Exercise 1    Exercise Name 1 PRO ll for endurance  -JH      Time (Minutes) 1 10  -JH      Additional Comments L 1  -JH      Exercise 2    Exercise Name 2 seated HS stretch  -JH      Cueing 2 Verbal;Tactile  -JH      Reps 2 2  -JH      Time (Seconds) 2 30  -JH      Exercise 3    Exercise Name 3 manual stretch on L  -JH      Cueing 3 Verbal  -JH      Time (Seconds) 3 2x30\"  -JH      Exercise 4    Exercise Name 4 bridges  -JH      Cueing 4 Demo  -JH      Sets 4 2  -JH      Reps 4 10  -JH      Exercise 5    Exercise Name 5 supine hip add  -JH      Cueing 5 Tactile  -JH      Sets 5 2  -JH      Reps 5 10  -JH      Exercise 6    Exercise Name 6 BKLL  -JH      Sets 6 2  -JH      Reps 6 10  -JH      Exercise 7    Exercise Name 7 practice log roll   -JH      Cueing 7 Verbal   tactile  -JH      Reps 7 1  -JH      Exercise 8    Exercise Name 8 prone laying with STM to LB  -JH      Time (Minutes) 8 4  -JH        User Key  (r) = Recorded By, (t) = Taken By, (c) = Cosigned By    Initials Name Provider Type    JV Cartwright PTA Physical Therapy Assistant                                       Outcome Measures       11/14/17 1310          Modified " Oswestry    Modified Oswestry Score/Comments 48%  -BB      Functional Assessment    Outcome Measure Options Modifed Owestry  -BB        User Key  (r) = Recorded By, (t) = Taken By, (c) = Cosigned By    Initials Name Provider Type    KRISTY Fatima PT Physical Therapist            Time Calculation:   Start Time: 1348  Stop Time: 1427  Time Calculation (min): 39 min    Therapy Charges for Today     Code Description Service Date Service Provider Modifiers Qty    98149578522  PT THER PROC EA 15 MIN 11/15/2017 Gerber Cartwright PTA GP, KX 3                    Gerber Cartwright PTA  11/15/2017

## 2017-11-22 ENCOUNTER — HOSPITAL ENCOUNTER (OUTPATIENT)
Dept: PHYSICAL THERAPY | Facility: HOSPITAL | Age: 82
Setting detail: THERAPIES SERIES
Discharge: HOME | End: 2017-11-22

## 2017-11-22 DIAGNOSIS — M54.40 CHRONIC MIDLINE LOW BACK PAIN WITH SCIATICA, SCIATICA LATERALITY UNSPECIFIED: Primary | ICD-10-CM

## 2017-11-22 DIAGNOSIS — G89.29 CHRONIC MIDLINE LOW BACK PAIN WITH SCIATICA, SCIATICA LATERALITY UNSPECIFIED: Primary | ICD-10-CM

## 2017-11-22 PROCEDURE — 97110 THERAPEUTIC EXERCISES: CPT

## 2017-11-22 NOTE — THERAPY TREATMENT NOTE
Outpatient Physical Therapy Ortho Treatment Note  [facility]     Patient Name: Kierra Scales  : 1929  MRN: [id number]  Today's Date: 2017      Visit Date: 2017  Pt. Has attended 3/4 visits  REcert 17  MD rawls  Visit Dx:    ICD-10-CM ICD-9-CM   1. 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)   • Hypothyroidism   • Dyspnea   • Disorder of thyroid   • Gastrointestinal hemorrhage with melena   • Diabetes mellitus   • Colon cancer   • Chronic blood loss anemia   • Physical deconditioning   • Senile dementia, uncomplicated   • Encounter for rehabilitation   • Adenocarcinoma of sigmoid colon   • Hypokalemia   • Alzheimer's disease   • Arthritis   • Cataract        Past Medical History:   Diagnosis Date   • Anxiety    • Arthritis    • Bradycardia    • Dyslipidemia    • Dyspnea    • Fibrocystic disease of breast    • Hashimoto's thyroiditis    • Hypertensive disorder    • Hypothyroidism    • Pain    • Palpitations    • Paroxysmal tachycardia    • Shortness of breath    • Tricuspid valve disorders, specified as nonrheumatic    • Vitamin D deficiency         Past Surgical History:   Procedure Laterality Date   • COLON RESECTION N/A 2017    Procedure: COLON RESECTION;  Surgeon: Stefano Linn MD;  Location: [facility] OR;  Service:    • COLONOSCOPY N/A 2017    Procedure: Flexible Sigmoidoscopy;  Surgeon: Stefano Linn MD;  Location: [facility] ENDOSCOPY;  Service:    • EXTERNAL EAR SURGERY     • EYE SURGERY     • LIPOMA EXCISION     • PACEMAKER IMPLANTATION     • SALIVARY GLAND SURGERY     •  TUBAL ABDOMINAL LIGATION                               PT Assessment/Plan       11/22/17 1709       PT Assessment    Assessment Comments Tolerates Rx well daughter present pt does need cues for completing tasks and tactile cues as well  -SP     PT Plan    PT Frequency 2x/week  -SP     PT Plan Comments Continue with POC  -SP       User Key  (r) = Recorded By, (t) = Taken By, (c) = Cosigned By    Initials Name Provider Type    SP Angelica Shaw PTA Physical Therapy Assistant                    Exercises       11/22/17 1600          Subjective Comments    Subjective Comments No new complaints  -SP      Subjective Pain    Able to rate subjective pain? yes  -SP      Pre-Treatment Pain Level 1  -SP      Post-Treatment Pain Level 0  -SP      Aquatics    Aquatics performed? No  -SP      Exercise 1    Exercise Name 1 Pro 2 Level 3   -SP      Time (Minutes) 1 10 min  -SP      Exercise 2    Exercise Name 2 Incline S HS S  -SP      Reps 2 3  -SP      Time (Seconds) 2 30 sec  -SP      Exercise 3    Exercise Name 3 step ups   -SP      Sets 3 2  -SP      Reps 3 10  -SP      Exercise 4    Exercise Name 4 alternating target taps  -SP      Sets 4 2  -SP      Reps 4 10  -SP      Exercise 5    Exercise Name 5 sit to stand  -SP      Sets 5 2  -SP      Reps 5 10  -SP      Exercise 6    Exercise Name 6 colored dot weaves 7 dots  -SP      Reps 6 3  -SP      Exercise 7    Exercise Name 7 colored dot fwd steps and lateral steps 7 dots  -SP      Reps 7 2  -SP        User Key  (r) = Recorded By, (t) = Taken By, (c) = Cosigned By    Initials Name Provider Type    SP Angelica Shaw PTA Physical Therapy Assistant                               PT OP Goals       11/22/17 1709 11/22/17 1600    PT Short Term Goals    STG Date to Achieve  --   defer STG  -SP    STG 1  --  -SP    STG 1 Progress  --  -SP    STG 2  --  -SP    STG 2 Progress  --  -SP    STG 3  --  -SP    STG 3 Progress  --  -SP    STG 4  --  -SP    STG 4 Progress  --  -SP    Long Term  Goals    LTG Date to Achieve  12/12/17  -SP    LTG 1  Independent in HEP   -SP    LTG 1 Progress  Not Met  -SP    LTG 2  hip flexion MMT 4+/5 or better   -SP    LTG 2 Progress  Not Met  -SP    LTG 3  Static stand without UE assist narrow TRE 1' safely  -SP    LTG 3 Progress  Progressing  -SP    LTG 3 Progress Comments  can complete with narrow base of support; however cannot complete 1 minute rhomberg  -SP    LTG 4  Sit to stand independent with 1 hand assist or less  -SP    LTG 4 Progress  Ongoing  -SP    LTG 4 Progress Comments  uses two hands  -SP    Time Calculation    PT Goal Re-Cert Due Date 12/15/17  -SP 12/05/17  -SP      User Key  (r) = Recorded By, (t) = Taken By, (c) = Cosigned By    Initials Name Provider Type    SP Angelica Shaw PTA Physical Therapy Assistant                    Time Calculation:   Start Time: 1612  Stop Time: 1700  Time Calculation (min): 48 min  Total Timed Code Minutes- PT: 48 minute(s)    Therapy Charges for Today     Code Description Service Date Service Provider Modifiers Qty    65998094323 HC PT THER PROC EA 15 MIN 11/22/2017 Angelica Shaw PTA GP 3                    Angelica Shaw PTA  11/22/2017

## 2017-11-27 RX ORDER — FLURBIPROFEN SODIUM 0.3 MG/ML
SOLUTION/ DROPS OPHTHALMIC
Qty: 90 EACH | Refills: 11 | Status: SHIPPED | OUTPATIENT
Start: 2017-11-27

## 2017-11-29 ENCOUNTER — HOSPITAL ENCOUNTER (OUTPATIENT)
Dept: PHYSICAL THERAPY | Facility: HOSPITAL | Age: 82
Setting detail: THERAPIES SERIES
Discharge: HOME | End: 2017-11-29

## 2017-11-29 DIAGNOSIS — M54.40 CHRONIC MIDLINE LOW BACK PAIN WITH SCIATICA, SCIATICA LATERALITY UNSPECIFIED: Primary | ICD-10-CM

## 2017-11-29 DIAGNOSIS — G89.29 CHRONIC MIDLINE LOW BACK PAIN WITH SCIATICA, SCIATICA LATERALITY UNSPECIFIED: Primary | ICD-10-CM

## 2017-11-29 PROCEDURE — 97110 THERAPEUTIC EXERCISES: CPT | Performed by: PHYSICAL THERAPY ASSISTANT

## 2017-11-29 NOTE — THERAPY TREATMENT NOTE
Outpatient Physical Therapy Ortho Treatment Note  [facility]     Patient Name: Kierra Scales  : 1929  MRN: [id number]  Today's Date: 2017      Visit Date: 2017  Pt. Has attended 4/5 visits  REcert 17  Visit Dx:    ICD-10-CM ICD-9-CM   1. 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)   • Hypothyroidism   • Dyspnea   • Disorder of thyroid   • Gastrointestinal hemorrhage with melena   • Diabetes mellitus   • Colon cancer   • Chronic blood loss anemia   • Physical deconditioning   • Senile dementia, uncomplicated   • Encounter for rehabilitation   • Adenocarcinoma of sigmoid colon   • Hypokalemia   • Alzheimer's disease   • Arthritis   • Cataract        Past Medical History:   Diagnosis Date   • Anxiety    • Arthritis    • Bradycardia    • Dyslipidemia    • Dyspnea    • Fibrocystic disease of breast    • Hashimoto's thyroiditis    • Hypertensive disorder    • Hypothyroidism    • Pain    • Palpitations    • Paroxysmal tachycardia    • Shortness of breath    • Tricuspid valve disorders, specified as nonrheumatic    • Vitamin D deficiency         Past Surgical History:   Procedure Laterality Date   • COLON RESECTION N/A 2017    Procedure: COLON RESECTION;  Surgeon: Stefano Linn MD;  Location: [facility] OR;  Service:    • COLONOSCOPY N/A 2017    Procedure: Flexible Sigmoidoscopy;  Surgeon: Stefano Linn MD;  Location: [facility] ENDOSCOPY;  Service:    • EXTERNAL EAR SURGERY     • EYE SURGERY     • LIPOMA EXCISION     • PACEMAKER IMPLANTATION     • SALIVARY GLAND SURGERY     • TUBAL  ABDOMINAL LIGATION               PT Ortho       11/29/17 1500    Precautions and Contraindications    Precautions/Limitations fall precautions  -    Precautions confusion  -    Contraindications FALL RISK  -    Subjective Pain    Post-Treatment Pain Level 0  -      User Key  (r) = Recorded By, (t) = Taken By, (c) = Cosigned By    Initials Name Provider Type    JV Cartwright PTA Physical Therapy Assistant                            PT Assessment/Plan       11/29/17 1600       PT Assessment    Assessment Comments good tolerance with ther ex, Pt needs many vc's for correct therapeutic technique,   -     PT Plan    PT Frequency 2x/week  -     PT Plan Comments progress as tolerates  -       User Key  (r) = Recorded By, (t) = Taken By, (c) = Cosigned By    Initials Name Provider Type    JV Cartwright PTA Physical Therapy Assistant                    Exercises       11/29/17 1500          Subjective Comments    Subjective Comments Pt reports the pain is in my back  -      Subjective Pain    Able to rate subjective pain? yes  -      Pre-Treatment Pain Level --   Pt reports the pain is in my back when asked  -      Post-Treatment Pain Level 0  -JH      Exercise 1    Exercise Name 1 Pro 2 Level 3   -JH      Time (Minutes) 1 10 min  -JH      Exercise 2    Exercise Name 2 Incline S HS S  -JH      Reps 2 3  -JH      Time (Seconds) 2 30 sec  -JH      Exercise 3    Exercise Name 3 ST HS stretch  -JH      Reps 3 3  -JH      Time (Seconds) 3 30  -JH      Exercise 4    Exercise Name 4 sit to stand  -      Cueing 4 Verbal  -JH      Sets 4 2  -JH      Reps 4 10  -JH      Exercise 5    Exercise Name 5 LTR  -JH      Cueing 5 Verbal  -JH      Reps 5 10  -JH      Time (Seconds) 5 10  -JH      Exercise 6    Exercise Name 6 BKLL  -JH      Sets 6 2  -JH      Reps 6 10  -JH      Exercise 7    Exercise Name 7 B SLR  -JH      Cueing 7 Verbal  -JH      Sets 7 2  -JH      Reps 7 10  -JH      Exercise 8    Exercise  Name 8 bridges  -      Cueing 8 Verbal  -      Sets 8 2  -      Reps 8 10  -        User Key  (r) = Recorded By, (t) = Taken By, (c) = Cosigned By    Initials Name Provider Type    JV Cartwright PTA Physical Therapy Assistant                               PT OP Goals       11/29/17 1600       PT Short Term Goals    STG Date to Achieve --   defer STG  -     Long Term Goals    LTG Date to Achieve 12/12/17  -     LTG 1 Independent in HEP   -     LTG 1 Progress Not Met  -     LTG 2 hip flexion MMT 4+/5 or better   -     LTG 2 Progress Not Met  -     LTG 3 Static stand without UE assist narrow TRE 1' safely  -     LTG 3 Progress Progressing  -     LTG 4 Sit to stand independent with 1 hand assist or less  -     LTG 4 Progress Ongoing  -       User Key  (r) = Recorded By, (t) = Taken By, (c) = Cosigned By    Initials Name Provider Type    JV Cartwright PTA Physical Therapy Assistant                    Time Calculation:   Start Time: 1515  Stop Time: 1559  Time Calculation (min): 44 min    Therapy Charges for Today     Code Description Service Date Service Provider Modifiers Qty    70831802100 HC PT THER PROC EA 15 MIN 11/29/2017 Gerber Cartwright PTA GP, KX 3                    Gerber Cartwright PTA  11/29/2017

## 2017-12-06 ENCOUNTER — HOSPITAL ENCOUNTER (OUTPATIENT)
Dept: PHYSICAL THERAPY | Facility: HOSPITAL | Age: 82
Setting detail: THERAPIES SERIES
Discharge: HOME | End: 2017-12-06

## 2017-12-06 DIAGNOSIS — G89.29 CHRONIC MIDLINE LOW BACK PAIN WITH SCIATICA, SCIATICA LATERALITY UNSPECIFIED: Primary | ICD-10-CM

## 2017-12-06 DIAGNOSIS — M54.40 CHRONIC MIDLINE LOW BACK PAIN WITH SCIATICA, SCIATICA LATERALITY UNSPECIFIED: Primary | ICD-10-CM

## 2017-12-06 DIAGNOSIS — R53.81 PHYSICAL DECONDITIONING: ICD-10-CM

## 2017-12-06 PROCEDURE — 97110 THERAPEUTIC EXERCISES: CPT | Performed by: PHYSICAL THERAPY ASSISTANT

## 2017-12-06 NOTE — THERAPY TREATMENT NOTE
Outpatient Physical Therapy Ortho Treatment Note  [facility]     Patient Name: Kierra Scales  : 1929  MRN: [id number]  Today's Date: 2017      Visit Date: 2017    Visits    Recert Date 17   MD appointment    Pt reported % of improvement       Insurance available: medicare cap    Visit Dx:    ICD-10-CM ICD-9-CM   1. Chronic midline low back pain with sciatica, sciatica laterality unspecified M54.40 724.2    G89.29 724.3     338.29   2. 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)   • Hypothyroidism   • Dyspnea   • Disorder of thyroid   • Gastrointestinal hemorrhage with melena   • Diabetes mellitus   • Colon cancer   • Chronic blood loss anemia   • Physical deconditioning   • Senile dementia, uncomplicated   • Encounter for rehabilitation   • Adenocarcinoma of sigmoid colon   • Hypokalemia   • Alzheimer's disease   • Arthritis   • Cataract        Past Medical History:   Diagnosis Date   • Anxiety    • Arthritis    • Bradycardia    • Dyslipidemia    • Dyspnea    • Fibrocystic disease of breast    • Hashimoto's thyroiditis    • Hypertensive disorder    • Hypothyroidism    • Pain    • Palpitations    • Paroxysmal tachycardia    • Shortness of breath    • Tricuspid valve disorders, specified as nonrheumatic    • Vitamin D deficiency         Past Surgical History:   Procedure Laterality Date   • COLON RESECTION N/A 2017    Procedure: COLON RESECTION;  Surgeon: Stefano Linn MD;  Location: [facility] OR;  Service:    • COLONOSCOPY N/A 2017    Procedure: Flexible Sigmoidoscopy;  Surgeon: Stefano Linn MD;  Location: [facility] ENDOSCOPY;  Service:    • EXTERNAL EAR  SURGERY     • EYE SURGERY     • LIPOMA EXCISION     • PACEMAKER IMPLANTATION     • SALIVARY GLAND SURGERY     • TUBAL ABDOMINAL LIGATION                               PT Assessment/Plan       12/06/17 1400       PT Assessment    Assessment Comments Pt arrived 6 min late, Pt needs constant verbal cues for correct technique. but tolerates well  -     PT Plan    PT Frequency 2x/week  -     PT Plan Comments MS as tolerated  -       User Key  (r) = Recorded By, (t) = Taken By, (c) = Cosigned By    Initials Name Provider Type     Gerber Cartwright PTA Physical Therapy Assistant                    Exercises       12/06/17 1400          Subjective Comments    Subjective Comments Pt reports she is having trouble moving around  -      Subjective Pain    Able to rate subjective pain? yes  -      Pre-Treatment Pain Level --   unsure  -      Exercise 1    Exercise Name 1 Pro 2 Level 3   -      Time (Minutes) 1 10 min  -      Exercise 2    Exercise Name 2 Incline S HS S  -JH      Reps 2 3  -JH      Time (Seconds) 2 30 sec  -JH      Exercise 3    Exercise Name 3 ST HS stretch  -JH      Cueing 3 Verbal  -      Reps 3 3  -JH      Time (Seconds) 3 30  -JH      Exercise 4    Exercise Name 4 step up B fwd   -JH      Cueing 4 Verbal  -JH      Reps 4 10  -JH      Additional Comments 4 in  -JH      Exercise 5    Exercise Name 5 ST CR  -JH      Cueing 5 Verbal  -      Sets 5 2  -JH      Reps 5 10  -JH      Exercise 6    Exercise Name 6 seated hip add  -JH      Sets 6 2  -JH      Reps 6 10  -JH      Exercise 7    Exercise Name 7 supine B SLR  -JH      Sets 7 2  -JH      Reps 7 5  -JH      Exercise 8    Exercise Name 8 BKLL B  -JH      Sets 8 1  -JH      Reps 8 10  -        User Key  (r) = Recorded By, (t) = Taken By, (c) = Cosigned By    Initials Name Provider Type     Gerber Cartwright PTA Physical Therapy Assistant                               PT OP Goals       12/06/17 1500       PT Short Term Goals    STG Date to  Achieve --   defer STG  -     Long Term Goals    LTG Date to Achieve 12/12/17  -     LTG 1 Independent in HEP   -     LTG 1 Progress Not Met  -     LTG 2 hip flexion MMT 4+/5 or better   -     LTG 2 Progress Not Met  -     LTG 3 Static stand without UE assist narrow TRE 1' safely  -     LTG 3 Progress Progressing  -     LTG 4 Sit to stand independent with 1 hand assist or less  -     LTG 4 Progress Ongoing  -       User Key  (r) = Recorded By, (t) = Taken By, (c) = Cosigned By    Initials Name Provider Type     Gerber Cartwright PTA Physical Therapy Assistant                    Time Calculation:   Start Time: 1436  Stop Time: 1516  Time Calculation (min): 40 min    Therapy Charges for Today     Code Description Service Date Service Provider Modifiers Qty    92393001283 HC PT THER PROC EA 15 MIN 12/6/2017 Gerber Cartwright PTA GP, KX 3                    Gerber Cartwright PTA  12/6/2017

## 2017-12-08 ENCOUNTER — HOSPITAL ENCOUNTER (OUTPATIENT)
Dept: PHYSICAL THERAPY | Facility: HOSPITAL | Age: 82
Setting detail: THERAPIES SERIES
Discharge: HOME | End: 2017-12-08

## 2017-12-08 DIAGNOSIS — M54.40 CHRONIC MIDLINE LOW BACK PAIN WITH SCIATICA, SCIATICA LATERALITY UNSPECIFIED: Primary | ICD-10-CM

## 2017-12-08 DIAGNOSIS — R53.81 PHYSICAL DECONDITIONING: ICD-10-CM

## 2017-12-08 DIAGNOSIS — G89.29 CHRONIC MIDLINE LOW BACK PAIN WITH SCIATICA, SCIATICA LATERALITY UNSPECIFIED: Primary | ICD-10-CM

## 2017-12-08 PROCEDURE — 97110 THERAPEUTIC EXERCISES: CPT | Performed by: PHYSICAL THERAPY ASSISTANT

## 2017-12-08 NOTE — THERAPY TREATMENT NOTE
Outpatient Physical Therapy Ortho Treatment Note  [facility]     Patient Name: Kierra Scales  : 1929  MRN: [id number]  Today's Date: 2017      Visit Date: 2017    Visits /   Recert Date 17   MD appointment    Pt reported % of improvement       Insurance available:medicare cap    Visit Dx:    ICD-10-CM ICD-9-CM   1. Chronic midline low back pain with sciatica, sciatica laterality unspecified M54.40 724.2    G89.29 724.3     338.29   2. 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)   • Hypothyroidism   • Dyspnea   • Disorder of thyroid   • Gastrointestinal hemorrhage with melena   • Diabetes mellitus   • Colon cancer   • Chronic blood loss anemia   • Physical deconditioning   • Senile dementia, uncomplicated   • Encounter for rehabilitation   • Adenocarcinoma of sigmoid colon   • Hypokalemia   • Alzheimer's disease   • Arthritis   • Cataract        Past Medical History:   Diagnosis Date   • Anxiety    • Arthritis    • Bradycardia    • Dyslipidemia    • Dyspnea    • Fibrocystic disease of breast    • Hashimoto's thyroiditis    • Hypertensive disorder    • Hypothyroidism    • Pain    • Palpitations    • Paroxysmal tachycardia    • Shortness of breath    • Tricuspid valve disorders, specified as nonrheumatic    • Vitamin D deficiency         Past Surgical History:   Procedure Laterality Date   • COLON RESECTION N/A 2017    Procedure: COLON RESECTION;  Surgeon: Stefano Linn MD;  Location: [facility] OR;  Service:    • COLONOSCOPY N/A 2017    Procedure: Flexible Sigmoidoscopy;  Surgeon: Stefano Linn MD;  Location: [facility] ENDOSCOPY;  Service:    • EXTERNAL EAR  SURGERY     • EYE SURGERY     • LIPOMA EXCISION     • PACEMAKER IMPLANTATION     • SALIVARY GLAND SURGERY     • TUBAL ABDOMINAL LIGATION                               PT Assessment/Plan       12/08/17 1300       PT Assessment    Assessment Comments Pt arrives 12 min late, Pt still requires constant cues for technique, no new goals met  -     PT Plan    PT Frequency 2x/week  -     PT Plan Comments ststic standing  -       User Key  (r) = Recorded By, (t) = Taken By, (c) = Cosigned By    Initials Name Provider Type     Gerber Cartwright PTA Physical Therapy Assistant                    Exercises       12/08/17 1300          Subjective Comments    Subjective Comments Pt reports she is not having back ppain but her knee is sore  -      Subjective Pain    Able to rate subjective pain? yes  -JH      Pre-Treatment Pain Level 0  -JH      Exercise 1    Exercise Name 1 Pro 2 Level 3   -JH      Time (Minutes) 1 10 min  -JH      Exercise 2    Exercise Name 2 Incline S HS S  -JH      Cueing 2 Verbal  -JH      Reps 2 3  -JH      Time (Seconds) 2 30 sec  -JH      Exercise 3    Exercise Name 3 step up fwd Crow  -JH      Cueing 3 Verbal  -JH      Sets 3 2  -JH      Reps 3 10  -JH      Exercise 4    Exercise Name 4 ST CR  -JH      Cueing 4 Verbal  -JH      Sets 4 2  -JH      Reps 4 10  -JH      Exercise 5    Exercise Name 5 MS  -JH      Cueing 5 Tactile   verval  -JH      Sets 5 2  -JH      Exercise 6    Exercise Name 6 seated hip add  -JH      Cueing 6 Verbal  -JH      Sets 6 2  -JH      Reps 6 10  -JH      Exercise 7    Exercise Name 7 LAQ  -JH      Cueing 7 Verbal  -JH      Sets 7 2  -JH      Reps 7 10  -JH      Exercise 8    Exercise Name 8 sit to stand focus on decending slowly  -JH      Sets 8 1  -JH      Reps 8 10  -JH        User Key  (r) = Recorded By, (t) = Taken By, (c) = Cosigned By    Initials Name Provider Type    JV Cartwright PTA Physical Therapy Assistant                               PT OP Goals        12/08/17 1300       PT Short Term Goals    STG Date to Achieve --   defer STG  -     Long Term Goals    LTG Date to Achieve 12/12/17  -     LTG 1 Independent in HEP   -     LTG 1 Progress Not Met  -     LTG 2 hip flexion MMT 4+/5 or better   -     LTG 2 Progress Not Met  -     LTG 3 Static stand without UE assist narrow TRE 1' safely  -     LTG 3 Progress Progressing  -     LTG 4 Sit to stand independent with 1 hand assist or less  -     LTG 4 Progress Ongoing  -       User Key  (r) = Recorded By, (t) = Taken By, (c) = Cosigned By    Initials Name Provider Type     Gerber Cartwright PTA Physical Therapy Assistant                    Time Calculation:   Start Time: 1312  Stop Time: 1345  Time Calculation (min): 33 min    Therapy Charges for Today     Code Description Service Date Service Provider Modifiers Qty    07282160121 HC PT THER PROC EA 15 MIN 12/8/2017 Gerber Cartwright PTA GP, KX 2                    Gerber Cartwright PTA  12/8/2017

## 2017-12-11 ENCOUNTER — HOSPITAL ENCOUNTER (OUTPATIENT)
Dept: PHYSICAL THERAPY | Facility: HOSPITAL | Age: 82
Setting detail: THERAPIES SERIES
Discharge: HOME | End: 2017-12-11

## 2017-12-11 DIAGNOSIS — G89.29 CHRONIC MIDLINE LOW BACK PAIN WITH SCIATICA, SCIATICA LATERALITY UNSPECIFIED: Primary | ICD-10-CM

## 2017-12-11 DIAGNOSIS — M54.40 CHRONIC MIDLINE LOW BACK PAIN WITH SCIATICA, SCIATICA LATERALITY UNSPECIFIED: Primary | ICD-10-CM

## 2017-12-11 PROCEDURE — G8979 MOBILITY GOAL STATUS: HCPCS | Performed by: PHYSICAL THERAPIST

## 2017-12-11 PROCEDURE — 97110 THERAPEUTIC EXERCISES: CPT | Performed by: PHYSICAL THERAPIST

## 2017-12-11 PROCEDURE — G8978 MOBILITY CURRENT STATUS: HCPCS | Performed by: PHYSICAL THERAPIST

## 2017-12-12 ENCOUNTER — LAB (OUTPATIENT)
Dept: LAB | Facility: HOSPITAL | Age: 82
End: 2017-12-12

## 2017-12-12 ENCOUNTER — OFFICE VISIT (OUTPATIENT)
Dept: SURGERY | Facility: CLINIC | Age: 82
End: 2017-12-12

## 2017-12-12 VITALS
HEIGHT: 68 IN | BODY MASS INDEX: 28.94 KG/M2 | WEIGHT: 190.92 LBS | DIASTOLIC BLOOD PRESSURE: 70 MMHG | SYSTOLIC BLOOD PRESSURE: 134 MMHG

## 2017-12-12 DIAGNOSIS — C18.7 ADENOCARCINOMA OF SIGMOID COLON (HCC): Primary | ICD-10-CM

## 2017-12-12 DIAGNOSIS — Z09 POSTOPERATIVE FOLLOW-UP: ICD-10-CM

## 2017-12-12 DIAGNOSIS — Z09 POSTOPERATIVE FOLLOW-UP: Primary | ICD-10-CM

## 2017-12-12 LAB
ALBUMIN SERPL-MCNC: 4.6 G/DL (ref 3.4–4.8)
ALBUMIN/GLOB SERPL: 1.3 G/DL (ref 1.1–1.8)
ALP SERPL-CCNC: 70 U/L (ref 38–126)
ALT SERPL W P-5'-P-CCNC: 27 U/L (ref 9–52)
ANION GAP SERPL CALCULATED.3IONS-SCNC: 13 MMOL/L (ref 5–15)
AST SERPL-CCNC: 25 U/L (ref 14–36)
BASOPHILS # BLD AUTO: 0 10*3/MM3 (ref 0–0.2)
BASOPHILS NFR BLD AUTO: 0 % (ref 0–2)
BILIRUB SERPL-MCNC: 0.6 MG/DL (ref 0.2–1.3)
BUN BLD-MCNC: 14 MG/DL (ref 7–21)
BUN/CREAT SERPL: 17.3 (ref 7–25)
CALCIUM SPEC-SCNC: 9.5 MG/DL (ref 8.4–10.2)
CHLORIDE SERPL-SCNC: 98 MMOL/L (ref 95–110)
CO2 SERPL-SCNC: 31 MMOL/L (ref 22–31)
CREAT BLD-MCNC: 0.81 MG/DL (ref 0.5–1)
DEPRECATED RDW RBC AUTO: 44.3 FL (ref 36.4–46.3)
EOSINOPHIL # BLD AUTO: 0.09 10*3/MM3 (ref 0–0.7)
EOSINOPHIL NFR BLD AUTO: 2.2 % (ref 0–7)
ERYTHROCYTE [DISTWIDTH] IN BLOOD BY AUTOMATED COUNT: 12.9 % (ref 11.5–14.5)
GFR SERPL CREATININE-BSD FRML MDRD: 81 ML/MIN/1.73 (ref 39–90)
GLOBULIN UR ELPH-MCNC: 3.5 GM/DL (ref 2.3–3.5)
GLUCOSE BLD-MCNC: 167 MG/DL (ref 60–100)
HCT VFR BLD AUTO: 40.6 % (ref 35–45)
HGB BLD-MCNC: 13.6 G/DL (ref 12–15.5)
IMM GRANULOCYTES # BLD: 0.01 10*3/MM3 (ref 0–0.02)
IMM GRANULOCYTES NFR BLD: 0.2 % (ref 0–0.5)
LYMPHOCYTES # BLD AUTO: 1.37 10*3/MM3 (ref 0.6–4.2)
LYMPHOCYTES NFR BLD AUTO: 34.1 % (ref 10–50)
MCH RBC QN AUTO: 31.3 PG (ref 26.5–34)
MCHC RBC AUTO-ENTMCNC: 33.5 G/DL (ref 31.4–36)
MCV RBC AUTO: 93.5 FL (ref 80–98)
MONOCYTES # BLD AUTO: 0.36 10*3/MM3 (ref 0–0.9)
MONOCYTES NFR BLD AUTO: 9 % (ref 0–12)
NEUTROPHILS # BLD AUTO: 2.19 10*3/MM3 (ref 2–8.6)
NEUTROPHILS NFR BLD AUTO: 54.5 % (ref 37–80)
PLATELET # BLD AUTO: 176 10*3/MM3 (ref 150–450)
PMV BLD AUTO: 10.2 FL (ref 8–12)
POTASSIUM BLD-SCNC: 3.9 MMOL/L (ref 3.5–5.1)
PROT SERPL-MCNC: 8.1 G/DL (ref 6.3–8.6)
RBC # BLD AUTO: 4.34 10*6/MM3 (ref 3.77–5.16)
SODIUM BLD-SCNC: 142 MMOL/L (ref 137–145)
WBC NRBC COR # BLD: 4.02 10*3/MM3 (ref 3.2–9.8)

## 2017-12-12 PROCEDURE — 80053 COMPREHEN METABOLIC PANEL: CPT

## 2017-12-12 PROCEDURE — 36415 COLL VENOUS BLD VENIPUNCTURE: CPT

## 2017-12-12 PROCEDURE — 99212 OFFICE O/P EST SF 10 MIN: CPT | Performed by: SURGERY

## 2017-12-12 PROCEDURE — 82378 CARCINOEMBRYONIC ANTIGEN: CPT

## 2017-12-12 PROCEDURE — 85025 COMPLETE CBC W/AUTO DIFF WBC: CPT

## 2017-12-12 NOTE — THERAPY PROGRESS REPORT/RE-CERT
Outpatient Physical Therapy Ortho Progress Note  [facility]     Patient Name: Kierra Scales  : 1929  MRN: [id number]  Today's Date: 2017      Visit Date: 2017  Attendance:  (Medicare)  Subjective Improvement: minimal per family  Next MD Appt: ??  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)   • Hypothyroidism   • Dyspnea   • Disorder of thyroid   • Gastrointestinal hemorrhage with melena   • Diabetes mellitus   • Colon cancer   • Chronic blood loss anemia   • Physical deconditioning   • Senile dementia, uncomplicated   • Encounter for rehabilitation   • Adenocarcinoma of sigmoid colon   • Hypokalemia   • Alzheimer's disease   • Arthritis   • Cataract        Past Medical History:   Diagnosis Date   • Anxiety    • Arthritis    • Bradycardia    • Dyslipidemia    • Dyspnea    • Fibrocystic disease of breast    • Hashimoto's thyroiditis    • Hypertensive disorder    • Hypothyroidism    • Pain    • Palpitations    • Paroxysmal tachycardia    • Shortness of breath    • Tricuspid valve disorders, specified as nonrheumatic    • Vitamin D deficiency         Past Surgical History:   Procedure Laterality Date   • COLON RESECTION N/A 2017    Procedure: COLON RESECTION;  Surgeon: Stefano Linn MD;  Location: [facility] OR;  Service:    • COLONOSCOPY N/A 2017    Procedure: Flexible Sigmoidoscopy;  Surgeon: Stefano Linn MD;  Location: [facility] ENDOSCOPY;  Service:    • EXTERNAL EAR SURGERY     • EYE SURGERY     • LIPOMA EXCISION     • PACEMAKER IMPLANTATION     • SALIVARY GLAND SURGERY     • TUBAL ABDOMINAL LIGATION         Visit  Dx:     ICD-10-CM ICD-9-CM   1. Chronic midline low back pain with sciatica, sciatica laterality unspecified M54.40 724.2    G89.29 724.3     338.29     Changes in Medications: none noted  Changes in MD Orders: none noted  Number of Work Days Lost: n/a              PT Ortho       12/11/17 1300    Subjective Comments    Subjective Comments Patient reports continued pain in the low back. Therapy has helped a fit. She reports that she feels like she is going to fall, but hasn't. Minimal to no improvement with therapy.  -SS    Precautions and Contraindications    Precautions/Limitations fall precautions  -SS    Precautions confusion  -SS    Contraindications FALL RISK  -SS    Subjective Pain    Pre-Treatment Pain Level --   patient unable to rate  -SS    Posture/Observations    Alignment Options Thoracic kyphosis;Lumbar lordosis  -SS    Thoracic Kyphosis Decreased  -SS    Lumbar lordosis Decreased  -SS    Posture/Observations Comments B UE assist for sit <-> stand transfers. Unsteady gait. Patient ambulates with wide base of support and flexed knees. Kyphotic sitting posture.  -SS    Lumbosacral Palpation    SI --   no complaints at PS[facility]  -SS    Lumbosacral Segment Bilateral:   lumbosacral junction  -SS    Thoracolumbar Segment --   non-tender  -SS    Spinous Process Tender   lower lumbars  -SS    Piriformis Bilateral:;Guarded/taut  -SS    Gluteus Dashawn Bilateral:;Tender;Guarded/taut  -SS    Erector Spinae (Paraspinals) --   non-tender  -SS    Left Hip    Hip Flexion Gross Movement (4/5) good  -SS    Hip Extension Gross Movement (4-/5) good minus  -SS    Hip ABduction Gross Movement --   unable to test, patient not understanding   -SS    Hip ADduction Gross Movement (4-/5) good minus  -SS    Hip Int (Medial) Rotation Gross Movement (5/5) normal  -SS    Hip Ext (Lat) Rotation Gross Movement (5/5) normal  -SS    Right Hip    Hip Flexion Gross Movement (4/5) good  -SS    Hip Extension Gross Movement (4-/5) good minus   -SS    Hip ABduction Gross Movement (4-/5) good minus  -SS    Hip ADduction Gross Movement (4/5) good  -SS    Hip Int (Medial) Rotation Gross Movement (4+/5) good plus  -SS    Hip Ext (Lat) Rotation Gross Movement (5/5) normal  -SS    Left Knee    Knee Extension Gross Movement (5/5) normal   knee pain  -SS    Knee Flexion Gross Movement (4+/5) good plus   knee pain  -SS    Right Knee    Knee Extension Gross Movement (5/5) normal  -SS    Knee Flexion Gross Movement (5/5) normal  -SS    Left Ankle/Foot    Ankle PF Gross Movement (5/5) normal  -SS    Ankle Dorsiflexion Gross Movement (5/5) normal  -SS    Right Ankle/Foot    Ankle PF Gross Movement (5/5) normal  -SS    Ankle Dorsiflexion Gross Movement (5/5) normal  -SS      User Key  (r) = Recorded By, (t) = Taken By, (c) = Cosigned By    Initials Name Provider Type    SS Joe Phillips, PT DPT Physical Therapist                            Therapy Education       12/11/17 2100          Therapy Education    Given Other (comment)   POC  -SS      How Provided Verbal  -SS      Provided to Other (comment)   patient's daughter  -SS      Level of Understanding Verbalized  -SS        User Key  (r) = Recorded By, (t) = Taken By, (c) = Cosigned By    Initials Name Provider Type     Joe Phillips, PT DPT Physical Therapist                PT OP Goals       12/11/17 1300       PT Short Term Goals    STG Date to Achieve --   defer STG  -SS     Long Term Goals    LTG Date to Achieve 12/12/17  -SS     LTG 1 Independent in HEP   -     LTG 1 Progress Not Met  -SS     LTG 2 hip flexion MMT 4+/5 or better   -     LTG 2 Progress Not Met  -SS     LTG 3 Static stand without UE assist narrow TRE 1' safely  -SS     LTG 3 Progress Progressing  -     LTG 4 Sit to stand independent with 1 hand assist or less  -     LTG 4 Progress Ongoing  -     Time Calculation    PT Goal Re-Cert Due Date 01/01/18  -SS       User Key  (r) = Recorded By, (t) = Taken By, (c) = Cosigned By     Initials Name Provider Type    SS Joe Phillips, PT DPT Physical Therapist                PT Assessment/Plan       12/11/17 1300       PT Assessment    Functional Limitations Impaired gait;Decreased safety during functional activities;Limitations in functional capacity and performance;Limitations in community activities  -     Impairments Balance;Endurance;Muscle strength;Gait;Impaired muscle endurance;Coordination;Pain  -SS     Assessment Comments Patient requires constant cuing for exercise performance and examination. She has significant short-term memory loss.  -     Rehab Potential Fair   barriers: dementia/confusion, chronicity  -SS     Patient/caregiver participated in establishment of treatment plan and goals Yes  -SS     Patient would benefit from skilled therapy intervention Yes  -SS     PT Plan    PT Frequency 2x/week  -SS     Predicted Duration of Therapy Intervention (days/wks) 3 weeks  -SS     PT Plan Comments Continue P.T. for 3 weeks to work on back and lower extremity strengthening and balance training.  -SS       User Key  (r) = Recorded By, (t) = Taken By, (c) = Cosigned By    Initials Name Provider Type    SS Joe Phillips, PT DPT Physical Therapist                  Exercises       12/11/17 1300          Subjective Comments    Subjective Comments Patient reports continued pain in the low back. Therapy has helped a fit. She reports that she feels like she is going to fall, but hasn't. Minimal to no improvement with therapy.  -SS      Subjective Pain    Able to rate subjective pain? no  -SS      Pre-Treatment Pain Level --   patient unable to rate  -SS      Post-Treatment Pain Level --   patient unable to rate  -SS      Exercise 1    Exercise Name 1 Bridging   -SS      Cueing 1 Verbal;Tactile  -SS      Sets 1 2  -SS      Reps 1 10  -SS      Exercise 2    Exercise Name 2 SLR flexion - B  -SS      Cueing 2 Verbal;Tactile  -SS      Sets 2 2  -SS      Reps 2 10  -SS      Exercise  3    Exercise Name 3 Hooklying T-band hip abduction  -SS      Cueing 3 Verbal  -SS      Sets 3 2  -SS      Reps 3 10  -SS      Additional Comments red  -SS      Exercise 4    Exercise Name 4 LAQ with cuing to keep trunk erect  -SS      Cueing 4 Verbal;Tactile  -SS      Sets 4 1  -SS      Reps 4 10  -SS      Exercise 5    Exercise Name 5 Sitting erect  -SS      Cueing 5 --   tactile, verbal  -SS      Sets 5 2  -SS      Time (Minutes) 5 1 min  -SS      Exercise 6    Exercise Name 6 Seated trunk rotation L/R  -SS      Cueing 6 --   verbal, tactile  -SS      Sets 6 1  -SS      Reps 6 10  -SS      Exercise 7    Exercise Name 7 Seated trunk extension with manual resistance  -SS      Cueing 7 --   verbal, tactile  -SS        User Key  (r) = Recorded By, (t) = Taken By, (c) = Cosigned By    Initials Name Provider Type    SS Joe Phillips, PT DPT Physical Therapist                              Time Calculation:   Start Time: 1350  Stop Time: 1430  Time Calculation (min): 40 min     Therapy Charges for Today     Code Description Service Date Service Provider Modifiers Qty    77968056147 HC PT MOBILITY CURRENT 12/11/2017 Joe Phillips, PT DPT GP, CJ 1    71098045523 HC PT MOBILITY PROJECTED 12/11/2017 Joe Phillips, PT DPT GP, CI 1    61138705563 HC PT THER PROC EA 15 MIN 12/11/2017 Joe Phillips, PT DPT GP 3          PT G-Codes  PT Professional Judgement Used?: Yes  Functional Limitation: Mobility: Walking and moving around  Mobility: Walking and Moving Around Current Status (): At least 20 percent but less than 40 percent impaired, limited or restricted  Mobility: Walking and Moving Around Goal Status (): At least 1 percent but less than 20 percent impaired, limited or restricted         Joe Phillips, PT, DPT, CHT  12/11/2017

## 2017-12-13 ENCOUNTER — OFFICE VISIT (OUTPATIENT)
Dept: OTOLARYNGOLOGY | Facility: CLINIC | Age: 82
End: 2017-12-13

## 2017-12-13 VITALS — HEIGHT: 68 IN | HEART RATE: 76 BPM | WEIGHT: 193 LBS | BODY MASS INDEX: 29.25 KG/M2 | OXYGEN SATURATION: 98 %

## 2017-12-13 DIAGNOSIS — H61.23 BILATERAL IMPACTED CERUMEN: Primary | ICD-10-CM

## 2017-12-13 LAB — CEA SERPL-MCNC: 2.8 NG/ML (ref 0–5)

## 2017-12-13 PROCEDURE — 69210 REMOVE IMPACTED EAR WAX UNI: CPT | Performed by: OTOLARYNGOLOGY

## 2017-12-13 NOTE — PROGRESS NOTES
Subjective   Kierra Scales is a 88 y.o. female.       History of Present Illness   Patient has a history of recurring cerumen impactions.  Her daughter states that she asked like she can't hear well at times.      The following portions of the patient's history were reviewed and updated as appropriate: allergies, current medications, past family history, past medical history, past social history, past surgical history and problem list.     reports that she has never smoked. She has never used smokeless tobacco. She reports that she does not drink alcohol.   Patient is not a tobacco user and has not been counseled for use of tobacco products      Review of Systems        Objective   Physical Exam  Both ear canals show cerumen impactions  Using the binocular microscope for visualization, cerumen impaction was removed from bilateral ear canal(s) using instrumentation. This was personally performed by Ugo Gale MD  Following cerumen removal both tympanic membranes are noted be intact with no infection or effusion    Assessment/Plan   Kierra was seen today for follow-up.    Diagnoses and all orders for this visit:    Bilateral impacted cerumen      Plan: Cerumen removed as described above.  Return 9 months, call sooner for problems

## 2017-12-15 ENCOUNTER — HOSPITAL ENCOUNTER (OUTPATIENT)
Dept: PHYSICAL THERAPY | Facility: HOSPITAL | Age: 82
Setting detail: THERAPIES SERIES
Discharge: HOME | End: 2017-12-15

## 2017-12-15 DIAGNOSIS — G89.29 CHRONIC MIDLINE LOW BACK PAIN WITH SCIATICA, SCIATICA LATERALITY UNSPECIFIED: Primary | ICD-10-CM

## 2017-12-15 DIAGNOSIS — M54.40 CHRONIC MIDLINE LOW BACK PAIN WITH SCIATICA, SCIATICA LATERALITY UNSPECIFIED: Primary | ICD-10-CM

## 2017-12-15 PROCEDURE — 97110 THERAPEUTIC EXERCISES: CPT

## 2017-12-15 NOTE — THERAPY TREATMENT NOTE
Outpatient Physical Therapy Ortho Treatment Note  [facility]     Patient Name: Kierra Scales  : 1929  MRN: [id number]  Today's Date: 12/15/2017      Visit Date: 12/15/2017  Visits 8/9. Recert 18. MD f/u SHIRLEY.   Visit Dx:    ICD-10-CM ICD-9-CM   1. 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)   • Hypothyroidism   • Dyspnea   • Disorder of thyroid   • Gastrointestinal hemorrhage with melena   • Diabetes mellitus   • Colon cancer   • Chronic blood loss anemia   • Physical deconditioning   • Senile dementia, uncomplicated   • Encounter for rehabilitation   • Adenocarcinoma of sigmoid colon   • Hypokalemia   • Alzheimer's disease   • Arthritis   • Cataract        Past Medical History:   Diagnosis Date   • Anxiety    • Arthritis    • Bradycardia    • Dyslipidemia    • Dyspnea    • Fibrocystic disease of breast    • Hashimoto's thyroiditis    • Hypertensive disorder    • Hypothyroidism    • Pain    • Palpitations    • Paroxysmal tachycardia    • Shortness of breath    • Tricuspid valve disorders, specified as nonrheumatic    • Vitamin D deficiency         Past Surgical History:   Procedure Laterality Date   • COLON RESECTION N/A 2017    Procedure: COLON RESECTION;  Surgeon: Stefano Linn MD;  Location: [facility] OR;  Service:    • COLONOSCOPY N/A 2017    Procedure: Flexible Sigmoidoscopy;  Surgeon: Stefano Linn MD;  Location: [facility] ENDOSCOPY;  Service:    • EXTERNAL EAR SURGERY     • EYE SURGERY     • LIPOMA EXCISION     • PACEMAKER IMPLANTATION     • SALIVARY GLAND SURGERY     • TUBAL ABDOMINAL  LIGATION               PT Ortho       12/15/17 1400    Subjective Comments    Subjective Comments Pt reports LBP. No number given  -JW    Precautions and Contraindications    Precautions/Limitations fall precautions  -JW    Precautions confusion  -JW    Contraindications FALL RISK  -JW    Subjective Pain    Able to rate subjective pain? no  -JW    Posture/Observations    Posture/Observations Comments Intermittent unsteadiness with gait. CGA with amb.   -JW      User Key  (r) = Recorded By, (t) = Taken By, (c) = Cosigned By    Initials Name Provider Type    CLAUDIA Rincon PTA Physical Therapy Assistant                            PT Assessment/Plan       12/15/17 1400       PT Assessment    Functional Limitations Impaired gait;Decreased safety during functional activities;Limitations in functional capacity and performance;Limitations in community activities  -     Impairments Balance;Endurance;Muscle strength;Gait;Impaired muscle endurance;Coordination;Pain  -JW     Assessment Comments Good steven of today's treatment. Continued LBP that needs decreasing.   -JW     PT Plan    PT Frequency 2x/week  -JW     Predicted Duration of Therapy Intervention (days/wks) 3 weeks  -JW     PT Plan Comments Cont PT per POC.   -JW       User Key  (r) = Recorded By, (t) = Taken By, (c) = Cosigned By    Initials Name Provider Type    CLAUDIA Rincon PTA Physical Therapy Assistant                    Exercises       12/15/17 1400          Subjective Comments    Subjective Comments Pt reports LBP. No number given  -JW      Subjective Pain    Able to rate subjective pain? no  -JW      Exercise 1    Exercise Name 1 PRO2  -JW      Cueing 1 Verbal  -JW      Time (Minutes) 1 8'  -JW      Exercise 2    Exercise Name 2 Sit-stands  -JW      Cueing 2 Verbal  -JW      Sets 2 2  -JW      Reps 2 10  -JW      Exercise 3    Exercise Name 3 LAQ  -JW      Cueing 3 Verbal  -JW      Sets 3 2  -JW      Reps 3 10  -JW      Additional Comments 3#  -JW       Exercise 4    Exercise Name 4 HS curls  -JW      Cueing 4 Verbal  -JW      Sets 4 2  -JW      Reps 4 10  -JW      Additional Comments red tb  -JW      Exercise 5    Exercise Name 5 LTR  -JW      Cueing 5 Tactile  -JW      Reps 5 10  -JW      Exercise 6    Exercise Name 6 Bridges  -JW      Cueing 6 Verbal  -JW      Sets 6 2  -JW      Reps 6 5  -JW      Exercise 7    Exercise Name 7 SLR  -JW      Cueing 7 Verbal  -JW      Sets 7 2  -JW      Reps 7 10  -JW      Exercise 8    Exercise Name 8 H/L hip abd  -JW      Cueing 8 Verbal  -JW      Sets 8 2  -JW      Reps 8 10  -JW      Exercise 9    Exercise Name 9 Seated trunk ext iso  -JW      Cueing 9 Verbal  -JW      Sets 9 2  -JW      Reps 9 10  -JW        User Key  (r) = Recorded By, (t) = Taken By, (c) = Cosigned By    Initials Name Provider Type    CLAUDIA Rincon PTA Physical Therapy Assistant                               PT OP Goals       12/15/17 1400       PT Short Term Goals    STG Date to Achieve --   defer STG  -JW     Long Term Goals    LTG Date to Achieve 12/12/17  -JW     LTG 1 Independent in HEP   -JW     LTG 1 Progress Not Met  -JW     LTG 2 hip flexion MMT 4+/5 or better   -JW     LTG 2 Progress Not Met  -JW     LTG 3 Static stand without UE assist narrow TRE 1' safely  -JW     LTG 3 Progress Progressing  -JW     LTG 4 Sit to stand independent with 1 hand assist or less  -JW     LTG 4 Progress Ongoing  -JW       User Key  (r) = Recorded By, (t) = Taken By, (c) = Cosigned By    Initials Name Provider Type    JW Joe Rincon PTA Physical Therapy Assistant                         Time Calculation:   Start Time: 1400  Stop Time: 1449  Time Calculation (min): 49 min  Total Timed Code Minutes- PT: 49 minute(s)    Therapy Charges for Today     Code Description Service Date Service Provider Modifiers Qty    37607803886  PT THER PROC EA 15 MIN 12/15/2017 Joe Rincon PTA GP 3                    Joe Rincon PTA  12/15/2017

## 2017-12-18 ENCOUNTER — HOSPITAL ENCOUNTER (OUTPATIENT)
Dept: PHYSICAL THERAPY | Facility: HOSPITAL | Age: 82
Setting detail: THERAPIES SERIES
Discharge: HOME | End: 2017-12-18

## 2017-12-18 DIAGNOSIS — G89.29 CHRONIC MIDLINE LOW BACK PAIN WITH SCIATICA, SCIATICA LATERALITY UNSPECIFIED: Primary | ICD-10-CM

## 2017-12-18 DIAGNOSIS — M54.40 CHRONIC MIDLINE LOW BACK PAIN WITH SCIATICA, SCIATICA LATERALITY UNSPECIFIED: Primary | ICD-10-CM

## 2017-12-18 DIAGNOSIS — R53.81 PHYSICAL DECONDITIONING: ICD-10-CM

## 2017-12-18 PROCEDURE — 97110 THERAPEUTIC EXERCISES: CPT

## 2017-12-18 NOTE — THERAPY TREATMENT NOTE
Outpatient Physical Therapy Ortho Treatment Note  [facility]     Patient Name: Kierra Scales  : 1929  MRN: [id number]  Today's Date: 2017      Visit Date: 2017     Subjective Improvement: unable to give percent  Attendance:  9/10 (medicare)  Next MD Visit : TBD  Recert Date:  18      Therapy Diagnosis:  Low back pain; core instability; LE weakness        Visit Dx:    ICD-10-CM ICD-9-CM   1. Chronic midline low back pain with sciatica, sciatica laterality unspecified M54.40 724.2    G89.29 724.3     338.29   2. 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)   • Hypothyroidism   • Dyspnea   • Disorder of thyroid   • Gastrointestinal hemorrhage with melena   • Diabetes mellitus   • Colon cancer   • Chronic blood loss anemia   • Physical deconditioning   • Senile dementia, uncomplicated   • Encounter for rehabilitation   • Adenocarcinoma of sigmoid colon   • Hypokalemia   • Alzheimer's disease   • Arthritis   • Cataract        Past Medical History:   Diagnosis Date   • Anxiety    • Arthritis    • Bradycardia    • Dyslipidemia    • Dyspnea    • Fibrocystic disease of breast    • Hashimoto's thyroiditis    • Hypertensive disorder    • Hypothyroidism    • Pain    • Palpitations    • Paroxysmal tachycardia    • Shortness of breath    • Tricuspid valve disorders, specified as nonrheumatic    • Vitamin D deficiency         Past Surgical History:   Procedure Laterality Date   • COLON RESECTION N/A 2017    Procedure: COLON RESECTION;  Surgeon: Stefano Linn MD;  Location: [facility];  Service:    • COLONOSCOPY N/A 2017    Procedure: Flexible Sigmoidoscopy;  Surgeon: Stefano  "Jerson Linn MD;  Location: [facility] ENDOSCOPY;  Service:    • EXTERNAL EAR SURGERY     • EYE SURGERY     • LIPOMA EXCISION     • PACEMAKER IMPLANTATION     • SALIVARY GLAND SURGERY     • TUBAL ABDOMINAL LIGATION               PT Ortho       12/18/17 1520    Precautions and Contraindications    Precautions/Limitations fall precautions  -    Precautions confusion  -    Contraindications FALL RISK  -KH    Subjective Pain    Able to rate subjective pain? no  -KH    Posture/Observations    Posture/Observations Comments CGA w/ gait throughout treatment.   -      User Key  (r) = Recorded By, (t) = Taken By, (c) = Cosigned By    Initials Name Provider Type    BLANQUITA Washington PTA Physical Therapy Assistant                            PT Assessment/Plan       12/18/17 1520       PT Assessment    Assessment Comments good tolerance to treatment and enjoyed cybex equipment.   -     PT Plan    PT Frequency 2x/week  -BLANQUITA     Predicted Duration of Therapy Intervention (days/wks) 3 weeks  -     PT Plan Comments Continue through the first week of 2018 and then d/c to independent program and free month of fitness.  Focus on LB and LE strengthening for gym and HEP.   -       User Key  (r) = Recorded By, (t) = Taken By, (c) = Cosigned By    Initials Name Provider Type    BLANQUITA Washington PTA Physical Therapy Assistant                    Exercises       12/18/17 1520          Subjective Comments    Subjective Comments Pt reports that she feels better but her back hurts some.  Daughter present with treatment this date,   -      Subjective Pain    Able to rate subjective pain? no  -KH      Exercise 1    Exercise Name 1 pro ll LE strength  -      Time (Minutes) 1 10'  -KH      Additional Comments level 3  -KH      Exercise 2    Exercise Name 2 incline stretch  -      Sets 2 3  -KH      Time (Seconds) 2 30\"  -      Exercise 3    Exercise Name 3 cybex leg press  -      Sets 3 3  -KH      Reps 3 10  -KH      Additional " Comments 50#  -KH      Exercise 4    Exercise Name 4 cybex hip abd  -KH      Sets 4 3  -KH      Reps 4 10  -KH      Additional Comments 50#  -KH      Exercise 5    Exercise Name 5 CR  -KH      Sets 5 2  -KH      Reps 5 10  -KH      Exercise 6    Exercise Name 6 seated hip flex  -KH      Sets 6 3  -KH      Reps 6 10  -KH      Additional Comments 3#  -KH      Exercise 7    Exercise Name 7 LAQ B  -KH      Sets 7 3  -KH      Reps 7 10  -KH      Additional Comments 3#  -KH        User Key  (r) = Recorded By, (t) = Taken By, (c) = Cosigned By    Initials Name Provider Type    BLANQUITA Washington PTA Physical Therapy Assistant                               PT OP Goals       12/18/17 1520       PT Short Term Goals    STG Date to Achieve --   defer STG  -KH     Long Term Goals    LTG Date to Achieve 12/12/17  -KH     LTG 1 Independent in HEP   -     LTG 1 Progress Not Met  -KH     LTG 2 hip flexion MMT 4+/5 or better   -KH     LTG 2 Progress Not Met  -KH     LTG 3 Static stand without UE assist narrow TRE 1' safely  -     LTG 3 Progress Progressing  -KH     LTG 4 Sit to stand independent with 1 hand assist or less  -     LTG 4 Progress Ongoing  -     Time Calculation    PT Goal Re-Cert Due Date 01/01/18  -       User Key  (r) = Recorded By, (t) = Taken By, (c) = Cosigned By    Initials Name Provider Type    BLANQUITA Washington PTA Physical Therapy Assistant                         Time Calculation:   Start Time: 1520  Stop Time: 1600  Time Calculation (min): 40 min  Total Timed Code Minutes- PT: 40 minute(s)    Therapy Charges for Today     Code Description Service Date Service Provider Modifiers Qty    29243534871 HC PT THER PROC EA 15 MIN 12/18/2017 Janel Washington PTA GP 3                    Janel Washington PTA  12/18/2017

## 2017-12-20 ENCOUNTER — HOSPITAL ENCOUNTER (OUTPATIENT)
Dept: PHYSICAL THERAPY | Facility: HOSPITAL | Age: 82
Setting detail: THERAPIES SERIES
Discharge: HOME | End: 2017-12-20

## 2017-12-20 DIAGNOSIS — R53.81 PHYSICAL DECONDITIONING: ICD-10-CM

## 2017-12-20 DIAGNOSIS — M54.40 CHRONIC MIDLINE LOW BACK PAIN WITH SCIATICA, SCIATICA LATERALITY UNSPECIFIED: Primary | ICD-10-CM

## 2017-12-20 DIAGNOSIS — G89.29 CHRONIC MIDLINE LOW BACK PAIN WITH SCIATICA, SCIATICA LATERALITY UNSPECIFIED: Primary | ICD-10-CM

## 2017-12-20 PROCEDURE — 97110 THERAPEUTIC EXERCISES: CPT

## 2017-12-20 RX ORDER — ERGOCALCIFEROL 1.25 MG/1
CAPSULE ORAL
Qty: 30 CAPSULE | Refills: 11 | Status: SHIPPED | OUTPATIENT
Start: 2017-12-20

## 2017-12-20 NOTE — THERAPY TREATMENT NOTE
Outpatient Physical Therapy Ortho Treatment Note  [facility]     Patient Name: Kierra Scales  : 1929  MRN: [id number]  Today's Date: 2017      Visit Date: 2017     Subjective Improvement unable to give percentage  Visits 10/11  Visits approved medicare cap  RTMD PRN  Recert Date 2018    Low back pain, core instability LE weakness      Visit Dx:    ICD-10-CM ICD-9-CM   1. Chronic midline low back pain with sciatica, sciatica laterality unspecified M54.40 724.2    G89.29 724.3     338.29   2. 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)   • Hypothyroidism   • Dyspnea   • Disorder of thyroid   • Gastrointestinal hemorrhage with melena   • Diabetes mellitus   • Colon cancer   • Chronic blood loss anemia   • Physical deconditioning   • Senile dementia, uncomplicated   • Encounter for rehabilitation   • Adenocarcinoma of sigmoid colon   • Hypokalemia   • Alzheimer's disease   • Arthritis   • Cataract        Past Medical History:   Diagnosis Date   • Anxiety    • Arthritis    • Bradycardia    • Dyslipidemia    • Dyspnea    • Fibrocystic disease of breast    • Hashimoto's thyroiditis    • Hypertensive disorder    • Hypothyroidism    • Pain    • Palpitations    • Paroxysmal tachycardia    • Shortness of breath    • Tricuspid valve disorders, specified as nonrheumatic    • Vitamin D deficiency         Past Surgical History:   Procedure Laterality Date   • COLON RESECTION N/A 2017    Procedure: COLON RESECTION;  Surgeon: Stefano Linn MD;  Location: [facility];  Service:    • COLONOSCOPY N/A 2017    Procedure: Flexible Sigmoidoscopy;  Surgeon: Stefano Linn MD;   Location: [facility] ENDOSCOPY;  Service:    • EXTERNAL EAR SURGERY     • EYE SURGERY     • LIPOMA EXCISION     • PACEMAKER IMPLANTATION     • SALIVARY GLAND SURGERY     • TUBAL ABDOMINAL LIGATION               PT Ortho       12/20/17 1400    Precautions and Contraindications    Precautions/Limitations fall precautions  -CP    Precautions confusion  -CP    Contraindications fall risk  -CP    Subjective Pain    Able to rate subjective pain? no  -CP    Posture/Observations    Posture/Observations Comments CGA with gait during treatment   -CP      12/18/17 1520    Precautions and Contraindications    Precautions/Limitations fall precautions  -KH    Precautions confusion  -KH    Contraindications FALL RISK  -KH    Subjective Pain    Able to rate subjective pain? no  -KH    Posture/Observations    Posture/Observations Comments CGA w/ gait throughout treatment.   -KH      User Key  (r) = Recorded By, (t) = Taken By, (c) = Cosigned By    Initials Name Provider Type     Janel Washington PTA Physical Therapy Assistant    CP Lor Slaughter PTA Physical Therapy Assistant                            PT Assessment/Plan       12/20/17 1515       PT Assessment    Assessment Comments Patietn very pleasant.  Requires CG during treatment as well as VC for ther ex activities  -CP     PT Plan    PT Frequency 2x/week  -CP     Predicted Duration of Therapy Intervention (days/wks) 4 weeks  -CP     PT Plan Comments cont with POC  -CP       User Key  (r) = Recorded By, (t) = Taken By, (c) = Cosigned By    Initials Name Provider Type    CP Lor Slaughter PTA Physical Therapy Assistant                    Exercises       12/20/17 1400          Subjective Comments    Subjective Comments Back is doing some better.  Denies any falls  -CP      Subjective Pain    Able to rate subjective pain? no  -CP      Exercise 1    Exercise Name 1 Pro II UE/LE  -CP      Time (Minutes) 1 10  -CP      Additional Comments Level 3  -CP      Exercise 2    Exercise  Name 2 incline stretch  -CP      Sets 2 3  -CP      Time (Seconds) 2 30  -CP      Exercise 3    Exercise Name 3 gait in clinic with HHA  -CP      Time (Minutes) 3 270 ft x 1  -CP      Exercise 4    Exercise Name 4 cybex leg press  -CP      Sets 4 3  -CP      Reps 4 10  -CP      Time (Seconds) 4 50 lb  -CP      Exercise 5    Exercise Name 5 cybex hip AB  -CP      Sets 5 3  -CP      Reps 5 10  -CP      Time (Minutes) 5 20 lb  -CP      Exercise 6    Exercise Name 6 seted marching  -CP      Sets 6 3  -CP      Reps 6 10  -CP      Time (Seconds) 6 3 lb  -CP      Exercise 7    Exercise Name 7 LAQ  -CP      Sets 7 3  -CP      Reps 7 10  -CP      Time (Seconds) 7 3  -CP      Additional Comments 3 lb  -CP      Exercise 8    Exercise Name 8 CR  -CP      Sets 8 3  -CP      Reps 8 10  -CP      Exercise 9    Exercise Name 9 side stepping at table  -CP      Reps 9 5  -CP      Time (Minutes) 9 B UE assist  -CP        User Key  (r) = Recorded By, (t) = Taken By, (c) = Cosigned By    Initials Name Provider Type    CP Lor Slaughter PTA Physical Therapy Assistant                               PT OP Goals       12/20/17 1500       PT Short Term Goals    STG Date to Achieve --   defer STG  -CP     Long Term Goals    LTG Date to Achieve 12/12/17  -CP     LTG 1 Independent in HEP   -CP     LTG 1 Progress Not Met  -CP     LTG 2 hip flexion MMT 4+/5 or better   -CP     LTG 2 Progress Not Met  -CP     LTG 3 Static stand without UE assist narrow TRE 1' safely  -CP     LTG 3 Progress Progressing  -CP     LTG 4 Sit to stand independent with 1 hand assist or less  -CP     LTG 4 Progress Ongoing  -CP     Time Calculation    PT Goal Re-Cert Due Date 01/01/18  -CP       User Key  (r) = Recorded By, (t) = Taken By, (c) = Cosigned By    Initials Name Provider Type    CP Lor Slaughter PTA Physical Therapy Assistant                         Time Calculation:   Start Time: 1430  Stop Time: 1510  Time Calculation (min): 40 min  Total Timed Code  Minutes- PT: 40 minute(s)    Therapy Charges for Today     Code Description Service Date Service Provider Modifiers Qty    09425234043 HC PT THER PROC EA 15 MIN 12/20/2017 Lor Slaughter PTA GP 3                    Lor Slaughter PTA  12/20/2017

## 2017-12-27 ENCOUNTER — APPOINTMENT (OUTPATIENT)
Dept: PHYSICAL THERAPY | Facility: HOSPITAL | Age: 82
End: 2017-12-27

## 2018-01-03 ENCOUNTER — HOSPITAL ENCOUNTER (OUTPATIENT)
Dept: PHYSICAL THERAPY | Facility: HOSPITAL | Age: 83
Setting detail: THERAPIES SERIES
Discharge: HOME | End: 2018-01-03

## 2018-01-03 DIAGNOSIS — M54.40 CHRONIC MIDLINE LOW BACK PAIN WITH SCIATICA, SCIATICA LATERALITY UNSPECIFIED: Primary | ICD-10-CM

## 2018-01-03 DIAGNOSIS — R53.81 PHYSICAL DECONDITIONING: ICD-10-CM

## 2018-01-03 DIAGNOSIS — G89.29 CHRONIC MIDLINE LOW BACK PAIN WITH SCIATICA, SCIATICA LATERALITY UNSPECIFIED: Primary | ICD-10-CM

## 2018-01-03 PROCEDURE — 97110 THERAPEUTIC EXERCISES: CPT

## 2018-01-03 NOTE — THERAPY TREATMENT NOTE
Outpatient Physical Therapy Ortho Treatment Note  [facility]     Patient Name: Kierra Scales  : 1929  MRN: [id number]  Today's Date: 1/3/2018      Visit Date: 2018     Subjective Improvement: Unable to give  Attendance:   (medicare)  Next MD Visit : PRN  Recert Date:  18      Therapy Diagnosis:  Low back pain, core instability LE weakness           Visit Dx:    ICD-10-CM ICD-9-CM   1. Chronic midline low back pain with sciatica, sciatica laterality unspecified M54.40 724.2    G89.29 724.3     338.29   2. 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)   • Hypothyroidism   • Dyspnea   • Disorder of thyroid   • Gastrointestinal hemorrhage with melena   • Diabetes mellitus   • Colon cancer   • Chronic blood loss anemia   • Physical deconditioning   • Senile dementia, uncomplicated   • Encounter for rehabilitation   • Adenocarcinoma of sigmoid colon   • Hypokalemia   • Alzheimer's disease   • Arthritis   • Cataract        Past Medical History:   Diagnosis Date   • Anxiety    • Arthritis    • Bradycardia    • Dyslipidemia    • Dyspnea    • Fibrocystic disease of breast    • Hashimoto's thyroiditis    • Hypertensive disorder    • Hypothyroidism    • Pain    • Palpitations    • Paroxysmal tachycardia    • Shortness of breath    • Tricuspid valve disorders, specified as nonrheumatic    • Vitamin D deficiency         Past Surgical History:   Procedure Laterality Date   • COLON RESECTION N/A 2017    Procedure: COLON RESECTION;  Surgeon: Stefano Linn MD;  Location: [facility];  Service:    • COLONOSCOPY N/A 2017    Procedure: Flexible Sigmoidoscopy;  Surgeon: Stefano Arthur  MD Kaveh;  Location: [facility] ENDOSCOPY;  Service:    • EXTERNAL EAR SURGERY     • EYE SURGERY     • LIPOMA EXCISION     • PACEMAKER IMPLANTATION     • SALIVARY GLAND SURGERY     • TUBAL ABDOMINAL LIGATION               PT Ortho       01/03/18 1350    Precautions and Contraindications    Precautions/Limitations fall precautions  -    Precautions confusion  -    Contraindications fall risk  -    Posture/Observations    Posture/Observations Comments CGA with gait during treatment   -      User Key  (r) = Recorded By, (t) = Taken By, (c) = Cosigned By    Initials Name Provider Type    BLANQUITA Washington PTA Physical Therapy Assistant                            PT Assessment/Plan       01/03/18 1350       PT Assessment    Assessment Comments Pt did not have any LOB during treatment this date. Continues to need mutliple VC to perform things correctly with the confusion patient has.  Discussed with daughter current POC and she is in aggreeance to continue through next week and finalize HEP then to d/c  -     PT Plan    PT Frequency 2x/week  -     Predicted Duration of Therapy Intervention (days/wks) 3 weeks  -     PT Plan Comments One more visit then d/c to independent program and fitness [facility]; Educate daughter next week for cybex equipment.   -       User Key  (r) = Recorded By, (t) = Taken By, (c) = Cosigned By    Initials Name Provider Type    BLANQUITA Washington PTA Physical Therapy Assistant                    Exercises       01/03/18 1350          Subjective Comments    Subjective Comments Ms. Guerrier reports that she is not doing to well today.  States that her walking feels like it is all over the place.  No reports of any falls at this time.   -      Subjective Pain    Able to rate subjective pain? no  -      Subjective Pain Comment pt with first stages of dementia  -      Exercise 1    Exercise Name 1 pro ll LE strength  -      Time (Minutes) 1 12'  -      Additional Comments level 2   -KH      Exercise 2    Exercise Name 2 incline stretch  -KH      Sets 2 3  -KH      Time (Seconds) 2 30  -KH      Exercise 3    Exercise Name 3 gait around clinic with CGA  -KH      Time (Minutes) 3 270ft x2  -KH      Exercise 4    Exercise Name 4 cybex hip add  -KH      Sets 4 3  -KH      Reps 4 10  -KH      Additional Comments 30#  -KH      Exercise 5    Exercise Name 5 cybex hip abd  -KH      Sets 5 3  -KH      Reps 5 10  -KH      Additional Comments 30#  -KH      Exercise 6    Exercise Name 6 cybex leg press  -KH      Sets 6 3  -KH      Reps 6 10  -KH      Additional Comments 70#  -KH      Exercise 7    Exercise Name 7 LAQ B  -KH      Sets 7 3  -KH      Reps 7 10  -KH      Additional Comments 3#  -KH      Exercise 8    Exercise Name 8 seated march  -KH      Sets 8 3  -KH      Reps 8 10  -KH      Additional Comments 3# B  -KH        User Key  (r) = Recorded By, (t) = Taken By, (c) = Cosigned By    Initials Name Provider Type    BLANQUITA Washington PTA Physical Therapy Assistant                               PT OP Goals       01/03/18 1350       PT Short Term Goals    STG Date to Achieve --   defer STG  -KH     Long Term Goals    LTG Date to Achieve 12/12/17  -KH     LTG 1 Independent in HEP   -KH     LTG 1 Progress Met  -KH     LTG 2 hip flexion MMT 4+/5 or better   -KH     LTG 2 Progress Not Met  -KH     LTG 3 Static stand without UE assist narrow TRE 1' safely  -KH     LTG 3 Progress Met  -KH     LTG 4 Sit to stand independent with 1 hand assist or less  -KH     LTG 4 Progress Met  -KH     Time Calculation    PT Goal Re-Cert Due Date 01/01/18  -KH       User Key  (r) = Recorded By, (t) = Taken By, (c) = Cosigned By    Initials Name Provider Type    BLANQUITA Washington PTA Physical Therapy Assistant                         Time Calculation:   Start Time: 1350  Stop Time: 1433  Time Calculation (min): 43 min  Total Timed Code Minutes- PT: 43 minute(s)    Therapy Charges for Today     Code Description Service Date Service  Provider Modifiers Qty    38444231402 HC PT THER PROC EA 15 MIN 1/3/2018 Janel Washington PTA GP 3                    Janel Washington PTA  1/3/2018

## 2018-01-10 ENCOUNTER — HOSPITAL ENCOUNTER (OUTPATIENT)
Dept: PHYSICAL THERAPY | Facility: HOSPITAL | Age: 83
Setting detail: THERAPIES SERIES
Discharge: HOME | End: 2018-01-10

## 2018-01-10 DIAGNOSIS — M54.40 CHRONIC MIDLINE LOW BACK PAIN WITH SCIATICA, SCIATICA LATERALITY UNSPECIFIED: Primary | ICD-10-CM

## 2018-01-10 DIAGNOSIS — G89.29 CHRONIC MIDLINE LOW BACK PAIN WITH SCIATICA, SCIATICA LATERALITY UNSPECIFIED: Primary | ICD-10-CM

## 2018-01-10 DIAGNOSIS — R53.81 PHYSICAL DECONDITIONING: ICD-10-CM

## 2018-01-10 PROCEDURE — 97110 THERAPEUTIC EXERCISES: CPT

## 2018-01-10 NOTE — THERAPY DISCHARGE NOTE
Outpatient Physical Therapy Ortho Treatment Note/Discharge Summary  [facility]     Patient Name: Kierra Scales  : 1929  MRN: [id number]  Today's Date: 1/10/2018      Visit Date: 01/10/2018     Subjective Improvement: unable to give  Attendance:   (Medicare)  Next MD Visit : PRN  Recert Date:  18      Therapy Diagnosis:  Low back pain, core instability LE weakness        Visit Dx:    ICD-10-CM ICD-9-CM   1. Chronic midline low back pain with sciatica, sciatica laterality unspecified M54.40 724.2    G89.29 724.3     338.29   2. 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)   • Hypothyroidism   • Dyspnea   • Disorder of thyroid   • Gastrointestinal hemorrhage with melena   • Diabetes mellitus   • Colon cancer   • Chronic blood loss anemia   • Physical deconditioning   • Senile dementia, uncomplicated   • Encounter for rehabilitation   • Adenocarcinoma of sigmoid colon   • Hypokalemia   • Alzheimer's disease   • Arthritis   • Cataract        Past Medical History:   Diagnosis Date   • Anxiety    • Arthritis    • Bradycardia    • Dyslipidemia    • Dyspnea    • Fibrocystic disease of breast    • Hashimoto's thyroiditis    • Hypertensive disorder    • Hypothyroidism    • Pain    • Palpitations    • Paroxysmal tachycardia    • Shortness of breath    • Tricuspid valve disorders, specified as nonrheumatic    • Vitamin D deficiency         Past Surgical History:   Procedure Laterality Date   • COLON RESECTION N/A 2017    Procedure: COLON RESECTION;  Surgeon: Stefano Linn MD;  Location: [facility];  Service:    • COLONOSCOPY N/A 2017    Procedure: Flexible Sigmoidoscopy;  Surgeon:  Stefano Linn MD;  Location: [facility] ENDOSCOPY;  Service:    • EXTERNAL EAR SURGERY     • EYE SURGERY     • LIPOMA EXCISION     • PACEMAKER IMPLANTATION     • SALIVARY GLAND SURGERY     • TUBAL ABDOMINAL LIGATION               PT Ortho       01/10/18 1440    Precautions and Contraindications    Precautions/Limitations fall precautions  -    Precautions confusion  -    Contraindications fall risk  -    Posture/Observations    Posture/Observations Comments CGA with gait during treatment   -    Left Hip    Hip Flexion Gross Movement (4+/5) good plus  -    Right Hip    Hip Flexion Gross Movement (4+/5) good plus  -      User Key  (r) = Recorded By, (t) = Taken By, (c) = Cosigned By    Initials Name Provider Type    BLANQUITA Washington PTA Physical Therapy Assistant                            PT Assessment/Plan       01/10/18 1400       PT Assessment    Assessment Comments has met all goals at this time.  Educated daughter on how to do cybex equipment.   -     PT Plan    PT Frequency 2x/week  -     Predicted Duration of Therapy Intervention (days/wks) d/c this date  -     PT Plan Comments D/c this date to independent program and fitness memebership at this time.   -       User Key  (r) = Recorded By, (t) = Taken By, (c) = Cosigned By    Initials Name Provider Type    BLANQUITA Washington PTA Physical Therapy Assistant                    Exercises       01/10/18 1440          Subjective Comments    Subjective Comments No new complaints by Ms. Scales this date.   -      Subjective Pain    Able to rate subjective pain? no  -      Exercise 1    Exercise Name 1 pro ll LE strength  -      Time (Minutes) 1 12'  -      Additional Comments level 3  -KH      Exercise 2    Exercise Name 2 cybex leg press  -      Sets 2 3  -KH      Reps 2 10  -KH      Additional Comments 70#  -KH      Exercise 3    Exercise Name 3 cybex hip abd  -      Reps 3 30  -KH      Additional Comments 30#  -KH      Exercise 4     Exercise Name 4 cybex hip add  -KH      Sets 4 3  -KH      Reps 4 10  -KH      Additional Comments 30#  -        User Key  (r) = Recorded By, (t) = Taken By, (c) = Cosigned By    Initials Name Provider Type    BLANQUITA Washington PTA Physical Therapy Assistant                               PT OP Goals       01/10/18 1440       PT Short Term Goals    STG Date to Achieve --   defer STG  -KH     Long Term Goals    LTG Date to Achieve 12/12/17  -     LTG 1 Independent in HEP   -     LTG 1 Progress Met  -     LTG 2 hip flexion MMT 4+/5 or better   -     LTG 2 Progress Met  -     LTG 3 Static stand without UE assist narrow TRE 1' safely  -     LTG 3 Progress Met  -     LTG 4 Sit to stand independent with 1 hand assist or less  -     LTG 4 Progress Met  -     Time Calculation    PT Goal Re-Cert Due Date 01/01/18  -       User Key  (r) = Recorded By, (t) = Taken By, (c) = Cosigned By    Initials Name Provider Type    BLANQUITA Washington PTA Physical Therapy Assistant                         Time Calculation:   Start Time: 1440  Stop Time: 1518  Time Calculation (min): 38 min  Total Timed Code Minutes- PT: 38 minute(s)    Therapy Charges for Today     Code Description Service Date Service Provider Modifiers Qty    79440627600  PT THER PROC EA 15 MIN 1/10/2018 Janel Washington PTA GP 3                       Janel Washington PTA  1/10/2018

## 2018-02-10 NOTE — PROGRESS NOTES
CHIEF COMPLAINT:    Chief Complaint   Patient presents with   • Follow-up     4 mos. follow-up, S/P Left colon resection on 6/2/17.       HISTORY OF PRESENT ILLNESS:    Kierra Scales is a 88 y.o. female who underwent Left colon resection in June 2017 for T3 N0 colon cancer.  She is here today for follow-up.  She has no complaints at home.  She's been eating and drinking well and having normal bowel function.    EXAM:  Vitals:    12/12/17 1328   BP: 134/70         Abdomen soft, well-healed    ASSESSMENT:    Status post resection of colon cancer    PLAN:    We will recheck labs and plan to follow up in 6 months.        This document has been electronically signed by Stefano Linn MD on February 10, 2018 4:19 PM

## 2018-02-12 RX ORDER — METHIMAZOLE 5 MG/1
TABLET ORAL
Qty: 30 TABLET | Refills: 5 | Status: SHIPPED | OUTPATIENT
Start: 2018-02-12 | End: 2018-08-06 | Stop reason: SDUPTHER

## 2018-03-16 RX ORDER — PRAVASTATIN SODIUM 40 MG
40 TABLET ORAL NIGHTLY
Qty: 30 TABLET | Refills: 5 | Status: SHIPPED | OUTPATIENT
Start: 2018-03-16

## 2018-03-16 RX ORDER — EZETIMIBE 10 MG/1
10 TABLET ORAL DAILY
Qty: 30 TABLET | Refills: 5 | Status: SHIPPED | OUTPATIENT
Start: 2018-03-16

## 2018-04-13 ENCOUNTER — OFFICE VISIT (OUTPATIENT)
Dept: CARDIOLOGY | Facility: CLINIC | Age: 83
End: 2018-04-13

## 2018-04-13 VITALS
DIASTOLIC BLOOD PRESSURE: 70 MMHG | SYSTOLIC BLOOD PRESSURE: 160 MMHG | HEART RATE: 60 BPM | BODY MASS INDEX: 31.67 KG/M2 | WEIGHT: 209 LBS | HEIGHT: 68 IN

## 2018-04-13 DIAGNOSIS — I10 ESSENTIAL HYPERTENSION: ICD-10-CM

## 2018-04-13 DIAGNOSIS — I36.9 TRICUSPID VALVE DISORDERS, NON-RHEUMATIC: ICD-10-CM

## 2018-04-13 DIAGNOSIS — E78.00 HYPERCHOLESTEROLEMIA: Primary | ICD-10-CM

## 2018-04-13 DIAGNOSIS — I47.9 PAROXYSMAL TACHYCARDIA (HCC): ICD-10-CM

## 2018-04-13 DIAGNOSIS — I49.5 SSS (SICK SINUS SYNDROME) (HCC): ICD-10-CM

## 2018-04-13 DIAGNOSIS — R00.2 PALPITATIONS: ICD-10-CM

## 2018-04-13 PROCEDURE — 93000 ELECTROCARDIOGRAM COMPLETE: CPT | Performed by: INTERNAL MEDICINE

## 2018-04-13 PROCEDURE — 99213 OFFICE O/P EST LOW 20 MIN: CPT | Performed by: INTERNAL MEDICINE

## 2018-04-13 RX ORDER — MEMANTINE HYDROCHLORIDE AND DONEPEZIL HYDROCHLORIDE 28; 10 MG/1; MG/1
1 CAPSULE ORAL DAILY
COMMUNITY
Start: 2018-03-28

## 2018-04-13 NOTE — PROGRESS NOTES
Kierra Scales  89 y.o. female    04/13/2018  1. Hypercholesterolemia    2. Essential hypertension    3. SSS (sick sinus syndrome)    4. Palpitations    5. Paroxysmal tachycardia    6. 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  The patient admitted with GI bleed noted to have mass subsequent surgery performed recovered very well.  Had anemia induced renal insufficiency recovered well from that.  EKG in the office sinus rhythm.  Patient denies orthopnea, PND, nauseous, vomiting.  Denies any polydipsia polyuria.  Denies any fever cough or chills.    ECHO 5/2017  · Left ventricular systolic function is normal. Estimated EF = 60%.  · Left ventricular diastolic dysfunction (grade I) consistent with impaired relaxation.  · Left atrial cavity size is mildly dilated.  · Mild tricuspid valve regurgitation is present    SUBJECTIVE    Allergies   Allergen Reactions   • Penicillins          Past Medical History:   Diagnosis Date   • Anxiety    • Arthritis    • Bradycardia    • Dyslipidemia    • Dyspnea    • Fibrocystic disease of breast    • Hashimoto's thyroiditis    • Hypertensive disorder    • Hypothyroidism    • Pain    • Palpitations    • Paroxysmal tachycardia    • Shortness of breath    • Tricuspid valve disorders, specified as nonrheumatic    • Vitamin D deficiency          Past Surgical History:   Procedure Laterality Date   • COLON RESECTION N/A 6/2/2017    Procedure: COLON RESECTION;  Surgeon: Stefano Linn MD;  Location: [facility] OR;  Service:    • COLONOSCOPY N/A 5/30/2017    Procedure: Flexible Sigmoidoscopy;  Surgeon: Stefano Linn MD;  Location: [facility] ENDOSCOPY;   "Service:    • EXTERNAL EAR SURGERY     • EYE SURGERY     • LIPOMA EXCISION     • PACEMAKER IMPLANTATION     • SALIVARY GLAND SURGERY     • TUBAL ABDOMINAL LIGATION           Family History   Problem Relation Age of Onset   • Diabetes Other    • Heart disease Other    • Hypertension Other    • Thyroid disease Other          Social History     Social History   • Marital status:      Spouse name: N/A   • Number of children: N/A   • Years of education: N/A     Occupational History   • Not on file.     Social History Main Topics   • Smoking status: Never Smoker   • Smokeless tobacco: Never Used   • Alcohol use No   • Drug use: No   • Sexual activity: Defer     Other Topics Concern   • Not on file     Social History Narrative    Lives alone but daughters expect to stay with her at discharge         Current Outpatient Prescriptions   Medication Sig Dispense Refill   • Alcohol Swabs (B-D SINGLE USE SWABS REGULAR) pads USE AS DIRECTED 200 each 3   • B-D UF III MINI PEN NEEDLES 31G X 5 MM misc USE WITH INSULIN TWO TIMES A DAY 90 each 11   • LUISA CONTOUR TEST test strip USE AS DIRECTED 200 each 11   • diltiaZEM CD (CARDIZEM CD) 120 MG 24 hr capsule Take 1 capsule by mouth Daily. 30 capsule 1   • Empagliflozin (JARDIANCE) 10 MG tablet Take 10 mg by mouth Daily. One tablet daily before breakfast 30 tablet 11   • ezetimibe (ZETIA) 10 MG tablet Take 1 tablet by mouth Daily. 30 tablet 5   • glucose blood test strip by Misc.(Non-Drug; Combo Route) route. ascensia contour strips  Dr Thompson     • insulin aspart (NOVOLOG FLEXPEN) 100 UNIT/ML solution pen-injector sc pen Inject 8 Units under the skin 3 (Three) Times a Day With Meals. 5 pen 11   • Insulin Glargine (LANTUS SOLOSTAR) 100 UNIT/ML injection pen Inject 20 Units under the skin Every Night. 5 pen 11   • Insulin Pen Needle (COMFORT EZ PEN NEEDLES) 31G X 6 MM misc 2 (two) times a day. Inject two times per day.     • Insulin Syringe-Needle U-100 31G X 5/16\" 1 ML misc 2 " "(two) times a day. Use as directed TWICE DAILY FOR Insulin Injections     • Lancets (UNILET COMFORTOUCH LANCET) misc USE TO CHECK HER SUGARS 4 TIMES PER  each 11   • methIMAzole (TAPAZOLE) 5 MG tablet TAKE ONE TABLET BY MOUTH EVERY DAY 30 tablet 5   • nabumetone (RELAFEN) 500 MG tablet Take 500 mg by mouth 2 (Two) Times a Day.     • NAMZARIC 28-10 MG capsule sustained-release 24 hr Take 1 tablet by mouth Daily.     • pravastatin (PRAVACHOL) 40 MG tablet Take 1 tablet by mouth Every Night. 30 tablet 5   • SITagliptin (JANUVIA) 100 MG tablet Take 1 tablet by mouth Daily. 30 tablet 5   • sotalol (BETAPACE) 80 MG tablet Take 40 mg by mouth 2 (Two) Times a Day.     • vitamin D (ERGOCALCIFEROL) 60087 units capsule capsule TAKE ONE CAPSULE BY MOUTH EVERY FOURTEEN DAYS 30 capsule 11     No current facility-administered medications for this visit.          OBJECTIVE    /70   Pulse 60   Ht 172.7 cm (68\")   Wt 94.8 kg (209 lb)   BMI 31.78 kg/m²         Review of Systems     Constitutional:  Denies recent weight loss, weight gain, fever or chills, no change in exercise tolerance     HENT:  Denies any hearing loss, epistaxis, hoarseness, or difficulty speaking.     Eyes: No blurring    Respiratory:  Denies dyspnea with exertion,no cough, wheezing, or hemoptysis.     Cardiovascular: See H&P    Gastrointestinal:  Denies change in bowel habits, dyspepsia, ulcer disease, hematochezia, or melena.     Endocrine: Negative for cold intolerance, heat intolerance, polydipsia, polyphagia and polyuria. Denies any history of weight change, heat/cold intolerance, polydipsia, polyuria     Genitourinary: Negative.      Musculoskeletal: Denies any history of arthritic symptoms or back problems     Skin:  Denies any change in hair or nails, rashes, or skin lesions.     Allergic/Immunologic: Negative.  Negative for environmental allergies, food allergies and immunocompromised state.     Neurological:  Denies any history of " recurrent headaches, strokes, TIA, or seizure disorder.     Hematological: Denies any food allergies, seasonal allergies, bleeding disorders, or lymphadenopathy.     Psychiatric/Behavioral: Denies any history of depression, substance abuse, or change in cognitive function.         Physical Exam     Constitutional: Cooperative, alert and oriented, well-developed, well-nourished, in no acute distress.     HENT:   Head: Normocephalic, normal hair patterns, no masses or tenderness.  Ears, Nose, and Throat: No gross abnormalities. No pallor or cyanosis. Dentition good.   Eyes: EOMS intact, PERRL, conjunctivae and lids unremarkable. Fundoscopic exam and visual fields not performed.   Neck: No palpable masses or adenopathy, no thyromegaly, no JVD, carotid pulses are full and equal bilaterally and without  Bruits.     Cardiovascular: Regular rhythm, S1 and S2 normal, no S3 or S4. Apical impulse not displaced. No murmurs, gallops, or rubs detected.     Pulmonary/Chest: Chest: normal symmetry, no tenderness to palpation, normal respiratory excursion, no intercostal retraction, no use of accessory muscles.            Pulmonary: Normal breath sounds. No rales or ronchi.    Abdominal: Abdomen soft, bowel sounds normoactive, no masses, no hepatosplenomegaly, non-tender, no bruits.     Musculoskeletal: No deformities, clubbing, cyanosis, erythema, or edema observed. There are no spinal abnormalities noted. Normal muscle strength and tone. Pulses full and equal in all extremities, no bruits auscultated.     Neurological: No gross motor or sensory deficits noted, affect appropriate, oriented to time, person, place.     Skin: Warm and dry to the touch, no apparent skin lesions or masses noted.     Psychiatric: She has a normal mood and affect. Her behavior is normal. Judgment and thought content normal.           ECG 12 Lead  Date/Time: 4/13/2018 11:11 AM  Performed by: ELYSIA PICKARD  Authorized by: ELYSIA PICKARD   Comparison:  not compared with previous ECG   Rhythm: sinus rhythm and paced              Lab Results   Component Value Date    WBC 4.02 12/12/2017    HGB 13.6 12/12/2017    HCT 40.6 12/12/2017    MCV 93.5 12/12/2017     12/12/2017     Lab Results   Component Value Date    GLUCOSE 167 (H) 12/12/2017    BUN 14 12/12/2017    CREATININE 0.81 12/12/2017    EGFRIFAFRI 81 12/12/2017    BCR 17.3 12/12/2017    CO2 31.0 12/12/2017    CALCIUM 9.5 12/12/2017    ALBUMIN 4.60 12/12/2017    LABIL2 1.3 12/12/2017    AST 25 12/12/2017    ALT 27 12/12/2017     No results found for: CHOL  Lab Results   Component Value Date    TRIG 54 08/05/2015    TRIG 67 02/08/2014     Lab Results   Component Value Date    HDL 60 08/05/2015    HDL 75 02/08/2014     No components found for: LDLCALC  Lab Results   Component Value Date    LDL 52 08/05/2015    LDL 48 02/08/2014     No results found for: HDLLDLRATIO  No components found for: CHOLHDL  Lab Results   Component Value Date    HGBA1C 6.30 (H) 06/13/2017     Lab Results   Component Value Date    TSH 0.440 (L) 06/13/2017    B8RSXLS 135.0 03/15/2017           ASSESSMENT AND PLAN  #1 atrial tachycardia #2 sick sinus syndrome with bradycardia and pacemaker implantation #3 hypertension with hypertensive heart disease #4 diabetes on combined insulin and oral hypoglycemic agent #5 thyroid abnormality on     Clinically, no evidence of congestive heart failure or active ischemia at the time of evaluations.  EKG done and finding discussed with the family and patient's.  Patient history mild dementia by doing remarkably well.  Good blood pressure control.  The patient will continue Betapace with history of symptomatic atrial tachycardia currently sinus rhythm, diabetes being managed with combined insulin and oral hypoglycemic agent and thyroid abnormality being treated with methimazole.  We will see her back in 6 month R depends on Clinical condition.  EKG done and finding discussed the patient.  Atrial pacing  with ventricular conduction.  She is a pleased with the clinical outcome.  Patient is taking 80 mg Betapace half tablet twice a day    Kierra was seen today for follow-up.    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

## 2018-05-09 ENCOUNTER — CLINICAL SUPPORT (OUTPATIENT)
Dept: CARDIOLOGY | Facility: CLINIC | Age: 83
End: 2018-05-09

## 2018-05-09 DIAGNOSIS — I49.5 SSS (SICK SINUS SYNDROME) (HCC): Primary | ICD-10-CM

## 2018-05-09 DIAGNOSIS — Z95.0 PACEMAKER: ICD-10-CM

## 2018-05-09 PROCEDURE — 93288 INTERROG EVL PM/LDLS PM IP: CPT | Performed by: NURSE PRACTITIONER

## 2018-05-09 NOTE — PROGRESS NOTES
Pacemaker Evaluation Report    May 9, 2018    Primary Cardiologist: Dr. Valenzuela  Implanting MD: Dr. 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    Cardiac device indication(s): sick sinus syndrome    Battery  SONU: 5-5.4 years   2.87v   Last charge: 0    Interrogation Results  Atrial sensing: P wave: 3.4 mV  Atrial capture: 0.62 V @ 0.5 ms   Atrial lead impedance: 380 ohms  Ventricular sensing: R wave: 9.5 mV  Ventricular capture: 1 V @ 0.5 ms  LV: 0 V @ 0 ms   Ventricular lead impedance: right  390 ohms; left  0 ohms    Parameters  Mode: DDDR   Base Rate: 60/110    Diagnostic Data  Atrial paced: 83%  Ventricular paced: <1 %  Mode switch: <1%  AT/AF [place]: 0  AHR: 3 Episodes AMS- longest 48 secs  VHR: 0    Changes made: 0    Conclusions: normal device function    Assessment:  Presence of PPM  SSS          This document has been electronically signed by DEBBY Guevara on May 9, 2018 5:01 PM

## 2018-06-12 ENCOUNTER — OFFICE VISIT (OUTPATIENT)
Dept: SURGERY | Facility: CLINIC | Age: 83
End: 2018-06-12

## 2018-06-12 VITALS
HEIGHT: 68 IN | BODY MASS INDEX: 31.98 KG/M2 | WEIGHT: 211 LBS | DIASTOLIC BLOOD PRESSURE: 70 MMHG | SYSTOLIC BLOOD PRESSURE: 132 MMHG

## 2018-06-12 DIAGNOSIS — C18.6 MALIGNANT NEOPLASM OF DESCENDING COLON (HCC): Primary | Chronic | ICD-10-CM

## 2018-06-12 PROCEDURE — 99212 OFFICE O/P EST SF 10 MIN: CPT | Performed by: SURGERY

## 2018-06-12 NOTE — PROGRESS NOTES
CHIEF COMPLAINT:    Chief Complaint   Patient presents with   • Follow-up     S/P Left colon resection on 6/2/17.       HISTORY OF PRESENT ILLNESS:    Kierra Scales is a 89 y.o. female who underwent prior left colon resection about 1 year ago.  She returns today with her daughter for follow up.  She has no complaints, but still has somewhat loose BMs.  She notes no blood in her stool.  She has gained about 20 pounds over the last 6 months.    EXAM:  Vitals:    06/12/18 1315   BP: 132/70         Abdomen soft    ASSESSMENT:    S/p left colon resection for cancer, T3N0    PLAN:    She is due for surveillance colonoscopy and repeat labs.  I discussed this with she and her daughter today.  Given her age and dementia I gave her daughter the option of discontinuing routine follow up.  She will discuss this with her sister and they will decide whether we should proceed with colonoscopy, etc.        This document has been electronically signed by Stefano Linn MD on June 12, 2018 1:44 PM

## 2018-08-06 RX ORDER — EMPAGLIFLOZIN 10 MG/1
TABLET, FILM COATED ORAL
Qty: 30 TABLET | Refills: 11 | Status: SHIPPED | OUTPATIENT
Start: 2018-08-06

## 2018-08-06 RX ORDER — METHIMAZOLE 5 MG/1
TABLET ORAL
Qty: 30 TABLET | Refills: 5 | Status: SHIPPED | OUTPATIENT
Start: 2018-08-06

## 2018-09-12 ENCOUNTER — OFFICE VISIT (OUTPATIENT)
Dept: OTOLARYNGOLOGY | Facility: CLINIC | Age: 83
End: 2018-09-12

## 2018-09-12 VITALS — HEIGHT: 68 IN | RESPIRATION RATE: 18 BRPM | WEIGHT: 214.4 LBS | BODY MASS INDEX: 32.49 KG/M2

## 2018-09-12 DIAGNOSIS — H61.23 BILATERAL IMPACTED CERUMEN: Primary | ICD-10-CM

## 2018-09-12 PROCEDURE — 69210 REMOVE IMPACTED EAR WAX UNI: CPT | Performed by: OTOLARYNGOLOGY

## 2018-09-12 NOTE — PROGRESS NOTES
Subjective   Kierra Scales is a 89 y.o. female.       History of Present Illness   Patient has a history of recurring cerumen impactions.  Family says she's started acting like she doesn't hear as well.      The following portions of the patient's history were reviewed and updated as appropriate: allergies, current medications, past family history, past medical history, past social history, past surgical history and problem list.     reports that she has never smoked. She has never used smokeless tobacco. She reports that she does not drink alcohol or use drugs.   Patient is not a tobacco user and has not been counseled for use of tobacco products      Review of Systems        Objective   Physical Exam    Both ear canals are completely occluded with cerumen  Using the binocular microscope for visualization, cerumen impaction was removed from bilateral ear canal(s) using instrumentation. This was personally performed by Ugo Gale MD   Following cerumen removal tympanic membranes are noted to be intact and clear    Assessment/Plan   Kierra was seen today for follow-up.    Diagnoses and all orders for this visit:    Bilateral impacted cerumen    Plan: Cerumen removed as described above.  Advised return in 9 months call sooner for problems.

## 2018-10-23 ENCOUNTER — OFFICE VISIT (OUTPATIENT)
Dept: CARDIOLOGY | Facility: CLINIC | Age: 83
End: 2018-10-23

## 2018-10-23 VITALS
WEIGHT: 217 LBS | SYSTOLIC BLOOD PRESSURE: 126 MMHG | HEIGHT: 68 IN | OXYGEN SATURATION: 99 % | DIASTOLIC BLOOD PRESSURE: 78 MMHG | BODY MASS INDEX: 32.89 KG/M2 | HEART RATE: 64 BPM

## 2018-10-23 DIAGNOSIS — I47.9 PAROXYSMAL TACHYCARDIA (HCC): ICD-10-CM

## 2018-10-23 DIAGNOSIS — R00.2 PALPITATIONS: ICD-10-CM

## 2018-10-23 DIAGNOSIS — E03.9 ACQUIRED HYPOTHYROIDISM: ICD-10-CM

## 2018-10-23 DIAGNOSIS — I49.5 SSS (SICK SINUS SYNDROME) (HCC): Primary | ICD-10-CM

## 2018-10-23 DIAGNOSIS — I36.9 TRICUSPID VALVE DISORDERS, NON-RHEUMATIC: ICD-10-CM

## 2018-10-23 PROCEDURE — 99213 OFFICE O/P EST LOW 20 MIN: CPT | Performed by: INTERNAL MEDICINE

## 2018-10-23 NOTE — PROGRESS NOTES
Kierra Scales  89 y.o. female    10/23/2018  1. SSS (sick sinus syndrome) (CMS/HCC)    2. Palpitations    3. Paroxysmal tachycardia (CMS/HCC)    4. 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 Acquired hypothyroidism        History of Present Illness:    89 years old patient with medical history significant for hypertension, hypertensive heart disease with good heart function, sick sinus syndrome status post pacemaker implantation, palpitation document atrial tachycardia maintain sinus rhythm with the help of Betapace.  The patient admitted with GI bleed noted to have mass subsequent surgery performed recovered very well.  Had anemia induced renal insufficiency recovered well from that.  EKG in the office sinus rhythm.  Patient denies orthopnea, PND, nauseous, vomiting.  Denies any polydipsia polyuria.  Denies any fever cough or chills.     ECHO 5/2017  · Left ventricular systolic function is normal. Estimated EF = 60%.  · Left ventricular diastolic dysfunction (grade I) consistent with impaired relaxation.  · Left atrial cavity size is mildly dilated.  · Mild tricuspid valve regurgitation is present      SUBJECTIVE:    Allergies   Allergen Reactions   • Penicillins          Past Medical History:   Diagnosis Date   • Anxiety    • Arthritis    • Bradycardia    • Dyslipidemia    • Dyspnea    • Fibrocystic disease of breast    • Hashimoto's thyroiditis    • Hypertensive disorder    • Hypothyroidism    • Pain    • Palpitations    • Paroxysmal tachycardia (CMS/HCC)    • Shortness of breath    • Tricuspid valve disorders, specified as nonrheumatic    • Vitamin D deficiency          Past Surgical History:   Procedure Laterality Date   • COLON RESECTION N/A 6/2/2017    Procedure: COLON RESECTION;  Surgeon: Stefano Linn MD;  Location: [facility] OR;  Service:    • COLONOSCOPY N/A 5/30/2017    Procedure: Flexible Sigmoidoscopy;  Surgeon: Stefano Linn MD;  Location: [facility]  "ENDOSCOPY;  Service:    • EXTERNAL EAR SURGERY     • EYE SURGERY     • LIPOMA EXCISION     • PACEMAKER IMPLANTATION     • SALIVARY GLAND SURGERY     • TUBAL ABDOMINAL LIGATION           Family History   Problem Relation Age of Onset   • Diabetes Other    • Heart disease Other    • Hypertension Other    • Thyroid disease Other          Social History     Social History   • Marital status:      Spouse name: N/A   • Number of children: N/A   • Years of education: N/A     Occupational History   • Not on file.     Social History Main Topics   • Smoking status: Never Smoker   • Smokeless tobacco: Never Used   • Alcohol use No   • Drug use: No   • Sexual activity: Defer     Other Topics Concern   • Not on file     Social History Narrative    Lives alone but daughters expect to stay with her at discharge         Current Outpatient Prescriptions   Medication Sig Dispense Refill   • Alcohol Swabs (B-D SINGLE USE SWABS REGULAR) pads USE AS DIRECTED 200 each 3   • B-D UF III MINI PEN NEEDLES 31G X 5 MM misc USE WITH INSULIN TWO TIMES A DAY 90 each 11   • diltiaZEM CD (CARDIZEM CD) 120 MG 24 hr capsule Take 1 capsule by mouth Daily. 30 capsule 1   • ezetimibe (ZETIA) 10 MG tablet Take 1 tablet by mouth Daily. 30 tablet 5   • glucose blood (LUISA CONTOUR TEST) test strip Use to test 3 times per day 100 each 11   • glucose blood test strip by Misc.(Non-Drug; Combo Route) route. ascensia contour strips  Dr Thompson     • insulin aspart (NOVOLOG FLEXPEN) 100 UNIT/ML solution pen-injector sc pen Inject 8 Units under the skin 3 (Three) Times a Day With Meals. 5 pen 11   • Insulin Glargine (LANTUS SOLOSTAR) 100 UNIT/ML injection pen Inject 20 Units under the skin Every Night. 5 pen 11   • Insulin Pen Needle (COMFORT EZ PEN NEEDLES) 31G X 6 MM misc 2 (two) times a day. Inject two times per day.     • Insulin Syringe-Needle U-100 31G X 5/16\" 1 ML misc 2 (two) times a day. Use as directed TWICE DAILY FOR Insulin Injections     • " JARDIANCE 10 MG tablet TAKE ONE TABLET BY MOUTH DAILY BEFORE BREAKFAST. 30 tablet 11   • Lancets (UNILET COMFORTOUCH LANCET) misc USE TO CHECK HER SUGARS 4 TIMES PER  each 11   • methIMAzole (TAPAZOLE) 5 MG tablet TAKE ONE TABLET BY MOUTH EVERY DAY 30 tablet 5   • nabumetone (RELAFEN) 500 MG tablet Take 500 mg by mouth 2 (Two) Times a Day.     • NAMZARIC 28-10 MG capsule sustained-release 24 hr Take 1 tablet by mouth Daily.     • pravastatin (PRAVACHOL) 40 MG tablet Take 1 tablet by mouth Every Night. 30 tablet 5   • SITagliptin (JANUVIA) 100 MG tablet Take 1 tablet by mouth Daily. 30 tablet 5   • sotalol (BETAPACE) 80 MG tablet Take 40 mg by mouth 2 (Two) Times a Day.     • vitamin D (ERGOCALCIFEROL) 64510 units capsule capsule TAKE ONE CAPSULE BY MOUTH EVERY FOURTEEN DAYS 30 capsule 11     No current facility-administered medications for this visit.            Review of Systems:     Constitutional:  Denies recent weight loss, weight gain, fever or chills, no change in exercise tolerance.     HENT:  Denies any hearing loss, epistaxis, hoarseness, or difficulty speaking.     Eyes: Wears eyeglasses or contact lenses.    Respiratory:  Denies dyspnea with exertion,no cough, wheezing, or hemoptysis.     Cardiovascular: Negative for palpations, chest pain, orthopnea, PND, peripheral edema, syncope, or claudication.     Gastrointestinal:  Denies change in bowel habits, dyspepsia, ulcer disease, hematochezia, or melena.     Endocrine: Negative for cold intolerance, heat intolerance, polydipsia, polyphagia and polyuria. Denies any history of weight change, polydipsia, polyuria.     Genitourinary: Negative.      Musculoskeletal: Denies any history of arthritic symptoms or back problems.     Skin:  Denies any change in hair or nails, rashes, or skin lesions.     Allergic/Immunologic: Negative.  Negative for environmental allergies, food allergies and immunocompromised state.     Neurological:  Denies any history of  "recurrent headaches, strokes, TIA, or seizure disorder.     Hematological: Denies any food allergies, seasonal allergies, bleeding disorders, or lymphadenopathy.     Psychiatric/Behavioral: Denies any history of depression, substance abuse, or change in cognitive function.       OBJECTIVE:    Ht 172.7 cm (68\")       Physical Exam:     Constitutional: Cooperative, alert and oriented, well-developed, well-nourished, in no acute distress.     HENT:   Head: Normocephalic, normal hair patterns, no masses or tenderness.  Ears, Nose, and Throat: No gross abnormalities. No pallor or cyanosis. Dentition good.   Eyes: EOMS intact, PERRL, conjunctivae and lids unremarkable. Fundoscopic exam and visual fields not performed.   Neck: No palpable masses or adenopathy, no thyromegaly, no JVD, carotid pulses are full and equal bilaterally and without  Bruits.     Cardiovascular: Regular rhythm, S1 and S2 normal, no S3 or S4. Apical impulse not displaced. No murmurs, gallops, or rubs detected.     Pulmonary/Chest: Chest: normal symmetry, no tenderness to palpation, normal respiratory excursion, no intercostal retraction, no use of accessory muscles. Pulmonary: Normal breath sounds. No rales or rhonchi.    Abdominal: Abdomen soft, bowel sounds normoactive, no masses, no hepatosplenomegaly, non-tender, no bruits.     Musculoskeletal: No deformities, clubbing, cyanosis, erythema, or edema observed. There are no spinal abnormalities noted. Normal muscle strength and tone. Pulses full and equal in all extremities, no bruits auscultated.     Neurological: No gross motor or sensory deficits noted, affect appropriate, oriented to time, person, place.     Skin: Warm and dry to the touch, no apparent skin lesions or masses noted.     Psychiatric: She has a normal mood and affect. Her behavior is normal. Judgment and thought content normal.         Procedures      Lab Results   Component Value Date    WBC 4.02 12/12/2017    HGB 13.6 12/12/2017 "    HCT 40.6 12/12/2017    MCV 93.5 12/12/2017     12/12/2017     Lab Results   Component Value Date    GLUCOSE 167 (H) 12/12/2017    BUN 14 12/12/2017    CREATININE 0.81 12/12/2017    EGFRIFAFRI 81 12/12/2017    BCR 17.3 12/12/2017    CO2 31.0 12/12/2017    CALCIUM 9.5 12/12/2017    ALBUMIN 4.60 12/12/2017    AST 25 12/12/2017    ALT 27 12/12/2017     No results found for: CHOL  Lab Results   Component Value Date    TRIG 54 08/05/2015    TRIG 67 02/08/2014     Lab Results   Component Value Date    HDL 60 08/05/2015    HDL 75 02/08/2014     No components found for: LDLCALC  Lab Results   Component Value Date    LDL 52 08/05/2015    LDL 48 02/08/2014     No results found for: HDLLDLRATIO  No components found for: CHOLHDL  Lab Results   Component Value Date    HGBA1C 6.30 (H) 06/13/2017     Lab Results   Component Value Date    TSH 0.440 (L) 06/13/2017    H4ZMOHH 135.0 03/15/2017           ASSESSMENT AND PLAN:  #1 atrial tachycardia #2 sick sinus syndrome with bradycardia and pacemaker implantation #3 hypertension with hypertensive heart disease #4 diabetes on combined insulin and oral hypoglycemic agent #5 thyroid abnormality on     Clinically, no evidence of congestive heart failure or active ischemia at the time of evaluations.    history mild dementia but doing remarkably well.  Good blood pressure control.  The patient will continue Betapace with history of symptomatic atrial tachycardia currently sinus rhythm, diabetes being managed with combined insulin and oral hypoglycemic agent and thyroid abnormality being treated with methimazole.  We will see her back in 6 month R depends on Clinical condition.  EKG done and finding discussed the patient.  Atrial pacing with ventricular conduction.  She is a pleased with the clinical outcome.  Patient is taking 80 mg Betapace half tablet twice a day    Kierra was seen today for follow-up.    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

## 2018-10-29 RX ORDER — LANCETS
EACH MISCELLANEOUS
Qty: 150 EACH | Refills: 11 | Status: SHIPPED | OUTPATIENT
Start: 2018-10-29

## 2018-10-30 RX ORDER — INSULIN ASPART 100 [IU]/ML
INJECTION, SOLUTION INTRAVENOUS; SUBCUTANEOUS
Qty: 15 ML | Refills: 11 | Status: SHIPPED | OUTPATIENT
Start: 2018-10-30

## 2018-11-13 NOTE — PROGRESS NOTES
Pacemaker Evaluation Report    November 14, 2018    Primary Cardiologist: Dr. Valenzuela  Implanting MD: Dr. Valenzuela  :[employer] Model: [employer] DR RF 2210 Serial Number: [id number]  Implant date: 6/23/2011     Reason for evaluation:routine, PPM, Office   Cardiac device indication(s): sick sinus syndrome    Battery  SONU: 4.4-4.8 years     Interrogation Results  Atrial sensing: P wave: 2.9 mV  Atrial capture: 0.75 V @ 0.5 ms   Atrial lead impedance: 390 ohms  Ventricular sensing: R wave: 9.0 mV  Ventricular capture: 1.25 V @ 0.5 ms   Ventricular lead impedance: right  380 ohms    Parameters  Mode: DDDR  Base Rate: 60/110    Diagnostic Data  Atrial paced: 91 %   Ventricular paced: <1 %  Mode switch: <1%  AT/AF Hubbell: <1%  AHR: 2  VHR: 0    Changes made: No changes made     Conclusions: normal device function    Assessment:  1. SSS (sick sinus syndrome) (CMS/HCC)    2. Pacemaker              This document has been electronically signed by DEBBY Guevara on November 14, 2018 2:37 PM

## 2018-11-14 ENCOUNTER — CLINICAL SUPPORT (OUTPATIENT)
Dept: CARDIOLOGY | Facility: CLINIC | Age: 83
End: 2018-11-14

## 2018-11-14 DIAGNOSIS — I49.5 SSS (SICK SINUS SYNDROME) (HCC): Primary | ICD-10-CM

## 2018-11-14 DIAGNOSIS — Z95.0 PACEMAKER: ICD-10-CM

## 2018-11-14 PROCEDURE — 93288 INTERROG EVL PM/LDLS PM IP: CPT | Performed by: NURSE PRACTITIONER

## 2018-12-27 RX ORDER — ERGOCALCIFEROL 1.25 MG/1
CAPSULE ORAL
Qty: 30 CAPSULE | Refills: 12 | OUTPATIENT
Start: 2018-12-27

## 2019-01-30 ENCOUNTER — TRANSCRIBE ORDERS (OUTPATIENT)
Dept: PHYSICAL THERAPY | Facility: HOSPITAL | Age: 84
End: 2019-01-30

## 2019-01-30 DIAGNOSIS — R26.89 IMBALANCE: ICD-10-CM

## 2019-01-30 DIAGNOSIS — M25.562 CHRONIC PAIN OF LEFT KNEE: Primary | ICD-10-CM

## 2019-01-30 DIAGNOSIS — G89.29 CHRONIC PAIN OF LEFT KNEE: Primary | ICD-10-CM

## 2019-02-11 ENCOUNTER — APPOINTMENT (OUTPATIENT)
Dept: PHYSICAL THERAPY | Facility: HOSPITAL | Age: 84
End: 2019-02-11

## 2019-02-11 NOTE — PROGRESS NOTES
Subjective    Kierra Scales is a 88 y.o. female. she is here today for follow-up.    History of Present Illness       Duration/Timing:Diabetes mellitus type 2, age at onset of diabetes: 50 years, onset of symptoms sudden         Timing constant      Quality Controlled      Severity High            Severity (Complications/Hospitalizations)  Secondary Macrovascular Complications:CAD  Secondary Microvascular Complications:Diabetic Retinopathy      Context  Diabetes Regimen: Insulin, last HgbA1c 6.7% from Dec. 2016     Lab Results   Component Value Date    HGBA1C 6.30 (H) 06/13/2017                Blood Glucose Readings    At goal         Diet      High carb      Exercise:does not exercise      Associated Signs/Symptoms  Hyperglycemic Symptoms: No polyuria,polydipsia, No polyphagia, No weight loss, No weight gain  Hypoglycemic Episodes:No documented hypoglycemia   Modifying Factors/Comorbidities:Hypertension, hyperlipidemia                  The following portions of the patient's history were reviewed and updated as appropriate:   Past Medical History:   Diagnosis Date   • Anxiety    • Arthritis    • Bradycardia    • Dyslipidemia    • Dyspnea    • Fibrocystic disease of breast    • Hashimoto's thyroiditis    • Hypertensive disorder    • Hypothyroidism    • Pain    • Palpitations    • Paroxysmal tachycardia    • Shortness of breath    • Tricuspid valve disorders, specified as nonrheumatic    • Vitamin D deficiency      Past Surgical History:   Procedure Laterality Date   • COLON RESECTION N/A 6/2/2017    Procedure: COLON RESECTION;  Surgeon: Stefano iLnn MD;  Location: [facility] OR;  Service:    • COLONOSCOPY N/A 5/30/2017    Procedure: Flexible Sigmoidoscopy;  Surgeon: Stefano Linn MD;  Location: [facility] ENDOSCOPY;  Service:    • EXTERNAL EAR SURGERY     • EYE SURGERY     • LIPOMA EXCISION     • PACEMAKER IMPLANTATION     • SALIVARY GLAND SURGERY     • TUBAL ABDOMINAL LIGATION       Family  "History   Problem Relation Age of Onset   • Diabetes Other    • Heart disease Other    • Hypertension Other    • Thyroid disease Other      OB History     No data available        Current Outpatient Prescriptions   Medication Sig Dispense Refill   • Alcohol Swabs (B-D SINGLE USE SWABS REGULAR) pads USE AS DIRECTED 200 each 3   • B-D UF III MINI PEN NEEDLES 31G X 5 MM misc USE WITH INSULIN TWO TIMES A DAY  11   • LUISA CONTOUR TEST test strip USE AS DIRECTED 200 each 11   • diltiaZEM CD (CARDIZEM CD) 120 MG 24 hr capsule Take 1 capsule by mouth Daily. 30 capsule 1   • donepezil (ARICEPT) 10 MG tablet Take 1 tablet by mouth Daily.  3   • Empagliflozin (JARDIANCE) 10 MG tablet Take 10 mg by mouth Daily. One tablet daily before breakfast 30 tablet 11   • ezetimibe (ZETIA) 10 MG tablet Take 1 tablet by mouth Daily. 30 tablet 11   • glucose blood test strip by Misc.(Non-Drug; Combo Route) route. ascensia contour strips  Dr Thompson     • insulin aspart (NOVOLOG FLEXPEN) 100 UNIT/ML solution pen-injector sc pen Inject 8 Units under the skin 3 (Three) Times a Day With Meals. 5 pen 11   • Insulin Glargine (LANTUS SOLOSTAR) 100 UNIT/ML injection pen Inject 20 Units under the skin Every Night. 5 pen 11   • Insulin Pen Needle (COMFORT EZ PEN NEEDLES) 31G X 6 MM misc 2 (two) times a day. Inject two times per day.     • Insulin Syringe-Needle U-100 31G X 5/16\" 1 ML misc 2 (two) times a day. Use as directed TWICE DAILY FOR Insulin Injections     • Lancets (UNILET COMFORTOUCH LANCET) [facility] [place] USE TO CHECK HER SUGARS 4 TIMES PER  each 11   • memantine (NAMENDA) 10 MG tablet TAKE ONE TABLET BY MOUTH TWICE A DAY  5   • methIMAzole (TAPAZOLE) 5 MG tablet Take half tablet q day (Patient taking differently: Take 2.5 mg by mouth Daily. Take half tablet q day) 30 tablet 5   • nabumetone (RELAFEN) 500 MG tablet Take 500 mg by mouth 2 (Two) Times a Day.     • pravastatin (PRAVACHOL) 40 MG tablet Take 1 tablet by mouth Every Night. 30 tablet " 11   • SITagliptin (JANUVIA) 100 MG tablet Take 1 tablet by mouth Daily. 30 tablet 11   • sotalol (BETAPACE) 80 MG tablet Take 0.5 tablets by mouth 2 (Two) Times a Day. 30 tablet 1   • sotalol (BETAPACE) 80 MG tablet Take 40 mg by mouth.     • vitamin D (ERGOCALCIFEROL) 69014 UNITS capsule capsule Take 1 capsule by mouth Every 14 (Fourteen) Days. 30 capsule 11     No current facility-administered medications for this visit.      Allergies   Allergen Reactions   • Penicillins      Social History     Social History   • Marital status:      Spouse name: N/A   • Number of children: N/A   • Years of education: N/A     Social History Main Topics   • Smoking status: Never Smoker   • Smokeless tobacco: Never Used   • Alcohol use No   • Drug use: None   • Sexual activity: Defer     Other Topics Concern   • None     Social History Narrative    Lives alone but daughters expect to stay with her at discharge       Review of Systems  Review of Systems   Constitutional: Negative for activity change, appetite change, diaphoresis and fatigue.   HENT: Negative for congestion, dental problem, drooling, facial swelling, sneezing, sore throat, tinnitus, trouble swallowing and voice change.    Eyes: Negative for photophobia, pain, discharge, redness, itching and visual disturbance.   Respiratory: Negative for apnea, cough, choking, chest tightness and shortness of breath.    Cardiovascular: Negative for chest pain, palpitations and leg swelling.   Gastrointestinal: Negative for abdominal distention, abdominal pain, constipation, diarrhea, nausea and vomiting.   Endocrine: Negative for cold intolerance, heat intolerance, polydipsia, polyphagia and polyuria.   Genitourinary: Negative for difficulty urinating, dysuria, frequency, hematuria and urgency.   Musculoskeletal: Negative for arthralgias, back pain, gait problem, joint swelling, myalgias, neck pain and neck stiffness.   Skin: Negative for color change, pallor, rash and wound.  "  Allergic/Immunologic: Negative for environmental allergies, food allergies and immunocompromised state.   Neurological: Negative for dizziness, tremors, facial asymmetry, weakness, light-headedness, numbness and headaches.   Hematological: Negative for adenopathy. Does not bruise/bleed easily.   Psychiatric/Behavioral: Negative for agitation, behavioral problems, confusion and sleep disturbance.        Objective    /84 (BP Location: Right arm, Patient Position: Sitting, Cuff Size: Large Adult)  Pulse 71  Ht 68\" (172.7 cm)  Wt 190 lb 12.8 oz (86.5 kg)  SpO2 99%  BMI 29.01 kg/m2  Physical Exam   Constitutional: She is oriented to person, place, and time. She appears well-developed and well-nourished. No distress.   HENT:   Head: Normocephalic and atraumatic.   Right Ear: External ear normal.   Left Ear: External ear normal.   Nose: Nose normal.   Eyes: Conjunctivae and EOM are normal. Pupils are equal, round, and reactive to light.   Neck: Normal range of motion. Neck supple. No tracheal deviation present. No thyromegaly present.   Cardiovascular: Normal rate, regular rhythm and normal heart sounds.    No murmur heard.  Pulmonary/Chest: Effort normal and breath sounds normal. No respiratory distress. She has no wheezes.   Abdominal: Soft. Bowel sounds are normal. There is no tenderness. There is no rebound and no guarding.   Musculoskeletal: Normal range of motion. She exhibits no edema, tenderness or deformity.   Neurological: She is alert and oriented to person, place, and time. No cranial nerve deficit.   Skin: Skin is warm and dry. No rash noted.   Psychiatric: She has a normal mood and affect. Her behavior is normal. Judgment and thought content normal.       Lab Review  Glucose (mg/dL)   Date Value   06/19/2017 109 (H)   06/18/2017 118 (H)   06/17/2017 104 (H)     Glucose, Arterial (mmol/L)   Date Value   06/02/2017 93     Sodium (mmol/L)   Date Value   06/19/2017 139   06/18/2017 140   06/17/2017 " "137     Potassium (mmol/L)   Date Value   06/19/2017 4.3   06/18/2017 4.4   06/17/2017 4.4     Chloride (mmol/L)   Date Value   06/19/2017 104   06/18/2017 106   06/17/2017 103     CO2 (mmol/L)   Date Value   06/19/2017 27.0   06/18/2017 24.0   06/17/2017 28.0     BUN (mg/dL)   Date Value   06/19/2017 10   06/18/2017 15   06/17/2017 19     Creatinine (mg/dL)   Date Value   06/19/2017 0.68   06/18/2017 0.67   06/17/2017 0.77     Hemoglobin A1C   Date Value   06/13/2017 6.30 % (H)   03/15/2017 6.36 % (H)   12/09/2016 6.7 %TotHgb (H)   09/09/2016 6.8 %TotHgb (H)   06/09/2016 6.7 %TotHgb (H)     Triglycerides (mg/dl)   Date Value   08/05/2015 54   02/08/2014 67       Assessment/Plan    No diagnosis found..    Medications prescribed:  Outpatient Encounter Prescriptions as of 11/10/2017   Medication Sig Dispense Refill   • Alcohol Swabs (B-D SINGLE USE SWABS REGULAR) pads USE AS DIRECTED 200 each 3   • B-D UF III MINI PEN NEEDLES 31G X 5 MM misc USE WITH INSULIN TWO TIMES A DAY  11   • LUISA CONTOUR TEST test strip USE AS DIRECTED 200 each 11   • diltiaZEM CD (CARDIZEM CD) 120 MG 24 hr capsule Take 1 capsule by mouth Daily. 30 capsule 1   • donepezil (ARICEPT) 10 MG tablet Take 1 tablet by mouth Daily.  3   • Empagliflozin (JARDIANCE) 10 MG tablet Take 10 mg by mouth Daily. One tablet daily before breakfast 30 tablet 11   • ezetimibe (ZETIA) 10 MG tablet Take 1 tablet by mouth Daily. 30 tablet 11   • glucose blood test strip by Misc.(Non-Drug; Combo Route) route. ascensia contour strips  Dr Thompson     • insulin aspart (NOVOLOG FLEXPEN) 100 UNIT/ML solution pen-injector sc pen Inject 8 Units under the skin 3 (Three) Times a Day With Meals. 5 pen 11   • Insulin Glargine (LANTUS SOLOSTAR) 100 UNIT/ML injection pen Inject 20 Units under the skin Every Night. 5 pen 11   • Insulin Pen Needle (COMFORT EZ PEN NEEDLES) 31G X 6 MM misc 2 (two) times a day. Inject two times per day.     • Insulin Syringe-Needle U-100 31G X 5/16\" 1 ML " misc 2 (two) times a day. Use as directed TWICE DAILY FOR Insulin Injections     • Lancets (UNILET COMFORTOUCH LANCET) misc USE TO CHECK HER SUGARS 4 TIMES PER  each 11   • memantine (NAMENDA) 10 MG tablet TAKE ONE TABLET BY MOUTH TWICE A DAY  5   • methIMAzole (TAPAZOLE) 5 MG tablet Take half tablet q day (Patient taking differently: Take 2.5 mg by mouth Daily. Take half tablet q day) 30 tablet 5   • nabumetone (RELAFEN) 500 MG tablet Take 500 mg by mouth 2 (Two) Times a Day.     • pravastatin (PRAVACHOL) 40 MG tablet Take 1 tablet by mouth Every Night. 30 tablet 11   • SITagliptin (JANUVIA) 100 MG tablet Take 1 tablet by mouth Daily. 30 tablet 11   • sotalol (BETAPACE) 80 MG tablet Take 0.5 tablets by mouth 2 (Two) Times a Day. 30 tablet 1   • sotalol (BETAPACE) 80 MG tablet Take 40 mg by mouth.     • vitamin D (ERGOCALCIFEROL) 53889 UNITS capsule capsule Take 1 capsule by mouth Every 14 (Fourteen) Days. 30 capsule 11     No facility-administered encounter medications on file as of 11/10/2017.        Orders placed during this encounter include:  No orders of the defined types were placed in this encounter.  Glycemic Management            Lab Results   Component Value Date     HGBA1C 6.30 (H) 06/13/2017               Januvia 100 mg daily      Invokana 100 mg daily--- stop due to warnings     Start jardiance 10 mg once daily      Lantus 20 units -     Novolog 8 units with meals -                  Lipid Management:      Pravastatin 40 mg qhs     Zetia 10 mg qhs     10-15      Lipids at goal         Blood Pressure Management:      On enalapril 20 mg daily      Not taking lasix      Sotalol TID      Preventive Care:patient is not smoking      Bone Health      August 2016      Vitamin d- 45.5      50,000 units every 2 weeks     Oct. 2017     Vitamin d - 43      Other Diabetes Related Aspects      Off medication      Nov. 2016      TSH - 0.53                  Lab Results   Component Value Date     TSH 0.39 (L)  12/09/2016      Repeat thyroid function test       July 2017      TSH - 0.27     Go back to one tablet daily     Lab Results   Component Value Date    TSH 0.440 (L) 06/13/2017    [id number] 135.0 03/15/2017          Oct. 2017     TSH - 2.5    Free T4 - 0.80       Thyroid nodule --right thyroid nodule      FNA by       Final Diagnosis   [facility] Category: BENIGN  Negative for Malignant Cells  Benign thyroid nodule with cystic component (mixed solid/cytstic                   4. Follow-up: No Follow-up on file.                    Chemotherapy-induced nausea and vomiting Chemotherapy-induced nausea and vomiting Chemotherapy-induced nausea and vomiting Chemotherapy-induced nausea and vomiting Chemotherapy-induced nausea and vomiting Chemotherapy-induced nausea and vomiting Chemotherapy-induced nausea and vomiting Chemotherapy-induced nausea and vomiting Chemotherapy-induced nausea and vomiting Chemotherapy-induced nausea and vomiting Chemotherapy-induced nausea and vomiting Chemotherapy-induced nausea and vomiting Chemotherapy-induced nausea and vomiting Chemotherapy-induced nausea and vomiting Chemotherapy-induced nausea and vomiting Chemotherapy-induced nausea and vomiting Chemotherapy-induced nausea and vomiting Chemotherapy-induced nausea and vomiting Chemotherapy-induced nausea and vomiting Chemotherapy-induced nausea and vomiting Chemotherapy-induced nausea and vomiting Chemotherapy-induced nausea and vomiting Chemotherapy-induced nausea and vomiting Chemotherapy-induced nausea and vomiting Chemotherapy-induced nausea and vomiting hair removal not indicated

## 2019-02-20 ENCOUNTER — HOSPITAL ENCOUNTER (OUTPATIENT)
Dept: PHYSICAL THERAPY | Facility: HOSPITAL | Age: 84
Setting detail: THERAPIES SERIES
Discharge: HOME | End: 2019-02-20

## 2019-02-20 DIAGNOSIS — R26.89 IMBALANCE: Primary | ICD-10-CM

## 2019-02-20 DIAGNOSIS — M25.562 CHRONIC PAIN OF LEFT KNEE: ICD-10-CM

## 2019-02-20 DIAGNOSIS — G89.29 CHRONIC PAIN OF LEFT KNEE: ICD-10-CM

## 2019-02-20 PROCEDURE — 97162 PT EVAL MOD COMPLEX 30 MIN: CPT | Performed by: PHYSICAL THERAPIST

## 2019-02-20 NOTE — THERAPY EVALUATION
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)   • Hypothyroidism   • Dyspnea   • Disorder of thyroid   • Gastrointestinal hemorrhage with melena   • Diabetes mellitus (CMS/HCC)   • Colon cancer (CMS/HCC)   • Chronic blood loss anemia   • Physical deconditioning   • Senile dementia, uncomplicated   • Encounter for rehabilitation   • Adenocarcinoma of sigmoid colon (CMS/HCC)   • Hypokalemia   • Alzheimer's disease   • Arthritis   • Cataract   • Tricuspid valve disorders, non-rheumatic   • Pain   • Hypertensive disorder   • Hashimoto's thyroiditis   • Fibrocystic disease of breast   • Dyslipidemia   • Anxiety   • Tricuspid valve disorders, non-rheumatic   • Pacemaker        Past Medical History:   Diagnosis Date   • Anxiety    • Arthritis    • Bradycardia    • Dyslipidemia    • Dyspnea    • Fibrocystic disease of breast    • Hashimoto's thyroiditis    • Hypertensive disorder    • Hypothyroidism    • Pain    • Palpitations    • Paroxysmal tachycardia (CMS/HCC)    • Shortness of breath    • Tricuspid valve disorders, specified as nonrheumatic    • Vitamin D deficiency         Past Surgical History:   Procedure Laterality Date   • COLON RESECTION N/A 2017    Procedure: COLON RESECTION;  Surgeon: Stefano Linn MD;  Location: [facility] OR;  Service:    • COLONOSCOPY N/A 2017    Procedure: Flexible Sigmoidoscopy;  Surgeon: Stefano Linn MD;  Location: [facility] ENDOSCOPY;  Service:    • EXTERNAL EAR SURGERY     • EYE SURGERY     •  "LIPOMA EXCISION     • PACEMAKER IMPLANTATION     • SALIVARY GLAND SURGERY     • TUBAL ABDOMINAL LIGATION       Medications (Admitted on 2/20/2019)    Alcohol Swabs (B-D SINGLE USE SWABS REGULAR) pads    B-D UF III MINI PEN NEEDLES 31G X 5 MM misc    diltiaZEM CD (CARDIZEM CD) 120 MG 24 hr capsule    ezetimibe (ZETIA) 10 MG tablet    glucose blood (LUISA CONTOUR TEST) test strip    glucose blood test strip    Insulin Glargine (LANTUS SOLOSTAR) 100 UNIT/ML injection pen    Insulin Pen Needle (COMFORT EZ PEN NEEDLES) 31G X 6 MM misc    Insulin Syringe-Needle U-100 31G X 5/16\" 1 ML misc    JARDIANCE 10 MG tablet    Lancets (UNILET GP 28) misc    methIMAzole (TAPAZOLE) 5 MG tablet    nabumetone (RELAFEN) 500 MG tablet    NAMZARIC 28-10 MG capsule sustained-release 24 hr    NOVOLOG FLEXPEN 100 UNIT/ML solution pen-injector sc pen    pravastatin (PRAVACHOL) 40 MG tablet    SITagliptin (JANUVIA) 100 MG tablet    sotalol (BETAPACE) 80 MG tablet    vitamin D (ERGOCALCIFEROL) 47013 units capsule capsule     ALLERGIES: Penicillins    Visit Dx:     ICD-10-CM ICD-9-CM   1. Imbalance R26.89 781.2   2. Chronic pain of left knee M25.562 719.46    G89.29 338.29       Patient History     Row Name 02/20/19 1400             History    Chief Complaint  Difficulty Walking;Pain  -DD      Type of Pain  -- knee  -DD      Date Current Problem(s) Began  -- Chronic  -DD      Patient/Caregiver Goals  Improve strength;Relieve pain  -DD      Patient's Rating of General Health  Good dimentia  -DD      Hand Dominance  right-handed  -DD      Occupation/sports/leisure activities  retired  -DD         Pain     Pain Location  Knee  -DD      Pain at Present  unable to assess  -DD      Pain Frequency  Intermittent  -DD      Pain Description  Patient unable to describe pops  -DD      What Performance Factors Make the Current Problem(s) WORSE?  movement walking  -DD      What Performance Factors Make the Current Problem(s) BETTER?  rest  -DD      Tolerance " Time- Standing  short  -DD      Tolerance Time- Walking  short distance fall risk  -DD      Is your sleep disturbed?  Yes  -DD      Is medication used to assist with sleep?  Yes  -DD        User Key  (r) = Recorded By, (t) = Taken By, (c) = Cosigned By    Initials Name Provider Type    Gabrielle Fournier, PT DPT Physical Therapist          PT Ortho     Row Name 02/20/19 1500       Precautions and Contraindications    Precautions  donfusion/dementia  -DD    Contraindications  fall risk/ use gait belt  -DD       Subjective Pain    Able to rate subjective pain?  no  -DD    Subjective Pain Comment  Patient reports her knee hurts a lot and her back hurts.  She states she has not been doing much of anything.  Daughter reports that she has gained weight and is agitated when she is urged to do exercises from last year's therapy for her back.  -DD       Posture/Observations    Posture/Observations Comments  Patient does not use assistive device CGA with gait during treatment   -DD       Special Tests/Palpation    Special Tests/Palpation  -- Grinding patellar mvmt, hip & thigh Muscles  generally TTP  -DD       MMT (Manual Muscle Testing)    General MMT Comments  Patient has knee range of motion 0-111 degrees with pain at end range flexion and extension more with extension.  Hip abduction 20 degrees passive with pain, hip flexion 90 degrees passive with pain external rotation 50% with pain limitations.  Internal rotation 10 degrees with pain limitations.  Right hip mobility flexion to 90 degrees with pain abduction 20 degrees with pain, knee range of motion 0-115.  Patient unable to hold resistance through manual muscle testing and does not move through full range of motion to be greater than 3/5  -DD       Sensation    Sensation WNL?  WFL  -DD    Light Touch  No apparent deficits  -DD    Additional Comments  Pt feedback is poor and unreliable.  -DD       Balance Skills Training    Sitting-Level of Assistance   Independent  -DD    Sitting-Balance Support  Feet supported  -DD    Standing-Level of Assistance  Minimum assistance  -DD    SLS  unable  -DD    Rhomberg  unsafe  -DD    Balance Comments  pt unable to walk in straight path, cannot turn and alba while walking, low foot clearance.  -DD      User Key  (r) = Recorded By, (t) = Taken By, (c) = Cosigned By    Initials Name Provider Type    Gabrielle Fournier, PT DPT Physical Therapist                            PT OP Goals     Row Name 02/20/19 1503          PT Short Term Goals    STG Date to Achieve  03/13/19  -DD     STG 1  Caregiver will be independent in home exercise program  -DD     STG 2  Patient will tolerate therapeutic exercise sessions of 30-45 minutes without increased symptoms of knee pain  -DD     STG 3  Patient will perform safe use of rolling walker for indoor activities  -DD     STG 4  Patient improve left knee flexion to 115 degrees  -DD     STG 5  Patient will be able to perform quad set with full knee extension minimal increase in pain  -DD        Long Term Goals    LTG 1  Patient will have an LEFS score of 37/80  -DD        Time Calculation    PT Goal Re-Cert Due Date  03/13/19  -DD       User Key  (r) = Recorded By, (t) = Taken By, (c) = Cosigned By    Initials Name Provider Type    Gabrielle Fournier, PT DPT Physical Therapist          PT Assessment/Plan     Row Name 02/20/19 1501          PT Assessment    Functional Limitations  Impaired gait;Decreased safety during functional activities;Limitation in home management;Performance in self-care ADL  -DD     Impairments  Pain;Muscle strength;Range of motion;Poor body mechanics;Gait;Balance;Cognition  -DD     Assessment Comments  Patient is high risk for falls and is unsafe with use of assistive devices due to dementia.  Daughter reports poor follow-through with HEP established last year.  Will try to improve overall strength and function to help stabilize gait and educate family to new  program for home.  -DD     Please refer to paper survey for additional self-reported information  Yes Daughter had to fill it out  -DD     Rehab Potential  Fair  -DD     Patient/caregiver participated in establishment of treatment plan and goals  Yes  -DD     Patient would benefit from skilled therapy intervention  Yes  -DD        PT Plan    PT Frequency  2x/week  -DD     Predicted Duration of Therapy Intervention (Therapy Eval)  4-6 weeks  -DD     Planned CPT's?  PT EVAL MOD COMPLELITY: 17501;PT THER PROC EA 15 MIN: 46163;PT GAIT TRAINING EA 15 MIN: 76309;PT HOT OR COLD PACK TREAT MCARE  -DD     Physical Therapy Interventions (Optional Details)  balance training;gait training;strengthening;stretching;ROM (Range of Motion);home exercise program;gross motor skills  -DD     PT Plan Comments  try to improve abiity to ambulate safely with RW. stretch and strenghten LE.   -DD       User Key  (r) = Recorded By, (t) = Taken By, (c) = Cosigned By    Initials Name Provider Type    DD Gabrielle Pedro, PT DPT Physical Therapist            Exercises     Row Name 02/20/19 1500             Subjective Pain    Able to rate subjective pain?  no  -DD      Subjective Pain Comment  Patient reports her knee hurts a lot and her back hurts.  She states she has not been doing much of anything.  Daughter reports that she has gained weight and is agitated when she is urged to do exercises from last year's therapy for her back.  -DD        User Key  (r) = Recorded By, (t) = Taken By, (c) = Cosigned By    Initials Name Provider Type    DD Gabrielle Pedro, PT DPT Physical Therapist                        Outcome Measure Options: Lower Extremity Functional Scale (LEFS)  Lower Extremity Functional Index  Any of your usual work, housework or school activities: Moderate difficulty  Your usual hobbies, recreational or sporting activities: Extreme difficulty or unable to perform activity  Getting into or out of the bath: Moderate  difficulty  Walking between rooms: Moderate difficulty  Putting on your shoes or socks: Moderate difficulty  Squatting: Quite a bit of difficulty  Lifting an object, like a bag of groceries from the floor: A little bit of difficulty  Performing light activities around your home: A little bit of difficulty  Performing heavy activities around your home: Quite a bit of difficulty  Getting into or out of a car: Moderate difficulty  Walking 2 blocks: Extreme difficulty or unable to perform activity  Walking a mile: Extreme difficulty or unable to perform activity  Going up or down 10 stairs (about 1 flight of stairs): Extreme difficulty or unable to perform activity  Standing for 1 hour: Quite a bit of difficulty  Sitting for 1 hour: A little bit of difficulty  Running on even ground: Extreme difficulty or unable to perform activity  Running on uneven ground: Extreme difficulty or unable to perform activity  Making sharp turns while running fast: Extreme difficulty or unable to perform activity  Hopping: Extreme difficulty or unable to perform activity  Rolling over in bed: A little bit of difficulty  Total: 25      Time Calculation:     Therapy Suggested Charges     Code   Minutes Charges    None             Start Time: 1430  Stop Time: 1500  Time Calculation (min): 30 min  Total Timed Code Minutes- PT: 0 minute(s)     Therapy Charges for Today     Code Description Service Date Service Provider Modifiers Qty    14816212500 HC PT EVAL MOD COMPLEXITY 2 2/20/2019 Gabrielle Pedro, PT DPT GP 1          PT G-Codes  Outcome Measure Options: Lower Extremity Functional Scale (LEFS)  Total: 25         Gabrielle Pedro, PT DPT  2/20/2019

## 2019-03-07 ENCOUNTER — APPOINTMENT (OUTPATIENT)
Dept: PHYSICAL THERAPY | Facility: HOSPITAL | Age: 84
End: 2019-03-07

## 2019-03-12 ENCOUNTER — HOSPITAL ENCOUNTER (OUTPATIENT)
Dept: PHYSICAL THERAPY | Facility: HOSPITAL | Age: 84
Setting detail: THERAPIES SERIES
Discharge: HOME | End: 2019-03-12

## 2019-03-12 DIAGNOSIS — R26.89 IMBALANCE: Primary | ICD-10-CM

## 2019-03-12 DIAGNOSIS — M25.562 CHRONIC PAIN OF LEFT KNEE: ICD-10-CM

## 2019-03-12 DIAGNOSIS — G89.29 CHRONIC PAIN OF LEFT KNEE: ICD-10-CM

## 2019-03-12 PROCEDURE — 97110 THERAPEUTIC EXERCISES: CPT

## 2019-03-12 PROCEDURE — 97116 GAIT TRAINING THERAPY: CPT

## 2019-03-12 NOTE — THERAPY TREATMENT NOTE
Outpatient Physical Therapy Ortho Treatment Note  [facility]     Patient Name: Kierra Scales  : 1929  MRN: [id number]  Today's Date: 3/12/2019      Visit Date: 2019  Subjective Improvement: 0%  Visit Number:   2/3  Recert Date:   3/13/19  MD Visit:   ??  Total Approved Visits:  12 visits from 19 to 4/3/19      Visit Dx:    ICD-10-CM ICD-9-CM   1. Imbalance R26.89 781.2   2. 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)   • Hypothyroidism   • Dyspnea   • Disorder of thyroid   • Gastrointestinal hemorrhage with melena   • Diabetes mellitus (CMS/HCC)   • Colon cancer (CMS/HCC)   • Chronic blood loss anemia   • Physical deconditioning   • Senile dementia, uncomplicated   • Encounter for rehabilitation   • Adenocarcinoma of sigmoid colon (CMS/HCC)   • Hypokalemia   • Alzheimer's disease   • Arthritis   • Cataract   • Tricuspid valve disorders, non-rheumatic   • Pain   • Hypertensive disorder   • Hashimoto's thyroiditis   • Fibrocystic disease of breast   • Dyslipidemia   • Anxiety   • Tricuspid valve disorders, non-rheumatic   • Pacemaker        Past Medical History:   Diagnosis Date   • Anxiety    • Arthritis    • Bradycardia    • Dyslipidemia    • Dyspnea    • Fibrocystic disease of breast    • Hashimoto's thyroiditis    • Hypertensive disorder    • Hypothyroidism    • Pain    • Palpitations    • Paroxysmal tachycardia (CMS/HCC)    • Shortness of breath    • Tricuspid valve disorders, specified as nonrheumatic    • Vitamin D deficiency         Past Surgical History:   Procedure Laterality Date   • COLON RESECTION N/A 2017    Procedure: COLON  RESECTION;  Surgeon: Stefano Linn MD;  Location: [facility] OR;  Service:    • COLONOSCOPY N/A 5/30/2017    Procedure: Flexible Sigmoidoscopy;  Surgeon: Stefano Linn MD;  Location: [facility] ENDOSCOPY;  Service:    • EXTERNAL EAR SURGERY     • EYE SURGERY     • LIPOMA EXCISION     • PACEMAKER IMPLANTATION     • SALIVARY GLAND SURGERY     • TUBAL ABDOMINAL LIGATION         PT Ortho     Row Name 03/12/19 1400       Precautions and Contraindications    Precautions  donfusion/dementia  -BB    Contraindications  fall risk/ use gait belt  -BB       Posture/Observations    Posture/Observations Comments  Pt accompanied by daughter. Daughter assists patient up with initial sit to stand.  Max cues throughout given verbally, tactile and thru demonstration.  CGA with gait belt during standing/gait for safety. No LOB and was CGA during gait with RW for safety. Cues to stay inside walker and for direction at times.   -BB      User Key  (r) = Recorded By, (t) = Taken By, (c) = Cosigned By    Initials Name Provider Type    Tracey Ruiz PTA Physical Therapy Assistant                      PT Assessment/Plan     Row Name 03/12/19 1806          PT Assessment    Assessment Comments  Pt required max cues throughout.  Transfers and gait were effected by pain L knee. Pt very fearful of falling and requires encouragement throughout.   -BB        PT Plan    PT Frequency  2x/week  -BB     Predicted Duration of Therapy Intervention (Therapy Eval)  x 4-6 weeks  -BB     PT Plan Comments  Continue with POC. Will need recheck next week.  May attempt Pro II. Continue with safety during gait with RW. Pts daughter encouraged to bring her walker next visit.   -BB       User Key  (r) = Recorded By, (t) = Taken By, (c) = Cosigned By    Initials Name Provider Type    Tracey Ruiz PTA Physical Therapy Assistant              Exercises     Row Name 03/12/19 1400             Subjective Comments    Subjective Comments  States  her knee is bothering her. Patient states she is fearful of falling.  Daughter affraid she will fall if she tries to use walker.   -BB         Subjective Pain    Able to rate subjective pain?  no  -BB      Pre-Treatment Pain Level  -- The person with patient states she thinks her pain is about7  -BB      Post-Treatment Pain Level  -- unable to rate verbally with a number due to dementia  -BB      Subjective Pain Comment  Pt arrived 12 min late for treatment.   -BB         Exercise 1    Exercise Name 1  Add sq  -BB      Cueing 1  Verbal;Tactile;Demo  -BB      Sets 1  2  -BB      Reps 1  10  -BB      Additional Comments  Max cues  -BB         Exercise 2    Exercise Name 2  t band hip abd  -BB      Cueing 2  Verbal;Tactile;Demo  -BB      Sets 2  2  -BB      Reps 2  10  -BB      Additional Comments  red with max cues  -BB         Exercise 3    Exercise Name 3  seated CR/TR  -BB      Cueing 3  Verbal;Demo  -BB      Sets 3  2  -BB      Reps 3  10  -BB         Exercise 4    Exercise Name 4  Sit to stand with max cues for safety and form  -BB      Reps 4  1x  -BB         Exercise 5    Exercise Name 5  gait with rolling walker CGA for safety. No LOB  -BB      Time 5  100 ft with 1 turn  -BB        User Key  (r) = Recorded By, (t) = Taken By, (c) = Cosigned By    Initials Name Provider Type    Tracey Ruiz, PTA Physical Therapy Assistant                         PT OP Goals     Row Name 03/12/19 1400          PT Short Term Goals    STG Date to Achieve  03/13/19  -BB     STG 1  Caregiver will be independent in home exercise program  -BB     STG 1 Progress  Not Met  -BB     STG 2  Patient will tolerate therapeutic exercise sessions of 30-45 minutes without increased symptoms of knee pain  -BB     STG 2 Progress  Not Met  -BB     STG 3  Patient will perform safe use of rolling walker for indoor activities  -BB     STG 3 Progress  Not Met  -BB     STG 4  Patient improve left knee flexion to 115 degrees  -BB     STG 4  Progress  Not Met  -BB     STG 5  Patient will be able to perform quad set with full knee extension minimal increase in pain  -BB     STG 5 Progress  Not Met  -BB        Time Calculation    PT Goal Re-Cert Due Date  03/13/19  -BB       User Key  (r) = Recorded By, (t) = Taken By, (c) = Cosigned By    Initials Name Provider Type    Tracey Ruiz PTA Physical Therapy Assistant          Therapy Education  Education Details: HEP provided to patient and daughter.  Seated CR/TR, add sq and tband hip abd.   Given: HEP  Program: New  How Provided: Verbal, Demonstration, Written  Provided to: Patient(daughter)  Level of Understanding: (Pt had difficulty understanding HEP but daughter was present and demonstrated and verbalized understanding. )              Time Calculation:   Start Time: 1447  Stop Time: 1525  Time Calculation (min): 38 min  Total Timed Code Minutes- PT: 38 minute(s)  Therapy Suggested Charges     Code   Minutes Charges    None           Therapy Charges for Today     Code Description Service Date Service Provider Modifiers Qty    71039260087 HC PT THER PROC EA 15 MIN 3/12/2019 Tracey Vargas PTA GP 2    [id number] HC GAIT TRAINING EA 15 MIN 3/12/2019 Tracey Vargas PTA GP 1                    Tracey Vargas PTA  3/12/2019

## 2019-03-14 ENCOUNTER — HOSPITAL ENCOUNTER (OUTPATIENT)
Dept: PHYSICAL THERAPY | Facility: HOSPITAL | Age: 84
Setting detail: THERAPIES SERIES
Discharge: HOME | End: 2019-03-14

## 2019-03-14 DIAGNOSIS — R26.89 IMBALANCE: Primary | ICD-10-CM

## 2019-03-14 DIAGNOSIS — G89.29 CHRONIC PAIN OF LEFT KNEE: ICD-10-CM

## 2019-03-14 DIAGNOSIS — M25.562 CHRONIC PAIN OF LEFT KNEE: ICD-10-CM

## 2019-03-14 PROCEDURE — 97116 GAIT TRAINING THERAPY: CPT

## 2019-03-14 PROCEDURE — 97110 THERAPEUTIC EXERCISES: CPT

## 2019-03-14 NOTE — THERAPY TREATMENT NOTE
Outpatient Physical Therapy Ortho Treatment Note  [facility]     Patient Name: Kierra Scales  : 1929  MRN: [id number]  Today's Date: 3/14/2019      Visit Date: 2019  Subjective Improvement: 0%  Visit Number:   3/4  Recert Date:   3/13/19 scheduled next week  MD Visit:   19  Total Approved Visits:  12 visits from 19 to 4/3/19      Visit Dx:    ICD-10-CM ICD-9-CM   1. Imbalance R26.89 781.2   2. 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)   • Hypothyroidism   • Dyspnea   • Disorder of thyroid   • Gastrointestinal hemorrhage with melena   • Diabetes mellitus (CMS/HCC)   • Colon cancer (CMS/HCC)   • Chronic blood loss anemia   • Physical deconditioning   • Senile dementia, uncomplicated   • Encounter for rehabilitation   • Adenocarcinoma of sigmoid colon (CMS/HCC)   • Hypokalemia   • Alzheimer's disease   • Arthritis   • Cataract   • Tricuspid valve disorders, non-rheumatic   • Pain   • Hypertensive disorder   • Hashimoto's thyroiditis   • Fibrocystic disease of breast   • Dyslipidemia   • Anxiety   • Tricuspid valve disorders, non-rheumatic   • Pacemaker        Past Medical History:   Diagnosis Date   • Anxiety    • Arthritis    • Bradycardia    • Dyslipidemia    • Dyspnea    • Fibrocystic disease of breast    • Hashimoto's thyroiditis    • Hypertensive disorder    • Hypothyroidism    • Pain    • Palpitations    • Paroxysmal tachycardia (CMS/HCC)    • Shortness of breath    • Tricuspid valve disorders, specified as nonrheumatic    • Vitamin D deficiency         Past Surgical History:   Procedure Laterality Date   • COLON RESECTION N/A 2017     Procedure: COLON RESECTION;  Surgeon: Stefano Linn MD;  Location: [facility] OR;  Service:    • COLONOSCOPY N/A 5/30/2017    Procedure: Flexible Sigmoidoscopy;  Surgeon: Stefano Linn MD;  Location: [facility] ENDOSCOPY;  Service:    • EXTERNAL EAR SURGERY     • EYE SURGERY     • LIPOMA EXCISION     • PACEMAKER IMPLANTATION     • SALIVARY GLAND SURGERY     • TUBAL ABDOMINAL LIGATION         PT Ortho     Row Name 03/14/19 1400       Subjective Comments    Subjective Comments  States she is doing ok today. Knee still hurts. Has difficulty understanding some questions asked by PTA due to dementia.  -BB       Precautions and Contraindications    Precautions  donfusion/dementia  -BB    Contraindications  fall risk/ use gait belt  -BB       Subjective Pain    Able to rate subjective pain?  no  -BB       Posture/Observations    Posture/Observations Comments  HHA by daughter upon arrival.  Gait belt used during treatment. Gait training with RW during treatment. Cues throughout for safety with gait and sit to stands.  Pt does well CGA with gait using RW unless distracted then looses balance slightly.    -BB    Row Name 03/12/19 1400       Precautions and Contraindications    Precautions  donfusion/dementia  -BB    Contraindications  fall risk/ use gait belt  -BB       Posture/Observations    Posture/Observations Comments  Pt accompanied by daughter. Daughter assists patient up with initial sit to stand.  Max cues throughout given verbally, tactile and thru demonstration.  CGA with gait belt during standing/gait for safety. No LOB and was CGA during gait with RW for safety. Cues to stay inside walker and for direction at times.   -BB      User Key  (r) = Recorded By, (t) = Taken By, (c) = Cosigned By    Initials Name Provider Type    Tracey Ruiz PTA Physical Therapy Assistant                      PT Assessment/Plan     Row Name 03/14/19 1617          PT Assessment    Assessment Comments  Max cues due  to dementia.  Pt fearful of falling at times.  Good support of daughter whom was present during treatment.  No change to HEP this date.   -BB        PT Plan    PT Frequency  2x/week  -BB     Predicted Duration of Therapy Intervention (Therapy Eval)  x 4-6 weeks  -BB     PT Plan Comments  Recheck next week. Progress, strength, balance and safety with gait as able.   -BB       User Key  (r) = Recorded By, (t) = Taken By, (c) = Cosigned By    Initials Name Provider Type    Tracey Ruiz PTA Physical Therapy Assistant              Exercises     Row Name 03/14/19 1400             Subjective Comments    Subjective Comments  States she is doing ok today. Knee still hurts. Has difficulty understanding some questions asked by PTA due to dementia.  -BB         Subjective Pain    Able to rate subjective pain?  no  -BB         Exercise 1    Exercise Name 1  pro II with assistance with full revolution by PTA   -BB      Cueing 1  Verbal;Tactile  -BB      Time 1  8 min  -BB      Additional Comments   no resistance  -BB         Exercise 2    Exercise Name 2  add sq  -BB      Cueing 2  Verbal;Tactile;Demo  -BB      Sets 2  2  -BB      Reps 2  10  -BB         Exercise 3    Exercise Name 3  LAQ  -BB      Cueing 3  Verbal;Tactile;Demo  -BB      Sets 3  2  -BB      Reps 3  10  -BB         Exercise 4    Exercise Name 4  Iso ham pull into ball  -BB      Cueing 4  Verbal;Tactile;Demo  -BB      Reps 4  10  -BB         Exercise 5    Exercise Name 5  sit to stand from low  mat with max cues and min assist by PTA  -BB         Exercise 6    Exercise Name 6  Gait training with RW CGA and cues for safety  -BB      Time 6  50 ft x 2  -BB        User Key  (r) = Recorded By, (t) = Taken By, (c) = Cosigned By    Initials Name Provider Type    Tracey Ruiz PTA Physical Therapy Assistant                         PT OP Goals     Row Name 03/14/19 1400          PT Short Term Goals    STG Date to Achieve  03/13/19  -BB     STG 1  Caregiver  will be independent in home exercise program  -BB     STG 1 Progress  Not Met  -BB     STG 2  Patient will tolerate therapeutic exercise sessions of 30-45 minutes without increased symptoms of knee pain  -BB     STG 2 Progress  Not Met  -BB     STG 3  Patient will perform safe use of rolling walker for indoor activities  -BB     STG 3 Progress  Not Met  -BB     STG 4  Patient improve left knee flexion to 115 degrees  -BB     STG 4 Progress  Not Met  -BB     STG 5  Patient will be able to perform quad set with full knee extension minimal increase in pain  -BB     STG 5 Progress  Not Met  -BB        Time Calculation    PT Goal Re-Cert Due Date  03/13/19  -BB       User Key  (r) = Recorded By, (t) = Taken By, (c) = Cosigned By    Initials Name Provider Type    Tracey Ruiz PTA Physical Therapy Assistant          Therapy Education  Education Details: Pt and daughter encouraged to continue same as previous treatment for HEP.   Given: HEP  Program: Reinforced              Time Calculation:   Start Time: 1436  Stop Time: 1517  Time Calculation (min): 41 min  Total Timed Code Minutes- PT: 41 minute(s)  Therapy Suggested Charges     Code   Minutes Charges    None           Therapy Charges for Today     Code Description Service Date Service Provider Modifiers Qty    07978355942 HC PT THER PROC EA 15 MIN 3/14/2019 Tracey Vargas PTA GP 2    11187467725 HC GAIT TRAINING EA 15 MIN 3/14/2019 Tracey Vargas PTA GP 1                    Tracey Vargas PTA  3/14/2019

## 2019-03-19 ENCOUNTER — HOSPITAL ENCOUNTER (OUTPATIENT)
Dept: PHYSICAL THERAPY | Facility: HOSPITAL | Age: 84
Setting detail: THERAPIES SERIES
Discharge: HOME | End: 2019-03-19

## 2019-03-19 DIAGNOSIS — R26.89 IMBALANCE: Primary | ICD-10-CM

## 2019-03-19 DIAGNOSIS — M25.562 CHRONIC PAIN OF LEFT KNEE: ICD-10-CM

## 2019-03-19 DIAGNOSIS — G89.29 CHRONIC PAIN OF LEFT KNEE: ICD-10-CM

## 2019-03-19 PROCEDURE — 97110 THERAPEUTIC EXERCISES: CPT

## 2019-03-19 NOTE — THERAPY PROGRESS REPORT/RE-CERT
Outpatient Physical Therapy Ortho Progress Note  [facility]     Patient Name: Kierra Scales  : 1929  MRN: [id number]  Today's Date: 3/19/2019      Visit Date: 2019    Visit Dx:    ICD-10-CM ICD-9-CM   1. Imbalance R26.89 781.2   2. 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)   • Hypothyroidism   • Dyspnea   • Disorder of thyroid   • Gastrointestinal hemorrhage with melena   • Diabetes mellitus (CMS/HCC)   • Colon cancer (CMS/HCC)   • Chronic blood loss anemia   • Physical deconditioning   • Senile dementia, uncomplicated   • Encounter for rehabilitation   • Adenocarcinoma of sigmoid colon (CMS/HCC)   • Hypokalemia   • Alzheimer's disease   • Arthritis   • Cataract   • Tricuspid valve disorders, non-rheumatic   • Pain   • Hypertensive disorder   • Hashimoto's thyroiditis   • Fibrocystic disease of breast   • Dyslipidemia   • Anxiety   • Tricuspid valve disorders, non-rheumatic   • Pacemaker        Past Medical History:   Diagnosis Date   • Anxiety    • Arthritis    • Bradycardia    • Dyslipidemia    • Dyspnea    • Fibrocystic disease of breast    • Hashimoto's thyroiditis    • Hypertensive disorder    • Hypothyroidism    • Pain    • Palpitations    • Paroxysmal tachycardia (CMS/HCC)    • Shortness of breath    • Tricuspid valve disorders, specified as nonrheumatic    • Vitamin D deficiency         Past Surgical History:   Procedure Laterality Date   • COLON RESECTION N/A 2017    Procedure: COLON RESECTION;  Surgeon: Stefano Linn MD;  Location: [facility];  Service:    • COLONOSCOPY N/A 2017    Procedure: Flexible Sigmoidoscopy;   "Surgeon: Stefano Linn MD;  Location: [facility] ENDOSCOPY;  Service:    • EXTERNAL EAR SURGERY     • EYE SURGERY     • LIPOMA EXCISION     • PACEMAKER IMPLANTATION     • SALIVARY GLAND SURGERY     • TUBAL ABDOMINAL LIGATION                         PT Assessment/Plan     Row Name 03/19/19 1800          PT Assessment    Functional Limitations  Decreased safety during functional activities;Impaired locomotion;Limitations in functional capacity and performance  -     Impairments  Balance;Endurance;Gait;Pain  -     Assessment Comments  Patient does not appear to be able to folllow with instructions. Requires near constant cues to stay on task. Family state they believe the therapy has been helping with the patient's reported knee pain.   -     Please refer to paper survey for additional self-reported information  No Patient does not comprehend how to complete LEFS  -     Rehab Potential  Fair  -     Patient/caregiver participated in establishment of treatment plan and goals  Yes  -     Patient would benefit from skilled therapy intervention  Yes  -        PT Plan    PT Frequency  2x/week  -     Predicted Duration of Therapy Intervention (Therapy Eval)  2 weeks  -     PT Plan Comments  Continue with current POC with plan to discharge 4/3/19.  -       User Key  (r) = Recorded By, (t) = Taken By, (c) = Cosigned By    Initials Name Provider Type     Dolores Mccord, PT Physical Therapist            Exercises     Row Name 03/19/19 1500             Subjective Comments    Subjective Comments  Patient reports her knee hurts and \"pops sometimes.\"   -         Subjective Pain    Able to rate subjective pain?  no  -DH         Exercise 1    Exercise Name 1  Pro II  -      Cueing 1  Verbal;Tactile  -      Additional Comments  LE only, needs constant cues  -         Exercise 2    Exercise Name 2  add squeeze  -      Cueing 2  Verbal;Tactile;Demo  -      Sets 2  2  -DH      Reps 2  10  -DH      " Additional Comments  Needs constant cues  -         Exercise 3    Exercise Name 3  seated hamstrings stretch  -      Cueing 3  Verbal;Tactile  -      Reps 3  10  -DH         Exercise 4    Exercise Name 4  Sit <->stand with UE  -      Cueing 4  Verbal;Tactile  -      Reps 4  10  -DH         Exercise 5    Exercise Name 5  resisted abd with green band  -      Cueing 5  Verbal;Tactile  -DH      Sets 5  2  -DH      Reps 5  10  -DH         Exercise 6    Exercise Name 6  Calf stretches bilateral  -      Cueing 6  Verbal;Tactile;Demo  -      Sets 6  1  -DH      Reps 6  5  -        User Key  (r) = Recorded By, (t) = Taken By, (c) = Cosigned By    Initials Name Provider Type     Dolores Mccord, PT Physical Therapist                       PT OP Goals     Row Name 03/19/19 1500          PT Short Term Goals    STG Date to Achieve  03/13/19  -     STG 1  Caregiver will be independent in home exercise program  -     STG 1 Progress  Not Met  -     STG 2  Patient will tolerate therapeutic exercise sessions of 30-45 minutes without increased symptoms of knee pain  -     STG 2 Progress  Not Met  -     STG 3  Patient will perform safe use of rolling walker for indoor activities  -     STG 3 Progress  Not Met  -     STG 4  Patient improve left knee flexion to 115 degrees  -     STG 4 Progress  Not Met  -     STG 5  Patient will be able to perform quad set with full knee extension minimal increase in pain  -     STG 5 Progress  Not Met  -        Time Calculation    PT Goal Re-Cert Due Date  04/03/19  -       User Key  (r) = Recorded By, (t) = Taken By, (c) = Cosigned By    Initials Name Provider Type     Dolores Mccord, PT Physical Therapist                         Time Calculation:   Start Time: 1515  Stop Time: 1600  Time Calculation (min): 45 min  Therapy Charges for Today     Code Description Service Date Service Provider Modifiers Qty    61117165967  PT THER PROC EA 15 MIN 3/19/2019  Dolores Mccord, PT GP 3                    Dolores Mccord, PT  3/19/2019

## 2019-03-21 ENCOUNTER — HOSPITAL ENCOUNTER (OUTPATIENT)
Dept: PHYSICAL THERAPY | Facility: HOSPITAL | Age: 84
Setting detail: THERAPIES SERIES
Discharge: HOME | End: 2019-03-21

## 2019-03-21 DIAGNOSIS — R26.89 IMBALANCE: Primary | ICD-10-CM

## 2019-03-21 DIAGNOSIS — G89.29 CHRONIC PAIN OF LEFT KNEE: ICD-10-CM

## 2019-03-21 DIAGNOSIS — M25.562 CHRONIC PAIN OF LEFT KNEE: ICD-10-CM

## 2019-03-21 PROCEDURE — 97110 THERAPEUTIC EXERCISES: CPT

## 2019-03-21 NOTE — THERAPY TREATMENT NOTE
Outpatient Physical Therapy Ortho Treatment Note  [facility]     Patient Name: Kierra Scales  : 1929  MRN: [id number]  Today's Date: 3/21/2019      Visit Date: 2019  Subjective Improvement: 50% per patient and daughter  Visit Number:     Recert Date:   19  MD Visit:   ?  Total Approved Visits:  12 visits from 19 to 4/3/19      Visit Dx:    ICD-10-CM ICD-9-CM   1. Imbalance R26.89 781.2   2. 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)   • Hypothyroidism   • Dyspnea   • Disorder of thyroid   • Gastrointestinal hemorrhage with melena   • Diabetes mellitus (CMS/HCC)   • Colon cancer (CMS/HCC)   • Chronic blood loss anemia   • Physical deconditioning   • Senile dementia, uncomplicated   • Encounter for rehabilitation   • Adenocarcinoma of sigmoid colon (CMS/HCC)   • Hypokalemia   • Alzheimer's disease   • Arthritis   • Cataract   • Tricuspid valve disorders, non-rheumatic   • Pain   • Hypertensive disorder   • Hashimoto's thyroiditis   • Fibrocystic disease of breast   • Dyslipidemia   • Anxiety   • Tricuspid valve disorders, non-rheumatic   • Pacemaker        Past Medical History:   Diagnosis Date   • Anxiety    • Arthritis    • Bradycardia    • Dyslipidemia    • Dyspnea    • Fibrocystic disease of breast    • Hashimoto's thyroiditis    • Hypertensive disorder    • Hypothyroidism    • Pain    • Palpitations    • Paroxysmal tachycardia (CMS/HCC)    • Shortness of breath    • Tricuspid valve disorders, specified as nonrheumatic    • Vitamin D deficiency         Past Surgical History:   Procedure Laterality Date   • COLON RESECTION N/A 2017     Procedure: COLON RESECTION;  Surgeon: Stefano Linn MD;  Location: [facility] OR;  Service:    • COLONOSCOPY N/A 5/30/2017    Procedure: Flexible Sigmoidoscopy;  Surgeon: Stefano Linn MD;  Location: [facility] ENDOSCOPY;  Service:    • EXTERNAL EAR SURGERY     • EYE SURGERY     • LIPOMA EXCISION     • PACEMAKER IMPLANTATION     • SALIVARY GLAND SURGERY     • TUBAL ABDOMINAL LIGATION         PT Ortho     Row Name 03/21/19 1600       Subjective Comments    Subjective Comments  States she is doing ok today.  They expressed they were not comfortable with RW.   -BB       Precautions and Contraindications    Precautions  confusion/dementia  -BB    Contraindications  fall risk/ use gait belt  -BB       Posture/Observations    Posture/Observations Comments  HHA by daughter when not walking with PTA. CGA with gait belt for safety while ambulating with PTA.  Only had min LOB 1x when became distracted during gait.  Crepitis noted L knee with standing CR.   -BB      User Key  (r) = Recorded By, (t) = Taken By, (c) = Cosigned By    Initials Name Provider Type    Tracey Ruiz PTA Physical Therapy Assistant                      PT Assessment/Plan     Row Name 03/21/19 1500          PT Assessment    Assessment Comments  Increased independence with sit to stands and pro II this date.  Tolerated standing therex well without complaint of increased pain. Pt still fearful of falling and requires mas  -BB        PT Plan    PT Frequency  2x/week  -BB     Predicted Duration of Therapy Intervention (Therapy Eval)  2 weeks  -BB     PT Plan Comments  Per last recheck plan to discharge 4/3/19.  Continue to work on safety with gait, transfers, balance, strength and ROM.   -BB       User Key  (r) = Recorded By, (t) = Taken By, (c) = Cosigned By    Initials Name Provider Type    Tracey Ruiz PTA Physical Therapy Assistant            Exercises     Row Name 03/21/19 1600             Subjective Comments    Subjective  Comments  States she is doing ok today.  They expressed they were not comfortable with RW.   -BB         Subjective Pain    Able to rate subjective pain?  no  -BB         Exercise 1    Exercise Name 1  Pro II level 1 seat 12 strength and ROM  -BB      Cueing 1  Verbal  -BB      Time 1  10  -BB      Additional Comments  Able to do with only supervision this date.  Used LE only  -BB         Exercise 2    Exercise Name 2  gait CGA  -BB      Cueing 2  Verbal  -BB      Additional Comments  60 ft - Only became imbalanced when looked side to side with distraction.  -BB         Exercise 3    Exercise Name 3  Romberg  -BB      Cueing 3  Verbal;Demo  -BB      Reps 3  1  -BB      Time 3  30 sec  -BB      Additional Comments  CGA  -BB         Exercise 4    Exercise Name 4  Split stance ( not full tandem )  -BB      Cueing 4  Verbal;Tactile;Demo  -BB      Reps 4  1  -BB      Time 4  30 sec  -BB      Additional Comments   CGA  -BB         Exercise 5    Exercise Name 5  Feet apart EC  -BB      Cueing 5  Verbal;Tactile;Demo  -BB      Reps 5  1  -BB      Time 5  30  -BB      Additional Comments  CGA  -BB         Exercise 6    Exercise Name 6  standing CR  -BB      Cueing 6  Verbal;Tactile;Demo  -BB      Sets 6  2  -BB      Reps 6  10  -BB      Additional Comments  Cues at knees to keep them straight. Crepitis noted L knee with CR.  -BB         Exercise 7    Exercise Name 7  seated assisted calf stretch/HS stretch  -BB      Reps 7  2  -BB      Time 7  1 min  -BB      Additional Comments  on stool  -BB         Exercise 8    Exercise Name 8  step up 4 in fwd  -BB      Cueing 8  Verbal;Tactile;Demo  -BB      Reps 8  10   -BB      Additional Comments  bilat  -BB         Exercise 9    Exercise Name 9  standing hip abd bilat  -BB      Cueing 9  Verbal;Tactile;Demo  -BB      Reps 9  10  -BB      Additional Comments  bilat  -BB        User Key  (r) = Recorded By, (t) = Taken By, (c) = Cosigned By    Initials Name Provider Type    BB Baskett,  Tracey PADILLA PTA Physical Therapy Assistant                       PT OP Goals     Row Name 03/21/19 1500          PT Short Term Goals    STG Date to Achieve  03/13/19  -BB     STG 1  Caregiver will be independent in home exercise program  -BB     STG 1 Progress  Progressing  -BB     STG 2  Patient will tolerate therapeutic exercise sessions of 30-45 minutes without increased symptoms of knee pain  -BB     STG 2 Progress  Partially Met;Ongoing  -BB     STG 2 Progress Comments  No complaints of increased pain this date.   -BB     STG 3  Patient will perform safe use of rolling walker for indoor activities  -BB     STG 3 Progress  Not Met  -BB     STG 4  Patient improve left knee flexion to 115 degrees  -BB     STG 4 Progress  Not Met  -BB     STG 5  Patient will be able to perform quad set with full knee extension minimal increase in pain  -BB     STG 5 Progress  Not Met  -BB        Time Calculation    PT Goal Re-Cert Due Date  04/09/19  -BB       User Key  (r) = Recorded By, (t) = Taken By, (c) = Cosigned By    Initials Name Provider Type    Tracey Ruiz PTA Physical Therapy Assistant          Therapy Education  Given: HEP  Program: Reinforced              Time Calculation:   Start Time: 1520  Stop Time: 1558  Time Calculation (min): 38 min  Total Timed Code Minutes- PT: 38 minute(s)  Therapy Charges for Today     Code Description Service Date Service Provider Modifiers Qty    95261370763 HC PT THER PROC EA 15 MIN 3/21/2019 Tracey Vargas PTA GP 3                    Tracey Vargas PTA  3/21/2019

## 2019-03-26 ENCOUNTER — HOSPITAL ENCOUNTER (OUTPATIENT)
Dept: PHYSICAL THERAPY | Facility: HOSPITAL | Age: 84
Setting detail: THERAPIES SERIES
Discharge: HOME | End: 2019-03-26

## 2019-03-26 DIAGNOSIS — M25.562 CHRONIC PAIN OF LEFT KNEE: ICD-10-CM

## 2019-03-26 DIAGNOSIS — G89.29 CHRONIC PAIN OF LEFT KNEE: ICD-10-CM

## 2019-03-26 DIAGNOSIS — R26.89 IMBALANCE: Primary | ICD-10-CM

## 2019-03-26 PROCEDURE — 97110 THERAPEUTIC EXERCISES: CPT

## 2019-03-26 NOTE — THERAPY TREATMENT NOTE
Outpatient Physical Therapy Ortho Treatment Note  [facility]     Patient Name: Kierra Scales  : 1929  MRN: [id number]  Today's Date: 3/26/2019      Visit Date: 2019    Visit Dx:    ICD-10-CM ICD-9-CM   1. Imbalance R26.89 781.2   2. 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)   • Hypothyroidism   • Dyspnea   • Disorder of thyroid   • Gastrointestinal hemorrhage with melena   • Diabetes mellitus (CMS/HCC)   • Colon cancer (CMS/HCC)   • Chronic blood loss anemia   • Physical deconditioning   • Senile dementia, uncomplicated   • Encounter for rehabilitation   • Adenocarcinoma of sigmoid colon (CMS/HCC)   • Hypokalemia   • Alzheimer's disease   • Arthritis   • Cataract   • Tricuspid valve disorders, non-rheumatic   • Pain   • Hypertensive disorder   • Hashimoto's thyroiditis   • Fibrocystic disease of breast   • Dyslipidemia   • Anxiety   • Tricuspid valve disorders, non-rheumatic   • Pacemaker        Past Medical History:   Diagnosis Date   • Anxiety    • Arthritis    • Bradycardia    • Dyslipidemia    • Dyspnea    • Fibrocystic disease of breast    • Hashimoto's thyroiditis    • Hypertensive disorder    • Hypothyroidism    • Pain    • Palpitations    • Paroxysmal tachycardia (CMS/HCC)    • Shortness of breath    • Tricuspid valve disorders, specified as nonrheumatic    • Vitamin D deficiency         Past Surgical History:   Procedure Laterality Date   • COLON RESECTION N/A 2017    Procedure: COLON RESECTION;  Surgeon: Stefano Linn MD;  Location: [facility];  Service:    • COLONOSCOPY N/A 2017    Procedure: Flexible Sigmoidoscopy;   "Surgeon: Stefano Linn MD;  Location: [facility] ENDOSCOPY;  Service:    • EXTERNAL EAR SURGERY     • EYE SURGERY     • LIPOMA EXCISION     • PACEMAKER IMPLANTATION     • SALIVARY GLAND SURGERY     • TUBAL ABDOMINAL LIGATION                         PT Assessment/Plan     Row Name 03/26/19 1500          PT Assessment    Assessment Comments  Patient appears to be more cognizant of what is expected of her, requiring min to mod cueing. Gait appears to be improving, requirng less assist for safety.  -DH        PT Plan    PT Plan Comments  Continue with current POC.  -       User Key  (r) = Recorded By, (t) = Taken By, (c) = Cosigned By    Initials Name Provider Type     Dolores Mccord, PT Physical Therapist            Exercises     Row Name 03/26/19 1500             Subjective Comments    Subjective Comments  Reports pain   -DH         Subjective Pain    Able to rate subjective pain?  no  -DH         Total Minutes    11770 - PT Therapeutic Exercise Minutes  40  -DH         Exercise 1    Exercise Name 1  Pro II level 1 for strengthening and flexibility  -DH      Time 1  10  -DH      Additional Comments  Able to sta on task, requires occasional verbal and tactile cues.  -DH         Exercise 2    Exercise Name 2  Incline Baby Bear  -DH      Cueing 2  Verbal;Demo  -DH      Sets 2  3  -DH      Time 2  30\"  -DH      Additional Comments  Requires CGA and much encouragement  -DH         Exercise 3    Exercise Name 3  LAQ in sitting  -DH      Cueing 3  Verbal;Tactile;Demo  -DH      Sets 3  3  -DH      Reps 3  10  -DH         Exercise 4    Exercise Name 4  hip flexion  -DH         Exercise 5    Exercise Name 5  toe up  -DH         Exercise 6    Exercise Name 6  hip add / play ball squeez  -DH      Cueing 6  Verbal;Tactile;Demo  -DH      Sets 6  1  -DH      Reps 6  20  -DH      Additional Comments  Constant cues  -DH         Exercise 7    Exercise Name 7  hip abd  -DH      Cueing 7  Verbal;Tactile;Demo  -DH      Sets 7  1 "  -      Reps 7  20  -DH      Additional Comments  Needs cues  -         Exercise 8    Exercise Name 8  Step over cone  -      Cueing 8  Verbal;Tactile;Demo  -      Reps 8  10  -DH      Additional Comments  bilateral  -         Exercise 9    Exercise Name 9  standing hip abd  -      Cueing 9  Verbal;Tactile;Demo  -      Reps 9  10  -DH      Additional Comments  bilateral  -        User Key  (r) = Recorded By, (t) = Taken By, (c) = Cosigned By    Initials Name Provider Type     Dolores Mccord, PT Physical Therapist                       PT OP Goals     Row Name 03/26/19 1500          PT Short Term Goals    STG Date to Achieve  03/13/19  -     STG 1  Caregiver will be independent in home exercise program  -     STG 1 Progress  Progressing  -     STG 2  Patient will tolerate therapeutic exercise sessions of 30-45 minutes without increased symptoms of knee pain  -     STG 2 Progress  Partially Met;Ongoing  -     STG 3  Patient will perform safe use of rolling walker for indoor activities  -     STG 3 Progress  Not Met  -     STG 4  Patient improve left knee flexion to 115 degrees  -     STG 4 Progress  Not Met  -     STG 5  Patient will be able to perform quad set with full knee extension minimal increase in pain  -     STG 5 Progress  Not Met  -        Time Calculation    PT Goal Re-Cert Due Date  05/09/19  -       User Key  (r) = Recorded By, (t) = Taken By, (c) = Cosigned By    Initials Name Provider Type     Dolores Mccord, PT Physical Therapist                         Time Calculation:   Start Time: 1520  Stop Time: 1600  Time Calculation (min): 40 min  Therapy Charges for Today     Code Description Service Date Service Provider Modifiers Qty    20023460165  PT THER PROC EA 15 MIN 3/26/2019 Dolores Mccord, PT GP 3                    Dolores Mccord, PT  3/26/2019

## 2019-03-28 ENCOUNTER — HOSPITAL ENCOUNTER (OUTPATIENT)
Dept: PHYSICAL THERAPY | Facility: HOSPITAL | Age: 84
Setting detail: THERAPIES SERIES
Discharge: HOME | End: 2019-03-28

## 2019-03-28 DIAGNOSIS — M25.562 CHRONIC PAIN OF LEFT KNEE: ICD-10-CM

## 2019-03-28 DIAGNOSIS — R26.89 IMBALANCE: Primary | ICD-10-CM

## 2019-03-28 DIAGNOSIS — G89.29 CHRONIC PAIN OF LEFT KNEE: ICD-10-CM

## 2019-03-28 PROCEDURE — 97110 THERAPEUTIC EXERCISES: CPT

## 2019-03-28 NOTE — THERAPY TREATMENT NOTE
Outpatient Physical Therapy Ortho Treatment Note  [facility]     Patient Name: Kierra Scales  : 1929  MRN: [id number]  Today's Date: 3/28/2019      Visit Date: 2019  Subjective Improvement: Unable to rate   Visit Number:     Recert Date:   19  MD Visit:   unsure  Total Approved Visits:  12 visits from 19 to 4/3/19      Visit Dx:    ICD-10-CM ICD-9-CM   1. Imbalance R26.89 781.2   2. 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)   • Hypothyroidism   • Dyspnea   • Disorder of thyroid   • Gastrointestinal hemorrhage with melena   • Diabetes mellitus (CMS/HCC)   • Colon cancer (CMS/HCC)   • Chronic blood loss anemia   • Physical deconditioning   • Senile dementia, uncomplicated   • Encounter for rehabilitation   • Adenocarcinoma of sigmoid colon (CMS/HCC)   • Hypokalemia   • Alzheimer's disease   • Arthritis   • Cataract   • Tricuspid valve disorders, non-rheumatic   • Pain   • Hypertensive disorder   • Hashimoto's thyroiditis   • Fibrocystic disease of breast   • Dyslipidemia   • Anxiety   • Tricuspid valve disorders, non-rheumatic   • Pacemaker        Past Medical History:   Diagnosis Date   • Anxiety    • Arthritis    • Bradycardia    • Dyslipidemia    • Dyspnea    • Fibrocystic disease of breast    • Hashimoto's thyroiditis    • Hypertensive disorder    • Hypothyroidism    • Pain    • Palpitations    • Paroxysmal tachycardia (CMS/HCC)    • Shortness of breath    • Tricuspid valve disorders, specified as nonrheumatic    • Vitamin D deficiency         Past Surgical History:   Procedure Laterality Date   • COLON RESECTION N/A 2017     Procedure: COLON RESECTION;  Surgeon: tSefano Linn MD;  Location: [facility] OR;  Service:    • COLONOSCOPY N/A 5/30/2017    Procedure: Flexible Sigmoidoscopy;  Surgeon: Stefano Linn MD;  Location: [facility] ENDOSCOPY;  Service:    • EXTERNAL EAR SURGERY     • EYE SURGERY     • LIPOMA EXCISION     • PACEMAKER IMPLANTATION     • SALIVARY GLAND SURGERY     • TUBAL ABDOMINAL LIGATION         PT Ortho     Row Name 03/28/19 1400       Precautions and Contraindications    Precautions  confusion/dementia  -BB    Contraindications  fall risk/ use gait belt  -BB       Posture/Observations    Posture/Observations Comments  CGA for safety throughout. Pt very apprehensive and fearful of falling at times. Requires reassurance and cues throughout.  HHA when walking with daughter.  Unable to do standing TR  -BB      User Key  (r) = Recorded By, (t) = Taken By, (c) = Cosigned By    Initials Name Provider Type    Tracey Ruiz PTA Physical Therapy Assistant                      PT Assessment/Plan     Row Name 03/28/19 1444          PT Assessment    Assessment Comments  Encouragement required for HEP. Pts dementia effects ability to independently remember to do HEP.    -BB        PT Plan    PT Frequency  2x/week  -BB     Predicted Duration of Therapy Intervention (Therapy Eval)  x 2 weeks  -BB     PT Plan Comments  Progress strength, gait, safety with transfers and balance as able. Scheduled 1 x next week and then would need date extension if needs more PT.  Discuss with PT and patient next week benefit of continuing or not.   -BB       User Key  (r) = Recorded By, (t) = Taken By, (c) = Cosigned By    Initials Name Provider Type    Tracey Ruiz PTA Physical Therapy Assistant            Exercises     Row Name 03/28/19 1400             Subjective Comments    Subjective Comments  Left knee bothering her.   -BB         Subjective Pain    Able to rate subjective pain?  no  -BB         Exercise 1    Exercise  Name 1  Pro II level 1 seat 13 for strength and ROM  -BB      Cueing 1  Verbal;Tactile;Demo  -BB      Time 1  12  -BB      Additional Comments  Assisted patients LE onto bike pedals but able to ride independently.   -BB         Exercise 2    Exercise Name 2  Incline stretch  -BB      Cueing 2  Verbal;Demo  -BB      Sets 2  3  -BB      Reps 2  30  -BB         Exercise 3    Exercise Name 3  romberg SBA for balance   -BB      Cueing 3  Verbal;Demo  -BB      Time 3  1 min  -BB      Additional Comments  no LOB  -BB         Exercise 4    Exercise Name 4  alt step taps on 4 inch step patient holding both rails  -BB      Cueing 4  Verbal;Demo  -BB      Reps 4  10  -BB         Exercise 5    Exercise Name 5  standing CR  -BB      Cueing 5  Verbal;Demo  -BB      Reps 5  10  -BB         Exercise 6    Exercise Name 6  standing ham curl L  -BB      Cueing 6  Verbal;Demo  -BB      Reps 6  10  -BB         Exercise 7    Exercise Name 7  seated rest  -BB         Exercise 8    Exercise Name 8  side stepping at tall mat  -BB      Reps 8  8 ft x 4   -BB      Additional Comments  CGA and cues for positioning  -BB         Exercise 9    Exercise Name 9  standing hip abd L  -BB      Cueing 9  Verbal;Demo  -BB      Reps 9  10  -BB        User Key  (r) = Recorded By, (t) = Taken By, (c) = Cosigned By    Initials Name Provider Type    Tracey Ruiz, PTA Physical Therapy Assistant                       PT OP Goals     Row Name 03/28/19 1400          PT Short Term Goals    STG Date to Achieve  03/13/19  -BB     STG 1  Caregiver will be independent in home exercise program  -BB     STG 1 Progress  Progressing  -BB     STG 2  Patient will tolerate therapeutic exercise sessions of 30-45 minutes without increased symptoms of knee pain  -BB     STG 2 Progress  Partially Met;Ongoing  -BB     STG 3  Patient will perform safe use of rolling walker for indoor activities  -BB     STG 3 Progress  Not Met  -BB     STG 4  Patient improve left knee  flexion to 115 degrees  -BB     STG 4 Progress  Not Met  -BB     STG 5  Patient will be able to perform quad set with full knee extension minimal increase in pain  -BB     STG 5 Progress  Not Met  -BB        Time Calculation    PT Goal Re-Cert Due Date  04/09/19  -BB       User Key  (r) = Recorded By, (t) = Taken By, (c) = Cosigned By    Initials Name Provider Type    Tracey Ruiz PTA Physical Therapy Assistant          Therapy Education  Education Details: Strongly encouraged compliance. Added standing side step and instructed daughter.   Given: HEP  Program: Reinforced              Time Calculation:   Start Time: 1430  Stop Time: 1510  Time Calculation (min): 40 min  Total Timed Code Minutes- PT: 40 minute(s)  Therapy Charges for Today     Code Description Service Date Service Provider Modifiers Qty    70724128751 HC PT THER PROC EA 15 MIN 3/28/2019 Tracey Vargas PTA GP 3                    Tracey Vargas PTA  3/28/2019

## 2019-04-02 ENCOUNTER — HOSPITAL ENCOUNTER (OUTPATIENT)
Dept: PHYSICAL THERAPY | Facility: HOSPITAL | Age: 84
Setting detail: THERAPIES SERIES
Discharge: HOME | End: 2019-04-02

## 2019-04-02 DIAGNOSIS — M25.562 CHRONIC PAIN OF LEFT KNEE: ICD-10-CM

## 2019-04-02 DIAGNOSIS — R26.89 IMBALANCE: Primary | ICD-10-CM

## 2019-04-02 DIAGNOSIS — G89.29 CHRONIC PAIN OF LEFT KNEE: ICD-10-CM

## 2019-04-02 PROCEDURE — 97110 THERAPEUTIC EXERCISES: CPT

## 2019-04-02 NOTE — THERAPY DISCHARGE NOTE
Outpatient Physical Therapy Ortho Treatment Note/Discharge Summary  [facility]     Patient Name: Kierra Scales  : 1929  MRN: [id number]  Today's Date: 2019      Visit Date: 2019  Subjective Improvement:  Pt unable to rate.  Visit Number:      Recert Date:    None due to DC this date  MD Visit:    unsure  Total Approved Visits:   12 visits from 19 to 4/3/19      Visit Dx:    ICD-10-CM ICD-9-CM   1. Imbalance R26.89 781.2   2. 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)   • Hypothyroidism   • Dyspnea   • Disorder of thyroid   • Gastrointestinal hemorrhage with melena   • Diabetes mellitus (CMS/HCC)   • Colon cancer (CMS/HCC)   • Chronic blood loss anemia   • Physical deconditioning   • Senile dementia, uncomplicated   • Encounter for rehabilitation   • Adenocarcinoma of sigmoid colon (CMS/HCC)   • Hypokalemia   • Alzheimer's disease   • Arthritis   • Cataract   • Tricuspid valve disorders, non-rheumatic   • Pain   • Hypertensive disorder   • Hashimoto's thyroiditis   • Fibrocystic disease of breast   • Dyslipidemia   • Anxiety   • Tricuspid valve disorders, non-rheumatic   • Pacemaker        Past Medical History:   Diagnosis Date   • Anxiety    • Arthritis    • Bradycardia    • Dyslipidemia    • Dyspnea    • Fibrocystic disease of breast    • Hashimoto's thyroiditis    • Hypertensive disorder    • Hypothyroidism    • Pain    • Palpitations    • Paroxysmal tachycardia (CMS/HCC)    • Shortness of breath    • Tricuspid valve disorders, specified as nonrheumatic    • Vitamin D deficiency         Past Surgical History:   Procedure Laterality Date    • COLON RESECTION N/A 6/2/2017    Procedure: COLON RESECTION;  Surgeon: Stefano Linn MD;  Location: [facility] OR;  Service:    • COLONOSCOPY N/A 5/30/2017    Procedure: Flexible Sigmoidoscopy;  Surgeon: Stefano Linn MD;  Location: [facility] ENDOSCOPY;  Service:    • EXTERNAL EAR SURGERY     • EYE SURGERY     • LIPOMA EXCISION     • PACEMAKER IMPLANTATION     • SALIVARY GLAND SURGERY     • TUBAL ABDOMINAL LIGATION         PT Ortho     Row Name 04/02/19 1600       Precautions and Contraindications    Precautions  confusion/dementia  -BB    Contraindications  fall risk/ use gait belt  -BB       Posture/Observations    Posture/Observations Comments  Pt able to perform sit to stands from all surfaces independently.  Cues throughout to encouraged due to fear of falling.  Unable to do standing toe raises.  Cues throughout due to confusion.  AROM 5-115 L knee  -BB      User Key  (r) = Recorded By, (t) = Taken By, (c) = Cosigned By    Initials Name Provider Type    Tracey Ruiz PTA Physical Therapy Assistant                      PT Assessment/Plan     Row Name 04/02/19 1317          PT Assessment    Assessment Comments  Pt's family ready to try independent  management. Pt has made good gains in funcional strength with sit to stands. Pt remains very hesitant and fearful of falling at time. Max cues throughout.   -BB        PT Plan    Predicted Duration of Therapy Intervention (Therapy Eval)  DC  -BB     PT Plan Comments  DC to independent HEP and free month fitness with daughters assistance.   -BB       User Key  (r) = Recorded By, (t) = Taken By, (c) = Cosigned By    Initials Name Provider Type    Tracey Ruiz PTA Physical Therapy Assistant              Exercises     Row Name 04/02/19 1700             Subjective Comments    Subjective Comments  Left knee is bothering her some. Pts daughter reports they are ready to try it on their own and wish to use free month FF.   -BB         Subjective  Pain    Able to rate subjective pain?  no  -BB         Exercise 1    Exercise Name 1  Pro II level 2 seat 12 strength and ROM  -BB      Cueing 1  Verbal  -BB      Time 1  12  -BB      Additional Comments  Pt stopped a few times and required encouragement to continue but had no complaints with pro II.   -BB         Exercise 2    Exercise Name 2  incline stretch  -BB      Reps 2  3  -BB      Time 2  30  -BB         Exercise 3    Exercise Name 3  ROM measurements taken  -BB         Exercise 4    Exercise Name 4  standing CR  -BB      Cueing 4  Verbal;Demo  -BB      Sets 4  2  -BB      Reps 4  10  -BB      Additional Comments  daughter observed HEP and exercises that can be performed at tall mat in clinic.   -BB         Exercise 5    Exercise Name 5  attempted TR and patient unable to do possibly due to weakness  -BB         Exercise 6    Exercise Name 6  standing hip abd  -BB      Cueing 6  Verbal;Tactile;Demo  -BB      Sets 6  2  -BB      Reps 6  10  -BB      Additional Comments  bilat  -BB         Exercise 7    Exercise Name 7  side stepping at tall mat   -BB      Cueing 7  Verbal;Tactile;Demo  -BB      Reps 7  2 trips  -BB         Exercise 8    Exercise Name 8  fwd step up  -BB      Cueing 8  Verbal;Demo  -BB      Reps 8  10  -BB      Additional Comments  4 inch  -BB        User Key  (r) = Recorded By, (t) = Taken By, (c) = Cosigned By    Initials Name Provider Type    Tracey Ruiz, PTA Physical Therapy Assistant                         PT OP Goals     Row Name 04/02/19 1600          PT Short Term Goals    STG Date to Achieve  03/13/19  -BB     STG 1  Caregiver will be independent in home exercise program  -BB     STG 1 Progress  Met  -BB     STG 2  Patient will tolerate therapeutic exercise sessions of 30-45 minutes without increased symptoms of knee pain  -BB     STG 2 Progress  Met  -BB     STG 2 Progress Comments  Pt reports her knee did  not hurt any worse at end of treatment.   -BB     STG 3  Patient  will perform safe use of rolling walker for indoor activities  -BB     STG 3 Progress  Not Met  -BB     STG 3 Progress Comments  Pt and daughter felt that walker use was not appropriate.   -BB     STG 4  Patient improve left knee flexion to 115 degrees  -BB     STG 4 Progress  Met  -BB     STG 4 Progress Comments  115 AROM L  -BB     STG 5  Patient will be able to perform quad set with full knee extension minimal increase in pain  -BB     STG 5 Progress  Not Met  -BB     STG 5 Progress Comments  5 degrees from full knee extension  -BB        Time Calculation    PT Goal Re-Cert Due Date  04/09/19  -BB       User Key  (r) = Recorded By, (t) = Taken By, (c) = Cosigned By    Initials Name Provider Type    Tracey Ruiz PTA Physical Therapy Assistant          Therapy Education  Given: HEP  Program: Reinforced  How Provided: Demonstration, Verbal  Provided to: Patient, Caregiver(Requires max cues and daughter present for education. Daughter verbalizes understanding. )              Time Calculation:   Start Time: 1600  Stop Time: 1645  Time Calculation (min): 45 min  Total Timed Code Minutes- PT: 45 minute(s)  Therapy Charges for Today     Code Description Service Date Service Provider Modifiers Qty    98145462554 HC PT THER PROC EA 15 MIN 4/2/2019 Tracey Vargas PTA GP 3                OP PT Discharge Summary  Date of Discharge: 04/02/19  Reason for Discharge: Maximum functional potential achieved  Outcomes Achieved: Patient able to partially acheive established goals  Discharge Destination: Home with home program  Discharge Instructions/Additional Comments: Pt to continue HEP with daughter and utilize free [facility] FF as appropriate      Tracey Vargas PTA  4/2/2019

## 2019-04-04 RX ORDER — ISOPROPYL ALCOHOL 0.75 G/1
SWAB TOPICAL
Qty: 200 EACH | Refills: 0 | Status: SHIPPED | OUTPATIENT
Start: 2019-04-04 | End: 2019-06-26 | Stop reason: SDUPTHER

## 2019-04-30 ENCOUNTER — OFFICE VISIT (OUTPATIENT)
Dept: CARDIOLOGY | Facility: CLINIC | Age: 84
End: 2019-04-30

## 2019-04-30 VITALS
WEIGHT: 220 LBS | SYSTOLIC BLOOD PRESSURE: 138 MMHG | OXYGEN SATURATION: 98 % | BODY MASS INDEX: 33.34 KG/M2 | HEIGHT: 68 IN | HEART RATE: 69 BPM | DIASTOLIC BLOOD PRESSURE: 72 MMHG

## 2019-04-30 DIAGNOSIS — E03.9 ACQUIRED HYPOTHYROIDISM: ICD-10-CM

## 2019-04-30 DIAGNOSIS — I36.9 TRICUSPID VALVE DISORDERS, NON-RHEUMATIC: ICD-10-CM

## 2019-04-30 DIAGNOSIS — I47.9 PAROXYSMAL TACHYCARDIA (HCC): ICD-10-CM

## 2019-04-30 DIAGNOSIS — I49.5 SSS (SICK SINUS SYNDROME) (HCC): Primary | ICD-10-CM

## 2019-04-30 DIAGNOSIS — R00.1 BRADYCARDIA: ICD-10-CM

## 2019-04-30 PROBLEM — E07.9 DISORDER OF THYROID: Status: RESOLVED | Noted: 2017-03-15 | Resolved: 2019-04-30

## 2019-04-30 PROCEDURE — 99214 OFFICE O/P EST MOD 30 MIN: CPT | Performed by: INTERNAL MEDICINE

## 2019-04-30 NOTE — PROGRESS NOTES
Kierra Scales  89 y.o. female    04/30/2019  1. SSS (sick sinus syndrome) (CMS/HCC)    2. Bradycardia    3. Paroxysmal tachycardia (CMS/HCC)    4. 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 Acquired hypothyroidism        History of Present Illness:  Patient's Body mass index is 33.45 kg/m². BMI is above normal parameters. Recommendations include: exercise counseling, nutrition counseling and referral to primary care.  89 years old patient younger than her stated age physically active good mental status with medical history significant for hypertension, hypertensive heart disease with good heart function, sick sinus syndrome status post pacemaker implantation, palpitation document atrial tachycardia maintain sinus rhythm with the help of Betapace.  The patient admitted with GI bleed noted to have mass subsequent surgery performed recovered very well.  Had anemia induced renal insufficiency recovered well from that.  EKG in the office sinus rhythm.  Patient denies orthopnea, PND, nauseous, vomiting.  Denies any polydipsia polyuria.  Denies any fever cough or chills.     ECHO 5/2017  · Left ventricular systolic function is normal. Estimated EF = 60%.  · Left ventricular diastolic dysfunction (grade I) consistent with impaired relaxation.  · Left atrial cavity size is mildly dilated.  · Mild tricuspid valve regurgitation is present            SUBJECTIVE:    Allergies   Allergen Reactions   • Penicillins          Past Medical History:   Diagnosis Date   • Anxiety    • Arthritis    • Bradycardia    • Dyslipidemia    • Dyspnea    • Fibrocystic disease of breast    • Hashimoto's thyroiditis    • Hypertensive disorder    • Hypothyroidism    • Pain    • Palpitations    • Paroxysmal tachycardia (CMS/HCC)    • Shortness of breath    • Tricuspid valve disorders, specified as nonrheumatic    • Vitamin D deficiency          Past Surgical History:   Procedure Laterality Date   • COLON RESECTION N/A  "6/2/2017    Procedure: COLON RESECTION;  Surgeon: Stefano Linn MD;  Location: [facility] OR;  Service:    • COLONOSCOPY N/A 5/30/2017    Procedure: Flexible Sigmoidoscopy;  Surgeon: Stefano Linn MD;  Location: [facility] ENDOSCOPY;  Service:    • EXTERNAL EAR SURGERY     • EYE SURGERY     • LIPOMA EXCISION     • PACEMAKER IMPLANTATION     • SALIVARY GLAND SURGERY     • TUBAL ABDOMINAL LIGATION           Family History   Problem Relation Age of Onset   • Diabetes Other    • Heart disease Other    • Hypertension Other    • Thyroid disease Other          Social History     Socioeconomic History   • Marital status:      Spouse name: Not on file   • Number of children: Not on file   • Years of education: Not on file   • Highest education level: Not on file   Tobacco Use   • Smoking status: Never Smoker   • Smokeless tobacco: Never Used   Substance and Sexual Activity   • Alcohol use: No   • Drug use: No   • Sexual activity: Defer   Social History Narrative    Lives alone but daughters expect to stay with her at discharge         Current Outpatient Medications   Medication Sig Dispense Refill   • Alcohol Swabs (B-D SINGLE USE SWABS REGULAR) pads USE AS DIRECTED 200 each 0   • B-D UF III MINI PEN NEEDLES 31G X 5 MM misc USE WITH INSULIN TWO TIMES A DAY 90 each 11   • diltiaZEM CD (CARDIZEM CD) 120 MG 24 hr capsule Take 1 capsule by mouth Daily. 30 capsule 1   • ezetimibe (ZETIA) 10 MG tablet Take 1 tablet by mouth Daily. 30 tablet 5   • glucose blood (LUISA CONTOUR TEST) test strip Use to test 3 times per day 100 each 11   • glucose blood test strip by Misc.(Non-Drug; Combo Route) route. ascensia contour strips  Dr Thompson     • Insulin Glargine (LANTUS SOLOSTAR) 100 UNIT/ML injection pen Inject 20 Units under the skin Every Night. 5 pen 11   • Insulin Pen Needle (COMFORT EZ PEN NEEDLES) 31G X 6 MM misc 2 (two) times a day. Inject two times per day.     • Insulin Syringe-Needle U-100 31G X 5/16\" 1 ML " misc 2 (two) times a day. Use as directed TWICE DAILY FOR Insulin Injections     • JARDIANCE 10 MG tablet TAKE ONE TABLET BY MOUTH DAILY BEFORE BREAKFAST. 30 tablet 11   • Lancets (UNILET GP 28) misc USE TO CHECK HER SUGARS 4 TIMES PER  each 11   • methIMAzole (TAPAZOLE) 5 MG tablet TAKE ONE TABLET BY MOUTH EVERY DAY 30 tablet 5   • nabumetone (RELAFEN) 500 MG tablet Take 500 mg by mouth 2 (Two) Times a Day.     • NAMZARIC 28-10 MG capsule sustained-release 24 hr Take 1 tablet by mouth Daily.     • NOVOLOG FLEXPEN 100 UNIT/ML solution pen-injector sc pen INJECT 8 UNITS SUBCUTANEOUSLY THREE TIMES A DAY WITH MEALS 15 mL 11   • pravastatin (PRAVACHOL) 40 MG tablet Take 1 tablet by mouth Every Night. 30 tablet 5   • SITagliptin (JANUVIA) 100 MG tablet Take 1 tablet by mouth Daily. 30 tablet 5   • sotalol (BETAPACE) 80 MG tablet Take 40 mg by mouth 2 (Two) Times a Day.     • vitamin D (ERGOCALCIFEROL) 06549 units capsule capsule TAKE ONE CAPSULE BY MOUTH EVERY FOURTEEN DAYS 30 capsule 11     No current facility-administered medications for this visit.            Review of Systems:     Constitutional:  Denies recent weight loss, weight gain, fever or chills, no change in exercise tolerance.     HENT:  Denies any hearing loss, epistaxis, hoarseness, or difficulty speaking.     Eyes: Wears eyeglasses or contact lenses.    Respiratory:  Denies dyspnea with exertion,no cough, wheezing, or hemoptysis.     Cardiovascular: Negative for palpations, chest pain, orthopnea, PND, peripheral edema, syncope, or claudication.     Gastrointestinal:  Denies change in bowel habits, dyspepsia, ulcer disease, hematochezia, or melena.     Endocrine: Negative for cold intolerance, heat intolerance, polydipsia, polyphagia and polyuria. Denies any history of weight change, polydipsia, polyuria.     Genitourinary: Negative.      Musculoskeletal: Knee arthritis    Skin:  Denies any change in hair or nails, rashes, or skin lesions.  "    Allergic/Immunologic: Negative.  Negative for environmental allergies, food allergies and immunocompromised state.     Neurological:  Denies any history of recurrent headaches, strokes, TIA, or seizure disorder.     Hematological: Denies any food allergies, seasonal allergies, bleeding disorders, or lymphadenopathy.     Psychiatric/Behavioral: Denies any history of depression, substance abuse, or change in cognitive function.       OBJECTIVE:    /72   Pulse 69   Ht 172.7 cm (68\")   Wt 99.8 kg (220 lb)   SpO2 98%   BMI 33.45 kg/m²       Physical Exam:     Constitutional: Cooperative, alert and oriented, well-developed, well-nourished, in no acute distress.     HENT:   Head: Normocephalic, normal hair patterns, no masses or tenderness.  Ears, Nose, and Throat: No gross abnormalities. No pallor or cyanosis. Dentition good.   Eyes: EOMS intact, PERRL, conjunctivae and lids unremarkable. Fundoscopic exam and visual fields not performed.   Neck: No palpable masses or adenopathy, no thyromegaly, no JVD, carotid pulses are full and equal bilaterally and without  Bruits.     Cardiovascular: Regular rhythm, S1 and S2 normal, no S3 or S4. Apical impulse not displaced. No murmurs, gallops, or rubs detected.     Pulmonary/Chest: Chest: normal symmetry, no tenderness to palpation, normal respiratory excursion, no intercostal retraction, no use of accessory muscles. Pulmonary: Normal breath sounds. No rales or rhonchi.    Abdominal: Abdomen soft, bowel sounds normoactive, no masses, no hepatosplenomegaly, non-tender, no bruits.     Musculoskeletal: No deformities, clubbing, cyanosis, erythema, or edema observed. There are no spinal abnormalities noted. Normal muscle strength and tone. Pulses full and equal in all extremities, no bruits auscultated.     Neurological: No gross motor or sensory deficits noted, affect appropriate, oriented to time, person, place.     Skin: Warm and dry to the touch, no apparent skin " lesions or masses noted.     Psychiatric: She has a normal mood and affect. Her behavior is normal. Judgment and thought content normal.         Procedures      Lab Results   Component Value Date    WBC 4.02 12/12/2017    HGB 13.6 12/12/2017    HCT 40.6 12/12/2017    MCV 93.5 12/12/2017     12/12/2017     Lab Results   Component Value Date    GLUCOSE 167 (H) 12/12/2017    BUN 14 12/12/2017    CREATININE 0.81 12/12/2017    EGFRIFAFRI 81 12/12/2017    BCR 17.3 12/12/2017    CO2 31.0 12/12/2017    CALCIUM 9.5 12/12/2017    ALBUMIN 4.60 12/12/2017    AST 25 12/12/2017    ALT 27 12/12/2017     Lab Results   Component Value Date    CHOL 154 04/30/2018     Lab Results   Component Value Date    TRIG 33 04/30/2018    TRIG 54 08/05/2015    TRIG 67 02/08/2014     Lab Results   Component Value Date    HDL 89 04/30/2018    HDL 60 08/05/2015    HDL 75 02/08/2014     No components found for: LDLCALC  Lab Results   Component Value Date    LDL 54 04/30/2018    LDL 52 08/05/2015    LDL 48 02/08/2014     No results found for: HDLLDLRATIO  No components found for: CHOLHDL  Lab Results   Component Value Date    HGBA1C 6.9 (H) 04/30/2018     Lab Results   Component Value Date    TSH 0.440 (L) 06/13/2017    O1HXEJA 135.0 03/15/2017           ASSESSMENT AND PLAN:    #1 atrial tachycardia #2 sick sinus syndrome with bradycardia and pacemaker implantation #3 hypertension with hypertensive heart disease #4 diabetes on combined insulin and oral hypoglycemic agent #5 thyroid abnormality on     Clinically, no evidence of congestive heart failure or active ischemia at the time of evaluations.    history mild dementia but doing remarkably well.  Good blood pressure control.  The patient will continue Betapace for symptomatic symptomatic atrial tachycardia for the, diabetes being managed with combined insulin and oral hypoglycemic agent and thyroid abnormality being treated with methimazole.  We will see her back in 6 month R depends on  Clinical condition.  EKG done and finding discussed the patient.  Atrial pacing with ventricular conduction.  She is a pleased with the clinical outcome.  Patient is taking 80 mg Betapace half tablet twice a day      Kierra was seen today for follow-up.    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

## 2019-06-12 ENCOUNTER — OFFICE VISIT (OUTPATIENT)
Dept: OTOLARYNGOLOGY | Facility: CLINIC | Age: 84
End: 2019-06-12

## 2019-06-12 VITALS — RESPIRATION RATE: 18 BRPM | BODY MASS INDEX: 33.04 KG/M2 | HEIGHT: 68 IN | WEIGHT: 218 LBS

## 2019-06-12 DIAGNOSIS — H61.23 BILATERAL IMPACTED CERUMEN: Primary | ICD-10-CM

## 2019-06-12 PROCEDURE — 69210 REMOVE IMPACTED EAR WAX UNI: CPT | Performed by: OTOLARYNGOLOGY

## 2019-06-12 RX ORDER — SAW/PYGEUM/BETA/HERB/D3/B6/ZN 30 MG-25MG
1 CAPSULE ORAL
Refills: 3 | COMMUNITY
Start: 2019-06-03

## 2019-06-12 NOTE — PROGRESS NOTES
Subjective   Kierra Scales is a 89 y.o. female.       History of Present Illness     Patient is followed with a history of recurring cerumen impactions.  Family says they think she may be having trouble hearing again but is not having any otorrhea    The following portions of the patient's history were reviewed and updated as appropriate: allergies, current medications, past family history, past medical history, past social history, past surgical history and problem list.     reports that she has never smoked. She has never used smokeless tobacco. She reports that she does not drink alcohol or use drugs.   Patient is not a tobacco user and has not been counseled for use of tobacco products      Review of Systems        Objective   Physical Exam  Both ear canals show cerumen impactions  Using the binocular microscope for visualization, cerumen impaction was removed from bilateral ear canal(s) using instrumentation. This was personally performed by Ugo Gale MD  Following cerumen removal tympanic membranes are noted to be intact and clear  Assessment/Plan   Kierra was seen today for cerumen impaction.    Diagnoses and all orders for this visit:    Bilateral impacted cerumen      Plan: Cerumen removed as described above.  Return 9 months.

## 2019-06-18 ENCOUNTER — TRANSCRIBE ORDERS (OUTPATIENT)
Dept: PODIATRY | Facility: CLINIC | Age: 84
End: 2019-06-18

## 2019-06-18 DIAGNOSIS — L60.8 TOENAIL DEFORMITY: Primary | ICD-10-CM

## 2019-06-26 RX ORDER — ISOPROPYL ALCOHOL 0.75 G/1
SWAB TOPICAL
Qty: 200 EACH | Refills: 0 | Status: SHIPPED | OUTPATIENT
Start: 2019-06-26 | End: 2019-07-26 | Stop reason: SDUPTHER

## 2019-07-15 ENCOUNTER — OFFICE VISIT (OUTPATIENT)
Dept: PODIATRY | Facility: CLINIC | Age: 84
End: 2019-07-15

## 2019-07-15 VITALS — BODY MASS INDEX: 33.04 KG/M2 | WEIGHT: 218 LBS | HEART RATE: 81 BPM | OXYGEN SATURATION: 100 % | HEIGHT: 68 IN

## 2019-07-15 DIAGNOSIS — E11.42 TYPE 2 DIABETES MELLITUS WITH PERIPHERAL NEUROPATHY (HCC): Primary | ICD-10-CM

## 2019-07-15 DIAGNOSIS — G89.29 CHRONIC TOE PAIN, BILATERAL: ICD-10-CM

## 2019-07-15 DIAGNOSIS — M79.675 CHRONIC TOE PAIN, BILATERAL: ICD-10-CM

## 2019-07-15 DIAGNOSIS — M79.674 CHRONIC TOE PAIN, BILATERAL: ICD-10-CM

## 2019-07-15 DIAGNOSIS — B35.1 ONYCHOMYCOSIS: ICD-10-CM

## 2019-07-15 PROCEDURE — 99203 OFFICE O/P NEW LOW 30 MIN: CPT | Performed by: PODIATRIST

## 2019-07-15 PROCEDURE — 11721 DEBRIDE NAIL 6 OR MORE: CPT | Performed by: PODIATRIST

## 2019-07-15 NOTE — PROGRESS NOTES
Kierra Scales  4/5/1929  89 y.o. female   PCP : Dr. Ch  BS: [id number]    Patient came to the clinic for diabetic foot care     07/15/2019    Chief Complaint   Patient presents with   • Left Foot - diabetic nail care   • Right Foot - diabetic nail care       History of Present Illness    Kierra Scales is a 89 y.o.female who presents to clinic today accompanied by her daughter for diabetic foot care.  Patient admits to being a diabetic on insulin.  She relates to numbness and tingling in her feet.  Chief complaint today is of long, thickened painful toenails.  Patient is unable to trim them herself.  She denies any injuries or trauma to her feet.  She has no other pedal complaints today.      Past Medical History:   Diagnosis Date   • Anxiety    • Arthritis    • Bradycardia    • Diabetes (CMS/HCC)    • Dyslipidemia    • Dyspnea    • Fibrocystic disease of breast    • Hashimoto's thyroiditis    • High blood pressure    • Hypertensive disorder    • Hypothyroidism    • Pain    • Palpitations    • Paroxysmal tachycardia (CMS/HCC)    • Shortness of breath    • Thyroiditis    • Tricuspid valve disorders, specified as nonrheumatic    • Vitamin D deficiency          Past Surgical History:   Procedure Laterality Date   • COLON RESECTION N/A 6/2/2017    Procedure: COLON RESECTION;  Surgeon: Stefano Linn MD;  Location: [facility] OR;  Service:    • COLONOSCOPY N/A 5/30/2017    Procedure: Flexible Sigmoidoscopy;  Surgeon: Stefano Linn MD;  Location: [facility] ENDOSCOPY;  Service:    • EXTERNAL EAR SURGERY     • EYE SURGERY     • LIPOMA EXCISION     • PACEMAKER IMPLANTATION     • SALIVARY GLAND SURGERY     • TUBAL ABDOMINAL LIGATION           Family History   Problem Relation Age of Onset   • Diabetes Other    • Heart disease Other    • Hypertension Other    • Thyroid disease Other        Allergies   Allergen Reactions   • Penicillins        Social History     Socioeconomic History   • Marital status:  "     Spouse name: Not on file   • Number of children: Not on file   • Years of education: Not on file   • Highest education level: Not on file   Tobacco Use   • Smoking status: Never Smoker   • Smokeless tobacco: Never Used   Substance and Sexual Activity   • Alcohol use: No   • Drug use: No   • Sexual activity: Defer   Social History Narrative    Lives alone but daughters expect to stay with her at discharge         Current Outpatient Medications   Medication Sig Dispense Refill   • Alcohol Swabs (B-D SINGLE USE SWABS REGULAR) pads USE AS DIRECTED 200 each 0   • B-D UF III MINI PEN NEEDLES 31G X 5 MM misc USE WITH INSULIN TWO TIMES A DAY 90 each 11   • diltiaZEM CD (CARDIZEM CD) 120 MG 24 hr capsule Take 1 capsule by mouth Daily. 30 capsule 1   • ezetimibe (ZETIA) 10 MG tablet Take 1 tablet by mouth Daily. 30 tablet 5   • glucose blood (LUISA CONTOUR TEST) test strip Use to test 3 times per day 100 each 11   • glucose blood test strip by Misc.(Non-Drug; Combo Route) route. ascensia contour strips  Dr Thompson     • Insulin Glargine (LANTUS SOLOSTAR) 100 UNIT/ML injection pen Inject 20 Units under the skin Every Night. 5 pen 11   • Insulin Pen Needle (COMFORT EZ PEN NEEDLES) 31G X 6 MM misc 2 (two) times a day. Inject two times per day.     • Insulin Syringe-Needle U-100 31G X 5/16\" 1 ML misc 2 (two) times a day. Use as directed TWICE DAILY FOR Insulin Injections     • JARDIANCE 10 MG tablet TAKE ONE TABLET BY MOUTH DAILY BEFORE BREAKFAST. 30 tablet 11   • Lancets (UNILET GP 28) misc USE TO CHECK HER SUGARS 4 TIMES PER  each 11   • Melatonin ER 10 MG tablet controlled-release Take 1 tablet by mouth every night at bedtime.  3   • methIMAzole (TAPAZOLE) 5 MG tablet TAKE ONE TABLET BY MOUTH EVERY DAY 30 tablet 5   • nabumetone (RELAFEN) 500 MG tablet Take 500 mg by mouth 2 (Two) Times a Day.     • NAMZARIC 28-10 MG capsule sustained-release 24 hr Take 1 tablet by mouth Daily.     • NOVOLOG FLEXPEN 100 " "UNIT/ML solution pen-injector sc pen INJECT 8 UNITS SUBCUTANEOUSLY THREE TIMES A DAY WITH MEALS 15 mL 11   • pravastatin (PRAVACHOL) 40 MG tablet Take 1 tablet by mouth Every Night. 30 tablet 5   • SITagliptin (JANUVIA) 100 MG tablet Take 1 tablet by mouth Daily. 30 tablet 5   • sotalol (BETAPACE) 80 MG tablet Take 40 mg by mouth 2 (Two) Times a Day.     • vitamin D (ERGOCALCIFEROL) 66762 units capsule capsule TAKE ONE CAPSULE BY MOUTH EVERY FOURTEEN DAYS 30 capsule 11     No current facility-administered medications for this visit.        Review of Systems   Constitutional: Negative.    HENT: Negative.    Eyes: Negative.    Respiratory: Negative.    Cardiovascular: Negative.    Gastrointestinal: Negative.    Endocrine: Negative.    Genitourinary: Negative.    Musculoskeletal: Positive for back pain.        Joint pain and arthritis    Skin: Negative.    Allergic/Immunologic: Negative.    Neurological: Negative.    Hematological: Negative.    Psychiatric/Behavioral: Positive for confusion.        Dementia          OBJECTIVE    Pulse 81   Ht 172.7 cm (68\")   Wt 98.9 kg (218 lb)   SpO2 100%   BMI 33.15 kg/m²       Physical Exam   Constitutional: She is oriented to person, place, and time. She appears well-developed and well-nourished. No distress.   HENT:   Head: Normocephalic and atraumatic.   Nose: Nose normal.   Pulmonary/Chest: Effort normal. No respiratory distress. She has no wheezes.    Kierra had a diabetic foot exam performed today.  Neurological: She is alert and oriented to person, place, and time.   Skin: Skin is warm and dry. Capillary refill takes less than 2 seconds.   Psychiatric: She has a normal mood and affect. Her behavior is normal.   Vitals reviewed.      Lower Extremity    Cardiovascular:    DP/PT pulses palpable bilateral  CFT brisk  to all digits  Skin temp is warm to warm from proximal tibia to distal digits bilateral  Pedal hair growth diminished.   No erythema  noted   Diffuse " varicosities bilateral    Musculoskeletal:  Muscle strength is 5/5 for all muscle groups tested   ROM of the 1st MTP is WNL bilateral  Mild HAV bilateral  ROM of the MTJ is WNL bilateral   ROM of the STJ is WNL bilateral   ROM of the ankle joint is  WNL bilateral     Dermatological:   Skin is warm, dry and intact bilateral  Webspaces 1-4 bilateral are clean, dry and intact.   No subcutaneous nodules or masses noted    Nails 1-5 bilateral are thickened, discolored, elongated with subungual debris.    Neurological:   Protective sensation absent 3 out of 10 sites bilateral  Sensation intact to light touch    Vibratory sensation intact at the hallux interphalangeal joint bilateral    Procedures        ASSESSMENT AND PLAN    Kierra was seen today for diabetic nail care and diabetic nail care.    Diagnoses and all orders for this visit:    Type 2 diabetes mellitus with peripheral neuropathy (CMS/HCC)    Onychomycosis    Chronic toe pain, bilateral      - A diabetic foot screening exam was performed and the patient was educated on the foot complications related to diabetes,  preventative foot care and what signs and symptoms to watch for.  Instructed to contact our office if any foot problems develop before next visit.  - Nails 1-5 bilateral were debrided in length and thickness with nail nipper and electric  to decrease fungal load and risk of infection.  - All the patients questions were answered.  - RTC 3 months or sooner if needed.            This document has been electronically signed by Dexter Stein DPM on July 15, 2019 5:51 PM     7/15/2019  5:51 PM

## 2019-07-22 ENCOUNTER — APPOINTMENT (OUTPATIENT)
Dept: GENERAL RADIOLOGY | Facility: HOSPITAL | Age: 84
End: 2019-07-22

## 2019-07-22 ENCOUNTER — HOSPITAL ENCOUNTER (EMERGENCY)
Facility: HOSPITAL | Age: 84
Discharge: HOME | End: 2019-07-22
Attending: EMERGENCY MEDICINE | Admitting: EMERGENCY MEDICINE

## 2019-07-22 VITALS
BODY MASS INDEX: 33.27 KG/M2 | WEIGHT: 219.5 LBS | OXYGEN SATURATION: 97 % | TEMPERATURE: 98.7 F | SYSTOLIC BLOOD PRESSURE: 145 MMHG | HEART RATE: 65 BPM | HEIGHT: 68 IN | RESPIRATION RATE: 18 BRPM | DIASTOLIC BLOOD PRESSURE: 60 MMHG
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
## 2019-07-22 DIAGNOSIS — N39.0 URINARY TRACT INFECTION WITHOUT HEMATURIA, SITE UNSPECIFIED: Primary | ICD-10-CM

## 2019-07-22 LAB
ALBUMIN SERPL-MCNC: 4.3 G/DL (ref 3.5–5.2)
ALBUMIN/GLOB SERPL: 1.3 G/DL
ALP SERPL-CCNC: 73 U/L (ref 39–117)
ALT SERPL W P-5'-P-CCNC: 23 U/L (ref 1–33)
ANION GAP SERPL CALCULATED.3IONS-SCNC: 20 MMOL/L (ref 5–15)
AST SERPL-CCNC: 40 U/L (ref 1–32)
BACTERIA UR QL AUTO: ABNORMAL /HPF
BASOPHILS # BLD AUTO: 0.01 10*3/MM3 (ref 0–0.2)
BASOPHILS NFR BLD AUTO: 0.1 % (ref 0–1.5)
BILIRUB SERPL-MCNC: 0.6 MG/DL (ref 0.2–1.2)
BILIRUB UR QL STRIP: NEGATIVE
BUN BLD-MCNC: 19 MG/DL (ref 8–23)
BUN/CREAT SERPL: 24.4 (ref 7–25)
CALCIUM SPEC-SCNC: 9.4 MG/DL (ref 8.2–9.6)
CHLORIDE SERPL-SCNC: 101 MMOL/L (ref 98–107)
CLARITY UR: CLEAR
CO2 SERPL-SCNC: 22 MMOL/L (ref 22–29)
COLOR UR: YELLOW
CREAT BLD-MCNC: 0.78 MG/DL (ref 0.57–1)
DEPRECATED RDW RBC AUTO: 44.7 FL (ref 37–54)
EOSINOPHIL # BLD AUTO: 0.01 10*3/MM3 (ref 0–0.4)
EOSINOPHIL NFR BLD AUTO: 0.1 % (ref 0.3–6.2)
ERYTHROCYTE [DISTWIDTH] IN BLOOD BY AUTOMATED COUNT: 12.9 % (ref 12.3–15.4)
GFR SERPL CREATININE-BSD FRML MDRD: 84 ML/MIN/1.73
GLOBULIN UR ELPH-MCNC: 3.4 GM/DL
GLUCOSE BLD-MCNC: 169 MG/DL (ref 65–99)
GLUCOSE BLDC GLUCOMTR-MCNC: 153 MG/DL (ref 70–130)
GLUCOSE UR STRIP-MCNC: ABNORMAL MG/DL
HCT VFR BLD AUTO: 46.9 % (ref 34–46.6)
HGB BLD-MCNC: 15.8 G/DL (ref 12–15.9)
HGB UR QL STRIP.AUTO: ABNORMAL
HYALINE CASTS UR QL AUTO: ABNORMAL /LPF
IMM GRANULOCYTES # BLD AUTO: 0.1 10*3/MM3 (ref 0–0.05)
IMM GRANULOCYTES NFR BLD AUTO: 0.9 % (ref 0–0.5)
KETONES UR QL STRIP: ABNORMAL
LEUKOCYTE ESTERASE UR QL STRIP.AUTO: NEGATIVE
LYMPHOCYTES # BLD AUTO: 0.75 10*3/MM3 (ref 0.7–3.1)
LYMPHOCYTES NFR BLD AUTO: 7 % (ref 19.6–45.3)
MCH RBC QN AUTO: 31.9 PG (ref 26.6–33)
MCHC RBC AUTO-ENTMCNC: 33.7 G/DL (ref 31.5–35.7)
MCV RBC AUTO: 94.7 FL (ref 79–97)
MONOCYTES # BLD AUTO: 0.43 10*3/MM3 (ref 0.1–0.9)
MONOCYTES NFR BLD AUTO: 4 % (ref 5–12)
NEUTROPHILS # BLD AUTO: 9.45 10*3/MM3 (ref 1.7–7)
NEUTROPHILS NFR BLD AUTO: 87.9 % (ref 42.7–76)
NITRITE UR QL STRIP: NEGATIVE
NRBC BLD AUTO-RTO: 0 /100 WBC (ref 0–0.2)
PH UR STRIP.AUTO: 5.5 [PH] (ref 5–9)
PLATELET # BLD AUTO: 136 10*3/MM3 (ref 140–450)
PMV BLD AUTO: 11.1 FL (ref 6–12)
POTASSIUM BLD-SCNC: 3.8 MMOL/L (ref 3.5–5.2)
PROT SERPL-MCNC: 7.7 G/DL (ref 6–8.5)
PROT UR QL STRIP: ABNORMAL
RBC # BLD AUTO: 4.95 10*6/MM3 (ref 3.77–5.28)
RBC # UR: ABNORMAL /HPF
RBC MORPH BLD: NORMAL
REF LAB TEST METHOD: ABNORMAL
SMALL PLATELETS BLD QL SMEAR: NORMAL
SODIUM BLD-SCNC: 143 MMOL/L (ref 136–145)
SP GR UR STRIP: >=1.03 (ref 1–1.03)
SQUAMOUS #/AREA URNS HPF: ABNORMAL /HPF
UROBILINOGEN UR QL STRIP: ABNORMAL
WBC MORPH BLD: NORMAL
WBC NRBC COR # BLD: 10.75 10*3/MM3 (ref 3.4–10.8)
WBC UR QL AUTO: ABNORMAL /HPF

## 2019-07-22 PROCEDURE — 85025 COMPLETE CBC W/AUTO DIFF WBC: CPT | Performed by: PHYSICIAN ASSISTANT

## 2019-07-22 PROCEDURE — 73502 X-RAY EXAM HIP UNI 2-3 VIEWS: CPT

## 2019-07-22 PROCEDURE — 99284 EMERGENCY DEPT VISIT MOD MDM: CPT

## 2019-07-22 PROCEDURE — 85007 BL SMEAR W/DIFF WBC COUNT: CPT | Performed by: PHYSICIAN ASSISTANT

## 2019-07-22 PROCEDURE — 82962 GLUCOSE BLOOD TEST: CPT

## 2019-07-22 PROCEDURE — 80053 COMPREHEN METABOLIC PANEL: CPT | Performed by: PHYSICIAN ASSISTANT

## 2019-07-22 PROCEDURE — 36415 COLL VENOUS BLD VENIPUNCTURE: CPT

## 2019-07-22 PROCEDURE — 81001 URINALYSIS AUTO W/SCOPE: CPT | Performed by: PHYSICIAN ASSISTANT

## 2019-07-22 PROCEDURE — 73030 X-RAY EXAM OF SHOULDER: CPT

## 2019-07-22 RX ORDER — NITROFURANTOIN 25; 75 MG/1; MG/1
100 CAPSULE ORAL 2 TIMES DAILY
Qty: 10 CAPSULE | Refills: 0 | Status: SHIPPED | OUTPATIENT
Start: 2019-07-22 | End: 2019-07-27

## 2019-07-22 RX ORDER — CLONIDINE HYDROCHLORIDE 0.1 MG/1
0.1 TABLET ORAL ONCE
Status: DISCONTINUED | OUTPATIENT
Start: 2019-07-22 | End: 2019-07-22

## 2019-07-22 RX ORDER — CLONIDINE HYDROCHLORIDE 0.1 MG/1
0.1 TABLET ORAL ONCE
Status: COMPLETED | OUTPATIENT
Start: 2019-07-22 | End: 2019-07-22

## 2019-07-22 RX ORDER — NITROFURANTOIN 25; 75 MG/1; MG/1
100 CAPSULE ORAL ONCE
Status: COMPLETED | OUTPATIENT
Start: 2019-07-22 | End: 2019-07-22

## 2019-07-22 RX ORDER — NITROFURANTOIN 25; 75 MG/1; MG/1
100 CAPSULE ORAL 2 TIMES DAILY
Qty: 14 CAPSULE | Refills: 0 | Status: SHIPPED | OUTPATIENT
Start: 2019-07-22 | End: 2019-07-22 | Stop reason: SDUPTHER

## 2019-07-22 RX ADMIN — CLONIDINE HYDROCHLORIDE 0.1 MG: 0.1 TABLET ORAL at 15:57

## 2019-07-22 RX ADMIN — NITROFURANTOIN (MONOHYDRATE/MACROCRYSTALS) 100 MG: 75; 25 CAPSULE ORAL at 19:58

## 2019-07-26 RX ORDER — ISOPROPYL ALCOHOL 0.75 G/1
SWAB TOPICAL
Qty: 200 EACH | Refills: 0 | Status: SHIPPED | OUTPATIENT
Start: 2019-07-26 | End: 2019-08-27 | Stop reason: SDUPTHER

## 2019-08-05 RX ORDER — CARVEDILOL 25 MG/1
TABLET, FILM COATED ORAL
Qty: 100 EACH | Refills: 11 | Status: SHIPPED | OUTPATIENT
Start: 2019-08-05

## 2019-08-27 RX ORDER — ISOPROPYL ALCOHOL 0.75 G/1
SWAB TOPICAL
Qty: 200 EACH | Refills: 11 | Status: SHIPPED | OUTPATIENT
Start: 2019-08-27 | End: 2020-06-03

## 2019-09-26 ENCOUNTER — CLINICAL SUPPORT (OUTPATIENT)
Dept: CARDIOLOGY | Facility: CLINIC | Age: 84
End: 2019-09-26

## 2019-09-26 DIAGNOSIS — I49.5 SSS (SICK SINUS SYNDROME) (HCC): Primary | ICD-10-CM

## 2019-09-26 DIAGNOSIS — Z95.0 PACEMAKER: ICD-10-CM

## 2019-09-26 PROCEDURE — 93294 REM INTERROG EVL PM/LDLS PM: CPT | Performed by: NURSE PRACTITIONER

## 2019-09-26 PROCEDURE — 93296 REM INTERROG EVL PM/IDS: CPT | Performed by: NURSE PRACTITIONER

## 2019-09-26 NOTE — PROGRESS NOTES
Pacemaker Evaluation Report    September 26, 2019    Primary Cardiologist: Dr. Valenzuela  Implanting MD: Dr. Valenzuela  :[employer] Model: [employer] DR RF [id number] Serial Number: [id number]  Implant date: 6/23/2011     Reason for evaluation:routine, PPM, Remote   Cardiac device indication(s): sick sinus syndrome    Battery  SONU: 2.9 years     Interrogation Results  Atrial sensing: P wave: 2.5 mV  Atrial capture: 0.375 V @ 0.5 ms   Atrial lead impedance: 400 ohms  Ventricular sensing: R wave: >12.0 mV  Ventricular capture: 1.0 V @ 0.5 ms   Ventricular lead impedance: right  400 ohms    Parameters  Mode: DDDR  Base Rate: 60/110    Diagnostic Data  Atrial paced: 92 %   Ventricular paced: <1 %  Mode switch: <1%  AT/AF [place]: <1%  AHR: 2 longest 12 min 52 min on 9/12/2019  VHR: 0    Changes made: No changes made     Conclusions: normal device function    Assessment:  1. SSS (sick sinus syndrome) (CMS/HCC)    2. Pacemaker              This document has been electronically signed by DEBBY Guevara on September 26, 2019 11:54 AM

## 2019-10-15 ENCOUNTER — OFFICE VISIT (OUTPATIENT)
Dept: PODIATRY | Facility: CLINIC | Age: 84
End: 2019-10-15

## 2019-10-15 VITALS — WEIGHT: 219 LBS | HEART RATE: 67 BPM | HEIGHT: 68 IN | OXYGEN SATURATION: 99 % | BODY MASS INDEX: 33.19 KG/M2

## 2019-10-15 DIAGNOSIS — G89.29 CHRONIC TOE PAIN, BILATERAL: ICD-10-CM

## 2019-10-15 DIAGNOSIS — E11.42 TYPE 2 DIABETES MELLITUS WITH PERIPHERAL NEUROPATHY (HCC): Primary | ICD-10-CM

## 2019-10-15 DIAGNOSIS — B35.1 ONYCHOMYCOSIS: ICD-10-CM

## 2019-10-15 DIAGNOSIS — M79.674 CHRONIC TOE PAIN, BILATERAL: ICD-10-CM

## 2019-10-15 DIAGNOSIS — M79.675 CHRONIC TOE PAIN, BILATERAL: ICD-10-CM

## 2019-10-15 PROCEDURE — 11721 DEBRIDE NAIL 6 OR MORE: CPT | Performed by: PODIATRIST

## 2019-10-15 NOTE — PROGRESS NOTES
Kierra Scales  4/5/1929  89 y.o. female   PCP : Dr. Ch  BS: [id number]    Patient came to the clinic for diabetic foot care     10/15/2019     Chief Complaint   Patient presents with   • Left Foot - diabetic nail care   • Right Foot - diabetic nail care       History of Present Illness    Kierra Scales is a 89 y.o.female who presents to clinic today accompanied by her daughters for diabetic foot care.       Past Medical History:   Diagnosis Date   • Anxiety    • Arthritis    • Bradycardia    • Diabetes (CMS/HCC)    • Dyslipidemia    • Dyspnea    • Fibrocystic disease of breast    • Hashimoto's thyroiditis    • High blood pressure    • Hypertensive disorder    • Hypothyroidism    • Pain    • Palpitations    • Paroxysmal tachycardia (CMS/HCC)    • Shortness of breath    • Thyroiditis    • Tricuspid valve disorders, specified as nonrheumatic    • Vitamin D deficiency          Past Surgical History:   Procedure Laterality Date   • COLON RESECTION N/A 6/2/2017    Procedure: COLON RESECTION;  Surgeon: Stefano Linn MD;  Location: [facility] OR;  Service:    • COLONOSCOPY N/A 5/30/2017    Procedure: Flexible Sigmoidoscopy;  Surgeon: Stefano Linn MD;  Location: [facility] ENDOSCOPY;  Service:    • EXTERNAL EAR SURGERY     • EYE SURGERY     • LIPOMA EXCISION     • PACEMAKER IMPLANTATION     • SALIVARY GLAND SURGERY     • TUBAL ABDOMINAL LIGATION           Family History   Problem Relation Age of Onset   • Diabetes Other    • Heart disease Other    • Hypertension Other    • Thyroid disease Other        Allergies   Allergen Reactions   • Penicillins        Social History     Socioeconomic History   • Marital status:      Spouse name: Not on file   • Number of children: Not on file   • Years of education: Not on file   • Highest education level: Not on file   Tobacco Use   • Smoking status: Never Smoker   • Smokeless tobacco: Never Used   Substance and Sexual Activity   • Alcohol use: No   • Drug  "use: No   • Sexual activity: Defer   Social History Narrative    Lives alone but daughters expect to stay with her at discharge         Current Outpatient Medications   Medication Sig Dispense Refill   • Alcohol Swabs (B-D SINGLE USE SWABS REGULAR) pads USE AS DIRECTED 200 each 11   • B-D UF III MINI PEN NEEDLES 31G X 5 MM misc USE WITH INSULIN TWO TIMES A DAY 90 each 11   • CONTOUR TEST test strip USE TO TEST BLOOD SUGAR THREE TIMES A  each 11   • diltiaZEM CD (CARDIZEM CD) 120 MG 24 hr capsule Take 1 capsule by mouth Daily. 30 capsule 1   • ezetimibe (ZETIA) 10 MG tablet Take 1 tablet by mouth Daily. 30 tablet 5   • glucose blood test strip by Misc.(Non-Drug; Combo Route) route. ascensia contour strips  Dr Thompson     • Insulin Glargine (LANTUS SOLOSTAR) 100 UNIT/ML injection pen Inject 20 Units under the skin Every Night. 5 pen 11   • Insulin Pen Needle (COMFORT EZ PEN NEEDLES) 31G X 6 MM misc 2 (two) times a day. Inject two times per day.     • Insulin Syringe-Needle U-100 31G X 5/16\" 1 ML misc 2 (two) times a day. Use as directed TWICE DAILY FOR Insulin Injections     • JARDIANCE 10 MG tablet TAKE ONE TABLET BY MOUTH DAILY BEFORE BREAKFAST. 30 tablet 11   • Lancets (UNILET GP 28) misc USE TO CHECK HER SUGARS 4 TIMES PER  each 11   • Melatonin ER 10 MG tablet controlled-release Take 1 tablet by mouth every night at bedtime.  3   • methIMAzole (TAPAZOLE) 5 MG tablet TAKE ONE TABLET BY MOUTH EVERY DAY 30 tablet 5   • nabumetone (RELAFEN) 500 MG tablet Take 500 mg by mouth 2 (Two) Times a Day.     • NAMZARIC 28-10 MG capsule sustained-release 24 hr Take 1 tablet by mouth Daily.     • NOVOLOG FLEXPEN 100 UNIT/ML solution pen-injector sc pen INJECT 8 UNITS SUBCUTANEOUSLY THREE TIMES A DAY WITH MEALS 15 mL 11   • pravastatin (PRAVACHOL) 40 MG tablet Take 1 tablet by mouth Every Night. 30 tablet 5   • SITagliptin (JANUVIA) 100 MG tablet Take 1 tablet by mouth Daily. 30 tablet 5   • sotalol (BETAPACE) 80 MG " "tablet Take 40 mg by mouth 2 (Two) Times a Day.     • vitamin D (ERGOCALCIFEROL) 70836 units capsule capsule TAKE ONE CAPSULE BY MOUTH EVERY FOURTEEN DAYS 30 capsule 11     No current facility-administered medications for this visit.        Review of Systems   Constitutional: Negative.    HENT: Negative.    Eyes: Negative.    Respiratory: Negative.    Cardiovascular: Negative.    Gastrointestinal: Negative.    Musculoskeletal: Positive for back pain.        Joint pain and arthritis    Skin: Negative.    Psychiatric/Behavioral:        Dementia          OBJECTIVE    Pulse 67   Ht 172.7 cm (68\")   Wt 99.3 kg (219 lb)   SpO2 99%   BMI 33.30 kg/m²       Physical Exam   Constitutional: She is oriented to person, place, and time. She appears well-developed and well-nourished. No distress.   HENT:   Head: Normocephalic and atraumatic.   Pulmonary/Chest: Effort normal. No respiratory distress.   Neurological: She is alert and oriented to person, place, and time.   Skin: Skin is warm and dry. Capillary refill takes less than 2 seconds.   Psychiatric: She has a normal mood and affect. Her behavior is normal.   Vitals reviewed.      Lower Extremity    Cardiovascular:    DP/PT pulses palpable bilateral  CFT brisk  to all digits  Skin temp is warm to warm from proximal tibia to distal digits bilateral  Pedal hair growth diminished.   Diffuse varicosities bilateral  Musculoskeletal:  Muscle strength is 5/5 for all muscle groups tested   ROM of the 1st MTP is WNL bilateral  Mild HAV bilateral  ROM of the MTJ is WNL bilateral   ROM of the STJ is WNL bilateral   ROM of the ankle joint is  WNL bilateral   Dermatological:   Skin is warm, dry and intact bilateral  Webspaces 1-4 bilateral are clean, dry and intact.   Nails 1-5 bilateral are thickened, discolored, elongated with subungual debris.  Neurological:   Protective sensation absent 3 out of 10 sites bilateral  Sensation intact to light touch    Vibratory sensation intact at " the hallux interphalangeal joint bilateral    Procedures        ASSESSMENT AND PLAN    Kierra was seen today for diabetic nail care and diabetic nail care.    Diagnoses and all orders for this visit:    Type 2 diabetes mellitus with peripheral neuropathy (CMS/HCC)    Onychomycosis    Chronic toe pain, bilateral      - Nails 1-5 bilateral were debrided in length and thickness with nail nipper and electric  to decrease fungal load and risk of infection.  - All the patients questions were answered.  - RTC 3 months or sooner if needed.            This document has been electronically signed by Dexter Stein DPM on October 15, 2019 3:30 PM     10/15/2019  3:30 PM

## 2019-11-04 RX ORDER — INSULIN ASPART 100 [IU]/ML
INJECTION, SOLUTION INTRAVENOUS; SUBCUTANEOUS
Qty: 15 ML | Refills: 11 | OUTPATIENT
Start: 2019-11-04

## 2019-11-05 ENCOUNTER — OFFICE VISIT (OUTPATIENT)
Dept: CARDIOLOGY | Facility: CLINIC | Age: 84
End: 2019-11-05

## 2019-11-05 VITALS
HEART RATE: 75 BPM | SYSTOLIC BLOOD PRESSURE: 126 MMHG | WEIGHT: 223 LBS | DIASTOLIC BLOOD PRESSURE: 68 MMHG | OXYGEN SATURATION: 99 % | HEIGHT: 68 IN | BODY MASS INDEX: 33.8 KG/M2

## 2019-11-05 DIAGNOSIS — Z79.4 TYPE 2 DIABETES MELLITUS WITHOUT COMPLICATION, WITH LONG-TERM CURRENT USE OF INSULIN (HCC): ICD-10-CM

## 2019-11-05 DIAGNOSIS — I47.9 PAROXYSMAL TACHYCARDIA (HCC): ICD-10-CM

## 2019-11-05 DIAGNOSIS — I49.5 SSS (SICK SINUS SYNDROME) (HCC): ICD-10-CM

## 2019-11-05 DIAGNOSIS — E78.00 HYPERCHOLESTEROLEMIA: Primary | ICD-10-CM

## 2019-11-05 DIAGNOSIS — E11.9 TYPE 2 DIABETES MELLITUS WITHOUT COMPLICATION, WITH LONG-TERM CURRENT USE OF INSULIN (HCC): ICD-10-CM

## 2019-11-05 DIAGNOSIS — I36.9 TRICUSPID VALVE DISORDERS, NON-RHEUMATIC: ICD-10-CM

## 2019-11-05 DIAGNOSIS — I10 ESSENTIAL HYPERTENSION: ICD-10-CM

## 2019-11-05 DIAGNOSIS — E03.9 ACQUIRED HYPOTHYROIDISM: ICD-10-CM

## 2019-11-05 PROCEDURE — 99213 OFFICE O/P EST LOW 20 MIN: CPT | Performed by: INTERNAL MEDICINE

## 2019-11-05 NOTE — PROGRESS NOTES
Kierra Scales  89 y.o. female    11/05/2019  1. Hypercholesterolemia    2. Essential hypertension    3. SSS (sick sinus syndrome) (CMS/HCC)    4. Paroxysmal tachycardia (CMS/HCC)    5. Acquired hypothyroidism    6. 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 Type 2 diabetes mellitus without complication, with long-term current use of insulin (CMS/HCC)        History of Present Illness:    Body mass index is 33.91 kg/m². BMI is above normal parameters. Recommendations include: exercise counseling, nutrition counseling and referral to primary care.      89 years old patient younger than her stated age physically active good mental status with medical history significant for hypertension, hypertensive heart disease with good heart function, sick sinus syndrome status post pacemaker implantation, palpitation document atrial tachycardia maintain sinus rhythm with the help of Betapace.  The patient admitted with GI bleed noted to have mass subsequent surgery performed recovered very well.  Had anemia induced renal insufficiency recovered well from that.  EKG in the office sinus rhythm.  Patient denies orthopnea, PND, nauseous, vomiting.  Denies any polydipsia polyuria.  Denies any fever cough or chills.     ECHO 5/2017  · Left ventricular systolic function is normal. Estimated EF = 60%.  · Left ventricular diastolic dysfunction (grade I) consistent with impaired relaxation.  · Left atrial cavity size is mildly dilated.  · Mild tricuspid valve regurgitation is present      4/2018  Total Cholesterol  100 - 200 mg/dL 154    Triglycerides  30 - 200 mg/dL 33    HDL Cholesterol  39 - 96 mg/dL 89    LDL Cholesterol   5 - 99 mg/dL 54    Chol/HDL Ratio  3.5 - 5.3 1.7 Abnormally low          SUBJECTIVE:    Allergies   Allergen Reactions   • Penicillins          Past Medical History:   Diagnosis Date   • Anxiety    • Arthritis    • Bradycardia    • Diabetes (CMS/HCC)    • Dyslipidemia    • Dyspnea    •  Fibrocystic disease of breast    • Hashimoto's thyroiditis    • High blood pressure    • Hypertensive disorder    • Hypothyroidism    • Pain    • Palpitations    • Paroxysmal tachycardia (CMS/HCC)    • Shortness of breath    • Thyroiditis    • Tricuspid valve disorders, specified as nonrheumatic    • Vitamin D deficiency          Past Surgical History:   Procedure Laterality Date   • COLON RESECTION N/A 6/2/2017    Procedure: COLON RESECTION;  Surgeon: Stefano Linn MD;  Location: [facility] OR;  Service:    • COLONOSCOPY N/A 5/30/2017    Procedure: Flexible Sigmoidoscopy;  Surgeon: Stefano Linn MD;  Location: [facility] ENDOSCOPY;  Service:    • EXTERNAL EAR SURGERY     • EYE SURGERY     • LIPOMA EXCISION     • PACEMAKER IMPLANTATION     • SALIVARY GLAND SURGERY     • TUBAL ABDOMINAL LIGATION           Family History   Problem Relation Age of Onset   • Diabetes Other    • Heart disease Other    • Hypertension Other    • Thyroid disease Other          Social History     Socioeconomic History   • Marital status:      Spouse name: Not on file   • Number of children: Not on file   • Years of education: Not on file   • Highest education level: Not on file   Tobacco Use   • Smoking status: Never Smoker   • Smokeless tobacco: Never Used   Substance and Sexual Activity   • Alcohol use: No   • Drug use: No   • Sexual activity: Defer   Social History Narrative    Lives alone but daughters expect to stay with her at discharge         Current Outpatient Medications   Medication Sig Dispense Refill   • Alcohol Swabs (B-D SINGLE USE SWABS REGULAR) pads USE AS DIRECTED 200 each 11   • B-D UF III MINI PEN NEEDLES 31G X 5 MM misc USE WITH INSULIN TWO TIMES A DAY 90 each 11   • CONTOUR TEST test strip USE TO TEST BLOOD SUGAR THREE TIMES A  each 11   • diltiaZEM CD (CARDIZEM CD) 120 MG 24 hr capsule Take 1 capsule by mouth Daily. 30 capsule 1   • ezetimibe (ZETIA) 10 MG tablet Take 1 tablet by mouth Daily.  "30 tablet 5   • glucose blood test strip by Misc.(Non-Drug; Combo Route) route. ascensia contour strips  Dr Thompson     • Insulin Glargine (LANTUS SOLOSTAR) 100 UNIT/ML injection pen Inject 20 Units under the skin Every Night. 5 pen 11   • Insulin Pen Needle (COMFORT EZ PEN NEEDLES) 31G X 6 MM misc 2 (two) times a day. Inject two times per day.     • Insulin Syringe-Needle U-100 31G X 5/16\" 1 ML misc 2 (two) times a day. Use as directed TWICE DAILY FOR Insulin Injections     • JARDIANCE 10 MG tablet TAKE ONE TABLET BY MOUTH DAILY BEFORE BREAKFAST. 30 tablet 11   • Lancets (UNILET GP 28) [facility] USE TO CHECK HER SUGARS 4 TIMES PER  each 11   • Melatonin ER 10 MG tablet controlled-release Take 1 tablet by mouth every night at bedtime.  3   • methIMAzole (TAPAZOLE) 5 MG tablet TAKE ONE TABLET BY MOUTH EVERY DAY 30 tablet 5   • nabumetone (RELAFEN) 500 MG tablet Take 500 mg by mouth 2 (Two) Times a Day.     • NAMZARIC 28-10 MG capsule sustained-release 24 hr Take 1 tablet by mouth Daily.     • NOVOLOG FLEXPEN 100 UNIT/ML solution pen-injector sc pen INJECT 8 UNITS SUBCUTANEOUSLY THREE TIMES A DAY WITH MEALS 15 mL 11   • pravastatin (PRAVACHOL) 40 MG tablet Take 1 tablet by mouth Every Night. 30 tablet 5   • SITagliptin (JANUVIA) 100 MG tablet Take 1 tablet by mouth Daily. 30 tablet 5   • sotalol (BETAPACE) 80 MG tablet Take 40 mg by mouth 2 (Two) Times a Day.     • vitamin D (ERGOCALCIFEROL) 23131 units capsule capsule TAKE ONE CAPSULE BY MOUTH EVERY FOURTEEN DAYS 30 capsule 11     No current facility-administered medications for this visit.            Review of Systems:     Constitutional:  Denies recent weight loss, weight gain, fever or chills, no change in exercise tolerance.     HENT:  Denies any hearing loss, epistaxis, hoarseness, or difficulty speaking.     Eyes: Wears eyeglasses or contact lenses.    Respiratory:  Denies dyspnea with exertion,no cough, wheezing, or hemoptysis.     Cardiovascular: Negative for " "palpations, chest pain, orthopnea, PND, peripheral edema, syncope, or claudication.     Gastrointestinal:  Denies change in bowel habits, dyspepsia, ulcer disease, hematochezia, or melena.     Endocrine: Negative for cold intolerance, heat intolerance, polydipsia, polyphagia and polyuria. Denies any history of weight change, polydipsia, polyuria.     Genitourinary: Negative.      Musculoskeletal: Denies any history of arthritic symptoms or back problems.     Skin:  Denies any change in hair or nails, rashes, or skin lesions.     Allergic/Immunologic: Negative.  Negative for environmental allergies, food allergies and immunocompromised state.     Neurological:  Denies any history of recurrent headaches, strokes, TIA, or seizure disorder.     Hematological: Denies any food allergies, seasonal allergies, bleeding disorders, or lymphadenopathy.     Psychiatric/Behavioral: Denies any history of depression, substance abuse, or change in cognitive function.       OBJECTIVE:    /68   Pulse 75   Ht 172.7 cm (68\")   Wt 101 kg (223 lb)   SpO2 99%   BMI 33.91 kg/m²       Physical Exam:     Constitutional: Cooperative, alert and oriented, well-developed, well-nourished, in no acute distress.     HENT:   Head: Normocephalic, normal hair patterns, no masses or tenderness.  Ears, Nose, and Throat: No gross abnormalities. No pallor or cyanosis. Dentition good.   Eyes: EOMS intact, PERRL, conjunctivae and lids unremarkable. Fundoscopic exam and visual fields not performed.   Neck: No palpable masses or adenopathy, no thyromegaly, no JVD, carotid pulses are full and equal bilaterally and without  Bruits.     Cardiovascular: Regular rhythm, S1 and S2 normal, no S3 or S4. Apical impulse not displaced. No murmurs, gallops, or rubs detected.     Pulmonary/Chest: Chest: normal symmetry, no tenderness to palpation, normal respiratory excursion, no intercostal retraction, no use of accessory muscles. Pulmonary: Normal breath " sounds. No rales or rhonchi.    Abdominal: Abdomen soft, bowel sounds normoactive, no masses, no hepatosplenomegaly, non-tender, no bruits.     Musculoskeletal: No deformities, clubbing, cyanosis, erythema, or edema observed. There are no spinal abnormalities noted. Normal muscle strength and tone. Pulses full and equal in all extremities, no bruits auscultated.     Neurological: No gross motor or sensory deficits noted, affect appropriate, oriented to time, person, place.     Skin: Warm and dry to the touch, no apparent skin lesions or masses noted.     Psychiatric: She has a normal mood and affect. Her behavior is normal. Judgment and thought content normal.         Procedures      Lab Results   Component Value Date    WBC 10.75 07/22/2019    HGB 15.8 07/22/2019    HCT 46.9 (H) 07/22/2019    MCV 94.7 07/22/2019     (L) 07/22/2019     Lab Results   Component Value Date    GLUCOSE 169 (H) 07/22/2019    BUN 19 07/22/2019    CREATININE 0.78 07/22/2019    EGFRIFAFRI 84 07/22/2019    BCR 24.4 07/22/2019    CO2 22.0 07/22/2019    CALCIUM 9.4 07/22/2019    ALBUMIN 4.30 07/22/2019    AST 40 (H) 07/22/2019    ALT 23 07/22/2019     Lab Results   Component Value Date    CHOL 154 04/30/2018     Lab Results   Component Value Date    TRIG 33 04/30/2018    TRIG 54 08/05/2015    TRIG 67 02/08/2014     Lab Results   Component Value Date    HDL 89 04/30/2018    HDL 60 08/05/2015    HDL 75 02/08/2014     No components found for: LDLCALC  Lab Results   Component Value Date    LDL 54 04/30/2018    LDL 52 08/05/2015    LDL 48 02/08/2014     No results found for: HDLLDLRATIO  No components found for: CHOLHDL  Lab Results   Component Value Date    HGBA1C 6.9 (H) 04/30/2018     Lab Results   Component Value Date    TSH 0.440 (L) 06/13/2017    [id number] 135.0 03/15/2017           ASSESSMENT AND PLAN:  #1 atrial tachycardia #2 sick sinus syndrome with bradycardia and pacemaker implantation #3 hypertension with hypertensive heart disease  #4 diabetes on combined insulin and oral hypoglycemic agent #5 thyroid abnormality on     Clinically, no evidence of congestive heart failure or active ischemia at the time of evaluations.    history mild dementia but doing remarkably well.  Good blood pressure control.  The patient will continue Betapace for symptomatic symptomatic atrial tachycardia for the, diabetes being managed with combined insulin and oral hypoglycemic agent and thyroid abnormality being treated with methimazole.  We will see her back in 6 month R depends on Clinical condition.  EKG done and finding discussed the patient.  Atrial pacing with ventricular conduction.  She is a pleased with the clinical outcome.  Patient is taking 80 mg Betapace half tablet twice a day    Kierra was seen today for follow-up.    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

## 2019-11-12 RX ORDER — LANCETS
EACH MISCELLANEOUS
Refills: 11 | OUTPATIENT
Start: 2019-11-12

## 2020-01-01 ENCOUNTER — OFFICE VISIT (OUTPATIENT)
Dept: OTOLARYNGOLOGY | Facility: CLINIC | Age: 85
End: 2020-01-01

## 2020-01-01 VITALS — OXYGEN SATURATION: 97 %

## 2020-01-01 DIAGNOSIS — H61.23 BILATERAL IMPACTED CERUMEN: Primary | ICD-10-CM

## 2020-01-01 PROCEDURE — 69210 REMOVE IMPACTED EAR WAX UNI: CPT | Performed by: OTOLARYNGOLOGY

## 2020-01-01 RX ORDER — MELOXICAM 7.5 MG/1
7.5 TABLET ORAL DAILY
COMMUNITY
Start: 2020-01-01

## 2020-01-01 RX ORDER — QUETIAPINE FUMARATE 25 MG/1
50 TABLET, FILM COATED ORAL 2 TIMES DAILY
COMMUNITY
Start: 2020-01-01

## 2020-01-01 RX ORDER — TRAZODONE HYDROCHLORIDE 50 MG/1
50 TABLET ORAL NIGHTLY
COMMUNITY
Start: 2020-01-01

## 2020-01-01 RX ORDER — ISOPROPYL ALCOHOL 0.75 G/1
SWAB TOPICAL
Qty: 200 EACH | Refills: 0 | OUTPATIENT
Start: 2020-01-01

## 2020-03-27 PROCEDURE — 93294 REM INTERROG EVL PM/LDLS PM: CPT | Performed by: NURSE PRACTITIONER

## 2020-04-06 ENCOUNTER — CLINICAL SUPPORT (OUTPATIENT)
Dept: CARDIOLOGY | Facility: CLINIC | Age: 85
End: 2020-04-06

## 2020-04-06 DIAGNOSIS — Z95.0 PACEMAKER: Primary | ICD-10-CM

## 2020-04-06 DIAGNOSIS — I49.5 SSS (SICK SINUS SYNDROME) (HCC): ICD-10-CM

## 2020-04-06 PROCEDURE — 93296 REM INTERROG EVL PM/IDS: CPT | Performed by: NURSE PRACTITIONER

## 2020-04-06 NOTE — PROGRESS NOTES
Pacemaker Evaluation Report    April 6, 2020    Primary Cardiologist: Dr. Valenzuela  Implanting MD: Dr. Valenzuela  :[employer] Model: [employer] DR RF 2210 Serial Number: [id number]  Implant date: 6/23/2011     Reason for evaluation:routine, PPM, Remote   Cardiac device indication(s): sick sinus syndrome    Battery  SONU: 1.9 years     Interrogation Results  Atrial sensing: P wave: 3.1 mV  Atrial capture: 0.5 V @ 0.5 ms   Atrial lead impedance: 390 ohms  Ventricular sensing: R wave: 9.0 mV  Ventricular capture: 0.875 V @ 0.5 ms   Ventricular lead impedance: right  380 ohms    Parameters  Mode: DDDR  Base Rate: 60/110    Diagnostic Data  Atrial paced: 96 %   Ventricular paced: 2.1 %  Mode switch: <1%  AT/AF [place]: 0%  AHR: 2  Longest 42 sec on 10/23/2019  VHR: 1 5 cycles @ 150 bpm on 3/19/2020    Changes made: No changes made     Conclusions: normal device function    Assessment:  1. Pacemaker    2. SSS (sick sinus syndrome) (CMS/[facility])                      This document has been electronically signed by DEBBY Hayes on April 10, 2020 09:55

## 2020-06-03 RX ORDER — UBIQUINOL 100 MG
CAPSULE ORAL
Qty: 200 EACH | Refills: 0 | Status: SHIPPED | OUTPATIENT
Start: 2020-06-03

## 2020-11-04 NOTE — PROGRESS NOTES
Subjective   Kierra Scales is a 89 y.o. female.       History of Present Illness   Patient has recurring cerumen impactions.  Has dementia.  Family thinks her ears are stopped up      The following portions of the patient's history were reviewed and updated as appropriate: allergies, current medications, past family history, past medical history, past social history, past surgical history and problem list.     reports that she has never smoked. She has never used smokeless tobacco. She reports that she does not drink alcohol or use drugs.   Patient is not a tobacco user and has not been counseled for use of tobacco products      Review of Systems        Objective   Physical Exam  Both ear canals are indeed completely occluded with cerumen  Using the binocular microscope for visualization, cerumen impaction was removed from bilateral ear canal(s) using instrumentation. This was personally performed by Ugo Gale MD  Following cerumen removal tympanic membranes are noted to be intact and clear    Assessment/Plan   Diagnoses and all orders for this visit:    1. Bilateral impacted cerumen (Primary)        Plan: Cerumen removed as described above.  Return 9 months.  Call for problems.

## 2021-01-01 ENCOUNTER — APPOINTMENT (OUTPATIENT)
Dept: ULTRASOUND IMAGING | Facility: HOSPITAL | Age: 86
End: 2021-01-01

## 2021-01-01 ENCOUNTER — HOSPITAL ENCOUNTER (OUTPATIENT)
Facility: HOSPITAL | Age: 86
Setting detail: HOSPITAL OUTPATIENT SURGERY
Discharge: HOME | End: 2021-05-27
Attending: INTERNAL MEDICINE | Admitting: INTERNAL MEDICINE

## 2021-01-01 ENCOUNTER — ANESTHESIA EVENT (OUTPATIENT)
Dept: CARDIOLOGY | Facility: HOSPITAL | Age: 86
End: 2021-01-01

## 2021-01-01 ENCOUNTER — IMMUNIZATION (OUTPATIENT)
Dept: VACCINE CLINIC | Facility: HOSPITAL | Age: 86
End: 2021-01-01

## 2021-01-01 ENCOUNTER — CLINICAL SUPPORT (OUTPATIENT)
Dept: CARDIOLOGY | Facility: CLINIC | Age: 86
End: 2021-01-01

## 2021-01-01 ENCOUNTER — TELEPHONE (OUTPATIENT)
Dept: CARDIOLOGY | Facility: CLINIC | Age: 86
End: 2021-01-01

## 2021-01-01 ENCOUNTER — ANESTHESIA (OUTPATIENT)
Dept: CARDIOLOGY | Facility: HOSPITAL | Age: 86
End: 2021-01-01

## 2021-01-01 ENCOUNTER — OFFICE VISIT (OUTPATIENT)
Dept: CARDIOLOGY | Facility: CLINIC | Age: 86
End: 2021-01-01

## 2021-01-01 ENCOUNTER — LAB (OUTPATIENT)
Dept: LAB | Facility: HOSPITAL | Age: 86
End: 2021-01-01

## 2021-01-01 VITALS
BODY MASS INDEX: 33.92 KG/M2 | OXYGEN SATURATION: 93 % | RESPIRATION RATE: 18 BRPM | DIASTOLIC BLOOD PRESSURE: 62 MMHG | HEIGHT: 68 IN | SYSTOLIC BLOOD PRESSURE: 122 MMHG | HEART RATE: 55 BPM

## 2021-01-01 VITALS
WEIGHT: 190 LBS | RESPIRATION RATE: 18 BRPM | HEART RATE: 63 BPM | BODY MASS INDEX: 27.2 KG/M2 | HEIGHT: 70 IN | TEMPERATURE: 96.9 F | SYSTOLIC BLOOD PRESSURE: 132 MMHG | OXYGEN SATURATION: 97 % | DIASTOLIC BLOOD PRESSURE: 53 MMHG

## 2021-01-01 DIAGNOSIS — I49.5 SSS (SICK SINUS SYNDROME) (HCC): Primary | ICD-10-CM

## 2021-01-01 DIAGNOSIS — I10 ESSENTIAL HYPERTENSION: ICD-10-CM

## 2021-01-01 DIAGNOSIS — I49.5 SSS (SICK SINUS SYNDROME) (HCC): ICD-10-CM

## 2021-01-01 DIAGNOSIS — Z45.010 PACEMAKER GENERATOR END OF LIFE: Primary | ICD-10-CM

## 2021-01-01 DIAGNOSIS — Z95.0 PACEMAKER: ICD-10-CM

## 2021-01-01 DIAGNOSIS — Z45.010 PACEMAKER GENERATOR END OF LIFE: ICD-10-CM

## 2021-01-01 DIAGNOSIS — Z01.818 PREOP TESTING: Primary | ICD-10-CM

## 2021-01-01 DIAGNOSIS — I47.9 PAROXYSMAL TACHYCARDIA (HCC): ICD-10-CM

## 2021-01-01 LAB
ALBUMIN SERPL-MCNC: 4.1 G/DL (ref 3.5–5.2)
ALBUMIN/GLOB SERPL: 1.3 G/DL
ALP SERPL-CCNC: 79 U/L (ref 39–117)
ALT SERPL W P-5'-P-CCNC: 7 U/L (ref 1–33)
ANION GAP SERPL CALCULATED.3IONS-SCNC: 9 MMOL/L (ref 5–15)
AST SERPL-CCNC: 14 U/L (ref 1–32)
BASOPHILS # BLD AUTO: 0.01 10*3/MM3 (ref 0–0.2)
BASOPHILS NFR BLD AUTO: 0.2 % (ref 0–1.5)
BILIRUB SERPL-MCNC: 0.6 MG/DL (ref 0–1.2)
BUN SERPL-MCNC: 11 MG/DL (ref 8–23)
BUN/CREAT SERPL: 10.3 (ref 7–25)
CALCIUM SPEC-SCNC: 9.9 MG/DL (ref 8.2–9.6)
CHLORIDE SERPL-SCNC: 106 MMOL/L (ref 98–107)
CO2 SERPL-SCNC: 27 MMOL/L (ref 22–29)
CREAT SERPL-MCNC: 1.07 MG/DL (ref 0.57–1)
DEPRECATED RDW RBC AUTO: 49.9 FL (ref 37–54)
EOSINOPHIL # BLD AUTO: 0.04 10*3/MM3 (ref 0–0.4)
EOSINOPHIL NFR BLD AUTO: 0.7 % (ref 0.3–6.2)
ERYTHROCYTE [DISTWIDTH] IN BLOOD BY AUTOMATED COUNT: 14.2 % (ref 12.3–15.4)
GFR SERPL CREATININE-BSD FRML MDRD: 58 ML/MIN/1.73
GLOBULIN UR ELPH-MCNC: 3.1 GM/DL
GLUCOSE BLDC GLUCOMTR-MCNC: 181 MG/DL (ref 70–130)
GLUCOSE SERPL-MCNC: 206 MG/DL (ref 65–99)
HCT VFR BLD AUTO: 38.6 % (ref 34–46.6)
HGB BLD-MCNC: 12.4 G/DL (ref 12–15.9)
IMM GRANULOCYTES # BLD AUTO: 0.08 10*3/MM3 (ref 0–0.05)
IMM GRANULOCYTES NFR BLD AUTO: 1.4 % (ref 0–0.5)
INR PPP: 1.08 (ref 0.8–1.2)
LYMPHOCYTES # BLD AUTO: 1.37 10*3/MM3 (ref 0.7–3.1)
LYMPHOCYTES NFR BLD AUTO: 24.3 % (ref 19.6–45.3)
MCH RBC QN AUTO: 30.9 PG (ref 26.6–33)
MCHC RBC AUTO-ENTMCNC: 32.1 G/DL (ref 31.5–35.7)
MCV RBC AUTO: 96.3 FL (ref 79–97)
MONOCYTES # BLD AUTO: 0.75 10*3/MM3 (ref 0.1–0.9)
MONOCYTES NFR BLD AUTO: 13.3 % (ref 5–12)
NEUTROPHILS NFR BLD AUTO: 3.38 10*3/MM3 (ref 1.7–7)
NEUTROPHILS NFR BLD AUTO: 60.1 % (ref 42.7–76)
NRBC BLD AUTO-RTO: 0 /100 WBC (ref 0–0.2)
PLATELET # BLD AUTO: 133 10*3/MM3 (ref 140–450)
PMV BLD AUTO: 11.3 FL (ref 6–12)
POTASSIUM SERPL-SCNC: 4.4 MMOL/L (ref 3.5–5.2)
PROT SERPL-MCNC: 7.2 G/DL (ref 6–8.5)
PROTHROMBIN TIME: 14.5 SECONDS (ref 11.1–15.3)
RBC # BLD AUTO: 4.01 10*6/MM3 (ref 3.77–5.28)
SARS-COV-2 N GENE RESP QL NAA+PROBE: NOT DETECTED
SODIUM SERPL-SCNC: 142 MMOL/L (ref 136–145)
WBC # BLD AUTO: 5.63 10*3/MM3 (ref 3.4–10.8)

## 2021-01-01 PROCEDURE — 25010000002 PROPOFOL 10 MG/ML EMULSION: Performed by: ANESTHESIOLOGY

## 2021-01-01 PROCEDURE — 25010000002 HEPARIN (PORCINE) PER 1000 UNITS: Performed by: INTERNAL MEDICINE

## 2021-01-01 PROCEDURE — 85610 PROTHROMBIN TIME: CPT | Performed by: INTERNAL MEDICINE

## 2021-01-01 PROCEDURE — 99214 OFFICE O/P EST MOD 30 MIN: CPT | Performed by: INTERNAL MEDICINE

## 2021-01-01 PROCEDURE — 85025 COMPLETE CBC W/AUTO DIFF WBC: CPT | Performed by: INTERNAL MEDICINE

## 2021-01-01 PROCEDURE — 82962 GLUCOSE BLOOD TEST: CPT

## 2021-01-01 PROCEDURE — 93288 INTERROG EVL PM/LDLS PM IP: CPT | Performed by: NURSE PRACTITIONER

## 2021-01-01 PROCEDURE — 0001A: CPT | Performed by: THORACIC SURGERY (CARDIOTHORACIC VASCULAR SURGERY)

## 2021-01-01 PROCEDURE — 93971 EXTREMITY STUDY: CPT

## 2021-01-01 PROCEDURE — 80053 COMPREHEN METABOLIC PANEL: CPT | Performed by: INTERNAL MEDICINE

## 2021-01-01 PROCEDURE — 91300 HC SARSCOV02 VAC 30MCG/0.3ML IM: CPT | Performed by: THORACIC SURGERY (CARDIOTHORACIC VASCULAR SURGERY)

## 2021-01-01 PROCEDURE — 91300 HC SARSCOV02 VAC 30MCG/0.3ML IM: CPT | Performed by: NURSE PRACTITIONER

## 2021-01-01 PROCEDURE — 33228 REMV&REPLC PM GEN DUAL LEAD: CPT | Performed by: INTERNAL MEDICINE

## 2021-01-01 PROCEDURE — 25010000002 GENTAMICIN PER 80 MG: Performed by: INTERNAL MEDICINE

## 2021-01-01 PROCEDURE — 0002A: CPT | Performed by: NURSE PRACTITIONER

## 2021-01-01 PROCEDURE — 93296 REM INTERROG EVL PM/IDS: CPT | Performed by: NURSE PRACTITIONER

## 2021-01-01 PROCEDURE — C1785 PMKR, DUAL, RATE-RESP: HCPCS | Performed by: INTERNAL MEDICINE

## 2021-01-01 PROCEDURE — 93294 REM INTERROG EVL PM/LDLS PM: CPT | Performed by: NURSE PRACTITIONER

## 2021-01-01 PROCEDURE — C9803 HOPD COVID-19 SPEC COLLECT: HCPCS

## 2021-01-01 PROCEDURE — 87635 SARS-COV-2 COVID-19 AMP PRB: CPT

## 2021-01-01 DEVICE — GEN PM ASSURITY MRI DR RF PM2272: Type: IMPLANTABLE DEVICE | Status: FUNCTIONAL

## 2021-01-01 RX ORDER — ONDANSETRON 2 MG/ML
4 INJECTION INTRAMUSCULAR; INTRAVENOUS ONCE AS NEEDED
Status: CANCELLED | OUTPATIENT
Start: 2021-01-01

## 2021-01-01 RX ORDER — CLINDAMYCIN PHOSPHATE 900 MG/50ML
900 INJECTION INTRAVENOUS ONCE
Status: COMPLETED | OUTPATIENT
Start: 2021-01-01 | End: 2021-01-01

## 2021-01-01 RX ORDER — CLINDAMYCIN PHOSPHATE 900 MG/50ML
900 INJECTION INTRAVENOUS ONCE
Status: CANCELLED | OUTPATIENT
Start: 2021-01-01 | End: 2021-01-01

## 2021-01-01 RX ORDER — LIDOCAINE HYDROCHLORIDE 20 MG/ML
INJECTION, SOLUTION INFILTRATION; PERINEURAL AS NEEDED
Status: DISCONTINUED | OUTPATIENT
Start: 2021-01-01 | End: 2021-01-01 | Stop reason: HOSPADM

## 2021-01-01 RX ORDER — PROMETHAZINE HYDROCHLORIDE 25 MG/1
25 SUPPOSITORY RECTAL ONCE AS NEEDED
Status: CANCELLED | OUTPATIENT
Start: 2021-01-01

## 2021-01-01 RX ORDER — SODIUM CHLORIDE 9 MG/ML
75 INJECTION, SOLUTION INTRAVENOUS CONTINUOUS
Status: DISCONTINUED | OUTPATIENT
Start: 2021-01-01 | End: 2021-01-01 | Stop reason: HOSPADM

## 2021-01-01 RX ORDER — CETIRIZINE HYDROCHLORIDE 10 MG/1
10 TABLET ORAL DAILY
COMMUNITY

## 2021-01-01 RX ORDER — ASPIRIN 81 MG/1
81 TABLET, CHEWABLE ORAL DAILY
COMMUNITY

## 2021-01-01 RX ORDER — SODIUM CHLORIDE 9 MG/ML
75 INJECTION, SOLUTION INTRAVENOUS CONTINUOUS
Status: CANCELLED | OUTPATIENT
Start: 2021-01-01

## 2021-01-01 RX ORDER — PROMETHAZINE HYDROCHLORIDE 25 MG/1
25 TABLET ORAL ONCE AS NEEDED
Status: CANCELLED | OUTPATIENT
Start: 2021-01-01

## 2021-01-01 RX ORDER — CLINDAMYCIN PHOSPHATE 300 MG/50ML
300 INJECTION INTRAVENOUS ONCE
Status: COMPLETED | OUTPATIENT
Start: 2021-01-01 | End: 2021-01-01

## 2021-01-01 RX ORDER — PROPOFOL 10 MG/ML
VIAL (ML) INTRAVENOUS AS NEEDED
Status: DISCONTINUED | OUTPATIENT
Start: 2021-01-01 | End: 2021-01-01 | Stop reason: SURG

## 2021-01-01 RX ADMIN — SODIUM CHLORIDE 75 ML/HR: 900 INJECTION, SOLUTION INTRAVENOUS at 08:19

## 2021-01-01 RX ADMIN — PROPOFOL 20 MG: 10 INJECTION, EMULSION INTRAVENOUS at 09:19

## 2021-01-01 RX ADMIN — PROPOFOL 20 MG: 10 INJECTION, EMULSION INTRAVENOUS at 09:12

## 2021-01-01 RX ADMIN — PROPOFOL 20 MG: 10 INJECTION, EMULSION INTRAVENOUS at 10:01

## 2021-01-01 RX ADMIN — PROPOFOL 20 MG: 10 INJECTION, EMULSION INTRAVENOUS at 09:30

## 2021-01-01 RX ADMIN — PROPOFOL 20 MG: 10 INJECTION, EMULSION INTRAVENOUS at 09:43

## 2021-01-01 RX ADMIN — PROPOFOL 20 MG: 10 INJECTION, EMULSION INTRAVENOUS at 09:52

## 2021-01-01 RX ADMIN — PROPOFOL 20 MG: 10 INJECTION, EMULSION INTRAVENOUS at 09:47

## 2021-01-01 RX ADMIN — GENTAMICIN SULFATE 80 MG: 40 INJECTION, SOLUTION INTRAMUSCULAR; INTRAVENOUS at 09:16

## 2021-01-01 RX ADMIN — PROPOFOL 20 MG: 10 INJECTION, EMULSION INTRAVENOUS at 10:17

## 2021-01-01 RX ADMIN — PROPOFOL 20 MG: 10 INJECTION, EMULSION INTRAVENOUS at 09:18

## 2021-01-01 RX ADMIN — PROPOFOL 20 MG: 10 INJECTION, EMULSION INTRAVENOUS at 09:25

## 2021-01-01 RX ADMIN — CLINDAMYCIN IN 5 PERCENT DEXTROSE 900 MG: 18 INJECTION, SOLUTION INTRAVENOUS at 09:34

## 2021-01-01 RX ADMIN — CLINDAMYCIN PHOSPHATE 300 MG: 300 INJECTION, SOLUTION INTRAVENOUS at 09:40

## 2021-01-01 RX ADMIN — PROPOFOL 20 MG: 10 INJECTION, EMULSION INTRAVENOUS at 09:28

## 2021-01-01 RX ADMIN — PROPOFOL 20 MG: 10 INJECTION, EMULSION INTRAVENOUS at 09:34

## 2021-01-01 RX ADMIN — PROPOFOL 20 MG: 10 INJECTION, EMULSION INTRAVENOUS at 09:39

## 2021-01-01 RX ADMIN — PROPOFOL 20 MG: 10 INJECTION, EMULSION INTRAVENOUS at 10:08

## 2021-05-14 PROBLEM — Z45.010 PACEMAKER GENERATOR END OF LIFE: Status: ACTIVE | Noted: 2021-01-01

## 2021-05-14 NOTE — PROGRESS NOTES
Kierra Scales  89 y.o. female    5/14/2021  1. SSS (sick sinus syndrome) (CMS/HCC)    2. Paroxysmal tachycardia (CMS/HCC)    3. Essential hypertension        History of Present Illness:    Body mass index is 33.92 kg/m². BMI is above normal parameters. Recommendations include: exercise counseling, nutrition counseling and referral to primary care.    89 years old patient preparatory age with a background history of sick sinus syndrome, paroxysmal atrial tachycardia maintained sinus rhythm with the help of Betapace.  Patient presented office today pacer interrogated and reached SONU status.  One of her daughter works at same day surgery at [employer].  No symptom suggesting cardiac decompensation reported by the family.  She had appropriate medical management.  At present she is on diltiazem 120 mg and sotalol 80 mg.  She pacing the atrium with 95% of time.  Her intrinsic rhythm is at rate of 30 bpm.  No fever cough or chills or syncopal episode reported.    ECHO 5/2017  · Left ventricular systolic function is normal. Estimated EF = 60%.  · Left ventricular diastolic dysfunction (grade I) consistent with impaired relaxation.  · Left atrial cavity size is mildly dilated.  · Mild tricuspid valve regurgitation is present      4/2018  Total Cholesterol  100 - 200 mg/dL 154    Triglycerides  30 - 200 mg/dL 33    HDL Cholesterol  39 - 96 mg/dL 89    LDL Cholesterol   5 - 99 mg/dL 54    Chol/HDL Ratio  3.5 - 5.3 1.7 Abnormally low          SUBJECTIVE:    Allergies   Allergen Reactions   • Penicillins          Past Medical History:   Diagnosis Date   • Anxiety    • Arthritis    • Bradycardia    • Diabetes (CMS/HCC)    • Dyslipidemia    • Dyspnea    • Fibrocystic disease of breast    • Hashimoto's thyroiditis    • High blood pressure    • Hypertensive disorder    • Hypothyroidism    • Pain    • Palpitations    • Paroxysmal tachycardia (CMS/HCC)    • Shortness of breath    • Thyroiditis    • Tricuspid valve  disorders, specified as nonrheumatic    • Vitamin D deficiency          Past Surgical History:   Procedure Laterality Date   • COLON RESECTION N/A 6/2/2017    Procedure: COLON RESECTION;  Surgeon: Stefano Linn MD;  Location: [facility] OR;  Service:    • COLONOSCOPY N/A 5/30/2017    Procedure: Flexible Sigmoidoscopy;  Surgeon: Stefano Linn MD;  Location: [facility] ENDOSCOPY;  Service:    • EXTERNAL EAR SURGERY     • EYE SURGERY     • LIPOMA EXCISION     • PACEMAKER IMPLANTATION     • SALIVARY GLAND SURGERY     • TUBAL ABDOMINAL LIGATION           Family History   Problem Relation Age of Onset   • Diabetes Other    • Heart disease Other    • Hypertension Other    • Thyroid disease Other          Social History     Socioeconomic History   • Marital status:      Spouse name: Not on file   • Number of children: Not on file   • Years of education: Not on file   • Highest education level: Not on file   Tobacco Use   • Smoking status: Never Smoker   • Smokeless tobacco: Never Used   Vaping Use   • Vaping Use: Never used   Substance and Sexual Activity   • Alcohol use: No   • Drug use: No   • Sexual activity: Defer         Current Outpatient Medications   Medication Sig Dispense Refill   • Alcohol Swabs (ALCOHOL PREP) 70 % pads USE AS DIRECTED 200 each 0   • B-D UF III MINI PEN NEEDLES 31G X 5 MM misc USE WITH INSULIN TWO TIMES A DAY 90 each 11   • CONTOUR TEST test strip USE TO TEST BLOOD SUGAR THREE TIMES A  each 11   • diltiaZEM CD (CARDIZEM CD) 120 MG 24 hr capsule Take 1 capsule by mouth Daily. 30 capsule 1   • ezetimibe (ZETIA) 10 MG tablet Take 1 tablet by mouth Daily. 30 tablet 5   • glucose blood test strip by Misc.(Non-Drug; Combo Route) route. ascensia contour strips  Dr Thompson     • Insulin Glargine (LANTUS SOLOSTAR) 100 UNIT/ML injection pen Inject 20 Units under the skin Every Night. 5 pen 11   • Insulin Pen Needle (COMFORT EZ PEN NEEDLES) 31G X 6 MM misc 2 (two) times a day. Inject  "two times per day.     • Insulin Syringe-Needle U-100 31G X 5/16\" 1 ML misc 2 (two) times a day. Use as directed TWICE DAILY FOR Insulin Injections     • JARDIANCE 10 MG tablet TAKE ONE TABLET BY MOUTH DAILY BEFORE BREAKFAST. 30 tablet 11   • Lancets (UNILET GP 28) misc USE TO CHECK HER SUGARS 4 TIMES PER  each 11   • Melatonin ER 10 MG tablet controlled-release Take 1 tablet by mouth every night at bedtime.  3   • meloxicam (MOBIC) 7.5 MG tablet      • methIMAzole (TAPAZOLE) 5 MG tablet TAKE ONE TABLET BY MOUTH EVERY DAY 30 tablet 5   • nabumetone (RELAFEN) 500 MG tablet Take 500 mg by mouth 2 (Two) Times a Day.     • NAMZARIC 28-10 MG capsule sustained-release 24 hr Take 1 tablet by mouth Daily.     • NOVOLOG FLEXPEN 100 UNIT/ML solution pen-injector sc pen INJECT 8 UNITS SUBCUTANEOUSLY THREE TIMES A DAY WITH MEALS 15 mL 11   • pravastatin (PRAVACHOL) 40 MG tablet Take 1 tablet by mouth Every Night. 30 tablet 5   • QUEtiapine (SEROquel) 25 MG tablet      • SITagliptin (JANUVIA) 100 MG tablet Take 1 tablet by mouth Daily. 30 tablet 5   • sotalol (BETAPACE) 80 MG tablet Take 40 mg by mouth 2 (Two) Times a Day.     • traZODone (DESYREL) 50 MG tablet      • vitamin D (ERGOCALCIFEROL) 30388 units capsule capsule TAKE ONE CAPSULE BY MOUTH EVERY FOURTEEN DAYS 30 capsule 11     No current facility-administered medications for this visit.           Review of Systems:     Constitutional:  Denies recent weight loss, weight gain no change in exercise tolerance.     HENT:  Denies any hearing loss, epistaxis, hoarseness    Eyes: Wears eyeglasses or contact lenses.    Respiratory:  Denies dyspnea with exertion,no cough,      Cardiovascular: Negative for palpations, chest pain, orthopnea, PND, peripheral edema, syncope, or claudication.     Gastrointestinal:  Denies change in bowel habits, dyspepsia, ulcer disease    Endocrine: Negative for cold intolerance, heat intolerance, polydipsia, polyphagia and polyuria. " "  Genitourinary: Negative.      Musculoskeletal history of osteoarthritis    Skin:  Denies any change in hair or nails, rashes, or skin lesions.     Allergic/Immunologic: Negative.  Negative for environmental allergies,    Neurological:  Denies any history of recurrent headaches, strokes    Hematological: Denies any food allergies, seasonal allergies    Psychiatric/Behavioral: Denies any history of depression      OBJECTIVE:    /62 (BP Location: Right arm, Patient Position: Sitting, Cuff Size: Adult)   Pulse 55   Resp 18   Ht 172.7 cm (67.99\")   SpO2 93%   BMI 33.92 kg/m²       Physical Exam:     Constitutional: Cooperative, alert and oriented, well-developed, well-nourished, in no acute distress.     HENT:   Head: Normocephalic, conjunctive is pink thyroid is nonpalpable no jugular is distention  Cardiovascular: Regular rhythm, S1 and S2 normal, no S3 or S4. Apical impulse not displaced. No murmurs, gallops, or rubs detected.       Pulmonary/Chest: Chest: No rales or wheezing      Abdominal: Abdomen soft, bowel sounds normoactive, no masses,     Musculoskeletal: No deformities, positive peripheral pulses no edema.     Neurological: No gross motor or sensory deficits noted, affect appropriate    Skin: Warm and dry to the touch, no apparent skin lesions or masses noted.     Psychiatric: She has a normal mood and affect. Her behavior is normal        Procedures      Lab Results   Component Value Date    WBC 3.5 (L) 11/19/2019    HGB 13.5 11/19/2019    HCT 41.5 11/19/2019    MCV 97 (H) 11/19/2019     (L) 11/19/2019     Lab Results   Component Value Date    GLUCOSE 169 (H) 07/22/2019    BUN 13 11/19/2019    CREATININE 1.1 (H) 11/19/2019    EGFRIFAFRI 84 07/22/2019    BCR 24.4 07/22/2019    CO2 22.0 07/22/2019    CALCIUM 9.4 11/19/2019    ALBUMIN 4 11/19/2019    AST 16 11/19/2019    ALT 19 11/19/2019     Lab Results   Component Value Date    CHOL 132 11/25/2020    CHOL 154 04/30/2018     Lab Results "   Component Value Date    TRIG 45 11/25/2020    TRIG 33 04/30/2018    TRIG 54 08/05/2015     Lab Results   Component Value Date    HDL 68 11/25/2020    HDL 89 04/30/2018    HDL 60 08/05/2015     No components found for: LDLCALC  Lab Results   Component Value Date    LDL 52 11/25/2020    LDL 54 04/30/2018    LDL 52 08/05/2015     No results found for: HDLLDLRATIO  No components found for: CHOLHDL  Lab Results   Component Value Date    HGBA1C 7.6 (H) 11/25/2020     Lab Results   Component Value Date    TSH 0.440 (L) 06/13/2017    C4BWAIR 135.0 03/15/2017           ASSESSMENT AND PLAN:    #1 sick sinus syndrome s/p pacemaker reached SONU status      89 years old patient presented for routine follow-up.  Patient interrogated pacing the atrium at 96% of time.  Has underlying rhythm at 30s beats per minute.  Procedure risk regarding the pacemaker generator replacement discussed with the patient and family.  Risk included but not infection, bleeding, stroke, pneumothorax, hematoma.  Understand willing to proceed forward.  Risk related to IV conscious sedation explained.  She is a Mallampati score 2 and ASA class II at the time of evaluation    #2 paroxysmal atrial tachycardia maintained sinus rhythm with the Betapace and AV kamala blocking drug    #3 hypertension with hypertensive heart disease.  Good blood pressure continue diltiazem    #1 atrial tachycardia #2 sick sinus syndrome with bradycardia and pacemaker implantation #3 hypertension with hypertensive heart disease #4 diabetes on combined insulin and oral hypoglycemic agent #5 thyroid abnormality on         Diagnoses and all orders for this visit:    1. SSS (sick sinus syndrome) (CMS/HCC) (Primary)    2. Paroxysmal tachycardia (CMS/HCC)    3. Essential hypertension          Karol Valenzuela MD  5/14/2021  12:38 CDT

## 2021-05-17 NOTE — PROGRESS NOTES
Pacemaker Evaluation Report    April 6, 2020    Primary Cardiologist: Dr. Valenzuela  Implanting MD: Dr. Valenzuela  :[employer] Model: [employer] DR [id number] Serial Number: [id number]  Implant date: 6/23/2011     Reason for evaluation: PPM office per Dr Valenzuela, symptoms   Cardiac device indication(s): sick sinus syndrome    Battery  SONU: Reached SONU on 5/2/2021    Interrogation Results  Atrial sensing: P wave: 3.6 mV  Atrial capture: 0.5 V @ 0.5 ms   Atrial lead impedance: 390 ohms  Ventricular sensing: R wave: 10.9 mV  Ventricular capture: 1.25 V @ 0.5 ms   Ventricular lead impedance: right  390 ohms    Parameters  Mode: DDDR  Base Rate: 60/110    Diagnostic Data  Atrial paced: 87 %   Ventricular paced: <1 %  Mode switch: <1%  AT/AF [place]: 0%  AHR: 1 AMS  VHR: 0    In office discussed with Dr Valenzuela and Denice to schedule generator change.    Changes made: No changes made     Conclusions: battery at elective replacement voltage    Assessment:  1. SSS (sick sinus syndrome) (CMS/HCC)    2. Pacemaker                  This document has been electronically signed by DEBBY Guevara on May 18, 2021 08:02 CDT

## 2021-05-27 NOTE — ANESTHESIA PREPROCEDURE EVALUATION
Anesthesia Evaluation     Patient summary reviewed and Nursing notes reviewed   no history of anesthetic complications:  NPO Solid Status: > 8 hours  NPO Liquid Status: > 8 hours           Airway   Mallampati: II  TM distance: >3 FB  Neck ROM: full  possible difficult intubation  Dental    (+) edentulous    Pulmonary - negative pulmonary ROS and normal exam    breath sounds clear to auscultation  (-) not a smoker  Cardiovascular - normal exam    ECG reviewed  Rhythm: regular  Rate: normal    (+) pacemaker pacemaker interrogated >year ago, hypertension, dysrhythmias (Sick sinus syndrome) Bradycardia, hyperlipidemia,   (-) murmur, carotid bruits    ROS comment: ECG 12 Lead   Date/Time: 4/13/2018 11:11 AM   Performed by: ELSYIA PICKARD   Authorized by: ELYSIA PICKARD   Comparison: not compared with previous ECG   Rhythm: sinus rhythm and paced     Neuro/Psych  (+) tremors, psychiatric history Anxiety, dementia,     GI/Hepatic/Renal/Endo    (+)   diabetes mellitus type 2 using insulin,     Musculoskeletal     Abdominal    Substance History - negative use     OB/GYN negative ob/gyn ROS         Other   arthritis,    history of cancer (Colon treated.) remission                    Anesthesia Plan    ASA 4     MAC     intravenous induction     Anesthetic plan, all risks, benefits, and alternatives have been provided, discussed and informed consent has been obtained with: legal guardian, healthcare power of  and child.

## 2021-05-27 NOTE — ANESTHESIA POSTPROCEDURE EVALUATION
Patient: Kierra Scales    Procedure Summary     Date: 05/27/21 Room / Location: [facility] CATH/EP LAB 3 / [facility] CATH INVASIVE LOCATION    Anesthesia Start: 0906 Anesthesia Stop: 1039    Procedure: PPM generator change - dual (N/A ) Diagnosis:       SSS (sick sinus syndrome) (CMS/HCC)      Pacemaker generator end of life    Providers: Karol Valenzuela MD Provider: Deejay Nuñez MD    Anesthesia Type: MAC ASA Status: 4          Anesthesia Type: MAC    Vitals  Vitals Value Taken Time   BP     Temp     Pulse     Resp     SpO2 96 % 05/27/21 0924           Post Anesthesia Care and Evaluation    Patient location during evaluation: PHASE II  Patient participation: waiting for patient participation  Level of consciousness: awake and alert  Pain score: 0  Pain management: adequate  Airway patency: patent  Anesthetic complications: No anesthetic complications  PONV Status: none  Cardiovascular status: acceptable  Respiratory status: acceptable  Hydration status: acceptable

## (undated) DEVICE — CANN SMPL SOFTECH BIFLO ETCO2 A/M 7FT

## (undated) DEVICE — GLV SURG SENSICARE GREEN W/ALOE PF LF 6.5 STRL

## (undated) DEVICE — PK LAP CHOLE LF 60

## (undated) DEVICE — SPNG GZ WOVN 4X4IN 12PLY 10/BX STRL

## (undated) DEVICE — SINGLE-USE BIOPSY FORCEPS: Brand: RADIAL JAW 4

## (undated) DEVICE — GOWN,AURORA,NOREINF,RAGLAN,XL,STERILE: Brand: MEDLINE

## (undated) DEVICE — SUT PDS CLOSURE CT1 1/0 27IN Z341H

## (undated) DEVICE — ENDOPATH XCEL BLADELESS TROCARS WITH STABILITY SLEEVES: Brand: ENDOPATH XCEL

## (undated) DEVICE — MEDI-VAC YANKAUER SUCTION HANDLE W/BULBOUS TIP: Brand: CARDINAL HEALTH

## (undated) DEVICE — SUT VIC 3/0 TIES 18IN J110T

## (undated) DEVICE — GLV SURG SENSICARE ALOE LF PF SZ7.5 GRN

## (undated) DEVICE — GLV SURG TRIUMPH NATURAL W/ALOE PF LTX 7 STRL

## (undated) DEVICE — SPNG LAP 18X18IN LF STRL PK/5

## (undated) DEVICE — SINGLE USE INJECTOR: Brand: SINGLE USE INJECTOR

## (undated) DEVICE — GLV SURG TRIUMPH LT PF LTX 7.5 STRL

## (undated) DEVICE — SUT VIC 2/0 TIES 18IN J111T

## (undated) DEVICE — GLV SURG TRIUMPH LT PF LTX 6 STRL

## (undated) DEVICE — WOUND RETRACTOR AND PROTECTOR: Brand: ALEXIS O WOUND PROTECTOR-RETRACTOR

## (undated) DEVICE — ELECTRODE,RT,STRESS,FOAM,50PK: Brand: MEDLINE

## (undated) DEVICE — MEDIUM VISCERA RETAINER: Brand: VISCERA RETAINER, FISH

## (undated) DEVICE — GLV SURG SENSICARE GREEN W/ALOE PF LF 8 STRL

## (undated) DEVICE — PK PM 60

## (undated) DEVICE — TOTAL TRAY, 16FR 10ML SIL FOLEY, URN: Brand: MEDLINE

## (undated) DEVICE — 1314 FOAM STRIP NEEDLE COUNTER: Brand: DEVON

## (undated) DEVICE — STERILE POLYISOPRENE POWDER-FREE SURGICAL GLOVES WITH EMOLLIENT COATING: Brand: PROTEXIS

## (undated) DEVICE — SKIN AFFIX SURG ADHESIVE 72/CS 0.55ML: Brand: MEDLINE

## (undated) DEVICE — GLV SURG TRIUMPH LT PF LTX 6.5 STRL

## (undated) DEVICE — TOWEL,OR,DSP,ST,BLUE,DLX,4/PK,20PK/CS: Brand: MEDLINE

## (undated) DEVICE — SUT VICRYL 3-0 SH-1 PO 18IN J772D

## (undated) DEVICE — MARKR SPOT ENDOSCOPIC LESION INJ

## (undated) DEVICE — ACCESS PLATFORM FOR MINIMALLY INVASIVE SURGERY.: Brand: GELPORT® LAPAROSCOPIC  SYSTEM

## (undated) DEVICE — ELECTRD BLD STD SS 3/32X6.5IN

## (undated) DEVICE — COUNT NDL FOAM STRIP W/MAG 60CT

## (undated) DEVICE — STERILE POLYISOPRENE POWDER-FREE SURGICAL GLOVES: Brand: PROTEXIS

## (undated) DEVICE — ENSEAL LAPAROSCOPIC TISSUE SEALER G2 ARTICULATING  CURVED JAW FOR USE WITH G2 GENERATOR 5MM DIAMETER 35CM SHAFT LENGTH: Brand: ENSEAL

## (undated) DEVICE — GLV SURG TRIUMPH LT PF LTX 7 STRL

## (undated) DEVICE — ANTIBACTERIAL UNDYED BRAIDED (POLYGLACTIN 910), SYNTHETIC ABSORBABLE SUTURE: Brand: COATED VICRYL